# Patient Record
Sex: FEMALE | Race: WHITE | NOT HISPANIC OR LATINO | Employment: OTHER | URBAN - NONMETROPOLITAN AREA
[De-identification: names, ages, dates, MRNs, and addresses within clinical notes are randomized per-mention and may not be internally consistent; named-entity substitution may affect disease eponyms.]

---

## 2017-01-17 ENCOUNTER — OFFICE VISIT (OUTPATIENT)
Dept: OTOLARYNGOLOGY | Facility: CLINIC | Age: 82
End: 2017-01-17

## 2017-01-17 VITALS
TEMPERATURE: 97.8 F | DIASTOLIC BLOOD PRESSURE: 74 MMHG | BODY MASS INDEX: 25.39 KG/M2 | WEIGHT: 158 LBS | HEART RATE: 64 BPM | SYSTOLIC BLOOD PRESSURE: 114 MMHG | HEIGHT: 66 IN

## 2017-01-17 DIAGNOSIS — R09.89 GLOBUS SENSATION: Primary | ICD-10-CM

## 2017-01-17 DIAGNOSIS — H69.83 EUSTACHIAN TUBE DYSFUNCTION, BILATERAL: ICD-10-CM

## 2017-01-17 DIAGNOSIS — J30.1 SEASONAL ALLERGIC RHINITIS DUE TO POLLEN: ICD-10-CM

## 2017-01-17 DIAGNOSIS — K21.9 GASTROESOPHAGEAL REFLUX DISEASE, ESOPHAGITIS PRESENCE NOT SPECIFIED: ICD-10-CM

## 2017-01-17 PROBLEM — J30.9 ALLERGIC RHINITIS: Status: ACTIVE | Noted: 2017-01-17

## 2017-01-17 PROBLEM — H69.80 EUSTACHIAN TUBE DYSFUNCTION: Status: ACTIVE | Noted: 2017-01-17

## 2017-01-17 PROCEDURE — 99213 OFFICE O/P EST LOW 20 MIN: CPT | Performed by: NURSE PRACTITIONER

## 2017-01-17 RX ORDER — BUMETANIDE 0.5 MG/1
0.5 TABLET ORAL DAILY
COMMUNITY
Start: 2017-01-11 | End: 2019-04-15

## 2017-01-17 RX ORDER — LOSARTAN POTASSIUM 50 MG/1
TABLET ORAL
COMMUNITY
End: 2017-09-25 | Stop reason: ALTCHOICE

## 2017-01-17 NOTE — MR AVS SNAPSHOT
Honey Canales   1/17/2017 1:30 PM   Office Visit    Dept Phone:  [phone]   Encounter #:  [id number]    Provider:  YISEL Anderson   Department:  [facility]                Your Full Care Plan              Today's Medication Changes          These changes are accurate as of: 1/17/17  4:18 PM.  If you have any questions, ask your nurse or doctor.               Stop taking medication(s)listed here:     esomeprazole 20 MG capsule   Commonly known as:  nexIUM   Stopped by:  YISEL Anderson           losartan-hydrochlorothiazide 100-25 MG per tablet   Commonly known as:  HYZAAR   Stopped by:  YISEL Anderson           metFORMIN 500 MG tablet   Commonly known as:  GLUCOPHAGE   Stopped by:  YISEL Anderson           MYRBETRIQ 25 MG tablet sustained-release 24 hour   Generic drug:  Mirabegron ER   Stopped by:  YISEL Anderson                      Your Updated Medication List          This list is accurate as of: 1/17/17  4:18 PM.  Always use your most recent med list.                atorvastatin 80 MG tablet   Commonly known as:  LIPITOR       bumetanide 0.5 MG tablet   Commonly known as:  BUMEX       fluticasone 50 MCG/ACT nasal spray   Commonly known as:  FLONASE   USE 1 SPRAY NASALLY DAILY       glipiZIDE 5 MG tablet   Commonly known as:  GLUCOTROL       insulin detemir 100 UNIT/ML injection   Commonly known as:  LEVEMIR       levothyroxine 75 MCG tablet   Commonly known as:  SYNTHROID, LEVOTHROID       losartan 50 MG tablet   Commonly known as:  COZAAR       montelukast 10 MG tablet   Commonly known as:  SINGULAIR   TAKE 1 TABLET DAILY IN THE EVENING       PARoxetine 10 MG tablet   Commonly known as:  PAXIL       XYZAL PO               You Were Diagnosed With        Codes Comments    Globus sensation    -  Primary ICD-10-CM: F45.8  ICD-9-CM: 306.4     Gastroesophageal reflux disease, esophagitis presence not specified      ICD-10-CM: K21.9  ICD-9-CM: 530.81     Seasonal allergic rhinitis due to pollen     ICD-10-CM: J30.1  ICD-9-CM: 477.0     Eustachian tube dysfunction, bilateral     ICD-10-CM: H69.83  ICD-9-CM: 381.81       Instructions    Sleep with the head of bed on an incline. No large meals 1-2 hrs prior to sleep. The patient was instructed to use PPI's 30 minutes prior to the last meal of the day. The patient was advised to avoid fatty meals and caffine or alcohol prior to sleep. Increase water/ non caffeinated drink intake to at least 6-8 glasses of  a day. Decrease caffeine intake. Plain 600 mg Mucinex over the counter, twice a day, as needed to thin out secretions.      ALL ABOUT HEARTBURN AND THE LIFESTYLE CHANGES THAT HELP    Lifestyle Changes Can Help Relieve The Burning Pain In Your Tummy    What is Heartburn?  Heartburn occurs when the lining of the esophagus (the tube that connects the mouth to the stomach) is exposed to and irritated by stomach acid.  Heartburn often feels like a burning pain in the middle of your chest that moves up your throat.  You may also have the sensation that food has come back into your mouth, leaving a sour or bitter taste.  Almost everyone has heartburn once in a while.  But if you have heartburn 2 or more times per week, it can be a sign of a more serious problem call gastroesophogeal reflux disease, or GERD.    What causes Heartburn?  Heartburn usually occurs when the valve at the suzanne of the stomach (the lower esophageal sphincter or LES) doesn’t close the way it should.  If the LES is weak or opens at the wrong time, stomach acid can reflux (flow back) into the esophagus and cause heartburn.  Factors that can affect the LES include:    • Eating or drinking certain foods and beverages such as chocolate, peppermint, fried or fatty foods, coffee, or drinks that contain alcohol  • Having a hiatal hernia - a common physical condition where part of the stomach protrudes up through the  diaphragm  • Lying down  • Smoking cigarettes  • Taking certain medications (be sure to tell your doctor about all medications or supplements you take)    What foods can make heartburn worse?  All foods cause the stomach to produce acid, although foods can affect people in different ways.  Following is a list of foods and beverages that may aggravate your symptoms.  You may want to eat them less often.    • Fried or fatty foods  • Heavy seasoning and spicy foods  • Coffee  • Onions  • Orange juice, grapefruit juice, and tomato juice  • Alcoholic beverages  • Chocolate  • Peppermint and spearmint    What else can I do to help control my heartburn?  Try these lifestyle changes:  • Stop Smoking  • Lose weight - if you’re overweight  • Exercise to help control your weight (talk to your doctor first before starting any exercise program).  • Eat small, well-balanced meals  • Reduce abdominal pressure-don’t wear tight belts or tight clothing  • Avoid eating within 2 hours before bedtime  • Elevate your bed so the head is 6 to 8 inches higher than the foot.  This can help reduce acid reflux at night  • Let your doctor know about all the drugs and supplements you are taking.  Some of them may contribute to your heartburn.    What if these things don’t work?  Talk to your doctor, who can discuss many treatments with you.  Although there are several possible causes of heartburn associated with GERD, they all involve stomach acids.  So no matter what the cause may be, the medicines to reduce stomach acid can prevent heartburn.         Patient Instructions History      Upcoming Appointments     Visit Type Date Time Department    FOLLOW UP 1/17/2017  1:30 PM [facility] [place]    FOLLOW UP 2/21/2017  1:00 PM [facility]    FOLLOW UP 7/18/2017  1:30 PM [facility]     [facility] MyChart allows you to send messages to your doctor, view your test results, renew your prescriptions, schedule appointments,  "and more. To sign up, go to [employer] and click on the Sign Up Now link in the New User? box. Enter your [employer] Activation Code exactly as it appears below along with the last four digits of your Social Security Number and your Date of Birth () to complete the sign-up process. If you do not sign up before the expiration date, you must request a new code.    [employer] Activation Code: [email or web address]  Expires: 2017  4:18 PM    If you have questions, you can email [email or web address] or call [phone] to talk to our [employer] staff. Remember, [employer] is NOT to be used for urgent needs. For medical emergencies, dial 911.               Other Info from Your Visit           Your Appointments     2017  1:00 PM CST   Follow Up with Archie Avery MD   [facility] (--)    [address]  [facility] [id number] [phone]           Arrive 15 minutes prior to appointment.            2017  1:30 PM CDT   Follow Up with YISEL Anderson   [facility] (--)    [address]  [place] [address] [phone]           Arrive 15 minutes prior to appointment.              Allergies     Accupril [Quinapril Hcl]      Adhesive Tape      Aspirin      Celebrex [Celecoxib]  Nausea Only    Codeine Phosphate [Codeine]      Collagen      CAT GUT SUTURES    Lyrica [Pregabalin]      Sulfa Antibiotics      Tramadol      Vioxx [Rofecoxib]        Reason for Visit     Sinus Problem Drainage/Constant Throat clearing    Dizziness Imbalance      Vital Signs     Blood Pressure Pulse Temperature Height Weight Body Mass Index    114/74 64 97.8 °F (36.6 °C) 66\" (167.6 cm) 158 lb (71.7 kg) 25.5 kg/m2    Smoking Status                   Former Smoker           Problems and Diagnoses Noted     Nasal inflammation due to allergen    Eustachian tube dysfunction    Acid reflux disease    Sensation of lump in throat        "

## 2017-01-17 NOTE — PROGRESS NOTES
PRIMARY CARE PROVIDER: Devon Zuñiga MD  REFERRING PROVIDER: No ref. provider found    Chief Complaint   Patient presents with   • Sinus Problem     Drainage/Constant Throat clearing   • Dizziness     Imbalance       Subjective   History of Present Illness:  Honey Canales is a  83 y.o.  female who complains of allergy, acid reflux and a globus sensation. The symptoms are not localized to a particular location. The patient has had moderate symptoms. The symptoms have been intermittant for the last several months . The symptoms are aggravated by  no identifiable factors . The symptoms are improved by medications.    Review of Systems:  Review of Systems   Constitutional: Negative for chills and fever.   HENT:        See HPI   Eyes: Positive for redness and itching.   Respiratory: Positive for cough. Negative for shortness of breath.    Gastrointestinal: Negative for nausea and vomiting.   Endocrine: Negative.    Allergic/Immunologic: Positive for environmental allergies. Negative for food allergies.   Neurological: Positive for dizziness and headaches.   Hematological: Negative.    Psychiatric/Behavioral: Negative.        Past History:  Past Medical History   Diagnosis Date   • Allergic rhinitis    • Aneurysm    • Arthritis    • Cerebral aneurysm 2009     2ND ONE FOUND   • Chronic laryngitis    • Deviated nasal septum    • Diabetes mellitus    • Disorder of kidney and ureter    • Disturbance of smell and taste    • Dizziness    • Encounter for imaging to screen for metal prior to MRI      NO MRI, METAL CLIPS IN HEAD   • Eustachian tube dysfunction    • GERD (gastroesophageal reflux disease)    • Headache    • Hyperlipidemia    • Hypertension    • Hypothyroid    • Laryngitis sicca    • Nocturia    • Sensorineural hearing loss    • Spinal stenosis    • Urge incontinence    • Urgency of urination    • Urinary frequency    • UTI (urinary tract infection)      Past Surgical History   Procedure Laterality Date   • Brain  surgery       ANUERYSM REMOVED   • Hysterectomy     • Breast surgery       BIOPSY   • Cataract extraction, bilateral     • Cerebral aneurysm repair     • Carpal tunnel release     • Knee arthroscopy     •  section     • Cholecystectomy     • Thyroidectomy     • Tonsillectomy     • Bladder surgery  2016     ManagerComplete     Family History   Problem Relation Age of Onset   • Cancer Brother      BLADDER     Social History   Substance Use Topics   • Smoking status: Former Smoker     Types: Cigarettes     Quit date:    • Smokeless tobacco: None      Comment: SMOKED OVER 40 YEARS   • Alcohol use No       Current Outpatient Prescriptions:   •  atorvastatin (LIPITOR) 80 MG tablet, Take 80 mg by mouth daily., Disp: , Rfl:   •  bumetanide (BUMEX) 0.5 MG tablet, , Disp: , Rfl:   •  fluticasone (FLONASE) 50 MCG/ACT nasal spray, USE 1 SPRAY NASALLY DAILY, Disp: 16 g, Rfl: 4  •  glipiZIDE (GLUCOTROL) 5 MG tablet, Take 5 mg by mouth 2 (two) times a day before meals., Disp: , Rfl:   •  insulin detemir (LEVEMIR) 100 UNIT/ML injection, Inject  under the skin daily., Disp: , Rfl:   •  Levocetirizine Dihydrochloride (XYZAL PO), Take  by mouth., Disp: , Rfl:   •  levothyroxine (SYNTHROID, LEVOTHROID) 75 MCG tablet, Take 75 mcg by mouth daily., Disp: , Rfl:   •  losartan (COZAAR) 50 MG tablet, Take 100 mg by mouth daily , Disp: , Rfl:   •  montelukast (SINGULAIR) 10 MG tablet, TAKE 1 TABLET DAILY IN THE EVENING, Disp: 90 tablet, Rfl: 2  •  PARoxetine (PAXIL) 10 MG tablet, Take 10 mg by mouth every morning., Disp: , Rfl:   Allergies:  Accupril [quinapril hcl]; Adhesive tape; Aspirin; Celebrex [celecoxib]; Codeine phosphate [codeine]; Collagen; Lyrica [pregabalin]; Sulfa antibiotics; Tramadol; and Vioxx [rofecoxib]    Objective     Vital Signs:  Temp:  [97.8 °F (36.6 °C)] 97.8 °F (36.6 °C)  Heart Rate:  [64] 64  BP: (114)/(74) 114/74    Physical Exam:  CONSTITUTIONAL: well nourished, well-developed, alert, oriented,  in no acute distress   COMMUNICATION AND VOICE: able to communicate normally for age, normal voice quality  HEAD: normocephalic, no lesions, atraumatic, no tenderness, no masses   FACE: appearance normal, no lesions, no tenderness, no deformities, facial motion symmetric  EYES: ocular motility normal, eyelids normal, orbits normal, no proptosis, conjunctiva normal , pupils equal, round   EARS:  Hearing: response to conversational voice normal bilaterally   External Ears: auricles without lesions  NOSE:  External Nose: structure normal, no tenderness on palpation, no nasal discharge, no lesions, no evidence of trauma, nostrils patent   Intranasal exam: normal nasal mucosa with no erythema, nasal septum relatively midline   ORAL:  Lips: upper and lower lips without lesion   Oropharynx: dry with stringy mucous  NECK: neck appearance normal  CHEST/RESPIRATORY: respiratory effort normal, normal chest excursion  CARDIOVASCULAR: extremities without cyanosis or edema   NEUROLOGIC/PSYCHIATRIC: oriented appropriately, mood normal, affect appropriate, CN II-XII intact grossly      Results Review:   None.     Assessment   1. Globus sensation    2. Gastroesophageal reflux disease, esophagitis presence not specified    3. Seasonal allergic rhinitis due to pollen    4. Eustachian tube dysfunction, bilateral        Plan   Continue current management plan.     -------MEDICATIONS:-------  Continue previous medication as prescribed.        -----INSTRUCTIONS-----  Continue current plan.  Continue nasal sprays as previously prescribed.  Increase water/ non caffeinated drink intake to at least 6-8 glasses a day. Decrease caffeine intake. Plain Mucinex over the counter as needed to thin out secretions.  Sleep with the head of bed on an incline. No large meals 1-2 hrs prior to sleep. The patient was instructed to use PPI's 30 minutes prior to the last meal of the day. The patient was advised to avoid fatty meals and caffine or alcohol  prior to sleep. May use 600mg Mucinex bid as needed to thin secretions.      -----FOLLOW UP-----  Return in about 6 months (around 7/17/2017) for Recheck allergies/GERD.        Beena Grey, YISEL  01/17/17  2:10 PM

## 2017-01-17 NOTE — PATIENT INSTRUCTIONS
Sleep with the head of bed on an incline. No large meals 1-2 hrs prior to sleep. The patient was instructed to use PPI's 30 minutes prior to the last meal of the day. The patient was advised to avoid fatty meals and caffine or alcohol prior to sleep. Increase water/ non caffeinated drink intake to at least 6-8 glasses of  a day. Decrease caffeine intake. Plain 600 mg Mucinex over the counter, twice a day, as needed to thin out secretions.      ALL ABOUT HEARTBURN AND THE LIFESTYLE CHANGES THAT HELP    Lifestyle Changes Can Help Relieve The Burning Pain In Your Tummy    What is Heartburn?  Heartburn occurs when the lining of the esophagus (the tube that connects the mouth to the stomach) is exposed to and irritated by stomach acid.  Heartburn often feels like a burning pain in the middle of your chest that moves up your throat.  You may also have the sensation that food has come back into your mouth, leaving a sour or bitter taste.  Almost everyone has heartburn once in a while.  But if you have heartburn 2 or more times per week, it can be a sign of a more serious problem call gastroesophogeal reflux disease, or GERD.    What causes Heartburn?  Heartburn usually occurs when the valve at the suzanne of the stomach (the lower esophageal sphincter or LES) doesn’t close the way it should.  If the LES is weak or opens at the wrong time, stomach acid can reflux (flow back) into the esophagus and cause heartburn.  Factors that can affect the LES include:    • Eating or drinking certain foods and beverages such as chocolate, peppermint, fried or fatty foods, coffee, or drinks that contain alcohol  • Having a hiatal hernia - a common physical condition where part of the stomach protrudes up through the diaphragm  • Lying down  • Smoking cigarettes  • Taking certain medications (be sure to tell your doctor about all medications or supplements you take)    What foods can make heartburn worse?  All foods cause the stomach to  produce acid, although foods can affect people in different ways.  Following is a list of foods and beverages that may aggravate your symptoms.  You may want to eat them less often.    • Fried or fatty foods  • Heavy seasoning and spicy foods  • Coffee  • Onions  • Orange juice, grapefruit juice, and tomato juice  • Alcoholic beverages  • Chocolate  • Peppermint and spearmint    What else can I do to help control my heartburn?  Try these lifestyle changes:  • Stop Smoking  • Lose weight - if you’re overweight  • Exercise to help control your weight (talk to your doctor first before starting any exercise program).  • Eat small, well-balanced meals  • Reduce abdominal pressure-don’t wear tight belts or tight clothing  • Avoid eating within 2 hours before bedtime  • Elevate your bed so the head is 6 to 8 inches higher than the foot.  This can help reduce acid reflux at night  • Let your doctor know about all the drugs and supplements you are taking.  Some of them may contribute to your heartburn.    What if these things don’t work?  Talk to your doctor, who can discuss many treatments with you.  Although there are several possible causes of heartburn associated with GERD, they all involve stomach acids.  So no matter what the cause may be, the medicines to reduce stomach acid can prevent heartburn.

## 2017-02-16 ENCOUNTER — TELEPHONE (OUTPATIENT)
Dept: UROLOGY | Facility: CLINIC | Age: 82
End: 2017-02-16

## 2017-02-16 NOTE — TELEPHONE ENCOUNTER
----- Message from Megan Reilly sent at 2/15/2017 11:51 AM CST -----  Contact: [phone]  Patient is still having problems going to bathroom at night, She getting up 4 to 5 times and not making it to bathroom. She said the the [facility] Rep was suppose to call her back and she never did. She wanted to know if the Rep can come to appointment next week with her. She also wants to know if we have a number for the Rep.      Thanks

## 2017-02-23 ENCOUNTER — OFFICE VISIT (OUTPATIENT)
Dept: UROLOGY | Facility: CLINIC | Age: 82
End: 2017-02-23

## 2017-02-23 VITALS
BODY MASS INDEX: 25.07 KG/M2 | DIASTOLIC BLOOD PRESSURE: 66 MMHG | WEIGHT: 156 LBS | TEMPERATURE: 97.1 F | SYSTOLIC BLOOD PRESSURE: 130 MMHG | HEIGHT: 66 IN

## 2017-02-23 DIAGNOSIS — R35.0 FREQUENCY OF URINATION: ICD-10-CM

## 2017-02-23 DIAGNOSIS — R35.1 NOCTURIA: Primary | ICD-10-CM

## 2017-02-23 DIAGNOSIS — N39.41 URGE INCONTINENCE: ICD-10-CM

## 2017-02-23 LAB
BILIRUB BLD-MCNC: NEGATIVE MG/DL
CLARITY, POC: CLEAR
COLOR UR: YELLOW
GLUCOSE UR STRIP-MCNC: NEGATIVE MG/DL
KETONES UR QL: ABNORMAL
LEUKOCYTE EST, POC: NEGATIVE
NITRITE UR-MCNC: NEGATIVE MG/ML
PH UR: 5 [PH] (ref 5–8)
PROT UR STRIP-MCNC: ABNORMAL MG/DL
RBC # UR STRIP: NEGATIVE /UL
SP GR UR: 1.02 (ref 1–1.03)
UROBILINOGEN UR QL: NORMAL

## 2017-02-23 PROCEDURE — 81003 URINALYSIS AUTO W/O SCOPE: CPT | Performed by: UROLOGY

## 2017-02-23 PROCEDURE — 99213 OFFICE O/P EST LOW 20 MIN: CPT | Performed by: UROLOGY

## 2017-02-23 RX ORDER — LOSARTAN POTASSIUM 100 MG/1
100 TABLET ORAL NIGHTLY
COMMUNITY
Start: 2017-02-19 | End: 2019-12-02

## 2017-02-23 RX ORDER — LEVOCETIRIZINE DIHYDROCHLORIDE 5 MG/1
5 TABLET, FILM COATED ORAL EVERY EVENING
COMMUNITY
Start: 2017-02-12 | End: 2020-12-10

## 2017-02-23 NOTE — PROGRESS NOTES
Subjective    Ms. Canales is 83 y.o. female    CHIEF COMPLAINT: Nocturia and urge incontinence    The patient comes back today for follow-up of urgency frequency urgent continence and nocturia she says she does not do to bed during the day but she really has a lot of problems at night she gets up 3 maybe 4 times at night which actually looking back over her history is somewhat improved however which he does get up she cannot quite make it to the bathroom.    There is no gross hematuria there is no dysuria but just irritable-type symptoms.    She is had a InterStim and we will change at the last time about 3 months ago when she was here maybe a little better again as noted above    Again no symptoms of infections at all also I did discuss revascularization with her she is on an Leslie on a diuretic she does take in the morning but I do not know she may be getting a little more fluid at night axes she says she may make more urine at night      History of Present Illness      The following portions of the patient's history were reviewed and updated as appropriate: allergies, current medications, past family history, past medical history, past social history, past surgical history and problem list.    Review of Systems   Constitutional: Negative for fever.   Gastrointestinal: Negative for nausea and vomiting.   Genitourinary: Positive for frequency (3-5 times a night) and urgency. Negative for difficulty urinating.         Current Outpatient Prescriptions:   •  atorvastatin (LIPITOR) 80 MG tablet, Take 80 mg by mouth daily., Disp: , Rfl:   •  bumetanide (BUMEX) 0.5 MG tablet, , Disp: , Rfl:   •  fluticasone (FLONASE) 50 MCG/ACT nasal spray, USE 1 SPRAY NASALLY DAILY, Disp: 16 g, Rfl: 4  •  glipiZIDE (GLUCOTROL) 5 MG tablet, Take 5 mg by mouth 2 (two) times a day before meals., Disp: , Rfl:   •  insulin detemir (LEVEMIR) 100 UNIT/ML injection, Inject  under the skin daily., Disp: , Rfl:   •  levocetirizine (XYZAL) 5 MG  tablet, , Disp: , Rfl:   •  Levocetirizine Dihydrochloride (XYZAL PO), Take  by mouth., Disp: , Rfl:   •  levothyroxine (SYNTHROID, LEVOTHROID) 75 MCG tablet, Take 75 mcg by mouth daily., Disp: , Rfl:   •  losartan (COZAAR) 100 MG tablet, , Disp: , Rfl:   •  losartan (COZAAR) 50 MG tablet, Take 100 mg by mouth daily , Disp: , Rfl:   •  montelukast (SINGULAIR) 10 MG tablet, TAKE 1 TABLET DAILY IN THE EVENING, Disp: 90 tablet, Rfl: 2  •  PARoxetine (PAXIL) 10 MG tablet, Take 10 mg by mouth every morning., Disp: , Rfl:     Past Medical History   Diagnosis Date   • Allergic rhinitis    • Aneurysm    • Arthritis    • Cerebral aneurysm 2009 ONE FOUND   • Chronic laryngitis    • Deviated nasal septum    • Diabetes mellitus    • Disorder of kidney and ureter    • Disturbance of smell and taste    • Dizziness    • Encounter for imaging to screen for metal prior to MRI      NO MRI, METAL CLIPS IN HEAD   • Eustachian tube dysfunction    • GERD (gastroesophageal reflux disease)    • Headache    • Hyperlipidemia    • Hypertension    • Hypothyroid    • Laryngitis sicca    • Nocturia    • Sensorineural hearing loss    • Spinal stenosis    • Urge incontinence    • Urgency of urination    • Urinary frequency    • UTI (urinary tract infection)        Past Surgical History   Procedure Laterality Date   • Brain surgery       ANUERYSM REMOVED   • Hysterectomy     • Breast surgery       BIOPSY   • Cataract extraction, bilateral     • Cerebral aneurysm repair     • Carpal tunnel release     • Knee arthroscopy     •  section     • Cholecystectomy     • Thyroidectomy     • Tonsillectomy     • Bladder surgery  2016     [id number]       Social History     Social History   • Marital status:      Spouse name: N/A   • Number of children: N/A   • Years of education: N/A     Social History Main Topics   • Smoking status: Former Smoker     Types: Cigarettes     Quit date:    • Smokeless tobacco: None       "Comment: SMOKED OVER 40 YEARS   • Alcohol use No   • Drug use: None   • Sexual activity: Not Asked     Other Topics Concern   • None     Social History Narrative       Family History   Problem Relation Age of Onset   • Cancer Brother      BLADDER       Objective    Visit Vitals   • /66   • Temp 97.1 °F (36.2 °C)   • Ht 66\" (167.6 cm)   • Wt 156 lb (70.8 kg)   • BMI 25.18 kg/m2       Physical Exam      Office Visit on 11/21/2016   Component Date Value Ref Range Status   • Color 11/21/2016 Yellow  Yellow, Straw, Dark Yellow, Kirti Final   • Clarity, UA 11/21/2016 Clear  Clear Final   • Glucose, UA 11/21/2016 Negative  Negative mg/dL Final   • Bilirubin 11/21/2016 Negative  Negative Final   • Ketones, UA 11/21/2016 Negative  Negative Final   • Specific Gravity  11/21/2016 1.025  1.005 - 1.030 Final   • Blood, UA 11/21/2016 Negative  Negative Final   • pH, Urine 11/21/2016 5.5  5.0 - 8.0 Final   • Protein, POC 11/21/2016 Negative  Negative mg/dL Final   • Urobilinogen, UA 11/21/2016 Normal  Normal Final   • Leukocytes 11/21/2016 Negative  Negative Final   • Nitrite, UA 11/21/2016 Negative  Negative Final       Results for orders placed or performed in visit on 02/23/17   POC Urinalysis Dipstick, Automated   Result Value Ref Range    Color Yellow Yellow, Straw, Dark Yellow, Kirti    Clarity, UA Clear Clear    Glucose, UA Negative Negative, 1000 mg/dL (3+) mg/dL    Bilirubin Negative Negative    Ketones, UA Trace (A) Negative    Specific Gravity  1.025 1.005 - 1.030    Blood, UA Negative Negative    pH, Urine 5.0 5.0 - 8.0    Protein, POC Trace (A) Negative mg/dL    Urobilinogen, UA Normal Normal    Leukocytes Negative Negative    Nitrite, UA Negative Negative       Assessment and Plan    Diagnoses and all orders for this visit:    Nocturia  -     POC Urinalysis Dipstick, Automated    Urge incontinence    Frequency of urination   plan--I discussed the options with the patient underwent a head and, reprogrammed her " device she was on level I at the power setting of 4.5 and I increased her level I prior setting of 4.8 before she started feeling in the vaginal tissues.    If this does not work or she continues to have symptoms and we will can have her come back when Miranda is here and reprogramming she will let us know otherwise we will see her back in 3 months time    EMR Dragon/Transcription disclaimer:  Much of this encounter note is an electronic transcription/translation of spoken language to printed text. The electronic translation of spoken language may permit erroneous, or at times, nonsensical words or phrases to be inadvertently transcribed; although I have reviewed the note for such errors, some may still exist.

## 2017-03-24 ENCOUNTER — HOSPITAL ENCOUNTER (OUTPATIENT)
Dept: WOMENS IMAGING | Age: 82
Discharge: HOME | End: 2017-03-24
Payer: MEDICARE

## 2017-03-24 DIAGNOSIS — Z12.31 ENCOUNTER FOR SCREENING MAMMOGRAM FOR BREAST CANCER: ICD-10-CM

## 2017-03-24 PROCEDURE — 77063 BREAST TOMOSYNTHESIS BI: CPT

## 2017-05-23 ENCOUNTER — OFFICE VISIT (OUTPATIENT)
Dept: UROLOGY | Facility: CLINIC | Age: 82
End: 2017-05-23

## 2017-05-23 ENCOUNTER — TELEPHONE (OUTPATIENT)
Dept: UROLOGY | Facility: CLINIC | Age: 82
End: 2017-05-23

## 2017-05-23 VITALS
WEIGHT: 157 LBS | DIASTOLIC BLOOD PRESSURE: 68 MMHG | HEIGHT: 66 IN | SYSTOLIC BLOOD PRESSURE: 132 MMHG | TEMPERATURE: 96.6 F | BODY MASS INDEX: 25.23 KG/M2

## 2017-05-23 DIAGNOSIS — R35.0 FREQUENCY OF URINATION: ICD-10-CM

## 2017-05-23 DIAGNOSIS — R35.1 NOCTURIA: Primary | ICD-10-CM

## 2017-05-23 DIAGNOSIS — N39.41 URGE INCONTINENCE: ICD-10-CM

## 2017-05-23 DIAGNOSIS — R32 INCONTINENCE IN FEMALE: ICD-10-CM

## 2017-05-23 LAB
BILIRUB BLD-MCNC: NEGATIVE MG/DL
CLARITY, POC: CLEAR
COLOR UR: YELLOW
GLUCOSE UR STRIP-MCNC: ABNORMAL MG/DL
KETONES UR QL: NEGATIVE
LEUKOCYTE EST, POC: NEGATIVE
NITRITE UR-MCNC: NEGATIVE MG/ML
PH UR: 5.5 [PH] (ref 5–8)
PROT UR STRIP-MCNC: NEGATIVE MG/DL
RBC # UR STRIP: ABNORMAL /UL
SP GR UR: 10.02 (ref 1–1.03)
UROBILINOGEN UR QL: NORMAL

## 2017-05-23 PROCEDURE — 81003 URINALYSIS AUTO W/O SCOPE: CPT | Performed by: UROLOGY

## 2017-05-23 PROCEDURE — 51798 US URINE CAPACITY MEASURE: CPT | Performed by: UROLOGY

## 2017-05-23 PROCEDURE — 99213 OFFICE O/P EST LOW 20 MIN: CPT | Performed by: UROLOGY

## 2017-06-06 ENCOUNTER — TELEPHONE (OUTPATIENT)
Dept: UROLOGY | Facility: CLINIC | Age: 82
End: 2017-06-06

## 2017-06-06 NOTE — TELEPHONE ENCOUNTER
Patient called to see when Miranda the [employer] rep would back in the office. Her interstim has not improved the issue. I spoke with Marty to get in touch with Miranda. I told the patient we would call her once we knew a date.

## 2017-06-08 ENCOUNTER — HOSPITAL ENCOUNTER (OUTPATIENT)
Dept: ULTRASOUND IMAGING | Facility: HOSPITAL | Age: 82
Discharge: HOME | End: 2017-06-08
Attending: INTERNAL MEDICINE | Admitting: INTERNAL MEDICINE

## 2017-06-08 ENCOUNTER — TRANSCRIBE ORDERS (OUTPATIENT)
Dept: GENERAL RADIOLOGY | Facility: HOSPITAL | Age: 82
End: 2017-06-08

## 2017-06-08 DIAGNOSIS — R55 NEAR SYNCOPE: ICD-10-CM

## 2017-06-08 DIAGNOSIS — R55 NEAR SYNCOPE: Primary | ICD-10-CM

## 2017-06-08 PROCEDURE — 93880 EXTRACRANIAL BILAT STUDY: CPT

## 2017-07-06 ENCOUNTER — TELEPHONE (OUTPATIENT)
Dept: UROLOGY | Facility: CLINIC | Age: 82
End: 2017-07-06

## 2017-07-06 NOTE — TELEPHONE ENCOUNTER
Pt called said the program Miranda changed her interstim to last time did not work. I have sent Miranda a message via text to see what her advice is to do next with that patient. I will also be sending Dr. Avery a message to see when he would like to see patient back in the office since it doesn't specify in last office note in May,

## 2017-07-11 NOTE — TELEPHONE ENCOUNTER
Marty is go ahead and get her an appointment when Miranda is here sometime the near future when I think I am here

## 2017-08-29 ENCOUNTER — OFFICE VISIT (OUTPATIENT)
Dept: UROLOGY | Facility: CLINIC | Age: 82
End: 2017-08-29

## 2017-08-29 VITALS — BODY MASS INDEX: 25.71 KG/M2 | TEMPERATURE: 97 F | WEIGHT: 160 LBS | HEIGHT: 66 IN

## 2017-08-29 DIAGNOSIS — R35.1 NOCTURIA: ICD-10-CM

## 2017-08-29 DIAGNOSIS — N39.41 URGE INCONTINENCE: Primary | ICD-10-CM

## 2017-08-29 DIAGNOSIS — R35.0 FREQUENCY OF URINATION: ICD-10-CM

## 2017-08-29 LAB
BILIRUB BLD-MCNC: NEGATIVE MG/DL
CLARITY, POC: CLEAR
COLOR UR: YELLOW
GLUCOSE UR STRIP-MCNC: NEGATIVE MG/DL
KETONES UR QL: NEGATIVE
LEUKOCYTE EST, POC: NEGATIVE
NITRITE UR-MCNC: NEGATIVE MG/ML
PH UR: 6 [PH] (ref 5–8)
PROT UR STRIP-MCNC: NEGATIVE MG/DL
RBC # UR STRIP: NEGATIVE /UL
SP GR UR: 1.01 (ref 1–1.03)
UROBILINOGEN UR QL: NORMAL

## 2017-08-29 PROCEDURE — 99213 OFFICE O/P EST LOW 20 MIN: CPT | Performed by: UROLOGY

## 2017-08-29 PROCEDURE — 81003 URINALYSIS AUTO W/O SCOPE: CPT | Performed by: UROLOGY

## 2017-08-29 NOTE — PROGRESS NOTES
Subjective    Ms. Canales is 84 y.o. female    CHIEF COMPLAINT: Urge incontinence    The patient comes back today apparently about 2 weeks ago she was able to get hold of the Miranda and another ref for [employer] N/A changed her power settings and her settings and actually she was doing great unfortunately recently she is also had a tooth infection she is been on steroids and antibiotics and this is causing her diabetes to go way out of control so she is having to void a lot more frequently and of course with the frequency, little more urgency etc.    She is not had any gross hematuria there is no flank pain.    Her urinalysis today is clear            History of Present Illness      The following portions of the patient's history were reviewed and updated as appropriate: allergies, current medications, past family history, past medical history, past social history, past surgical history and problem list.    Review of Systems   Constitutional: Negative for chills and fever.   Gastrointestinal: Negative for abdominal pain, anal bleeding and blood in stool.   Genitourinary: Positive for frequency and urgency. Negative for difficulty urinating, dysuria, flank pain and hematuria.         Current Outpatient Prescriptions:   •  atorvastatin (LIPITOR) 80 MG tablet, Take 80 mg by mouth daily., Disp: , Rfl:   •  bumetanide (BUMEX) 0.5 MG tablet, , Disp: , Rfl:   •  fluticasone (FLONASE) 50 MCG/ACT nasal spray, USE 1 SPRAY NASALLY DAILY, Disp: 16 g, Rfl: 4  •  glipiZIDE (GLUCOTROL) 5 MG tablet, Take 5 mg by mouth 2 (two) times a day before meals., Disp: , Rfl:   •  insulin detemir (LEVEMIR) 100 UNIT/ML injection, Inject  under the skin daily., Disp: , Rfl:   •  levocetirizine (XYZAL) 5 MG tablet, , Disp: , Rfl:   •  Levocetirizine Dihydrochloride (XYZAL PO), Take  by mouth., Disp: , Rfl:   •  levothyroxine (SYNTHROID, LEVOTHROID) 75 MCG tablet, Take 75 mcg by mouth daily., Disp: , Rfl:   •  losartan (COZAAR) 100 MG tablet, ,  Disp: , Rfl:   •  losartan (COZAAR) 50 MG tablet, Take 100 mg by mouth daily , Disp: , Rfl:   •  montelukast (SINGULAIR) 10 MG tablet, TAKE 1 TABLET DAILY IN THE EVENING, Disp: 90 tablet, Rfl: 2  •  PARoxetine (PAXIL) 10 MG tablet, Take 10 mg by mouth every morning., Disp: , Rfl:     Past Medical History:   Diagnosis Date   • Allergic rhinitis    • Aneurysm    • Arthritis    • Cerebral aneurysm 2009    2ND ONE FOUND   • Chronic laryngitis    • Deviated nasal septum    • Diabetes mellitus    • Disorder of kidney and ureter    • Disturbance of smell and taste    • Dizziness    • Encounter for imaging to screen for metal prior to MRI     NO MRI, METAL CLIPS IN HEAD   • Eustachian tube dysfunction    • GERD (gastroesophageal reflux disease)    • Headache    • Hyperlipidemia    • Hypertension    • Hypothyroid    • Laryngitis sicca    • Nocturia    • Sensorineural hearing loss    • Spinal stenosis    • Urge incontinence    • Urgency of urination    • Urinary frequency    • UTI (urinary tract infection)        Past Surgical History:   Procedure Laterality Date   • BLADDER SURGERY  2016    [employer] Interstem   • BRAIN SURGERY      ANUERYSM REMOVED   • BREAST SURGERY      BIOPSY   • CARPAL TUNNEL RELEASE     • CATARACT EXTRACTION, BILATERAL     • CEREBRAL ANEURYSM REPAIR     •  SECTION     • CHOLECYSTECTOMY     • HYSTERECTOMY     • KNEE ARTHROSCOPY     • THYROIDECTOMY     • TONSILLECTOMY         Social History     Social History   • Marital status:      Spouse name: N/A   • Number of children: N/A   • Years of education: N/A     Social History Main Topics   • Smoking status: Former Smoker     Types: Cigarettes     Quit date:    • Smokeless tobacco: None      Comment: SMOKED OVER 40 YEARS   • Alcohol use No   • Drug use: None   • Sexual activity: Not Asked     Other Topics Concern   • None     Social History Narrative       Family History   Problem Relation Age of Onset   • Cancer Brother      BLADDER  "      Objective    Temp 97 °F (36.1 °C)  Ht 66\" (167.6 cm)  Wt 160 lb (72.6 kg)  BMI 25.82 kg/m2    Physical Exam      Office Visit on 05/23/2017   Component Date Value Ref Range Status   • Color 05/23/2017 Yellow  Yellow, Straw, Dark Yellow, Kirti Final   • Clarity, UA 05/23/2017 Clear  Clear Final   • Glucose, UA 05/23/2017 100 mg/dL* Negative, 1000 mg/dL (3+) mg/dL Final   • Bilirubin 05/23/2017 Negative  Negative Final   • Ketones, UA 05/23/2017 Negative  Negative Final   • Specific Gravity  05/23/2017 10.020* 1.005 - 1.030 Final   • Blood, UA 05/23/2017 Trace* Negative Final   • pH, Urine 05/23/2017 5.5  5.0 - 8.0 Final   • Protein, POC 05/23/2017 Negative  Negative mg/dL Final   • Urobilinogen, UA 05/23/2017 Normal  Normal Final   • Leukocytes 05/23/2017 Negative  Negative Final   • Nitrite, UA 05/23/2017 Negative  Negative Final       Results for orders placed or performed in visit on 08/29/17   POC Urinalysis Dipstick, Automated   Result Value Ref Range    Color Yellow Yellow, Straw, Dark Yellow, Kirti    Clarity, UA Clear Clear    Glucose, UA Negative Negative, 1000 mg/dL (3+) mg/dL    Bilirubin Negative Negative    Ketones, UA Negative Negative    Specific Gravity  1.010 1.005 - 1.030    Blood, UA Negative Negative    pH, Urine 6.0 5.0 - 8.0    Protein, POC Negative Negative mg/dL    Urobilinogen, UA Normal Normal    Leukocytes Negative Negative    Nitrite, UA Negative Negative       Assessment and Plan    Diagnoses and all orders for this visit:    Urge incontinence  -     POC Urinalysis Dipstick, Automated    Nocturia    Frequency of urination  Plan--she questions, what to do with the InterStim at this point I told her until she is off the steroids and antibiotics and is to this fixed I would expect may be that the diabetes would be a lot worse and that may affect her frequency of voiding.    I told her really that that will be helped probably by the InterStim except that she may be would be able to " hold a little more volume so I would wait until her tooth and infection etc. is improved she is off steroids and antibiotics and her etc. before we get too concerned about this.    She has had really the last couple weeks prior to this was the best she had with her bladder site told her I would not therefore change the settings at this point in time and less just leave it as it is.    On was here back again in 3 months but if she gets the infection cleared up and still having trouble she will let us know prior to that

## 2017-09-14 RX ORDER — FLUTICASONE PROPIONATE 50 MCG
SPRAY, SUSPENSION (ML) NASAL
Qty: 16 G | Refills: 4 | Status: SHIPPED | OUTPATIENT
Start: 2017-09-14 | End: 2018-01-02 | Stop reason: SINTOL

## 2017-09-25 ENCOUNTER — OFFICE VISIT (OUTPATIENT)
Dept: OTOLARYNGOLOGY | Facility: CLINIC | Age: 82
End: 2017-09-25

## 2017-09-25 VITALS
WEIGHT: 160.13 LBS | TEMPERATURE: 97.8 F | BODY MASS INDEX: 25.73 KG/M2 | HEART RATE: 69 BPM | HEIGHT: 66 IN | SYSTOLIC BLOOD PRESSURE: 107 MMHG | DIASTOLIC BLOOD PRESSURE: 66 MMHG

## 2017-09-25 DIAGNOSIS — J30.1 SEASONAL ALLERGIC RHINITIS DUE TO POLLEN: Primary | ICD-10-CM

## 2017-09-25 PROCEDURE — 99213 OFFICE O/P EST LOW 20 MIN: CPT | Performed by: NURSE PRACTITIONER

## 2017-09-25 NOTE — PROGRESS NOTES
YOB: 1933  Location: [facility]  Location Address: [address] [place] [address], [facility], [address] [place] [address]  Location Phone: [phone]    Chief Complaint   Patient presents with   • Ear Problem       History of Present Illness  Honey Canales is a 84 y.o. female.  Honey Canales is here for follow up of ENT complaints. The patient has had problems with nasal drainage  The symptoms are not localized to a particular location. The patient has had mild symptoms. The symptoms have been present for the last several months The symptoms are aggravated by  no identifiable factors. The symptoms are improved by no identifiable factors. Her PCP is giving her her allergy meds       Past Medical History:   Diagnosis Date   • Allergic rhinitis    • Aneurysm    • Arthritis    • Cerebral aneurysm 2009 ONE FOUND   • Chronic laryngitis    • Deviated nasal septum    • Diabetes mellitus    • Disorder of kidney and ureter    • Disturbance of smell and taste    • Dizziness    • Encounter for imaging to screen for metal prior to MRI     NO MRI, METAL CLIPS IN HEAD   • Eustachian tube dysfunction    • GERD (gastroesophageal reflux disease)    • Globus sensation    • Headache    • Hyperlipidemia    • Hypertension    • Hypothyroid    • Laryngitis sicca    • Nocturia    • Sensorineural hearing loss    • Spinal stenosis    • Urge incontinence    • Urgency of urination    • Urinary frequency    • UTI (urinary tract infection)        Past Surgical History:   Procedure Laterality Date   • BLADDER SURGERY  2016    [employer] Interstem   • BRAIN SURGERY      ANUERYSM REMOVED   • BREAST SURGERY      BIOPSY   • CARPAL TUNNEL RELEASE     • CATARACT EXTRACTION, BILATERAL     • CEREBRAL ANEURYSM REPAIR     •  SECTION     • CHOLECYSTECTOMY     • HYSTERECTOMY     • KNEE ARTHROSCOPY     • THYROIDECTOMY     • TONSILLECTOMY           Current Outpatient Prescriptions:   •  atorvastatin (LIPITOR) 80 MG tablet, Take 80 mg by mouth  daily., Disp: , Rfl:   •  bumetanide (BUMEX) 0.5 MG tablet, , Disp: , Rfl:   •  fluticasone (FLONASE) 50 MCG/ACT nasal spray, USE 1 SPRAY NASALLY DAILY, Disp: 16 g, Rfl: 4  •  insulin detemir (LEVEMIR) 100 UNIT/ML injection, Inject  under the skin daily., Disp: , Rfl:   •  levocetirizine (XYZAL) 5 MG tablet, , Disp: , Rfl:   •  levothyroxine (SYNTHROID, LEVOTHROID) 75 MCG tablet, Take 75 mcg by mouth daily., Disp: , Rfl:   •  losartan (COZAAR) 100 MG tablet, , Disp: , Rfl:   •  montelukast (SINGULAIR) 10 MG tablet, TAKE 1 TABLET DAILY IN THE EVENING, Disp: 90 tablet, Rfl: 2  •  PARoxetine (PAXIL) 10 MG tablet, Take 10 mg by mouth every morning., Disp: , Rfl:     Accupril [quinapril hcl]; Adhesive tape; Aspirin; Celebrex [celecoxib]; Codeine phosphate [codeine]; Collagen; Lyrica [pregabalin]; Pantoprazole sodium; Pentoxifylline; Sulfa antibiotics; Tramadol; and Vioxx [rofecoxib]    Family History   Problem Relation Age of Onset   • Cancer Brother      BLADDER       Social History     Social History   • Marital status:      Spouse name: N/A   • Number of children: N/A   • Years of education: N/A     Occupational History   • Not on file.     Social History Main Topics   • Smoking status: Former Smoker     Types: Cigarettes     Quit date: 1993   • Smokeless tobacco: Never Used      Comment: SMOKED OVER 40 YEARS   • Alcohol use No   • Drug use: Defer   • Sexual activity: Not on file     Other Topics Concern   • Not on file     Social History Narrative       Review of Systems   Constitutional: Negative.    HENT:        SEE HPI   Eyes: Negative.    Respiratory: Negative.    Cardiovascular: Negative.    Gastrointestinal: Negative.    Endocrine: Negative.    Genitourinary: Negative.    Musculoskeletal: Negative.    Skin: Negative.    Allergic/Immunologic: Negative.    Neurological: Negative.    Hematological: Negative.    Psychiatric/Behavioral: Negative.        Vitals:    09/25/17 1538   BP: 107/66   Pulse: 69    Temp: 97.8 °F (36.6 °C)       Objective     Physical Exam  CONSTITUTIONAL: well nourished, alert, oriented, in no acute distress     COMMUNICATION AND VOICE: able to communicate normally, normal voice quality    HEAD: normocephalic, no lesions, atraumatic, no tenderness, no masses     FACE: appearance normal, no lesions, no tenderness, no deformities, facial motion symmetric    SALIVARY GLANDS: parotid glands with no tenderness, no swelling, no masses, submandibular glands with normal size, nontender    EYES: ocular motility normal, eyelids normal, orbits normal, no proptosis, conjunctiva normal , pupils equal, round     EARS:  Hearing: response to conversational voice normal bilaterally   External Ears: auricles without lesions  Otoscopic: tympanic membrane appearance normal, no lesions, no perforation, normal mobility, no fluid    NOSE:  External Nose: flattened nasal bridge  Intranasal Exam: nasal mucosa normal, vestibule within normal limits, inferior turbinate normal, nasal septum midline     ORAL:  Lips: upper and lower lips without lesion   Teeth: dentition within normal limits for age   Gums: gingivae healthy   Oral Mucosa: oral mucosa normal, no mucosal lesions   Floor of Mouth: Warthin’s duct patent, mucosa normal  Tongue: lingual mucosa normal without lesions, normal tongue mobility   Palate: soft and hard palates with normal mucosa and structure  Oropharynx: oropharyngeal mucosa normal    NECK: neck appearance normal, no mass,  noted without erythema or tenderness    THYROID: no overt thyromegaly, no tenderness, nodules or mass present on palpation, position midline     LYMPH NODES: no lymphadenopathy    CHEST/RESPIRATORY: respiratory effort normal,     CARDIOVASCULAR: extremities without cyanosis or edema      NEUROLOGIC/PSYCHIATRIC: oriented to time, place and person, mood normal, affect appropriate, CN II-XII intact grossly    Assessment/Plan   Honey was seen today for ear problem.    Diagnoses and  all orders for this visit:    Seasonal allergic rhinitis due to pollen      * Surgery not found *  No orders of the defined types were placed in this encounter.    Return if symptoms worsen or fail to improve.       Patient Instructions   Call for problems or worsening symptoms

## 2017-10-16 RX ORDER — MONTELUKAST SODIUM 10 MG/1
TABLET ORAL
Qty: 90 TABLET | Refills: 2 | Status: SHIPPED | OUTPATIENT
Start: 2017-10-16 | End: 2018-07-12 | Stop reason: SDUPTHER

## 2017-11-21 ENCOUNTER — OFFICE VISIT (OUTPATIENT)
Dept: UROLOGY | Facility: CLINIC | Age: 82
End: 2017-11-21

## 2017-11-21 VITALS — BODY MASS INDEX: 27.32 KG/M2 | WEIGHT: 170 LBS | TEMPERATURE: 98.6 F | HEIGHT: 66 IN

## 2017-11-21 DIAGNOSIS — N39.41 URGE INCONTINENCE: Primary | ICD-10-CM

## 2017-11-21 DIAGNOSIS — R35.0 FREQUENCY OF URINATION: ICD-10-CM

## 2017-11-21 DIAGNOSIS — R35.1 NOCTURIA: ICD-10-CM

## 2017-11-21 PROCEDURE — 81003 URINALYSIS AUTO W/O SCOPE: CPT | Performed by: UROLOGY

## 2017-11-21 PROCEDURE — 99213 OFFICE O/P EST LOW 20 MIN: CPT | Performed by: UROLOGY

## 2017-11-21 NOTE — PROGRESS NOTES
Subjective    Ms. Canales is 84 y.o. female    CHIEF COMPLAINT: Nocturia    The patient came back today for follow-up with her InterStim we last saw her she was actually doing better but since then she is got a follow back off the wagon again and her nocturia is 2-3 sometimes 4 times.  Also during the day she has noticed little bit more urgencies some occasional urge incontinence and frequency.    I checked her InterStim for her she was on program one at a level of 5.5.    One ahead and turned up to a level of 5.7 she can just barely feel it and was not uncomfortable.        She denies any gross hematuria there is no flank pain etc.      History of Present Illness      The following portions of the patient's history were reviewed and updated as appropriate: allergies, current medications, past family history, past medical history, past social history, past surgical history and problem list.    Review of Systems   Constitutional: Negative for chills and fever.   Gastrointestinal: Negative for abdominal pain, anal bleeding and blood in stool.   Genitourinary: Positive for frequency and urgency. Negative for difficulty urinating, dysuria, flank pain and hematuria.         Current Outpatient Prescriptions:   •  atorvastatin (LIPITOR) 80 MG tablet, Take 80 mg by mouth daily., Disp: , Rfl:   •  bumetanide (BUMEX) 0.5 MG tablet, , Disp: , Rfl:   •  fluticasone (FLONASE) 50 MCG/ACT nasal spray, USE 1 SPRAY NASALLY DAILY, Disp: 16 g, Rfl: 4  •  insulin detemir (LEVEMIR) 100 UNIT/ML injection, Inject  under the skin daily., Disp: , Rfl:   •  levocetirizine (XYZAL) 5 MG tablet, , Disp: , Rfl:   •  levothyroxine (SYNTHROID, LEVOTHROID) 75 MCG tablet, Take 75 mcg by mouth daily., Disp: , Rfl:   •  losartan (COZAAR) 100 MG tablet, , Disp: , Rfl:   •  montelukast (SINGULAIR) 10 MG tablet, TAKE 1 TABLET DAILY IN THE EVENING, Disp: 90 tablet, Rfl: 2  •  PARoxetine (PAXIL) 10 MG tablet, Take 10 mg by mouth every morning., Disp: , Rfl:  "    Past Medical History:   Diagnosis Date   • Allergic rhinitis    • Aneurysm    • Arthritis    • Cerebral aneurysm 2009    2ND ONE FOUND   • Chronic laryngitis    • Deviated nasal septum    • Diabetes mellitus    • Disorder of kidney and ureter    • Disturbance of smell and taste    • Dizziness    • Encounter for imaging to screen for metal prior to MRI     NO MRI, METAL CLIPS IN HEAD   • Eustachian tube dysfunction    • GERD (gastroesophageal reflux disease)    • Globus sensation    • Headache    • Hyperlipidemia    • Hypertension    • Hypothyroid    • Laryngitis sicca    • Nocturia    • Sensorineural hearing loss    • Spinal stenosis    • Urge incontinence    • Urgency of urination    • Urinary frequency    • UTI (urinary tract infection)        Past Surgical History:   Procedure Laterality Date   • BLADDER SURGERY  2016    Medtronic Interstem   • BRAIN SURGERY      ANUERYSM REMOVED   • BREAST SURGERY      BIOPSY   • CARPAL TUNNEL RELEASE     • CATARACT EXTRACTION, BILATERAL     • CEREBRAL ANEURYSM REPAIR     •  SECTION     • CHOLECYSTECTOMY     • HYSTERECTOMY     • KNEE ARTHROSCOPY     • THYROIDECTOMY     • TONSILLECTOMY         Social History     Social History   • Marital status:      Spouse name: N/A   • Number of children: N/A   • Years of education: N/A     Social History Main Topics   • Smoking status: Former Smoker     Types: Cigarettes     Quit date:    • Smokeless tobacco: Never Used      Comment: SMOKED OVER 40 YEARS   • Alcohol use No   • Drug use: Defer   • Sexual activity: Not Asked     Other Topics Concern   • None     Social History Narrative       Family History   Problem Relation Age of Onset   • Cancer Brother      BLADDER   • No Known Problems Father    • No Known Problems Mother        Objective    Temp 98.6 °F (37 °C)  Ht 66\" (167.6 cm)  Wt 170 lb (77.1 kg)  BMI 27.44 kg/m2    Physical Exam      Office Visit on 2017   Component Date Value Ref Range Status   • " Color 08/29/2017 Yellow  Yellow, Straw, Dark Yellow, Kirti Final   • Clarity, UA 08/29/2017 Clear  Clear Final   • Glucose, UA 08/29/2017 Negative  Negative, 1000 mg/dL (3+) mg/dL Final   • Bilirubin 08/29/2017 Negative  Negative Final   • Ketones, UA 08/29/2017 Negative  Negative Final   • Specific Gravity  08/29/2017 1.010  1.005 - 1.030 Final   • Blood, UA 08/29/2017 Negative  Negative Final   • pH, Urine 08/29/2017 6.0  5.0 - 8.0 Final   • Protein, POC 08/29/2017 Negative  Negative mg/dL Final   • Urobilinogen, UA 08/29/2017 Normal  Normal Final   • Leukocytes 08/29/2017 Negative  Negative Final   • Nitrite, UA 08/29/2017 Negative  Negative Final       Results for orders placed or performed in visit on 11/21/17   POC Urinalysis Dipstick, Automated   Result Value Ref Range    Color Yellow Yellow, Straw, Dark Yellow, Kirti    Clarity, UA Clear Clear    Glucose, UA Negative Negative, 1000 mg/dL (3+) mg/dL    Bilirubin Negative Negative    Ketones, UA Negative Negative    Specific Gravity  1.015 1.005 - 1.030    Blood, UA Negative Negative    pH, Urine 6.0 5.0 - 8.0    Protein, POC Negative Negative mg/dL    Urobilinogen, UA Normal Normal    Leukocytes Negative Negative    Nitrite, UA Negative Negative       Assessment and Plan    Diagnoses and all orders for this visit:    Urge incontinence  -     POC Urinalysis Dipstick, Automated    Nocturia    Frequency of urination    Plan--she is going try this new level for couple weeks if there is no benefit the stomach already have her get set up with Miranda she is not seen Miranda in some time so she will let us know otherwise I will just see her back here in 6 months time.    If she has any change or worsening of her symptoms she will let me know

## 2018-01-02 ENCOUNTER — TELEPHONE (OUTPATIENT)
Dept: OTOLARYNGOLOGY | Facility: CLINIC | Age: 83
End: 2018-01-02

## 2018-01-02 NOTE — TELEPHONE ENCOUNTER
Patient called wanting to discontinue her fluticasone due to intermittent nose bleeding.  Explained to her that she should use humidification during the winter months especially as it is very drying and can cause nosebleeds.  She understood that and still requested to stop the Flonase as well.  Spoke to Angelito Atkins and okay to discontinue med.

## 2018-02-21 ENCOUNTER — OFFICE VISIT (OUTPATIENT)
Dept: GASTROENTEROLOGY | Age: 83
End: 2018-02-21
Payer: MEDICARE

## 2018-02-21 VITALS
DIASTOLIC BLOOD PRESSURE: 60 MMHG | HEART RATE: 60 BPM | SYSTOLIC BLOOD PRESSURE: 104 MMHG | BODY MASS INDEX: 25.15 KG/M2 | OXYGEN SATURATION: 96 % | HEIGHT: 67 IN | WEIGHT: 160.2 LBS

## 2018-02-21 DIAGNOSIS — Z86.010 HISTORY OF COLON POLYPS: Primary | ICD-10-CM

## 2018-02-21 DIAGNOSIS — K59.09 CHRONIC CONSTIPATION: ICD-10-CM

## 2018-02-21 PROCEDURE — 99214 OFFICE O/P EST MOD 30 MIN: CPT | Performed by: NURSE PRACTITIONER

## 2018-02-21 PROCEDURE — G8399 PT W/DXA RESULTS DOCUMENT: HCPCS | Performed by: NURSE PRACTITIONER

## 2018-02-21 PROCEDURE — G8482 FLU IMMUNIZE ORDER/ADMIN: HCPCS | Performed by: NURSE PRACTITIONER

## 2018-02-21 PROCEDURE — G8427 DOCREV CUR MEDS BY ELIG CLIN: HCPCS | Performed by: NURSE PRACTITIONER

## 2018-02-21 PROCEDURE — 1090F PRES/ABSN URINE INCON ASSESS: CPT | Performed by: NURSE PRACTITIONER

## 2018-02-21 PROCEDURE — 1123F ACP DISCUSS/DSCN MKR DOCD: CPT | Performed by: NURSE PRACTITIONER

## 2018-02-21 PROCEDURE — G8419 CALC BMI OUT NRM PARAM NOF/U: HCPCS | Performed by: NURSE PRACTITIONER

## 2018-02-21 PROCEDURE — 4040F PNEUMOC VAC/ADMIN/RCVD: CPT | Performed by: NURSE PRACTITIONER

## 2018-02-21 PROCEDURE — 1036F TOBACCO NON-USER: CPT | Performed by: NURSE PRACTITIONER

## 2018-02-21 RX ORDER — BUMETANIDE 0.5 MG/1
TABLET ORAL
COMMUNITY
Start: 2017-01-11 | End: 2022-03-15

## 2018-02-21 RX ORDER — LOSARTAN POTASSIUM 100 MG/1
TABLET ORAL
COMMUNITY
Start: 2017-02-19 | End: 2022-03-15

## 2018-02-21 ASSESSMENT — ENCOUNTER SYMPTOMS
ABDOMINAL PAIN: 0
CONSTIPATION: 1
VOMITING: 0
COUGH: 0
BLOOD IN STOOL: 0
NAUSEA: 0
RECTAL PAIN: 0
ABDOMINAL DISTENTION: 0
CHEST TIGHTNESS: 0
VOICE CHANGE: 0
SORE THROAT: 0
BACK PAIN: 0
DIARRHEA: 0
SHORTNESS OF BREATH: 0

## 2018-02-21 NOTE — PATIENT INSTRUCTIONS
Colorectal cancer symptoms  The most common symptoms of colorectal cancer include:  1. Stomach pains or frequent gas pains  2. Change in bowel habits (constipation or diarrhea)  3. Blood in the bowel movement  4. Feeling unusually weak or tired  5. Low iron level (iron deficiency anemia)  6.  Black or dark-colored stools

## 2018-02-21 NOTE — PROGRESS NOTES
[facility]    UPPER GASTROINTESTINAL ENDOSCOPY  10/25/2013    [facility]       Current Outpatient Prescriptions   Medication Sig Dispense Refill    bumetanide (BUMEX) 0.5 MG tablet       losartan (COZAAR) 100 MG tablet       insulin detemir (LEVEMIR) 100 UNIT/ML injection vial Inject into the skin      levothyroxine (SYNTHROID) 75 MCG tablet Take 75 mcg by mouth Daily.  Levocetirizine Dihydrochloride (XYZAL PO) Take 5 mg by mouth daily.  PARoxetine (PAXIL) 10 MG tablet Take 10 mg by mouth every morning.  Atorvastatin Calcium (LIPITOR PO) Take 80 mg by mouth daily.  GlipiZIDE (GLUCOTROL PO) Take 10 mg by mouth daily. No current facility-administered medications for this visit. Allergies   Allergen Reactions    Celebrex [Celecoxib]     Codeine     Pregabalin     Protonix [Pantoprazole Sodium] Diarrhea    Quinapril Hcl     Sulfa Antibiotics     Tramadol     Trental [Pentoxifylline Er] Diarrhea    Vioxx [Rofecoxib]         reports that she quit smoking about 25 years ago. She has never used smokeless tobacco. She reports that she does not drink alcohol or use drugs. Review of Systems   Constitutional: Negative for appetite change, fever and unexpected weight change. HENT: Negative for sore throat and voice change. Respiratory: Negative for cough, chest tightness and shortness of breath. Cardiovascular: Negative for chest pain, palpitations and leg swelling. Gastrointestinal: Positive for constipation. Negative for abdominal distention, abdominal pain, blood in stool, diarrhea, nausea, rectal pain and vomiting. Musculoskeletal: Positive for arthralgias and gait problem. Negative for back pain. Skin: Negative for pallor, rash and wound. Neurological: Negative for dizziness, weakness and light-headedness. Hematological: Negative for adenopathy. Does not bruise/bleed easily. All other systems reviewed and are negative.       Objective:   Physical Exam

## 2018-03-13 ENCOUNTER — OFFICE VISIT (OUTPATIENT)
Dept: UROLOGY | Facility: CLINIC | Age: 83
End: 2018-03-13

## 2018-03-13 VITALS — HEIGHT: 66 IN | TEMPERATURE: 96.8 F | BODY MASS INDEX: 25.55 KG/M2 | WEIGHT: 159 LBS

## 2018-03-13 DIAGNOSIS — R35.0 FREQUENCY OF URINATION: ICD-10-CM

## 2018-03-13 DIAGNOSIS — N39.41 URGE INCONTINENCE: Primary | ICD-10-CM

## 2018-03-13 PROCEDURE — 99213 OFFICE O/P EST LOW 20 MIN: CPT | Performed by: UROLOGY

## 2018-03-13 NOTE — PROGRESS NOTES
Subjective    Ms. Canales is 84 y.o. female    CHIEF COMPLAINT: Frequency and urgency    The patient comes back today for follow-up of [facility] she recently is seeing Miranda the [facility] and she is adjusted thinks she has been having some pain in her vaginal tissues and she had turned down she still having some pain and pressure at the end of her voiding but she actually does seem to be doing better she is not had any gross hematuria no fever again symptoms seem to be better today      History of Present Illness      The following portions of the patient's history were reviewed and updated as appropriate: allergies, current medications, past family history, past medical history, past social history, past surgical history and problem list.    Review of Systems   Constitutional: Negative for chills and fever.   Gastrointestinal: Negative for abdominal pain, anal bleeding and blood in stool.   Genitourinary: Positive for decreased urine volume, frequency and vaginal pain. Negative for flank pain and hematuria.         Current Outpatient Prescriptions:   •  atorvastatin (LIPITOR) 80 MG tablet, Take 80 mg by mouth daily., Disp: , Rfl:   •  bumetanide (BUMEX) 0.5 MG tablet, , Disp: , Rfl:   •  insulin detemir (LEVEMIR) 100 UNIT/ML injection, Inject  under the skin daily., Disp: , Rfl:   •  levocetirizine (XYZAL) 5 MG tablet, , Disp: , Rfl:   •  levothyroxine (SYNTHROID, LEVOTHROID) 75 MCG tablet, Take 75 mcg by mouth daily., Disp: , Rfl:   •  losartan (COZAAR) 100 MG tablet, , Disp: , Rfl:   •  montelukast (SINGULAIR) 10 MG tablet, TAKE 1 TABLET DAILY IN THE EVENING, Disp: 90 tablet, Rfl: 2  •  PARoxetine (PAXIL) 10 MG tablet, Take 10 mg by mouth every morning., Disp: , Rfl:     Past Medical History:   Diagnosis Date   • Allergic rhinitis    • Aneurysm    • Arthritis    • Cerebral aneurysm 2009    2ND ONE FOUND   • Chronic laryngitis    • Deviated nasal septum    • Diabetes mellitus    • Disorder of kidney and ureter   "  • Disturbance of smell and taste    • Dizziness    • Encounter for imaging to screen for metal prior to MRI     NO MRI, METAL CLIPS IN HEAD   • Eustachian tube dysfunction    • GERD (gastroesophageal reflux disease)    • Globus sensation    • Headache    • Hyperlipidemia    • Hypertension    • Hypothyroid    • Laryngitis sicca    • Nocturia    • Sensorineural hearing loss    • Spinal stenosis    • Urge incontinence    • Urgency of urination    • Urinary frequency    • UTI (urinary tract infection)        Past Surgical History:   Procedure Laterality Date   • BLADDER SURGERY  2016    Medtronic Interstem   • BRAIN SURGERY      ANUERYSM REMOVED   • BREAST SURGERY      BIOPSY   • CARPAL TUNNEL RELEASE     • CATARACT EXTRACTION, BILATERAL     • CEREBRAL ANEURYSM REPAIR     •  SECTION     • CHOLECYSTECTOMY     • HYSTERECTOMY     • KNEE ARTHROSCOPY     • THYROIDECTOMY     • TONSILLECTOMY         Social History     Social History   • Marital status:      Social History Main Topics   • Smoking status: Former Smoker     Types: Cigarettes     Quit date:    • Smokeless tobacco: Never Used      Comment: SMOKED OVER 40 YEARS   • Alcohol use No   • Drug use: Unknown     Other Topics Concern   • Not on file       Family History   Problem Relation Age of Onset   • Cancer Brother      BLADDER   • No Known Problems Father    • No Known Problems Mother        Objective    Temp 96.8 °F (36 °C)   Ht 167.6 cm (66\")   Wt 72.1 kg (159 lb)   BMI 25.66 kg/m²     Physical Exam      Office Visit on 2017   Component Date Value Ref Range Status   • Color 2017 Yellow  Yellow, Straw, Dark Yellow, Kirti Final   • Clarity, UA 2017 Clear  Clear Final   • Glucose, UA 2017 Negative  Negative, 1000 mg/dL (3+) mg/dL Final   • Bilirubin 2017 Negative  Negative Final   • Ketones, UA 2017 Negative  Negative Final   • Specific Gravity  2017 1.015  1.005 - 1.030 Final   • Blood, UA 2017 " Negative  Negative Final   • pH, Urine 11/21/2017 6.0  5.0 - 8.0 Final   • Protein, POC 11/21/2017 Negative  Negative mg/dL Final   • Urobilinogen, UA 11/21/2017 Normal  Normal Final   • Leukocytes 11/21/2017 Negative  Negative Final   • Nitrite, UA 11/21/2017 Negative  Negative Final       Results for orders placed or performed in visit on 11/21/17   POC Urinalysis Dipstick, Automated   Result Value Ref Range    Color Yellow Yellow, Straw, Dark Yellow, Kirti    Clarity, UA Clear Clear    Glucose, UA Negative Negative, 1000 mg/dL (3+) mg/dL    Bilirubin Negative Negative    Ketones, UA Negative Negative    Specific Gravity  1.015 1.005 - 1.030    Blood, UA Negative Negative    pH, Urine 6.0 5.0 - 8.0    Protein, POC Negative Negative mg/dL    Urobilinogen, UA Normal Normal    Leukocytes Negative Negative    Nitrite, UA Negative Negative       Assessment and Plan    Diagnoses and all orders for this visit:    Urge incontinence  -     Cancel: POC Urinalysis Dipstick, Automated    Frequency of urination  -     Cancel: POC Urinalysis Dipstick, Automated    Plan--I discussed the options with Ms. Mcleod I really do not want to change things around anymore I think Miranda was may be concerned about her battery life but actually this was put in and 16 so really her batteries should be fine.    I suggest that we give a little more time cc she cannot acclimate to the new settings and see if she will get better somewhat on her own.    On the see her back here in 4 months but if she needs to me before then she will call

## 2018-03-26 ENCOUNTER — HOSPITAL ENCOUNTER (OUTPATIENT)
Dept: WOMENS IMAGING | Age: 83
Discharge: HOME | End: 2018-03-26
Payer: MEDICARE

## 2018-03-26 DIAGNOSIS — Z12.31 ENCOUNTER FOR SCREENING MAMMOGRAM FOR MALIGNANT NEOPLASM OF BREAST: ICD-10-CM

## 2018-03-26 PROCEDURE — 77067 SCR MAMMO BI INCL CAD: CPT

## 2018-06-13 ENCOUNTER — TRANSCRIBE ORDERS (OUTPATIENT)
Dept: ADMINISTRATIVE | Facility: HOSPITAL | Age: 83
End: 2018-06-13

## 2018-06-13 DIAGNOSIS — R06.02 SHORTNESS OF BREATH: Primary | ICD-10-CM

## 2018-06-13 DIAGNOSIS — R26.89 IMBALANCE: ICD-10-CM

## 2018-06-15 ENCOUNTER — HOSPITAL ENCOUNTER (OUTPATIENT)
Dept: CARDIOLOGY | Facility: HOSPITAL | Age: 83
Discharge: HOME | End: 2018-06-15
Attending: INTERNAL MEDICINE | Admitting: INTERNAL MEDICINE

## 2018-06-15 VITALS
WEIGHT: 156 LBS | HEIGHT: 66 IN | DIASTOLIC BLOOD PRESSURE: 78 MMHG | SYSTOLIC BLOOD PRESSURE: 125 MMHG | HEART RATE: 74 BPM | BODY MASS INDEX: 25.07 KG/M2

## 2018-06-15 DIAGNOSIS — R06.02 SHORTNESS OF BREATH: ICD-10-CM

## 2018-06-15 DIAGNOSIS — R26.89 IMBALANCE: ICD-10-CM

## 2018-06-15 PROCEDURE — 25010000003 DOBUTAMINE PER 250 MG: Performed by: INTERNAL MEDICINE

## 2018-06-15 PROCEDURE — 93017 CV STRESS TEST TRACING ONLY: CPT

## 2018-06-15 PROCEDURE — 93350 STRESS TTE ONLY: CPT

## 2018-06-15 PROCEDURE — 25010000002 PERFLUTREN 6.52 MG/ML SUSPENSION: Performed by: INTERNAL MEDICINE

## 2018-06-15 PROCEDURE — 93350 STRESS TTE ONLY: CPT | Performed by: INTERNAL MEDICINE

## 2018-06-15 PROCEDURE — 93352 ADMIN ECG CONTRAST AGENT: CPT | Performed by: INTERNAL MEDICINE

## 2018-06-15 PROCEDURE — 93018 CV STRESS TEST I&R ONLY: CPT | Performed by: INTERNAL MEDICINE

## 2018-06-15 RX ORDER — DOBUTAMINE HYDROCHLORIDE 100 MG/100ML
10-50 INJECTION INTRAVENOUS CONTINUOUS
Status: DISCONTINUED | OUTPATIENT
Start: 2018-06-15 | End: 2018-06-16 | Stop reason: HOSPADM

## 2018-06-15 RX ADMIN — PERFLUTREN 8.48 MG: 6.52 INJECTION, SUSPENSION INTRAVENOUS at 14:49

## 2018-06-15 RX ADMIN — Medication 10 MCG/KG/MIN: at 14:49

## 2018-06-25 LAB
BH CV STRESS BP STAGE 1: NORMAL
BH CV STRESS BP STAGE 2: NORMAL
BH CV STRESS BP STAGE 3: NORMAL
BH CV STRESS DOSE DOBUTAMINE STAGE 1: 10
BH CV STRESS DOSE DOBUTAMINE STAGE 2: 20
BH CV STRESS DOSE DOBUTAMINE STAGE 3: 30
BH CV STRESS DURATION MIN STAGE 1: 3
BH CV STRESS DURATION MIN STAGE 2: 3
BH CV STRESS DURATION MIN STAGE 3: 1
BH CV STRESS DURATION SEC STAGE 1: 0
BH CV STRESS DURATION SEC STAGE 2: 0
BH CV STRESS DURATION SEC STAGE 3: 31
BH CV STRESS HR STAGE 1: 65
BH CV STRESS HR STAGE 2: 104
BH CV STRESS HR STAGE 3: 121
BH CV STRESS PROTOCOL 1: NORMAL
BH CV STRESS RECOVERY BP: NORMAL MMHG
BH CV STRESS RECOVERY HR: 83 BPM
BH CV STRESS STAGE 1: 1
BH CV STRESS STAGE 2: 2
BH CV STRESS STAGE 3: 3
MAXIMAL PREDICTED HEART RATE: 135 BPM
PERCENT MAX PREDICTED HR: 89.63 %
STRESS BASELINE BP: NORMAL MMHG
STRESS BASELINE HR: 63 BPM
STRESS PERCENT HR: 105 %
STRESS POST EXERCISE DUR MIN: 7 MIN
STRESS POST EXERCISE DUR SEC: 31 SEC
STRESS POST PEAK BP: NORMAL MMHG
STRESS POST PEAK HR: 121 BPM
STRESS TARGET HR: 115 BPM

## 2018-07-13 RX ORDER — MONTELUKAST SODIUM 10 MG/1
TABLET ORAL
Qty: 90 TABLET | Refills: 2 | Status: SHIPPED | OUTPATIENT
Start: 2018-07-13 | End: 2018-09-24

## 2018-07-24 ENCOUNTER — OFFICE VISIT (OUTPATIENT)
Dept: UROLOGY | Facility: CLINIC | Age: 83
End: 2018-07-24

## 2018-07-24 VITALS
HEIGHT: 66 IN | DIASTOLIC BLOOD PRESSURE: 70 MMHG | BODY MASS INDEX: 24.27 KG/M2 | WEIGHT: 151 LBS | SYSTOLIC BLOOD PRESSURE: 132 MMHG

## 2018-07-24 DIAGNOSIS — R35.1 NOCTURIA: ICD-10-CM

## 2018-07-24 DIAGNOSIS — N39.41 URGE INCONTINENCE: Primary | ICD-10-CM

## 2018-07-24 DIAGNOSIS — R35.0 FREQUENCY OF URINATION: ICD-10-CM

## 2018-07-24 DIAGNOSIS — R32 INCONTINENCE IN FEMALE: ICD-10-CM

## 2018-07-24 LAB
BILIRUB BLD-MCNC: NEGATIVE MG/DL
CLARITY, POC: CLEAR
COLOR UR: YELLOW
GLUCOSE UR STRIP-MCNC: NEGATIVE MG/DL
KETONES UR QL: NEGATIVE
LEUKOCYTE EST, POC: ABNORMAL
NITRITE UR-MCNC: NEGATIVE MG/ML
PH UR: 5.5 [PH] (ref 5–8)
PROT UR STRIP-MCNC: NEGATIVE MG/DL
RBC # UR STRIP: NEGATIVE /UL
SP GR UR: 1.01 (ref 1–1.03)
UROBILINOGEN UR QL: NORMAL

## 2018-07-24 PROCEDURE — 81001 URINALYSIS AUTO W/SCOPE: CPT | Performed by: UROLOGY

## 2018-07-24 PROCEDURE — 99212 OFFICE O/P EST SF 10 MIN: CPT | Performed by: UROLOGY

## 2018-07-24 NOTE — PROGRESS NOTES
Subjective    Ms. Canales is 85 y.o. female    CHIEF COMPLAINT: Urgency frequency    The patient comes back today for follow-up of urgency frequency she does have an InterStim placed unfortunately we were hoping to get him point with Miranda but she has not been able see her.    Since we last saw her she is still having the vaginal pain and irritation intermittently her voiding symptoms also are 90 better she has to go pretty urgently she still gets up a lot at night she is not had any gross hematuria no urinary tract infections etc. she is had no flank pain.    There is no dysuria      History of Present Illness      The following portions of the patient's history were reviewed and updated as appropriate: allergies, current medications, past family history, past medical history, past social history, past surgical history and problem list.    Review of Systems   Constitutional: Negative.  Negative for appetite change, chills, fatigue, fever and unexpected weight change.   HENT: Negative for congestion, dental problem, ear pain, hearing loss, nosebleeds, sinus pressure and trouble swallowing.    Eyes: Negative for pain, discharge, redness and itching.   Respiratory: Negative for apnea, cough, choking and shortness of breath.    Cardiovascular: Negative for chest pain and palpitations.   Gastrointestinal: Negative for abdominal distention, abdominal pain, blood in stool, constipation, diarrhea, nausea and vomiting.   Endocrine: Negative for cold intolerance and heat intolerance.   Genitourinary: Positive for frequency, urgency and vaginal pain. Negative for difficulty urinating, dysuria, flank pain and hematuria.   Musculoskeletal: Negative for arthralgias, back pain and gait problem.   Skin: Negative for pallor, rash and wound.   Allergic/Immunologic: Negative for immunocompromised state.   Neurological: Negative for dizziness, tremors, seizures, weakness, numbness and headaches.   Hematological: Negative for adenopathy.  Does not bruise/bleed easily.   Psychiatric/Behavioral: Negative.  Negative for agitation, behavioral problems, confusion, hallucinations, self-injury and suicidal ideas.   All other systems reviewed and are negative.        Current Outpatient Prescriptions:   •  atorvastatin (LIPITOR) 80 MG tablet, Take 80 mg by mouth daily., Disp: , Rfl:   •  bumetanide (BUMEX) 0.5 MG tablet, , Disp: , Rfl:   •  insulin detemir (LEVEMIR) 100 UNIT/ML injection, Inject  under the skin daily., Disp: , Rfl:   •  levocetirizine (XYZAL) 5 MG tablet, , Disp: , Rfl:   •  levothyroxine (SYNTHROID, LEVOTHROID) 75 MCG tablet, Take 75 mcg by mouth daily., Disp: , Rfl:   •  losartan (COZAAR) 100 MG tablet, , Disp: , Rfl:   •  montelukast (SINGULAIR) 10 MG tablet, TAKE 1 TABLET DAILY IN THE EVENING, Disp: 90 tablet, Rfl: 2  •  PARoxetine (PAXIL) 10 MG tablet, Take 10 mg by mouth every morning., Disp: , Rfl:     Past Medical History:   Diagnosis Date   • Allergic rhinitis    • Aneurysm (CMS/HCC)    • Arthritis    • Cerebral aneurysm 2009    2ND ONE FOUND   • Chronic laryngitis    • Deviated nasal septum    • Diabetes mellitus (CMS/HCC)    • Disorder of kidney and ureter    • Disturbance of smell and taste    • Dizziness    • Encounter for imaging to screen for metal prior to MRI     NO MRI, METAL CLIPS IN HEAD   • Eustachian tube dysfunction    • GERD (gastroesophageal reflux disease)    • Globus sensation    • Headache    • Hyperlipidemia    • Hypertension    • Hypothyroid    • Laryngitis sicca    • Nocturia    • Sensorineural hearing loss    • Spinal stenosis    • Urge incontinence    • Urgency of urination    • Urinary frequency    • UTI (urinary tract infection)        Past Surgical History:   Procedure Laterality Date   • BLADDER SURGERY  08/2016    [employer]tronic Interstem   • BRAIN SURGERY      ANUERYSM REMOVED   • BREAST SURGERY      BIOPSY   • CARPAL TUNNEL RELEASE     • CATARACT EXTRACTION, BILATERAL     • CEREBRAL ANEURYSM REPAIR     •  " SECTION     • CHOLECYSTECTOMY     • HYSTERECTOMY     • KNEE ARTHROSCOPY     • THYROIDECTOMY     • TONSILLECTOMY         Social History     Social History   • Marital status:      Social History Main Topics   • Smoking status: Former Smoker     Types: Cigarettes     Quit date:    • Smokeless tobacco: Never Used      Comment: SMOKED OVER 40 YEARS   • Alcohol use No   • Drug use: Unknown     Other Topics Concern   • Not on file       Family History   Problem Relation Age of Onset   • Cancer Brother         BLADDER   • No Known Problems Father    • No Known Problems Mother        Objective    /70   Ht 166.4 cm (65.5\")   Wt 68.5 kg (151 lb)   BMI 24.75 kg/m²     Physical Exam      Hospital Outpatient Visit on 06/15/2018   Component Date Value Ref Range Status   • BH CV STRESS PROTOCOL 1 06/15/2018 Pharmacologic   Final-Edited   • Stage 1 06/15/2018 1   Final-Edited   • HR Stage 1 06/15/2018 65   Final-Edited   • BP Stage 1 06/15/2018 128/64   Final-Edited   • Duration Min Stage 1 06/15/2018 3   Final-Edited   • Duration Sec Stage 1 06/15/2018 0   Final-Edited   • Stress Dose Dobutamine Stage 1 06/15/2018 10   Final-Edited   • Stage 2 06/15/2018 2   Final-Edited   • HR Stage 2 06/15/2018 104   Final-Edited   • BP Stage 2 06/15/2018 165/44   Final-Edited   • Duration Min Stage 2 06/15/2018 3   Final-Edited   • Duration Sec Stage 2 06/15/2018 0   Final-Edited   • Stress Dose Dobutamine Stage 2 06/15/2018 20   Final-Edited   • Stage 3 06/15/2018 3   Final-Edited   • HR Stage 3 06/15/2018 121   Final-Edited   • BP Stage 3 06/15/2018 148/43   Final-Edited   • Duration Min Stage 3 06/15/2018 1   Final-Edited   • Duration Sec Stage 3 06/15/2018 31   Final-Edited   • Stress Dose Dobutamine Stage 3 06/15/2018 30   Final-Edited   • Baseline HR 06/15/2018 63  bpm Final-Edited   • Baseline BP 06/15/2018 125/78  mmHg Corrected   • Peak HR 06/15/2018 121  bpm Final-Edited   • Peak BP 06/15/2018 148/43  mmHg " Final-Edited   • Exercise duration (min) 06/15/2018 7  min Final-Edited   • Exercise duration (sec) 06/15/2018 31  sec Final-Edited   • Percent Target HR 06/15/2018 105  % Final-Edited   • Percent Max Pred HR 06/15/2018 89.63  % Final-Edited   • Recovery HR 06/15/2018 83  bpm Final-Edited   • Recovery BP 06/15/2018 153/65  mmHg Final-Edited   • Target HR (85%) 06/15/2018 115  bpm Final-Edited   • Max. Pred. HR (100%) 06/15/2018 135  bpm Final-Edited       Results for orders placed or performed in visit on 07/24/18   POC Urinalysis Dipstick   Result Value Ref Range    Color Yellow Yellow, Straw, Dark Yellow, Kirti    Clarity, UA Clear Clear    Glucose, UA Negative Negative, 1000 mg/dL (3+) mg/dL    Bilirubin Negative Negative    Ketones, UA Negative Negative    Specific Gravity  1.010 1.005 - 1.030    Blood, UA Negative Negative    pH, Urine 5.5 5.0 - 8.0    Protein, POC Negative Negative mg/dL    Urobilinogen, UA Normal Normal    Leukocytes Small (1+) (A) Negative    Nitrite, UA Negative Negative   Microscopic Urinalysis  I inspected the urine myself based on the clinical situation including the dipstick urine. The urine is spun in a centrifuge for three minutes. The spun urine shows 0-2 rbc/hpf, 3-6 wbc/hpf, 0-2 epi/hpf, negative bacteria, negative crystals, and negative casts.       Assessment and Plan    Honey was seen today for follow-up.    Diagnoses and all orders for this visit:    Urge incontinence  -     POC Urinalysis Dipstick    Frequency of urination  -     POC Urinalysis Dipstick    Nocturia  -     POC Urinalysis Dipstick    Incontinence in female  -     POC Urinalysis Dipstick    Plan--I spoke with chronic today we do have a couple patient's schedule with Miranda so hopefully she will be her near future I informed the patient to await a phone call from Miranda and we will also give her a call as well so we did not have her reviewed this with her she had no further questions today

## 2018-07-25 ENCOUNTER — TELEPHONE (OUTPATIENT)
Dept: OTOLARYNGOLOGY | Facility: CLINIC | Age: 83
End: 2018-07-25

## 2018-07-25 NOTE — TELEPHONE ENCOUNTER
Patient has called and has stopped all medications. She will call and make an appt if she needs anything.

## 2018-07-30 ENCOUNTER — TELEPHONE (OUTPATIENT)
Dept: UROLOGY | Facility: CLINIC | Age: 83
End: 2018-07-30

## 2018-07-30 NOTE — TELEPHONE ENCOUNTER
Called pt, she will come in on 08/02/2018 at 10 to see Miranda----- Message from Archie Avery MD sent at 7/24/2018 11:05 AM CDT -----  : With Miranda when next year place thank you

## 2018-08-09 ENCOUNTER — OFFICE VISIT (OUTPATIENT)
Dept: UROLOGY | Facility: CLINIC | Age: 83
End: 2018-08-09

## 2018-08-09 VITALS
HEIGHT: 67 IN | BODY MASS INDEX: 23.7 KG/M2 | SYSTOLIC BLOOD PRESSURE: 100 MMHG | DIASTOLIC BLOOD PRESSURE: 64 MMHG | WEIGHT: 151 LBS

## 2018-08-09 DIAGNOSIS — R35.0 FREQUENCY OF URINATION: ICD-10-CM

## 2018-08-09 DIAGNOSIS — N30.00 ACUTE CYSTITIS WITHOUT HEMATURIA: ICD-10-CM

## 2018-08-09 DIAGNOSIS — N39.41 URGE INCONTINENCE: Primary | ICD-10-CM

## 2018-08-09 LAB
BILIRUB BLD-MCNC: NEGATIVE MG/DL
CLARITY, POC: CLEAR
COLOR UR: YELLOW
GLUCOSE UR STRIP-MCNC: NEGATIVE MG/DL
KETONES UR QL: NEGATIVE
LEUKOCYTE EST, POC: ABNORMAL
NITRITE UR-MCNC: POSITIVE MG/ML
PH UR: 5.5 [PH] (ref 5–8)
PROT UR STRIP-MCNC: ABNORMAL MG/DL
RBC # UR STRIP: ABNORMAL /UL
SP GR UR: 1.02 (ref 1–1.03)
UROBILINOGEN UR QL: NORMAL

## 2018-08-09 PROCEDURE — 81001 URINALYSIS AUTO W/SCOPE: CPT | Performed by: UROLOGY

## 2018-08-09 PROCEDURE — 99214 OFFICE O/P EST MOD 30 MIN: CPT | Performed by: UROLOGY

## 2018-08-09 RX ORDER — NITROFURANTOIN 25; 75 MG/1; MG/1
100 CAPSULE ORAL 2 TIMES DAILY
Qty: 14 CAPSULE | Refills: 0 | Status: SHIPPED | OUTPATIENT
Start: 2018-08-09 | End: 2018-08-10 | Stop reason: SDUPTHER

## 2018-08-09 NOTE — PROGRESS NOTES
Subjective    Ms. Canales is 85 y.o. female    CHIEF COMPLAINT: Urge incontinence    The patient comes back today for follow-up of InterStim and urge incontinence frequency etc. she has been having some pain off and on with the device is not constant however her biggest complaint still is nocturia she does fine during the day but at night she has to get up 3 times before we put and she had to get up 5-6 times with there has been improvement but not as far she would like her I would like.    .  She recently saw Miranda and she was told that the battery was about ready to run out a couple months from a little surprised that that in that we will put an anechoic couple years ago so I would not anticipate that the battery point would run down that quickly.    Also her urinalysis today is abnormal and consistent with a urinary tract infection.    We try the catheter actually my nurse Veronique Pollard got a couple drops also not sufficient amount for culture      History of Present Illness      The following portions of the patient's history were reviewed and updated as appropriate: allergies, current medications, past family history, past medical history, past social history, past surgical history and problem list.    Review of Systems   Constitutional: Negative for chills and fever.   Gastrointestinal: Negative for abdominal pain, anal bleeding and blood in stool.   Genitourinary: Positive for difficulty urinating, frequency and urgency. Negative for decreased urine volume, dyspareunia, dysuria, enuresis, flank pain, genital sores, hematuria, menstrual problem, pelvic pain, vaginal bleeding, vaginal discharge and vaginal pain.         Current Outpatient Prescriptions:   •  atorvastatin (LIPITOR) 80 MG tablet, Take 80 mg by mouth daily., Disp: , Rfl:   •  bumetanide (BUMEX) 0.5 MG tablet, , Disp: , Rfl:   •  insulin detemir (LEVEMIR) 100 UNIT/ML injection, Inject  under the skin daily., Disp: , Rfl:   •  levocetirizine (XYZAL) 5 MG  tablet, , Disp: , Rfl:   •  levothyroxine (SYNTHROID, LEVOTHROID) 75 MCG tablet, Take 75 mcg by mouth daily., Disp: , Rfl:   •  losartan (COZAAR) 100 MG tablet, , Disp: , Rfl:   •  montelukast (SINGULAIR) 10 MG tablet, TAKE 1 TABLET DAILY IN THE EVENING, Disp: 90 tablet, Rfl: 2  •  PARoxetine (PAXIL) 10 MG tablet, Take 10 mg by mouth every morning., Disp: , Rfl:   •  nitrofurantoin, macrocrystal-monohydrate, (MACROBID) 100 MG capsule, Take 1 capsule by mouth 2 (Two) Times a Day., Disp: 14 capsule, Rfl: 0    Past Medical History:   Diagnosis Date   • Allergic rhinitis    • Aneurysm (CMS/HCC)    • Arthritis    • Cerebral aneurysm 2009 ONE FOUND   • Chronic laryngitis    • Deviated nasal septum    • Diabetes mellitus (CMS/HCC)    • Disorder of kidney and ureter    • Disturbance of smell and taste    • Dizziness    • Encounter for imaging to screen for metal prior to MRI     NO MRI, METAL CLIPS IN HEAD   • Eustachian tube dysfunction    • GERD (gastroesophageal reflux disease)    • Globus sensation    • Headache    • Hyperlipidemia    • Hypertension    • Hypothyroid    • Laryngitis sicca    • Nocturia    • Sensorineural hearing loss    • Spinal stenosis    • Urge incontinence    • Urgency of urination    • Urinary frequency    • UTI (urinary tract infection)        Past Surgical History:   Procedure Laterality Date   • BLADDER SURGERY  2016    Medtronic Interstem   • BRAIN SURGERY      ANUERYSM REMOVED   • BREAST SURGERY      BIOPSY   • CARPAL TUNNEL RELEASE     • CATARACT EXTRACTION, BILATERAL     • CEREBRAL ANEURYSM REPAIR     •  SECTION     • CHOLECYSTECTOMY     • HYSTERECTOMY     • KNEE ARTHROSCOPY     • THYROIDECTOMY     • TONSILLECTOMY         Social History     Social History   • Marital status:      Social History Main Topics   • Smoking status: Former Smoker     Types: Cigarettes     Quit date:    • Smokeless tobacco: Never Used      Comment: SMOKED OVER 40 YEARS   • Alcohol use No  "  • Drug use: Unknown     Other Topics Concern   • Not on file       Family History   Problem Relation Age of Onset   • Cancer Brother         BLADDER   • No Known Problems Father    • No Known Problems Mother        Objective    /64   Ht 168.9 cm (66.5\")   Wt 68.5 kg (151 lb)   BMI 24.01 kg/m²     Physical Exam      Office Visit on 07/24/2018   Component Date Value Ref Range Status   • Color 07/24/2018 Yellow  Yellow, Straw, Dark Yellow, Kirti Final   • Clarity, UA 07/24/2018 Clear  Clear Final   • Glucose, UA 07/24/2018 Negative  Negative, 1000 mg/dL (3+) mg/dL Final   • Bilirubin 07/24/2018 Negative  Negative Final   • Ketones, UA 07/24/2018 Negative  Negative Final   • Specific Gravity  07/24/2018 1.010  1.005 - 1.030 Final   • Blood, UA 07/24/2018 Negative  Negative Final   • pH, Urine 07/24/2018 5.5  5.0 - 8.0 Final   • Protein, POC 07/24/2018 Negative  Negative mg/dL Final   • Urobilinogen, UA 07/24/2018 Normal  Normal Final   • Leukocytes 07/24/2018 Small (1+)* Negative Final   • Nitrite, UA 07/24/2018 Negative  Negative Final       Results for orders placed or performed in visit on 08/09/18   POC Urinalysis Dipstick, Multipro   Result Value Ref Range    Color Yellow Yellow, Straw, Dark Yellow, Kirti    Clarity, UA Clear Clear    Glucose, UA Negative Negative, 1000 mg/dL (3+) mg/dL    Bilirubin Negative Negative    Ketones, UA Negative Negative    Specific Gravity  1.020 1.005 - 1.030    Blood, UA Moderate (A) Negative    pH, Urine 5.5 5.0 - 8.0    Protein, POC 2+ (A) Negative mg/dL    Urobilinogen, UA Normal Normal    Nitrite, UA Positive (A) Negative    Leukocytes Large (3+) (A) Negative   Microscopic Urinalysis  I inspected the urine myself based on the clinical situation including the dipstick urine. The urine is spun in a centrifuge for three minutes. The spun urine shows 7-12 rbc/hpf, 12-20 wbc/hpf, 3-6 epi/hpf, 2+ bacteria, negative crystals, and negative casts.       Assessment and " Plan    Diagnoses and all orders for this visit:    Urge incontinence  -     POC Urinalysis Dipstick, Multipro    Frequency of urination    Acute cystitis without hematuria  -     nitrofurantoin, macrocrystal-monohydrate, (MACROBID) 100 MG capsule; Take 1 capsule by mouth 2 (Two) Times a Day.    Plan--undergo ahead and put the patient on Macrodantin 50 mg milligrams twice a day for a week she became clear the infection.    I will speak also with Miranda and see what the problem is with the battery and discussed the case with her as well and see we can figure out what best plan is for Ms. Canales

## 2018-08-10 ENCOUNTER — TELEPHONE (OUTPATIENT)
Dept: UROLOGY | Facility: CLINIC | Age: 83
End: 2018-08-10

## 2018-08-10 DIAGNOSIS — N30.00 ACUTE CYSTITIS WITHOUT HEMATURIA: Primary | ICD-10-CM

## 2018-08-10 RX ORDER — NITROFURANTOIN 25; 75 MG/1; MG/1
100 CAPSULE ORAL 2 TIMES DAILY
Qty: 14 CAPSULE | Refills: 0 | Status: SHIPPED | OUTPATIENT
Start: 2018-08-10 | End: 2018-08-17

## 2018-08-10 NOTE — TELEPHONE ENCOUNTER
THIS IS A RANSLER PATIENT...    Patient was in yesterday and prescribed Microbid. It was sent to [facility] instead of [facility] on [address]. She did ask that the one faxed yesterday to be cancelled.

## 2018-08-10 NOTE — TELEPHONE ENCOUNTER
[phone] Lyudmila  ----- Message from Archie Avery MD sent at 8/9/2018  4:22 PM CDT -----  Please give me traceys number to call

## 2018-08-27 ENCOUNTER — TELEPHONE (OUTPATIENT)
Dept: UROLOGY | Facility: CLINIC | Age: 83
End: 2018-08-27

## 2018-08-27 NOTE — TELEPHONE ENCOUNTER
Patient called stating she was waiting for news from Dr Avery regarding her interstem. She said he was going to talk to the rep and let her know. She has not heard anything back yet.

## 2018-09-18 ENCOUNTER — TELEPHONE (OUTPATIENT)
Dept: UROLOGY | Facility: CLINIC | Age: 83
End: 2018-09-18

## 2018-09-18 NOTE — TELEPHONE ENCOUNTER
Miranda from [facility], saw Honey Canales in the office and her Interstim battery needs replaced ang possibly  a lead revision.  Miranda #[phone]

## 2018-09-20 ENCOUNTER — TELEPHONE (OUTPATIENT)
Dept: PODIATRY | Facility: CLINIC | Age: 83
End: 2018-09-20

## 2018-09-20 NOTE — PROGRESS NOTES
[facility] - PODIATRY    Today's Date: 09/24/18    Patient Name: Honey Canales  MRN: [id number]  CSN: [id number]  PCP: Devon Zuñiga MD  Referring Provider: No ref. provider found    SUBJECTIVE     Chief Complaint   Patient presents with   • Diabetes     PT is here for diabetic foot and nail care. PT c/o occasional pain in feet depending on shoes. Blood sugar: 97mg/dl. PCP: Dr. Devon Zuñiga last visit 03/14/2018     HPI: Honey Canales, a 85 y.o.female, comes to clinic as a(n) new patient presenting for diabetic foot exam and complaining of painful toenails. Patient has h/o aneurysm, arthritis, DM2, GERD, Headache, HLD, HTN, Hypothyroid, Spinal Stenosis, urinary incontinence. Patient is IDDM with last stated BG level of 97mg/dl. States that her toenails are long, thick and discolored. She is unable to care for them herself. Admits to occasional numbness in feet. Denies open wounds or sores. Most pain when wearing closed toed shoes. Uses foam as padding for bunion deformities. Admits pain at 3/10 level and described as aching, numbness and tingling. Relates previous treatment(s) including foot care by podiatrist. Denies any constitutional symptoms. No other pedal complaints at this time.    Past Medical History:   Diagnosis Date   • Allergic rhinitis    • Aneurysm (CMS/HCC)    • Arthritis    • Cerebral aneurysm 2009    2ND ONE FOUND   • Chronic laryngitis    • Deviated nasal septum    • Diabetes mellitus (CMS/HCC)    • Disorder of kidney and ureter    • Disturbance of smell and taste    • Dizziness    • Encounter for imaging to screen for metal prior to MRI     NO MRI, METAL CLIPS IN HEAD   • Eustachian tube dysfunction    • GERD (gastroesophageal reflux disease)    • Globus sensation    • Headache    • Hyperlipidemia    • Hypertension    • Hypothyroid    • Laryngitis sicca    • Nocturia    • Sensorineural hearing loss    • Spinal stenosis    • Urge incontinence    • Urgency of urination    •  Urinary frequency    • UTI (urinary tract infection)      Past Surgical History:   Procedure Laterality Date   • BLADDER SURGERY  2016    Medtronic Interstem   • BRAIN SURGERY      ANUERYSM REMOVED   • BREAST SURGERY      BIOPSY   • CARPAL TUNNEL RELEASE     • CATARACT EXTRACTION, BILATERAL     • CEREBRAL ANEURYSM REPAIR     •  SECTION     • CHOLECYSTECTOMY     • HYSTERECTOMY     • KNEE ARTHROSCOPY     • THYROIDECTOMY     • TONSILLECTOMY       Family History   Problem Relation Age of Onset   • Cancer Brother         BLADDER   • No Known Problems Father    • No Known Problems Mother      Social History     Social History   • Marital status:      Spouse name: N/A   • Number of children: N/A   • Years of education: N/A     Occupational History   • Not on file.     Social History Main Topics   • Smoking status: Former Smoker     Types: Cigarettes     Quit date:    • Smokeless tobacco: Never Used      Comment: SMOKED OVER 40 YEARS   • Alcohol use No   • Drug use: Unknown   • Sexual activity: Not on file     Other Topics Concern   • Not on file     Social History Narrative   • No narrative on file     Allergies   Allergen Reactions   • Accupril [Quinapril Hcl]    • Adhesive Tape    • Aspirin    • Celebrex [Celecoxib] Nausea Only   • Codeine Phosphate [Codeine]    • Collagen      CAT GUT SUTURES   • Lyrica [Pregabalin]    • Pantoprazole Sodium Diarrhea   • Pentoxifylline Diarrhea   • Sulfa Antibiotics    • Tramadol    • Vioxx [Rofecoxib]      Current Outpatient Prescriptions   Medication Sig Dispense Refill   • acetaminophen (TYLENOL) 500 MG tablet Take 500 mg by mouth Every 6 (Six) Hours As Needed for Mild Pain .     • atorvastatin (LIPITOR) 80 MG tablet Take 80 mg by mouth daily.     • bumetanide (BUMEX) 0.5 MG tablet      • glipiZIDE (GLUCOTROL) 10 MG tablet Take 10 mg by mouth 2 (Two) Times a Day Before Meals.     • insulin detemir (LEVEMIR) 100 UNIT/ML injection Inject  under the skin daily.      • levocetirizine (XYZAL) 5 MG tablet      • levothyroxine (SYNTHROID, LEVOTHROID) 75 MCG tablet Take 75 mcg by mouth daily.     • losartan (COZAAR) 100 MG tablet      • PARoxetine (PAXIL) 10 MG tablet Take 10 mg by mouth every morning.       No current facility-administered medications for this visit.      Review of Systems   Constitutional: Negative for chills and fever.   HENT: Negative for congestion.    Respiratory: Negative for shortness of breath.    Cardiovascular: Negative for chest pain and leg swelling.   Gastrointestinal: Negative for constipation, diarrhea, nausea and vomiting.   Musculoskeletal:        Foot pain   Skin: Negative for wound.   Neurological: Positive for numbness.       OBJECTIVE     Vitals:    09/24/18 0938   BP: 118/62   Pulse: 76   SpO2: 97%       PHYSICAL EXAM  GEN:   Accompanied by none.     General Exam  Appearance: appears stated age and healthy   Orientation: alert and oriented to person, place, and time   Affect: appropriate   Gait: unimpaired   Assistance: ambulation does not require assistance   Shoe Gear: diabetic shoes    Foot/Ankle Exam  Inspection and Palpation  Ecchymosis - Right: none Left: none  Tenderness - Right: (Toenails) Left: (Toenails)  Arch - Right: normal Left: normal  Hammertoes - Right: absent Left: absent  Claw Toes - Right: absent Left: absent  Mallet Toes - Right: absent Left: absent  Hallux Valgus - Right: yes Left: yes  Hallux Limitus - Right: no Left: no  Skin - Right: skin intact  Left: skin intact   Nails -  Right: abnormally thick and onychomycosis Left: abnormally thick and onychomycosis     Vascular  Dorsalis Pedis - Right: 2+ Left: 2+  Posterior Tibial - Right: 2+ Left: 2+  Skin Temperature -  Right: warm  Left: warm    CFT - Right: 3 Left: 3  Pedal Hair Growth - Right: absent Left: absent  Varicosities -  Right: mild varicosities  Left: mild varicosities      Neurologic  Saphenous Nerve Sensation  - Right: normal Left: normal  Tibial Nerve  Sensation - Right: normal Left: normal  Superficial Peroneal Nerve Sensation - Right: normal Left: normal  Deep Peroneal Nerve Sensation - Right: normal Left: normal  Sural Nerve Sensation - Right: normal Left: normal  Protective Sensation using [place]Tima Monofilament - Right: 9 Left: 9    Muscle Strength  Dorsiflexion - Right: 5 Left: 5  Plantar Flexion - Right: 5 Left: 5  Eversion - Right: 5 Left: 5  Inversion - Right: 5 Left: 5  Great Toe Extension - Right: 5 Left: 5  Great Toe Flexion - Right: 5 Left: 5    Range of Motion  Normal right ankle ROM  Normal left ankle ROM            RADIOLOGY/NUCLEAR:  No results found.    LABORATORY/CULTURE RESULTS:      PATHOLOGY RESULTS:       ASSESSMENT/PLAN     Honey was seen today for diabetes.    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  Discussed findings and treatment plan including risks, benefits, and treatment options with patient in detail. Patient agreed with treatment plan.  After verbal consent obtained, nail(s) x10 debrided of length and thickness with nail nipper without incidence  Patient may maintain nails and calluses at home utilizing emery board or pumice stone between visits as needed  Reviewed at home diabetic foot care including daily foot checks   HAV asymptomatic at this time. Continue foam padding.   An After Visit Summary was printed and given to the patient at discharge, including (if requested) any available informative/educational handouts regarding diagnosis, treatment, or medications. All questions were answered to patient/family satisfaction. Should symptoms fail to improve or worsen they agree to call or return to clinic or to go to the Emergency Department. Discussed the importance  of following up with any needed screening tests/labs/specialist appointments and any requested follow-up recommended by me today. Importance of maintaining follow-up discussed and patient accepts that missed appointments can delay diagnosis and potentially lead to worsening of conditions.  Return in about 3 months (around 12/24/2018)., or sooner if acute issues arise.        This document has been electronically signed by Morgan Pepper DPM on September 24, 2018 10:07 AM

## 2018-09-20 NOTE — TELEPHONE ENCOUNTER
Called and reminded patient of appointment with Dr. Ameya Pepper on 09/24/2018 at 9:30 am. Patient gave verbal confirmation at this time.

## 2018-09-24 ENCOUNTER — OFFICE VISIT (OUTPATIENT)
Dept: PODIATRY | Facility: CLINIC | Age: 83
End: 2018-09-24

## 2018-09-24 VITALS
DIASTOLIC BLOOD PRESSURE: 62 MMHG | SYSTOLIC BLOOD PRESSURE: 118 MMHG | OXYGEN SATURATION: 97 % | WEIGHT: 151 LBS | HEIGHT: 67 IN | BODY MASS INDEX: 23.7 KG/M2 | HEART RATE: 76 BPM

## 2018-09-24 DIAGNOSIS — M20.11 HALLUX VALGUS, RIGHT: ICD-10-CM

## 2018-09-24 DIAGNOSIS — M79.671 FOOT PAIN, BILATERAL: ICD-10-CM

## 2018-09-24 DIAGNOSIS — E11.8 DIABETIC FOOT (HCC): ICD-10-CM

## 2018-09-24 DIAGNOSIS — E11.49 DIABETES MELLITUS TYPE 2 WITH NEUROLOGICAL MANIFESTATIONS (HCC): ICD-10-CM

## 2018-09-24 DIAGNOSIS — M79.672 FOOT PAIN, BILATERAL: ICD-10-CM

## 2018-09-24 DIAGNOSIS — M20.12 HALLUX VALGUS, LEFT: ICD-10-CM

## 2018-09-24 DIAGNOSIS — B35.1 ONYCHOMYCOSIS: Primary | ICD-10-CM

## 2018-09-24 PROCEDURE — 11721 DEBRIDE NAIL 6 OR MORE: CPT | Performed by: PODIATRIST

## 2018-09-24 PROCEDURE — 99203 OFFICE O/P NEW LOW 30 MIN: CPT | Performed by: PODIATRIST

## 2018-09-24 RX ORDER — GLIPIZIDE 10 MG/1
10 TABLET ORAL
COMMUNITY
End: 2020-06-18

## 2018-09-24 RX ORDER — ACETAMINOPHEN 500 MG
500 TABLET ORAL EVERY 6 HOURS PRN
COMMUNITY

## 2018-10-04 ENCOUNTER — OFFICE VISIT (OUTPATIENT)
Dept: UROLOGY | Facility: CLINIC | Age: 83
End: 2018-10-04

## 2018-10-04 ENCOUNTER — PREP FOR SURGERY (OUTPATIENT)
Dept: OTHER | Facility: HOSPITAL | Age: 83
End: 2018-10-04

## 2018-10-04 VITALS — HEIGHT: 66 IN | TEMPERATURE: 97.9 F | BODY MASS INDEX: 24.11 KG/M2 | WEIGHT: 150 LBS

## 2018-10-04 DIAGNOSIS — N39.41 URGE INCONTINENCE: Primary | ICD-10-CM

## 2018-10-04 DIAGNOSIS — R35.0 FREQUENCY OF URINATION: ICD-10-CM

## 2018-10-04 DIAGNOSIS — R32 INCONTINENCE IN FEMALE: ICD-10-CM

## 2018-10-04 DIAGNOSIS — R35.1 NOCTURIA: ICD-10-CM

## 2018-10-04 LAB
BILIRUB BLD-MCNC: ABNORMAL MG/DL
CLARITY, POC: CLEAR
COLOR UR: YELLOW
GLUCOSE UR STRIP-MCNC: ABNORMAL MG/DL
KETONES UR QL: ABNORMAL
LEUKOCYTE EST, POC: NEGATIVE
NITRITE UR-MCNC: NEGATIVE MG/ML
PH UR: 5.5 [PH] (ref 5–8)
PROT UR STRIP-MCNC: NEGATIVE MG/DL
RBC # UR STRIP: NEGATIVE /UL
SP GR UR: 1.02 (ref 1–1.03)
UROBILINOGEN UR QL: NORMAL

## 2018-10-04 PROCEDURE — 99213 OFFICE O/P EST LOW 20 MIN: CPT | Performed by: UROLOGY

## 2018-10-04 PROCEDURE — 81001 URINALYSIS AUTO W/SCOPE: CPT | Performed by: UROLOGY

## 2018-10-04 RX ORDER — PEN NEEDLE, DIABETIC 31 GX5/16"
NEEDLE, DISPOSABLE MISCELLANEOUS
COMMUNITY
Start: 2018-09-28 | End: 2020-12-10

## 2018-10-04 NOTE — PROGRESS NOTES
Subjective    Ms. Canales is 85 y.o. female    CHIEF COMPLAINT: Problems with the InterStim    The patient comes back today I finally was able to sit down and talk with Miranda to find out what we had seen with the evaluation initially I was told that it was a battery that and then immediately replaced however when I talked with Miranda but we found that really was that the reason the battery had run down so quickly was not there were some impedance and difficulties with the lead itself so this is what the problem is and so were the need to change both the lead and the battery.    What I wanted to be sure to do was to make sure that when we first put it and that we had gotten success with Ms. Rivera and indeed she said yes was not perfect but that she got significant improvement with the InterStim and she does want to go ahead and replace      History of Present Illness      The following portions of the patient's history were reviewed and updated as appropriate: allergies, current medications, past family history, past medical history, past social history, past surgical history and problem list.    Review of Systems   Constitutional: Negative for chills and fever.   Gastrointestinal: Negative for abdominal pain, anal bleeding and blood in stool.   Genitourinary: Positive for difficulty urinating, frequency, urgency and vaginal pain. Negative for dysuria and hematuria.         Current Outpatient Prescriptions:   •  acetaminophen (TYLENOL) 500 MG tablet, Take 500 mg by mouth Every 6 (Six) Hours As Needed for Mild Pain ., Disp: , Rfl:   •  atorvastatin (LIPITOR) 80 MG tablet, Take 80 mg by mouth daily., Disp: , Rfl:   •  B-D ULTRAFINE III SHORT PEN 31G X 8 MM misc, , Disp: , Rfl:   •  bumetanide (BUMEX) 0.5 MG tablet, , Disp: , Rfl:   •  glipiZIDE (GLUCOTROL) 10 MG tablet, Take 10 mg by mouth 2 (Two) Times a Day Before Meals., Disp: , Rfl:   •  insulin detemir (LEVEMIR) 100 UNIT/ML injection, Inject  under the skin daily.,  Disp: , Rfl:   •  levocetirizine (XYZAL) 5 MG tablet, , Disp: , Rfl:   •  levothyroxine (SYNTHROID, LEVOTHROID) 75 MCG tablet, Take 75 mcg by mouth daily., Disp: , Rfl:   •  losartan (COZAAR) 100 MG tablet, , Disp: , Rfl:   •  PARoxetine (PAXIL) 10 MG tablet, Take 10 mg by mouth every morning., Disp: , Rfl:     Past Medical History:   Diagnosis Date   • Allergic rhinitis    • Aneurysm (CMS/HCC)    • Arthritis    • Cerebral aneurysm 2009 ONE FOUND   • Chronic laryngitis    • Deviated nasal septum    • Diabetes mellitus (CMS/HCC)    • Disorder of kidney and ureter    • Disturbance of smell and taste    • Dizziness    • Encounter for imaging to screen for metal prior to MRI     NO MRI, METAL CLIPS IN HEAD   • Eustachian tube dysfunction    • GERD (gastroesophageal reflux disease)    • Globus sensation    • Headache    • Hyperlipidemia    • Hypertension    • Hypothyroid    • Laryngitis sicca    • Nocturia    • Sensorineural hearing loss    • Spinal stenosis    • Urge incontinence    • Urgency of urination    • Urinary frequency    • UTI (urinary tract infection)        Past Surgical History:   Procedure Laterality Date   • BLADDER SURGERY  2016    Medtronic Interstem   • BRAIN SURGERY      ANUERYSM REMOVED   • BREAST SURGERY      BIOPSY   • CARPAL TUNNEL RELEASE     • CATARACT EXTRACTION, BILATERAL     • CEREBRAL ANEURYSM REPAIR     •  SECTION     • CHOLECYSTECTOMY     • HYSTERECTOMY     • KNEE ARTHROSCOPY     • THYROIDECTOMY     • TONSILLECTOMY         Social History     Social History   • Marital status:      Social History Main Topics   • Smoking status: Former Smoker     Types: Cigarettes     Quit date:    • Smokeless tobacco: Never Used      Comment: SMOKED OVER 40 YEARS   • Alcohol use No   • Drug use: Unknown     Other Topics Concern   • Not on file       Family History   Problem Relation Age of Onset   • Cancer Brother         BLADDER   • No Known Problems Father    • No Known  "Problems Mother        Objective    Temp 97.9 °F (36.6 °C)   Ht 167.6 cm (66\")   Wt 68 kg (150 lb)   BMI 24.21 kg/m²     Physical Exam      Office Visit on 08/09/2018   Component Date Value Ref Range Status   • Color 08/09/2018 Yellow  Yellow, Straw, Dark Yellow, Kirti Final   • Clarity, UA 08/09/2018 Clear  Clear Final   • Glucose, UA 08/09/2018 Negative  Negative, 1000 mg/dL (3+) mg/dL Final   • Bilirubin 08/09/2018 Negative  Negative Final   • Ketones, UA 08/09/2018 Negative  Negative Final   • Specific Gravity  08/09/2018 1.020  1.005 - 1.030 Final   • Blood, UA 08/09/2018 Moderate* Negative Final   • pH, Urine 08/09/2018 5.5  5.0 - 8.0 Final   • Protein, POC 08/09/2018 2+* Negative mg/dL Final   • Urobilinogen, UA 08/09/2018 Normal  Normal Final   • Nitrite, UA 08/09/2018 Positive* Negative Final   • Leukocytes 08/09/2018 Large (3+)* Negative Final       Results for orders placed or performed in visit on 10/04/18   POC Urinalysis Dipstick, Multipro   Result Value Ref Range    Color Yellow Yellow, Straw, Dark Yellow, Kirti    Clarity, UA Clear Clear    Glucose, UA Trace (A) Negative, 1000 mg/dL (3+) mg/dL    Bilirubin Small (1+) (A) Negative    Ketones, UA 15 mg/dL (A) Negative    Specific Gravity  1.025 1.005 - 1.030    Blood, UA Negative Negative    pH, Urine 5.5 5.0 - 8.0    Protein, POC Negative Negative mg/dL    Urobilinogen, UA Normal Normal    Nitrite, UA Negative Negative    Leukocytes Negative Negative       Assessment and Plan    Diagnoses and all orders for this visit:    Urge incontinence  -     POC Urinalysis Dipstick, Multipro    Nocturia    Incontinence in female    Frequency of urination    Plan--again I discussed the replacement with the patient will we will do is go ahead and take out the InterStim and lead and then fully replace it with a new battery and a new lead I think she is comfortable with that I told her hopefully this will help her symptoms.    We will call with a time when we get " together with Miranda

## 2018-10-11 ENCOUNTER — APPOINTMENT (OUTPATIENT)
Dept: PREADMISSION TESTING | Facility: HOSPITAL | Age: 83
End: 2018-10-11

## 2018-10-11 VITALS
SYSTOLIC BLOOD PRESSURE: 101 MMHG | OXYGEN SATURATION: 98 % | DIASTOLIC BLOOD PRESSURE: 45 MMHG | WEIGHT: 154.32 LBS | BODY MASS INDEX: 24.22 KG/M2 | HEART RATE: 78 BPM | HEIGHT: 67 IN

## 2018-10-11 DIAGNOSIS — N39.41 URGE INCONTINENCE: ICD-10-CM

## 2018-10-11 LAB
ANION GAP SERPL CALCULATED.3IONS-SCNC: 11 MMOL/L (ref 4–13)
BILIRUB UR QL STRIP: ABNORMAL
BUN BLD-MCNC: 19 MG/DL (ref 5–21)
BUN/CREAT SERPL: 12.7 (ref 7–25)
CALCIUM SPEC-SCNC: 9.7 MG/DL (ref 8.4–10.4)
CHLORIDE SERPL-SCNC: 104 MMOL/L (ref 98–110)
CLARITY UR: CLEAR
CO2 SERPL-SCNC: 26 MMOL/L (ref 24–31)
COLOR UR: ABNORMAL
CREAT BLD-MCNC: 1.5 MG/DL (ref 0.5–1.4)
DEPRECATED RDW RBC AUTO: 46.1 FL (ref 40–54)
ERYTHROCYTE [DISTWIDTH] IN BLOOD BY AUTOMATED COUNT: 13.2 % (ref 12–15)
GFR SERPL CREATININE-BSD FRML MDRD: 33 ML/MIN/1.73
GLUCOSE BLD-MCNC: 184 MG/DL (ref 70–100)
GLUCOSE UR STRIP-MCNC: NEGATIVE MG/DL
HCT VFR BLD AUTO: 40.9 % (ref 37–47)
HGB BLD-MCNC: 13.7 G/DL (ref 12–16)
HGB UR QL STRIP.AUTO: NEGATIVE
KETONES UR QL STRIP: ABNORMAL
LEUKOCYTE ESTERASE UR QL STRIP.AUTO: ABNORMAL
MCH RBC QN AUTO: 31.8 PG (ref 28–32)
MCHC RBC AUTO-ENTMCNC: 33.5 G/DL (ref 33–36)
MCV RBC AUTO: 94.9 FL (ref 82–98)
NITRITE UR QL STRIP: NEGATIVE
PH UR STRIP.AUTO: <=5 [PH] (ref 5–8)
PLATELET # BLD AUTO: 211 10*3/MM3 (ref 130–400)
PMV BLD AUTO: 10.5 FL (ref 6–12)
POTASSIUM BLD-SCNC: 5 MMOL/L (ref 3.5–5.3)
PROT UR QL STRIP: ABNORMAL
RBC # BLD AUTO: 4.31 10*6/MM3 (ref 4.2–5.4)
SODIUM BLD-SCNC: 141 MMOL/L (ref 135–145)
SP GR UR STRIP: 1.02 (ref 1–1.03)
UROBILINOGEN UR QL STRIP: ABNORMAL
WBC NRBC COR # BLD: 7.8 10*3/MM3 (ref 4.8–10.8)

## 2018-10-11 PROCEDURE — 80048 BASIC METABOLIC PNL TOTAL CA: CPT | Performed by: UROLOGY

## 2018-10-11 PROCEDURE — 36415 COLL VENOUS BLD VENIPUNCTURE: CPT

## 2018-10-11 PROCEDURE — 81003 URINALYSIS AUTO W/O SCOPE: CPT | Performed by: UROLOGY

## 2018-10-11 PROCEDURE — 85027 COMPLETE CBC AUTOMATED: CPT | Performed by: UROLOGY

## 2018-10-11 NOTE — DISCHARGE INSTRUCTIONS
INTERSTIM STAGES 1 AND 2 LEAD AND GENERATOR PLACEMENT    DAY OF SURGERY INSTRUCTIONS  INTERSTIM STAGES 1 AND 2 LEAD AND GENERATOR PLACEMENT    DR RAMIREZ      DATE OF SURGERY: OCT 18 2018    ARRIVAL TIME: AS DIRECTED BY OFFICE    DAY OF SURGERY TAKE ONLY THESE MEDICATIONS UNLESS OTHERWISE INSTRUCTED BY YOUR PHYSICIAN: NONE          MANAGING PAIN AFTER SURGERY    We know you are probably wondering what your pain will be like after surgery.  Following surgery it is unrealistic to expect you will not have pain.   Pain is how our bodies let us know that something is wrong or cautions us to be careful.  That said, our goal is to make your pain tolerable.    Methods we may use to treat your pain include (oral or IV medications, PCAs, epidurals, nerve blocks, etc.)   While some procedures require IV pain medications for a short time after surgery, transitioning to pain medications by mouth allows for better management of pain.   Your nurse will encourage you to take oral pain medications whenever possible.  IV medications work almost immediately, but only last a short while.  Taking medications by mouth allows for a more constant level of medication in your blood stream for a longer period of time.      Once your pain is out of control it is harder to get back under control.  It is important you are aware when your next dose of pain medication is due.  If you are admitted, your nurse may write the time of your next dose on the white board in your room to help you remember.      We are interested in your pain and encourage you to inform us about aggravating factors during your visit.   Many times a simple repositioning every few hours can make a big difference.    If your physician says it is okay, do not let your pain prevent you from getting out of bed. Be sure to call your nurse for assistance prior to getting up so you do not fall.      Before surgery, please decide your tolerable pain goal.  These faces help describe  the pain ratings we use on a 0-10 scale.   Be prepared to tell us your goal and whether or not you take pain or anxiety medications at home.            BEFORE YOU COME TO THE HOSPITAL  (Pre-op instructions)  • Do not eat, drink, smoke or chew gum after midnight the night before surgery.  This also includes no mints.  • Morning of surgery take only the medicines you have been instructed with a sip of water unless otherwise instructed  by your physician.  • Do not shave, wear makeup or dark nail polish.  • Remove all jewelry including rings.  • Leave anything you consider valuable at home.  • Leave your suitcase in the car until after your surgery.  • Bring the following with you if applicable:  o Picture ID and insurance, Medicare or Medicaid cards  o Co-pay/deductible required by insurance (cash, check, credit card)  o Copy of advance directive, living will or power-of- documents if not brought to PAT  o CPAP or BIPAP mask and tubing  o Relaxation aids (MP3 player, book, magazine)  • On the day of surgery check in at registration located at the main entrance of the hospital.       Outpatient Surgery Guidelines, Adult  Outpatient procedures are those for which the person having the procedure is allowed to go home the same day as the procedure. Various procedures are done on an outpatient basis. You should follow some general guidelines if you will be having an outpatient procedure.  LET YOUR HEALTH CARE PROVIDER KNOW ABOUT:  · Any allergies you have.  · All medicines you are taking, including vitamins, herbs, eye drops, creams, and over-the-counter medicines.  · Previous problems you or members of your family have had with the use of anesthetics.  · Any blood disorders you have.  · Previous surgeries you have had.  · Medical conditions you have.  RISKS AND COMPLICATIONS  Your health care provider will discuss possible risks and complications with you before surgery. Common risks and complications include:     · Problems due to the use of anesthetics.  · Blood loss and replacement (does not apply to minor surgical procedures).  · Temporary increase in pain due to surgery.  · Uncorrected pain or problems that the surgery was meant to correct.  · Infection.  · New damage.  BEFORE THE PROCEDURE  · Ask your health care provider about changing or stopping your regular medicines. You may need to stop taking certain medicines in the days or weeks before the procedure.  · Stop smoking at least 2 weeks before surgery. This lowers your risk for complications during and after surgery. Ask your health care provider for help with this if needed.  · Eat your usual meals and a light supper the day before surgery. Continue fluid intake. Do not drink alcohol.  · Do not eat or drink after midnight the night before your surgery.   · Arrange for someone to take you home and to stay with you for 24 hours after the procedure. Medicine given for your procedure may affect your ability to drive or to care for yourself.  · Call your health care provider's office if you develop an illness or problem that may prevent you from safely having your procedure.  AFTER THE PROCEDURE  After surgery, you will be taken to a recovery area, where your progress will be monitored. If there are no complications, you will be allowed to go home when you are awake, stable, and taking fluids well. You may have numbness around the surgical site. Healing will take some time. You will have tenderness at the surgical site and may have some swelling and bruising. You may also have some nausea.  HOME CARE INSTRUCTIONS  · Do not drive for 24 hours, or as directed by your health care provider. Do not drive while taking prescription pain medicines.  · Do not drink alcohol for 24 hours.  · Do not make important decisions or sign legal documents for 24 hours.  · You may resume a normal diet and activities as directed.  · Do not lift anything heavier than 10 pounds (4.5 kg) or  play contact sports until your health care provider says it is okay.  · Change your bandages (dressings) as directed.  · Only take over-the-counter or prescription medicines as directed by your health care provider.  · Follow up with your health care provider as directed.  SEEK MEDICAL CARE IF:  · You have increased bleeding (more than a small spot) from the surgical site.  · You have redness, swelling, or increasing pain in the wound.  · You see pus coming from the wound.  · You have a fever.  · You notice a bad smell coming from the wound or dressing.  · You feel lightheaded or faint.  · You develop a rash.  · You have trouble breathing.  · You develop allergies.  MAKE SURE YOU:  · Understand these instructions.  · Will watch your condition.  · Will get help right away if you are not doing well or get worse.     This information is not intended to replace advice given to you by your health care provider. Make sure you discuss any questions you have with your health care provider.     Document Released: 09/12/2002 Document Revised: 05/03/2016 Document Reviewed: 05/22/2014  [employer] Interactive Patient Education ©2016 [employer]       Fall Prevention in Hospitals, Adult  As a hospital patient, your condition and the treatments you receive can increase your risk for falls. Some additional risk factors for falls in a hospital include:  · Being in an unfamiliar environment.  · Being on bed rest.  · Your surgery.  · Taking certain medicines.  · Your tubing requirements, such as intravenous (IV) therapy or catheters.  It is important that you learn how to decrease fall risks while at the hospital. Below are important tips that can help prevent falls.  SAFETY TIPS FOR PREVENTING FALLS  Talk about your risk of falling.  · Ask your health care provider why you are at risk for falling. Is it your medicine, illness, tubing placement, or something else?  · Make a plan with your health care provider to keep you safe from  falls.  · Ask your health care provider or pharmacist about side effects of your medicines. Some medicines can make you dizzy or affect your coordination.  Ask for help.  · Ask for help before getting out of bed. You may need to press your call button.  · Ask for assistance in getting safely to the toilet.  · Ask for a walker or cane to be put at your bedside. Ask that most of the side rails on your bed be placed up before your health care provider leaves the room.  · Ask family or friends to sit with you.  · Ask for things that are out of your reach, such as your glasses, hearing aids, telephone, bedside table, or call button.  Follow these tips to avoid falling:  · Stay lying or seated, rather than standing, while waiting for help.  · Wear rubber-soled slippers or shoes whenever you walk in the hospital.  · Avoid quick, sudden movements.  ¨ Change positions slowly.  ¨ Sit on the side of your bed before standing.  ¨ Stand up slowly and wait before you start to walk.  · Let your health care provider know if there is a spill on the floor.  · Pay careful attention to the medical equipment, electrical cords, and tubes around you.  · When you need help, use your call button by your bed or in the bathroom. Wait for one of your health care providers to help you.  · If you feel dizzy or unsure of your footing, return to bed and wait for assistance.  · Avoid being distracted by the TV, telephone, or another person in your room.  · Do not lean or support yourself on rolling objects, such as IV poles or bedside tables.     This information is not intended to replace advice given to you by your health care provider. Make sure you discuss any questions you have with your health care provider.     Document Released: 12/15/2001 Document Revised: 01/08/2016 Document Reviewed: 08/25/2013  [employer] Interactive Patient Education ©2016 [employer]       Surgical Site Infections FAQs  What is a Surgical Site Infection (SSI)?  A  surgical site infection is an infection that occurs after surgery in the part of the body where the surgery took place. Most patients who have surgery do not develop an infection. However, infections develop in about 1 to 3 out of every 100 patients who have surgery.  Some of the common symptoms of a surgical site infection are:  · Redness and pain around the area where you had surgery  · Drainage of cloudy fluid from your surgical wound  · Fever  Can SSIs be treated?  Yes. Most surgical site infections can be treated with antibiotics. The antibiotic given to you depends on the bacteria (germs) causing the infection. Sometimes patients with SSIs also need another surgery to treat the infection.  What are some of the things that hospitals are doing to prevent SSIs?  To prevent SSIs, doctors, nurses, and other healthcare providers:  · Clean their hands and arms up to their elbows with an antiseptic agent just before the surgery.  · Clean their hands with soap and water or an alcohol-based hand rub before and after caring for each patient.  · May remove some of your hair immediately before your surgery using electric clippers if the hair is in the same area where the procedure will occur. They should not shave you with a razor.  · Wear special hair covers, masks, gowns, and gloves during surgery to keep the surgery area clean.  · Give you antibiotics before your surgery starts. In most cases, you should get antibiotics within 60 minutes before the surgery starts and the antibiotics should be stopped within 24 hours after surgery.  · Clean the skin at the site of your surgery with a special soap that kills germs.  What can I do to help prevent SSIs?  Before your surgery:  · Tell your doctor about other medical problems you may have. Health problems such as allergies, diabetes, and obesity could affect your surgery and your treatment.  · Quit smoking. Patients who smoke get more infections. Talk to your doctor about how  you can quit before your surgery.  · Do not shave near where you will have surgery. Shaving with a razor can irritate your skin and make it easier to develop an infection.  At the time of your surgery:  · Speak up if someone tries to shave you with a razor before surgery. Ask why you need to be shaved and talk with your surgeon if you have any concerns.  · Ask if you will get antibiotics before surgery.  After your surgery:  · Make sure that your healthcare providers clean their hands before examining you, either with soap and water or an alcohol-based hand rub.  · If you do not see your providers clean their hands, please ask them to do so.  · Family and friends who visit you should not touch the surgical wound or dressings.  · Family and friends should clean their hands with soap and water or an alcohol-based hand rub before and after visiting you. If you do not see them clean their hands, ask them to clean their hands.  What do I need to do when I go home from the hospital?  · Before you go home, your doctor or nurse should explain everything you need to know about taking care of your wound. Make sure you understand how to care for your wound before you leave the hospital.  · Always clean your hands before and after caring for your wound.  · Before you go home, make sure you know who to contact if you have questions or problems after you get home.  · If you have any symptoms of an infection, such as redness and pain at the surgery site, drainage, or fever, call your doctor immediately.  If you have additional questions, please ask your doctor or nurse.  Developed and co-sponsored by The Society for Healthcare Epidemiology of [place] (SHEA); Infectious Diseases Society of [place] (IDSA); American Hospital Association; Association for Professionals in Infection Control and Epidemiology (APIC); Centers for Disease Control and Prevention (CDC); and The Joint Commission.     This information is not intended to replace  advice given to you by your health care provider. Make sure you discuss any questions you have with your health care provider.     Document Released: 12/23/2014 Document Revised: 01/08/2016 Document Reviewed: 03/02/2016  [employer] Interactive Patient Education ©2016 [employer]     PATIENT/FAMILY/RESPONSIBLE PARTY VERBALIZES UNDERSTANDING OF ABOVE EDUCATION.  COPY OF PAIN SCALE GIVEN AND REVIEWED WITH VERBALIZED UNDERSTANDING.

## 2018-10-18 ENCOUNTER — HOSPITAL ENCOUNTER (OUTPATIENT)
Facility: HOSPITAL | Age: 83
Discharge: HOME | End: 2018-10-18
Attending: UROLOGY | Admitting: UROLOGY

## 2018-10-18 ENCOUNTER — ANESTHESIA (OUTPATIENT)
Dept: PERIOP | Facility: HOSPITAL | Age: 83
End: 2018-10-18

## 2018-10-18 ENCOUNTER — APPOINTMENT (OUTPATIENT)
Dept: GENERAL RADIOLOGY | Facility: HOSPITAL | Age: 83
End: 2018-10-18

## 2018-10-18 ENCOUNTER — ANESTHESIA EVENT (OUTPATIENT)
Dept: PERIOP | Facility: HOSPITAL | Age: 83
End: 2018-10-18

## 2018-10-18 VITALS
RESPIRATION RATE: 20 BRPM | HEART RATE: 71 BPM | TEMPERATURE: 97.9 F | SYSTOLIC BLOOD PRESSURE: 126 MMHG | DIASTOLIC BLOOD PRESSURE: 69 MMHG | OXYGEN SATURATION: 96 %

## 2018-10-18 DIAGNOSIS — N39.41 URGE INCONTINENCE: ICD-10-CM

## 2018-10-18 LAB
GLUCOSE BLDC GLUCOMTR-MCNC: 100 MG/DL (ref 70–130)
GLUCOSE BLDC GLUCOMTR-MCNC: 72 MG/DL (ref 70–130)
GLUCOSE BLDC GLUCOMTR-MCNC: 97 MG/DL (ref 70–130)

## 2018-10-18 PROCEDURE — 76000 FLUOROSCOPY <1 HR PHYS/QHP: CPT

## 2018-10-18 PROCEDURE — 76000 FLUOROSCOPY <1 HR PHYS/QHP: CPT | Performed by: UROLOGY

## 2018-10-18 PROCEDURE — C1894 INTRO/SHEATH, NON-LASER: HCPCS | Performed by: UROLOGY

## 2018-10-18 PROCEDURE — 63710000001 ACETAMINOPHEN 500 MG TABLET: Performed by: ANESTHESIOLOGY

## 2018-10-18 PROCEDURE — 63710000001 OXYCODONE-ACETAMINOPHEN 10-325 MG TABLET: Performed by: ANESTHESIOLOGY

## 2018-10-18 PROCEDURE — 82962 GLUCOSE BLOOD TEST: CPT

## 2018-10-18 PROCEDURE — C1787 PATIENT PROGR, NEUROSTIM: HCPCS | Performed by: UROLOGY

## 2018-10-18 PROCEDURE — 25010000002 SUCCINYLCHOLINE PER 20 MG: Performed by: NURSE ANESTHETIST, CERTIFIED REGISTERED

## 2018-10-18 PROCEDURE — 25010000002 DEXAMETHASONE PER 1 MG: Performed by: NURSE ANESTHETIST, CERTIFIED REGISTERED

## 2018-10-18 PROCEDURE — 25010000002 ONDANSETRON PER 1 MG: Performed by: NURSE ANESTHETIST, CERTIFIED REGISTERED

## 2018-10-18 PROCEDURE — A9270 NON-COVERED ITEM OR SERVICE: HCPCS | Performed by: ANESTHESIOLOGY

## 2018-10-18 PROCEDURE — 25010000002 VANCOMYCIN PER 500 MG: Performed by: UROLOGY

## 2018-10-18 PROCEDURE — 25010000002 FENTANYL CITRATE (PF) 100 MCG/2ML SOLUTION: Performed by: NURSE ANESTHETIST, CERTIFIED REGISTERED

## 2018-10-18 PROCEDURE — 64581 OPN IMPLTJ NEA SACRAL NERVE: CPT | Performed by: UROLOGY

## 2018-10-18 PROCEDURE — C1767 GENERATOR, NEURO NON-RECHARG: HCPCS | Performed by: UROLOGY

## 2018-10-18 PROCEDURE — 25010000002 PROPOFOL 10 MG/ML EMULSION: Performed by: NURSE ANESTHETIST, CERTIFIED REGISTERED

## 2018-10-18 PROCEDURE — 72220 X-RAY EXAM SACRUM TAILBONE: CPT

## 2018-10-18 PROCEDURE — 64590 INS/RPL PRPH SAC/GSTR NPG/R: CPT | Performed by: UROLOGY

## 2018-10-18 PROCEDURE — 95972 ALYS CPLX SP/PN NPGT W/PRGRM: CPT | Performed by: UROLOGY

## 2018-10-18 PROCEDURE — C1778 LEAD, NEUROSTIMULATOR: HCPCS | Performed by: UROLOGY

## 2018-10-18 PROCEDURE — 88300 SURGICAL PATH GROSS: CPT | Performed by: UROLOGY

## 2018-10-18 DEVICE — LD INTERSTIM TINED 28CM 388928: Type: IMPLANTABLE DEVICE | Status: FUNCTIONAL

## 2018-10-18 DEVICE — NEUROSTIMULAR INTERSTIM 2: Type: IMPLANTABLE DEVICE | Status: FUNCTIONAL

## 2018-10-18 RX ORDER — SODIUM CHLORIDE 0.9 % (FLUSH) 0.9 %
3 SYRINGE (ML) INJECTION EVERY 12 HOURS SCHEDULED
Status: DISCONTINUED | OUTPATIENT
Start: 2018-10-18 | End: 2018-10-18 | Stop reason: HOSPADM

## 2018-10-18 RX ORDER — SUCCINYLCHOLINE CHLORIDE 20 MG/ML
INJECTION INTRAMUSCULAR; INTRAVENOUS AS NEEDED
Status: DISCONTINUED | OUTPATIENT
Start: 2018-10-18 | End: 2018-10-18 | Stop reason: SURG

## 2018-10-18 RX ORDER — ACETAMINOPHEN 500 MG
1000 TABLET ORAL ONCE
Status: COMPLETED | OUTPATIENT
Start: 2018-10-18 | End: 2018-10-18

## 2018-10-18 RX ORDER — SODIUM CHLORIDE 0.9 % (FLUSH) 0.9 %
1-10 SYRINGE (ML) INJECTION AS NEEDED
Status: DISCONTINUED | OUTPATIENT
Start: 2018-10-18 | End: 2018-10-18 | Stop reason: HOSPADM

## 2018-10-18 RX ORDER — NALOXONE HCL 0.4 MG/ML
0.4 VIAL (ML) INJECTION AS NEEDED
Status: DISCONTINUED | OUTPATIENT
Start: 2018-10-18 | End: 2018-10-18 | Stop reason: HOSPADM

## 2018-10-18 RX ORDER — MEPERIDINE HYDROCHLORIDE 25 MG/ML
12.5 INJECTION INTRAMUSCULAR; INTRAVENOUS; SUBCUTANEOUS
Status: DISCONTINUED | OUTPATIENT
Start: 2018-10-18 | End: 2018-10-18 | Stop reason: HOSPADM

## 2018-10-18 RX ORDER — MAGNESIUM HYDROXIDE 1200 MG/15ML
LIQUID ORAL AS NEEDED
Status: DISCONTINUED | OUTPATIENT
Start: 2018-10-18 | End: 2018-10-18 | Stop reason: HOSPADM

## 2018-10-18 RX ORDER — LABETALOL HYDROCHLORIDE 5 MG/ML
5 INJECTION, SOLUTION INTRAVENOUS
Status: DISCONTINUED | OUTPATIENT
Start: 2018-10-18 | End: 2018-10-18 | Stop reason: HOSPADM

## 2018-10-18 RX ORDER — PROPOFOL 10 MG/ML
VIAL (ML) INTRAVENOUS AS NEEDED
Status: DISCONTINUED | OUTPATIENT
Start: 2018-10-18 | End: 2018-10-18 | Stop reason: SURG

## 2018-10-18 RX ORDER — CEPHALEXIN 500 MG/1
500 CAPSULE ORAL 3 TIMES DAILY
Qty: 15 CAPSULE | Refills: 0 | Status: SHIPPED | OUTPATIENT
Start: 2018-10-18 | End: 2019-04-15

## 2018-10-18 RX ORDER — PHENYLEPHRINE HCL IN 0.9% NACL 0.8MG/10ML
SYRINGE (ML) INTRAVENOUS AS NEEDED
Status: DISCONTINUED | OUTPATIENT
Start: 2018-10-18 | End: 2018-10-18 | Stop reason: SURG

## 2018-10-18 RX ORDER — ONDANSETRON 2 MG/ML
INJECTION INTRAMUSCULAR; INTRAVENOUS AS NEEDED
Status: DISCONTINUED | OUTPATIENT
Start: 2018-10-18 | End: 2018-10-18 | Stop reason: SURG

## 2018-10-18 RX ORDER — IPRATROPIUM BROMIDE AND ALBUTEROL SULFATE 2.5; .5 MG/3ML; MG/3ML
3 SOLUTION RESPIRATORY (INHALATION) ONCE AS NEEDED
Status: DISCONTINUED | OUTPATIENT
Start: 2018-10-18 | End: 2018-10-18 | Stop reason: HOSPADM

## 2018-10-18 RX ORDER — DEXAMETHASONE SODIUM PHOSPHATE 4 MG/ML
INJECTION, SOLUTION INTRA-ARTICULAR; INTRALESIONAL; INTRAMUSCULAR; INTRAVENOUS; SOFT TISSUE AS NEEDED
Status: DISCONTINUED | OUTPATIENT
Start: 2018-10-18 | End: 2018-10-18 | Stop reason: SURG

## 2018-10-18 RX ORDER — SODIUM CHLORIDE, SODIUM LACTATE, POTASSIUM CHLORIDE, CALCIUM CHLORIDE 600; 310; 30; 20 MG/100ML; MG/100ML; MG/100ML; MG/100ML
100 INJECTION, SOLUTION INTRAVENOUS CONTINUOUS
Status: DISCONTINUED | OUTPATIENT
Start: 2018-10-18 | End: 2018-10-18 | Stop reason: HOSPADM

## 2018-10-18 RX ORDER — SODIUM CHLORIDE, SODIUM LACTATE, POTASSIUM CHLORIDE, CALCIUM CHLORIDE 600; 310; 30; 20 MG/100ML; MG/100ML; MG/100ML; MG/100ML
1000 INJECTION, SOLUTION INTRAVENOUS CONTINUOUS
Status: DISCONTINUED | OUTPATIENT
Start: 2018-10-18 | End: 2018-10-18 | Stop reason: HOSPADM

## 2018-10-18 RX ORDER — ROCURONIUM BROMIDE 10 MG/ML
INJECTION, SOLUTION INTRAVENOUS AS NEEDED
Status: DISCONTINUED | OUTPATIENT
Start: 2018-10-18 | End: 2018-10-18 | Stop reason: SURG

## 2018-10-18 RX ORDER — FENTANYL CITRATE 50 UG/ML
25 INJECTION, SOLUTION INTRAMUSCULAR; INTRAVENOUS AS NEEDED
Status: DISCONTINUED | OUTPATIENT
Start: 2018-10-18 | End: 2018-10-18 | Stop reason: HOSPADM

## 2018-10-18 RX ORDER — ONDANSETRON 2 MG/ML
4 INJECTION INTRAMUSCULAR; INTRAVENOUS ONCE AS NEEDED
Status: DISCONTINUED | OUTPATIENT
Start: 2018-10-18 | End: 2018-10-18 | Stop reason: HOSPADM

## 2018-10-18 RX ORDER — FENTANYL CITRATE 50 UG/ML
INJECTION, SOLUTION INTRAMUSCULAR; INTRAVENOUS AS NEEDED
Status: DISCONTINUED | OUTPATIENT
Start: 2018-10-18 | End: 2018-10-18 | Stop reason: SURG

## 2018-10-18 RX ORDER — SODIUM CHLORIDE 0.9 % (FLUSH) 0.9 %
3 SYRINGE (ML) INJECTION AS NEEDED
Status: DISCONTINUED | OUTPATIENT
Start: 2018-10-18 | End: 2018-10-18 | Stop reason: HOSPADM

## 2018-10-18 RX ORDER — LIDOCAINE HYDROCHLORIDE 20 MG/ML
INJECTION, SOLUTION INFILTRATION; PERINEURAL AS NEEDED
Status: DISCONTINUED | OUTPATIENT
Start: 2018-10-18 | End: 2018-10-18 | Stop reason: SURG

## 2018-10-18 RX ORDER — OXYCODONE AND ACETAMINOPHEN 10; 325 MG/1; MG/1
1 TABLET ORAL ONCE AS NEEDED
Status: COMPLETED | OUTPATIENT
Start: 2018-10-18 | End: 2018-10-18

## 2018-10-18 RX ORDER — METOCLOPRAMIDE HYDROCHLORIDE 5 MG/ML
5 INJECTION INTRAMUSCULAR; INTRAVENOUS
Status: DISCONTINUED | OUTPATIENT
Start: 2018-10-18 | End: 2018-10-18 | Stop reason: HOSPADM

## 2018-10-18 RX ADMIN — EPHEDRINE SULFATE 10 MG: 50 INJECTION INTRAMUSCULAR; INTRAVENOUS; SUBCUTANEOUS at 08:47

## 2018-10-18 RX ADMIN — PROPOFOL 150 MG: 10 INJECTION, EMULSION INTRAVENOUS at 08:25

## 2018-10-18 RX ADMIN — VANCOMYCIN HYDROCHLORIDE 1000 MG: 1 INJECTION, POWDER, LYOPHILIZED, FOR SOLUTION INTRAVENOUS at 07:46

## 2018-10-18 RX ADMIN — SODIUM CHLORIDE, POTASSIUM CHLORIDE, SODIUM LACTATE AND CALCIUM CHLORIDE 1000 ML: 600; 310; 30; 20 INJECTION, SOLUTION INTRAVENOUS at 06:48

## 2018-10-18 RX ADMIN — LIDOCAINE HYDROCHLORIDE 0.5 ML: 10 INJECTION, SOLUTION EPIDURAL; INFILTRATION; INTRACAUDAL; PERINEURAL at 06:47

## 2018-10-18 RX ADMIN — FENTANYL CITRATE 50 MCG: 50 INJECTION, SOLUTION INTRAMUSCULAR; INTRAVENOUS at 08:37

## 2018-10-18 RX ADMIN — FENTANYL CITRATE 50 MCG: 50 INJECTION, SOLUTION INTRAMUSCULAR; INTRAVENOUS at 08:23

## 2018-10-18 RX ADMIN — LIDOCAINE HYDROCHLORIDE 60 MG: 20 INJECTION, SOLUTION INFILTRATION; PERINEURAL at 08:24

## 2018-10-18 RX ADMIN — SUCCINYLCHOLINE CHLORIDE 100 MG: 20 INJECTION, SOLUTION INTRAMUSCULAR; INTRAVENOUS at 08:25

## 2018-10-18 RX ADMIN — DEXAMETHASONE SODIUM PHOSPHATE 4 MG: 4 INJECTION, SOLUTION INTRAMUSCULAR; INTRAVENOUS at 08:40

## 2018-10-18 RX ADMIN — Medication 80 MCG: at 08:47

## 2018-10-18 RX ADMIN — ROCURONIUM BROMIDE 10 MG: 10 INJECTION INTRAVENOUS at 08:24

## 2018-10-18 RX ADMIN — Medication 80 MCG: at 08:31

## 2018-10-18 RX ADMIN — Medication 80 MCG: at 08:30

## 2018-10-18 RX ADMIN — OXYCODONE HYDROCHLORIDE AND ACETAMINOPHEN 1 TABLET: 10; 325 TABLET ORAL at 10:00

## 2018-10-18 RX ADMIN — EPHEDRINE SULFATE 10 MG: 50 INJECTION INTRAMUSCULAR; INTRAVENOUS; SUBCUTANEOUS at 09:05

## 2018-10-18 RX ADMIN — ACETAMINOPHEN 1000 MG: 500 TABLET, FILM COATED ORAL at 07:32

## 2018-10-18 RX ADMIN — ONDANSETRON HYDROCHLORIDE 4 MG: 2 SOLUTION INTRAMUSCULAR; INTRAVENOUS at 09:20

## 2018-10-18 NOTE — ANESTHESIA PREPROCEDURE EVALUATION
Anesthesia Evaluation     Patient summary reviewed   history of anesthetic complications: PONV  NPO Solid Status: > 8 hours  NPO Liquid Status: > 8 hours           Airway   Mallampati: I  TM distance: >3 FB  Neck ROM: full  No difficulty expected  Dental - normal exam     Pulmonary    (+) COPD,   (-) asthma, sleep apnea, not a smoker  Cardiovascular   Exercise tolerance: (Patient explains shortness of breath, denies chest pain with exertional activity)    (+) hypertension, hyperlipidemia,   (-) past MI, CAD, CHF, cardiac stents    ROS comment: 06/2018- Dobutamine stress echo is low risk for ischemia    Neuro/Psych  (+) headaches,       ROS Comment: Cerebral aneurysm  GI/Hepatic/Renal/Endo    (+)  GERD,  hepatitis (patient reports 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

## 2018-10-18 NOTE — ANESTHESIA POSTPROCEDURE EVALUATION
Patient: Honey Canales    Procedure Summary     Date:  10/18/18 Room / Location:  [facility] OR  /  [facility] OR    Anesthesia Start:  0820 Anesthesia Stop:  0928    Procedure:  INTERSTIM STAGES 1 AND 2 LEAD AND GENERATOR REMOVAL AND REPLACEMENT (N/A ) Diagnosis:       Urge incontinence      (Urge incontinence [N39.41])    Surgeon:  Archie Avery MD Provider:  Jaswinder Bhatia CRNA    Anesthesia Type:  general ASA Status:  3          Anesthesia Type: general  Last vitals  BP   158/90 (10/18/18 1020)   Temp   97.9 °F (36.6 °C) (10/18/18 0927)   Pulse   66 (10/18/18 1020)   Resp   20 (10/18/18 1020)     SpO2   97 % (10/18/18 1020)     Post Anesthesia Care and Evaluation    Patient location during evaluation: PACU  Patient participation: complete - patient participated  Level of consciousness: awake and alert  Pain management: adequate  Airway patency: patent  Anesthetic complications: No anesthetic complications  PONV Status: none  Cardiovascular status: acceptable and hemodynamically stable  Respiratory status: acceptable  Hydration status: acceptable    Comments: Blood pressure 158/90, pulse 66, temperature 97.9 °F (36.6 °C), temperature source Temporal Artery , resp. rate 20, SpO2 97 %.    Patient discharged from PACU based upon Cayla score. Please see RN notes for further details

## 2018-10-18 NOTE — ANESTHESIA PROCEDURE NOTES
Airway  Urgency: elective    Date/Time: 10/18/2018 8:26 AM  End Time:10/18/2018 8:26 AM    General Information and Staff    Patient location during procedure: OR  CRNA: FREDA SAUCEDA    Indications and Patient Condition  Indications for airway management: airway protection    Preoxygenated: yes  Mask difficulty assessment: 1 - vent by mask    Final Airway Details  Final airway type: endotracheal airway      Successful airway: ETT    Endotracheal tube insertion site: oral  Blade: Schmitt  Blade size: 2  ETT size: 7.0 mm  Cormack-Lehane Classification: grade IIb - view of arytenoids or posterior of glottis only  Placement verified by: capnometry   Measured from: teeth  ETT to teeth (cm): 22  Number of attempts at approach: 1

## 2018-10-18 NOTE — OP NOTE
INTERSTIM STAGES 1 AND 2 LEAD AND GENERATOR PLACEMENT  Procedure Note    Honey Canales  10/18/2018    Pre-op Diagnosis:   Urge incontinence [N39.41]    Post-op Diagnosis:     Post-Op Diagnosis Codes:     * Urge incontinence [N39.41]    Procedure/CPT® Codes:      Procedure(s):  INTERSTIM STAGES 1 AND 2 LEAD AND GENERATOR REMOVAL AND REPLACEMENT    Surgeon(s):  Archie Avery MD    Anesthesia: General    Staff:   Circulator: Kanchan Saucedo RN  Scrub Person: Tim Jamison; Zay Mason    Estimated Blood Loss: minimal    Specimens:                ID Type Source Tests Collected by Time   A : INTERSTIM GENERATOR REMOVAL FOR GROSS ONLY Hardware / Foreign Body Buttock, Right TISSUE PATHOLOGY EXAM Archie Avery MD 10/18/2018 0909         Drains:      Findings: Nonfunctioning InterStim    Complications: None    Indications: The patient had a InterStim placed about 2 years ago it he was not working well and actually because we turned up so high the battery was running down to    Also testing investigation revealed that the lead was not functioning well also.    I discussed the options with the patient she did feel that we initially placed InterStim that really works great and she was very pleased with that so she did not want to go ahead and remove and replace this present InterStim revision complication were discussed including bleeding infection etc. think all questions were answered she had no further medially prior to surgery    Description of Procedure: Patient brought to the operating suite under adequate general anesthetic was carefully placed in the prone position buttocks were taped open so to expose the rectal area the lower back and buttocks were then prepped and draped in a sterile fashion.    Might be noted the strict sterile technique was used throughout the case    At this point I went ahead and fluoroscopy did not interestingly enough the lead which was in the patient's left side looks like  a pullback almost out of the foramen completely so presumably this was the reason for the malfunction in that the bleeding had migrated out of the foramen of s3.    Incision was then made over the old generator site and the right buttocks sharply carried down through to the capsule which is an open and did not use any cautery at this point the capsule was not opened the generator was popped out and then was removed from the lead I Gently tugged on the lead at this point and saw where it went into the many surface the skin on the patient's left side.    A small incision was then made over the lead at this point it was then fished out using a right angle clamp and then totally removed without breaking the lead and generator were then removed from the table and sent for pathologic analysis.    Using fluoroscopy at this point and on the patient's right side I was able to pass a spinal needle into the S3 foramen on the right we got excellent neurologic responses both rectal and toe.    Following this the guide was placed through the needle and then over the guide the introducer was placed down just to the tip of the sacral inner portion.    Following this the lead was then placed appropriately using an angled guide all portions of the lead were tested and tested positive.    The obturator was removed and the lead was then tunneled to the previous generator site and then the generator was placed at this portion also throughout the case I used copious amounts of antibiotic solution to irrigate the generator site the jig.    The generator was then placed in the site and there was tested and we got a good neurologic response testing as well on all leads everything looks to be working and nice condition.    The wound was then closed with a running 3-0 Vicryl and then a subcuticular 3-0 Vicryl the smaller incisions were then also closed with interrupted Vicryl.    Steri-Strips were applied sponge and needles are correct blood  loss was minimal the patient returned to the cover stable condition        Archie Avery MD     Date: 10/18/2018  Time: 9:22 AM

## 2018-10-18 NOTE — DISCHARGE INSTRUCTIONS

## 2018-10-19 LAB
LAB AP CASE REPORT: NORMAL
PATH REPORT.FINAL DX SPEC: NORMAL
PATH REPORT.GROSS SPEC: NORMAL

## 2018-10-22 ENCOUNTER — TELEPHONE (OUTPATIENT)
Dept: UROLOGY | Facility: CLINIC | Age: 83
End: 2018-10-22

## 2018-10-22 NOTE — TELEPHONE ENCOUNTER
Patient had surgery to replace her InterStim and she was not fur sure if it was working correctly and if she could program it herself or if someone else needed to do it for her. She said it may not be working correctly if it is already programed because her urine frequency has increased and she says she is going more now than her first InterStim. I told her someone would be in contact with her. I wasn't for sure if the Rep would want to check it out and go over how it works again with the patient to make sure she understands how it works.

## 2018-11-01 ENCOUNTER — OFFICE VISIT (OUTPATIENT)
Dept: UROLOGY | Facility: CLINIC | Age: 83
End: 2018-11-01

## 2018-11-01 VITALS — WEIGHT: 154 LBS | BODY MASS INDEX: 24.17 KG/M2 | HEIGHT: 67 IN

## 2018-11-01 DIAGNOSIS — N39.41 URGE INCONTINENCE: ICD-10-CM

## 2018-11-01 DIAGNOSIS — N30.00 ACUTE CYSTITIS WITHOUT HEMATURIA: Primary | ICD-10-CM

## 2018-11-01 DIAGNOSIS — R32 INCONTINENCE IN FEMALE: ICD-10-CM

## 2018-11-01 LAB
BILIRUB BLD-MCNC: ABNORMAL MG/DL
CLARITY, POC: CLEAR
COLOR UR: YELLOW
GLUCOSE UR STRIP-MCNC: NEGATIVE MG/DL
KETONES UR QL: ABNORMAL
LEUKOCYTE EST, POC: ABNORMAL
NITRITE UR-MCNC: NEGATIVE MG/ML
PH UR: 5 [PH] (ref 5–8)
PROT UR STRIP-MCNC: NEGATIVE MG/DL
RBC # UR STRIP: NEGATIVE /UL
SP GR UR: 1.01 (ref 1–1.03)
UROBILINOGEN UR QL: NORMAL

## 2018-11-01 PROCEDURE — 99024 POSTOP FOLLOW-UP VISIT: CPT | Performed by: UROLOGY

## 2018-11-01 PROCEDURE — 81001 URINALYSIS AUTO W/SCOPE: CPT | Performed by: UROLOGY

## 2018-11-01 RX ORDER — NYSTATIN AND TRIAMCINOLONE ACETONIDE 100000; 1 [USP'U]/G; MG/G
OINTMENT TOPICAL
Qty: 30 G | Refills: 1 | Status: SHIPPED | OUTPATIENT
Start: 2018-11-01 | End: 2020-06-18

## 2018-11-01 RX ORDER — GLIPIZIDE 10 MG/1
TABLET, FILM COATED, EXTENDED RELEASE ORAL
COMMUNITY
Start: 2018-10-31 | End: 2019-04-15

## 2018-11-01 NOTE — PROGRESS NOTES
Subjective    Ms. Canales is 85 y.o. female    CHIEF COMPLAINT: Urge incontinence    The patient comes back today for follow-up of recent InterStim placement she is doing okay I still may be not quite worse he like to be but she is markedly improved she is down from a nocturia 5-6 times now to about 3 times she still does have some urgency but that is improving as well.    She is also talked [place] people and we have slowly turned up to power from 1.72.3 again I encouraged her not to turn up more frequently than that in the last time we turned up to the point where she was having vaginal irritation in this sort.    She also feels some antibiotics that she get a yeast infections I wrote her prescription for some Mycolog.    I looked at her incisions are nicely healed (infection per deciliter back here again in 3 months      History of Present Illness      The following portions of the patient's history were reviewed and updated as appropriate: allergies, current medications, past family history, past medical history, past social history, past surgical history and problem list.    Review of Systems   Constitutional: Negative for chills and fever.   Gastrointestinal: Negative for abdominal pain, anal bleeding and blood in stool.   Genitourinary: Positive for frequency and urgency. Negative for decreased urine volume, difficulty urinating, dyspareunia, dysuria, enuresis, flank pain, genital sores, hematuria, menstrual problem, pelvic pain, vaginal bleeding, vaginal discharge and vaginal pain.         Current Outpatient Prescriptions:   •  acetaminophen (TYLENOL) 500 MG tablet, Take 500 mg by mouth Every 6 (Six) Hours As Needed for Mild Pain ., Disp: , Rfl:   •  atorvastatin (LIPITOR) 80 MG tablet, Take 80 mg by mouth daily., Disp: , Rfl:   •  B-D ULTRAFINE III SHORT PEN 31G X 8 MM misc, , Disp: , Rfl:   •  bumetanide (BUMEX) 0.5 MG tablet, Take 0.5 mg by mouth Daily., Disp: , Rfl:   •  cephalexin (KEFLEX) 500 MG  capsule, Take 1 capsule by mouth 3 (Three) Times a Day., Disp: 15 capsule, Rfl: 0  •  glipiZIDE (GLUCOTROL XL) 10 MG 24 hr tablet, , Disp: , Rfl:   •  glipiZIDE (GLUCOTROL) 10 MG tablet, Take 10 mg by mouth 2 (Two) Times a Day Before Meals., Disp: , Rfl:   •  Insulin Detemir (LEVEMIR FLEXPEN SC), Inject 25 Units under the skin into the appropriate area as directed Every Night., Disp: , Rfl:   •  insulin detemir (LEVEMIR) 100 UNIT/ML injection, Inject 30 Units under the skin into the appropriate area as directed Every Morning., Disp: , Rfl:   •  levocetirizine (XYZAL) 5 MG tablet, Take 5 mg by mouth Every Evening., Disp: , Rfl:   •  levothyroxine (SYNTHROID, LEVOTHROID) 75 MCG tablet, Take 75 mcg by mouth daily., Disp: , Rfl:   •  losartan (COZAAR) 100 MG tablet, Take 100 mg by mouth Every Night., Disp: , Rfl:   •  nystatin-triamcinolone (MYCOLOG) 239864-5.1 UNIT/GM-% ointment, Apply to affected area 2 times daily, Disp: 30 g, Rfl: 1  •  PARoxetine (PAXIL) 10 MG tablet, Take 10 mg by mouth Every Evening., Disp: , Rfl:     Past Medical History:   Diagnosis Date   • Allergic rhinitis    • Aneurysm (CMS/HCC)    • Arthritis    • Cerebral aneurysm 2009    2ND ONE FOUND   • Cerebral aneurysm     NON TREATABLE   • Chronic laryngitis    • CKD (chronic kidney disease)    • Colon polyps    • COPD (chronic obstructive pulmonary disease) (CMS/HCC)    • Deviated nasal septum    • Diabetes mellitus (CMS/HCC)    • Disorder of kidney and ureter    • Disturbance of smell and taste    • Dizziness    • Encounter for imaging to screen for metal prior to MRI     NO MRI, METAL CLIPS IN HEAD   • Eustachian tube dysfunction    • GERD (gastroesophageal reflux disease)    • Globus sensation    • Headache    • Hepatitis     B   • Hepatitis A    • History of stomach ulcers    • Hyperlipidemia    • Hypertension    • Hypothyroid    • Laryngitis sicca    • Lung nodule    • Nocturia    • PONV (postoperative nausea and vomiting)    • Sensorineural  "hearing loss    • Spinal stenosis    • Urge incontinence    • Urgency of urination    • Urinary frequency    • Urinary incontinence    • UTI (urinary tract infection)        Past Surgical History:   Procedure Laterality Date   • BLADDER SURGERY  2016    Medtronic Interstem   • BRAIN SURGERY      ANUERYSM REMOVED   • BREAST SURGERY Bilateral     BIOPSY   • CARPAL TUNNEL RELEASE     • CATARACT EXTRACTION, BILATERAL     • CEREBRAL ANEURYSM REPAIR     •  SECTION      X4   • CHOLECYSTECTOMY     • HYSTERECTOMY     • INTERSTIM PLACEMENT N/A 10/18/2018    Procedure: INTERSTIM STAGES 1 AND 2 LEAD AND GENERATOR REMOVAL AND REPLACEMENT;  Surgeon: Archie Avery MD;  Location: [facility] OR;  Service: Urology   • JOINT REPLACEMENT Right     KNEE   • KNEE ARTHROSCOPY     • THYROIDECTOMY     • TONSILLECTOMY         Social History     Social History   • Marital status:      Social History Main Topics   • Smoking status: Former Smoker     Types: Cigarettes     Quit date:    • Smokeless tobacco: Never Used      Comment: SMOKED OVER 40 YEARS   • Alcohol use No   • Drug use: Unknown   • Sexual activity: Defer     Other Topics Concern   • Not on file       Family History   Problem Relation Age of Onset   • Cancer Brother         BLADDER   • No Known Problems Father    • No Known Problems Mother        Objective    Ht 169 cm (66.54\")   Wt 69.9 kg (154 lb)   BMI 24.45 kg/m²     Physical Exam      Admission on 10/18/2018, Discharged on 10/18/2018   Component Date Value Ref Range Status   • Glucose 10/18/2018 72  70 - 130 mg/dL Final    : [id number] Sally Torriebrianne Gallardo ID: [id number]   • Glucose 10/18/2018 97  70 - 130 mg/dL Final    : [id number] Manuela HardikjingAurea ID: [id number]   • Case Report 10/18/2018    Final                    Value:Surgical Pathology Report                         Case: [id number]                                  Authorizing Provider:  Archie Avery MD     Collected:       "     10/18/2018 09:09 AM          Ordering Location:     [facility] OR  Received:            10/18/2018 10:33 AM          Pathologist:           Shilo Mckenna MD                                                        Specimen:    Back, Lower, INTERSTIM GENERATOR REMOVAL FOR GROSS ONLY                                   • Final Diagnosis 10/18/2018    Final                    Value:This result contains rich text formatting which cannot be displayed here.   • Gross Description 10/18/2018    Final                    Value:This result contains rich text formatting which cannot be displayed here.   • Glucose 10/18/2018 100  70 - 130 mg/dL Final    : [id number] Emily Radford ID: [id number]       Results for orders placed or performed in visit on 11/01/18   POC Urinalysis Dipstick, Multipro   Result Value Ref Range    Color Yellow Yellow, Straw, Dark Yellow, Kirti    Clarity, UA Clear Clear    Glucose, UA Negative Negative, 1000 mg/dL (3+) mg/dL    Bilirubin Small (1+) (A) Negative    Ketones, UA Trace (A) Negative    Specific Gravity  1.010 1.005 - 1.030    Blood, UA Negative Negative    pH, Urine 5.0 5.0 - 8.0    Protein, POC Negative Negative mg/dL    Urobilinogen, UA Normal Normal    Nitrite, UA Negative Negative    Leukocytes Small (1+) (A) Negative       Assessment and Plan    Diagnoses and all orders for this visit:    Acute cystitis without hematuria  -     POC Urinalysis Dipstick, Multipro  -     nystatin-triamcinolone (MYCOLOG) 168370-7.1 UNIT/GM-% ointment; Apply to affected area 2 times daily    Urge incontinence    Incontinence in female

## 2019-01-03 NOTE — PROGRESS NOTES
[facility] - PODIATRY    Today's Date: 01/07/19    Patient Name: Honey Canales  MRN: [id number]  CSN: [id number]  PCP: Devon Zuñiga MD  Referring Provider: No ref. provider found    SUBJECTIVE     Chief Complaint   Patient presents with   • Diabetes     Patient is here for diabetic foot and nail care. Denies pain at present time. Last blood sugar level of 119mg/dl. PCP: Dr. Devon Zuñiga last visit 09/17/2018     HPI: Honey Canales, a 85 y.o.female, comes to clinic as a(n) established patient presenting for diabetic foot exam and complaining of painful toenails. Patient has h/o aneurysm, arthritis, DM2, GERD, Headache, HLD, HTN, Hypothyroid, Spinal Stenosis, urinary incontinence. Patient is IDDM with last stated BG level of 119mg/dl. States that her toenails are long, thick and discolored. She is unable to care for them herself. Admits to occasional numbness in feet. Denies open wounds or sores. Most pain when wearing closed toed shoes. Uses foam as padding for bunion deformities. Denies pain today. Relates previous treatment(s) including foot care by podiatrist. Denies any constitutional symptoms. No other pedal complaints at this time.    Past Medical History:   Diagnosis Date   • Allergic rhinitis    • Aneurysm (CMS/HCC)    • Arthritis    • Cerebral aneurysm 2009    2ND ONE FOUND   • Cerebral aneurysm     NON TREATABLE   • Chronic laryngitis    • CKD (chronic kidney disease)    • Colon polyps    • COPD (chronic obstructive pulmonary disease) (CMS/HCC)    • Deviated nasal septum    • Diabetes mellitus (CMS/HCC)    • Disorder of kidney and ureter    • Disturbance of smell and taste    • Dizziness    • Encounter for imaging to screen for metal prior to MRI     NO MRI, METAL CLIPS IN HEAD   • Eustachian tube dysfunction    • GERD (gastroesophageal reflux disease)    • Globus sensation    • Headache    • Hepatitis     B   • Hepatitis A    • History of stomach ulcers    • Hyperlipidemia    •  Hypertension    • Hypothyroid    • Laryngitis sicca    • Lung nodule    • Nocturia    • PONV (postoperative nausea and vomiting)    • Sensorineural hearing loss    • Spinal stenosis    • Urge incontinence    • Urgency of urination    • Urinary frequency    • Urinary incontinence    • UTI (urinary tract infection)      Past Surgical History:   Procedure Laterality Date   • BLADDER SURGERY  2016    Medtronic Interstem   • BRAIN SURGERY      ANUERYSM REMOVED   • BREAST SURGERY Bilateral     BIOPSY   • CARPAL TUNNEL RELEASE     • CATARACT EXTRACTION, BILATERAL     • CEREBRAL ANEURYSM REPAIR     •  SECTION      X4   • CHOLECYSTECTOMY     • HYSTERECTOMY     • INTERSTIM PLACEMENT N/A 10/18/2018    Procedure: INTERSTIM STAGES 1 AND 2 LEAD AND GENERATOR REMOVAL AND REPLACEMENT;  Surgeon: Archie Avery MD;  Location: [facility];  Service: Urology   • JOINT REPLACEMENT Right     KNEE   • KNEE ARTHROSCOPY     • THYROIDECTOMY     • TONSILLECTOMY       Family History   Problem Relation Age of Onset   • Cancer Brother         BLADDER   • No Known Problems Father    • No Known Problems Mother      Social History     Socioeconomic History   • Marital status:      Spouse name: Not on file   • Number of children: Not on file   • Years of education: Not on file   • Highest education level: Not on file   Social Needs   • Financial resource strain: Not on file   • Food insecurity - worry: Not on file   • Food insecurity - inability: Not on file   • Transportation needs - medical: Not on file   • Transportation needs - non-medical: Not on file   Occupational History   • Not on file   Tobacco Use   • Smoking status: Former Smoker     Types: Cigarettes     Last attempt to quit:      Years since quittin.0   • Smokeless tobacco: Never Used   • Tobacco comment: SMOKED OVER 40 YEARS   Substance and Sexual Activity   • Alcohol use: No   • Drug use: Defer   • Sexual activity: Defer   Other Topics Concern   • Not on  file   Social History Narrative   • Not on file     Allergies   Allergen Reactions   • Codeine Phosphate [Codeine] Unknown (See Comments)     CHEST PAIN   • Collagen Other (See Comments)     CAT GUT SUTURES \ WOUND WOULD NOT HEAL AND GOT INFECTION   • Accupril [Quinapril Hcl] Other (See Comments)     COUGH   • Aspirin Other (See Comments)     HISTORY OF STOMACH ULCERS   • Adhesive Tape Rash     SKIN BLISTERS   • Celebrex [Celecoxib] Nausea Only   • Lyrica [Pregabalin] Other (See Comments)     GAINED 50 POUNDS   • Pantoprazole Sodium Diarrhea   • Pentoxifylline Diarrhea   • Sulfa Antibiotics Nausea And Vomiting   • Tramadol Nausea And Vomiting   • Vioxx [Rofecoxib] Nausea And Vomiting     Current Outpatient Medications   Medication Sig Dispense Refill   • acetaminophen (TYLENOL) 500 MG tablet Take 500 mg by mouth Every 6 (Six) Hours As Needed for Mild Pain .     • atorvastatin (LIPITOR) 80 MG tablet Take 80 mg by mouth daily.     • B-D ULTRAFINE III SHORT PEN 31G X 8 MM misc      • bumetanide (BUMEX) 0.5 MG tablet Take 0.5 mg by mouth Daily.     • glipiZIDE (GLUCOTROL XL) 10 MG 24 hr tablet      • glipiZIDE (GLUCOTROL) 10 MG tablet Take 10 mg by mouth 2 (Two) Times a Day Before Meals.     • Insulin Detemir (LEVEMIR FLEXPEN SC) Inject 25 Units under the skin into the appropriate area as directed Every Night.     • insulin detemir (LEVEMIR) 100 UNIT/ML injection Inject 30 Units under the skin into the appropriate area as directed Every Morning.     • levocetirizine (XYZAL) 5 MG tablet Take 5 mg by mouth Every Evening.     • levothyroxine (SYNTHROID, LEVOTHROID) 75 MCG tablet Take 75 mcg by mouth daily.     • losartan (COZAAR) 100 MG tablet Take 100 mg by mouth Every Night.     • nystatin-triamcinolone (MYCOLOG) 499970-2.1 UNIT/GM-% ointment Apply to affected area 2 times daily 30 g 1   • PARoxetine (PAXIL) 10 MG tablet Take 10 mg by mouth Every Evening.     • cephalexin (KEFLEX) 500 MG capsule Take 1 capsule by mouth 3  (Three) Times a Day. 15 capsule 0     No current facility-administered medications for this visit.      Review of Systems   Constitutional: Negative for chills and fever.   HENT: Negative for congestion.    Respiratory: Negative for shortness of breath.    Cardiovascular: Negative for chest pain and leg swelling.   Gastrointestinal: Negative for constipation, diarrhea, nausea and vomiting.   Musculoskeletal:        Foot pain   Skin: Negative for wound.   Neurological: Positive for numbness.       OBJECTIVE     Vitals:    01/07/19 1016   BP: 110/60   Pulse: 60   SpO2: 94%       PHYSICAL EXAM  GEN:   Accompanied by none.     General Exam  Appearance: appears stated age and healthy   Orientation: alert and oriented to person, place, and time   Affect: appropriate   Gait: unimpaired   Assistance: ambulation does not require assistance   Shoe Gear: diabetic shoes      Foot/Ankle Exam  Inspection and Palpation  Ecchymosis - Right: none Left: none  Tenderness - Right: (Toenails) Left: (Toenails)  Arch - Right: normal Left: normal  Hammertoes - Right: absent Left: absent  Claw Toes - Right: absent Left: absent  Mallet Toes - Right: absent Left: absent  Hallux Valgus - Right: yes Left: yes  Hallux Limitus - Right: no Left: no  Skin - Right: callus  Left: skin intact   Nails -  Right: abnormally thick and onychomycosis Left: abnormally thick and onychomycosis     Vascular  Dorsalis Pedis - Right: 2+ Left: 2+  Posterior Tibial - Right: 2+ Left: 2+  Skin Temperature -  Right: warm  Left: warm    CFT - Right: 3 Left: 3  Pedal Hair Growth - Right: absent Left: absent  Varicosities -  Right: mild varicosities  Left: mild varicosities      Neurologic  Saphenous Nerve Sensation  - Right: normal Left: normal  Tibial Nerve Sensation - Right: normal Left: normal  Superficial Peroneal Nerve Sensation - Right: normal Left: normal  Deep Peroneal Nerve Sensation - Right: normal Left: normal  Sural Nerve Sensation - Right: normal Left:  normal  Protective Sensation using [place]ima Monofilament - Right: 9 Left: 9    Muscle Strength  Dorsiflexion - Right: 5 Left: 5  Plantar Flexion - Right: 5 Left: 5  Eversion - Right: 5 Left: 5  Inversion - Right: 5 Left: 5  Great Toe Extension - Right: 5 Left: 5  Great Toe Flexion - Right: 5 Left: 5    Range of Motion  Normal right ankle ROM  Normal left ankle ROM            RADIOLOGY/NUCLEAR:  No results found.    LABORATORY/CULTURE RESULTS:      PATHOLOGY RESULTS:       ASSESSMENT/PLAN     Honey was seen today for diabetes.    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  Discussed findings and treatment plan including risks, benefits, and treatment options with patient in detail. Patient agreed with treatment plan.  After verbal consent obtained, nail(s) x10 debrided of length and thickness with nail nipper without incidence  After verbal consent obtained, calluses x1 pared utilizing dermal curette and/or scalpel without incidence  Patient may maintain nails and calluses at home utilizing emery board or pumice stone between visits as needed  Reviewed at home diabetic foot care including daily foot checks   HAV asymptomatic at this time. Continue foam padding.   An After Visit Summary was printed and given to the patient at discharge, including (if requested) any available informative/educational handouts regarding diagnosis, treatment, or medications. All questions were answered to patient/family satisfaction. Should symptoms fail to improve or worsen they agree to call or return to clinic or to go to the Emergency Department. Discussed the importance of following up with any needed screening tests/labs/specialist appointments and any  requested follow-up recommended by me today. Importance of maintaining follow-up discussed and patient accepts that missed appointments can delay diagnosis and potentially lead to worsening of conditions.  Return in about 3 months (around 4/7/2019)., or sooner if acute issues arise.        This document has been electronically signed by Morgan Pepper DPM on January 7, 2019 7:00 PM

## 2019-01-04 ENCOUNTER — TELEPHONE (OUTPATIENT)
Dept: PODIATRY | Facility: CLINIC | Age: 84
End: 2019-01-04

## 2019-01-04 NOTE — TELEPHONE ENCOUNTER
Called to remind patient of appointment with Dr. Ameya Pepper on January 7, 2019 at 10:15 am. Left callback number included with message at this time.

## 2019-01-07 ENCOUNTER — OFFICE VISIT (OUTPATIENT)
Dept: PODIATRY | Facility: CLINIC | Age: 84
End: 2019-01-07

## 2019-01-07 VITALS
HEART RATE: 60 BPM | DIASTOLIC BLOOD PRESSURE: 60 MMHG | SYSTOLIC BLOOD PRESSURE: 110 MMHG | WEIGHT: 154 LBS | OXYGEN SATURATION: 94 % | HEIGHT: 67 IN | BODY MASS INDEX: 24.17 KG/M2

## 2019-01-07 DIAGNOSIS — E11.8 DIABETIC FOOT (HCC): ICD-10-CM

## 2019-01-07 DIAGNOSIS — M79.672 FOOT PAIN, BILATERAL: ICD-10-CM

## 2019-01-07 DIAGNOSIS — M79.671 FOOT PAIN, BILATERAL: ICD-10-CM

## 2019-01-07 DIAGNOSIS — B35.1 ONYCHOMYCOSIS: Primary | ICD-10-CM

## 2019-01-07 DIAGNOSIS — M20.11 HALLUX VALGUS, RIGHT: ICD-10-CM

## 2019-01-07 DIAGNOSIS — E11.49 DIABETES MELLITUS TYPE 2 WITH NEUROLOGICAL MANIFESTATIONS (HCC): ICD-10-CM

## 2019-01-07 DIAGNOSIS — L84 FOOT CALLUS: ICD-10-CM

## 2019-01-07 DIAGNOSIS — M20.12 HALLUX VALGUS, LEFT: ICD-10-CM

## 2019-01-07 PROCEDURE — 11721 DEBRIDE NAIL 6 OR MORE: CPT | Performed by: PODIATRIST

## 2019-01-07 PROCEDURE — 11055 PARING/CUTG B9 HYPRKER LES 1: CPT | Performed by: PODIATRIST

## 2019-01-07 PROCEDURE — 99213 OFFICE O/P EST LOW 20 MIN: CPT | Performed by: PODIATRIST

## 2019-02-05 ENCOUNTER — OFFICE VISIT (OUTPATIENT)
Dept: UROLOGY | Facility: CLINIC | Age: 84
End: 2019-02-05

## 2019-02-05 VITALS — HEIGHT: 67 IN | BODY MASS INDEX: 24.17 KG/M2 | WEIGHT: 154 LBS | TEMPERATURE: 97.9 F

## 2019-02-05 DIAGNOSIS — R35.0 FREQUENCY OF URINATION: ICD-10-CM

## 2019-02-05 DIAGNOSIS — N39.41 URGE INCONTINENCE: Primary | ICD-10-CM

## 2019-02-05 DIAGNOSIS — R32 INCONTINENCE IN FEMALE: ICD-10-CM

## 2019-02-05 DIAGNOSIS — R35.1 NOCTURIA: ICD-10-CM

## 2019-02-05 PROCEDURE — 81001 URINALYSIS AUTO W/SCOPE: CPT | Performed by: UROLOGY

## 2019-02-05 PROCEDURE — 99213 OFFICE O/P EST LOW 20 MIN: CPT | Performed by: UROLOGY

## 2019-02-05 NOTE — PROGRESS NOTES
Subjective    Ms. Canales is 85 y.o. female    CHIEF COMPLAINT: Nocturia    Patient comes back today for follow-up of her bladder her complaints still is of nocturia she said she is not any better but then when I ask her how she is doing she is only getting up 2-3 times at night this is much better than what she had preoperatively when she was getting up 5-6 times at night.    However she is not having any troubles at all during the day as far as urgency frequency etc. and we think is when she takes her Bumex in the morning she does have to void more which is obviously indicated.    She has no gross hematuria she has no flank pain her urinalysis today is clear      History of Present Illness      The following portions of the patient's history were reviewed and updated as appropriate: allergies, current medications, past family history, past medical history, past social history, past surgical history and problem list.    Review of Systems   Constitutional: Negative for chills and fever.   Gastrointestinal: Negative for abdominal pain, anal bleeding and blood in stool.   Genitourinary: Positive for frequency and urgency. Negative for difficulty urinating, dysuria and hematuria.   Psychiatric/Behavioral: Negative for agitation, behavioral problems and confusion.         Current Outpatient Medications:   •  acetaminophen (TYLENOL) 500 MG tablet, Take 500 mg by mouth Every 6 (Six) Hours As Needed for Mild Pain ., Disp: , Rfl:   •  atorvastatin (LIPITOR) 80 MG tablet, Take 80 mg by mouth daily., Disp: , Rfl:   •  B-D ULTRAFINE III SHORT PEN 31G X 8 MM misc, , Disp: , Rfl:   •  bumetanide (BUMEX) 0.5 MG tablet, Take 0.5 mg by mouth Daily., Disp: , Rfl:   •  cephalexin (KEFLEX) 500 MG capsule, Take 1 capsule by mouth 3 (Three) Times a Day., Disp: 15 capsule, Rfl: 0  •  glipiZIDE (GLUCOTROL XL) 10 MG 24 hr tablet, , Disp: , Rfl:   •  glipiZIDE (GLUCOTROL) 10 MG tablet, Take 10 mg by mouth 2 (Two) Times a Day Before Meals.,  Disp: , Rfl:   •  Insulin Detemir (LEVEMIR FLEXPEN SC), Inject 25 Units under the skin into the appropriate area as directed Every Night., Disp: , Rfl:   •  insulin detemir (LEVEMIR) 100 UNIT/ML injection, Inject 30 Units under the skin into the appropriate area as directed Every Morning., Disp: , Rfl:   •  levocetirizine (XYZAL) 5 MG tablet, Take 5 mg by mouth Every Evening., Disp: , Rfl:   •  levothyroxine (SYNTHROID, LEVOTHROID) 75 MCG tablet, Take 75 mcg by mouth daily., Disp: , Rfl:   •  losartan (COZAAR) 100 MG tablet, Take 100 mg by mouth Every Night., Disp: , Rfl:   •  nystatin-triamcinolone (MYCOLOG) 448998-0.1 UNIT/GM-% ointment, Apply to affected area 2 times daily, Disp: 30 g, Rfl: 1  •  PARoxetine (PAXIL) 10 MG tablet, Take 10 mg by mouth Every Evening., Disp: , Rfl:     Past Medical History:   Diagnosis Date   • Allergic rhinitis    • Aneurysm (CMS/HCC)    • Arthritis    • Cerebral aneurysm 2009    2ND ONE FOUND   • Cerebral aneurysm     NON TREATABLE   • Chronic laryngitis    • CKD (chronic kidney disease)    • Colon polyps    • COPD (chronic obstructive pulmonary disease) (CMS/HCC)    • Deviated nasal septum    • Diabetes mellitus (CMS/HCC)    • Disorder of kidney and ureter    • Disturbance of smell and taste    • Dizziness    • Encounter for imaging to screen for metal prior to MRI     NO MRI, METAL CLIPS IN HEAD   • Eustachian tube dysfunction    • GERD (gastroesophageal reflux disease)    • Globus sensation    • Headache    • Hepatitis     B   • Hepatitis A    • History of stomach ulcers    • Hyperlipidemia    • Hypertension    • Hypothyroid    • Laryngitis sicca    • Lung nodule    • Nocturia    • PONV (postoperative nausea and vomiting)    • Sensorineural hearing loss    • Spinal stenosis    • Urge incontinence    • Urgency of urination    • Urinary frequency    • Urinary incontinence    • UTI (urinary tract infection)        Past Surgical History:   Procedure Laterality Date   • BLADDER  "SURGERY  2016    Medtronic Interstem   • BRAIN SURGERY      ANUERYSM REMOVED   • BREAST SURGERY Bilateral     BIOPSY   • CARPAL TUNNEL RELEASE     • CATARACT EXTRACTION, BILATERAL     • CEREBRAL ANEURYSM REPAIR     •  SECTION      X4   • CHOLECYSTECTOMY     • HYSTERECTOMY     • INTERSTIM PLACEMENT N/A 10/18/2018    Procedure: INTERSTIM STAGES 1 AND 2 LEAD AND GENERATOR REMOVAL AND REPLACEMENT;  Surgeon: Archie Avery MD;  Location: [facility];  Service: Urology   • JOINT REPLACEMENT Right     KNEE   • KNEE ARTHROSCOPY     • THYROIDECTOMY     • TONSILLECTOMY         Social History     Socioeconomic History   • Marital status:      Spouse name: Not on file   • Number of children: Not on file   • Years of education: Not on file   • Highest education level: Not on file   Tobacco Use   • Smoking status: Former Smoker     Types: Cigarettes     Last attempt to quit:      Years since quittin.1   • Smokeless tobacco: Never Used   • Tobacco comment: SMOKED OVER 40 YEARS   Substance and Sexual Activity   • Alcohol use: No   • Drug use: Defer   • Sexual activity: Defer       Family History   Problem Relation Age of Onset   • Cancer Brother         BLADDER   • No Known Problems Father    • No Known Problems Mother        Objective    Temp 97.9 °F (36.6 °C)   Ht 169 cm (66.54\")   Wt 69.9 kg (154 lb)   BMI 24.45 kg/m²     Physical Exam      Office Visit on 2018   Component Date Value Ref Range Status   • Color 2018 Yellow  Yellow, Straw, Dark Yellow, Kirti Final   • Clarity, UA 2018 Clear  Clear Final   • Glucose, UA 2018 Negative  Negative, 1000 mg/dL (3+) mg/dL Final   • Bilirubin 2018 Small (1+)* Negative Final   • Ketones, UA 2018 Trace* Negative Final   • Specific Gravity  2018 1.010  1.005 - 1.030 Final   • Blood, UA 2018 Negative  Negative Final   • pH, Urine 2018 5.0  5.0 - 8.0 Final   • Protein, POC 2018 Negative  Negative mg/dL " Final   • Urobilinogen, UA 11/01/2018 Normal  Normal Final   • Nitrite, UA 11/01/2018 Negative  Negative Final   • Leukocytes 11/01/2018 Small (1+)* Negative Final       Results for orders placed or performed in visit on 02/05/19   POC Urinalysis Dipstick, Multipro   Result Value Ref Range    Color Yellow Yellow, Straw, Dark Yellow, Kirti    Clarity, UA Clear Clear    Glucose, UA Negative Negative, 1000 mg/dL (3+) mg/dL    Bilirubin Negative Negative    Ketones, UA Negative Negative    Specific Gravity  1.015 1.005 - 1.030    Blood, UA Negative Negative    pH, Urine 6.0 5.0 - 8.0    Protein, POC Negative Negative mg/dL    Urobilinogen, UA Normal Normal    Nitrite, UA Negative Negative    Leukocytes Negative Negative       Assessment and Plan    Diagnoses and all orders for this visit:    Urge incontinence  -     POC Urinalysis Dipstick, Multipro    Incontinence in female    Frequency of urination    Nocturia    Plan--the patient I think her 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    Therefore I am going to suggest that she go ahead and take her Bumex later in the day I made in the afternoon to see if we cannot keep the fluid off at night and have less symptoms I told her I do not know if we can do any better than that.    I will see her back again in 6 months she has a trouble prior then she will call

## 2019-03-28 ENCOUNTER — HOSPITAL ENCOUNTER (OUTPATIENT)
Dept: WOMENS IMAGING | Age: 84
Discharge: HOME | End: 2019-03-28
Payer: MEDICARE

## 2019-03-28 DIAGNOSIS — Z12.31 ENCOUNTER FOR SCREENING MAMMOGRAM FOR BREAST CANCER: ICD-10-CM

## 2019-03-28 PROCEDURE — 77063 BREAST TOMOSYNTHESIS BI: CPT

## 2019-04-09 NOTE — PROGRESS NOTES
[facility] - PODIATRY    Today's Date: 04/15/19    Patient Name: Honey Canales  MRN: [id number]  CSN: [id number]  PCP: Devon Zuñiga MD  Referring Provider: No ref. provider found    SUBJECTIVE     Chief Complaint   Patient presents with   • Follow-up     diabetic foot and nail care - pt c/o long toenails -  pt denies pain at present time    • Diabetes     last stated blood sugar 133 mg/dl - PCP Dr. Devon Zuñiga last visit 09/17/18     HPI: Honey Canales, a 86 y.o.female, comes to clinic as a(n) established patient presenting for diabetic foot exam and complaining of painful toenails. Patient has h/o aneurysm, arthritis, DM2, GERD, Headache, HLD, HTN, Hypothyroid, Spinal Stenosis, urinary incontinence. Patient is IDDM with last stated BG level of 133mg/dl. States that her toenails are long, thick and discolored. She is unable to care for them herself. Admits to occasional numbness in feet. Denies open wounds or sores. Most pain when wearing closed toed shoes. Uses foam as padding for bunion deformities. Denies pain today. Relates previous treatment(s) including foot care by podiatrist. Denies any constitutional symptoms. No other pedal complaints at this time.    Past Medical History:   Diagnosis Date   • Allergic rhinitis    • Aneurysm (CMS/HCC)    • Arthritis    • Cerebral aneurysm 2009 2ND ONE FOUND   • Cerebral aneurysm     NON TREATABLE   • Chronic laryngitis    • CKD (chronic kidney disease)    • Colon polyps    • COPD (chronic obstructive pulmonary disease) (CMS/HCC)    • Deviated nasal septum    • Diabetes mellitus (CMS/HCC)    • Disorder of kidney and ureter    • Disturbance of smell and taste    • Dizziness    • Encounter for imaging to screen for metal prior to MRI     NO MRI, METAL CLIPS IN HEAD   • Eustachian tube dysfunction    • GERD (gastroesophageal reflux disease)    • Globus sensation    • Headache    • Hepatitis     B   • Hepatitis A    • History of stomach ulcers    •  Hyperlipidemia    • Hypertension    • Hypothyroid    • Laryngitis sicca    • Lung nodule    • Nocturia    • PONV (postoperative nausea and vomiting)    • Sensorineural hearing loss    • Spinal stenosis    • Urge incontinence    • Urgency of urination    • Urinary frequency    • Urinary incontinence    • UTI (urinary tract infection)      Past Surgical History:   Procedure Laterality Date   • BLADDER SURGERY  2016    Medtronic Interstem   • BRAIN SURGERY      ANUERYSM REMOVED   • BREAST SURGERY Bilateral     BIOPSY   • CARPAL TUNNEL RELEASE     • CATARACT EXTRACTION, BILATERAL     • CEREBRAL ANEURYSM REPAIR     •  SECTION      X4   • CHOLECYSTECTOMY     • HYSTERECTOMY     • INTERSTIM PLACEMENT N/A 10/18/2018    Procedure: INTERSTIM STAGES 1 AND 2 LEAD AND GENERATOR REMOVAL AND REPLACEMENT;  Surgeon: Archie Avery MD;  Location: [facility] OR;  Service: Urology   • JOINT REPLACEMENT Right     KNEE   • KNEE ARTHROSCOPY     • THYROIDECTOMY     • TONSILLECTOMY       Family History   Problem Relation Age of Onset   • Cancer Brother         BLADDER   • No Known Problems Father    • No Known Problems Mother      Social History     Socioeconomic History   • Marital status:      Spouse name: Not on file   • Number of children: Not on file   • Years of education: Not on file   • Highest education level: Not on file   Tobacco Use   • Smoking status: Former Smoker     Types: Cigarettes     Last attempt to quit:      Years since quittin.3   • Smokeless tobacco: Never Used   • Tobacco comment: SMOKED OVER 40 YEARS   Substance and Sexual Activity   • Alcohol use: No   • Drug use: Defer   • Sexual activity: Defer     Allergies   Allergen Reactions   • Codeine Phosphate [Codeine] Unknown (See Comments)     CHEST PAIN   • Collagen Other (See Comments)     CAT GUT SUTURES \ WOUND WOULD NOT HEAL AND GOT INFECTION   • Accupril [Quinapril Hcl] Other (See Comments)     COUGH   • Aspirin Other (See Comments)      HISTORY OF STOMACH ULCERS   • Adhesive Tape Rash     SKIN BLISTERS   • Celebrex [Celecoxib] Nausea Only   • Lyrica [Pregabalin] Other (See Comments)     GAINED 50 POUNDS   • Pantoprazole Sodium Diarrhea   • Pentoxifylline Diarrhea   • Sulfa Antibiotics Nausea And Vomiting   • Tramadol Nausea And Vomiting   • Vioxx [Rofecoxib] Nausea And Vomiting     Current Outpatient Medications   Medication Sig Dispense Refill   • acetaminophen (TYLENOL) 500 MG tablet Take 500 mg by mouth Every 6 (Six) Hours As Needed for Mild Pain .     • atorvastatin (LIPITOR) 80 MG tablet Take 80 mg by mouth daily.     • B-D ULTRAFINE III SHORT PEN 31G X 8 MM misc      • chlorthalidone (HYGROTEN) 50 MG tablet Take 50 mg by mouth Daily.  1   • glipiZIDE (GLUCOTROL) 10 MG tablet Take 10 mg by mouth 2 (Two) Times a Day Before Meals.     • Insulin Detemir (LEVEMIR FLEXPEN SC) Inject 25 Units under the skin into the appropriate area as directed Every Night.     • insulin detemir (LEVEMIR) 100 UNIT/ML injection Inject 30 Units under the skin into the appropriate area as directed Every Morning.     • levocetirizine (XYZAL) 5 MG tablet Take 5 mg by mouth Every Evening.     • levothyroxine (SYNTHROID, LEVOTHROID) 75 MCG tablet Take 75 mcg by mouth daily.     • losartan (COZAAR) 100 MG tablet Take 100 mg by mouth Every Night.     • nystatin-triamcinolone (MYCOLOG) 046575-3.1 UNIT/GM-% ointment Apply to affected area 2 times daily 30 g 1   • PARoxetine (PAXIL) 10 MG tablet Take 10 mg by mouth Every Evening.       No current facility-administered medications for this visit.      Review of Systems   Constitutional: Negative for chills and fever.   HENT: Negative for congestion.    Respiratory: Negative for shortness of breath.    Cardiovascular: Negative for chest pain and leg swelling.   Gastrointestinal: Negative for constipation, diarrhea, nausea and vomiting.   Musculoskeletal:        Foot pain   Skin: Negative for wound.   Neurological: Positive for  numbness.       OBJECTIVE     Vitals:    04/15/19 1347   BP: 102/60   Pulse: 83   SpO2: 98%       PHYSICAL EXAM  GEN:   Accompanied by none.     General Exam  Appearance: appears stated age and healthy   Orientation: alert and oriented to person, place, and time   Affect: appropriate   Gait: unimpaired   Assistance: ambulation does not require assistance   Shoe Gear: diabetic shoes    Foot/Ankle Exam  Inspection and Palpation  Ecchymosis - Right: none Left: none  Tenderness - Right: (Toenails) Left: (Toenails)  Arch - Right: normal Left: normal  Hammertoes - Right: absent Left: absent  Claw Toes - Right: absent Left: absent  Mallet Toes - Right: absent Left: absent  Hallux Valgus - Right: yes Left: yes  Hallux Limitus - Right: no Left: no  Skin - Right: skin intact  Left: skin intact   Nails -  Right: abnormally thick and onychomycosis Left: abnormally thick and onychomycosis     Vascular  Dorsalis Pedis - Right: 2+ Left: 2+  Posterior Tibial - Right: 2+ Left: 2+  Skin Temperature -  Right: warm  Left: warm    CFT - Right: 3 Left: 3  Pedal Hair Growth - Right: absent Left: absent  Varicosities -  Right: mild varicosities  Left: mild varicosities      Neurologic  Saphenous Nerve Sensation  - Right: normal Left: normal  Tibial Nerve Sensation - Right: normal Left: normal  Superficial Peroneal Nerve Sensation - Right: normal Left: normal  Deep Peroneal Nerve Sensation - Right: normal Left: normal  Sural Nerve Sensation - Right: normal Left: normal  Protective Sensation using Chama-Tima Monofilament - Right: 9 Left: 9    Muscle Strength  Dorsiflexion - Right: 5 Left: 5  Plantar Flexion - Right: 5 Left: 5  Eversion - Right: 5 Left: 5  Inversion - Right: 5 Left: 5  Great Toe Extension - Right: 5 Left: 5  Great Toe Flexion - Right: 5 Left: 5    Range of Motion  Normal right ankle ROM  Normal left ankle ROM            RADIOLOGY/NUCLEAR:  No results found.    LABORATORY/CULTURE RESULTS:      PATHOLOGY RESULTS:        ASSESSMENT/PLAN     Honey was seen today for follow-up and diabetes.    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  Discussed findings and treatment plan including risks, benefits, and treatment options with patient in detail. Patient agreed with treatment plan.  After verbal consent obtained, nail(s) x10 debrided of length and thickness with nail nipper without incidence  Patient may maintain nails and calluses at home utilizing emery board or pumice stone between visits as needed  Reviewed at home diabetic foot care including daily foot checks   HAV asymptomatic at this time. Continue foam padding.   An After Visit Summary was printed and given to the patient at discharge, including (if requested) any available informative/educational handouts regarding diagnosis, treatment, or medications. All questions were answered to patient/family satisfaction. Should symptoms fail to improve or worsen they agree to call or return to clinic or to go to the Emergency Department. Discussed the importance of following up with any needed screening tests/labs/specialist appointments and any requested follow-up recommended by me today. Importance of maintaining follow-up discussed and patient accepts that missed appointments can delay diagnosis and potentially lead to worsening of conditions.  Return in about 3 months (around 7/15/2019)., or sooner if acute issues arise.        This document has been electronically signed by Morgan Pepper DPM on April 15, 2019 2:04 PM

## 2019-04-11 ENCOUNTER — TELEPHONE (OUTPATIENT)
Dept: PODIATRY | Facility: CLINIC | Age: 84
End: 2019-04-11

## 2019-04-11 NOTE — TELEPHONE ENCOUNTER
Called and reminded patient of appointment with Dr. Ameya Pepper on April 15, 2019 at 1:30 pm. Patient gave verbal confirmation of appointment at this time.

## 2019-04-15 ENCOUNTER — OFFICE VISIT (OUTPATIENT)
Dept: PODIATRY | Facility: CLINIC | Age: 84
End: 2019-04-15

## 2019-04-15 VITALS
HEART RATE: 83 BPM | HEIGHT: 67 IN | DIASTOLIC BLOOD PRESSURE: 60 MMHG | SYSTOLIC BLOOD PRESSURE: 102 MMHG | WEIGHT: 153 LBS | OXYGEN SATURATION: 98 % | BODY MASS INDEX: 24.01 KG/M2

## 2019-04-15 DIAGNOSIS — M79.671 FOOT PAIN, BILATERAL: ICD-10-CM

## 2019-04-15 DIAGNOSIS — M20.11 HALLUX VALGUS, RIGHT: ICD-10-CM

## 2019-04-15 DIAGNOSIS — L84 FOOT CALLUS: ICD-10-CM

## 2019-04-15 DIAGNOSIS — M79.672 FOOT PAIN, BILATERAL: ICD-10-CM

## 2019-04-15 DIAGNOSIS — E11.8 DIABETIC FOOT (HCC): ICD-10-CM

## 2019-04-15 DIAGNOSIS — E11.49 DIABETES MELLITUS TYPE 2 WITH NEUROLOGICAL MANIFESTATIONS (HCC): ICD-10-CM

## 2019-04-15 DIAGNOSIS — B35.1 ONYCHOMYCOSIS: Primary | ICD-10-CM

## 2019-04-15 DIAGNOSIS — M20.12 HALLUX VALGUS, LEFT: ICD-10-CM

## 2019-04-15 PROCEDURE — 11721 DEBRIDE NAIL 6 OR MORE: CPT | Performed by: PODIATRIST

## 2019-04-15 RX ORDER — CHLORTHALIDONE 50 MG/1
50 TABLET ORAL DAILY
Refills: 1 | COMMUNITY
Start: 2019-04-02 | End: 2019-08-12

## 2019-08-08 ENCOUNTER — TELEPHONE (OUTPATIENT)
Dept: PODIATRY | Facility: CLINIC | Age: 84
End: 2019-08-08

## 2019-08-12 ENCOUNTER — OFFICE VISIT (OUTPATIENT)
Dept: PODIATRY | Facility: CLINIC | Age: 84
End: 2019-08-12

## 2019-08-12 VITALS
HEIGHT: 67 IN | OXYGEN SATURATION: 97 % | BODY MASS INDEX: 24.01 KG/M2 | HEART RATE: 75 BPM | WEIGHT: 153 LBS | DIASTOLIC BLOOD PRESSURE: 60 MMHG | SYSTOLIC BLOOD PRESSURE: 118 MMHG

## 2019-08-12 DIAGNOSIS — B35.1 ONYCHOMYCOSIS: Primary | ICD-10-CM

## 2019-08-12 DIAGNOSIS — E11.8 DIABETIC FOOT (HCC): ICD-10-CM

## 2019-08-12 DIAGNOSIS — M79.671 FOOT PAIN, BILATERAL: ICD-10-CM

## 2019-08-12 DIAGNOSIS — M20.12 HALLUX VALGUS, LEFT: ICD-10-CM

## 2019-08-12 DIAGNOSIS — E11.49 DIABETES MELLITUS TYPE 2 WITH NEUROLOGICAL MANIFESTATIONS (HCC): ICD-10-CM

## 2019-08-12 DIAGNOSIS — M79.672 FOOT PAIN, BILATERAL: ICD-10-CM

## 2019-08-12 DIAGNOSIS — M20.11 HALLUX VALGUS, RIGHT: ICD-10-CM

## 2019-08-12 PROCEDURE — 99213 OFFICE O/P EST LOW 20 MIN: CPT | Performed by: PODIATRIST

## 2019-08-12 PROCEDURE — 11721 DEBRIDE NAIL 6 OR MORE: CPT | Performed by: PODIATRIST

## 2019-08-12 NOTE — PROGRESS NOTES
[facility] - PODIATRY    Today's Date: 08/12/19    Patient Name: Honey Canales  MRN: [id number]  CSN: [id number]  PCP: Devon Zuñiga MD  Referring Provider: No ref. provider found    SUBJECTIVE     Chief Complaint   Patient presents with   • Follow-up     referral diabetic foot and nail care-  pt c/o elongated, thickened toenails , callus on left foot - pt denies pain at present time    • Diabetes     last stated blood sugar reading 72 mg/dl-  PCP Dr. Zuñiga last visit 6/24/19     HPI: Honey Canales, a 86 y.o.female, comes to clinic as a(n) established patient presenting for diabetic foot exam and complaining of painful toenails. Patient has h/o aneurysm, arthritis, DM2, GERD, Headache, HLD, HTN, Hypothyroid, Spinal Stenosis, urinary incontinence. Patient is IDDM with last stated BG level of 72mg/dl. States that her toenails are long, thick and discolored. She is unable to care for them herself. Admits to occasional numbness in feet. Denies open wounds or sores. Most pain when wearing closed toed shoes. Uses foam as padding for bunion deformities. Denies pain today. Relates previous treatment(s) including foot care by podiatrist. Denies any constitutional symptoms. No other pedal complaints at this time.    Past Medical History:   Diagnosis Date   • Allergic rhinitis    • Aneurysm (CMS/HCC)    • Arthritis    • Cerebral aneurysm 2009 2ND ONE FOUND   • Cerebral aneurysm     NON TREATABLE   • Chronic laryngitis    • CKD (chronic kidney disease)    • Colon polyps    • COPD (chronic obstructive pulmonary disease) (CMS/HCC)    • Deviated nasal septum    • Diabetes mellitus (CMS/HCC)    • Disorder of kidney and ureter    • Disturbance of smell and taste    • Dizziness    • Encounter for imaging to screen for metal prior to MRI     NO MRI, METAL CLIPS IN HEAD   • Eustachian tube dysfunction    • GERD (gastroesophageal reflux disease)    • Globus sensation    • Headache    • Hepatitis     B   •  Hepatitis A    • History of stomach ulcers    • Hyperlipidemia    • Hypertension    • Hypothyroid    • Laryngitis sicca    • Lung nodule    • Nocturia    • PONV (postoperative nausea and vomiting)    • Sensorineural hearing loss    • Spinal stenosis    • Urge incontinence    • Urgency of urination    • Urinary frequency    • Urinary incontinence    • UTI (urinary tract infection)      Past Surgical History:   Procedure Laterality Date   • BLADDER SURGERY  2016    Medtronic Interstem   • BRAIN SURGERY      ANUERYSM REMOVED   • BREAST SURGERY Bilateral     BIOPSY   • CARPAL TUNNEL RELEASE     • CATARACT EXTRACTION, BILATERAL     • CEREBRAL ANEURYSM REPAIR     •  SECTION      X4   • CHOLECYSTECTOMY     • HYSTERECTOMY     • INTERSTIM PLACEMENT N/A 10/18/2018    Procedure: INTERSTIM STAGES 1 AND 2 LEAD AND GENERATOR REMOVAL AND REPLACEMENT;  Surgeon: Archie Avery MD;  Location: [facility] OR;  Service: Urology   • JOINT REPLACEMENT Right     KNEE   • KNEE ARTHROSCOPY     • THYROIDECTOMY     • TONSILLECTOMY       Family History   Problem Relation Age of Onset   • Cancer Brother         BLADDER   • No Known Problems Father    • No Known Problems Mother      Social History     Socioeconomic History   • Marital status:      Spouse name: Not on file   • Number of children: Not on file   • Years of education: Not on file   • Highest education level: Not on file   Tobacco Use   • Smoking status: Former Smoker     Types: Cigarettes     Last attempt to quit:      Years since quittin.6   • Smokeless tobacco: Never Used   • Tobacco comment: SMOKED OVER 40 YEARS   Substance and Sexual Activity   • Alcohol use: No   • Drug use: Defer   • Sexual activity: Defer     Allergies   Allergen Reactions   • Codeine Phosphate [Codeine] Unknown (See Comments)     CHEST PAIN   • Collagen Other (See Comments)     CAT GUT SUTURES \ WOUND WOULD NOT HEAL AND GOT INFECTION   • Accupril [Quinapril Hcl] Other (See  Comments)     COUGH   • Aspirin Other (See Comments)     HISTORY OF STOMACH ULCERS   • Adhesive Tape Rash     SKIN BLISTERS   • Celebrex [Celecoxib] Nausea Only   • Lyrica [Pregabalin] Other (See Comments)     GAINED 50 POUNDS   • Pantoprazole Sodium Diarrhea   • Pentoxifylline Diarrhea   • Sulfa Antibiotics Nausea And Vomiting   • Tramadol Nausea And Vomiting   • Vioxx [Rofecoxib] Nausea And Vomiting     Current Outpatient Medications   Medication Sig Dispense Refill   • acetaminophen (TYLENOL) 500 MG tablet Take 500 mg by mouth Every 6 (Six) Hours As Needed for Mild Pain .     • atorvastatin (LIPITOR) 80 MG tablet Take 80 mg by mouth daily.     • B-D ULTRAFINE III SHORT PEN 31G X 8 MM misc      • glipiZIDE (GLUCOTROL) 10 MG tablet Take 10 mg by mouth 2 (Two) Times a Day Before Meals.     • insulin detemir (LEVEMIR) 100 UNIT/ML injection Inject 30 Units under the skin into the appropriate area as directed Every Morning.     • levocetirizine (XYZAL) 5 MG tablet Take 5 mg by mouth Every Evening.     • levothyroxine (SYNTHROID, LEVOTHROID) 75 MCG tablet Take 75 mcg by mouth daily.     • losartan (COZAAR) 100 MG tablet Take 100 mg by mouth Every Night.     • nystatin-triamcinolone (MYCOLOG) 005106-6.1 UNIT/GM-% ointment Apply to affected area 2 times daily 30 g 1   • PARoxetine (PAXIL) 10 MG tablet Take 10 mg by mouth Every Evening.       No current facility-administered medications for this visit.      Review of Systems   Constitutional: Negative for chills and fever.   HENT: Negative for congestion.    Respiratory: Negative for shortness of breath.    Cardiovascular: Negative for chest pain and leg swelling.   Gastrointestinal: Negative for constipation, diarrhea, nausea and vomiting.   Musculoskeletal:        Foot pain   Skin: Negative for wound.   Neurological: Positive for numbness.       OBJECTIVE     Vitals:    08/12/19 1022   BP: 118/60   Pulse: 75   SpO2: 97%       PHYSICAL EXAM  GEN:   Accompanied by none.      General Exam  Diabetic Foot Exam: performed  Appearance: appears stated age and healthy   Orientation: alert and oriented to person, place, and time   Affect: appropriate   Gait: unimpaired   Assistance: ambulation does not require assistance   Shoe Gear: diabetic shoes    Foot/Ankle Exam  Inspection and Palpation  Ecchymosis - Right: none Left: none  Tenderness - Right: (Toenails) Left: (Toenails)  Arch - Right: normal Left: normal  Hammertoes - Right: absent Left: absent  Claw Toes - Right: absent Left: absent  Mallet Toes - Right: absent Left: absent  Hallux Valgus - Right: yes Left: yes  Hallux Limitus - Right: no Left: no  Skin - Right: skin intact  Left: skin intact   Nails -  Right: abnormally thick and onychomycosis Left: abnormally thick and onychomycosis     Vascular  Dorsalis Pedis - Right: 2+ Left: 2+  Posterior Tibial - Right: 2+ Left: 2+  Skin Temperature -  Right: warm  Left: warm    CFT - Right: 3 Left: 3  Pedal Hair Growth - Right: absent Left: absent  Varicosities -  Right: mild varicosities  Left: mild varicosities      Neurologic  Saphenous Nerve Sensation  - Right: normal Left: normal  Tibial Nerve Sensation - Right: diminished Left: diminished  Superficial Peroneal Nerve Sensation - Right: diminished Left: diminished  Deep Peroneal Nerve Sensation - Right: diminished Left: diminished  Sural Nerve Sensation - Right: diminished Left: diminished  Protective Sensation using [place] Monofilament - Right: 9 Left: 9    Muscle Strength  Dorsiflexion - Right: 5 Left: 5  Plantar Flexion - Right: 5 Left: 5  Eversion - Right: 5 Left: 5  Inversion - Right: 5 Left: 5    Range of Motion  Normal right ankle ROM  Normal left ankle ROM            RADIOLOGY/NUCLEAR:  No results found.    LABORATORY/CULTURE RESULTS:      PATHOLOGY RESULTS:       ASSESSMENT/PLAN     Honey was seen today for follow-up and diabetes.    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  Discussed findings and treatment plan including risks, benefits, and treatment options with patient in detail. Patient agreed with treatment plan.  After verbal consent obtained, nail(s) x10 debrided of length and thickness with nail nipper without incidence  Patient may maintain nails and calluses at home utilizing emery board or pumice stone between visits as needed  Reviewed at home diabetic foot care including daily foot checks   HAV asymptomatic at this time. Continue foam padding.   An After Visit Summary was printed and given to the patient at discharge, including (if requested) any available informative/educational handouts regarding diagnosis, treatment, or medications. All questions were answered to patient/family satisfaction. Should symptoms fail to improve or worsen they agree to call or return to clinic or to go to the Emergency Department. Discussed the importance of following up with any needed screening tests/labs/specialist appointments and any requested follow-up recommended by me today. Importance of maintaining follow-up discussed and patient accepts that missed appointments can delay diagnosis and potentially lead to worsening of conditions.  Return in about 3 months (around 11/12/2019)., or sooner if acute issues arise.        This document has been electronically signed by Morgan Pepper DPM on August 12, 2019 10:45 AM

## 2019-09-12 NOTE — PROGRESS NOTES
Subjective    Ms. Canales is 86 y.o. female    Chief Complaint: Urge Incontinence    History of Present Illness  86-year-old female follow-up for her InterStim.  She had a removal and replacement in 2018.  She complains of worsening urgency and frequency and nighttime incontinence.  The representative interrogated her device today and found not to be on.  She was placed on program 2 at 2.7.  She had appropriate vaginal sensation at the setting.       The following portions of the patient's history were reviewed and updated as appropriate: allergies, current medications, past family history, past medical history, past social history, past surgical history and problem list.    Review of Systems   Genitourinary: Positive for frequency and urgency.   All other systems reviewed and are negative.        Current Outpatient Medications:   •  acetaminophen (TYLENOL) 500 MG tablet, Take 500 mg by mouth Every 6 (Six) Hours As Needed for Mild Pain ., Disp: , Rfl:   •  atorvastatin (LIPITOR) 80 MG tablet, Take 80 mg by mouth daily., Disp: , Rfl:   •  B-D ULTRAFINE III SHORT PEN 31G X 8 MM misc, , Disp: , Rfl:   •  glipiZIDE (GLUCOTROL) 10 MG tablet, Take 10 mg by mouth 2 (Two) Times a Day Before Meals., Disp: , Rfl:   •  insulin detemir (LEVEMIR) 100 UNIT/ML injection, Inject 30 Units under the skin into the appropriate area as directed Every Morning., Disp: , Rfl:   •  levocetirizine (XYZAL) 5 MG tablet, Take 5 mg by mouth Every Evening., Disp: , Rfl:   •  levothyroxine (SYNTHROID, LEVOTHROID) 75 MCG tablet, Take 75 mcg by mouth daily., Disp: , Rfl:   •  losartan (COZAAR) 100 MG tablet, Take 100 mg by mouth Every Night., Disp: , Rfl:   •  nystatin-triamcinolone (MYCOLOG) 969386-2.1 UNIT/GM-% ointment, Apply to affected area 2 times daily, Disp: 30 g, Rfl: 1  •  PARoxetine (PAXIL) 10 MG tablet, Take 10 mg by mouth Every Evening., Disp: , Rfl:     Past Medical History:   Diagnosis Date   • Allergic rhinitis    • Aneurysm (CMS/HCC)     • Arthritis    • Cerebral aneurysm 2009    2ND ONE FOUND   • Cerebral aneurysm     NON TREATABLE   • Chronic laryngitis    • CKD (chronic kidney disease)    • Colon polyps    • COPD (chronic obstructive pulmonary disease) (CMS/HCC)    • Deviated nasal septum    • Diabetes mellitus (CMS/HCC)    • Disorder of kidney and ureter    • Disturbance of smell and taste    • Dizziness    • Encounter for imaging to screen for metal prior to MRI     NO MRI, METAL CLIPS IN HEAD   • Eustachian tube dysfunction    • GERD (gastroesophageal reflux disease)    • Globus sensation    • Headache    • Hepatitis     B   • Hepatitis A    • History of stomach ulcers    • Hyperlipidemia    • Hypertension    • Hypothyroid    • Laryngitis sicca    • Lung nodule    • Nocturia    • PONV (postoperative nausea and vomiting)    • Sensorineural hearing loss    • Spinal stenosis    • Urge incontinence    • Urgency of urination    • Urinary frequency    • Urinary incontinence    • UTI (urinary tract infection)        Past Surgical History:   Procedure Laterality Date   • BLADDER SURGERY  2016    Medtronic Interstem   • BRAIN SURGERY      ANUERYSM REMOVED   • BREAST SURGERY Bilateral     BIOPSY   • CARPAL TUNNEL RELEASE     • CATARACT EXTRACTION, BILATERAL     • CEREBRAL ANEURYSM REPAIR     •  SECTION      X4   • CHOLECYSTECTOMY     • HYSTERECTOMY     • INTERSTIM PLACEMENT N/A 10/18/2018    Procedure: INTERSTIM STAGES 1 AND 2 LEAD AND GENERATOR REMOVAL AND REPLACEMENT;  Surgeon: Archie Avery MD;  Location: [facility];  Service: Urology   • JOINT REPLACEMENT Right     KNEE   • KNEE ARTHROSCOPY     • THYROIDECTOMY     • TONSILLECTOMY         Social History     Socioeconomic History   • Marital status:      Spouse name: Not on file   • Number of children: Not on file   • Years of education: Not on file   • Highest education level: Not on file   Tobacco Use   • Smoking status: Former Smoker     Types: Cigarettes     Last attempt  to quit:      Years since quittin.7   • Smokeless tobacco: Never Used   • Tobacco comment: SMOKED OVER 40 YEARS   Substance and Sexual Activity   • Alcohol use: No   • Drug use: Defer   • Sexual activity: Defer       Family History   Problem Relation Age of Onset   • Cancer Brother         BLADDER   • No Known Problems Father    • No Known Problems Mother        Objective    There were no vitals taken for this visit.    Physical Exam  Constitutional: Well nourished, Well developed; No apparent distress, her vital signs are reviewed as recorded above  Psychiatric: Appropriate affect; Alert and oriented  Eyes: Unremarkable  Musculoskeletal: Normal gait and station  GI: Abdomen is soft, non-tender  Respiratory: No distress; Unlabored movement; No accessory musculature needed with symmetric movements  Skin: No pallor or diaphoresis        Results for orders placed or performed in visit on 19   POC Urinalysis Dipstick, Multipro   Result Value Ref Range    Color Yellow Yellow, Straw, Dark Yellow, Kirti    Clarity, UA Clear Clear    Glucose, UA Negative Negative, 1000 mg/dL (3+) mg/dL    Bilirubin Negative Negative    Ketones, UA Negative Negative    Specific Gravity  1.015 1.005 - 1.030    Blood, UA Negative Negative    pH, Urine 6.0 5.0 - 8.0    Protein, POC Negative Negative mg/dL    Urobilinogen, UA Normal Normal    Nitrite, UA Negative Negative    Leukocytes Negative Negative     Patient's There is no height or weight on file to calculate BMI. BMI is above normal parameters. Recommendations include: educational material.      Bladder Scan interpretation  Estimation of residual urine via abdominal ultrasound  Residual Urine: 0 ml  Indication: Urge Incontinence  Position: Supine  Examination: Incremental scanning of the suprapubic area using 3 MHz transducer using copious amounts of acoustic gel.   Findings: An anechoic area was demonstrated which represented the bladder, with measurement of residual  urine as noted. I inspected this myself. In that the residual urine was stable or insignificant, no treatment will be necessary at this time.       Assessment and Plan     Honey was seen today for urge incontinence.    Diagnoses and all orders for this visit:    Urge incontinence  -     POC Urinalysis Dipstick, Multipro      InterStim was turned back on today.  Follow-up 6 months or sooner as needed    More than half of this 15 minute visit was spent on counseling/coordinating care for  the patient about the following: Interstim, voiding dysfunction.       The entire time allotted was spent as face to face time with the patient. .

## 2019-09-16 ENCOUNTER — OFFICE VISIT (OUTPATIENT)
Dept: UROLOGY | Facility: CLINIC | Age: 84
End: 2019-09-16

## 2019-09-16 VITALS
BODY MASS INDEX: 24.59 KG/M2 | TEMPERATURE: 98.6 F | RESPIRATION RATE: 18 BRPM | WEIGHT: 153 LBS | OXYGEN SATURATION: 99 % | HEIGHT: 66 IN

## 2019-09-16 DIAGNOSIS — N39.41 URGE INCONTINENCE: Primary | ICD-10-CM

## 2019-09-16 LAB
BILIRUB BLD-MCNC: NEGATIVE MG/DL
CLARITY, POC: CLEAR
COLOR UR: YELLOW
GLUCOSE UR STRIP-MCNC: NEGATIVE MG/DL
KETONES UR QL: NEGATIVE
LEUKOCYTE EST, POC: NEGATIVE
NITRITE UR-MCNC: NEGATIVE MG/ML
PH UR: 5.5 [PH] (ref 5–8)
PROT UR STRIP-MCNC: NEGATIVE MG/DL
RBC # UR STRIP: ABNORMAL /UL
SP GR UR: 1.02 (ref 1–1.03)
UROBILINOGEN UR QL: NORMAL

## 2019-09-16 PROCEDURE — 81003 URINALYSIS AUTO W/O SCOPE: CPT | Performed by: UROLOGY

## 2019-09-16 PROCEDURE — 51798 US URINE CAPACITY MEASURE: CPT | Performed by: UROLOGY

## 2019-09-16 PROCEDURE — 99213 OFFICE O/P EST LOW 20 MIN: CPT | Performed by: UROLOGY

## 2019-11-27 ENCOUNTER — TELEPHONE (OUTPATIENT)
Dept: VASCULAR SURGERY | Facility: CLINIC | Age: 84
End: 2019-11-27

## 2019-12-02 ENCOUNTER — OFFICE VISIT (OUTPATIENT)
Dept: PODIATRY | Facility: CLINIC | Age: 84
End: 2019-12-02

## 2019-12-02 VITALS
WEIGHT: 154 LBS | HEIGHT: 66 IN | DIASTOLIC BLOOD PRESSURE: 80 MMHG | OXYGEN SATURATION: 98 % | BODY MASS INDEX: 24.75 KG/M2 | SYSTOLIC BLOOD PRESSURE: 140 MMHG | HEART RATE: 75 BPM

## 2019-12-02 DIAGNOSIS — M20.12 HALLUX VALGUS, LEFT: ICD-10-CM

## 2019-12-02 DIAGNOSIS — B35.1 ONYCHOMYCOSIS: Primary | ICD-10-CM

## 2019-12-02 DIAGNOSIS — E11.49 DIABETES MELLITUS TYPE 2 WITH NEUROLOGICAL MANIFESTATIONS (HCC): ICD-10-CM

## 2019-12-02 DIAGNOSIS — M79.672 FOOT PAIN, BILATERAL: ICD-10-CM

## 2019-12-02 DIAGNOSIS — M20.11 HALLUX VALGUS, RIGHT: ICD-10-CM

## 2019-12-02 DIAGNOSIS — M79.671 FOOT PAIN, BILATERAL: ICD-10-CM

## 2019-12-02 PROCEDURE — 11721 DEBRIDE NAIL 6 OR MORE: CPT | Performed by: PODIATRIST

## 2019-12-02 NOTE — PROGRESS NOTES
[facility] - PODIATRY    Today's Date: 12/02/19    Patient Name: Honey Canales  MRN: [id number]  CSN: [id number]  PCP: Devon Zuñiga MD  Referring Provider: No ref. provider found    SUBJECTIVE     Chief Complaint   Patient presents with   • Follow-up     pt c/o left foot bunion pain, right great toenail pain- pain scale 5/10- long, thickened toenails, callus -    • Diabetes     last stated blood sugar 83 mg/dl - PCP Dr. Zuñiga last visit 6/24/19      HPI: Honey Canales, a 86 y.o.female, comes to clinic as a(n) established patient presenting for diabetic foot exam and complaining of painful toenails. Patient has h/o aneurysm, arthritis, DM2, GERD, Headache, HLD, HTN, Hypothyroid, Spinal Stenosis, urinary incontinence. Patient is IDDM with last stated BG level of 83mg/dl. States that her toenails are long, thick and discolored. She is unable to care for them herself. Admits to occasional numbness in feet. Denies open wounds or sores. Most pain when wearing closed toed shoes. Uses foam as padding for bunion deformities. Admits pain at 5/10 level and described as aching, dull and numbness. Relates previous treatment(s) including foot care by podiatrist. Denies any constitutional symptoms. No other pedal complaints at this time.    Past Medical History:   Diagnosis Date   • Allergic rhinitis    • Aneurysm (CMS/HCC)    • Arthritis    • Cerebral aneurysm 2009    2ND ONE FOUND   • Cerebral aneurysm     NON TREATABLE   • Chronic laryngitis    • CKD (chronic kidney disease)    • Colon polyps    • COPD (chronic obstructive pulmonary disease) (CMS/HCC)    • Deviated nasal septum    • Diabetes mellitus (CMS/HCC)    • Disorder of kidney and ureter    • Disturbance of smell and taste    • Dizziness    • Encounter for imaging to screen for metal prior to MRI     NO MRI, METAL CLIPS IN HEAD   • Eustachian tube dysfunction    • GERD (gastroesophageal reflux disease)    • Globus sensation    • Headache    •  Hepatitis     B   • Hepatitis A    • History of stomach ulcers    • Hyperlipidemia    • Hypertension    • Hypothyroid    • Laryngitis sicca    • Lung nodule    • Nocturia    • PONV (postoperative nausea and vomiting)    • Sensorineural hearing loss    • Spinal stenosis    • Urge incontinence    • Urgency of urination    • Urinary frequency    • Urinary incontinence    • UTI (urinary tract infection)      Past Surgical History:   Procedure Laterality Date   • BLADDER SURGERY  2016    Medtronic Interstem   • BRAIN SURGERY      ANUERYSM REMOVED   • BREAST SURGERY Bilateral     BIOPSY   • CARPAL TUNNEL RELEASE     • CATARACT EXTRACTION, BILATERAL     • CEREBRAL ANEURYSM REPAIR     •  SECTION      X4   • CHOLECYSTECTOMY     • HYSTERECTOMY     • INTERSTIM PLACEMENT N/A 10/18/2018    Procedure: INTERSTIM STAGES 1 AND 2 LEAD AND GENERATOR REMOVAL AND REPLACEMENT;  Surgeon: Archie Avery MD;  Location: [facility] OR;  Service: Urology   • JOINT REPLACEMENT Right     KNEE   • KNEE ARTHROSCOPY     • THYROIDECTOMY     • TONSILLECTOMY       Family History   Problem Relation Age of Onset   • Cancer Brother         BLADDER   • No Known Problems Father    • No Known Problems Mother      Social History     Socioeconomic History   • Marital status:      Spouse name: Not on file   • Number of children: Not on file   • Years of education: Not on file   • Highest education level: Not on file   Tobacco Use   • Smoking status: Former Smoker     Types: Cigarettes     Last attempt to quit:      Years since quittin.9   • Smokeless tobacco: Never Used   • Tobacco comment: SMOKED OVER 40 YEARS   Substance and Sexual Activity   • Alcohol use: No   • Drug use: No   • Sexual activity: Defer     Allergies   Allergen Reactions   • Codeine Phosphate [Codeine] Unknown (See Comments)     CHEST PAIN   • Collagen Other (See Comments)     CAT GUT SUTURES \ WOUND WOULD NOT HEAL AND GOT INFECTION   • Accupril [Quinapril Hcl]  Other (See Comments)     COUGH   • Aspirin Other (See Comments)     HISTORY OF STOMACH ULCERS   • Adhesive Tape Rash     SKIN BLISTERS   • Celebrex [Celecoxib] Nausea Only   • Lyrica [Pregabalin] Other (See Comments)     GAINED 50 POUNDS   • Pantoprazole Sodium Diarrhea   • Pentoxifylline Diarrhea   • Sulfa Antibiotics Nausea And Vomiting   • Tramadol Nausea And Vomiting   • Vioxx [Rofecoxib] Nausea And Vomiting     Current Outpatient Medications   Medication Sig Dispense Refill   • acetaminophen (TYLENOL) 500 MG tablet Take 500 mg by mouth Every 6 (Six) Hours As Needed for Mild Pain .     • atorvastatin (LIPITOR) 80 MG tablet Take 80 mg by mouth daily.     • B-D ULTRAFINE III SHORT PEN 31G X 8 MM misc      • glipiZIDE (GLUCOTROL) 10 MG tablet Take 10 mg by mouth 2 (Two) Times a Day Before Meals.     • insulin detemir (LEVEMIR) 100 UNIT/ML injection Inject  under the skin into the appropriate area as directed. 35 units in the morning and 25 units at night     • levocetirizine (XYZAL) 5 MG tablet Take 5 mg by mouth Every Evening.     • levothyroxine (SYNTHROID, LEVOTHROID) 75 MCG tablet Take 75 mcg by mouth daily.     • nystatin-triamcinolone (MYCOLOG) 244186-5.1 UNIT/GM-% ointment Apply to affected area 2 times daily 30 g 1   • PARoxetine (PAXIL) 10 MG tablet Take 10 mg by mouth Every Evening.       No current facility-administered medications for this visit.      Review of Systems   Constitutional: Negative for chills and fever.   HENT: Negative for congestion.    Respiratory: Negative for shortness of breath.    Cardiovascular: Negative for chest pain and leg swelling.   Gastrointestinal: Negative for constipation, diarrhea, nausea and vomiting.   Musculoskeletal:        Foot pain   Skin: Negative for wound.   Neurological: Positive for numbness.       OBJECTIVE     Vitals:    12/02/19 0958   BP: 140/80   Pulse: 75   SpO2: 98%       PHYSICAL EXAM  GEN:   Accompanied by none.     Foot/Ankle Exam:       General:    Diabetic Foot Exam Performed    Appearance: appears stated age and healthy    Orientation: AAOx3    Affect: appropriate    Gait: unimpaired    Assistance: independent    Shoe Gear:  Diabetic shoes     Right Foot/Ankle:      Vascular   Dorsalis pedis:  2+  Posterior tibial:  2+  Skin Temperature: warm    Edema Grading:  None  CFT:  3  Pedal Hair Growth:  Absent  Varicosities: mild varicosities       Neurologic   Light touch sensation:  Diminished  Vibratory sensation:  Diminished  Hot/Cold sensation: diminished    Protective Sensation using Woodruff-Itma Monofilament:  9     Musculoskeletal   Ecchymosis:  None  Tenderness: toenails    Arch:  Normal  Hammertoe:  Absent  Claw toe:  Absent  Mallet toe:  Absent  Hallux valgus: Yes    Hallux limitus: No       Muscle Strength   Foot dorsiflexion:  5  Foot plantar flexion:  5  Foot inversion:  5  Foot eversion:  5     Range of Motion   Foot and ankle ROM within normal limits       Dermatologic   Skin: skin intact    Nails: onychomycosis, abnormally thick, subungual debris and dystrophic nails       Left Foot/Ankle:      Vascular   Dorsalis pedis:  2+  Posterior tibial:  2+  Skin Temperature: warm    Edema Grading:  None  CFT:  3  Pedal Hair Growth:  Absent  Varicosities: mild varicosities       Neurologic   Light touch sensation:  Diminished  Vibratory sensation:  Diminished  Hot/cold sensation: diminished    Protective Sensation using Woodruff-Tima Monofilament:  9     Musculoskeletal   Ecchymosis:  None  Tenderness: great toe metatarsophalangeal joint and toenails    Arch:  Normal  Hammertoe:  Absent  Claw toe:  Absent  Mallet Toe:  Absent  Hallux valgus: Yes    Hallux limitus: No       Muscle Strength   Foot dorsiflexion:  5  Foot plantar flexion:  5  Foot inversion:  5  Foot eversion:  5     Range of Motion   Foot and ankle ROM within normal limits       Dermatologic   Skin: skin intact    Nails: onychomycosis, abnormally thick, subungual debris and dystrophic  nails        RADIOLOGY/NUCLEAR:  No results found.    LABORATORY/CULTURE RESULTS:      PATHOLOGY RESULTS:       ASSESSMENT/PLAN     Honey was seen today for follow-up and diabetes.    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  Discussed findings and treatment plan including risks, benefits, and treatment options with patient in detail. Patient agreed with treatment plan.  After verbal consent obtained, nail(s) x10 debrided of length and thickness with nail nipper without incidence  Patient may maintain nails and calluses at home utilizing emery board or pumice stone between visits as needed  Reviewed at home diabetic foot care including daily foot checks   HAV asymptomatic at this time. Dispensed 1 gel bunion shield  An After Visit Summary was printed and given to the patient at discharge, including (if requested) any available informative/educational handouts regarding diagnosis, treatment, or medications. All questions were answered to patient/family satisfaction. Should symptoms fail to improve or worsen they agree to call or return to clinic or to go to the Emergency Department. Discussed the importance of following up with any needed screening tests/labs/specialist appointments and any requested follow-up recommended by me today. Importance of maintaining follow-up discussed and patient accepts that missed appointments can delay diagnosis and potentially lead to worsening of conditions.  Return in about 3 months (around 3/2/2020)., or sooner if acute issues arise.        This document has been electronically signed by Morgan Pepper DPM on December 2, 2019 10:26 AM

## 2020-01-17 ENCOUNTER — OFFICE VISIT (OUTPATIENT)
Dept: URGENT CARE | Age: 85
End: 2020-01-17
Payer: MEDICARE

## 2020-01-17 VITALS
WEIGHT: 147 LBS | RESPIRATION RATE: 18 BRPM | HEART RATE: 70 BPM | BODY MASS INDEX: 23.37 KG/M2 | OXYGEN SATURATION: 98 % | TEMPERATURE: 97.6 F | SYSTOLIC BLOOD PRESSURE: 109 MMHG | DIASTOLIC BLOOD PRESSURE: 75 MMHG

## 2020-01-17 PROCEDURE — G8484 FLU IMMUNIZE NO ADMIN: HCPCS | Performed by: SPECIALIST

## 2020-01-17 PROCEDURE — 4040F PNEUMOC VAC/ADMIN/RCVD: CPT | Performed by: SPECIALIST

## 2020-01-17 PROCEDURE — 99213 OFFICE O/P EST LOW 20 MIN: CPT | Performed by: SPECIALIST

## 2020-01-17 PROCEDURE — 1090F PRES/ABSN URINE INCON ASSESS: CPT | Performed by: SPECIALIST

## 2020-01-17 PROCEDURE — 1036F TOBACCO NON-USER: CPT | Performed by: SPECIALIST

## 2020-01-17 PROCEDURE — G8427 DOCREV CUR MEDS BY ELIG CLIN: HCPCS | Performed by: SPECIALIST

## 2020-01-17 PROCEDURE — 1123F ACP DISCUSS/DSCN MKR DOCD: CPT | Performed by: SPECIALIST

## 2020-01-17 PROCEDURE — G8420 CALC BMI NORM PARAMETERS: HCPCS | Performed by: SPECIALIST

## 2020-01-17 RX ORDER — PENICILLIN V POTASSIUM 500 MG/1
500 TABLET ORAL 4 TIMES DAILY
Qty: 40 TABLET | Refills: 0 | Status: SHIPPED | OUTPATIENT
Start: 2020-01-17 | End: 2020-01-27

## 2020-01-17 ASSESSMENT — ENCOUNTER SYMPTOMS: RESPIRATORY NEGATIVE: 1

## 2020-01-17 NOTE — PROGRESS NOTES
[address] [facility]  [address]  [place] [address]  Dept: [phone]  Loc: [phone]    Elenita Hayes is a 80 y.o. female who presents today for her medical conditions/complaintsas noted below. Elenita Hayes is c/o of Jaw Pain (lower left side) and Dental Pain        HPI:     Patient has an appointment Tuesday with her Dentist.    Dental Pain    This is a new problem. The current episode started in the past 7 days. The problem has been gradually worsening. The pain is moderate. Associated symptoms include facial pain and thermal sensitivity. She has tried acetaminophen for the symptoms. The treatment provided mild relief. Past Medical History:   Diagnosis Date    Aneurysm ([address]     Aneurysm ([address]     Breast cyst     Chronic pulmonary disease     CKD (chronic kidney disease)     Colon polyps     DM (diabetes mellitus) (HCC)     GERD (gastroesophageal reflux disease)     Hepatitis     High cholesterol     History of stomach ulcers     Lung nodule     left/BG    Stomach ache reflux    Thyroid disorder     Thyroid nodule     Urinary incontinence      Past Surgical History:   Procedure Laterality Date    BLADDER SURGERY  ?  BRAIN ANEURYSM SURGERY      BREAST SURGERY      BIOPSY    CARPAL TUNNEL RELEASE      CATARACT REMOVAL       SECTION      TIMES 4    COLONOSCOPY  3-    DR Dl Nielson    COLONOSCOPY  13    Louise    CYST REMOVAL      FINGER    GALLBLADDER SURGERY      HYSTERECTOMY      KNEE ARTHROSCOPY      KNEE SURGERY      RTK    OTHER SURGICAL HISTORY      arteriorgram, surgeon said leave it alone no surgery.     TONSILLECTOMY AND ADENOIDECTOMY      UPPER GASTROINTESTINAL ENDOSCOPY  2010    DR Dl Nielson    UPPER GASTROINTESTINAL ENDOSCOPY  10/25/2013    Louise       Family History   Problem Relation Age of Onset    Stroke Mother     High Blood Pressure Mother     Lung Cancer Brother     Esophageal Cancer Brother     Cancer Brother     High Blood Pressure Father     High Blood Pressure Sister        Social History     Tobacco Use    Smoking status: Former Smoker     Last attempt to quit: 1993     Years since quittin.9    Smokeless tobacco: Never Used    Tobacco comment: quit    Substance Use Topics    Alcohol use: No      Current Outpatient Medications   Medication Sig Dispense Refill    penicillin v potassium (VEETID) 500 MG tablet Take 1 tablet by mouth 4 times daily for 10 days 40 tablet 0    insulin detemir (LEVEMIR) 100 UNIT/ML injection vial Inject into the skin      levothyroxine (SYNTHROID) 75 MCG tablet Take 75 mcg by mouth Daily.  Levocetirizine Dihydrochloride (XYZAL PO) Take 5 mg by mouth daily.  PARoxetine (PAXIL) 10 MG tablet Take 10 mg by mouth every morning.  Atorvastatin Calcium (LIPITOR PO) Take 80 mg by mouth daily.  GlipiZIDE (GLUCOTROL PO) Take 10 mg by mouth daily.  bumetanide (BUMEX) 0.5 MG tablet       losartan (COZAAR) 100 MG tablet        No current facility-administered medications for this visit. Allergies   Allergen Reactions    Celebrex [Celecoxib]     Codeine     Pregabalin     Protonix [Pantoprazole Sodium] Diarrhea    Quinapril Hcl     Sulfa Antibiotics     Tramadol     Trental [Pentoxifylline Er] Diarrhea    Vioxx [Rofecoxib]        Health Maintenance   Topic Date Due    Potassium monitoring  1933    Creatinine monitoring  1933    Lipid screen  1943    DTaP/Tdap/Td vaccine (1 - Tdap) 1944    Shingles Vaccine (1 of 2) 1983    Pneumococcal 65+ years Vaccine (2 of 2 - PPSV23) 2016    Annual Wellness Visit (AWV)  2019    Flu vaccine (1) 2019       Subjective:     Review of Systems   Constitutional: Negative. HENT: Positive for dental problem. Respiratory: Negative.         OBJECTIVE:     Physical Exam  Vitals signs and nursing gums and throbbing pain, especially when you chew. You may have a bad taste in your mouth and a fever, and your jaw may swell. Damage to the tooth, untreated tooth decay, or gum disease can cause an abscessed tooth. An abscessed tooth needs to be treated by a dental professional right away. If it is not treated, the infection could spread to other parts of your body. Your dentist will give you antibiotics to stop the infection. He or she may make a hole in the tooth or cut open (zain) the abscess inside your mouth so that the infection can drain, which should relieve your pain. You may need to have a root canal treatment, which tries to save your tooth by taking out the infected pulp and replacing it with a healing medicine and/or a filling. If these treatments do not work, your tooth may have to be removed. Follow-up care is a key part of your treatment and safety. Be sure to make and go to all appointments, and call your doctor if you are having problems. It's also a good idea to know your test results and keep a list of the medicines you take. How can you care for yourself at home? · Reduce pain and swelling in your face and jaw by putting ice or a cold pack on the outside of your cheek for 10 to 20 minutes at a time. Put a thin cloth between the ice and your skin. · Take pain medicines exactly as directed. ? If the doctor gave you a prescription medicine for pain, take it as prescribed. ? If you are not taking a prescription pain medicine, ask your doctor if you can take an over-the-counter medicine. · Take your antibiotics as directed. Do not stop taking them just because you feel better. You need to take the full course of antibiotics. To prevent tooth abscess  · Brush and floss every day, and have regular dental checkups. · Eat a healthy diet, and avoid sugary foods and drinks.   · Do not smoke, use e-cigarettes with nicotine, or use spit tobacco. Tobacco and nicotine slow your ability to heal. Tobacco also increases your risk for gum disease and cancer of the mouth and throat. If you need help quitting, talk to your doctor about stop-smoking programs and medicines. These can increase your chances of quitting for good. When should you call for help? Call 911 anytime you think you may need emergency care. For example, call if:    · You have trouble breathing.    Call your doctor now or seek immediate medical care if:    · You have new or worse symptoms of infection, such as:  ? Increased pain, swelling, warmth, or redness. ? Red streaks leading from the area. ? Pus draining from the area. ? A fever.    Watch closely for changes in your health, and be sure to contact your doctor if:    · You do not get better as expected. Where can you learn more? Go to https://Offerum.[employer]. org and sign in to your [employer] account. Enter E018 in the [employer] box to learn more about \"Abscessed Tooth: Care Instructions. \"     If you do not have an account, please click on the \"Sign Up Now\" link. Current as of: July 28, 2019  Content Version: 12.3  © 6173-7309 [employer]. Care instructions adapted under license by [facility] ([facility]). If you have questions about a medical condition or this instruction, always ask your healthcare professional. [place] [address] any warranty or liability for your use of this information. Patient Education        Tooth and Gum Pain: Care Instructions  Your Care Instructions    The most common causes of dental pain are tooth decay and gum disease. Pain can also be caused by an infection of the tooth (abscess) or the gums. Or you may have pain from a broken or cracked tooth. Other causes of pain include infection and damage to a tooth from nervous grinding of your teeth. A wisdom tooth can be painful when it is coming in but cannot break through the gum.  It can also be painful when the tooth is only partway in and extra gum tissue has formed around it. The tissue can get inflamed (pericoronitis), and sometimes it gets infected. Prompt dental care can help find the cause of your toothache and keep the tooth from dying or gum disease from getting worse. Self-care at home may reduce your pain and discomfort. Follow-up care is a key part of your treatment and safety. Be sure to make and go to all appointments, and call your dentist or doctor if you are having problems. It's also a good idea to know your test results and keep a list of the medicines you take. How can you care for yourself at home? · To reduce pain and facial swelling, put an ice or cold pack on the outside of your cheek for 10 to 20 minutes at a time. Put a thin cloth between the ice and your skin. Do not use heat. · If your doctor prescribed antibiotics, take them as directed. Do not stop taking them just because you feel better. You need to take the full course of antibiotics. · Ask your doctor if you can take an over-the-counter pain medicine, such as acetaminophen (Tylenol), ibuprofen (Advil, Motrin), or naproxen (Aleve). Be safe with medicines. Read and follow all instructions on the label. · Avoid very hot, cold, or sweet foods and drinks if they increase your pain. · Rinse your mouth with warm salt water every 2 hours to help relieve pain and swelling. Mix 1 teaspoon of salt in 8 ounces of water. · Talk to your dentist about using special toothpaste for sensitive teeth. To reduce pain on contact with heat or cold or when brushing, brush with this toothpaste regularly or rub a small amount of the paste on the sensitive area with a clean finger 2 or 3 times a day. Floss gently between your teeth. · Do not smoke or use spit tobacco. Tobacco use can make gum problems worse, decreases your ability to fight infection in your gums, and delays healing. If you need help quitting, talk to your doctor about stop-smoking programs and medicines.  These can increase your

## 2020-01-17 NOTE — PATIENT INSTRUCTIONS
feel better. You need to take the full course of antibiotics. To prevent tooth abscess  · Brush and floss every day, and have regular dental checkups. · Eat a healthy diet, and avoid sugary foods and drinks. · Do not smoke, use e-cigarettes with nicotine, or use spit tobacco. Tobacco and nicotine slow your ability to heal. Tobacco also increases your risk for gum disease and cancer of the mouth and throat. If you need help quitting, talk to your doctor about stop-smoking programs and medicines. These can increase your chances of quitting for good. When should you call for help? Call 911 anytime you think you may need emergency care. For example, call if:    · You have trouble breathing.    Call your doctor now or seek immediate medical care if:    · You have new or worse symptoms of infection, such as:  ? Increased pain, swelling, warmth, or redness. ? Red streaks leading from the area. ? Pus draining from the area. ? A fever.    Watch closely for changes in your health, and be sure to contact your doctor if:    · You do not get better as expected. Where can you learn more? Go to https://Sociercise.[employer]. org and sign in to your [employer] account. Enter S047 in the [facility] box to learn more about \"Abscessed Tooth: Care Instructions. \"     If you do not have an account, please click on the \"Sign Up Now\" link. Current as of: July 28, 2019  Content Version: 12.3  © 5515-4975 [employer]. Care instructions adapted under license by [facility] ([facility]). If you have questions about a medical condition or this instruction, always ask your healthcare professional. [place] [address] any warranty or liability for your use of this information. Patient Education        Tooth and Gum Pain: Care Instructions  Your Care Instructions    The most common causes of dental pain are tooth decay and gum disease.  Pain can also be caused by an infection of the tooth (abscess) or the gums. Or you may have pain from a broken or cracked tooth. Other causes of pain include infection and damage to a tooth from nervous grinding of your teeth. A wisdom tooth can be painful when it is coming in but cannot break through the gum. It can also be painful when the tooth is only partway in and extra gum tissue has formed around it. The tissue can get inflamed (pericoronitis), and sometimes it gets infected. Prompt dental care can help find the cause of your toothache and keep the tooth from dying or gum disease from getting worse. Self-care at home may reduce your pain and discomfort. Follow-up care is a key part of your treatment and safety. Be sure to make and go to all appointments, and call your dentist or doctor if you are having problems. It's also a good idea to know your test results and keep a list of the medicines you take. How can you care for yourself at home? · To reduce pain and facial swelling, put an ice or cold pack on the outside of your cheek for 10 to 20 minutes at a time. Put a thin cloth between the ice and your skin. Do not use heat. · If your doctor prescribed antibiotics, take them as directed. Do not stop taking them just because you feel better. You need to take the full course of antibiotics. · Ask your doctor if you can take an over-the-counter pain medicine, such as acetaminophen (Tylenol), ibuprofen (Advil, Motrin), or naproxen (Aleve). Be safe with medicines. Read and follow all instructions on the label. · Avoid very hot, cold, or sweet foods and drinks if they increase your pain. · Rinse your mouth with warm salt water every 2 hours to help relieve pain and swelling. Mix 1 teaspoon of salt in 8 ounces of water. · Talk to your dentist about using special toothpaste for sensitive teeth.  To reduce pain on contact with heat or cold or when brushing, brush with this toothpaste regularly or rub a small amount of the paste on the sensitive area with a

## 2020-01-23 RX ORDER — GLIPIZIDE 10 MG/1
TABLET, FILM COATED, EXTENDED RELEASE ORAL
Qty: 180 TABLET | Refills: 3 | Status: SHIPPED | OUTPATIENT
Start: 2020-01-23 | End: 2021-01-18

## 2020-02-06 RX ORDER — PAROXETINE 10 MG/1
TABLET, FILM COATED ORAL
Qty: 90 TABLET | Refills: 3 | Status: SHIPPED | OUTPATIENT
Start: 2020-02-06 | End: 2021-02-01

## 2020-03-13 NOTE — PROGRESS NOTES
Subjective    Ms. Canales is 86 y.o. female    Chief Complaint: Urge incontinence    History of Present Illness    86-year-old female follow-up for worsening urinary urgency and frequency.  Timing constant.  Onset gradual.  Severity worsening.  Context she had a removal and replacement of InterStim October 2018 by Dr. Avery.  Her device was found to be off September 2019 and was turned back on.  Patient states she tarted up once.  She states she is having worsening nocturia and urgency incontinence.  She was placed on program 2 at 2.7 last visit 9/17/2019.      The following portions of the patient's history were reviewed and updated as appropriate: allergies, current medications, past family history, past medical history, past social history, past surgical history and problem list.    Review of Systems   Constitutional: Negative for chills, fatigue and fever.   Respiratory: Negative for apnea, cough, shortness of breath and wheezing.    Cardiovascular: Negative for chest pain and palpitations.   Gastrointestinal: Negative for abdominal distention, anal bleeding, blood in stool and nausea.   Genitourinary: Positive for urgency. Negative for difficulty urinating, dysuria, flank pain, frequency and hematuria.   All other systems reviewed and are negative.        Current Outpatient Medications:   •  acetaminophen (TYLENOL) 500 MG tablet, Take 500 mg by mouth Every 6 (Six) Hours As Needed for Mild Pain ., Disp: , Rfl:   •  atorvastatin (LIPITOR) 80 MG tablet, Take 80 mg by mouth daily., Disp: , Rfl:   •  B-D ULTRAFINE III SHORT PEN 31G X 8 MM misc, , Disp: , Rfl:   •  glipizide (GLUCOTROL XL) 10 MG 24 hr tablet, TAKE 1 TABLET TWICE A DAY, Disp: 180 tablet, Rfl: 3  •  glipiZIDE (GLUCOTROL) 10 MG tablet, Take 10 mg by mouth 2 (Two) Times a Day Before Meals., Disp: , Rfl:   •  insulin detemir (LEVEMIR) 100 UNIT/ML injection, Inject  under the skin into the appropriate area as directed. 35 units in the morning and 25 units  at night, Disp: , Rfl:   •  levocetirizine (XYZAL) 5 MG tablet, Take 5 mg by mouth Every Evening., Disp: , Rfl:   •  levothyroxine (SYNTHROID, LEVOTHROID) 75 MCG tablet, Take 75 mcg by mouth daily., Disp: , Rfl:   •  nystatin-triamcinolone (MYCOLOG) 701675-6.1 UNIT/GM-% ointment, Apply to affected area 2 times daily, Disp: 30 g, Rfl: 1  •  PARoxetine (PAXIL) 10 MG tablet, TAKE 1 TABLET DAILY, Disp: 90 tablet, Rfl: 3    Past Medical History:   Diagnosis Date   • Allergic rhinitis    • Aneurysm (CMS/HCC)    • Arthritis    • Cerebral aneurysm 2009    2ND ONE FOUND   • Cerebral aneurysm     NON TREATABLE   • Chronic laryngitis    • CKD (chronic kidney disease)    • Colon polyps    • COPD (chronic obstructive pulmonary disease) (CMS/HCC)    • Deviated nasal septum    • Diabetes mellitus (CMS/HCC)    • Disorder of kidney and ureter    • Disturbance of smell and taste    • Dizziness    • Encounter for imaging to screen for metal prior to MRI     NO MRI, METAL CLIPS IN HEAD   • Eustachian tube dysfunction    • GERD (gastroesophageal reflux disease)    • Globus sensation    • Headache    • Hepatitis     B   • Hepatitis A    • History of stomach ulcers    • Hyperlipidemia    • Hypertension    • Hypothyroid    • Laryngitis sicca    • Lung nodule    • Nocturia    • PONV (postoperative nausea and vomiting)    • Sensorineural hearing loss    • Spinal stenosis    • Urge incontinence    • Urgency of urination    • Urinary frequency    • Urinary incontinence    • UTI (urinary tract infection)        Past Surgical History:   Procedure Laterality Date   • BLADDER SURGERY  2016    Medtronic Interstem   • BRAIN SURGERY      ANUERYSM REMOVED   • BREAST SURGERY Bilateral     BIOPSY   • CARPAL TUNNEL RELEASE     • CATARACT EXTRACTION, BILATERAL     • CEREBRAL ANEURYSM REPAIR     •  SECTION      X4   • CHOLECYSTECTOMY     • HYSTERECTOMY     • INTERSTIM PLACEMENT N/A 10/18/2018    Procedure: INTERSTIM STAGES 1 AND 2 LEAD AND  GENERATOR REMOVAL AND REPLACEMENT;  Surgeon: Archie Avery MD;  Location: [facility] OR;  Service: Urology   • JOINT REPLACEMENT Right     KNEE   • KNEE ARTHROSCOPY     • THYROIDECTOMY     • TONSILLECTOMY         Social History     Socioeconomic History   • Marital status:      Spouse name: Not on file   • Number of children: Not on file   • Years of education: Not on file   • Highest education level: Not on file   Tobacco Use   • Smoking status: Former Smoker     Types: Cigarettes     Last attempt to quit:      Years since quittin.2   • Smokeless tobacco: Never Used   • Tobacco comment: SMOKED OVER 40 YEARS   Substance and Sexual Activity   • Alcohol use: No   • Drug use: No   • Sexual activity: Defer       Family History   Problem Relation Age of Onset   • Cancer Brother         BLADDER   • No Known Problems Father    • No Known Problems Mother        Objective    There were no vitals taken for this visit.    Physical Exam  Constitutional: Well nourished, Well developed; No apparent distress; Vital reviewed as above  Psychiatric: Appropriate affect; Alert and oriented  Eyes: Unremarkable  Musculoskeletal: Normal gait and station  GI: Abdomen is soft, non-tender  Respiratory: No distress; Unlabored movement; No accessory musculature needed with symmetric movements  Skin: No pallor or diaphoresis  Lymphatic: No adenopathy neck or groin      Results for orders placed or performed in visit on 19   POC Urinalysis Dipstick, Multipro   Result Value Ref Range    Color Yellow Yellow, Straw, Dark Yellow, Kirti    Clarity, UA Clear Clear    Glucose, UA Negative Negative, 1000 mg/dL (3+) mg/dL    Bilirubin Negative Negative    Ketones, UA Negative Negative    Specific Gravity  1.020 1.005 - 1.030    Blood, UA Trace (A) Negative    pH, Urine 5.5 5.0 - 8.0    Protein, POC Negative Negative mg/dL    Urobilinogen, UA Normal Normal    Nitrite, UA Negative Negative    Leukocytes Negative Negative     Bladder  Scan interpretation  Estimation of residual urine via abdominal ultrasound  Residual Urine: 11 ml  Indication: Urge incontinence  Position: Supine  Examination: Incremental scanning of the suprapubic area using 3 MHz transducer using copious amounts of acoustic gel.   Findings: An anechoic area was demonstrated which represented the bladder, with measurement of residual urine as noted. I inspected this myself. In that the residual urine was stable or insignificant, no treatment will be necessary at this time.       Assessment and Plan    Honey was seen today for urgency incontinence.    Diagnoses and all orders for this visit:    Urge incontinence  -     POC Urinalysis Dipstick, Multipro  -     Mirabegron ER (MYRBETRIQ) 50 MG tablet sustained-release 24 hour 24 hr tablet; Take 50 mg by mouth Daily.    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  She will try combination therapy with the Myrbetriq along with her InterStim.  She will be scheduled to have her InterStim interrogated by the device rep.  Otherwise follow-up with me in 1 year or sooner as needed.

## 2020-03-16 ENCOUNTER — OFFICE VISIT (OUTPATIENT)
Dept: UROLOGY | Facility: CLINIC | Age: 85
End: 2020-03-16

## 2020-03-16 VITALS — BODY MASS INDEX: 23.78 KG/M2 | TEMPERATURE: 98.6 F | WEIGHT: 148 LBS | HEIGHT: 66 IN

## 2020-03-16 DIAGNOSIS — N39.41 URGE INCONTINENCE: Primary | ICD-10-CM

## 2020-03-16 DIAGNOSIS — R35.0 FREQUENCY OF URINATION: ICD-10-CM

## 2020-03-16 DIAGNOSIS — E11.49 DIABETES MELLITUS TYPE 2 WITH NEUROLOGICAL MANIFESTATIONS (HCC): ICD-10-CM

## 2020-03-16 LAB
BILIRUB BLD-MCNC: ABNORMAL MG/DL
CLARITY, POC: CLEAR
COLOR UR: YELLOW
GLUCOSE UR STRIP-MCNC: NEGATIVE MG/DL
KETONES UR QL: NEGATIVE
LEUKOCYTE EST, POC: NEGATIVE
NITRITE UR-MCNC: NEGATIVE MG/ML
PH UR: 6 [PH] (ref 5–8)
PROT UR STRIP-MCNC: ABNORMAL MG/DL
RBC # UR STRIP: NEGATIVE /UL
SP GR UR: 1.03 (ref 1–1.03)
UROBILINOGEN UR QL: NORMAL

## 2020-03-16 PROCEDURE — 99213 OFFICE O/P EST LOW 20 MIN: CPT | Performed by: UROLOGY

## 2020-03-16 PROCEDURE — 51798 US URINE CAPACITY MEASURE: CPT | Performed by: UROLOGY

## 2020-03-16 PROCEDURE — 81003 URINALYSIS AUTO W/O SCOPE: CPT | Performed by: UROLOGY

## 2020-03-17 ENCOUNTER — OFFICE VISIT (OUTPATIENT)
Dept: PODIATRY | Facility: CLINIC | Age: 85
End: 2020-03-17

## 2020-03-17 VITALS
SYSTOLIC BLOOD PRESSURE: 140 MMHG | HEIGHT: 66 IN | BODY MASS INDEX: 23.63 KG/M2 | HEART RATE: 76 BPM | DIASTOLIC BLOOD PRESSURE: 60 MMHG | OXYGEN SATURATION: 98 % | WEIGHT: 147 LBS

## 2020-03-17 DIAGNOSIS — M20.12 HALLUX VALGUS, LEFT: ICD-10-CM

## 2020-03-17 DIAGNOSIS — M20.11 HALLUX VALGUS, RIGHT: ICD-10-CM

## 2020-03-17 DIAGNOSIS — B35.1 ONYCHOMYCOSIS: Primary | ICD-10-CM

## 2020-03-17 DIAGNOSIS — E11.40 TYPE 2 DIABETES MELLITUS WITH DIABETIC NEUROPATHY, WITHOUT LONG-TERM CURRENT USE OF INSULIN (HCC): ICD-10-CM

## 2020-03-17 DIAGNOSIS — M79.672 FOOT PAIN, BILATERAL: ICD-10-CM

## 2020-03-17 DIAGNOSIS — M79.671 FOOT PAIN, BILATERAL: ICD-10-CM

## 2020-03-17 PROCEDURE — 99213 OFFICE O/P EST LOW 20 MIN: CPT | Performed by: PODIATRIST

## 2020-03-17 PROCEDURE — 11721 DEBRIDE NAIL 6 OR MORE: CPT | Performed by: PODIATRIST

## 2020-03-17 NOTE — PROGRESS NOTES
[facility] - PODIATRY    Today's Date: 03/17/20    Patient Name: Honey Canales  MRN: [id number]  CSN: [id number]  PCP: Devon Zuñiga MD  Referring Provider: No ref. provider found    SUBJECTIVE     Chief Complaint   Patient presents with   • Follow-up     Pt is here for diabetic foot/nail care - Pt states no pain - Pt present with discoloration of the toenails.   • Diabetes     Pt last blood sugar reading is 96mg/dl.     HPI: Honey Canales, a 86 y.o.female, comes to clinic as a(n) established patient presenting for diabetic foot exam and complaining of painful toenails. Patient has h/o aneurysm, arthritis, DM2, GERD, Headache, HLD, HTN, Hypothyroid, Spinal Stenosis, urinary incontinence. Patient is IDDM with last stated BG level of 96mg/dl. States that her toenails are long, thick and discolored. She is unable to care for them herself. Admits to occasional numbness in feet. Denies open wounds or sores. Most pain when wearing closed toed shoes. Uses foam as padding or bunion shields for bunion deformities. Admits pain at 3/10 level and described as aching, dull and numbness. Relates previous treatment(s) including foot care by podiatrist. Denies any constitutional symptoms. No other pedal complaints at this time.    Past Medical History:   Diagnosis Date   • Allergic rhinitis    • Aneurysm (CMS/HCC)    • Arthritis    • Cerebral aneurysm 2009    2ND ONE FOUND   • Cerebral aneurysm     NON TREATABLE   • Chronic laryngitis    • CKD (chronic kidney disease)    • Colon polyps    • COPD (chronic obstructive pulmonary disease) (CMS/HCC)    • Deviated nasal septum    • Diabetes mellitus (CMS/HCC)    • Disorder of kidney and ureter    • Disturbance of smell and taste    • Dizziness    • Encounter for imaging to screen for metal prior to MRI     NO MRI, METAL CLIPS IN HEAD   • Eustachian tube dysfunction    • GERD (gastroesophageal reflux disease)    • Globus sensation    • Headache    • Hepatitis     B   •  Hepatitis A    • History of stomach ulcers    • Hyperlipidemia    • Hypertension    • Hypothyroid    • Laryngitis sicca    • Lung nodule    • Nocturia    • PONV (postoperative nausea and vomiting)    • Sensorineural hearing loss    • Spinal stenosis    • Urge incontinence    • Urgency of urination    • Urinary frequency    • Urinary incontinence    • UTI (urinary tract infection)      Past Surgical History:   Procedure Laterality Date   • BLADDER SURGERY  2016    Medtronic Interstem   • BRAIN SURGERY      ANUERYSM REMOVED   • BREAST SURGERY Bilateral     BIOPSY   • CARPAL TUNNEL RELEASE     • CATARACT EXTRACTION, BILATERAL     • CEREBRAL ANEURYSM REPAIR     •  SECTION      X4   • CHOLECYSTECTOMY     • HYSTERECTOMY     • INTERSTIM PLACEMENT N/A 10/18/2018    Procedure: INTERSTIM STAGES 1 AND 2 LEAD AND GENERATOR REMOVAL AND REPLACEMENT;  Surgeon: Archie Avery MD;  Location: [facility] OR;  Service: Urology   • JOINT REPLACEMENT Right     KNEE   • KNEE ARTHROSCOPY     • THYROIDECTOMY     • TONSILLECTOMY       Family History   Problem Relation Age of Onset   • Cancer Brother         BLADDER   • No Known Problems Father    • No Known Problems Mother      Social History     Socioeconomic History   • Marital status:      Spouse name: Not on file   • Number of children: Not on file   • Years of education: Not on file   • Highest education level: Not on file   Tobacco Use   • Smoking status: Former Smoker     Types: Cigarettes     Last attempt to quit:      Years since quittin.2   • Smokeless tobacco: Never Used   • Tobacco comment: SMOKED OVER 40 YEARS   Substance and Sexual Activity   • Alcohol use: No   • Drug use: No   • Sexual activity: Defer     Allergies   Allergen Reactions   • Codeine Phosphate [Codeine] Unknown (See Comments)     CHEST PAIN   • Collagen Other (See Comments)     CAT GUT SUTURES \ WOUND WOULD NOT HEAL AND GOT INFECTION   • Accupril [Quinapril Hcl] Other (See Comments)      COUGH   • Aspirin Other (See Comments)     HISTORY OF STOMACH ULCERS   • Adhesive Tape Rash     SKIN BLISTERS   • Celebrex [Celecoxib] Nausea Only   • Lyrica [Pregabalin] Other (See Comments)     GAINED 50 POUNDS   • Pantoprazole Sodium Diarrhea   • Pentoxifylline Diarrhea   • Sulfa Antibiotics Nausea And Vomiting   • Tramadol Nausea And Vomiting   • Vioxx [Rofecoxib] Nausea And Vomiting     Current Outpatient Medications   Medication Sig Dispense Refill   • acetaminophen (TYLENOL) 500 MG tablet Take 500 mg by mouth Every 6 (Six) Hours As Needed for Mild Pain .     • atorvastatin (LIPITOR) 80 MG tablet Take 80 mg by mouth daily.     • B-D ULTRAFINE III SHORT PEN 31G X 8 MM misc      • glipizide (GLUCOTROL XL) 10 MG 24 hr tablet TAKE 1 TABLET TWICE A  tablet 3   • glipiZIDE (GLUCOTROL) 10 MG tablet Take 10 mg by mouth 2 (Two) Times a Day Before Meals.     • insulin detemir (LEVEMIR) 100 UNIT/ML injection Inject  under the skin into the appropriate area as directed. 35 units in the morning and 25 units at night     • levocetirizine (XYZAL) 5 MG tablet Take 5 mg by mouth Every Evening.     • levothyroxine (SYNTHROID, LEVOTHROID) 75 MCG tablet Take 75 mcg by mouth daily.     • Mirabegron ER (MYRBETRIQ) 50 MG tablet sustained-release 24 hour 24 hr tablet Take 50 mg by mouth Daily. 90 tablet 3   • nystatin-triamcinolone (MYCOLOG) 075883-3.1 UNIT/GM-% ointment Apply to affected area 2 times daily 30 g 1   • PARoxetine (PAXIL) 10 MG tablet TAKE 1 TABLET DAILY 90 tablet 3     No current facility-administered medications for this visit.      Review of Systems   Constitutional: Negative for chills and fever.   HENT: Negative for congestion.    Respiratory: Negative for shortness of breath.    Cardiovascular: Positive for leg swelling. Negative for chest pain.   Gastrointestinal: Negative for constipation, diarrhea, nausea and vomiting.   Musculoskeletal:        Foot pain   Skin: Negative for wound.   Neurological:  Positive for numbness.       OBJECTIVE     Vitals:    03/17/20 1018   BP: 140/60   Pulse: 76   SpO2: 98%       PHYSICAL EXAM  GEN:   Accompanied by none.     Foot/Ankle Exam:       General:   Diabetic Foot Exam Performed    Appearance: appears stated age and healthy    Orientation: AAOx3    Affect: appropriate    Gait: unimpaired    Assistance: independent    Shoe Gear:  Diabetic shoes    VASCULAR      Right Foot Vascularity   Dorsalis pedis:  2+  Posterior tibial:  2+  Skin Temperature: warm    Edema Grading:  Trace  CFT:  3  Pedal Hair Growth:  Absent  Varicosities: mild varicosities       Left Foot Vascularity   Dorsalis pedis:  2+  Posterior tibial:  2+  Skin Temperature: warm    Edema Grading:  Trace  CFT:  3  Pedal Hair Growth:  Absent  Varicosities: mild varicosities        NEUROLOGIC     Right Foot Neurologic   Light touch sensation:  Diminished  Vibratory sensation:  Diminished  Hot/Cold sensation: diminished    Protective Sensation using Lamoni-Tima Monofilament:  8     Left Foot Neurologic   Light touch sensation:  Diminished  Vibratory sensation:  Diminished  Hot/cold sensation: diminished    Protective Sensation using Lamoni-Tima Monofilament:  8     MUSCULOSKELETAL      Right Foot Musculoskeletal   Ecchymosis:  None  Tenderness: toenails    Arch:  Normal  Hallux valgus: Yes    Hallux limitus: No       Left Foot Musculoskeletal   Ecchymosis:  None  Tenderness: great toe metatarsophalangeal joint and toenails    Arch:  Normal  Hallux valgus: Yes    Hallux limitus: No       MUSCLE STRENGTH     Right Foot Muscle Strength   Foot dorsiflexion:  5  Foot plantar flexion:  5  Foot inversion:  5  Foot eversion:  5     Left Foot Muscle Strength   Foot dorsiflexion:  5  Foot plantar flexion:  5  Foot inversion:  5  Foot eversion:  5     RANGE OF MOTION      Right Foot Range of Motion   Foot and ankle ROM within normal limits       Left Foot Range of Motion   Foot and ankle ROM within normal limits        DERMATOLOGIC     Right Foot Dermatologic   Skin: skin intact and atrophic    Nails: onychomycosis, abnormally thick, subungual debris and dystrophic nails       Left Foot Dermatologic   Skin: skin intact and atrophic    Nails: onychomycosis, abnormally thick, subungual debris and dystrophic nails        RADIOLOGY/NUCLEAR:  No results found.    LABORATORY/CULTURE RESULTS:      PATHOLOGY RESULTS:       ASSESSMENT/PLAN     Honey was seen today for follow-up and diabetes.    Diagnoses and all orders for this visit:    Onychomycosis    Type 2 diabetes mellitus with diabetic neuropathy, without long-term current use of insulin (CMS/[facility], located within [facility])    Hallux valgus, right    Hallux valgus, left    Foot pain, bilateral      Comprehensive lower extremity examination and evaluation was performed.  Discussed findings and treatment plan including risks, benefits, and treatment options with patient in detail. Patient agreed with treatment plan.  After verbal consent obtained, nail(s) x10 debrided of length and thickness with nail nipper without incidence  Patient may maintain nails and calluses at home utilizing emery board or pumice stone between visits as needed  Reviewed at home diabetic foot care including daily foot checks   HAV asymptomatic at this time. Continue with padding.   An After Visit Summary was printed and given to the patient at discharge, including (if requested) any available informative/educational handouts regarding diagnosis, treatment, or medications. All questions were answered to patient/family satisfaction. Should symptoms fail to improve or worsen they agree to call or return to clinic or to go to the Emergency Department. Discussed the importance of following up with any needed screening tests/labs/specialist appointments and any requested follow-up recommended by me today. Importance of maintaining follow-up discussed and patient accepts that missed appointments can delay diagnosis and potentially lead to  worsening of conditions.  Return in about 3 months (around 6/17/2020)., or sooner if acute issues arise.        This document has been electronically signed by Morgan Pepper DPM on March 17, 2020 10:48

## 2020-06-11 NOTE — PROGRESS NOTES
[facility] - PODIATRY    Today's Date: 06/18/20    Patient Name: Honey Canales  MRN: [id number]  CSN: [id number]  PCP: Devon Zuñiga MD  Referring Provider: No ref. provider found    SUBJECTIVE     Chief Complaint   Patient presents with   • Follow-up     Pt is here today for a 3 month f/u on diabetic foot/nail care - Pt presents with discoloration and thickened toenails - Pt denies any pain at this moment - PCP    • Diabetes     Pt last blood sugar reading is 77mg/dl.      HPI: Honey Canales, a 87 y.o.female, comes to clinic as a(n) established patient presenting for diabetic foot exam and complaining of painful toenails. Patient has h/o aneurysm, arthritis, DM2, GERD, Headache, HLD, HTN, Hypothyroid, Spinal Stenosis, urinary incontinence. Patient is IDDM with last stated BG level of 77mg/dl. States that her toenails are long, thick and discolored. She is unable to care for them herself. Admits to occasional numbness in feet. Denies open wounds or sores. Most pain when wearing closed toed shoes. Uses foam as padding or bunion shields for bunion deformities without complications. Callus to plantar right foot has returned. Denies redness or drainage. Denies pain today. Relates previous treatment(s) including foot care by podiatrist. Denies any constitutional symptoms. No other pedal complaints at this time.    Past Medical History:   Diagnosis Date   • Allergic rhinitis    • Aneurysm (CMS/HCC)    • Arthritis    • Cerebral aneurysm 2009    2ND ONE FOUND   • Cerebral aneurysm     NON TREATABLE   • Chronic laryngitis    • CKD (chronic kidney disease)    • Colon polyps    • COPD (chronic obstructive pulmonary disease) (CMS/HCC)    • Deviated nasal septum    • Diabetes mellitus (CMS/HCC)    • Disorder of kidney and ureter    • Disturbance of smell and taste    • Dizziness    • Encounter for imaging to screen for metal prior to MRI     NO MRI, METAL CLIPS IN HEAD   • Eustachian tube dysfunction    • GERD  (gastroesophageal reflux disease)    • Globus sensation    • Headache    • Hepatitis     B   • Hepatitis A    • History of stomach ulcers    • Hyperlipidemia    • Hypertension    • Hypothyroid    • Laryngitis sicca    • Lung nodule    • Nocturia    • PONV (postoperative nausea and vomiting)    • Sensorineural hearing loss    • Spinal stenosis    • Urge incontinence    • Urgency of urination    • Urinary frequency    • Urinary incontinence    • UTI (urinary tract infection)      Past Surgical History:   Procedure Laterality Date   • BLADDER SURGERY  2016    Medtronic Interstem   • BRAIN SURGERY      ANUERYSM REMOVED   • BREAST SURGERY Bilateral     BIOPSY   • CARPAL TUNNEL RELEASE     • CATARACT EXTRACTION, BILATERAL     • CEREBRAL ANEURYSM REPAIR     •  SECTION      X4   • CHOLECYSTECTOMY     • HYSTERECTOMY     • INTERSTIM PLACEMENT N/A 10/18/2018    Procedure: INTERSTIM STAGES 1 AND 2 LEAD AND GENERATOR REMOVAL AND REPLACEMENT;  Surgeon: Archie Avery MD;  Location: [facility];  Service: Urology   • JOINT REPLACEMENT Right     KNEE   • KNEE ARTHROSCOPY     • THYROIDECTOMY     • TONSILLECTOMY       Family History   Problem Relation Age of Onset   • Cancer Brother         BLADDER   • No Known Problems Father    • No Known Problems Mother      Social History     Socioeconomic History   • Marital status:      Spouse name: Not on file   • Number of children: Not on file   • Years of education: Not on file   • Highest education level: Not on file   Tobacco Use   • Smoking status: Former Smoker     Types: Cigarettes     Last attempt to quit:      Years since quittin.4   • Smokeless tobacco: Never Used   • Tobacco comment: SMOKED OVER 40 YEARS   Substance and Sexual Activity   • Alcohol use: No   • Drug use: No   • Sexual activity: Defer     Allergies   Allergen Reactions   • Codeine Phosphate [Codeine] Unknown (See Comments)     CHEST PAIN   • Collagen Other (See Comments)     CAT GUT  SUTURES \ WOUND WOULD NOT HEAL AND GOT INFECTION   • Accupril [Quinapril Hcl] Other (See Comments)     COUGH   • Aspirin Other (See Comments)     HISTORY OF STOMACH ULCERS   • Adhesive Tape Rash     SKIN BLISTERS   • Celebrex [Celecoxib] Nausea Only   • Lyrica [Pregabalin] Other (See Comments)     GAINED 50 POUNDS   • Pantoprazole Sodium Diarrhea   • Pentoxifylline Diarrhea   • Sulfa Antibiotics Nausea And Vomiting   • Tramadol Nausea And Vomiting   • Vioxx [Rofecoxib] Nausea And Vomiting     Current Outpatient Medications   Medication Sig Dispense Refill   • acetaminophen (TYLENOL) 500 MG tablet Take 500 mg by mouth Every 6 (Six) Hours As Needed for Mild Pain .     • atorvastatin (LIPITOR) 80 MG tablet Take 80 mg by mouth daily.     • B-D ULTRAFINE III SHORT PEN 31G X 8 MM misc      • glipizide (GLUCOTROL XL) 10 MG 24 hr tablet TAKE 1 TABLET TWICE A  tablet 3   • insulin detemir (LEVEMIR) 100 UNIT/ML injection Inject  under the skin into the appropriate area as directed. 35 units in the morning and 25 units at night     • levocetirizine (XYZAL) 5 MG tablet Take 5 mg by mouth Every Evening.     • levothyroxine (SYNTHROID, LEVOTHROID) 75 MCG tablet Take 75 mcg by mouth daily.     • Mirabegron ER (MYRBETRIQ) 50 MG tablet sustained-release 24 hour 24 hr tablet Take 50 mg by mouth Daily. 90 tablet 3   • nystatin-triamcinolone (MYCOLOG) 368166-6.1 UNIT/GM-% ointment Apply to affected area 2 times daily 30 g 1   • PARoxetine (PAXIL) 10 MG tablet TAKE 1 TABLET DAILY 90 tablet 3     No current facility-administered medications for this visit.      Review of Systems   Constitutional: Negative for chills and fever.   HENT: Negative for congestion.    Respiratory: Negative for shortness of breath.    Cardiovascular: Positive for leg swelling. Negative for chest pain.   Gastrointestinal: Negative for constipation, diarrhea, nausea and vomiting.   Musculoskeletal:        Foot pain   Skin: Negative for wound.    Neurological: Positive for numbness.       OBJECTIVE     Vitals:    06/18/20 1028   BP: 123/82   Pulse: 80   SpO2: 97%       PHYSICAL EXAM  GEN:   Accompanied by none.     Foot/Ankle Exam:       General:   Diabetic Foot Exam Performed    Appearance: appears stated age and healthy    Orientation: AAOx3    Affect: appropriate    Gait: unimpaired    Assistance: independent    Shoe Gear:  Diabetic shoes    VASCULAR      Right Foot Vascularity   Dorsalis pedis:  2+  Posterior tibial:  2+  Skin Temperature: warm    Edema Grading:  Trace  CFT:  3  Pedal Hair Growth:  Absent  Varicosities: mild varicosities       Left Foot Vascularity   Dorsalis pedis:  2+  Posterior tibial:  2+  Skin Temperature: warm    Edema Grading:  Trace  CFT:  3  Pedal Hair Growth:  Absent  Varicosities: mild varicosities        NEUROLOGIC     Right Foot Neurologic   Light touch sensation:  Diminished  Vibratory sensation:  Diminished  Hot/Cold sensation: diminished    Protective Sensation using Shirland-Tima Monofilament:  7     Left Foot Neurologic   Light touch sensation:  Diminished  Vibratory sensation:  Diminished  Hot/cold sensation: diminished    Protective Sensation using Shirland-Tima Monofilament:  7     MUSCULOSKELETAL      Right Foot Musculoskeletal   Ecchymosis:  None  Tenderness: right foot callus and toenails    Arch:  Normal  Hallux valgus: Yes    Hallux limitus: No       Left Foot Musculoskeletal   Ecchymosis:  None  Tenderness: great toe metatarsophalangeal joint and toenails    Arch:  Normal  Hallux valgus: Yes    Hallux limitus: No       MUSCLE STRENGTH     Right Foot Muscle Strength   Foot dorsiflexion:  5  Foot plantar flexion:  5  Foot inversion:  5  Foot eversion:  5     Left Foot Muscle Strength   Foot dorsiflexion:  5  Foot plantar flexion:  5  Foot inversion:  5  Foot eversion:  5     RANGE OF MOTION      Right Foot Range of Motion   Foot and ankle ROM within normal limits       Left Foot Range of Motion   Foot and  ankle ROM within normal limits       DERMATOLOGIC     Right Foot Dermatologic   Skin: corn (sub 5th met head) and atrophic    Nails: onychomycosis, abnormally thick, subungual debris and dystrophic nails       Left Foot Dermatologic   Skin: skin intact and atrophic    Nails: onychomycosis, abnormally thick, subungual debris and dystrophic nails        RADIOLOGY/NUCLEAR:  No results found.    LABORATORY/CULTURE RESULTS:      PATHOLOGY RESULTS:       ASSESSMENT/PLAN     Honey was seen today for follow-up and diabetes.    Diagnoses and all orders for this visit:    Onychomycosis    Type 2 diabetes mellitus with diabetic neuropathy, without long-term current use of insulin (CMS/[facility])    Foot pain, bilateral    Hallux valgus, left    Hallux valgus, right    Foot callus      Comprehensive lower extremity examination and evaluation was performed.  Discussed findings and treatment plan including risks, benefits, and treatment options with patient in detail. Patient agreed with treatment plan.  After verbal consent obtained, nail(s) x10 debrided of length and thickness with nail nipper without incidence  After verbal consent obtained, calluses x1 pared utilizing dermal curette and/or scalpel without incidence  Patient may maintain nails and calluses at home utilizing emery board or pumice stone between visits as needed  Reviewed at home diabetic foot care including daily foot checks   HAV asymptomatic at this time. Continue with padding.   An After Visit Summary was printed and given to the patient at discharge, including (if requested) any available informative/educational handouts regarding diagnosis, treatment, or medications. All questions were answered to patient/family satisfaction. Should symptoms fail to improve or worsen they agree to call or return to clinic or to go to the Emergency Department. Discussed the importance of following up with any needed screening tests/labs/specialist appointments and any requested  follow-up recommended by me today. Importance of maintaining follow-up discussed and patient accepts that missed appointments can delay diagnosis and potentially lead to worsening of conditions.  Return in about 3 months (around 9/18/2020)., or sooner if acute issues arise.        This document has been electronically signed by Morgan Pepper DPM on June 18, 2020 10:58

## 2020-06-17 ENCOUNTER — TELEPHONE (OUTPATIENT)
Dept: PODIATRY | Facility: CLINIC | Age: 85
End: 2020-06-17

## 2020-06-18 ENCOUNTER — OFFICE VISIT (OUTPATIENT)
Dept: PODIATRY | Facility: CLINIC | Age: 85
End: 2020-06-18

## 2020-06-18 VITALS
OXYGEN SATURATION: 97 % | SYSTOLIC BLOOD PRESSURE: 123 MMHG | WEIGHT: 148 LBS | HEART RATE: 80 BPM | HEIGHT: 66 IN | BODY MASS INDEX: 23.78 KG/M2 | DIASTOLIC BLOOD PRESSURE: 82 MMHG

## 2020-06-18 DIAGNOSIS — B35.1 ONYCHOMYCOSIS: Primary | ICD-10-CM

## 2020-06-18 DIAGNOSIS — M20.12 HALLUX VALGUS, LEFT: ICD-10-CM

## 2020-06-18 DIAGNOSIS — M79.671 FOOT PAIN, BILATERAL: ICD-10-CM

## 2020-06-18 DIAGNOSIS — M79.672 FOOT PAIN, BILATERAL: ICD-10-CM

## 2020-06-18 DIAGNOSIS — M20.11 HALLUX VALGUS, RIGHT: ICD-10-CM

## 2020-06-18 DIAGNOSIS — E11.40 TYPE 2 DIABETES MELLITUS WITH DIABETIC NEUROPATHY, WITHOUT LONG-TERM CURRENT USE OF INSULIN (HCC): ICD-10-CM

## 2020-06-18 DIAGNOSIS — L84 FOOT CALLUS: ICD-10-CM

## 2020-06-18 PROCEDURE — 11721 DEBRIDE NAIL 6 OR MORE: CPT | Performed by: PODIATRIST

## 2020-06-18 PROCEDURE — 11055 PARING/CUTG B9 HYPRKER LES 1: CPT | Performed by: PODIATRIST

## 2020-07-16 RX ORDER — ATORVASTATIN CALCIUM 80 MG/1
TABLET, FILM COATED ORAL
Qty: 90 TABLET | Refills: 3 | Status: SHIPPED | OUTPATIENT
Start: 2020-07-16 | End: 2021-03-30 | Stop reason: HOSPADM

## 2020-07-27 ENCOUNTER — HOSPITAL ENCOUNTER (OUTPATIENT)
Dept: WOMENS IMAGING | Age: 85
Discharge: HOME | End: 2020-07-27
Payer: MEDICARE

## 2020-07-27 PROCEDURE — 77063 BREAST TOMOSYNTHESIS BI: CPT

## 2020-08-28 RX ORDER — LEVOTHYROXINE SODIUM 0.07 MG/1
TABLET ORAL
Qty: 90 TABLET | Refills: 3 | Status: ON HOLD | OUTPATIENT
Start: 2020-08-28 | End: 2021-08-31

## 2020-09-04 NOTE — PROGRESS NOTES
Subjective    Ms. Canales is 87 y.o. female    Chief Complaint: Urge Incontinence    History of Present Illness    87-year-old female follow-up for overactive bladder.  Severity is improved after starting Myrbetriq last visit.  Onset gradual. .  Context she had a removal and replacement of InterStim October 2018 by Dr. Avery.  Her device was found to be off September 2019 and was turned back on.    Associated symptoms she denies dysuria, hematuria, or flank pain.    The following portions of the patient's history were reviewed and updated as appropriate: allergies, current medications, past family history, past medical history, past social history, past surgical history and problem list.    Review of Systems   Constitutional: Negative for chills and fever.   Gastrointestinal: Negative for abdominal pain, anal bleeding and blood in stool.   Genitourinary: Negative for dysuria, frequency, hematuria and urgency.   All other systems reviewed and are negative.        Current Outpatient Medications:   •  acetaminophen (TYLENOL) 500 MG tablet, Take 500 mg by mouth Every 6 (Six) Hours As Needed for Mild Pain ., Disp: , Rfl:   •  atorvastatin (LIPITOR) 80 MG tablet, TAKE 1 TABLET DAILY, Disp: 90 tablet, Rfl: 3  •  B-D ULTRAFINE III SHORT PEN 31G X 8 MM misc, , Disp: , Rfl:   •  glipizide (GLUCOTROL XL) 10 MG 24 hr tablet, TAKE 1 TABLET TWICE A DAY, Disp: 180 tablet, Rfl: 3  •  insulin detemir (LEVEMIR) 100 UNIT/ML injection, Inject  under the skin into the appropriate area as directed. 35 units in the morning and 25 units at night, Disp: , Rfl:   •  levocetirizine (XYZAL) 5 MG tablet, Take 5 mg by mouth Every Evening., Disp: , Rfl:   •  levothyroxine (SYNTHROID, LEVOTHROID) 75 MCG tablet, TAKE 1 TABLET DAILY, Disp: 90 tablet, Rfl: 3  •  Mirabegron ER (MYRBETRIQ) 50 MG tablet sustained-release 24 hour 24 hr tablet, Take 50 mg by mouth Daily., Disp: 90 tablet, Rfl: 3  •  PARoxetine (PAXIL) 10 MG tablet, TAKE 1 TABLET DAILY,  Disp: 90 tablet, Rfl: 3    Past Medical History:   Diagnosis Date   • Allergic rhinitis    • Aneurysm (CMS/HCC)    • Arthritis    • Cerebral aneurysm 2009    2ND ONE FOUND   • Cerebral aneurysm     NON TREATABLE   • Chronic laryngitis    • CKD (chronic kidney disease)    • Colon polyps    • COPD (chronic obstructive pulmonary disease) (CMS/HCC)    • Deviated nasal septum    • Diabetes mellitus (CMS/HCC)    • Disorder of kidney and ureter    • Disturbance of smell and taste    • Dizziness    • Encounter for imaging to screen for metal prior to MRI     NO MRI, METAL CLIPS IN HEAD   • Eustachian tube dysfunction    • GERD (gastroesophageal reflux disease)    • Globus sensation    • Headache    • Hepatitis     B   • Hepatitis A    • History of stomach ulcers    • Hyperlipidemia    • Hypertension    • Hypothyroid    • Laryngitis sicca    • Lung nodule    • Nocturia    • PONV (postoperative nausea and vomiting)    • Sensorineural hearing loss    • Spinal stenosis    • Urge incontinence    • Urgency of urination    • Urinary frequency    • Urinary incontinence    • UTI (urinary tract infection)        Past Surgical History:   Procedure Laterality Date   • BLADDER SURGERY  2016    Medtronic Interstem   • BRAIN SURGERY      ANUERYSM REMOVED   • BREAST SURGERY Bilateral     BIOPSY   • CARPAL TUNNEL RELEASE     • CATARACT EXTRACTION, BILATERAL     • CEREBRAL ANEURYSM REPAIR     •  SECTION      X4   • CHOLECYSTECTOMY     • HYSTERECTOMY     • INTERSTIM PLACEMENT N/A 10/18/2018    Procedure: INTERSTIM STAGES 1 AND 2 LEAD AND GENERATOR REMOVAL AND REPLACEMENT;  Surgeon: Archie Avery MD;  Location: [facility];  Service: Urology   • JOINT REPLACEMENT Right     KNEE   • KNEE ARTHROSCOPY     • THYROIDECTOMY     • TONSILLECTOMY         Social History     Socioeconomic History   • Marital status:      Spouse name: Not on file   • Number of children: Not on file   • Years of education: Not on file   • Highest  "education level: Not on file   Tobacco Use   • Smoking status: Former Smoker     Types: Cigarettes     Quit date:      Years since quittin.7   • Smokeless tobacco: Never Used   • Tobacco comment: SMOKED OVER 40 YEARS   Substance and Sexual Activity   • Alcohol use: No   • Drug use: No   • Sexual activity: Defer       Family History   Problem Relation Age of Onset   • Cancer Brother         BLADDER   • No Known Problems Father    • No Known Problems Mother        Objective    Temp 97.8 °F (36.6 °C)   Ht 167.6 cm (66\")   Wt 66.7 kg (147 lb)   BMI 23.73 kg/m²     Physical Exam  Constitutional: Well nourished, Well developed; No apparent distress; Vital reviewed as above  Psychiatric: Appropriate affect; Alert and oriented  Eyes: Unremarkable  Musculoskeletal: Normal gait and station  GI: Abdomen is soft, non-tender  Respiratory: No distress; Unlabored movement; No accessory musculature needed with symmetric movements  Skin: No pallor or diaphoresis  Lymphatic: No adenopathy neck or groin      Results for orders placed or performed in visit on 20   POC Urinalysis Dipstick, Multipro    Specimen: Urine   Result Value Ref Range    Color Kirti Yellow, Straw, Dark Yellow, Kirti    Clarity, UA Clear Clear    Glucose, UA Negative Negative, 1000 mg/dL (3+) mg/dL    Bilirubin Moderate (2+) (A) Negative    Ketones, UA Trace (A) Negative    Specific Gravity  1.020 1.005 - 1.030    Blood, UA Trace (A) Negative    pH, Urine 5.5 5.0 - 8.0    Protein, POC 2+ (A) Negative mg/dL    Urobilinogen, UA Normal Normal    Nitrite, UA Negative Negative    Leukocytes Small (1+) (A) Negative       Assessment and Plan    Diagnoses and all orders for this visit:    Urge incontinence  -     POC Urinalysis Dipstick, Multipro      Patient overall pleased on Myrbetriq therapy in addition to her InterStim-continue.  Follow-up in 1 year or sooner as needed.      This document has been signed by JADIEL Tejeda MD on 2020 " 17:46 CDT

## 2020-09-16 ENCOUNTER — OFFICE VISIT (OUTPATIENT)
Dept: UROLOGY | Facility: CLINIC | Age: 85
End: 2020-09-16

## 2020-09-16 VITALS — HEIGHT: 66 IN | WEIGHT: 147 LBS | TEMPERATURE: 97.8 F | BODY MASS INDEX: 23.63 KG/M2

## 2020-09-16 DIAGNOSIS — N39.41 URGE INCONTINENCE: Primary | ICD-10-CM

## 2020-09-16 LAB
BILIRUB BLD-MCNC: ABNORMAL MG/DL
CLARITY, POC: CLEAR
COLOR UR: ABNORMAL
GLUCOSE UR STRIP-MCNC: NEGATIVE MG/DL
KETONES UR QL: ABNORMAL
LEUKOCYTE EST, POC: ABNORMAL
NITRITE UR-MCNC: NEGATIVE MG/ML
PH UR: 5.5 [PH] (ref 5–8)
PROT UR STRIP-MCNC: ABNORMAL MG/DL
RBC # UR STRIP: ABNORMAL /UL
SP GR UR: 1.02 (ref 1–1.03)
UROBILINOGEN UR QL: NORMAL

## 2020-09-16 PROCEDURE — 99213 OFFICE O/P EST LOW 20 MIN: CPT | Performed by: UROLOGY

## 2020-09-16 PROCEDURE — 81003 URINALYSIS AUTO W/O SCOPE: CPT | Performed by: UROLOGY

## 2020-09-17 ENCOUNTER — OFFICE VISIT (OUTPATIENT)
Dept: PODIATRY | Facility: CLINIC | Age: 85
End: 2020-09-17

## 2020-09-17 VITALS
SYSTOLIC BLOOD PRESSURE: 165 MMHG | HEIGHT: 66 IN | OXYGEN SATURATION: 99 % | BODY MASS INDEX: 23.63 KG/M2 | HEART RATE: 91 BPM | WEIGHT: 147 LBS | DIASTOLIC BLOOD PRESSURE: 66 MMHG

## 2020-09-17 DIAGNOSIS — M79.671 FOOT PAIN, BILATERAL: ICD-10-CM

## 2020-09-17 DIAGNOSIS — M20.12 HALLUX VALGUS, LEFT: ICD-10-CM

## 2020-09-17 DIAGNOSIS — B35.1 ONYCHOMYCOSIS: Primary | ICD-10-CM

## 2020-09-17 DIAGNOSIS — L84 FOOT CALLUS: ICD-10-CM

## 2020-09-17 DIAGNOSIS — M79.672 FOOT PAIN, BILATERAL: ICD-10-CM

## 2020-09-17 DIAGNOSIS — E11.40 TYPE 2 DIABETES MELLITUS WITH DIABETIC NEUROPATHY, WITHOUT LONG-TERM CURRENT USE OF INSULIN (HCC): ICD-10-CM

## 2020-09-17 DIAGNOSIS — M20.11 HALLUX VALGUS, RIGHT: ICD-10-CM

## 2020-09-17 PROCEDURE — 11721 DEBRIDE NAIL 6 OR MORE: CPT | Performed by: PODIATRIST

## 2020-09-17 PROCEDURE — 99213 OFFICE O/P EST LOW 20 MIN: CPT | Performed by: PODIATRIST

## 2020-09-17 PROCEDURE — 11055 PARING/CUTG B9 HYPRKER LES 1: CPT | Performed by: PODIATRIST

## 2020-09-17 NOTE — PROGRESS NOTES
[facility] - PODIATRY    Today's Date: 09/17/20    Patient Name: Honey Canales  MRN: [id number]  CSN: [id number]  PCP: Devon Zuñiga MD  Referring Provider: No ref. provider found    SUBJECTIVE     Chief Complaint   Patient presents with   • Follow-up     Pt is here today for a 3 month f/u on diabetic foot/nail care - Pt presents with discoloration and thickened toenails - Pt denies any pain at this moment - PCP 06/14/2019   • Diabetes     last blood sugar reading is 92mg/dl      HPI: Honey Canales, a 87 y.o.female, comes to clinic as a(n) established patient presenting for diabetic foot exam and complaining of painful toenails. Patient has h/o aneurysm, arthritis, DM2, GERD, Headache, HLD, HTN, Hypothyroid, Spinal Stenosis, urinary incontinence. Patient is IDDM with last stated BG level of 92mg/dl. States that her toenails are long, thick and discolored. She is unable to care for them herself. Admits to occasional numbness in feet. Denies open wounds or sores. Most pain when wearing closed toed shoes. Uses foam as padding or bunion shields for bunion deformities without complications. Callus to plantar right foot has returned. Denies redness or drainage. Denies pain today. Relates previous treatment(s) including foot care by podiatrist. Denies any constitutional symptoms. No other pedal complaints at this time.    Past Medical History:   Diagnosis Date   • Allergic rhinitis    • Aneurysm (CMS/HCC)    • Arthritis    • Cerebral aneurysm 2009    2ND ONE FOUND   • Cerebral aneurysm     NON TREATABLE   • Chronic laryngitis    • CKD (chronic kidney disease)    • Colon polyps    • COPD (chronic obstructive pulmonary disease) (CMS/HCC)    • Deviated nasal septum    • Diabetes mellitus (CMS/HCC)    • Disorder of kidney and ureter    • Disturbance of smell and taste    • Dizziness    • Encounter for imaging to screen for metal prior to MRI     NO MRI, METAL CLIPS IN HEAD   • Eustachian tube dysfunction     • GERD (gastroesophageal reflux disease)    • Globus sensation    • Headache    • Hepatitis     B   • Hepatitis A    • History of stomach ulcers    • Hyperlipidemia    • Hypertension    • Hypothyroid    • Laryngitis sicca    • Lung nodule    • Nocturia    • PONV (postoperative nausea and vomiting)    • Sensorineural hearing loss    • Spinal stenosis    • Urge incontinence    • Urgency of urination    • Urinary frequency    • Urinary incontinence    • UTI (urinary tract infection)      Past Surgical History:   Procedure Laterality Date   • BLADDER SURGERY  2016    Medtronic Interstem   • BRAIN SURGERY      ANUERYSM REMOVED   • BREAST SURGERY Bilateral     BIOPSY   • CARPAL TUNNEL RELEASE     • CATARACT EXTRACTION, BILATERAL     • CEREBRAL ANEURYSM REPAIR     •  SECTION      X4   • CHOLECYSTECTOMY     • HYSTERECTOMY     • INTERSTIM PLACEMENT N/A 10/18/2018    Procedure: INTERSTIM STAGES 1 AND 2 LEAD AND GENERATOR REMOVAL AND REPLACEMENT;  Surgeon: Archie Avery MD;  Location: [facility] OR;  Service: Urology   • JOINT REPLACEMENT Right     KNEE   • KNEE ARTHROSCOPY     • THYROIDECTOMY     • TONSILLECTOMY       Family History   Problem Relation Age of Onset   • Cancer Brother         BLADDER   • No Known Problems Father    • No Known Problems Mother      Social History     Socioeconomic History   • Marital status:      Spouse name: Not on file   • Number of children: Not on file   • Years of education: Not on file   • Highest education level: Not on file   Tobacco Use   • Smoking status: Former Smoker     Types: Cigarettes     Quit date:      Years since quittin.7   • Smokeless tobacco: Never Used   • Tobacco comment: SMOKED OVER 40 YEARS   Substance and Sexual Activity   • Alcohol use: No   • Drug use: No   • Sexual activity: Defer     Allergies   Allergen Reactions   • Codeine Phosphate [Codeine] Unknown (See Comments)     CHEST PAIN   • Collagen Other (See Comments)     CAT GUT SUTURES  \ WOUND WOULD NOT HEAL AND GOT INFECTION   • Accupril [Quinapril Hcl] Other (See Comments)     COUGH   • Aspirin Other (See Comments)     HISTORY OF STOMACH ULCERS   • Adhesive Tape Rash     SKIN BLISTERS   • Celebrex [Celecoxib] Nausea Only   • Lyrica [Pregabalin] Other (See Comments)     GAINED 50 POUNDS   • Pantoprazole Sodium Diarrhea   • Pentoxifylline Diarrhea   • Sulfa Antibiotics Nausea And Vomiting   • Tramadol Nausea And Vomiting   • Vioxx [Rofecoxib] Nausea And Vomiting     Current Outpatient Medications   Medication Sig Dispense Refill   • acetaminophen (TYLENOL) 500 MG tablet Take 500 mg by mouth Every 6 (Six) Hours As Needed for Mild Pain .     • atorvastatin (LIPITOR) 80 MG tablet TAKE 1 TABLET DAILY 90 tablet 3   • B-D ULTRAFINE III SHORT PEN 31G X 8 MM misc      • glipizide (GLUCOTROL XL) 10 MG 24 hr tablet TAKE 1 TABLET TWICE A  tablet 3   • insulin detemir (LEVEMIR) 100 UNIT/ML injection Inject  under the skin into the appropriate area as directed. 35 units in the morning and 25 units at night     • levocetirizine (XYZAL) 5 MG tablet Take 5 mg by mouth Every Evening.     • levothyroxine (SYNTHROID, LEVOTHROID) 75 MCG tablet TAKE 1 TABLET DAILY 90 tablet 3   • Mirabegron ER (MYRBETRIQ) 50 MG tablet sustained-release 24 hour 24 hr tablet Take 50 mg by mouth Daily. 90 tablet 3   • PARoxetine (PAXIL) 10 MG tablet TAKE 1 TABLET DAILY 90 tablet 3     No current facility-administered medications for this visit.      Review of Systems   Constitutional: Negative for chills and fever.   HENT: Negative for congestion.    Respiratory: Negative for shortness of breath.    Cardiovascular: Positive for leg swelling. Negative for chest pain.   Gastrointestinal: Negative for constipation, diarrhea, nausea and vomiting.   Musculoskeletal:        Foot pain   Skin: Negative for wound.   Neurological: Positive for numbness.       OBJECTIVE     Vitals:    09/17/20 1038   BP: 165/66   Pulse: 91   SpO2: 99%        PHYSICAL EXAM  GEN:   Accompanied by none.     Foot/Ankle Exam:       General:   Diabetic Foot Exam Performed    Appearance: appears stated age and healthy    Orientation: AAOx3    Affect: appropriate    Gait: unimpaired    Assistance: independent    Shoe Gear:  Sandals    VASCULAR      Right Foot Vascularity   Dorsalis pedis:  2+  Posterior tibial:  2+  Skin Temperature: warm    Edema Grading:  Trace  CFT:  3  Pedal Hair Growth:  Absent  Varicosities: mild varicosities       Left Foot Vascularity   Dorsalis pedis:  2+  Posterior tibial:  2+  Skin Temperature: warm    Edema Grading:  Trace  CFT:  3  Pedal Hair Growth:  Absent  Varicosities: mild varicosities        NEUROLOGIC     Right Foot Neurologic   Light touch sensation:  Diminished  Vibratory sensation:  Diminished  Hot/Cold sensation: diminished    Protective Sensation using Lugoff-Tima Monofilament:  7     Left Foot Neurologic   Light touch sensation:  Diminished  Vibratory sensation:  Diminished  Hot/cold sensation: diminished    Protective Sensation using Lugoff-Tima Monofilament:  7     MUSCULOSKELETAL      Right Foot Musculoskeletal   Ecchymosis:  None  Tenderness: right foot callus and toenails    Arch:  Normal  Hallux valgus: Yes    Hallux limitus: No       Left Foot Musculoskeletal   Ecchymosis:  None  Tenderness: toenails    Arch:  Normal  Hallux valgus: Yes    Hallux limitus: No       MUSCLE STRENGTH     Right Foot Muscle Strength   Foot dorsiflexion:  5  Foot plantar flexion:  5  Foot inversion:  5  Foot eversion:  5     Left Foot Muscle Strength   Foot dorsiflexion:  5  Foot plantar flexion:  5  Foot inversion:  5  Foot eversion:  5     RANGE OF MOTION      Right Foot Range of Motion   Foot and ankle ROM within normal limits       Left Foot Range of Motion   Foot and ankle ROM within normal limits       DERMATOLOGIC     Right Foot Dermatologic   Skin: corn (sub 5th met head) and atrophic    Nails: onychomycosis, abnormally thick,  subungual debris and dystrophic nails       Left Foot Dermatologic   Skin: skin intact and atrophic    Nails: onychomycosis, abnormally thick, subungual debris and dystrophic nails       Image:       RADIOLOGY/NUCLEAR:  No results found.    LABORATORY/CULTURE RESULTS:      PATHOLOGY RESULTS:       ASSESSMENT/PLAN     Honey was seen today for follow-up and diabetes.    Diagnoses and all orders for this visit:    Onychomycosis    Type 2 diabetes mellitus with diabetic neuropathy, without long-term current use of insulin (CMS/[facility])    Foot pain, bilateral    Hallux valgus, left    Hallux valgus, right    Foot callus      Comprehensive lower extremity examination and evaluation was performed.  Discussed findings and treatment plan including risks, benefits, and treatment options with patient in detail. Patient agreed with treatment plan.  After verbal consent obtained, nail(s) x10 debrided of length and thickness with nail nipper without incidence  After verbal consent obtained, calluses x1 pared utilizing dermal curette and/or scalpel without incidence  Patient may maintain nails and calluses at home utilizing emery board or pumice stone between visits as needed  Reviewed at home diabetic foot care including daily foot checks   HAV mostly asymptomatic at this time. Continue with padding.   An After Visit Summary was printed and given to the patient at discharge, including (if requested) any available informative/educational handouts regarding diagnosis, treatment, or medications. All questions were answered to patient/family satisfaction. Should symptoms fail to improve or worsen they agree to call or return to clinic or to go to the Emergency Department. Discussed the importance of following up with any needed screening tests/labs/specialist appointments and any requested follow-up recommended by me today. Importance of maintaining follow-up discussed and patient accepts that missed appointments can delay diagnosis and  potentially lead to worsening of conditions.  Return in about 3 months (around 12/17/2020)., or sooner if acute issues arise.        This document has been electronically signed by Morgan Pepper DPM on September 17, 2020 10:59 CDT

## 2020-09-22 ENCOUNTER — OFFICE VISIT (OUTPATIENT)
Dept: URGENT CARE | Age: 85
End: 2020-09-22
Payer: MEDICARE

## 2020-09-22 ENCOUNTER — HOSPITAL ENCOUNTER (OUTPATIENT)
Dept: GENERAL RADIOLOGY | Age: 85
Discharge: HOME | End: 2020-09-22
Payer: MEDICARE

## 2020-09-22 VITALS
SYSTOLIC BLOOD PRESSURE: 112 MMHG | HEART RATE: 90 BPM | OXYGEN SATURATION: 98 % | TEMPERATURE: 98 F | WEIGHT: 146 LBS | HEIGHT: 66 IN | BODY MASS INDEX: 23.46 KG/M2 | RESPIRATION RATE: 20 BRPM | DIASTOLIC BLOOD PRESSURE: 66 MMHG

## 2020-09-22 PROCEDURE — 73502 X-RAY EXAM HIP UNI 2-3 VIEWS: CPT

## 2020-09-22 PROCEDURE — 99213 OFFICE O/P EST LOW 20 MIN: CPT | Performed by: NURSE PRACTITIONER

## 2020-09-22 ASSESSMENT — ENCOUNTER SYMPTOMS: COLOR CHANGE: 1

## 2020-09-22 NOTE — PROGRESS NOTES
[facility]  [address]  [address] [address]  Dept: [phone]  Dept Fax: [phone]  Loc: [phone]    Brandi Tracey is a 80 y.o. female who presents today for her medical conditions/complaintsas noted below. Brandi Tracey is c/o of Fall (patient complains of pain on right side of buttocks)        HPI:     Fall   The accident occurred more than 1 week ago. The fall occurred while walking. She fell from a height of 3 to 5 ft. Impact surface: driveway. There was no blood loss. The point of impact was the right hip and buttocks. The pain is present in the right hip. The symptoms are aggravated by ambulation. Pertinent negatives include no numbness or tingling. Pt states that a dog in the neighborhood jumped on her and she fell on her buttocks. Past Medical History:   Diagnosis Date    Aneurysm ([address]     Aneurysm ([address]     Breast cyst     Chronic pulmonary disease     CKD (chronic kidney disease)     Colon polyps     DM (diabetes mellitus) (HCC)     GERD (gastroesophageal reflux disease)     Hepatitis     High cholesterol     History of stomach ulcers     Lung nodule     left/BG    Stomach ache reflux    Thyroid disorder     Thyroid nodule     Urinary incontinence      Past Surgical History:   Procedure Laterality Date    BLADDER SURGERY  ?  BRAIN ANEURYSM SURGERY      BREAST SURGERY      BIOPSY    CARPAL TUNNEL RELEASE      CATARACT REMOVAL       SECTION      TIMES 4    COLONOSCOPY  3-    DR Sawyer Renee    COLONOSCOPY  13    [place]    CYST REMOVAL      FINGER    GALLBLADDER SURGERY      HYSTERECTOMY      KNEE ARTHROSCOPY      KNEE SURGERY      RTK    OTHER SURGICAL HISTORY      arteriorgram, surgeon said leave it alone no surgery.     TONSILLECTOMY AND ADENOIDECTOMY      UPPER GASTROINTESTINAL ENDOSCOPY  2010    DR Sawyer eRnee    UPPER GASTROINTESTINAL ENDOSCOPY  10/25/2013 Robin       Family History   Problem Relation Age of Onset    Stroke Mother     High Blood Pressure Mother     Lung Cancer Brother     Esophageal Cancer Brother     Cancer Brother     High Blood Pressure Father     High Blood Pressure Sister        Social History     Tobacco Use    Smoking status: Former Smoker     Last attempt to quit: 1993     Years since quittin.6    Smokeless tobacco: Never Used    Tobacco comment: quit    Substance Use Topics    Alcohol use: No      Current Outpatient Medications   Medication Sig Dispense Refill    bumetanide (BUMEX) 0.5 MG tablet       losartan (COZAAR) 100 MG tablet       insulin detemir (LEVEMIR) 100 UNIT/ML injection vial Inject into the skin      levothyroxine (SYNTHROID) 75 MCG tablet Take 75 mcg by mouth Daily.  Levocetirizine Dihydrochloride (XYZAL PO) Take 5 mg by mouth daily.  PARoxetine (PAXIL) 10 MG tablet Take 10 mg by mouth every morning.  Atorvastatin Calcium (LIPITOR PO) Take 80 mg by mouth daily.  GlipiZIDE (GLUCOTROL PO) Take 10 mg by mouth daily. No current facility-administered medications for this visit.       Allergies   Allergen Reactions    Celebrex [Celecoxib]     Codeine     Pregabalin     Protonix [Pantoprazole Sodium] Diarrhea    Quinapril Hcl     Sulfa Antibiotics     Tramadol     Trental [Pentoxifylline Er] Diarrhea    Vioxx [Rofecoxib]        Health Maintenance   Topic Date Due    Potassium monitoring  1933    Creatinine monitoring  1933    Lipid screen  1943    DTaP/Tdap/Td vaccine (1 - Tdap) 1952    Shingles Vaccine (1 of 2) 1983    Pneumococcal 65+ years Vaccine (2 of 2 - PPSV23) 2016    Annual Wellness Visit (AWV)  2019    Flu vaccine (1) 2020    Hepatitis A vaccine  Aged Out    Hib vaccine  Aged Out    Meningococcal (ACWY) vaccine  Aged Out       Subjective:     Review of Systems   Musculoskeletal: Positive for Instructed to continue current medications, diet and exercise. Patient agreed with treatment plan. Follow up as directed. Patient Instructions     Patient Education        Hip Pain: Care Instructions  Your Care Instructions     Hip pain may be caused by many things, including overuse, a fall, or a twisting movement. Another cause of hip pain is arthritis. Your pain may increase when you stand up, walk, or squat. The pain may come and go or may be constant. Home treatment can help relieve hip pain, swelling, and stiffness. If your pain is ongoing, you may need more tests and treatment. Follow-up care is a key part of your treatment and safety. Be sure to make and go to all appointments, and call your doctor if you are having problems. It's also a good idea to know your test results and keep a list of the medicines you take. How can you care for yourself at home? · Take pain medicines exactly as directed. ? If the doctor gave you a prescription medicine for pain, take it as prescribed. ? If you are not taking a prescription pain medicine, ask your doctor if you can take an over-the-counter medicine. · Rest and protect your hip. Take a break from any activity, including standing or walking, that may cause pain. · Put ice or a cold pack against your hip for 10 to 20 minutes at a time. Try to do this every 1 to 2 hours for the next 3 days (when you are awake) or until the swelling goes down. Put a thin cloth between the ice and your skin. · Sleep on your healthy side with a pillow between your knees, or sleep on your back with pillows under your knees. · If there is no swelling, you can put moist heat, a heating pad, or a warm cloth on your hip. Do gentle stretching exercises to help keep your hip flexible. · Learn how to prevent falls. Have your vision and hearing checked regularly. Wear slippers or shoes with a nonskid sole. · Stay at a healthy weight. · Wear comfortable shoes.   When should you call for help? [id number] anytime you think you may need emergency care. For example, call if:  · You have sudden chest pain and shortness of breath, or you cough up blood. · You are not able to stand or walk or bear weight. · Your buttocks, legs, or feet feel numb or tingly. · Your leg or foot is cool or pale or changes color. · You have severe pain. Call your doctor now or seek immediate medical care if:  · You have signs of infection, such as:  ? Increased pain, swelling, warmth, or redness in the hip area. ? Red streaks leading from the hip area. ? Pus draining from the hip area. ? A fever. · You have signs of a blood clot, such as:  ? Pain in your calf, back of the knee, thigh, or groin. ? Redness and swelling in your leg or groin. · You are not able to bend, straighten, or move your leg normally. · You have trouble urinating or having bowel movements. Watch closely for changes in your health, and be sure to contact your doctor if:  · You do not get better as expected. Where can you learn more? Go to https://[employer]. org and sign in to your PayOrPass account. Enter Q795 in the [facility] box to learn more about \"Hip Pain: Care Instructions. \"     If you do not have an account, please click on the \"Sign Up Now\" link. Current as of: June 26, 2019               Content Version: 12.5  © 6230-1273 Healthwise, Incorporated. Care instructions adapted under license by [facility] ([facility]). If you have questions about a medical condition or this instruction, always ask your healthcare professional. [place] [address] any warranty or liability for your use of this information.                Electronically signed by PREMA Rahman CNP on 9/22/2020 at 3:21 PM

## 2020-09-22 NOTE — PATIENT INSTRUCTIONS
Patient Education        Hip Pain: Care Instructions  Your Care Instructions     Hip pain may be caused by many things, including overuse, a fall, or a twisting movement. Another cause of hip pain is arthritis. Your pain may increase when you stand up, walk, or squat. The pain may come and go or may be constant. Home treatment can help relieve hip pain, swelling, and stiffness. If your pain is ongoing, you may need more tests and treatment. Follow-up care is a key part of your treatment and safety. Be sure to make and go to all appointments, and call your doctor if you are having problems. It's also a good idea to know your test results and keep a list of the medicines you take. How can you care for yourself at home? · Take pain medicines exactly as directed. ? If the doctor gave you a prescription medicine for pain, take it as prescribed. ? If you are not taking a prescription pain medicine, ask your doctor if you can take an over-the-counter medicine. · Rest and protect your hip. Take a break from any activity, including standing or walking, that may cause pain. · Put ice or a cold pack against your hip for 10 to 20 minutes at a time. Try to do this every 1 to 2 hours for the next 3 days (when you are awake) or until the swelling goes down. Put a thin cloth between the ice and your skin. · Sleep on your healthy side with a pillow between your knees, or sleep on your back with pillows under your knees. · If there is no swelling, you can put moist heat, a heating pad, or a warm cloth on your hip. Do gentle stretching exercises to help keep your hip flexible. · Learn how to prevent falls. Have your vision and hearing checked regularly. Wear slippers or shoes with a nonskid sole. · Stay at a healthy weight. · Wear comfortable shoes. When should you call for help? [id number] anytime you think you may need emergency care.  For example, call if:  · You have sudden chest pain and shortness of breath, or you cough up blood. · You are not able to stand or walk or bear weight. · Your buttocks, legs, or feet feel numb or tingly. · Your leg or foot is cool or pale or changes color. · You have severe pain. Call your doctor now or seek immediate medical care if:  · You have signs of infection, such as:  ? Increased pain, swelling, warmth, or redness in the hip area. ? Red streaks leading from the hip area. ? Pus draining from the hip area. ? A fever. · You have signs of a blood clot, such as:  ? Pain in your calf, back of the knee, thigh, or groin. ? Redness and swelling in your leg or groin. · You are not able to bend, straighten, or move your leg normally. · You have trouble urinating or having bowel movements. Watch closely for changes in your health, and be sure to contact your doctor if:  · You do not get better as expected. Where can you learn more? Go to https://[employer]. org and sign in to your ExpertBeacon account. Enter S628 in the [employer] box to learn more about \"Hip Pain: Care Instructions. \"     If you do not have an account, please click on the \"Sign Up Now\" link. Current as of: June 26, 2019               Content Version: 12.5  © 5827-2957 Healthwise, Incorporated. Care instructions adapted under license by [facility] ([facility]). If you have questions about a medical condition or this instruction, always ask your healthcare professional. [place] [address] any warranty or liability for your use of this information.

## 2020-10-15 ENCOUNTER — OFFICE VISIT (OUTPATIENT)
Dept: INTERNAL MEDICINE | Facility: CLINIC | Age: 85
End: 2020-10-15

## 2020-10-15 VITALS
SYSTOLIC BLOOD PRESSURE: 134 MMHG | HEIGHT: 66 IN | HEART RATE: 78 BPM | DIASTOLIC BLOOD PRESSURE: 88 MMHG | TEMPERATURE: 97.3 F | OXYGEN SATURATION: 98 % | WEIGHT: 144.8 LBS | BODY MASS INDEX: 23.27 KG/M2

## 2020-10-15 DIAGNOSIS — Z23 NEED FOR INFLUENZA VACCINATION: ICD-10-CM

## 2020-10-15 DIAGNOSIS — I10 BENIGN HYPERTENSION: ICD-10-CM

## 2020-10-15 DIAGNOSIS — S32.9XXD CLOSED NONDISPLACED FRACTURE OF PELVIS WITH ROUTINE HEALING, UNSPECIFIED PART OF PELVIS, SUBSEQUENT ENCOUNTER: ICD-10-CM

## 2020-10-15 DIAGNOSIS — E78.5 HYPERLIPIDEMIA, UNSPECIFIED HYPERLIPIDEMIA TYPE: ICD-10-CM

## 2020-10-15 DIAGNOSIS — I10 BENIGN ESSENTIAL HTN: ICD-10-CM

## 2020-10-15 DIAGNOSIS — E11.49 DIABETES MELLITUS TYPE 2 WITH NEUROLOGICAL MANIFESTATIONS (HCC): Primary | ICD-10-CM

## 2020-10-15 DIAGNOSIS — E78.5 HYPERLIPIDEMIA LDL GOAL <100: ICD-10-CM

## 2020-10-15 DIAGNOSIS — Z23 NEED FOR 23-POLYVALENT PNEUMOCOCCAL POLYSACCHARIDE VACCINE: ICD-10-CM

## 2020-10-15 PROBLEM — D32.9 BENIGN MENINGIOMA (HCC): Status: ACTIVE | Noted: 2020-10-15

## 2020-10-15 PROBLEM — E04.2 NON-TOXIC MULTINODULAR GOITER: Status: ACTIVE | Noted: 2020-10-15

## 2020-10-15 PROBLEM — M20.42 ACQUIRED HAMMER TOES OF BOTH FEET: Status: ACTIVE | Noted: 2020-10-15

## 2020-10-15 PROBLEM — L84 CALLUS: Status: ACTIVE | Noted: 2020-10-15

## 2020-10-15 PROBLEM — J84.10 PULMONARY FIBROSIS (HCC): Status: ACTIVE | Noted: 2020-10-15

## 2020-10-15 PROBLEM — M20.41 ACQUIRED HAMMER TOES OF BOTH FEET: Status: ACTIVE | Noted: 2020-10-15

## 2020-10-15 LAB — HBA1C MFR BLD: 6.6 %

## 2020-10-15 PROCEDURE — G0009 ADMIN PNEUMOCOCCAL VACCINE: HCPCS | Performed by: INTERNAL MEDICINE

## 2020-10-15 PROCEDURE — G0008 ADMIN INFLUENZA VIRUS VAC: HCPCS | Performed by: INTERNAL MEDICINE

## 2020-10-15 PROCEDURE — 83036 HEMOGLOBIN GLYCOSYLATED A1C: CPT | Performed by: INTERNAL MEDICINE

## 2020-10-15 PROCEDURE — G0438 PPPS, INITIAL VISIT: HCPCS | Performed by: INTERNAL MEDICINE

## 2020-10-15 PROCEDURE — 90732 PPSV23 VACC 2 YRS+ SUBQ/IM: CPT | Performed by: INTERNAL MEDICINE

## 2020-10-15 PROCEDURE — 90694 VACC AIIV4 NO PRSRV 0.5ML IM: CPT | Performed by: INTERNAL MEDICINE

## 2020-10-15 NOTE — PROGRESS NOTES
The ABCs of the Annual Wellness Visit  Initial Medicare Wellness Visit    Chief Complaint   Patient presents with   • Medicare Wellness-Initial Visit   • Diabetes     A1C 6.6       Subjective   History of Present Illness:  Honey Canales is a 87 y.o. female who presents for an Initial Medicare Wellness Visit.    HEALTH RISK ASSESSMENT    Recent Hospitalizations:  No hospitalization(s) within the last year.    Current Medical Providers:  Patient Care Team:  Devon Zuñiga MD as PCP - General  Archie Avery MD as Consulting Physician (Urology)  Geovanny, Tim Martinez MD as Consulting Physician (Otolaryngology)  Angelito Atkins PA as Physician Assistant (Otolaryngology)    Smoking Status:  Social History     Tobacco Use   Smoking Status Former Smoker   • Types: Cigarettes   • Quit date:    • Years since quittin.8   Smokeless Tobacco Never Used   Tobacco Comment    SMOKED OVER 40 YEARS       Alcohol Consumption:  Social History     Substance and Sexual Activity   Alcohol Use No       Depression Screen:   PHQ-2/PHQ-9 Depression Screening 10/15/2020   Little interest or pleasure in doing things 0   Feeling down, depressed, or hopeless 0   Total Score 0       Fall Risk Screen:  STEDAVID Fall Risk Assessment was completed, and patient is at HIGH risk for falls. Assessment completed on:10/15/2020    Health Habits and Functional and Cognitive Screening:  No flowsheet data found.      Does the patient have evidence of cognitive impairment? No    Asprin use counseling:Does not need ASA (and currently is not on it)    Age-appropriate Screening Schedule:  Refer to the list below for future screening recommendations based on patient's age, sex and/or medical conditions. Orders for these recommended tests are listed in the plan section. The patient has been provided with a written plan.    Health Maintenance   Topic Date Due   • URINE MICROALBUMIN  1933   • TDAP/TD VACCINES (1 - Tdap) 1952   • ZOSTER  VACCINE (1 of 2) 03/27/1983   • LIPID PANEL  05/23/2017   • DIABETIC EYE EXAM  05/23/2017   • HEMOGLOBIN A1C  03/23/2020   • INFLUENZA VACCINE  Completed          The following portions of the patient's history were reviewed and updated as appropriate: allergies, current medications, past family history, past medical history, past social history, past surgical history and problem list.    Outpatient Medications Prior to Visit   Medication Sig Dispense Refill   • acetaminophen (TYLENOL) 500 MG tablet Take 500 mg by mouth Every 6 (Six) Hours As Needed for Mild Pain .     • atorvastatin (LIPITOR) 80 MG tablet TAKE 1 TABLET DAILY 90 tablet 3   • B-D ULTRAFINE III SHORT PEN 31G X 8 MM misc      • glipizide (GLUCOTROL XL) 10 MG 24 hr tablet TAKE 1 TABLET TWICE A  tablet 3   • insulin detemir (LEVEMIR) 100 UNIT/ML injection Inject  under the skin into the appropriate area as directed. 35 units in the morning and 25 units at night     • levocetirizine (XYZAL) 5 MG tablet Take 5 mg by mouth Every Evening.     • levothyroxine (SYNTHROID, LEVOTHROID) 75 MCG tablet TAKE 1 TABLET DAILY 90 tablet 3   • Mirabegron ER (MYRBETRIQ) 50 MG tablet sustained-release 24 hour 24 hr tablet Take 50 mg by mouth Daily. 90 tablet 3   • PARoxetine (PAXIL) 10 MG tablet TAKE 1 TABLET DAILY 90 tablet 3     No facility-administered medications prior to visit.        Patient Active Problem List   Diagnosis   • Disorder of kidney and ureter   • Nocturia   • Urge incontinence   • Incontinence in female   • Urinary incontinence   • Frequency of urination   • GERD (gastroesophageal reflux disease)   • Globus sensation   • Eustachian tube dysfunction   • Allergic rhinitis   • Acute cystitis without hematuria   • Onychomycosis   • Diabetes mellitus type 2 with neurological manifestations (CMS/HCC)   • Hallux valgus, right   • Hallux valgus, left   • Foot pain, bilateral       Advanced Care Planning:  ACP discussion was held with the patient during  "this visit. Patient has an advance directive in EMR which is still valid.     Review of Systems   Constitutional: Negative for activity change, appetite change and chills.   HENT: Negative for congestion, ear pain and facial swelling.    Eyes: Negative for pain, discharge and itching.   Respiratory: Negative for apnea, cough and shortness of breath.    Cardiovascular: Negative for chest pain, palpitations and leg swelling.   Gastrointestinal: Negative for abdominal distention, abdominal pain and anal bleeding.   Endocrine: Negative for cold intolerance and heat intolerance.   Genitourinary: Negative for difficulty urinating, dysuria and flank pain.   Musculoskeletal: Negative for arthralgias, back pain and joint swelling.        Right pelvic pain   Skin: Negative for color change, pallor and rash.   Allergic/Immunologic: Negative for environmental allergies and food allergies.   Neurological: Negative for dizziness and facial asymmetry.   Hematological: Negative for adenopathy. Does not bruise/bleed easily.   Psychiatric/Behavioral: Negative for agitation, behavioral problems and confusion.       Compared to one year ago, the patient feels her physical health is the same.  Compared to one year ago, the patient feels her mental health is the same.    Reviewed chart for potential of high risk medication in the elderly: yes  Reviewed chart for potential of harmful drug interactions in the elderly:yes    Objective         Vitals:    10/15/20 1324   BP: 134/88   BP Location: Left arm   Patient Position: Sitting   Cuff Size: Adult   Pulse: 78   Temp: 97.3 °F (36.3 °C)   TempSrc: Temporal   SpO2: 98%   Weight: 65.7 kg (144 lb 12.8 oz)   Height: 167.6 cm (66\")   PainSc: 0-No pain       Body mass index is 23.37 kg/m².  Discussed the patient's BMI with her. The BMI is in the acceptable range.    Physical Exam  Constitutional:       Appearance: Normal appearance. She is well-developed.   HENT:      Head: Normocephalic and " atraumatic.      Right Ear: External ear normal.      Left Ear: External ear normal.   Eyes:      Extraocular Movements: Extraocular movements intact.      Conjunctiva/sclera: Conjunctivae normal.      Pupils: Pupils are equal, round, and reactive to light.   Neck:      Musculoskeletal: Normal range of motion and neck supple. No neck rigidity.      Vascular: No carotid bruit.   Cardiovascular:      Rate and Rhythm: Normal rate and regular rhythm.      Pulses: Normal pulses.      Heart sounds: Normal heart sounds. No murmur. No gallop.    Pulmonary:      Effort: Pulmonary effort is normal.      Breath sounds: Normal breath sounds.   Abdominal:      General: Bowel sounds are normal.      Palpations: Abdomen is soft.   Musculoskeletal: Normal range of motion.         General: No swelling or tenderness.   Skin:     General: Skin is warm and dry.   Neurological:      General: No focal deficit present.      Mental Status: She is alert and oriented to person, place, and time.   Psychiatric:         Mood and Affect: Mood normal.         Behavior: Behavior normal.               Assessment/Plan   Medicare Risks and Personalized Health Plan  CMS Preventative Services Quick Reference  Advance Directive Discussion  Cardiovascular risk  Diabetic Lab Screening   Immunizations Discussed/Encouraged (specific immunizations; adacel Tdap, Influenza, Pneumococcal 23 and Shingrix )    The above risks/problems have been discussed with the patient.  Pertinent information has been shared with the patient in the After Visit Summary.  Follow up plans and orders are seen below in the Assessment/Plan Section.    Diagnoses and all orders for this visit:    1. Diabetes mellitus type 2 with neurological manifestations (CMS/[facility]) (Primary)  -     POC Glycosylated Hemoglobin (Hb A1C)    2. Benign hypertension  -     CBC & Differential  -     Comprehensive Metabolic Panel  -     Urinalysis With Microscopic - Urine, Clean Catch  -     TSH  -     Lipid  Panel  -     Uric Acid  -     MicroAlbumin, Urine, Random - Urine, Clean Catch    3. Hyperlipidemia, unspecified hyperlipidemia type  -     CBC & Differential  -     Comprehensive Metabolic Panel  -     Urinalysis With Microscopic - Urine, Clean Catch  -     TSH  -     Lipid Panel  -     Uric Acid  -     MicroAlbumin, Urine, Random - Urine, Clean Catch      Follow Up:  No follow-ups on file.  I suspect that patient likely had a right pelvic fracture when she fell she had x-rays that she was told were okay however the pain when walking and the positioning of the pain distributed over the right lower pelvis right lateral hip and right greater trochanter area it makes me very suspicious of a pelvic fracture we talked about getting another x-ray however since I would do nothing other than continue to ask her to walk and when she hurts to sit down and rest she is just going to watch this while it may take another 4 to 6 weeks to totally resolve.    An After Visit Summary and PPPS were given to the patient.

## 2020-10-16 LAB
ALBUMIN SERPL-MCNC: 4 G/DL (ref 3.6–4.6)
ALBUMIN/GLOB SERPL: 1.4 {RATIO} (ref 1.2–2.2)
ALP SERPL-CCNC: 91 IU/L (ref 39–117)
ALT SERPL-CCNC: 16 IU/L (ref 0–32)
APPEARANCE UR: CLEAR
AST SERPL-CCNC: 21 IU/L (ref 0–40)
BACTERIA #/AREA URNS HPF: ABNORMAL /[HPF]
BASOPHILS # BLD AUTO: 0 X10E3/UL (ref 0–0.2)
BASOPHILS NFR BLD AUTO: 0 %
BILIRUB SERPL-MCNC: 0.7 MG/DL (ref 0–1.2)
BILIRUB UR QL STRIP: NEGATIVE
BUN SERPL-MCNC: 23 MG/DL (ref 8–27)
BUN/CREAT SERPL: 13 (ref 12–28)
CALCIUM SERPL-MCNC: 9.6 MG/DL (ref 8.7–10.3)
CASTS URNS MICRO: ABNORMAL
CASTS URNS QL MICRO: PRESENT /LPF
CHLORIDE SERPL-SCNC: 102 MMOL/L (ref 96–106)
CHOLEST SERPL-MCNC: 118 MG/DL (ref 100–199)
CO2 SERPL-SCNC: 21 MMOL/L (ref 20–29)
COLOR UR: YELLOW
CREAT SERPL-MCNC: 1.76 MG/DL (ref 0.57–1)
CRYSTALS URNS MICRO: ABNORMAL
EOSINOPHIL # BLD AUTO: 0 X10E3/UL (ref 0–0.4)
EOSINOPHIL NFR BLD AUTO: 1 %
EPI CELLS #/AREA URNS HPF: ABNORMAL /HPF (ref 0–10)
ERYTHROCYTE [DISTWIDTH] IN BLOOD BY AUTOMATED COUNT: 12 % (ref 11.7–15.4)
GLOBULIN SER CALC-MCNC: 2.8 G/DL (ref 1.5–4.5)
GLUCOSE SERPL-MCNC: 190 MG/DL (ref 65–99)
GLUCOSE UR QL: ABNORMAL
HCT VFR BLD AUTO: 37.2 % (ref 34–46.6)
HDLC SERPL-MCNC: 35 MG/DL
HGB BLD-MCNC: 12.5 G/DL (ref 11.1–15.9)
HGB UR QL STRIP: NEGATIVE
IMM GRANULOCYTES # BLD AUTO: 0 X10E3/UL (ref 0–0.1)
IMM GRANULOCYTES NFR BLD AUTO: 0 %
KETONES UR QL STRIP: ABNORMAL
LDLC SERPL CALC-MCNC: 55 MG/DL (ref 0–99)
LEUKOCYTE ESTERASE UR QL STRIP: NEGATIVE
LYMPHOCYTES # BLD AUTO: 2 X10E3/UL (ref 0.7–3.1)
LYMPHOCYTES NFR BLD AUTO: 27 %
MCH RBC QN AUTO: 31.7 PG (ref 26.6–33)
MCHC RBC AUTO-ENTMCNC: 33.6 G/DL (ref 31.5–35.7)
MCV RBC AUTO: 94 FL (ref 79–97)
MICRO URNS: ABNORMAL
MICROALBUMIN UR-MCNC: 31.1 UG/ML
MONOCYTES # BLD AUTO: 0.7 X10E3/UL (ref 0.1–0.9)
MONOCYTES NFR BLD AUTO: 10 %
MUCOUS THREADS URNS QL MICRO: PRESENT
NEUTROPHILS # BLD AUTO: 4.6 X10E3/UL (ref 1.4–7)
NEUTROPHILS NFR BLD AUTO: 62 %
NITRITE UR QL STRIP: NEGATIVE
PH UR STRIP: 5 [PH] (ref 5–7.5)
PLATELET # BLD AUTO: 234 X10E3/UL (ref 150–450)
POTASSIUM SERPL-SCNC: 4.6 MMOL/L (ref 3.5–5.2)
PROT SERPL-MCNC: 6.8 G/DL (ref 6–8.5)
PROT UR QL STRIP: ABNORMAL
RBC # BLD AUTO: 3.94 X10E6/UL (ref 3.77–5.28)
RBC #/AREA URNS HPF: ABNORMAL /HPF (ref 0–2)
SODIUM SERPL-SCNC: 139 MMOL/L (ref 134–144)
SP GR UR: 1.02 (ref 1–1.03)
TRIGL SERPL-MCNC: 166 MG/DL (ref 0–149)
TSH SERPL DL<=0.005 MIU/L-ACNC: 1.2 UIU/ML (ref 0.45–4.5)
UNIDENT CRYS URNS QL MICRO: PRESENT
URATE SERPL-MCNC: 6.7 MG/DL (ref 2.5–7.1)
UROBILINOGEN UR STRIP-MCNC: 0.2 MG/DL (ref 0.2–1)
VLDLC SERPL CALC-MCNC: 28 MG/DL (ref 5–40)
WBC # BLD AUTO: 7.4 X10E3/UL (ref 3.4–10.8)
WBC #/AREA URNS HPF: ABNORMAL /HPF (ref 0–5)

## 2020-11-12 NOTE — TELEPHONE ENCOUNTER
Caller: Honey Canales    Relationship: Self    Best call back number: [phone] (H)    Medication needed:   Requested Prescriptions     Pending Prescriptions Disp Refills   • insulin detemir (LEVEMIR) 100 UNIT/ML injection        Sig: Inject  under the skin into the appropriate area as directed. 35 units in the morning and 25 units at night       When do you need the refill by:11/12/2020    What details did the patient provide when requesting the medication:     Does the patient have less than a 3 day supply:  [] Yes  [x] No    What is the patient's preferred pharmacy: [facility] - [address] [phone] [facility] [phone]

## 2020-12-10 RX ORDER — LEVOCETIRIZINE DIHYDROCHLORIDE 5 MG/1
TABLET, FILM COATED ORAL
Qty: 90 TABLET | Refills: 3 | Status: ON HOLD | OUTPATIENT
Start: 2020-12-10 | End: 2021-08-31

## 2020-12-10 RX ORDER — PEN NEEDLE, DIABETIC 31 GX5/16"
NEEDLE, DISPOSABLE MISCELLANEOUS
Qty: 180 EACH | Refills: 3 | Status: ON HOLD | OUTPATIENT
Start: 2020-12-10 | End: 2021-08-31

## 2020-12-10 NOTE — PROGRESS NOTES
[facility] - PODIATRY    Today's Date: 12/17/20    Patient Name: Honey Canales  MRN: [id number]  CSN: [id number]  PCP: Devon Zuñiga MD  Referring Provider: No ref. provider found    SUBJECTIVE     Chief Complaint   Patient presents with   • diabetic foot care     3 month diabetic foot care; pt states she is not having any problems today;  pain score is 0/10 today; pcp last seen on 10.15.2020;    • Diabetes     66mg/dl     HPI: Honey Canales, a 87 y.o.female, comes to clinic as a(n) established patient presenting for diabetic foot exam and complaining of painful toenails. Patient has h/o aneurysm, arthritis, DM2, GERD, Headache, HLD, HTN, Hypothyroid, Spinal Stenosis, urinary incontinence. Patient is IDDM with last stated BG level of 66mg/dl. States that her toenails are long, thick and discolored. She is unable to care for them herself. Admits to occasional numbness in feet. Denies open wounds or sores. Most pain when wearing closed toed shoes. Uses foam as padding or bunion shields for bunion deformities without complications. Callus to plantar right foot has returned. Denies redness or drainage. Denies pain today. Relates previous treatment(s) including foot care by podiatrist. Denies any constitutional symptoms. No other pedal complaints at this time.    Past Medical History:   Diagnosis Date   • Allergic rhinitis    • Aneurysm (CMS/HCC)    • Arthritis    • Cerebral aneurysm 2009    2ND ONE FOUND   • Cerebral aneurysm     NON TREATABLE   • Chronic laryngitis    • CKD (chronic kidney disease)    • Colon polyps    • COPD (chronic obstructive pulmonary disease) (CMS/HCC)    • Deviated nasal septum    • Diabetes mellitus (CMS/HCC)    • Disorder of kidney and ureter    • Disturbance of smell and taste    • Dizziness    • Encounter for imaging to screen for metal prior to MRI     NO MRI, METAL CLIPS IN HEAD   • Eustachian tube dysfunction    • GERD (gastroesophageal reflux disease)    • Globus  sensation    • Headache    • Hepatitis     B   • Hepatitis A    • History of stomach ulcers    • Hyperlipidemia    • Hypertension    • Hypothyroid    • Laryngitis sicca    • Lung nodule    • Nocturia    • PONV (postoperative nausea and vomiting)    • Sensorineural hearing loss    • Spinal stenosis    • Urge incontinence    • Urgency of urination    • Urinary frequency    • Urinary incontinence    • UTI (urinary tract infection)      Past Surgical History:   Procedure Laterality Date   • BLADDER SURGERY  2016    Medtronic Interstem   • BRAIN SURGERY      ANUERYSM REMOVED   • BREAST SURGERY Bilateral     BIOPSY   • CARPAL TUNNEL RELEASE     • CATARACT EXTRACTION, BILATERAL     • CEREBRAL ANEURYSM REPAIR     •  SECTION      X4   • CHOLECYSTECTOMY     • HYSTERECTOMY     • INTERSTIM PLACEMENT N/A 10/18/2018    Procedure: INTERSTIM STAGES 1 AND 2 LEAD AND GENERATOR REMOVAL AND REPLACEMENT;  Surgeon: Archie Avery MD;  Location: [facility];  Service: Urology   • JOINT REPLACEMENT Right     KNEE   • KNEE ARTHROSCOPY     • THYROIDECTOMY     • TONSILLECTOMY       Family History   Problem Relation Age of Onset   • Cancer Brother         BLADDER   • No Known Problems Father    • No Known Problems Mother      Social History     Socioeconomic History   • Marital status:      Spouse name: Not on file   • Number of children: Not on file   • Years of education: Not on file   • Highest education level: Not on file   Tobacco Use   • Smoking status: Former Smoker     Types: Cigarettes     Quit date:      Years since quittin.9   • Smokeless tobacco: Never Used   • Tobacco comment: SMOKED OVER 40 YEARS   Substance and Sexual Activity   • Alcohol use: No   • Drug use: No   • Sexual activity: Defer     Allergies   Allergen Reactions   • Codeine Phosphate [Codeine] Unknown (See Comments)     CHEST PAIN   • Collagen Other (See Comments)     CAT GUT SUTURES \ WOUND WOULD NOT HEAL AND GOT INFECTION   • Accupril  [Quinapril Hcl] Other (See Comments)     COUGH   • Aspirin Other (See Comments)     HISTORY OF STOMACH ULCERS   • Adhesive Tape Rash     SKIN BLISTERS   • Celebrex [Celecoxib] Nausea Only   • Lyrica [Pregabalin] Other (See Comments)     GAINED 50 POUNDS   • Pantoprazole Sodium Diarrhea   • Pentoxifylline Diarrhea   • Sulfa Antibiotics Nausea And Vomiting   • Tramadol Nausea And Vomiting   • Vioxx [Rofecoxib] Nausea And Vomiting     Current Outpatient Medications   Medication Sig Dispense Refill   • acetaminophen (TYLENOL) 500 MG tablet Take 500 mg by mouth Every 6 (Six) Hours As Needed for Mild Pain .     • atorvastatin (LIPITOR) 80 MG tablet TAKE 1 TABLET DAILY 90 tablet 3   • B-D ULTRAFINE III SHORT PEN 31G X 8 MM misc USE 1 PEN NEEDLE TWO TIMES DAILY 180 each 3   • glipizide (GLUCOTROL XL) 10 MG 24 hr tablet TAKE 1 TABLET TWICE A  tablet 3   • insulin detemir (LEVEMIR) 100 UNIT/ML injection 35 units in the morning and 25 units at night 18 mL 3   • levocetirizine (XYZAL) 5 MG tablet TAKE 1 TABLET DAILY 90 tablet 3   • levothyroxine (SYNTHROID, LEVOTHROID) 75 MCG tablet TAKE 1 TABLET DAILY 90 tablet 3   • Mirabegron ER (MYRBETRIQ) 50 MG tablet sustained-release 24 hour 24 hr tablet Take 50 mg by mouth Daily. 90 tablet 3   • PARoxetine (PAXIL) 10 MG tablet TAKE 1 TABLET DAILY 90 tablet 3     No current facility-administered medications for this visit.      Review of Systems   Constitutional: Negative for chills and fever.   HENT: Negative for congestion.    Respiratory: Negative for shortness of breath.    Cardiovascular: Positive for leg swelling. Negative for chest pain.   Gastrointestinal: Negative for constipation, diarrhea, nausea and vomiting.   Musculoskeletal:        Foot pain   Skin: Negative for wound.   Neurological: Positive for numbness.       OBJECTIVE     Vitals:    12/17/20 1009   BP: 136/84   Pulse: 68   SpO2: 97%       PHYSICAL EXAM  GEN:   Accompanied by none.     Foot/Ankle Exam:        General:   Diabetic Foot Exam Performed    Appearance: appears stated age and healthy    Orientation: AAOx3    Affect: appropriate    Gait: unimpaired    Assistance: independent    Shoe Gear:  Diabetic shoes    VASCULAR      Right Foot Vascularity   Dorsalis pedis:  2+  Posterior tibial:  2+  Skin Temperature: warm    Edema Grading:  Trace  CFT:  3  Pedal Hair Growth:  Absent  Varicosities: mild varicosities       Left Foot Vascularity   Dorsalis pedis:  2+  Posterior tibial:  2+  Skin Temperature: warm    Edema Grading:  Trace  CFT:  3  Pedal Hair Growth:  Absent  Varicosities: mild varicosities        NEUROLOGIC     Right Foot Neurologic   Light touch sensation:  Diminished  Vibratory sensation:  Diminished  Hot/Cold sensation: diminished    Protective Sensation using Hazlet-Tima Monofilament:  7     Left Foot Neurologic   Light touch sensation:  Diminished  Vibratory sensation:  Diminished  Hot/cold sensation: diminished    Protective Sensation using Hazlet-Tima Monofilament:  7     MUSCULOSKELETAL      Right Foot Musculoskeletal   Ecchymosis:  None  Tenderness: right foot callus and toenails    Arch:  Normal  Hallux valgus: Yes    Hallux limitus: No       Left Foot Musculoskeletal   Ecchymosis:  None  Tenderness: toenails    Arch:  Normal  Hallux valgus: Yes    Hallux limitus: No       MUSCLE STRENGTH     Right Foot Muscle Strength   Foot dorsiflexion:  5  Foot plantar flexion:  5  Foot inversion:  5  Foot eversion:  5     Left Foot Muscle Strength   Foot dorsiflexion:  5  Foot plantar flexion:  5  Foot inversion:  5  Foot eversion:  5     RANGE OF MOTION      Right Foot Range of Motion   Foot and ankle ROM within normal limits       Left Foot Range of Motion   Foot and ankle ROM within normal limits       DERMATOLOGIC     Right Foot Dermatologic   Skin: corn (sub 5th met head) and atrophic    Nails: onychomycosis, abnormally thick, subungual debris and dystrophic nails       Left Foot Dermatologic    Skin: skin intact and atrophic    Nails: onychomycosis, abnormally thick, subungual debris and dystrophic nails       Image:       RADIOLOGY/NUCLEAR:  No results found.    LABORATORY/CULTURE RESULTS:      PATHOLOGY RESULTS:       ASSESSMENT/PLAN     Diagnoses and all orders for this visit:    1. Onychomycosis (Primary)    2. Type 2 diabetes mellitus with diabetic neuropathy, without long-term current use of insulin (CMS/[facility])    3. Foot pain, bilateral    4. Hallux valgus, left    5. Hallux valgus, right    6. Foot callus      Comprehensive lower extremity examination and evaluation was performed.  Discussed findings and treatment plan including risks, benefits, and treatment options with patient in detail. Patient agreed with treatment plan.  After verbal consent obtained, nail(s) x10 debrided of length and thickness with nail nipper without incidence  After verbal consent obtained, calluses x1 pared utilizing dermal curette and/or scalpel without incidence  Patient may maintain nails and calluses at home utilizing emery board or pumice stone between visits as needed  Reviewed at home diabetic foot care including daily foot checks   HAV mostly asymptomatic at this time. Continue with padding.   An After Visit Summary was printed and given to the patient at discharge, including (if requested) any available informative/educational handouts regarding diagnosis, treatment, or medications. All questions were answered to patient/family satisfaction. Should symptoms fail to improve or worsen they agree to call or return to clinic or to go to the Emergency Department. Discussed the importance of following up with any needed screening tests/labs/specialist appointments and any requested follow-up recommended by me today. Importance of maintaining follow-up discussed and patient accepts that missed appointments can delay diagnosis and potentially lead to worsening of conditions.  Return in about 3 months (around  3/17/2021)., or sooner if acute issues arise.        This document has been electronically signed by Morgan Pepper DPM on December 17, 2020 10:32 CST

## 2020-12-16 ENCOUNTER — TELEPHONE (OUTPATIENT)
Dept: PODIATRY | Facility: CLINIC | Age: 85
End: 2020-12-16

## 2020-12-17 ENCOUNTER — OFFICE VISIT (OUTPATIENT)
Dept: PODIATRY | Facility: CLINIC | Age: 85
End: 2020-12-17

## 2020-12-17 VITALS
WEIGHT: 148 LBS | OXYGEN SATURATION: 97 % | DIASTOLIC BLOOD PRESSURE: 84 MMHG | HEIGHT: 66 IN | SYSTOLIC BLOOD PRESSURE: 136 MMHG | BODY MASS INDEX: 23.78 KG/M2 | HEART RATE: 68 BPM

## 2020-12-17 DIAGNOSIS — L84 FOOT CALLUS: ICD-10-CM

## 2020-12-17 DIAGNOSIS — E11.40 TYPE 2 DIABETES MELLITUS WITH DIABETIC NEUROPATHY, WITHOUT LONG-TERM CURRENT USE OF INSULIN (HCC): ICD-10-CM

## 2020-12-17 DIAGNOSIS — M20.12 HALLUX VALGUS, LEFT: ICD-10-CM

## 2020-12-17 DIAGNOSIS — M79.671 FOOT PAIN, BILATERAL: ICD-10-CM

## 2020-12-17 DIAGNOSIS — B35.1 ONYCHOMYCOSIS: Primary | ICD-10-CM

## 2020-12-17 DIAGNOSIS — M20.11 HALLUX VALGUS, RIGHT: ICD-10-CM

## 2020-12-17 DIAGNOSIS — M79.672 FOOT PAIN, BILATERAL: ICD-10-CM

## 2020-12-17 PROCEDURE — 11721 DEBRIDE NAIL 6 OR MORE: CPT | Performed by: PODIATRIST

## 2020-12-17 PROCEDURE — 11055 PARING/CUTG B9 HYPRKER LES 1: CPT | Performed by: PODIATRIST

## 2020-12-30 DIAGNOSIS — E11.49 DIABETES MELLITUS TYPE 2 WITH NEUROLOGICAL MANIFESTATIONS (HCC): Primary | ICD-10-CM

## 2020-12-30 RX ORDER — LANCETS 28 GAUGE
EACH MISCELLANEOUS
Qty: 200 EACH | Refills: 3 | Status: ON HOLD | OUTPATIENT
Start: 2020-12-30 | End: 2021-08-31

## 2020-12-30 RX ORDER — ALPRAZOLAM 1 MG/1
TABLET ORAL
Qty: 200 EACH | Refills: 3 | Status: ON HOLD | OUTPATIENT
Start: 2020-12-30 | End: 2021-08-31

## 2021-01-18 ENCOUNTER — OFFICE VISIT (OUTPATIENT)
Dept: INTERNAL MEDICINE | Facility: CLINIC | Age: 86
End: 2021-01-18

## 2021-01-18 VITALS
WEIGHT: 144 LBS | HEIGHT: 66 IN | DIASTOLIC BLOOD PRESSURE: 80 MMHG | OXYGEN SATURATION: 98 % | BODY MASS INDEX: 23.14 KG/M2 | HEART RATE: 79 BPM | TEMPERATURE: 96.9 F | SYSTOLIC BLOOD PRESSURE: 120 MMHG

## 2021-01-18 DIAGNOSIS — E78.5 HYPERLIPIDEMIA LDL GOAL <100: ICD-10-CM

## 2021-01-18 DIAGNOSIS — E11.49 DIABETES MELLITUS TYPE 2 WITH NEUROLOGICAL MANIFESTATIONS (HCC): Primary | ICD-10-CM

## 2021-01-18 DIAGNOSIS — E11.9 ENCOUNTER FOR DIABETIC FOOT EXAM (HCC): ICD-10-CM

## 2021-01-18 DIAGNOSIS — I10 BENIGN ESSENTIAL HTN: ICD-10-CM

## 2021-01-18 LAB — HBA1C MFR BLD: 6.4 %

## 2021-01-18 PROCEDURE — 83036 HEMOGLOBIN GLYCOSYLATED A1C: CPT | Performed by: INTERNAL MEDICINE

## 2021-01-18 PROCEDURE — 99214 OFFICE O/P EST MOD 30 MIN: CPT | Performed by: INTERNAL MEDICINE

## 2021-01-18 RX ORDER — GLIPIZIDE 10 MG/1
TABLET, FILM COATED, EXTENDED RELEASE ORAL
Qty: 180 TABLET | Refills: 3 | Status: ON HOLD | OUTPATIENT
Start: 2021-01-18 | End: 2021-08-31

## 2021-01-18 NOTE — PROGRESS NOTES
Subjective   Honey Canales is a 87 y.o. female.   Chief Complaint   Patient presents with   • Hypertension     3 MONTH FOLLOW UP    • Diabetes     A1C: 6.4   • Fatigue     ON GOING    • Balance Issues     ON GOING        History of Present Illness patient is a well-controlled diabetic with an A1c of 6.4.  She has some ongoing fatigue and some rather severe hearing loss.  She is also stating that her balance is not quite what it should be.  She has not fallen and there is no signs of weakness on right or left body indicating ischemic events.    The following portions of the patient's history were reviewed and updated as appropriate: allergies, current medications, past family history, past medical history, past social history, past surgical history and problem list.    Review of Systems   Constitutional: Negative for activity change, appetite change, fatigue, fever, unexpected weight gain and unexpected weight loss.   HENT: Positive for hearing loss. Negative for swollen glands, trouble swallowing and voice change.    Eyes: Negative for blurred vision and visual disturbance.   Respiratory: Negative for cough and shortness of breath.    Cardiovascular: Negative for chest pain, palpitations and leg swelling.   Gastrointestinal: Negative for abdominal pain, constipation, diarrhea, nausea, vomiting and indigestion.   Endocrine: Negative for cold intolerance, heat intolerance, polydipsia and polyphagia.   Genitourinary: Negative for dysuria and frequency.   Musculoskeletal: Positive for gait problem. Negative for arthralgias, back pain, joint swelling and neck pain.   Skin: Negative for color change, rash and skin lesions.   Neurological: Negative for dizziness, weakness, headache, memory problem and confusion.   Hematological: Does not bruise/bleed easily.   Psychiatric/Behavioral: Negative for agitation, hallucinations and suicidal ideas. The patient is not nervous/anxious.        Objective   Past Medical History:    Diagnosis Date   • Allergic rhinitis    • Aneurysm (CMS/HCC)    • Arthritis    • Cerebral aneurysm 2009    2ND ONE FOUND   • Cerebral aneurysm     NON TREATABLE   • Chronic laryngitis    • CKD (chronic kidney disease)    • Colon polyps    • COPD (chronic obstructive pulmonary disease) (CMS/HCC)    • Deviated nasal septum    • Diabetes mellitus (CMS/HCC)    • Disorder of kidney and ureter    • Disturbance of smell and taste    • Dizziness    • Encounter for imaging to screen for metal prior to MRI     NO MRI, METAL CLIPS IN HEAD   • Eustachian tube dysfunction    • GERD (gastroesophageal reflux disease)    • Globus sensation    • Headache    • Hepatitis     B   • Hepatitis A    • History of stomach ulcers    • Hyperlipidemia    • Hypothyroid    • Laryngitis sicca    • Lung nodule    • Nocturia    • PONV (postoperative nausea and vomiting)    • Sensorineural hearing loss    • Spinal stenosis    • Urge incontinence    • Urgency of urination    • Urinary frequency    • Urinary incontinence    • UTI (urinary tract infection)       Past Surgical History:   Procedure Laterality Date   • BLADDER SURGERY  2016    Medtronic Interstem   • BRAIN SURGERY      ANUERYSM REMOVED   • BREAST SURGERY Bilateral     BIOPSY   • CARPAL TUNNEL RELEASE     • CATARACT EXTRACTION, BILATERAL     • CEREBRAL ANEURYSM REPAIR     •  SECTION      X4   • CHOLECYSTECTOMY     • HYSTERECTOMY     • INTERSTIM PLACEMENT N/A 10/18/2018    Procedure: INTERSTIM STAGES 1 AND 2 LEAD AND GENERATOR REMOVAL AND REPLACEMENT;  Surgeon: Archie Avery MD;  Location: [facility];  Service: Urology   • JOINT REPLACEMENT Right     KNEE   • KNEE ARTHROSCOPY     • THYROIDECTOMY     • TONSILLECTOMY          Current Outpatient Medications:   •  acetaminophen (TYLENOL) 500 MG tablet, Take 500 mg by mouth Every 6 (Six) Hours As Needed for Mild Pain ., Disp: , Rfl:   •  atorvastatin (LIPITOR) 80 MG tablet, TAKE 1 TABLET DAILY, Disp: 90 tablet, Rfl: 3  •  B-D  ULTRAFINE III SHORT PEN 31G X 8 MM misc, USE 1 PEN NEEDLE TWO TIMES DAILY, Disp: 180 each, Rfl: 3  •  Easy Touch Lancets 28G/Twist misc, TEST TWICE A DAY, Disp: 200 each, Rfl: 3  •  Easy Touch Test test strip, TEST TWICE A DAY, Disp: 200 each, Rfl: 3  •  insulin detemir (LEVEMIR) 100 UNIT/ML injection, 35 units in the morning and 25 units at night, Disp: 18 mL, Rfl: 3  •  levocetirizine (XYZAL) 5 MG tablet, TAKE 1 TABLET DAILY (Patient taking differently: PT WANTS TO STOP TAKING DUE TO COST), Disp: 90 tablet, Rfl: 3  •  levothyroxine (SYNTHROID, LEVOTHROID) 75 MCG tablet, TAKE 1 TABLET DAILY, Disp: 90 tablet, Rfl: 3  •  Mirabegron ER (MYRBETRIQ) 50 MG tablet sustained-release 24 hour 24 hr tablet, Take 50 mg by mouth Daily., Disp: 90 tablet, Rfl: 3  •  PARoxetine (PAXIL) 10 MG tablet, TAKE 1 TABLET DAILY, Disp: 90 tablet, Rfl: 3  •  glipizide (GLUCOTROL XL) 10 MG 24 hr tablet, TAKE 1 TABLET TWICE A DAY, Disp: 180 tablet, Rfl: 3     Vitals:    01/18/21 1439   BP: 120/80   Pulse: 79   Temp: 96.9 °F (36.1 °C)   SpO2: 98%         01/18/21  1439   Weight: 65.3 kg (144 lb)     Patient's Body mass index is 23.24 kg/m². BMI is .      Physical Exam  Constitutional:       Appearance: Normal appearance. She is well-developed.   HENT:      Head: Normocephalic and atraumatic.      Right Ear: External ear normal.      Left Ear: External ear normal.   Eyes:      Extraocular Movements: Extraocular movements intact.      Conjunctiva/sclera: Conjunctivae normal.      Pupils: Pupils are equal, round, and reactive to light.   Neck:      Musculoskeletal: Normal range of motion and neck supple. No neck rigidity.      Vascular: No carotid bruit.   Cardiovascular:      Rate and Rhythm: Normal rate and regular rhythm.      Pulses: Normal pulses.      Heart sounds: Normal heart sounds. No murmur. No gallop.    Pulmonary:      Effort: Pulmonary effort is normal.      Breath sounds: Normal breath sounds.   Musculoskeletal: Normal range of  motion.         General: No swelling or tenderness.   Skin:     General: Skin is warm and dry.   Neurological:      General: No focal deficit present.      Mental Status: She is alert and oriented to person, place, and time.   Psychiatric:         Mood and Affect: Mood normal.         Behavior: Behavior normal.               Assessment/Plan   Diagnoses and all orders for this visit:    1. Diabetes mellitus type 2 with neurological manifestations (CMS/[facility]) (Primary)  -     POC Glycosylated Hemoglobin (Hb A1C)    2. Encounter for diabetic foot exam (CMS/[facility])  -     POC Glycosylated Hemoglobin (Hb A1C)    3. Benign essential HTN    4. Hyperlipidemia LDL goal <100      At this point patient's diabetes is under good control I am satisfied with her A1c.  In regard to her blood pressure her reading today is 120/80 indicating good control with current medication.  In regard to her hyperlipidemia she is not yet due for repeat lipid and she is taking her medication as prescribed.  I will see patient back in months for her annual evaluation.  At that point she will need to have her lipids rechecked.

## 2021-02-01 RX ORDER — PAROXETINE 10 MG/1
TABLET, FILM COATED ORAL
Qty: 90 TABLET | Refills: 3 | Status: ON HOLD | OUTPATIENT
Start: 2021-02-01 | End: 2021-08-31

## 2021-02-04 DIAGNOSIS — N39.41 URGE INCONTINENCE: ICD-10-CM

## 2021-02-04 NOTE — TELEPHONE ENCOUNTER
Requested Prescriptions     Pending Prescriptions Disp Refills   • Myrbetriq 50 MG tablet sustained-release 24 hour 24 hr tablet [Pharmacy Med Name: MYRBETRIQ TABS 50MG] 90 tablet 3     Sig: TAKE 1 TABLET DAILY

## 2021-02-05 RX ORDER — MIRABEGRON 50 MG/1
TABLET, FILM COATED, EXTENDED RELEASE ORAL
Qty: 90 TABLET | Refills: 3 | Status: ON HOLD | OUTPATIENT
Start: 2021-02-05 | End: 2021-08-31

## 2021-02-09 ENCOUNTER — IMMUNIZATION (OUTPATIENT)
Age: 86
End: 2021-02-09
Payer: MEDICARE

## 2021-02-09 PROCEDURE — 0001A COVID-19, PFIZER VACCINE 30MCG/0.3ML DOSE: CPT | Performed by: FAMILY MEDICINE

## 2021-02-09 PROCEDURE — 91300 COVID-19, PFIZER VACCINE 30MCG/0.3ML DOSE: CPT | Performed by: FAMILY MEDICINE

## 2021-02-10 DIAGNOSIS — E11.49 DIABETES MELLITUS TYPE 2 WITH NEUROLOGICAL MANIFESTATIONS (HCC): Primary | ICD-10-CM

## 2021-02-10 NOTE — TELEPHONE ENCOUNTER
Caller: Honey Canales    Relationship: Self    Best call back number: [phone]    Medication needed:   Requested Prescriptions     Pending Prescriptions Disp Refills   • insulin detemir (LEVEMIR) 100 UNIT/ML injection 18 mL 3     Si units in the morning and 25 units at night       When do you need the refill by:   ASAP    What details did the patient provide when requesting the medication:   Patient wanted to request to have written and sent to pharmacy.    Does the patient have less than a 3 day supply:  [] Yes  [] No    What is the patient's preferred pharmacy: [facility] - [address] [phone] [facility] [phone]

## 2021-02-25 ENCOUNTER — TELEPHONE (OUTPATIENT)
Dept: INTERNAL MEDICINE | Facility: CLINIC | Age: 86
End: 2021-02-25

## 2021-02-25 DIAGNOSIS — E11.49 DIABETES MELLITUS TYPE 2 WITH NEUROLOGICAL MANIFESTATIONS (HCC): ICD-10-CM

## 2021-02-25 NOTE — TELEPHONE ENCOUNTER
PATIENT CALLING IN REQUESTING THAT A NEW SCRIPT BE FAXED TO [facility] FOR HER Levemir FlexTouch.    PATIENT CALLBACK # [phone]     [facility] - [address] [phone] [facility] [phone]   [phone]

## 2021-03-02 ENCOUNTER — IMMUNIZATION (OUTPATIENT)
Age: 86
End: 2021-03-02
Payer: MEDICARE

## 2021-03-02 PROCEDURE — 91300 COVID-19, PFIZER VACCINE 30MCG/0.3ML DOSE: CPT | Performed by: FAMILY MEDICINE

## 2021-03-02 PROCEDURE — 0002A COVID-19, PFIZER VACCINE 30MCG/0.3ML DOSE: CPT | Performed by: FAMILY MEDICINE

## 2021-03-17 ENCOUNTER — TELEPHONE (OUTPATIENT)
Dept: PODIATRY | Facility: CLINIC | Age: 86
End: 2021-03-17

## 2021-03-17 NOTE — PROGRESS NOTES
[facility] - PODIATRY    Today's Date: 03/18/21    Patient Name: Honey Canales  MRN: [id number]  CSN: [id number]  PCP: Devon Zuñiga MD  Referring Provider: No ref. provider found    SUBJECTIVE     Chief Complaint   Patient presents with   • Follow-up     3 Month Follow Up For Diabetic Foot and Nail Care. Patient denies any problems today. 0/10 on a pain scale.  PCP Dr Devon Zuñiga 01/08/2021   • Diabetes     Last known blood sugar 71   • Former Smoker     Patient is a Former Smoker - Quit 1993   • Med Management     Verified medicaitons from list patient brought in      HPI: Honey Canales, a 87 y.o.female, comes to clinic as a(n) established patient presenting for diabetic foot exam and complaining of painful toenails. Patient has h/o aneurysm, arthritis, DM2, GERD, Headache, HLD, HTN, Hypothyroid, Spinal Stenosis, urinary incontinence. Patient is IDDM with last stated BG level of 71mg/dl. States that her toenails are long, thick and discolored. She is unable to care for them herself. Admits to occasional numbness in feet. Denies open wounds or sores. Most pain when wearing closed toed shoes. Uses foam as padding or bunion shields for bunion deformities without complications. Callus to plantar right foot has returned. Denies redness or drainage. Denies pain today. Relates previous treatment(s) including foot care by podiatrist. Denies any constitutional symptoms. No other pedal complaints at this time.    Past Medical History:   Diagnosis Date   • Allergic rhinitis    • Aneurysm (CMS/HCC)    • Arthritis    • Cerebral aneurysm 2009 2ND ONE FOUND   • Cerebral aneurysm     NON TREATABLE   • Chronic laryngitis    • CKD (chronic kidney disease)    • Colon polyps    • COPD (chronic obstructive pulmonary disease) (CMS/HCC)    • Deviated nasal septum    • Diabetes mellitus (CMS/HCC)    • Disorder of kidney and ureter    • Disturbance of smell and taste    • Dizziness    • Encounter for imaging to  screen for metal prior to MRI     NO MRI, METAL CLIPS IN HEAD   • Eustachian tube dysfunction    • GERD (gastroesophageal reflux disease)    • Globus sensation    • Headache    • Hepatitis     B   • Hepatitis A    • History of stomach ulcers    • Hyperlipidemia    • Hypothyroid    • Laryngitis sicca    • Lung nodule    • Nocturia    • PONV (postoperative nausea and vomiting)    • Sensorineural hearing loss    • Spinal stenosis    • Urge incontinence    • Urgency of urination    • Urinary frequency    • Urinary incontinence    • UTI (urinary tract infection)      Past Surgical History:   Procedure Laterality Date   • BLADDER SURGERY  2016    Medtronic Interstem   • BRAIN SURGERY      ANUERYSM REMOVED   • BREAST SURGERY Bilateral     BIOPSY   • CARPAL TUNNEL RELEASE     • CATARACT EXTRACTION, BILATERAL     • CEREBRAL ANEURYSM REPAIR     •  SECTION      X4   • CHOLECYSTECTOMY     • HYSTERECTOMY     • INTERSTIM PLACEMENT N/A 10/18/2018    Procedure: INTERSTIM STAGES 1 AND 2 LEAD AND GENERATOR REMOVAL AND REPLACEMENT;  Surgeon: Archie Avery MD;  Location: [facility];  Service: Urology   • JOINT REPLACEMENT Right     KNEE   • KNEE ARTHROSCOPY     • THYROIDECTOMY     • TONSILLECTOMY       Family History   Problem Relation Age of Onset   • Cancer Brother         BLADDER   • No Known Problems Father    • No Known Problems Mother      Social History     Socioeconomic History   • Marital status:      Spouse name: Not on file   • Number of children: Not on file   • Years of education: Not on file   • Highest education level: Not on file   Tobacco Use   • Smoking status: Former Smoker     Types: Cigarettes     Quit date:      Years since quittin.2   • Smokeless tobacco: Never Used   • Tobacco comment: SMOKED OVER 40 YEARS   Substance and Sexual Activity   • Alcohol use: No   • Drug use: No   • Sexual activity: Defer     Allergies   Allergen Reactions   • Codeine Phosphate [Codeine] Unknown (See  Comments)     CHEST PAIN   • Collagen Other (See Comments)     CAT GUT SUTURES \ WOUND WOULD NOT HEAL AND GOT INFECTION   • Accupril [Quinapril Hcl] Other (See Comments)     COUGH   • Aspirin Other (See Comments)     HISTORY OF STOMACH ULCERS   • Adhesive Tape Rash     SKIN BLISTERS   • Celebrex [Celecoxib] Nausea Only   • Lyrica [Pregabalin] Other (See Comments)     GAINED 50 POUNDS   • Pantoprazole Sodium Diarrhea   • Pentoxifylline Diarrhea   • Sulfa Antibiotics Nausea And Vomiting   • Tramadol Nausea And Vomiting   • Vioxx [Rofecoxib] Nausea And Vomiting     Current Outpatient Medications   Medication Sig Dispense Refill   • acetaminophen (TYLENOL) 500 MG tablet Take 500 mg by mouth Every 6 (Six) Hours As Needed for Mild Pain .     • atorvastatin (LIPITOR) 80 MG tablet TAKE 1 TABLET DAILY 90 tablet 3   • B-D ULTRAFINE III SHORT PEN 31G X 8 MM misc USE 1 PEN NEEDLE TWO TIMES DAILY 180 each 3   • Easy Touch Lancets 28G/Twist misc TEST TWICE A  each 3   • Easy Touch Test test strip TEST TWICE A  each 3   • glipizide (GLUCOTROL XL) 10 MG 24 hr tablet TAKE 1 TABLET TWICE A  tablet 3   • insulin detemir (LEVEMIR) 100 UNIT/ML injection 35 units in the morning and 25 units at night 18 mL 3   • levocetirizine (XYZAL) 5 MG tablet TAKE 1 TABLET DAILY (Patient taking differently: PT WANTS TO STOP TAKING DUE TO COST) 90 tablet 3   • levothyroxine (SYNTHROID, LEVOTHROID) 75 MCG tablet TAKE 1 TABLET DAILY 90 tablet 3   • Myrbetriq 50 MG tablet sustained-release 24 hour 24 hr tablet TAKE 1 TABLET DAILY 90 tablet 3   • PARoxetine (PAXIL) 10 MG tablet TAKE 1 TABLET DAILY 90 tablet 3     No current facility-administered medications for this visit.     Review of Systems   Constitutional: Negative for chills and fever.   HENT: Negative for congestion.    Respiratory: Negative for shortness of breath.    Cardiovascular: Positive for leg swelling. Negative for chest pain.   Gastrointestinal: Negative for  constipation, diarrhea, nausea and vomiting.   Musculoskeletal:        Foot pain   Skin: Negative for wound.   Neurological: Positive for numbness.       OBJECTIVE     Vitals:    03/18/21 1022   BP: 118/72       PHYSICAL EXAM  GEN:   Accompanied by none.     Foot/Ankle Exam:       General:   Diabetic Foot Exam Performed    Appearance: appears stated age and healthy    Orientation: AAOx3    Affect: appropriate    Gait: unimpaired    Assistance: independent    Shoe Gear:  Diabetic shoes    VASCULAR      Right Foot Vascularity   Dorsalis pedis:  2+  Posterior tibial:  2+  Skin Temperature: warm    Edema Grading:  Trace  CFT:  3  Pedal Hair Growth:  Absent  Varicosities: mild varicosities       Left Foot Vascularity   Dorsalis pedis:  2+  Posterior tibial:  2+  Skin Temperature: warm    Edema Grading:  Trace  CFT:  3  Pedal Hair Growth:  Absent  Varicosities: mild varicosities        NEUROLOGIC     Right Foot Neurologic   Light touch sensation:  Diminished  Vibratory sensation:  Diminished  Hot/Cold sensation: diminished    Protective Sensation using [place] Monofilament:  7     Left Foot Neurologic   Light touch sensation:  Diminished  Vibratory sensation:  Diminished  Hot/cold sensation: diminished    Protective Sensation using [place] Monofilament:  7     MUSCULOSKELETAL      Right Foot Musculoskeletal   Ecchymosis:  None  Tenderness: right foot callus and toenails    Arch:  Normal  Hallux valgus: Yes    Hallux limitus: No       Left Foot Musculoskeletal   Ecchymosis:  None  Tenderness: toenails    Arch:  Normal  Hallux valgus: Yes    Hallux limitus: No       MUSCLE STRENGTH     Right Foot Muscle Strength   Foot dorsiflexion:  5  Foot plantar flexion:  5  Foot inversion:  4+  Foot eversion:  4+     Left Foot Muscle Strength   Foot dorsiflexion:  5  Foot plantar flexion:  5  Foot inversion:  4+  Foot eversion:  4+     RANGE OF MOTION      Right Foot Range of Motion   Foot and ankle ROM within normal  limits       Left Foot Range of Motion   Foot and ankle ROM within normal limits       DERMATOLOGIC     Right Foot Dermatologic   Skin: corn (sub 5th met head) and atrophic    Nails: onychomycosis, abnormally thick, subungual debris and dystrophic nails       Left Foot Dermatologic   Skin: skin intact and atrophic    Nails: onychomycosis, abnormally thick, subungual debris and dystrophic nails       Image:       RADIOLOGY/NUCLEAR:  No results found.    LABORATORY/CULTURE RESULTS:      PATHOLOGY RESULTS:       ASSESSMENT/PLAN     Diagnoses and all orders for this visit:    1. Onychomycosis (Primary)    2. Type 2 diabetes mellitus with diabetic neuropathy, without long-term current use of insulin (CMS/[facility])    3. Foot pain, bilateral    4. Hallux valgus, left    5. Hallux valgus, right    6. Foot callus      Comprehensive lower extremity examination and evaluation was performed.  Discussed findings and treatment plan including risks, benefits, and treatment options with patient in detail. Patient agreed with treatment plan.  After verbal consent obtained, nail(s) x10 debrided of length and thickness with nail nipper without incidence  After verbal consent obtained, calluses x1 pared utilizing dermal curette and/or scalpel without incidence  Patient may maintain nails and calluses at home utilizing emery board or pumice stone between visits as needed  Reviewed at home diabetic foot care including daily foot checks   HAV mostly asymptomatic at this time. Continue with padding.   An After Visit Summary was printed and given to the patient at discharge, including (if requested) any available informative/educational handouts regarding diagnosis, treatment, or medications. All questions were answered to patient/family satisfaction. Should symptoms fail to improve or worsen they agree to call or return to clinic or to go to the Emergency Department. Discussed the importance of following up with any needed screening  tests/labs/specialist appointments and any requested follow-up recommended by me today. Importance of maintaining follow-up discussed and patient accepts that missed appointments can delay diagnosis and potentially lead to worsening of conditions.  Return in about 3 months (around 6/18/2021)., or sooner if acute issues arise.        This document has been electronically signed by Morgan Pepper DPM on March 18, 2021 10:34 CDT

## 2021-03-18 ENCOUNTER — OFFICE VISIT (OUTPATIENT)
Dept: PODIATRY | Facility: CLINIC | Age: 86
End: 2021-03-18

## 2021-03-18 VITALS
WEIGHT: 140 LBS | BODY MASS INDEX: 22.5 KG/M2 | HEIGHT: 66 IN | DIASTOLIC BLOOD PRESSURE: 72 MMHG | SYSTOLIC BLOOD PRESSURE: 118 MMHG

## 2021-03-18 DIAGNOSIS — M79.671 FOOT PAIN, BILATERAL: ICD-10-CM

## 2021-03-18 DIAGNOSIS — L84 FOOT CALLUS: ICD-10-CM

## 2021-03-18 DIAGNOSIS — M20.12 HALLUX VALGUS, LEFT: ICD-10-CM

## 2021-03-18 DIAGNOSIS — B35.1 ONYCHOMYCOSIS: Primary | ICD-10-CM

## 2021-03-18 DIAGNOSIS — M20.11 HALLUX VALGUS, RIGHT: ICD-10-CM

## 2021-03-18 DIAGNOSIS — M79.672 FOOT PAIN, BILATERAL: ICD-10-CM

## 2021-03-18 DIAGNOSIS — E11.40 TYPE 2 DIABETES MELLITUS WITH DIABETIC NEUROPATHY, WITHOUT LONG-TERM CURRENT USE OF INSULIN (HCC): ICD-10-CM

## 2021-03-18 PROCEDURE — 11721 DEBRIDE NAIL 6 OR MORE: CPT | Performed by: PODIATRIST

## 2021-03-18 PROCEDURE — 11055 PARING/CUTG B9 HYPRKER LES 1: CPT | Performed by: PODIATRIST

## 2021-03-18 PROCEDURE — 99213 OFFICE O/P EST LOW 20 MIN: CPT | Performed by: PODIATRIST

## 2021-03-25 ENCOUNTER — HOSPITAL ENCOUNTER (OUTPATIENT)
Facility: HOSPITAL | Age: 86
Setting detail: OBSERVATION
Discharge: NURSING, REHAB OR LONG-TERM CARE | End: 2021-03-30
Attending: EMERGENCY MEDICINE | Admitting: FAMILY MEDICINE

## 2021-03-25 ENCOUNTER — APPOINTMENT (OUTPATIENT)
Dept: CT IMAGING | Facility: HOSPITAL | Age: 86
End: 2021-03-25

## 2021-03-25 ENCOUNTER — APPOINTMENT (OUTPATIENT)
Dept: GENERAL RADIOLOGY | Facility: HOSPITAL | Age: 86
End: 2021-03-25

## 2021-03-25 DIAGNOSIS — S42.291A CLOSED FRACTURE OF ANATOMICAL NECK OF RIGHT HUMERUS, INITIAL ENCOUNTER: Primary | ICD-10-CM

## 2021-03-25 DIAGNOSIS — Z74.09 IMPAIRED MOBILITY AND ADLS: ICD-10-CM

## 2021-03-25 DIAGNOSIS — Z78.9 IMPAIRED MOBILITY AND ADLS: ICD-10-CM

## 2021-03-25 DIAGNOSIS — Z74.09 IMPAIRED MOBILITY: ICD-10-CM

## 2021-03-25 PROBLEM — G89.11 ACUTE PAIN DUE TO INJURY: Status: ACTIVE | Noted: 2021-03-25

## 2021-03-25 PROBLEM — R29.6 FREQUENT FALLS: Status: ACTIVE | Noted: 2021-03-25

## 2021-03-25 PROBLEM — R74.01 TRANSAMINITIS: Status: ACTIVE | Noted: 2021-03-25

## 2021-03-25 LAB
ALBUMIN SERPL-MCNC: 3.5 G/DL (ref 3.5–5.2)
ALBUMIN/GLOB SERPL: 1.3 G/DL
ALP SERPL-CCNC: 96 U/L (ref 39–117)
ALT SERPL W P-5'-P-CCNC: 110 U/L (ref 1–33)
ANION GAP SERPL CALCULATED.3IONS-SCNC: 11 MMOL/L (ref 5–15)
AST SERPL-CCNC: 252 U/L (ref 1–32)
BASOPHILS # BLD AUTO: 0.02 10*3/MM3 (ref 0–0.2)
BASOPHILS NFR BLD AUTO: 0.2 % (ref 0–1.5)
BILIRUB SERPL-MCNC: 1.1 MG/DL (ref 0–1.2)
BUN SERPL-MCNC: 21 MG/DL (ref 8–23)
BUN/CREAT SERPL: 15 (ref 7–25)
CALCIUM SPEC-SCNC: 9.3 MG/DL (ref 8.6–10.5)
CHLORIDE SERPL-SCNC: 106 MMOL/L (ref 98–107)
CO2 SERPL-SCNC: 23 MMOL/L (ref 22–29)
CREAT SERPL-MCNC: 1.4 MG/DL (ref 0.57–1)
DEPRECATED RDW RBC AUTO: 44.6 FL (ref 37–54)
EOSINOPHIL # BLD AUTO: 0.01 10*3/MM3 (ref 0–0.4)
EOSINOPHIL NFR BLD AUTO: 0.1 % (ref 0.3–6.2)
ERYTHROCYTE [DISTWIDTH] IN BLOOD BY AUTOMATED COUNT: 13.2 % (ref 12.3–15.4)
GFR SERPL CREATININE-BSD FRML MDRD: 36 ML/MIN/1.73
GLOBULIN UR ELPH-MCNC: 2.8 GM/DL
GLUCOSE BLDC GLUCOMTR-MCNC: 114 MG/DL (ref 70–130)
GLUCOSE SERPL-MCNC: 126 MG/DL (ref 65–99)
HCT VFR BLD AUTO: 33.7 % (ref 34–46.6)
HGB BLD-MCNC: 11.4 G/DL (ref 12–15.9)
IMM GRANULOCYTES # BLD AUTO: 0.11 10*3/MM3 (ref 0–0.05)
IMM GRANULOCYTES NFR BLD AUTO: 1.1 % (ref 0–0.5)
LYMPHOCYTES # BLD AUTO: 1.18 10*3/MM3 (ref 0.7–3.1)
LYMPHOCYTES NFR BLD AUTO: 11.7 % (ref 19.6–45.3)
MCH RBC QN AUTO: 30.8 PG (ref 26.6–33)
MCHC RBC AUTO-ENTMCNC: 33.8 G/DL (ref 31.5–35.7)
MCV RBC AUTO: 91.1 FL (ref 79–97)
MONOCYTES # BLD AUTO: 0.81 10*3/MM3 (ref 0.1–0.9)
MONOCYTES NFR BLD AUTO: 8 % (ref 5–12)
NEUTROPHILS NFR BLD AUTO: 7.94 10*3/MM3 (ref 1.7–7)
NEUTROPHILS NFR BLD AUTO: 78.9 % (ref 42.7–76)
NRBC BLD AUTO-RTO: 0 /100 WBC (ref 0–0.2)
PLATELET # BLD AUTO: 183 10*3/MM3 (ref 140–450)
PMV BLD AUTO: 10.5 FL (ref 6–12)
POTASSIUM SERPL-SCNC: 4.1 MMOL/L (ref 3.5–5.2)
PROT SERPL-MCNC: 6.3 G/DL (ref 6–8.5)
RBC # BLD AUTO: 3.7 10*6/MM3 (ref 3.77–5.28)
SARS-COV-2 RNA PNL SPEC NAA+PROBE: NOT DETECTED
SODIUM SERPL-SCNC: 140 MMOL/L (ref 136–145)
WBC # BLD AUTO: 10.07 10*3/MM3 (ref 3.4–10.8)

## 2021-03-25 PROCEDURE — 25010000002 HYDROMORPHONE PER 4 MG: Performed by: EMERGENCY MEDICINE

## 2021-03-25 PROCEDURE — 82962 GLUCOSE BLOOD TEST: CPT

## 2021-03-25 PROCEDURE — G0378 HOSPITAL OBSERVATION PER HR: HCPCS

## 2021-03-25 PROCEDURE — 96376 TX/PRO/DX INJ SAME DRUG ADON: CPT

## 2021-03-25 PROCEDURE — 73030 X-RAY EXAM OF SHOULDER: CPT

## 2021-03-25 PROCEDURE — 96375 TX/PRO/DX INJ NEW DRUG ADDON: CPT

## 2021-03-25 PROCEDURE — 87635 SARS-COV-2 COVID-19 AMP PRB: CPT | Performed by: EMERGENCY MEDICINE

## 2021-03-25 PROCEDURE — 85025 COMPLETE CBC W/AUTO DIFF WBC: CPT | Performed by: EMERGENCY MEDICINE

## 2021-03-25 PROCEDURE — 96374 THER/PROPH/DIAG INJ IV PUSH: CPT

## 2021-03-25 PROCEDURE — C9803 HOPD COVID-19 SPEC COLLECT: HCPCS

## 2021-03-25 PROCEDURE — 99284 EMERGENCY DEPT VISIT MOD MDM: CPT

## 2021-03-25 PROCEDURE — 25010000002 ONDANSETRON PER 1 MG: Performed by: EMERGENCY MEDICINE

## 2021-03-25 PROCEDURE — 80053 COMPREHEN METABOLIC PANEL: CPT | Performed by: EMERGENCY MEDICINE

## 2021-03-25 PROCEDURE — 70450 CT HEAD/BRAIN W/O DYE: CPT

## 2021-03-25 RX ORDER — SODIUM CHLORIDE, SODIUM LACTATE, POTASSIUM CHLORIDE, CALCIUM CHLORIDE 600; 310; 30; 20 MG/100ML; MG/100ML; MG/100ML; MG/100ML
50 INJECTION, SOLUTION INTRAVENOUS CONTINUOUS
Status: DISCONTINUED | OUTPATIENT
Start: 2021-03-26 | End: 2021-03-27

## 2021-03-25 RX ORDER — PAROXETINE 10 MG/1
10 TABLET, FILM COATED ORAL DAILY
Status: DISCONTINUED | OUTPATIENT
Start: 2021-03-26 | End: 2021-03-26

## 2021-03-25 RX ORDER — ONDANSETRON 2 MG/ML
4 INJECTION INTRAMUSCULAR; INTRAVENOUS EVERY 6 HOURS PRN
Status: DISCONTINUED | OUTPATIENT
Start: 2021-03-25 | End: 2021-03-30 | Stop reason: HOSPADM

## 2021-03-25 RX ORDER — SODIUM CHLORIDE 0.9 % (FLUSH) 0.9 %
10 SYRINGE (ML) INJECTION AS NEEDED
Status: DISCONTINUED | OUTPATIENT
Start: 2021-03-25 | End: 2021-03-30 | Stop reason: HOSPADM

## 2021-03-25 RX ORDER — HYDROCODONE BITARTRATE AND ACETAMINOPHEN 5; 325 MG/1; MG/1
1 TABLET ORAL EVERY 4 HOURS PRN
Status: DISCONTINUED | OUTPATIENT
Start: 2021-03-25 | End: 2021-03-27

## 2021-03-25 RX ORDER — ACETAMINOPHEN 500 MG
500 TABLET ORAL EVERY 6 HOURS PRN
Status: DISCONTINUED | OUTPATIENT
Start: 2021-03-25 | End: 2021-03-30 | Stop reason: HOSPADM

## 2021-03-25 RX ORDER — ONDANSETRON 4 MG/1
4 TABLET, FILM COATED ORAL EVERY 6 HOURS PRN
Status: DISCONTINUED | OUTPATIENT
Start: 2021-03-25 | End: 2021-03-30 | Stop reason: HOSPADM

## 2021-03-25 RX ORDER — AMOXICILLIN 250 MG
1 CAPSULE ORAL 2 TIMES DAILY PRN
Status: DISCONTINUED | OUTPATIENT
Start: 2021-03-25 | End: 2021-03-30 | Stop reason: HOSPADM

## 2021-03-25 RX ORDER — LEVOTHYROXINE SODIUM 0.07 MG/1
75 TABLET ORAL
Status: DISCONTINUED | OUTPATIENT
Start: 2021-03-26 | End: 2021-03-30 | Stop reason: HOSPADM

## 2021-03-25 RX ORDER — OXYBUTYNIN CHLORIDE 5 MG/1
10 TABLET, EXTENDED RELEASE ORAL DAILY
Status: DISCONTINUED | OUTPATIENT
Start: 2021-03-26 | End: 2021-03-30 | Stop reason: HOSPADM

## 2021-03-25 RX ORDER — ONDANSETRON 2 MG/ML
4 INJECTION INTRAMUSCULAR; INTRAVENOUS ONCE
Status: COMPLETED | OUTPATIENT
Start: 2021-03-25 | End: 2021-03-25

## 2021-03-25 RX ORDER — DEXTROSE MONOHYDRATE 25 G/50ML
25 INJECTION, SOLUTION INTRAVENOUS
Status: DISCONTINUED | OUTPATIENT
Start: 2021-03-25 | End: 2021-03-30 | Stop reason: HOSPADM

## 2021-03-25 RX ORDER — HYDROMORPHONE HYDROCHLORIDE 1 MG/ML
0.5 INJECTION, SOLUTION INTRAMUSCULAR; INTRAVENOUS; SUBCUTANEOUS ONCE
Status: COMPLETED | OUTPATIENT
Start: 2021-03-25 | End: 2021-03-25

## 2021-03-25 RX ORDER — NICOTINE POLACRILEX 4 MG
15 LOZENGE BUCCAL
Status: DISCONTINUED | OUTPATIENT
Start: 2021-03-25 | End: 2021-03-30 | Stop reason: HOSPADM

## 2021-03-25 RX ORDER — SODIUM CHLORIDE 0.9 % (FLUSH) 0.9 %
10 SYRINGE (ML) INJECTION EVERY 12 HOURS SCHEDULED
Status: DISCONTINUED | OUTPATIENT
Start: 2021-03-26 | End: 2021-03-30 | Stop reason: HOSPADM

## 2021-03-25 RX ADMIN — ONDANSETRON HYDROCHLORIDE 4 MG: 2 SOLUTION INTRAMUSCULAR; INTRAVENOUS at 17:49

## 2021-03-25 RX ADMIN — HYDROCODONE BITARTRATE AND ACETAMINOPHEN 1 TABLET: 5; 325 TABLET ORAL at 23:53

## 2021-03-25 RX ADMIN — HYDROMORPHONE HYDROCHLORIDE 0.5 MG: 1 INJECTION, SOLUTION INTRAMUSCULAR; INTRAVENOUS; SUBCUTANEOUS at 18:50

## 2021-03-25 RX ADMIN — HYDROMORPHONE HYDROCHLORIDE 0.5 MG: 1 INJECTION, SOLUTION INTRAMUSCULAR; INTRAVENOUS; SUBCUTANEOUS at 17:52

## 2021-03-26 PROBLEM — N18.32 STAGE 3B CHRONIC KIDNEY DISEASE (HCC): Status: ACTIVE | Noted: 2021-03-26

## 2021-03-26 LAB
GLUCOSE BLDC GLUCOMTR-MCNC: 128 MG/DL (ref 70–130)
GLUCOSE BLDC GLUCOMTR-MCNC: 182 MG/DL (ref 70–130)
GLUCOSE BLDC GLUCOMTR-MCNC: 224 MG/DL (ref 70–130)
GLUCOSE BLDC GLUCOMTR-MCNC: 283 MG/DL (ref 70–130)

## 2021-03-26 PROCEDURE — 63710000001 INSULIN DETEMIR PER 5 UNITS: Performed by: NURSE PRACTITIONER

## 2021-03-26 PROCEDURE — G0378 HOSPITAL OBSERVATION PER HR: HCPCS

## 2021-03-26 PROCEDURE — 82962 GLUCOSE BLOOD TEST: CPT

## 2021-03-26 PROCEDURE — 63710000001 INSULIN LISPRO (HUMAN) PER 5 UNITS: Performed by: NURSE PRACTITIONER

## 2021-03-26 RX ORDER — CLONIDINE HYDROCHLORIDE 0.1 MG/1
0.1 TABLET ORAL ONCE
Status: COMPLETED | OUTPATIENT
Start: 2021-03-26 | End: 2021-03-26

## 2021-03-26 RX ORDER — GLIPIZIDE 5 MG/1
5 TABLET ORAL
Refills: 3 | Status: DISCONTINUED | OUTPATIENT
Start: 2021-03-26 | End: 2021-03-30 | Stop reason: HOSPADM

## 2021-03-26 RX ORDER — ATORVASTATIN CALCIUM 40 MG/1
80 TABLET, FILM COATED ORAL DAILY
Status: DISCONTINUED | OUTPATIENT
Start: 2021-03-26 | End: 2021-03-26

## 2021-03-26 RX ORDER — PAROXETINE 10 MG/1
10 TABLET, FILM COATED ORAL NIGHTLY
Status: DISCONTINUED | OUTPATIENT
Start: 2021-03-26 | End: 2021-03-30 | Stop reason: HOSPADM

## 2021-03-26 RX ADMIN — CLONIDINE HYDROCHLORIDE 0.1 MG: 0.1 TABLET ORAL at 01:07

## 2021-03-26 RX ADMIN — SODIUM CHLORIDE, POTASSIUM CHLORIDE, SODIUM LACTATE AND CALCIUM CHLORIDE 50 ML/HR: 600; 310; 30; 20 INJECTION, SOLUTION INTRAVENOUS at 03:25

## 2021-03-26 RX ADMIN — INSULIN DETEMIR 20 UNITS: 100 INJECTION, SOLUTION SUBCUTANEOUS at 08:41

## 2021-03-26 RX ADMIN — HYDROCODONE BITARTRATE AND ACETAMINOPHEN 1 TABLET: 5; 325 TABLET ORAL at 08:42

## 2021-03-26 RX ADMIN — OXYBUTYNIN CHLORIDE 10 MG: 5 TABLET, EXTENDED RELEASE ORAL at 08:42

## 2021-03-26 RX ADMIN — PAROXETINE 10 MG: 10 TABLET, FILM COATED ORAL at 19:42

## 2021-03-26 RX ADMIN — HYDROCODONE BITARTRATE AND ACETAMINOPHEN 1 TABLET: 5; 325 TABLET ORAL at 12:20

## 2021-03-26 RX ADMIN — LEVOTHYROXINE SODIUM 75 MCG: 75 TABLET ORAL at 06:38

## 2021-03-26 RX ADMIN — INSULIN DETEMIR 15 UNITS: 100 INJECTION, SOLUTION SUBCUTANEOUS at 19:46

## 2021-03-26 RX ADMIN — GLIPIZIDE 5 MG: 5 TABLET ORAL at 17:12

## 2021-03-26 RX ADMIN — INSULIN LISPRO 2 UNITS: 100 INJECTION, SOLUTION INTRAVENOUS; SUBCUTANEOUS at 12:19

## 2021-03-26 RX ADMIN — SODIUM CHLORIDE, PRESERVATIVE FREE 10 ML: 5 INJECTION INTRAVENOUS at 08:43

## 2021-03-26 RX ADMIN — INSULIN LISPRO 4 UNITS: 100 INJECTION, SOLUTION INTRAVENOUS; SUBCUTANEOUS at 17:12

## 2021-03-26 RX ADMIN — HYDROCODONE BITARTRATE AND ACETAMINOPHEN 1 TABLET: 5; 325 TABLET ORAL at 17:12

## 2021-03-26 RX ADMIN — INSULIN LISPRO 6 UNITS: 100 INJECTION, SOLUTION INTRAVENOUS; SUBCUTANEOUS at 19:46

## 2021-03-26 RX ADMIN — SODIUM CHLORIDE, PRESERVATIVE FREE 10 ML: 5 INJECTION INTRAVENOUS at 03:26

## 2021-03-27 LAB
ANION GAP SERPL CALCULATED.3IONS-SCNC: 10 MMOL/L (ref 5–15)
BUN SERPL-MCNC: 23 MG/DL (ref 8–23)
BUN/CREAT SERPL: 17.4 (ref 7–25)
CALCIUM SPEC-SCNC: 9 MG/DL (ref 8.6–10.5)
CHLORIDE SERPL-SCNC: 101 MMOL/L (ref 98–107)
CO2 SERPL-SCNC: 24 MMOL/L (ref 22–29)
CREAT SERPL-MCNC: 1.32 MG/DL (ref 0.57–1)
GFR SERPL CREATININE-BSD FRML MDRD: 38 ML/MIN/1.73
GLUCOSE BLDC GLUCOMTR-MCNC: 119 MG/DL (ref 70–130)
GLUCOSE BLDC GLUCOMTR-MCNC: 174 MG/DL (ref 70–130)
GLUCOSE BLDC GLUCOMTR-MCNC: 179 MG/DL (ref 70–130)
GLUCOSE BLDC GLUCOMTR-MCNC: 256 MG/DL (ref 70–130)
GLUCOSE SERPL-MCNC: 189 MG/DL (ref 65–99)
POTASSIUM SERPL-SCNC: 4.4 MMOL/L (ref 3.5–5.2)
SODIUM SERPL-SCNC: 135 MMOL/L (ref 136–145)

## 2021-03-27 PROCEDURE — 63710000001 INSULIN DETEMIR PER 5 UNITS: Performed by: NURSE PRACTITIONER

## 2021-03-27 PROCEDURE — 80048 BASIC METABOLIC PNL TOTAL CA: CPT | Performed by: FAMILY MEDICINE

## 2021-03-27 PROCEDURE — G0378 HOSPITAL OBSERVATION PER HR: HCPCS

## 2021-03-27 PROCEDURE — 63710000001 INSULIN LISPRO (HUMAN) PER 5 UNITS: Performed by: NURSE PRACTITIONER

## 2021-03-27 PROCEDURE — 97166 OT EVAL MOD COMPLEX 45 MIN: CPT

## 2021-03-27 PROCEDURE — 97162 PT EVAL MOD COMPLEX 30 MIN: CPT

## 2021-03-27 PROCEDURE — 82962 GLUCOSE BLOOD TEST: CPT

## 2021-03-27 RX ORDER — HYDROCODONE BITARTRATE AND ACETAMINOPHEN 7.5; 325 MG/1; MG/1
1 TABLET ORAL EVERY 6 HOURS PRN
Status: DISCONTINUED | OUTPATIENT
Start: 2021-03-27 | End: 2021-03-30 | Stop reason: HOSPADM

## 2021-03-27 RX ADMIN — GLIPIZIDE 5 MG: 5 TABLET ORAL at 17:01

## 2021-03-27 RX ADMIN — OXYBUTYNIN CHLORIDE 10 MG: 5 TABLET, EXTENDED RELEASE ORAL at 08:07

## 2021-03-27 RX ADMIN — INSULIN DETEMIR 15 UNITS: 100 INJECTION, SOLUTION SUBCUTANEOUS at 08:07

## 2021-03-27 RX ADMIN — HYDROCODONE BITARTRATE AND ACETAMINOPHEN 1 TABLET: 5; 325 TABLET ORAL at 10:07

## 2021-03-27 RX ADMIN — INSULIN LISPRO 2 UNITS: 100 INJECTION, SOLUTION INTRAVENOUS; SUBCUTANEOUS at 12:11

## 2021-03-27 RX ADMIN — INSULIN DETEMIR 15 UNITS: 100 INJECTION, SOLUTION SUBCUTANEOUS at 21:08

## 2021-03-27 RX ADMIN — INSULIN LISPRO 2 UNITS: 100 INJECTION, SOLUTION INTRAVENOUS; SUBCUTANEOUS at 17:01

## 2021-03-27 RX ADMIN — PAROXETINE 10 MG: 10 TABLET, FILM COATED ORAL at 21:08

## 2021-03-27 RX ADMIN — LEVOTHYROXINE SODIUM 75 MCG: 75 TABLET ORAL at 06:21

## 2021-03-27 RX ADMIN — INSULIN LISPRO 6 UNITS: 100 INJECTION, SOLUTION INTRAVENOUS; SUBCUTANEOUS at 21:14

## 2021-03-27 RX ADMIN — HYDROCODONE BITARTRATE AND ACETAMINOPHEN 1 TABLET: 5; 325 TABLET ORAL at 00:09

## 2021-03-27 RX ADMIN — HYDROCODONE BITARTRATE AND ACETAMINOPHEN 1 TABLET: 7.5; 325 TABLET ORAL at 17:01

## 2021-03-27 RX ADMIN — GLIPIZIDE 5 MG: 5 TABLET ORAL at 08:07

## 2021-03-28 LAB
GLUCOSE BLDC GLUCOMTR-MCNC: 159 MG/DL (ref 70–130)
GLUCOSE BLDC GLUCOMTR-MCNC: 169 MG/DL (ref 70–130)
GLUCOSE BLDC GLUCOMTR-MCNC: 192 MG/DL (ref 70–130)
GLUCOSE BLDC GLUCOMTR-MCNC: 82 MG/DL (ref 70–130)

## 2021-03-28 PROCEDURE — G0378 HOSPITAL OBSERVATION PER HR: HCPCS

## 2021-03-28 PROCEDURE — 97530 THERAPEUTIC ACTIVITIES: CPT

## 2021-03-28 PROCEDURE — 63710000001 INSULIN DETEMIR PER 5 UNITS: Performed by: NURSE PRACTITIONER

## 2021-03-28 PROCEDURE — 63710000001 INSULIN LISPRO (HUMAN) PER 5 UNITS: Performed by: NURSE PRACTITIONER

## 2021-03-28 PROCEDURE — 82962 GLUCOSE BLOOD TEST: CPT

## 2021-03-28 RX ORDER — DOCUSATE SODIUM 100 MG/1
100 CAPSULE, LIQUID FILLED ORAL 2 TIMES DAILY
Status: DISCONTINUED | OUTPATIENT
Start: 2021-03-28 | End: 2021-03-29

## 2021-03-28 RX ORDER — POLYETHYLENE GLYCOL 3350 17 G/17G
17 POWDER, FOR SOLUTION ORAL DAILY
Status: DISCONTINUED | OUTPATIENT
Start: 2021-03-28 | End: 2021-03-30 | Stop reason: HOSPADM

## 2021-03-28 RX ADMIN — GLIPIZIDE 5 MG: 5 TABLET ORAL at 08:45

## 2021-03-28 RX ADMIN — ACETAMINOPHEN 500 MG: 500 TABLET, FILM COATED ORAL at 00:21

## 2021-03-28 RX ADMIN — DOCUSATE SODIUM 100 MG: 100 CAPSULE ORAL at 12:24

## 2021-03-28 RX ADMIN — GLIPIZIDE 5 MG: 5 TABLET ORAL at 17:37

## 2021-03-28 RX ADMIN — INSULIN LISPRO 2 UNITS: 100 INJECTION, SOLUTION INTRAVENOUS; SUBCUTANEOUS at 12:24

## 2021-03-28 RX ADMIN — INSULIN LISPRO 2 UNITS: 100 INJECTION, SOLUTION INTRAVENOUS; SUBCUTANEOUS at 20:34

## 2021-03-28 RX ADMIN — DOCUSATE SODIUM 100 MG: 100 CAPSULE ORAL at 20:34

## 2021-03-28 RX ADMIN — LEVOTHYROXINE SODIUM 75 MCG: 75 TABLET ORAL at 06:49

## 2021-03-28 RX ADMIN — SODIUM CHLORIDE, PRESERVATIVE FREE 10 ML: 5 INJECTION INTRAVENOUS at 00:21

## 2021-03-28 RX ADMIN — OXYBUTYNIN CHLORIDE 10 MG: 5 TABLET, EXTENDED RELEASE ORAL at 08:45

## 2021-03-28 RX ADMIN — INSULIN DETEMIR 15 UNITS: 100 INJECTION, SOLUTION SUBCUTANEOUS at 20:26

## 2021-03-28 RX ADMIN — POLYETHYLENE GLYCOL (3350) 17 G: 17 POWDER, FOR SOLUTION ORAL at 12:24

## 2021-03-28 RX ADMIN — INSULIN LISPRO 2 UNITS: 100 INJECTION, SOLUTION INTRAVENOUS; SUBCUTANEOUS at 17:27

## 2021-03-28 RX ADMIN — SODIUM CHLORIDE, PRESERVATIVE FREE 10 ML: 5 INJECTION INTRAVENOUS at 20:34

## 2021-03-28 RX ADMIN — INSULIN DETEMIR 15 UNITS: 100 INJECTION, SOLUTION SUBCUTANEOUS at 08:45

## 2021-03-28 RX ADMIN — PAROXETINE 10 MG: 10 TABLET, FILM COATED ORAL at 20:33

## 2021-03-29 PROBLEM — B19.20 HEPATITIS C: Status: ACTIVE | Noted: 2021-03-29

## 2021-03-29 LAB
ALBUMIN SERPL-MCNC: 3.2 G/DL (ref 3.5–5.2)
ALBUMIN/GLOB SERPL: 1.1 G/DL
ALP SERPL-CCNC: 71 U/L (ref 39–117)
ALT SERPL W P-5'-P-CCNC: 47 U/L (ref 1–33)
ANION GAP SERPL CALCULATED.3IONS-SCNC: 12 MMOL/L (ref 5–15)
AST SERPL-CCNC: 24 U/L (ref 1–32)
BILIRUB SERPL-MCNC: 0.7 MG/DL (ref 0–1.2)
BUN SERPL-MCNC: 24 MG/DL (ref 8–23)
BUN/CREAT SERPL: 17.6 (ref 7–25)
CALCIUM SPEC-SCNC: 9 MG/DL (ref 8.6–10.5)
CHLORIDE SERPL-SCNC: 100 MMOL/L (ref 98–107)
CHOLEST SERPL-MCNC: 107 MG/DL (ref 0–200)
CO2 SERPL-SCNC: 23 MMOL/L (ref 22–29)
CREAT SERPL-MCNC: 1.36 MG/DL (ref 0.57–1)
GFR SERPL CREATININE-BSD FRML MDRD: 37 ML/MIN/1.73
GLOBULIN UR ELPH-MCNC: 3 GM/DL
GLUCOSE BLDC GLUCOMTR-MCNC: 102 MG/DL (ref 70–130)
GLUCOSE BLDC GLUCOMTR-MCNC: 152 MG/DL (ref 70–130)
GLUCOSE BLDC GLUCOMTR-MCNC: 171 MG/DL (ref 70–130)
GLUCOSE BLDC GLUCOMTR-MCNC: 91 MG/DL (ref 70–130)
GLUCOSE SERPL-MCNC: 104 MG/DL (ref 65–99)
HAV IGM SERPL QL IA: ABNORMAL
HBV CORE IGM SERPL QL IA: ABNORMAL
HBV SURFACE AG SERPL QL IA: ABNORMAL
HCV AB SER DONR QL: REACTIVE
HDLC SERPL-MCNC: 34 MG/DL (ref 40–60)
LDLC SERPL CALC-MCNC: 47 MG/DL (ref 0–100)
LDLC/HDLC SERPL: 1.24 {RATIO}
POTASSIUM SERPL-SCNC: 4.1 MMOL/L (ref 3.5–5.2)
PROT SERPL-MCNC: 6.2 G/DL (ref 6–8.5)
SODIUM SERPL-SCNC: 135 MMOL/L (ref 136–145)
TRIGL SERPL-MCNC: 154 MG/DL (ref 0–150)
VLDLC SERPL-MCNC: 26 MG/DL (ref 5–40)

## 2021-03-29 PROCEDURE — 97110 THERAPEUTIC EXERCISES: CPT

## 2021-03-29 PROCEDURE — 80074 ACUTE HEPATITIS PANEL: CPT | Performed by: NURSE PRACTITIONER

## 2021-03-29 PROCEDURE — 80061 LIPID PANEL: CPT | Performed by: NURSE PRACTITIONER

## 2021-03-29 PROCEDURE — G0378 HOSPITAL OBSERVATION PER HR: HCPCS

## 2021-03-29 PROCEDURE — 97535 SELF CARE MNGMENT TRAINING: CPT

## 2021-03-29 PROCEDURE — 82962 GLUCOSE BLOOD TEST: CPT

## 2021-03-29 PROCEDURE — 80053 COMPREHEN METABOLIC PANEL: CPT | Performed by: NURSE PRACTITIONER

## 2021-03-29 PROCEDURE — 63710000001 INSULIN DETEMIR PER 5 UNITS: Performed by: NURSE PRACTITIONER

## 2021-03-29 PROCEDURE — 63710000001 INSULIN LISPRO (HUMAN) PER 5 UNITS: Performed by: NURSE PRACTITIONER

## 2021-03-29 PROCEDURE — 97530 THERAPEUTIC ACTIVITIES: CPT

## 2021-03-29 RX ORDER — AMOXICILLIN 250 MG
1 CAPSULE ORAL 2 TIMES DAILY
Status: DISCONTINUED | OUTPATIENT
Start: 2021-03-29 | End: 2021-03-30 | Stop reason: HOSPADM

## 2021-03-29 RX ORDER — MAGNESIUM CARB/ALUMINUM HYDROX 105-160MG
296 TABLET,CHEWABLE ORAL ONCE
Status: COMPLETED | OUTPATIENT
Start: 2021-03-29 | End: 2021-03-29

## 2021-03-29 RX ADMIN — GLIPIZIDE 5 MG: 5 TABLET ORAL at 08:29

## 2021-03-29 RX ADMIN — DOCUSATE SODIUM 50 MG AND SENNOSIDES 8.6 MG 1 TABLET: 8.6; 5 TABLET, FILM COATED ORAL at 20:32

## 2021-03-29 RX ADMIN — GLIPIZIDE 5 MG: 5 TABLET ORAL at 18:05

## 2021-03-29 RX ADMIN — HYDROCODONE BITARTRATE AND ACETAMINOPHEN 1 TABLET: 7.5; 325 TABLET ORAL at 10:13

## 2021-03-29 RX ADMIN — INSULIN LISPRO 2 UNITS: 100 INJECTION, SOLUTION INTRAVENOUS; SUBCUTANEOUS at 20:33

## 2021-03-29 RX ADMIN — LEVOTHYROXINE SODIUM 75 MCG: 75 TABLET ORAL at 05:16

## 2021-03-29 RX ADMIN — POLYETHYLENE GLYCOL (3350) 17 G: 17 POWDER, FOR SOLUTION ORAL at 08:30

## 2021-03-29 RX ADMIN — INSULIN LISPRO 2 UNITS: 100 INJECTION, SOLUTION INTRAVENOUS; SUBCUTANEOUS at 18:05

## 2021-03-29 RX ADMIN — DOCUSATE SODIUM 50 MG AND SENNOSIDES 8.6 MG 1 TABLET: 8.6; 5 TABLET, FILM COATED ORAL at 11:37

## 2021-03-29 RX ADMIN — INSULIN DETEMIR 15 UNITS: 100 INJECTION, SOLUTION SUBCUTANEOUS at 22:00

## 2021-03-29 RX ADMIN — OXYBUTYNIN CHLORIDE 10 MG: 5 TABLET, EXTENDED RELEASE ORAL at 08:30

## 2021-03-29 RX ADMIN — DOCUSATE SODIUM 100 MG: 100 CAPSULE ORAL at 08:30

## 2021-03-29 RX ADMIN — PAROXETINE 10 MG: 10 TABLET, FILM COATED ORAL at 20:33

## 2021-03-29 RX ADMIN — INSULIN DETEMIR 15 UNITS: 100 INJECTION, SOLUTION SUBCUTANEOUS at 08:32

## 2021-03-29 RX ADMIN — Medication 296 ML: at 18:59

## 2021-03-30 VITALS
RESPIRATION RATE: 16 BRPM | HEART RATE: 69 BPM | OXYGEN SATURATION: 95 % | DIASTOLIC BLOOD PRESSURE: 60 MMHG | HEIGHT: 66 IN | BODY MASS INDEX: 22.98 KG/M2 | SYSTOLIC BLOOD PRESSURE: 153 MMHG | TEMPERATURE: 97.6 F | WEIGHT: 143 LBS

## 2021-03-30 LAB
GLUCOSE BLDC GLUCOMTR-MCNC: 106 MG/DL (ref 70–130)
GLUCOSE BLDC GLUCOMTR-MCNC: 92 MG/DL (ref 70–130)
SARS-COV-2 RNA PNL SPEC NAA+PROBE: NOT DETECTED

## 2021-03-30 PROCEDURE — 82962 GLUCOSE BLOOD TEST: CPT

## 2021-03-30 PROCEDURE — 87635 SARS-COV-2 COVID-19 AMP PRB: CPT | Performed by: FAMILY MEDICINE

## 2021-03-30 PROCEDURE — G0378 HOSPITAL OBSERVATION PER HR: HCPCS

## 2021-03-30 PROCEDURE — 97110 THERAPEUTIC EXERCISES: CPT

## 2021-03-30 PROCEDURE — 97530 THERAPEUTIC ACTIVITIES: CPT

## 2021-03-30 PROCEDURE — 63710000001 INSULIN DETEMIR PER 5 UNITS: Performed by: NURSE PRACTITIONER

## 2021-03-30 RX ORDER — AMOXICILLIN 250 MG
1 CAPSULE ORAL 2 TIMES DAILY
Start: 2021-03-30 | End: 2021-12-06

## 2021-03-30 RX ORDER — ATORVASTATIN CALCIUM 40 MG/1
40 TABLET, FILM COATED ORAL DAILY
Status: ON HOLD
Start: 2021-03-30 | End: 2021-10-09

## 2021-03-30 RX ORDER — HYDROCODONE BITARTRATE AND ACETAMINOPHEN 7.5; 325 MG/1; MG/1
1 TABLET ORAL EVERY 6 HOURS PRN
Qty: 12 TABLET | Refills: 0 | Status: SHIPPED | OUTPATIENT
Start: 2021-03-30 | End: 2021-04-02

## 2021-03-30 RX ORDER — POLYETHYLENE GLYCOL 3350 17 G/17G
17 POWDER, FOR SOLUTION ORAL DAILY
Start: 2021-03-31

## 2021-03-30 RX ADMIN — OXYBUTYNIN CHLORIDE 10 MG: 5 TABLET, EXTENDED RELEASE ORAL at 09:20

## 2021-03-30 RX ADMIN — GLIPIZIDE 5 MG: 5 TABLET ORAL at 09:20

## 2021-03-30 RX ADMIN — INSULIN DETEMIR 15 UNITS: 100 INJECTION, SOLUTION SUBCUTANEOUS at 09:20

## 2021-03-30 RX ADMIN — LEVOTHYROXINE SODIUM 75 MCG: 75 TABLET ORAL at 06:08

## 2021-06-11 NOTE — PROGRESS NOTES
[facility] - PODIATRY    Today's Date: 06/17/21    Patient Name: Honey Canales  MRN: [id number]  CSN: [id number]  PCP: Devon Zuñiga MD  Referring Provider: No ref. provider found    SUBJECTIVE     Chief Complaint   Patient presents with   • Follow-up     pcp01/18/2021 3 MO FU diabetic nail care-pt states swelling in legs and feet for about 2 months. Pt wears comrpression and uses elevation when sleeping. - pt states some pain in heels. 5/10- pt presents in wheelchair with compression sstockings. Some bruisng from stockings.    • Diabetes     74mg/dl last BG      HPI: Honey Canales, a 88 y.o.female, comes to clinic as a(n) established patient presenting for diabetic foot exam and complaining of painful toenails. Patient has h/o aneurysm, arthritis, DM2, GERD, Headache, HLD, HTN, Hypothyroid, Spinal Stenosis, urinary incontinence. Patient is IDDM with last stated BG level of 74mg/dl. States that her toenails are long, thick and discolored. She is unable to care for them herself. Admits to occasional numbness in feet. Denies open wounds or sores. Most pain when wearing closed toed shoes. Callus to plantar right foot has returned. Denies redness or drainage. States that roughly 1 week after last appt she had a fall and broke her shoulder/arm. She also hit her head but denies significant head injury. She is now at [facility]. She has difficulty with balance and notes increased edema in legs. Admits pain at 5/10 level and described as aching and dull. Relates previous treatment(s) including foot care by podiatrist. Denies any constitutional symptoms. No other pedal complaints at this time.    Past Medical History:   Diagnosis Date   • Allergic rhinitis    • Aneurysm (CMS/HCC)    • Arthritis    • Broken shoulder     Right shoulder    • Cerebral aneurysm 2009 2ND ONE FOUND   • Cerebral aneurysm     NON TREATABLE   • Chronic laryngitis    • CKD (chronic kidney disease)    • Colon polyps    • COPD  (chronic obstructive pulmonary disease) (CMS/HCC)    • Deviated nasal septum    • Diabetes mellitus (CMS/HCC)    • Disorder of kidney and ureter    • Disturbance of smell and taste    • Dizziness    • Encounter for imaging to screen for metal prior to MRI     NO MRI, METAL CLIPS IN HEAD   • Eustachian tube dysfunction    • GERD (gastroesophageal reflux disease)    • Globus sensation    • Headache    • Hepatitis     B   • Hepatitis A    • History of stomach ulcers    • Hyperlipidemia    • Hypothyroid    • Laryngitis sicca    • Lung nodule    • Nocturia    • PONV (postoperative nausea and vomiting)    • Sensorineural hearing loss    • Spinal stenosis    • Urge incontinence    • Urgency of urination    • Urinary frequency    • Urinary incontinence    • UTI (urinary tract infection)      Past Surgical History:   Procedure Laterality Date   • BLADDER SURGERY  2016    Medtronic Interstem   • BRAIN SURGERY      ANUERYSM REMOVED   • BREAST SURGERY Bilateral     BIOPSY   • CARPAL TUNNEL RELEASE     • CATARACT EXTRACTION, BILATERAL     • CEREBRAL ANEURYSM REPAIR     •  SECTION      X4   • CHOLECYSTECTOMY     • HYSTERECTOMY     • INTERSTIM PLACEMENT N/A 10/18/2018    Procedure: INTERSTIM STAGES 1 AND 2 LEAD AND GENERATOR REMOVAL AND REPLACEMENT;  Surgeon: Archie Avery MD;  Location: [facility] OR;  Service: Urology   • JOINT REPLACEMENT Right     KNEE   • KNEE ARTHROSCOPY     • THYROIDECTOMY     • TONSILLECTOMY       Family History   Problem Relation Age of Onset   • Cancer Brother         BLADDER   • No Known Problems Father    • No Known Problems Mother      Social History     Socioeconomic History   • Marital status:      Spouse name: Not on file   • Number of children: Not on file   • Years of education: Not on file   • Highest education level: Not on file   Tobacco Use   • Smoking status: Former Smoker     Types: Cigarettes     Quit date:      Years since quittin.4   • Smokeless tobacco:  Never Used   • Tobacco comment: SMOKED OVER 40 YEARS   Vaping Use   • Vaping Use: Never used   Substance and Sexual Activity   • Alcohol use: No   • Drug use: No   • Sexual activity: Defer     Allergies   Allergen Reactions   • Codeine Phosphate [Codeine] Unknown (See Comments)     CHEST PAIN   • Collagen Other (See Comments)     CAT GUT SUTURES \ WOUND WOULD NOT HEAL AND GOT INFECTION   • Accupril [Quinapril Hcl] Other (See Comments)     COUGH   • Aspirin Other (See Comments)     HISTORY OF STOMACH ULCERS   • Adhesive Tape Rash     SKIN BLISTERS   • Celebrex [Celecoxib] Nausea Only   • Lyrica [Pregabalin] Other (See Comments)     GAINED 50 POUNDS   • Pantoprazole Sodium Diarrhea   • Pentoxifylline Diarrhea   • Sulfa Antibiotics Nausea And Vomiting   • Tramadol Nausea And Vomiting   • Vioxx [Rofecoxib] Nausea And Vomiting     Current Outpatient Medications   Medication Sig Dispense Refill   • acetaminophen (TYLENOL) 500 MG tablet Take 500 mg by mouth Every 6 (Six) Hours As Needed for Mild Pain .     • atorvastatin (Lipitor) 40 MG tablet Take 1 tablet by mouth Daily.     • B-D ULTRAFINE III SHORT PEN 31G X 8 MM misc USE 1 PEN NEEDLE TWO TIMES DAILY 180 each 3   • Dextran 70-Hypromellose (GENTEAL TEARS MODERATE PF OP) Apply  to eye(s) as directed by provider.     • Easy Touch Lancets 28G/Twist misc TEST TWICE A  each 3   • Easy Touch Test test strip TEST TWICE A  each 3   • glipizide (GLUCOTROL XL) 10 MG 24 hr tablet TAKE 1 TABLET TWICE A  tablet 3   • hydroCHLOROthiazide (HYDRODIURIL) 25 MG tablet Take 25 mg by mouth Daily.     • insulin detemir (LEVEMIR) 100 UNIT/ML injection 35 units in the morning and 25 units at night 18 mL 3   • levocetirizine (XYZAL) 5 MG tablet TAKE 1 TABLET DAILY (Patient taking differently: Take 5 mg by mouth Every Evening. PT WANTS TO STOP TAKING DUE TO COST) 90 tablet 3   • levothyroxine (SYNTHROID, LEVOTHROID) 75 MCG tablet TAKE 1 TABLET DAILY 90 tablet 3   • LOSARTAN  POTASSIUM PO Take  by mouth.     • Miconazole Nitrate (REMEDY PHYTOPLEX ANTIFUNGAL EX) Apply  topically.     • multivitamin with minerals (MULTIVITAMIN ADULT PO) Take 1 tablet by mouth Daily.     • Myrbetriq 50 MG tablet sustained-release 24 hour 24 hr tablet TAKE 1 TABLET DAILY (Patient taking differently: Take 50 mg by mouth Every Night.) 90 tablet 3   • PARoxetine (PAXIL) 10 MG tablet TAKE 1 TABLET DAILY (Patient taking differently: Take 10 mg by mouth Every Night.) 90 tablet 3   • polyethylene glycol (polyethylene glycol) 17 g packet Take 17 g by mouth Daily.     • sennosides-docusate (PERICOLACE) 8.6-50 MG per tablet Take 1 tablet by mouth 2 (Two) Times a Day.     • vitamin C (ASCORBIC ACID) 250 MG tablet Take 250 mg by mouth Daily.     • Wheat Dextrin (BENEFIBER DRINK MIX PO) Take  by mouth.     • Zinc Sulfate (ZINC 15 PO) Take  by mouth.       No current facility-administered medications for this visit.     Review of Systems   Constitutional: Negative for chills and fever.   HENT: Negative for congestion.    Respiratory: Negative for shortness of breath.    Cardiovascular: Positive for leg swelling. Negative for chest pain.   Gastrointestinal: Negative for constipation, diarrhea, nausea and vomiting.   Musculoskeletal: Positive for arthralgias and gait problem.        Foot pain   Skin: Negative for wound.   Neurological: Positive for numbness.       OBJECTIVE     Vitals:    21 1024   BP: 136/58   Pulse: 77   SpO2: 99%       PHYSICAL EXAM  GEN:   Accompanied by son.     Foot/Ankle Exam:       General:   Diabetic Foot Exam Performed    Appearance: appears stated age and healthy and elderly    Orientation: AAOx3    Affect: appropriate    Assistance: wheelchair    Shoe Gear:  Casual shoes (compression stockings. )    VASCULAR      Right Foot Vascularity   Dorsalis pedis:  2+  Posterior tibial:  2+  Skin Temperature: warm    Edema Gradin+ and pitting  CFT:  3  Pedal Hair Growth:  Absent  Varicosities:  mild varicosities       Left Foot Vascularity   Dorsalis pedis:  2+  Posterior tibial:  2+  Skin Temperature: warm    Edema Gradin+ and pitting  CFT:  3  Pedal Hair Growth:  Absent  Varicosities: mild varicosities        NEUROLOGIC     Right Foot Neurologic   Light touch sensation:  Diminished  Vibratory sensation:  Diminished  Hot/Cold sensation: diminished    Protective Sensation using Garrett-Tima Monofilament:  7     Left Foot Neurologic   Light touch sensation:  Diminished  Vibratory sensation:  Diminished  Hot/cold sensation: diminished    Protective Sensation using Garrett-Tima Monofilament:  7     MUSCULOSKELETAL      Right Foot Musculoskeletal   Ecchymosis:  None  Tenderness: right foot callus and toenails    Arch:  Normal  Hallux valgus: Yes    Hallux limitus: No       Left Foot Musculoskeletal   Ecchymosis:  None  Tenderness: toenails    Arch:  Normal  Hallux valgus: Yes    Hallux limitus: No       MUSCLE STRENGTH     Right Foot Muscle Strength   Foot dorsiflexion:  5  Foot plantar flexion:  5  Foot inversion:  4+  Foot eversion:  4+     Left Foot Muscle Strength   Foot dorsiflexion:  5  Foot plantar flexion:  5  Foot inversion:  4+  Foot eversion:  4+     RANGE OF MOTION      Right Foot Range of Motion   Foot and ankle ROM within normal limits       Left Foot Range of Motion   Foot and ankle ROM within normal limits       DERMATOLOGIC     Right Foot Dermatologic   Skin: corn (sub 5th met head) and atrophic    Nails: onychomycosis, abnormally thick, subungual debris and dystrophic nails       Left Foot Dermatologic   Skin: skin intact and atrophic    Nails: onychomycosis, abnormally thick, subungual debris and dystrophic nails       Image:       RADIOLOGY/NUCLEAR:  No results found.    LABORATORY/CULTURE RESULTS:      PATHOLOGY RESULTS:       ASSESSMENT/PLAN     Diagnoses and all orders for this visit:    1. Onychomycosis (Primary)    2. Type 2 diabetes mellitus with diabetic neuropathy,  without long-term current use of insulin (CMS/[facility])    3. Foot pain, bilateral    4. Hallux valgus, left    5. Hallux valgus, right    6. Foot callus    7. Peripheral edema      Comprehensive lower extremity examination and evaluation was performed.  Discussed findings and treatment plan including risks, benefits, and treatment options with patient in detail. Patient agreed with treatment plan.  After verbal consent obtained, nail(s) x10 debrided of length and thickness with nail nipper without incidence  After verbal consent obtained, calluses x1 pared utilizing dermal curette and/or scalpel without incidence  Patient may maintain nails and calluses at home utilizing emery board or pumice stone between visits as needed  Reviewed at home diabetic foot care including daily foot checks   Continue use of compression stockings daily.  An After Visit Summary was printed and given to the patient at discharge, including (if requested) any available informative/educational handouts regarding diagnosis, treatment, or medications. All questions were answered to patient/family satisfaction. Should symptoms fail to improve or worsen they agree to call or return to clinic or to go to the Emergency Department. Discussed the importance of following up with any needed screening tests/labs/specialist appointments and any requested follow-up recommended by me today. Importance of maintaining follow-up discussed and patient accepts that missed appointments can delay diagnosis and potentially lead to worsening of conditions.  Return in about 3 months (around 9/17/2021)., or sooner if acute issues arise.        This document has been electronically signed by Morgan Pepper DPM on June 17, 2021 11:00 CDT

## 2021-06-16 ENCOUNTER — TELEPHONE (OUTPATIENT)
Dept: PODIATRY | Facility: CLINIC | Age: 86
End: 2021-06-16

## 2021-06-17 ENCOUNTER — OFFICE VISIT (OUTPATIENT)
Dept: PODIATRY | Facility: CLINIC | Age: 86
End: 2021-06-17

## 2021-06-17 VITALS
HEIGHT: 66 IN | WEIGHT: 152 LBS | DIASTOLIC BLOOD PRESSURE: 58 MMHG | SYSTOLIC BLOOD PRESSURE: 136 MMHG | HEART RATE: 77 BPM | OXYGEN SATURATION: 99 % | BODY MASS INDEX: 24.43 KG/M2

## 2021-06-17 DIAGNOSIS — E11.40 TYPE 2 DIABETES MELLITUS WITH DIABETIC NEUROPATHY, WITHOUT LONG-TERM CURRENT USE OF INSULIN (HCC): ICD-10-CM

## 2021-06-17 DIAGNOSIS — B35.1 ONYCHOMYCOSIS: Primary | ICD-10-CM

## 2021-06-17 DIAGNOSIS — M20.12 HALLUX VALGUS, LEFT: ICD-10-CM

## 2021-06-17 DIAGNOSIS — M20.11 HALLUX VALGUS, RIGHT: ICD-10-CM

## 2021-06-17 DIAGNOSIS — M79.672 FOOT PAIN, BILATERAL: ICD-10-CM

## 2021-06-17 DIAGNOSIS — L84 FOOT CALLUS: ICD-10-CM

## 2021-06-17 DIAGNOSIS — R60.9 PERIPHERAL EDEMA: ICD-10-CM

## 2021-06-17 DIAGNOSIS — M79.671 FOOT PAIN, BILATERAL: ICD-10-CM

## 2021-06-17 PROCEDURE — 11721 DEBRIDE NAIL 6 OR MORE: CPT | Performed by: PODIATRIST

## 2021-06-17 PROCEDURE — 11055 PARING/CUTG B9 HYPRKER LES 1: CPT | Performed by: PODIATRIST

## 2021-06-17 RX ORDER — HYDROCHLOROTHIAZIDE 25 MG/1
25 TABLET ORAL DAILY
COMMUNITY
End: 2021-09-03 | Stop reason: HOSPADM

## 2021-06-17 RX ORDER — MULTIVIT-MIN/IRON/FOLIC ACID/K 18-600-40
500 CAPSULE ORAL DAILY
COMMUNITY
End: 2021-11-05 | Stop reason: HOSPADM

## 2021-06-17 RX ORDER — LOSARTAN POTASSIUM 100 MG/1
100 TABLET ORAL
COMMUNITY
End: 2021-09-03 | Stop reason: HOSPADM

## 2021-06-17 RX ORDER — MULTIPLE VITAMINS W/ MINERALS TAB 9MG-400MCG
1 TAB ORAL DAILY
COMMUNITY
End: 2021-12-06

## 2021-08-30 ENCOUNTER — APPOINTMENT (OUTPATIENT)
Dept: GENERAL RADIOLOGY | Facility: HOSPITAL | Age: 86
End: 2021-08-30

## 2021-08-30 ENCOUNTER — HOSPITAL ENCOUNTER (INPATIENT)
Facility: HOSPITAL | Age: 86
LOS: 4 days | Discharge: NURSING, REHAB OR LONG-TERM CARE | End: 2021-09-03
Attending: EMERGENCY MEDICINE | Admitting: INTERNAL MEDICINE

## 2021-08-30 ENCOUNTER — APPOINTMENT (OUTPATIENT)
Dept: CT IMAGING | Facility: HOSPITAL | Age: 86
End: 2021-08-30

## 2021-08-30 DIAGNOSIS — E16.2 HYPOGLYCEMIA: Primary | ICD-10-CM

## 2021-08-30 DIAGNOSIS — N13.30 HYDRONEPHROSIS OF RIGHT KIDNEY: ICD-10-CM

## 2021-08-30 DIAGNOSIS — D64.9 ANEMIA, UNSPECIFIED TYPE: ICD-10-CM

## 2021-08-30 DIAGNOSIS — S32.050A COMPRESSION FRACTURE OF L5 VERTEBRA, INITIAL ENCOUNTER (HCC): ICD-10-CM

## 2021-08-30 DIAGNOSIS — N39.0 ACUTE UTI: ICD-10-CM

## 2021-08-30 DIAGNOSIS — N17.9 AKI (ACUTE KIDNEY INJURY) (HCC): ICD-10-CM

## 2021-08-30 DIAGNOSIS — E87.1 ACUTE HYPONATREMIA: ICD-10-CM

## 2021-08-30 DIAGNOSIS — R09.02 HYPOXIA: ICD-10-CM

## 2021-08-30 PROBLEM — Z91.81 PERSONAL HISTORY OF FALL: Status: ACTIVE | Noted: 2021-08-30

## 2021-08-30 PROBLEM — R18.8 ABDOMINAL FLUID COLLECTION: Status: ACTIVE | Noted: 2021-08-30

## 2021-08-30 LAB
ALBUMIN SERPL-MCNC: 3.1 G/DL (ref 3.5–5.2)
ALBUMIN/GLOB SERPL: 0.8 G/DL
ALP SERPL-CCNC: 58 U/L (ref 39–117)
ALT SERPL W P-5'-P-CCNC: 15 U/L (ref 1–33)
ANION GAP SERPL CALCULATED.3IONS-SCNC: 12 MMOL/L (ref 5–15)
AST SERPL-CCNC: 21 U/L (ref 1–32)
BACTERIA UR QL AUTO: ABNORMAL /HPF
BASOPHILS # BLD AUTO: 0.01 10*3/MM3 (ref 0–0.2)
BASOPHILS NFR BLD AUTO: 0.1 % (ref 0–1.5)
BILIRUB SERPL-MCNC: 0.5 MG/DL (ref 0–1.2)
BILIRUB UR QL STRIP: NEGATIVE
BUN SERPL-MCNC: 55 MG/DL (ref 8–23)
BUN/CREAT SERPL: 20.4 (ref 7–25)
CALCIUM SPEC-SCNC: 9 MG/DL (ref 8.6–10.5)
CHLORIDE SERPL-SCNC: 95 MMOL/L (ref 98–107)
CLARITY UR: ABNORMAL
CO2 SERPL-SCNC: 23 MMOL/L (ref 22–29)
COLOR UR: YELLOW
CREAT SERPL-MCNC: 2.7 MG/DL (ref 0.57–1)
D DIMER PPP FEU-MCNC: 2.46 MG/L (FEU) (ref 0–0.5)
D-LACTATE SERPL-SCNC: 1.1 MMOL/L (ref 0.5–2)
DEPRECATED RDW RBC AUTO: 42.9 FL (ref 37–54)
EOSINOPHIL # BLD AUTO: 0.01 10*3/MM3 (ref 0–0.4)
EOSINOPHIL NFR BLD AUTO: 0.1 % (ref 0.3–6.2)
ERYTHROCYTE [DISTWIDTH] IN BLOOD BY AUTOMATED COUNT: 13.1 % (ref 12.3–15.4)
GFR SERPL CREATININE-BSD FRML MDRD: 17 ML/MIN/1.73
GLOBULIN UR ELPH-MCNC: 3.8 GM/DL
GLUCOSE BLDC GLUCOMTR-MCNC: 106 MG/DL (ref 70–130)
GLUCOSE BLDC GLUCOMTR-MCNC: 149 MG/DL (ref 70–130)
GLUCOSE BLDC GLUCOMTR-MCNC: 167 MG/DL (ref 70–130)
GLUCOSE BLDC GLUCOMTR-MCNC: 210 MG/DL (ref 70–130)
GLUCOSE BLDC GLUCOMTR-MCNC: 32 MG/DL (ref 70–130)
GLUCOSE BLDC GLUCOMTR-MCNC: 33 MG/DL (ref 70–130)
GLUCOSE SERPL-MCNC: 57 MG/DL (ref 65–99)
GLUCOSE UR STRIP-MCNC: NEGATIVE MG/DL
HCT VFR BLD AUTO: 27.2 % (ref 34–46.6)
HGB BLD-MCNC: 9.3 G/DL (ref 12–15.9)
HGB UR QL STRIP.AUTO: NEGATIVE
HYALINE CASTS UR QL AUTO: ABNORMAL /LPF
IMM GRANULOCYTES # BLD AUTO: 0.06 10*3/MM3 (ref 0–0.05)
IMM GRANULOCYTES NFR BLD AUTO: 0.6 % (ref 0–0.5)
KETONES UR QL STRIP: NEGATIVE
LEUKOCYTE ESTERASE UR QL STRIP.AUTO: ABNORMAL
LIPASE SERPL-CCNC: 296 U/L (ref 13–60)
LYMPHOCYTES # BLD AUTO: 2.57 10*3/MM3 (ref 0.7–3.1)
LYMPHOCYTES NFR BLD AUTO: 25.3 % (ref 19.6–45.3)
MCH RBC QN AUTO: 31 PG (ref 26.6–33)
MCHC RBC AUTO-ENTMCNC: 34.2 G/DL (ref 31.5–35.7)
MCV RBC AUTO: 90.7 FL (ref 79–97)
MONOCYTES # BLD AUTO: 1.42 10*3/MM3 (ref 0.1–0.9)
MONOCYTES NFR BLD AUTO: 14 % (ref 5–12)
NEUTROPHILS NFR BLD AUTO: 59.9 % (ref 42.7–76)
NEUTROPHILS NFR BLD AUTO: 6.1 10*3/MM3 (ref 1.7–7)
NITRITE UR QL STRIP: NEGATIVE
NRBC BLD AUTO-RTO: 0 /100 WBC (ref 0–0.2)
NT-PROBNP SERPL-MCNC: 1686 PG/ML (ref 0–1800)
PH UR STRIP.AUTO: 6 [PH] (ref 5–8)
PLATELET # BLD AUTO: 256 10*3/MM3 (ref 140–450)
PMV BLD AUTO: 11.1 FL (ref 6–12)
POTASSIUM SERPL-SCNC: 4 MMOL/L (ref 3.5–5.2)
PROT SERPL-MCNC: 6.9 G/DL (ref 6–8.5)
PROT UR QL STRIP: ABNORMAL
RBC # BLD AUTO: 3 10*6/MM3 (ref 3.77–5.28)
RBC # UR: ABNORMAL /HPF
REF LAB TEST METHOD: ABNORMAL
SARS-COV-2 RNA PNL SPEC NAA+PROBE: NOT DETECTED
SODIUM SERPL-SCNC: 130 MMOL/L (ref 136–145)
SP GR UR STRIP: 1.02 (ref 1–1.03)
SQUAMOUS #/AREA URNS HPF: ABNORMAL /HPF
TSH SERPL DL<=0.05 MIU/L-ACNC: 1.71 UIU/ML (ref 0.27–4.2)
UROBILINOGEN UR QL STRIP: ABNORMAL
WBC # BLD AUTO: 10.17 10*3/MM3 (ref 3.4–10.8)
WBC UR QL AUTO: ABNORMAL /HPF

## 2021-08-30 PROCEDURE — 99222 1ST HOSP IP/OBS MODERATE 55: CPT | Performed by: UROLOGY

## 2021-08-30 PROCEDURE — 85025 COMPLETE CBC W/AUTO DIFF WBC: CPT | Performed by: EMERGENCY MEDICINE

## 2021-08-30 PROCEDURE — 84443 ASSAY THYROID STIM HORMONE: CPT | Performed by: INTERNAL MEDICINE

## 2021-08-30 PROCEDURE — 82962 GLUCOSE BLOOD TEST: CPT

## 2021-08-30 PROCEDURE — 81001 URINALYSIS AUTO W/SCOPE: CPT | Performed by: EMERGENCY MEDICINE

## 2021-08-30 PROCEDURE — 99285 EMERGENCY DEPT VISIT HI MDM: CPT

## 2021-08-30 PROCEDURE — 80053 COMPREHEN METABOLIC PANEL: CPT | Performed by: EMERGENCY MEDICINE

## 2021-08-30 PROCEDURE — 87040 BLOOD CULTURE FOR BACTERIA: CPT | Performed by: EMERGENCY MEDICINE

## 2021-08-30 PROCEDURE — 71045 X-RAY EXAM CHEST 1 VIEW: CPT

## 2021-08-30 PROCEDURE — P9612 CATHETERIZE FOR URINE SPEC: HCPCS

## 2021-08-30 PROCEDURE — 74176 CT ABD & PELVIS W/O CONTRAST: CPT

## 2021-08-30 PROCEDURE — 36415 COLL VENOUS BLD VENIPUNCTURE: CPT | Performed by: EMERGENCY MEDICINE

## 2021-08-30 PROCEDURE — 83605 ASSAY OF LACTIC ACID: CPT | Performed by: EMERGENCY MEDICINE

## 2021-08-30 PROCEDURE — 36415 COLL VENOUS BLD VENIPUNCTURE: CPT

## 2021-08-30 PROCEDURE — 25010000002 CEFTRIAXONE PER 250 MG: Performed by: EMERGENCY MEDICINE

## 2021-08-30 PROCEDURE — 83690 ASSAY OF LIPASE: CPT | Performed by: EMERGENCY MEDICINE

## 2021-08-30 PROCEDURE — 87635 SARS-COV-2 COVID-19 AMP PRB: CPT | Performed by: EMERGENCY MEDICINE

## 2021-08-30 PROCEDURE — 83880 ASSAY OF NATRIURETIC PEPTIDE: CPT | Performed by: EMERGENCY MEDICINE

## 2021-08-30 PROCEDURE — 87086 URINE CULTURE/COLONY COUNT: CPT | Performed by: EMERGENCY MEDICINE

## 2021-08-30 PROCEDURE — 85379 FIBRIN DEGRADATION QUANT: CPT | Performed by: EMERGENCY MEDICINE

## 2021-08-30 RX ORDER — ATORVASTATIN CALCIUM 40 MG/1
40 TABLET, FILM COATED ORAL NIGHTLY
Status: DISCONTINUED | OUTPATIENT
Start: 2021-08-30 | End: 2021-09-03 | Stop reason: HOSPADM

## 2021-08-30 RX ORDER — ONDANSETRON 4 MG/1
4 TABLET, ORALLY DISINTEGRATING ORAL EVERY 6 HOURS PRN
Status: ON HOLD | COMMUNITY
End: 2021-08-31

## 2021-08-30 RX ORDER — DEXTROSE AND SODIUM CHLORIDE 5; .9 G/100ML; G/100ML
100 INJECTION, SOLUTION INTRAVENOUS CONTINUOUS
Status: DISCONTINUED | OUTPATIENT
Start: 2021-08-30 | End: 2021-08-31

## 2021-08-30 RX ORDER — SODIUM CHLORIDE 0.9 % (FLUSH) 0.9 %
10 SYRINGE (ML) INJECTION AS NEEDED
Status: DISCONTINUED | OUTPATIENT
Start: 2021-08-30 | End: 2021-09-03 | Stop reason: HOSPADM

## 2021-08-30 RX ORDER — LEVOTHYROXINE SODIUM 0.07 MG/1
75 TABLET ORAL
Status: DISCONTINUED | OUTPATIENT
Start: 2021-08-31 | End: 2021-09-03 | Stop reason: HOSPADM

## 2021-08-30 RX ORDER — SODIUM CHLORIDE 0.9 % (FLUSH) 0.9 %
10 SYRINGE (ML) INJECTION EVERY 12 HOURS SCHEDULED
Status: DISCONTINUED | OUTPATIENT
Start: 2021-08-30 | End: 2021-09-03 | Stop reason: HOSPADM

## 2021-08-30 RX ORDER — ACETAMINOPHEN 325 MG/1
650 TABLET ORAL EVERY 4 HOURS PRN
Status: DISCONTINUED | OUTPATIENT
Start: 2021-08-30 | End: 2021-09-03 | Stop reason: HOSPADM

## 2021-08-30 RX ORDER — SODIUM CHLORIDE 9 MG/ML
75 INJECTION, SOLUTION INTRAVENOUS CONTINUOUS
Status: DISCONTINUED | OUTPATIENT
Start: 2021-08-30 | End: 2021-09-03 | Stop reason: HOSPADM

## 2021-08-30 RX ORDER — NICOTINE POLACRILEX 4 MG
15 LOZENGE BUCCAL
Status: DISCONTINUED | OUTPATIENT
Start: 2021-08-30 | End: 2021-09-03 | Stop reason: HOSPADM

## 2021-08-30 RX ORDER — ONDANSETRON 2 MG/ML
4 INJECTION INTRAMUSCULAR; INTRAVENOUS EVERY 6 HOURS PRN
Status: DISCONTINUED | OUTPATIENT
Start: 2021-08-30 | End: 2021-09-03 | Stop reason: HOSPADM

## 2021-08-30 RX ORDER — LACTULOSE 20 G/30ML
20 SOLUTION ORAL DAILY
Status: DISCONTINUED | OUTPATIENT
Start: 2021-08-30 | End: 2021-09-02

## 2021-08-30 RX ORDER — DEXTROSE MONOHYDRATE 25 G/50ML
25 INJECTION, SOLUTION INTRAVENOUS
Status: DISCONTINUED | OUTPATIENT
Start: 2021-08-30 | End: 2021-09-03 | Stop reason: HOSPADM

## 2021-08-30 RX ORDER — MULTIPLE VITAMINS W/ MINERALS TAB 9MG-400MCG
1 TAB ORAL DAILY
Status: DISCONTINUED | OUTPATIENT
Start: 2021-08-30 | End: 2021-09-03 | Stop reason: HOSPADM

## 2021-08-30 RX ORDER — HYDROCODONE BITARTRATE AND ACETAMINOPHEN 7.5; 325 MG/1; MG/1
1 TABLET ORAL EVERY 6 HOURS PRN
Status: ON HOLD | COMMUNITY
End: 2021-09-03 | Stop reason: SDUPTHER

## 2021-08-30 RX ORDER — BISACODYL 10 MG
10 SUPPOSITORY, RECTAL RECTAL DAILY PRN
Status: DISCONTINUED | OUTPATIENT
Start: 2021-08-30 | End: 2021-09-03 | Stop reason: HOSPADM

## 2021-08-30 RX ORDER — MAGNESIUM CARB/ALUMINUM HYDROX 105-160MG
296 TABLET,CHEWABLE ORAL ONCE
Status: COMPLETED | OUTPATIENT
Start: 2021-08-30 | End: 2021-08-30

## 2021-08-30 RX ORDER — PAROXETINE 10 MG/1
10 TABLET, FILM COATED ORAL DAILY
Status: DISCONTINUED | OUTPATIENT
Start: 2021-08-30 | End: 2021-09-03 | Stop reason: HOSPADM

## 2021-08-30 RX ORDER — SODIUM CHLORIDE 0.9 % (FLUSH) 0.9 %
10 SYRINGE (ML) INJECTION AS NEEDED
Status: DISCONTINUED | OUTPATIENT
Start: 2021-08-30 | End: 2021-08-31 | Stop reason: SDUPTHER

## 2021-08-30 RX ORDER — DEXTROSE MONOHYDRATE 25 G/50ML
25 INJECTION, SOLUTION INTRAVENOUS ONCE
Status: COMPLETED | OUTPATIENT
Start: 2021-08-30 | End: 2021-08-30

## 2021-08-30 RX ADMIN — Medication 296 ML: at 22:10

## 2021-08-30 RX ADMIN — DEXTROSE AND SODIUM CHLORIDE 100 ML/HR: 5; 900 INJECTION, SOLUTION INTRAVENOUS at 18:05

## 2021-08-30 RX ADMIN — ATORVASTATIN CALCIUM 40 MG: 40 TABLET, FILM COATED ORAL at 22:10

## 2021-08-30 RX ADMIN — DEXTROSE MONOHYDRATE 25 G: 500 INJECTION PARENTERAL at 17:30

## 2021-08-30 RX ADMIN — LACTULOSE 20 G: 20 SOLUTION ORAL at 22:10

## 2021-08-30 RX ADMIN — CEFTRIAXONE 1 G: 1 INJECTION, POWDER, FOR SOLUTION INTRAMUSCULAR; INTRAVENOUS at 12:37

## 2021-08-30 RX ADMIN — ACETAMINOPHEN 650 MG: 325 TABLET, FILM COATED ORAL at 22:10

## 2021-08-30 RX ADMIN — DEXTROSE MONOHYDRATE 25 G: 500 INJECTION PARENTERAL at 12:52

## 2021-08-31 LAB
ANION GAP SERPL CALCULATED.3IONS-SCNC: 20 MMOL/L (ref 5–15)
BUN SERPL-MCNC: 54 MG/DL (ref 8–23)
BUN/CREAT SERPL: 22.5 (ref 7–25)
CALCIUM SPEC-SCNC: 8.5 MG/DL (ref 8.6–10.5)
CHLORIDE SERPL-SCNC: 95 MMOL/L (ref 98–107)
CO2 SERPL-SCNC: 21 MMOL/L (ref 22–29)
CREAT SERPL-MCNC: 2.4 MG/DL (ref 0.57–1)
DEPRECATED RDW RBC AUTO: 43.7 FL (ref 37–54)
ERYTHROCYTE [DISTWIDTH] IN BLOOD BY AUTOMATED COUNT: 13 % (ref 12.3–15.4)
GFR SERPL CREATININE-BSD FRML MDRD: 19 ML/MIN/1.73
GLUCOSE BLDC GLUCOMTR-MCNC: 161 MG/DL (ref 70–130)
GLUCOSE BLDC GLUCOMTR-MCNC: 161 MG/DL (ref 70–130)
GLUCOSE BLDC GLUCOMTR-MCNC: 221 MG/DL (ref 70–130)
GLUCOSE BLDC GLUCOMTR-MCNC: 235 MG/DL (ref 70–130)
GLUCOSE BLDC GLUCOMTR-MCNC: 238 MG/DL (ref 70–130)
GLUCOSE BLDC GLUCOMTR-MCNC: 305 MG/DL (ref 70–130)
GLUCOSE SERPL-MCNC: 156 MG/DL (ref 65–99)
HBA1C MFR BLD: 6.3 % (ref 4.8–5.6)
HCT VFR BLD AUTO: 27.2 % (ref 34–46.6)
HGB BLD-MCNC: 9 G/DL (ref 12–15.9)
LIPASE SERPL-CCNC: 584 U/L (ref 13–60)
MCH RBC QN AUTO: 30.4 PG (ref 26.6–33)
MCHC RBC AUTO-ENTMCNC: 33.1 G/DL (ref 31.5–35.7)
MCV RBC AUTO: 91.9 FL (ref 79–97)
PLATELET # BLD AUTO: 247 10*3/MM3 (ref 140–450)
PMV BLD AUTO: 11.2 FL (ref 6–12)
POTASSIUM SERPL-SCNC: 3.7 MMOL/L (ref 3.5–5.2)
RBC # BLD AUTO: 2.96 10*6/MM3 (ref 3.77–5.28)
SODIUM SERPL-SCNC: 136 MMOL/L (ref 136–145)
WBC # BLD AUTO: 7.29 10*3/MM3 (ref 3.4–10.8)

## 2021-08-31 PROCEDURE — 99232 SBSQ HOSP IP/OBS MODERATE 35: CPT | Performed by: UROLOGY

## 2021-08-31 PROCEDURE — 63710000001 INSULIN LISPRO (HUMAN) PER 5 UNITS: Performed by: INTERNAL MEDICINE

## 2021-08-31 PROCEDURE — 63710000001 INSULIN DETEMIR PER 5 UNITS: Performed by: INTERNAL MEDICINE

## 2021-08-31 PROCEDURE — 83690 ASSAY OF LIPASE: CPT | Performed by: INTERNAL MEDICINE

## 2021-08-31 PROCEDURE — 85027 COMPLETE CBC AUTOMATED: CPT | Performed by: INTERNAL MEDICINE

## 2021-08-31 PROCEDURE — 83036 HEMOGLOBIN GLYCOSYLATED A1C: CPT | Performed by: INTERNAL MEDICINE

## 2021-08-31 PROCEDURE — 25010000002 CEFTRIAXONE PER 250 MG: Performed by: INTERNAL MEDICINE

## 2021-08-31 PROCEDURE — 80048 BASIC METABOLIC PNL TOTAL CA: CPT | Performed by: INTERNAL MEDICINE

## 2021-08-31 PROCEDURE — 82962 GLUCOSE BLOOD TEST: CPT

## 2021-08-31 RX ORDER — ONDANSETRON 4 MG/1
4 TABLET, ORALLY DISINTEGRATING ORAL EVERY 6 HOURS PRN
Status: DISCONTINUED | OUTPATIENT
Start: 2021-08-31 | End: 2021-09-03 | Stop reason: HOSPADM

## 2021-08-31 RX ORDER — LEVOCETIRIZINE DIHYDROCHLORIDE 5 MG/1
5 TABLET, FILM COATED ORAL DAILY
COMMUNITY
End: 2021-10-15 | Stop reason: HOSPADM

## 2021-08-31 RX ORDER — GLIPIZIDE 10 MG/1
10 TABLET, FILM COATED, EXTENDED RELEASE ORAL 2 TIMES DAILY
COMMUNITY
End: 2021-09-03 | Stop reason: HOSPADM

## 2021-08-31 RX ORDER — PAROXETINE 10 MG/1
10 TABLET, FILM COATED ORAL EVERY MORNING
Status: ON HOLD | COMMUNITY
End: 2021-10-09

## 2021-08-31 RX ORDER — PROCHLORPERAZINE MALEATE 10 MG
10 TABLET ORAL EVERY 6 HOURS PRN
COMMUNITY
End: 2022-01-10

## 2021-08-31 RX ORDER — ONDANSETRON 4 MG/1
4 TABLET, FILM COATED ORAL EVERY 6 HOURS PRN
COMMUNITY
End: 2021-12-06

## 2021-08-31 RX ORDER — SODIUM CHLORIDE 9 MG/ML
100 INJECTION, SOLUTION INTRAVENOUS CONTINUOUS
Status: DISCONTINUED | OUTPATIENT
Start: 2021-08-31 | End: 2021-08-31

## 2021-08-31 RX ORDER — LEVOTHYROXINE SODIUM 0.07 MG/1
75 TABLET ORAL DAILY
COMMUNITY

## 2021-08-31 RX ADMIN — SODIUM CHLORIDE 100 ML/HR: 9 INJECTION, SOLUTION INTRAVENOUS at 23:19

## 2021-08-31 RX ADMIN — SODIUM CHLORIDE 100 ML/HR: 9 INJECTION, SOLUTION INTRAVENOUS at 12:21

## 2021-08-31 RX ADMIN — INSULIN DETEMIR 10 UNITS: 100 INJECTION, SOLUTION SUBCUTANEOUS at 22:38

## 2021-08-31 RX ADMIN — ATORVASTATIN CALCIUM 40 MG: 40 TABLET, FILM COATED ORAL at 21:06

## 2021-08-31 RX ADMIN — SODIUM CHLORIDE 100 ML/HR: 9 INJECTION, SOLUTION INTRAVENOUS at 01:06

## 2021-08-31 RX ADMIN — LEVOTHYROXINE SODIUM 75 MCG: 75 TABLET ORAL at 06:47

## 2021-08-31 RX ADMIN — INSULIN LISPRO 4 UNITS: 100 INJECTION, SOLUTION INTRAVENOUS; SUBCUTANEOUS at 17:25

## 2021-08-31 RX ADMIN — LACTULOSE 20 G: 20 SOLUTION ORAL at 09:03

## 2021-08-31 RX ADMIN — PAROXETINE 10 MG: 10 TABLET, FILM COATED ORAL at 09:03

## 2021-08-31 RX ADMIN — INSULIN LISPRO 4 UNITS: 100 INJECTION, SOLUTION INTRAVENOUS; SUBCUTANEOUS at 12:21

## 2021-08-31 RX ADMIN — SODIUM CHLORIDE 1 G: 9 INJECTION, SOLUTION INTRAVENOUS at 12:21

## 2021-08-31 RX ADMIN — INSULIN LISPRO 2 UNITS: 100 INJECTION, SOLUTION INTRAVENOUS; SUBCUTANEOUS at 09:03

## 2021-08-31 RX ADMIN — Medication 1 TABLET: at 09:03

## 2021-09-01 LAB
ANION GAP SERPL CALCULATED.3IONS-SCNC: 11 MMOL/L (ref 5–15)
BUN SERPL-MCNC: 43 MG/DL (ref 8–23)
BUN/CREAT SERPL: 19.7 (ref 7–25)
CALCIUM SPEC-SCNC: 8.2 MG/DL (ref 8.6–10.5)
CHLORIDE SERPL-SCNC: 103 MMOL/L (ref 98–107)
CO2 SERPL-SCNC: 20 MMOL/L (ref 22–29)
CREAT SERPL-MCNC: 2.18 MG/DL (ref 0.57–1)
GFR SERPL CREATININE-BSD FRML MDRD: 21 ML/MIN/1.73
GLUCOSE BLDC GLUCOMTR-MCNC: 134 MG/DL (ref 70–130)
GLUCOSE BLDC GLUCOMTR-MCNC: 185 MG/DL (ref 70–130)
GLUCOSE BLDC GLUCOMTR-MCNC: 194 MG/DL (ref 70–130)
GLUCOSE BLDC GLUCOMTR-MCNC: 208 MG/DL (ref 70–130)
GLUCOSE SERPL-MCNC: 154 MG/DL (ref 65–99)
LIPASE SERPL-CCNC: 506 U/L (ref 13–60)
POTASSIUM SERPL-SCNC: 3.9 MMOL/L (ref 3.5–5.2)
SODIUM SERPL-SCNC: 134 MMOL/L (ref 136–145)

## 2021-09-01 PROCEDURE — 82962 GLUCOSE BLOOD TEST: CPT

## 2021-09-01 PROCEDURE — 25010000002 CEFTRIAXONE PER 250 MG: Performed by: INTERNAL MEDICINE

## 2021-09-01 PROCEDURE — 63710000001 INSULIN LISPRO (HUMAN) PER 5 UNITS: Performed by: INTERNAL MEDICINE

## 2021-09-01 PROCEDURE — 63710000001 INSULIN DETEMIR PER 5 UNITS: Performed by: INTERNAL MEDICINE

## 2021-09-01 PROCEDURE — 99232 SBSQ HOSP IP/OBS MODERATE 35: CPT | Performed by: UROLOGY

## 2021-09-01 PROCEDURE — 80048 BASIC METABOLIC PNL TOTAL CA: CPT | Performed by: NURSE PRACTITIONER

## 2021-09-01 PROCEDURE — 25010000002 VANCOMYCIN 10 G RECONSTITUTED SOLUTION: Performed by: INTERNAL MEDICINE

## 2021-09-01 PROCEDURE — 83690 ASSAY OF LIPASE: CPT | Performed by: INTERNAL MEDICINE

## 2021-09-01 RX ORDER — AMLODIPINE BESYLATE 5 MG/1
5 TABLET ORAL
Status: DISCONTINUED | OUTPATIENT
Start: 2021-09-01 | End: 2021-09-03

## 2021-09-01 RX ORDER — LABETALOL HYDROCHLORIDE 5 MG/ML
10 INJECTION, SOLUTION INTRAVENOUS EVERY 6 HOURS PRN
Status: DISCONTINUED | OUTPATIENT
Start: 2021-09-01 | End: 2021-09-03 | Stop reason: HOSPADM

## 2021-09-01 RX ADMIN — INSULIN DETEMIR 10 UNITS: 100 INJECTION, SOLUTION SUBCUTANEOUS at 21:03

## 2021-09-01 RX ADMIN — AMLODIPINE BESYLATE 5 MG: 5 TABLET ORAL at 08:35

## 2021-09-01 RX ADMIN — PAROXETINE 10 MG: 10 TABLET, FILM COATED ORAL at 08:35

## 2021-09-01 RX ADMIN — LABETALOL HYDROCHLORIDE 10 MG: 5 INJECTION, SOLUTION INTRAVENOUS at 22:38

## 2021-09-01 RX ADMIN — INSULIN LISPRO 4 UNITS: 100 INJECTION, SOLUTION INTRAVENOUS; SUBCUTANEOUS at 11:25

## 2021-09-01 RX ADMIN — LEVOTHYROXINE SODIUM 75 MCG: 75 TABLET ORAL at 05:44

## 2021-09-01 RX ADMIN — Medication 1 TABLET: at 08:35

## 2021-09-01 RX ADMIN — LABETALOL HYDROCHLORIDE 10 MG: 5 INJECTION, SOLUTION INTRAVENOUS at 05:44

## 2021-09-01 RX ADMIN — SODIUM CHLORIDE 1 G: 9 INJECTION, SOLUTION INTRAVENOUS at 12:14

## 2021-09-01 RX ADMIN — ATORVASTATIN CALCIUM 40 MG: 40 TABLET, FILM COATED ORAL at 21:02

## 2021-09-01 RX ADMIN — VANCOMYCIN HYDROCHLORIDE 500 MG: 10 INJECTION, POWDER, LYOPHILIZED, FOR SOLUTION INTRAVENOUS at 08:35

## 2021-09-01 RX ADMIN — INSULIN LISPRO 2 UNITS: 100 INJECTION, SOLUTION INTRAVENOUS; SUBCUTANEOUS at 17:42

## 2021-09-01 NOTE — PROGRESS NOTES
"Pharmacy Dosing Service  Pharmacokinetics  Vancomycin Initial Evaluation  Assessment/Action/Plan:    Initiated: Vancomycin 500 mg IVPB every 24 hours  Current end date:9/7/21  Additional antimicrobial agent(s): Rocephin    Vancomycin dosage initiated based on population pharmacokinetic parameters. Pharmacy will continue to follow daily and adjust dose accordingly.     Subjective:  Honey Canales is a 88 y.o. female with a Vancomycin \"Pharmacy to Dose\" consult for the treatment of UTI , day 1 of 7 of treatment.    AUC Model Data:  Regimen: 500 mg IV every 24 hours.  Exposure target: AUC24 (range)400-600 mg/L.hr   AUC24,ss: 420 mg/L.hr  PAUC*: 56 %  Ctrough,ss: 14.9 mg/L  Pconc*: 18 %  Tox.: 10 %      Objective:  Ht: 168.9 cm (66.5\"); Wt: 71.8 kg (158 lb 3.2 oz)  Estimated Creatinine Clearance: 20.2 mL/min (A) (by C-G formula based on SCr of 2.18 mg/dL (H)).   Creatinine   Date Value Ref Range Status   09/01/2021 2.18 (H) 0.57 - 1.00 mg/dL Final   08/31/2021 2.40 (H) 0.57 - 1.00 mg/dL Final   08/30/2021 2.70 (H) 0.57 - 1.00 mg/dL Final      Lab Results   Component Value Date    WBC 7.29 08/31/2021    WBC 10.17 08/30/2021      Baseline culture results:  Microbiology Results (last 10 days)       Procedure Component Value - Date/Time    Urine Culture - Urine, Urine, Catheter In/Out [[id number]]  (Abnormal) Collected: 08/30/21 1120    Lab Status: Preliminary result Specimen: Urine, Catheter In/Out Updated: 08/31/21 2219     Urine Culture >100,000 CFU/mL Gram Positive Cocci    Blood Culture - Blood, Arm, Right [[id number]] Collected: 08/30/21 1113    Lab Status: Preliminary result Specimen: Blood from Arm, Right Updated: 08/31/21 1145     Blood Culture No growth at 24 hours    Blood Culture - Blood, Hand, Right [[id number]] Collected: 08/30/21 1110    Lab Status: Preliminary result Specimen: Blood from Hand, Right Updated: 08/31/21 1145     Blood Culture No growth at 24 hours    COVID-19,[facility] IN-HOUSE,Nasal Swab No " Transport Media 3-4 HR TAT - Swab, Nasal Cavity [[id number]]  (Normal) Collected: 08/30/21 1059    Lab Status: Final result Specimen: Swab from Nasal Cavity Updated: 08/30/21 1223     COVID19 Not Detected    Narrative:      Fact sheet for providers: https://www.fda.gov/media/333976/download     Fact sheet for patients: https://www.fda.gov/media/961897/download    Test performed by PCR.    Consider negative results in combination with clinical observations, patient history, and epidemiological information.            RUY Bernard PharmD  09/01/21 07:37 CDT

## 2021-09-01 NOTE — PLAN OF CARE
Goal Outcome Evaluation:  Plan of Care Reviewed With: patient           Outcome Summary: BP elevated, PRN Labatolol started and given, no c/o pain, BG was up to 305 at HS, scheduled Levemir given, bladder scans were 227 and 167 this shift, sinus 75-90

## 2021-09-01 NOTE — PLAN OF CARE
Problem: Adult Inpatient Plan of Care  Goal: Plan of Care Review  Outcome: Ongoing, Progressing  Flowsheets (Taken 9/1/2021 1552)  Progress: no change  Plan of Care Reviewed With: patient  Outcome Summary: PATIENT ALERT AND ORIENTED. PURE WIC IN PLACE. BM THIS SHIFT. NO C/O PAIN. PATIENT REFUSED TO BE TURNED AND SAID SHE COULD TURN HERSELF, HAVE BEEN REMINDING HER THOUGH TO PREVENT BREAKDOWN. ROCEPHIN AND VANC IV GIVEN. CREAT 2.18, NS INFUSING AT 75. SATS WNL ON RA. CONT TO MONITOR.

## 2021-09-01 NOTE — PROGRESS NOTES
LOS: 2 days   Patient Care Team:  Devon Zuñiga MD as PCP - General  Archie Avery MD as Consulting Physician (Urology)  Tim Small MD as Consulting Physician (Otolaryngology)  Angelito Atkins PA as Physician Assistant (Otolaryngology)    Chief Complaint:  JAKOB, right hydronephrosis vs cysts    Subjective     Interval History:     Patient Reports: Feeling well  Patient Denies:  Fever, chills, flank pain, nausea  History taken from: patient chart    Patient is comfortable in her bed eating breakfast.     Review of Systems  Pertinent items are noted in HPI, all other systems reviewed and negative     Objective     Vital Signs  Temp:  [97.6 °F (36.4 °C)-99.8 °F (37.7 °C)] 97.8 °F (36.6 °C)  Heart Rate:  [70-88] 70  Resp:  [14-16] 14  BP: (138-175)/(52-69) 154/52    Physical Exam:  Nontoxic. Comfortable. Mild CVA tenderness.     Data Review:       I have reviewed the following data:    Basic Metabolic Panel (09/01/2021 05:22)   Plan of Care by Renita Dahl RN (09/01/2021 07:24)       Medication Review:     Current Facility-Administered Medications:   •  acetaminophen (TYLENOL) tablet 650 mg, 650 mg, Oral, Q4H PRN, David Garland MD, 650 mg at 08/30/21 2210  •  amLODIPine (NORVASC) tablet 5 mg, 5 mg, Oral, Q24H, Beverly Cabral DO, 5 mg at 09/01/21 0835  •  atorvastatin (LIPITOR) tablet 40 mg, 40 mg, Oral, Nightly, David Garland MD, 40 mg at 08/31/21 2106  •  bisacodyl (DULCOLAX) suppository 10 mg, 10 mg, Rectal, Daily PRN, David Garland MD  •  cefTRIAXone (ROCEPHIN) 1 g in sodium chloride 0.9 % 100 mL IVPB-VTB, 1 g, Intravenous, Q24H, David Garland MD, Last Rate: 200 mL/hr at 08/31/21 1221, 1 g at 08/31/21 1221  •  dextrose (D50W) 25 g/ 50mL Intravenous Solution 25 g, 25 g, Intravenous, Q15 Min PRN, David Garland MD, 25 g at 08/30/21 1730  •  dextrose (GLUTOSE) oral gel 15 g, 15 g, Oral, Q15 Min PRN, David Garland MD  •  glucagon (human  recombinant) (GLUCAGEN DIAGNOSTIC) injection 1 mg, 1 mg, Subcutaneous, Q15 Min PRN, David Garland MD  •  insulin detemir (LEVEMIR) injection 10 Units, 10 Units, Subcutaneous, Nightly, David Garland MD, 10 Units at 08/31/21 2238  •  insulin lispro (humaLOG) injection 0-9 Units, 0-9 Units, Subcutaneous, TID AC, David Garland MD, 4 Units at 08/31/21 1725  •  labetalol (NORMODYNE,TRANDATE) injection 10 mg, 10 mg, Intravenous, Q6H PRN, Beverly Cabral DO, 10 mg at 09/01/21 0544  •  lactulose solution 20 g, 20 g, Oral, Daily, David Garland MD, 20 g at 08/31/21 0903  •  levothyroxine (SYNTHROID, LEVOTHROID) tablet 75 mcg, 75 mcg, Oral, Q AM, David Garland MD, 75 mcg at 09/01/21 0544  •  multivitamin with minerals 1 tablet, 1 tablet, Oral, Daily, David Garland MD, 1 tablet at 09/01/21 0835  •  ondansetron (ZOFRAN) injection 4 mg, 4 mg, Intravenous, Q6H PRN, David Garland MD  •  ondansetron ODT (ZOFRAN-ODT) disintegrating tablet 4 mg, 4 mg, Oral, Q6H PRN, Nohemi Varner, APRN  •  PARoxetine (PAXIL) tablet 10 mg, 10 mg, Oral, Daily, David Garland MD, 10 mg at 09/01/21 0835  •  [COMPLETED] Insert peripheral IV, , , Once **AND** sodium chloride 0.9 % flush 10 mL, 10 mL, Intravenous, PRN, David Garland MD  •  sodium chloride 0.9 % flush 10 mL, 10 mL, Intravenous, Q12H, David Garland MD  •  sodium chloride 0.9 % infusion, 100 mL/hr, Intravenous, Continuous, David Garland MD, Last Rate: 100 mL/hr at 08/31/21 2319, 100 mL/hr at 08/31/21 2319  •  vancomycin 500 mg/100 mL 0.9% NS IVPB ([facility]), 500 mg, Intravenous, Q24H, Tim Perez MD, 500 mg at 09/01/21 0835    Assessment and Plan:    JAKOB: Continue to monitor. Slowly trending down.    Right hydronephrosis versus cysts: Relatively asymptomatic at this time. Continue to monitor. Reassess tomorrow.    URO DISPO: I will continue to follow this patient.    I discussed the patients  findings and my recommendations with patient and primary care team    (Please note that portions of this note were completed with a voice recognition program.)    Amor Brown MD  09/01/21  08:35 CDT    Time: Time spent: 25 minutes spent performing evaluation and management, chart review, and discussion with patient, > 50% of time spent in face-to-face encounter

## 2021-09-01 NOTE — PROGRESS NOTES
[facility] Medicine Services  INPATIENT PROGRESS NOTE    Length of Stay: 2  Date of Admission: 8/30/2021  Primary Care Physician: Devon Zuñiga MD    Subjective   Chief Complaint: Follow-up  HPI   Sitting up in bed eating.  No oxygen in use.  Reports she had bowel movement the past 2 days.  Abdomen less distended.  Denies nausea, vomiting or abdominal pain.  Glucoses 134, 154, 305.  Will resume low-dose insulin at night.  Blood Cultures no growth.  Urine culture gram-positive cocci.  Discussed with urology today.  Plans to monitor renal function before electing for stent which would be long-term.  Vancomycin added to antibiotics until culture results available.  Denies chest pain, palpitations or shortness of breath.  Appetite improved.  Denies nausea or vomiting.    Review of Systems   Constitutional: Positive for appetite change. Negative for chills and fever.   HENT: Negative for congestion and trouble swallowing.    Eyes: Negative for photophobia and visual disturbance.   Respiratory: Negative for cough, shortness of breath and wheezing.    Cardiovascular: Negative for chest pain and palpitations.   Gastrointestinal: Positive for constipation. Negative for nausea and vomiting.   Endocrine: Negative for cold intolerance, heat intolerance and polyuria.   Genitourinary: Negative for dysuria, frequency and urgency.   Musculoskeletal: Positive for gait problem.   Skin: Negative for color change, pallor, rash and wound.   Allergic/Immunologic: Negative for immunocompromised state.   Neurological: Positive for weakness.   Hematological: Negative for adenopathy. Does not bruise/bleed easily.   Psychiatric/Behavioral: Negative for agitation, behavioral problems and confusion.      All pertinent negatives and positives are as above. All other systems have been reviewed and are negative unless otherwise stated.     Objective    Temp:  [97.8 °F (36.6 °C)-99.8 °F (37.7 °C)] 97.8 °F  (36.6 °C)  Heart Rate:  [70-88] 70  Resp:  [14-16] 14  BP: (138-175)/(52-69) 154/52  Physical Exam  Vitals reviewed. Nurses notes reviewed  Constitutional:       Comments: Sitting up in bed.  Oxygen has been weaned.  No family in room.   HENT:      Head: Normocephalic and atraumatic.      Nose: No congestion.      Mouth/Throat:      Pharynx: Oropharynx is clear. No oropharyngeal exudate.   Eyes:      Extraocular Movements: Extraocular movements intact.      Pupils: Pupils are equal, round, and reactive to light.   Cardiovascular:      Rate and Rhythm: Normal rate and regular rhythm.      Heart sounds: No murmur heard.      Comments: Normal sinus rhythm 75-90 on telemetry.  Pulmonary:      Breath sounds: No wheezing, rhonchi or rales.      Comments: No oxygen in use.  Abdominal:      General: There is distension (mild distention, improved, soft, bowel sounds present.).      Palpations: Abdomen is soft.   Genitourinary:     Comments: Voiding.  Musculoskeletal:         General: No swelling or tenderness.      Cervical back: Normal range of motion and neck supple.   Skin:     General: Skin is warm and dry.   Neurological:      General: No focal deficit present.      Mental Status: She is alert and oriented to person, place, and time.   Psychiatric:         Mood and Affect: Mood normal.         Behavior: Behavior normal.         Thought Content: Thought content normal.         Judgment: Judgment normal.      Results Review:  I have reviewed the labs, radiology results, and diagnostic studies.    Laboratory Data:      Results from last 7 days   Lab Units 08/31/21  0550 08/30/21  1304   WBC 10*3/mm3 7.29 10.17   HEMOGLOBIN g/dL 9.0* 9.3*   HEMATOCRIT % 27.2* 27.2*   PLATELETS 10*3/mm3 247 256        Results from last 7 days   Lab Units 09/01/21  0522 08/31/21  0550 08/31/21  0550 08/30/21  1113 08/30/21  1113   SODIUM mmol/L 134*  --  136  --  130*   POTASSIUM mmol/L 3.9  --  3.7  --  4.0   CHLORIDE mmol/L 103  --  95*  --   95*   CO2 mmol/L 20.0*  --  21.0*  --  23.0   BUN mg/dL 43*  --  54*  --  55*   CREATININE mg/dL 2.18*  --  2.40*  --  2.70*   GLUCOSE mg/dL 154*   < > 156*   < > 57*   CALCIUM mg/dL 8.2*  --  8.5*  --  9.0   ALT (SGPT) U/L  --   --   --   --  15    < > = values in this interval not displayed.     Culture Data:      Blood Culture   Date Value Ref Range Status   08/30/2021 No growth at 24 hours  Preliminary   08/30/2021 No growth at 24 hours  Preliminary     Urine Culture   Date Value Ref Range Status   08/30/2021 >100,000 CFU/mL Gram Positive Cocci (A)  Preliminary     Radiology Data:   Imaging Results (Last 7 Days)     Procedure Component Value Units Date/Time    CT Abdomen Pelvis Without Contrast [[id number]] Collected: 08/30/21 1336     Updated: 08/30/21 1356    Narrative:      EXAMINATION:  CT ABDOMEN PELVIS WO CONTRAST-  8/30/2021 1:25 PM CDT     HISTORY: Abdominal distention.     TECHNIQUE: Spiral CT was performed of the abdomen and pelvis without  contrast. Multiplanar images were reconstructed.     DLP: 485 mGy-cm. Automated dosage control was utilized.     COMPARISON: 10/17/2013.     LUNG BASES: There is dense coronary artery calcification. There are mild  patchy infiltrates in both lung bases likely due to atelectasis. There  are emphysematous changes in the lung bases.     LIVER AND SPLEEN: There has been prior cholecystectomy. The unenhanced  liver and spleen are unremarkable.     PANCREAS: Normal unenhanced appearance of the pancreas.     KIDNEYS AND ADRENALS: The adrenal glands are unremarkable. There is a  1-2 mm nonobstructive stone in the lower pole left kidney. There is a 1  mm or less nonobstructive stone in the left mid kidney. The left renal  collecting system and ureter are nondilated. There is severe  hydronephrosis of the right kidney. The right renal collecting system is  dilated. There is stranding of the fat around the dilated collecting  system and around the right kidney. The right  ureter is not appear to be  significantly dilated. There is a collection of fluid medial to the  central right renal collecting system measuring about 4.7 x 3.6 cm.  There is minimal wall thickening of the urinary bladder.     BOWEL: No oral contrast was given. There is underdistention of the  stomach. Small bowel loops are nondilated. There is liquid stool in the  colon. There are a few scattered colon diverticula.     OTHER: There has been prior hysterectomy. There is atheromatous disease  of the aortoiliac vessels. There is a stimulator device in the pelvis  extending through the sacral foramina which is probably a bladder  stimulator. There are degenerative changes of the spine. There is  enthesopathy of the pelvis. There are degenerative changes of both hips.  There is an age-indeterminate compression fracture of L5.       Impression:      1. 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  Patchy infiltrates in both lung bases likely due to atelectasis.  Emphysema in the lung bases.  2. Prior cholecystectomy.  3. Severe hydronephrosis of the right renal collecting system with  stranding of the fat around the kidney and right renal collecting system  without a visible obstructing stone. There is a fluid collection medial  to the collecting system measuring 4.7 x 3.6 cm. This may represent a  urinoma versus a perinephric abscess. It does not contain any air  bubbles. The right ureter is nondilated. The hydronephrosis could be  secondary to infection. Neoplasm is not ruled out. Urology consultation  is recommended for these findings.  4. There are small nonobstructive renal stones on the left side. Minimal  bladder wall thickening may be an artifact of underdistention.  Infection/inflammation is possible.  5. Prior hysterectomy and cholecystectomy.  6. Age indeterminate L5 compression deformity. It may be acute or  subacute in age. There are degenerative changes of the spine and  both  hips.  7. There are a few scattered colon diverticula. There is no small bowel  distention.     The full report of this exam was immediately signed and available to the  emergency room. The patient is currently in the emergency room.  This report was finalized on 08/30/2021 13:53 by Dr. Johnathan Brennan MD.    XR Chest 1 View [[id number]] Collected: 08/30/21 1058     Updated: 08/30/21 1101    Narrative:      Exam:   XR CHEST 1 VW-       Date:  8/30/2021      History:  Female, age  88 years;hypoxic     COMPARISON:  None.     Findings :     The heart and mediastinum are normal in size. Bilateral diffuse  coarsening of interstitium. Low lung volumes. No measurable  pneumothorax. No pleural effusion. No obvious focal consolidation.  The  bones show no acute pathology.         Impression:      Impression:     Bilateral coarsening of interstitium, without focal consolidation. This  is nonspecific. Differential does include viral infection.     This report was finalized on 08/30/2021 10:58 by Dr. Polly Pablo MD.        Results for orders placed during the hospital encounter of 06/15/18    Adult Stress Echo W/ Cont or Stress Agent if Necessary Per Protocol    Interpretation Summary  Dobutamine stress echocardiogram is low risk for ischemia.    Intake/Output    Intake/Output Summary (Last 24 hours) at 9/1/2021 1117  Last data filed at 9/1/2021 0928  Gross per 24 hour   Intake 1720 ml   Output 2271 ml   Net -551 ml     Scheduled Meds  amLODIPine, 5 mg, Oral, Q24H  atorvastatin, 40 mg, Oral, Nightly  cefTRIAXone, 1 g, Intravenous, Q24H  insulin detemir, 10 Units, Subcutaneous, Nightly  insulin lispro, 0-9 Units, Subcutaneous, TID AC  lactulose, 20 g, Oral, Daily  levothyroxine, 75 mcg, Oral, Q AM  multivitamin with minerals, 1 tablet, Oral, Daily  PARoxetine, 10 mg, Oral, Daily  sodium chloride, 10 mL, Intravenous, Q12H  vancomycin, 500 mg, Intravenous, Q24H      I have reviewed the patient current medications.      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  To ER from [facility] with low blood sugar.  Reports constipation and decreased oral intake and nausea.  Insulin continued at scheduled dose at SNF.  Glucose 38. Dextrose given and symptoms improved.  2.  Hemoglobin A1c 6.3. Accu-Cheks with sliding scale insulin coverage.  Glucoses improved 134, 154, 305.  Tolerating diet. Detemir 10 units nightly, restarted at lower dose.  Hold glipizide.  3.  Urinalysis 21-30 WBC, 1+ bacteria.  Rocephin started on admission.  Urine culture today reporting gram-positive cocci.  Vancomycin added today.  Continue Rocephin until culture sensitivity available.  4.  Blood cultures no growth at 24 hours.  5.  CT abdomen and pelvis noted severe hydronephrosis on the right renal collecting system with stranding of the fat around the kidney and right renal collecting system without obstructive stone.  Fluid collection measuring 4.7 x 3.6 cm.  May represent urinoma versus perinephric abscess.  Does not contain any air bubbles.  Right ureter nondilated.  Hydronephrosis could be secondary to infection.  Neoplasm not ruled out.  Small nonobstructive renal stones left side.  6.  Urology consulted.  If renal function improves with hydration consider contrasted CT.  If no significant improvement consider cystoscopy with retrograde pyelogram and stent placement.  Discussed with Dr. Brown this morning.  He would like to avoid stent placement if possible.  Continue hydration.  7.  Presented with  acute kidney injury with creatinine 2.7.  Baseline creatinine 1.08-1.2.  Creatinine 2.18 today.  Continue IV fluids but decrease rate to 75 mL/h and repeat basic metabolic panel in a.m.  8.  Normocytic anemia.  Hemoglobin 9.3 on admission.  Stable at 9.  CBC in a.m.  9.  Bowel regimen with lactulose. Magnesium citrate as needed.  10.  Deep vein thrombosis prophylaxis with SCDs.  11.  Sodium 130 on admission.  Hyponatremia improved 134.    Code Status/Advanced Care Plan: Full Code  The patient's surrogate decision maker is her son, Chaka.    The above documentation resulted from a face-to-face encounter by me Nohemi MORILLO, [facility].    Discharge Planning: I expect patient to be discharged to skilled nursing facility in 2-3 days.    Electronically signed by YISEL Salamanca, 9/1/2021, 11:17 CDT.

## 2021-09-02 LAB
ANION GAP SERPL CALCULATED.3IONS-SCNC: 10 MMOL/L (ref 5–15)
BACTERIA SPEC AEROBE CULT: ABNORMAL
BASOPHILS # BLD AUTO: 0.01 10*3/MM3 (ref 0–0.2)
BASOPHILS NFR BLD AUTO: 0.2 % (ref 0–1.5)
BUN SERPL-MCNC: 38 MG/DL (ref 8–23)
BUN/CREAT SERPL: 20.3 (ref 7–25)
CALCIUM SPEC-SCNC: 8.6 MG/DL (ref 8.6–10.5)
CHLORIDE SERPL-SCNC: 104 MMOL/L (ref 98–107)
CO2 SERPL-SCNC: 21 MMOL/L (ref 22–29)
CREAT SERPL-MCNC: 1.87 MG/DL (ref 0.57–1)
DEPRECATED RDW RBC AUTO: 42.4 FL (ref 37–54)
EOSINOPHIL # BLD AUTO: 0.2 10*3/MM3 (ref 0–0.4)
EOSINOPHIL NFR BLD AUTO: 3 % (ref 0.3–6.2)
ERYTHROCYTE [DISTWIDTH] IN BLOOD BY AUTOMATED COUNT: 12.6 % (ref 12.3–15.4)
GFR SERPL CREATININE-BSD FRML MDRD: 25 ML/MIN/1.73
GLUCOSE BLDC GLUCOMTR-MCNC: 141 MG/DL (ref 70–130)
GLUCOSE BLDC GLUCOMTR-MCNC: 144 MG/DL (ref 70–130)
GLUCOSE BLDC GLUCOMTR-MCNC: 163 MG/DL (ref 70–130)
GLUCOSE BLDC GLUCOMTR-MCNC: 207 MG/DL (ref 70–130)
GLUCOSE SERPL-MCNC: 143 MG/DL (ref 65–99)
HCT VFR BLD AUTO: 29.7 % (ref 34–46.6)
HGB BLD-MCNC: 9.9 G/DL (ref 12–15.9)
IMM GRANULOCYTES # BLD AUTO: 0.04 10*3/MM3 (ref 0–0.05)
IMM GRANULOCYTES NFR BLD AUTO: 0.6 % (ref 0–0.5)
LYMPHOCYTES # BLD AUTO: 1.76 10*3/MM3 (ref 0.7–3.1)
LYMPHOCYTES NFR BLD AUTO: 26.5 % (ref 19.6–45.3)
MCH RBC QN AUTO: 30.7 PG (ref 26.6–33)
MCHC RBC AUTO-ENTMCNC: 33.3 G/DL (ref 31.5–35.7)
MCV RBC AUTO: 92 FL (ref 79–97)
MONOCYTES # BLD AUTO: 0.82 10*3/MM3 (ref 0.1–0.9)
MONOCYTES NFR BLD AUTO: 12.3 % (ref 5–12)
NEUTROPHILS NFR BLD AUTO: 3.81 10*3/MM3 (ref 1.7–7)
NEUTROPHILS NFR BLD AUTO: 57.4 % (ref 42.7–76)
NRBC BLD AUTO-RTO: 0 /100 WBC (ref 0–0.2)
PLATELET # BLD AUTO: 291 10*3/MM3 (ref 140–450)
PMV BLD AUTO: 10.7 FL (ref 6–12)
POTASSIUM SERPL-SCNC: 4.2 MMOL/L (ref 3.5–5.2)
RBC # BLD AUTO: 3.23 10*6/MM3 (ref 3.77–5.28)
SODIUM SERPL-SCNC: 135 MMOL/L (ref 136–145)
WBC # BLD AUTO: 6.64 10*3/MM3 (ref 3.4–10.8)

## 2021-09-02 PROCEDURE — 25010000002 VANCOMYCIN 10 G RECONSTITUTED SOLUTION: Performed by: INTERNAL MEDICINE

## 2021-09-02 PROCEDURE — 82962 GLUCOSE BLOOD TEST: CPT

## 2021-09-02 PROCEDURE — 99232 SBSQ HOSP IP/OBS MODERATE 35: CPT | Performed by: UROLOGY

## 2021-09-02 PROCEDURE — 80048 BASIC METABOLIC PNL TOTAL CA: CPT | Performed by: NURSE PRACTITIONER

## 2021-09-02 PROCEDURE — 25010000002 CEFTRIAXONE PER 250 MG: Performed by: INTERNAL MEDICINE

## 2021-09-02 PROCEDURE — 63710000001 INSULIN LISPRO (HUMAN) PER 5 UNITS: Performed by: INTERNAL MEDICINE

## 2021-09-02 PROCEDURE — 63710000001 INSULIN DETEMIR PER 5 UNITS: Performed by: INTERNAL MEDICINE

## 2021-09-02 PROCEDURE — 85025 COMPLETE CBC W/AUTO DIFF WBC: CPT | Performed by: NURSE PRACTITIONER

## 2021-09-02 RX ORDER — LACTULOSE 20 G/30ML
20 SOLUTION ORAL DAILY PRN
Status: DISCONTINUED | OUTPATIENT
Start: 2021-09-02 | End: 2021-09-03 | Stop reason: HOSPADM

## 2021-09-02 RX ADMIN — VANCOMYCIN HYDROCHLORIDE 500 MG: 10 INJECTION, POWDER, LYOPHILIZED, FOR SOLUTION INTRAVENOUS at 08:12

## 2021-09-02 RX ADMIN — AMLODIPINE BESYLATE 5 MG: 5 TABLET ORAL at 08:10

## 2021-09-02 RX ADMIN — LEVOTHYROXINE SODIUM 75 MCG: 75 TABLET ORAL at 05:03

## 2021-09-02 RX ADMIN — INSULIN DETEMIR 10 UNITS: 100 INJECTION, SOLUTION SUBCUTANEOUS at 20:45

## 2021-09-02 RX ADMIN — INSULIN LISPRO 2 UNITS: 100 INJECTION, SOLUTION INTRAVENOUS; SUBCUTANEOUS at 12:26

## 2021-09-02 RX ADMIN — SODIUM CHLORIDE 75 ML/HR: 9 INJECTION, SOLUTION INTRAVENOUS at 20:47

## 2021-09-02 RX ADMIN — SODIUM CHLORIDE 75 ML/HR: 9 INJECTION, SOLUTION INTRAVENOUS at 03:09

## 2021-09-02 RX ADMIN — LABETALOL HYDROCHLORIDE 10 MG: 5 INJECTION, SOLUTION INTRAVENOUS at 06:34

## 2021-09-02 RX ADMIN — ATORVASTATIN CALCIUM 40 MG: 40 TABLET, FILM COATED ORAL at 20:39

## 2021-09-02 RX ADMIN — SODIUM CHLORIDE, PRESERVATIVE FREE 10 ML: 5 INJECTION INTRAVENOUS at 22:15

## 2021-09-02 RX ADMIN — SODIUM CHLORIDE 1 G: 9 INJECTION, SOLUTION INTRAVENOUS at 12:39

## 2021-09-02 RX ADMIN — PAROXETINE 10 MG: 10 TABLET, FILM COATED ORAL at 08:10

## 2021-09-02 RX ADMIN — LABETALOL HYDROCHLORIDE 10 MG: 5 INJECTION, SOLUTION INTRAVENOUS at 22:15

## 2021-09-02 RX ADMIN — Medication 1 TABLET: at 08:11

## 2021-09-02 NOTE — CASE MANAGEMENT/SOCIAL WORK
Continued Stay Note   Oral     Patient Name: Honey Canales  MRN: [id number]  Today's Date: 9/2/2021    Admit Date: 8/30/2021    Discharge Plan     Row Name 09/02/21 1120       Plan    Plan  [facility]    Patient/Family in Agreement with Plan  yes    Final Discharge Disposition Code  03 - skilled nursing facility (SNF)    Final Note  Pt will return to [facility] upon dc.    Phone: [phone] Fax: [phone].        Discharge Codes    No documentation.             CAROLIN Cole

## 2021-09-02 NOTE — PROGRESS NOTES
[facility] Medicine Services  INPATIENT PROGRESS NOTE    Patient Name: Honey Canales  Date of Admission: 8/30/2021  Today's Date: 09/02/21  Length of Stay: 3  Primary Care Physician: Devon Zuñiga MD    Subjective   Chief Complaint: Follow-up acute kidney injury  HPI   She was sitting up in bed eating lunch. Reports appetite is improving. Denies shortness of breath, chest pain or pressure. Denies nausea, vomiting or abdominal pain. Afebrile.  She has had several loose bowel movements and refused lactulose this morning. Renal function continues to improve.  She she is voiding via pure wick and has had good urine output per her nurse So at bedside. Urology recommends to continue broad-spectrum antibiotics.    Review of Systems   All pertinent negatives and positives are as above. All other systems have been reviewed and are negative unless otherwise stated.     Objective    Temp:  [97.5 °F (36.4 °C)-98.4 °F (36.9 °C)] 97.5 °F (36.4 °C)  Heart Rate:  [66-76] 71  Resp:  [16-18] 18  BP: (150-184)/(54-88) 150/55  Physical Exam  Constitutional:       Appearance: She is well-developed.      Comments: Sitting up in bed. No acute distress. Tolerating room air. Discussed with her nurse So at bedside.   HENT:      Head: Normocephalic and atraumatic.   Eyes:      General: No scleral icterus.     Conjunctiva/sclera: Conjunctivae normal.      Pupils: Pupils are equal, round, and reactive to light.   Neck:      Vascular: No JVD.   Cardiovascular:      Rate and Rhythm: Normal rate and regular rhythm.      Heart sounds: Normal heart sounds. No murmur heard.     Pulmonary:      Effort: Pulmonary effort is normal.      Breath sounds: Normal breath sounds. No wheezing or rales.   Abdominal:      General: Bowel sounds are normal. There is no distension.      Palpations: Abdomen is soft.      Tenderness: There is no abdominal tenderness.   Genitourinary:     Comments: Voiding via pure  gage  Musculoskeletal:         General: Normal range of motion.      Cervical back: Neck supple.   Lymphadenopathy:      Cervical: No cervical adenopathy.   Skin:     General: Skin is warm and dry.   Neurological:      Mental Status: She is alert and oriented to person, place, and time.      Cranial Nerves: No cranial nerve deficit.   Psychiatric:         Behavior: Behavior normal.       Results Review:  I have reviewed the labs, radiology results, and diagnostic studies.    Laboratory Data:   Results from last 7 days   Lab Units 09/02/21  0514 08/31/21  0550 08/30/21  1304   WBC 10*3/mm3 6.64 7.29 10.17   HEMOGLOBIN g/dL 9.9* 9.0* 9.3*   HEMATOCRIT % 29.7* 27.2* 27.2*   PLATELETS 10*3/mm3 291 247 256        Results from last 7 days   Lab Units 09/02/21  0514 09/01/21  0522 08/31/21  0550 08/30/21  1113 08/30/21  1113   SODIUM mmol/L 135* 134* 136   < > 130*   POTASSIUM mmol/L 4.2 3.9 3.7   < > 4.0   CHLORIDE mmol/L 104 103 95*   < > 95*   CO2 mmol/L 21.0* 20.0* 21.0*   < > 23.0   BUN mg/dL 38* 43* 54*   < > 55*   CREATININE mg/dL 1.87* 2.18* 2.40*   < > 2.70*   CALCIUM mg/dL 8.6 8.2* 8.5*   < > 9.0   BILIRUBIN mg/dL  --   --   --   --  0.5   ALK PHOS U/L  --   --   --   --  58   ALT (SGPT) U/L  --   --   --   --  15   AST (SGOT) U/L  --   --   --   --  21   GLUCOSE mg/dL 143* 154* 156*   < > 57*    < > = values in this interval not displayed.     I have reviewed the patient's current medications.     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 Patient  presented on 8/30 with hypoglycemia . She also had reported decreased oral intake, nausea and constipation  Despite decreased oral intake she continued to receive scheduled Levemir 38 units in the morning and 25 units nightly in addition to Glucotrol XL.  She is a resident at [facility].  EMS was contacted and they arrived her blood sugar was checked and found to be 38.  She was administered dextrose and many of her symptoms began to improve. CT abdomen and pelvis noted severe hydronephrosis on the right renal collecting system with stranding of the fat around the kidney and right renal collecting system without obstructive stone.  Fluid collection measuring 4.7 x 3.6 cm.  May represent urinoma versus perinephric abscess.  Does not contain any air bubbles.  Right ureter nondilated.  Hydronephrosis could be secondary to infection.  Neoplasm not ruled out.  Small nonobstructive renal stones left side.  2.  Urology evaluating patient.  Continue broad-spectrum antibiotics with vancomycin and ceftriaxone.  Continue to monitor.  Urology is considering reimaging tomorrow.  3.  Urine culture shows greater than 100 K Aerococcus urinae.  Narrative notes that aerococcus urinae are generally susceptible to penicillin G, vancomycin, tetracycline.  Resistant to trimethoprim, sulfamethoxazole, and gentamicin.  Blood cultures with no growth to date.  4.  Creatinine on admission 2.7.  Creatinine is trending down to 1.87.  Baseline creatinine appears in the order of 1.08-1.2.  BMP in AM.  5.  Blood glucose trend reviewed 143, 141, 163.  Continue moderate dose sliding scale insulin and Levemir 10 units nightly.  6.  Sequential compression devices for DVT prophylaxis.    Discharge Planning: I expect the patient to be discharged to [facility] in 1 to 2 days pending patient's progress.    Electronically signed by YISEL Velasquez, 09/02/21, 14:52 CDT.

## 2021-09-02 NOTE — PLAN OF CARE
Goal Outcome Evaluation:  Plan of Care Reviewed With: patient        Progress: improving  Outcome Summary: No c/o pain today. Cre/Bun improving with IV hydration, possible CT tomorrow. S 63-77. Vanc trough timed for tomorrow. Purewick in place, good urine output. Continue to monitor.

## 2021-09-02 NOTE — PLAN OF CARE
Goal Outcome Evaluation:           Progress: no change   SBP elevated throughout the shift, MD notified, no new orders, patient asymptomatic, otherwise VSS. AAOX4. No C/O pain. Sinus 65-80 on tele. Continues on room air with saturation in the 90's. SCD's placed on patient. Bladder scanned performed. Weight shift encouraged. Medication education provided. Safety maintained.

## 2021-09-02 NOTE — PROGRESS NOTES
"Pharmacy Dosing Service  Pharmacokinetics  Vancomycin Follow-up Evaluation    Assessment/Action/Plan:  Vancomycin initiated yesterday for UTI.     AUC Model Data for current regimen:  Regimen: 500 mg IV every 24 hours.  Exposure target: AUC24 (range)400-600 mg/L.hr   AUC24,ss: 378 mg/L.hr  PAUC*: 43 %  Ctrough,ss: 13.2 mg/L  Pconc*: 11 %  Tox.: 8 %    SCr=1.87 (decreasing; see below). Ordered a Vancomycin trough prior to the AM dose tomorrow (3rd). Pharmacy will continue to follow daily and adjust dose accordingly.     Subjective:  Honey Canales is a 88 y.o. female currently on Vancomycin 500 mg IV every 24 hours for the treatment of UTI, day 2 of therapy. Current end date: 9-7-21    Objective:  Ht: 168.9 cm (66.5\"); Wt: 72.7 kg (160 lb 3.2 oz)  Estimated Creatinine Clearance: 21.4 mL/min (A) (by C-G formula based on SCr of 1.87 mg/dL (H)).     Creatinine   Date Value Ref Range Status   09/02/2021 1.87 (H) 0.57 - 1.00 mg/dL Final   09/01/2021 2.18 (H) 0.57 - 1.00 mg/dL Final   08/31/2021 2.40 (H) 0.57 - 1.00 mg/dL Final      Lab Results   Component Value Date    WBC 6.64 09/02/2021    WBC 7.29 08/31/2021    WBC 10.17 08/30/2021        Culture Results:  Microbiology Results (last 10 days)       Procedure Component Value - Date/Time    Urine Culture - Urine, Urine, Catheter In/Out [[id number]]  (Abnormal) Collected: 08/30/21 1120    Lab Status: Final result Specimen: Urine, Catheter In/Out Updated: 09/02/21 1044     Urine Culture >100,000 CFU/mL Aerococcus urinae    Narrative:      Aerococcus urinae are usually susceptible to Penicillin G, Vancomycin, and Tetracycline and Resistant to Trimethoprim sulfamethoxazole and Gentamicin.      Blood Culture - Blood, Arm, Right [[id number]] Collected: 08/30/21 1113    Lab Status: Preliminary result Specimen: Blood from Arm, Right Updated: 09/02/21 1145     Blood Culture No growth at 3 days    Blood Culture - Blood, Hand, Right [[id number]] Collected: 08/30/21 1110    Lab Status: " Preliminary result Specimen: Blood from Hand, Right Updated: 09/02/21 1145     Blood Culture No growth at 3 days    COVID-19,[facility] IN-HOUSE,Nasal Swab No Transport Media 3-4 HR TAT - Swab, Nasal Cavity [[id number]]  (Normal) Collected: 08/30/21 1059    Lab Status: Final result Specimen: Swab from Nasal Cavity Updated: 08/30/21 1223     COVID19 Not Detected    Narrative:      Fact sheet for providers: https://www.fda.gov/media/780297/download     Fact sheet for patients: https://www.fda.gov/media/908210/download    Test performed by PCR.    Consider negative results in combination with clinical observations, patient history, and epidemiological information.            Jamal Varner, PharmD   09/02/21 15:59 CDT

## 2021-09-02 NOTE — PROGRESS NOTES
LOS: 3 days   Patient Care Team:  Devon Zuñiga MD as PCP - General  Archie Avery MD as Consulting Physician (Urology)  Tim Small MD as Consulting Physician (Otolaryngology)  Angelito Atkins PA as Physician Assistant (Otolaryngology)    Chief Complaint:  JAKOB, right hydro vs cysts    Subjective     Interval History:     Patient Reports: Feeling well  Patient Denies:  Pain and fever  History taken from: patient chart family    Tolerating PO. Denies flank and back pain.    Review of Systems  Pertinent items are noted in HPI, all other systems reviewed and negative     Objective     Vital Signs  Temp:  [97.6 °F (36.4 °C)-98.5 °F (36.9 °C)] 98.1 °F (36.7 °C)  Heart Rate:  [58-76] 66  Resp:  [16-18] 18  BP: (154-184)/(54-88) 170/54    Physical Exam:  Comfortable. Nontoxic. No CVA tenderness today.     Data Review:       I have reviewed the following data:    Bladder scans acceptable    Creatinine trending down.    Urine culture reviewed    Medication Review:     Current Facility-Administered Medications:   •  acetaminophen (TYLENOL) tablet 650 mg, 650 mg, Oral, Q4H PRN, David Garland MD, 650 mg at 08/30/21 2210  •  amLODIPine (NORVASC) tablet 5 mg, 5 mg, Oral, Q24H, Beverly Cabral DO, 5 mg at 09/02/21 0810  •  atorvastatin (LIPITOR) tablet 40 mg, 40 mg, Oral, Nightly, David Garland MD, 40 mg at 09/01/21 2102  •  bisacodyl (DULCOLAX) suppository 10 mg, 10 mg, Rectal, Daily PRN, David Garland MD  •  cefTRIAXone (ROCEPHIN) 1 g in sodium chloride 0.9 % 100 mL IVPB-VTB, 1 g, Intravenous, Q24H, David Garland MD, Last Rate: 200 mL/hr at 09/01/21 1214, 1 g at 09/01/21 1214  •  dextrose (D50W) 25 g/ 50mL Intravenous Solution 25 g, 25 g, Intravenous, Q15 Min PRN, David Garland MD, 25 g at 08/30/21 1730  •  dextrose (GLUTOSE) oral gel 15 g, 15 g, Oral, Q15 Min PRN, David Garland MD  •  glucagon (human recombinant) (GLUCAGEN DIAGNOSTIC) injection 1  mg, 1 mg, Subcutaneous, Q15 Min PRN, David Garland MD  •  insulin detemir (LEVEMIR) injection 10 Units, 10 Units, Subcutaneous, Nightly, David Garland MD, 10 Units at 09/01/21 2103  •  insulin lispro (humaLOG) injection 0-9 Units, 0-9 Units, Subcutaneous, TID AC, David Garland MD, 2 Units at 09/01/21 1742  •  labetalol (NORMODYNE,TRANDATE) injection 10 mg, 10 mg, Intravenous, Q6H PRN, Beverly Cabral DO, 10 mg at 09/02/21 0634  •  lactulose solution 20 g, 20 g, Oral, Daily, David Garland MD, 20 g at 08/31/21 0903  •  levothyroxine (SYNTHROID, LEVOTHROID) tablet 75 mcg, 75 mcg, Oral, Q AM, David Garland MD, 75 mcg at 09/02/21 0503  •  multivitamin with minerals 1 tablet, 1 tablet, Oral, Daily, David Garland MD, 1 tablet at 09/02/21 0811  •  ondansetron (ZOFRAN) injection 4 mg, 4 mg, Intravenous, Q6H PRN, David Garland MD  •  ondansetron ODT (ZOFRAN-ODT) disintegrating tablet 4 mg, 4 mg, Oral, Q6H PRN, Nohemi Varner, APRN  •  PARoxetine (PAXIL) tablet 10 mg, 10 mg, Oral, Daily, David Garland MD, 10 mg at 09/02/21 0810  •  [COMPLETED] Insert peripheral IV, , , Once **AND** sodium chloride 0.9 % flush 10 mL, 10 mL, Intravenous, PRN, David Garland MD  •  sodium chloride 0.9 % flush 10 mL, 10 mL, Intravenous, Q12H, David Garland MD  •  sodium chloride 0.9 % infusion, 75 mL/hr, Intravenous, Continuous, Nohemi Varner, APRN, Last Rate: 75 mL/hr at 09/02/21 0309, 75 mL/hr at 09/02/21 0309  •  vancomycin 500 mg/100 mL 0.9% NS IVPB ([facility]), 500 mg, Intravenous, Q24H, Tim Perez MD, 500 mg at 09/02/21 0812    Assessment and Plan:    Continue broad spectrum antibiotics for UTI. Clincally, she's improving. Continue to monitor. Watch creatinine. Consider re-imaging tomorrow.    URO DISPO: I will continue to follow this patient.    I discussed the patients findings and my recommendations with patient and nursing staff    (Please note  that portions of this note were completed with a voice recognition program.)    Amor Brown MD  09/02/21  09:38 CDT    Time: Time spent: 25 minutes spent performing evaluation and management, chart review, and discussion with patient, > 50% of time spent in face-to-face encounter

## 2021-09-03 ENCOUNTER — APPOINTMENT (OUTPATIENT)
Dept: ULTRASOUND IMAGING | Facility: HOSPITAL | Age: 86
End: 2021-09-03

## 2021-09-03 VITALS
DIASTOLIC BLOOD PRESSURE: 63 MMHG | RESPIRATION RATE: 24 BRPM | SYSTOLIC BLOOD PRESSURE: 161 MMHG | HEART RATE: 74 BPM | OXYGEN SATURATION: 98 % | TEMPERATURE: 98.3 F | BODY MASS INDEX: 25.62 KG/M2 | WEIGHT: 159.4 LBS | HEIGHT: 66 IN

## 2021-09-03 LAB
ANION GAP SERPL CALCULATED.3IONS-SCNC: 9 MMOL/L (ref 5–15)
BUN SERPL-MCNC: 39 MG/DL (ref 8–23)
BUN/CREAT SERPL: 20.6 (ref 7–25)
CALCIUM SPEC-SCNC: 8.2 MG/DL (ref 8.6–10.5)
CHLORIDE SERPL-SCNC: 106 MMOL/L (ref 98–107)
CO2 SERPL-SCNC: 19 MMOL/L (ref 22–29)
CREAT SERPL-MCNC: 1.89 MG/DL (ref 0.57–1)
GFR SERPL CREATININE-BSD FRML MDRD: 25 ML/MIN/1.73
GLUCOSE BLDC GLUCOMTR-MCNC: 124 MG/DL (ref 70–130)
GLUCOSE BLDC GLUCOMTR-MCNC: 137 MG/DL (ref 70–130)
GLUCOSE BLDC GLUCOMTR-MCNC: 144 MG/DL (ref 70–130)
GLUCOSE SERPL-MCNC: 141 MG/DL (ref 65–99)
POTASSIUM SERPL-SCNC: 4.3 MMOL/L (ref 3.5–5.2)
SODIUM SERPL-SCNC: 134 MMOL/L (ref 136–145)
VANCOMYCIN TROUGH SERPL-MCNC: 6.9 MCG/ML (ref 5–20)

## 2021-09-03 PROCEDURE — 76775 US EXAM ABDO BACK WALL LIM: CPT

## 2021-09-03 PROCEDURE — 99232 SBSQ HOSP IP/OBS MODERATE 35: CPT | Performed by: UROLOGY

## 2021-09-03 PROCEDURE — 80202 ASSAY OF VANCOMYCIN: CPT | Performed by: INTERNAL MEDICINE

## 2021-09-03 PROCEDURE — 25010000002 VANCOMYCIN 10 G RECONSTITUTED SOLUTION: Performed by: INTERNAL MEDICINE

## 2021-09-03 PROCEDURE — 25010000002 CEFTRIAXONE PER 250 MG: Performed by: INTERNAL MEDICINE

## 2021-09-03 PROCEDURE — 80048 BASIC METABOLIC PNL TOTAL CA: CPT | Performed by: NURSE PRACTITIONER

## 2021-09-03 PROCEDURE — 82962 GLUCOSE BLOOD TEST: CPT

## 2021-09-03 RX ORDER — CEFDINIR 300 MG/1
300 CAPSULE ORAL DAILY
Qty: 5 CAPSULE | Refills: 0
Start: 2021-09-04 | End: 2021-09-09

## 2021-09-03 RX ORDER — AMLODIPINE BESYLATE 10 MG/1
10 TABLET ORAL
Status: ON HOLD
Start: 2021-09-04 | End: 2021-10-09

## 2021-09-03 RX ORDER — HYDROCODONE BITARTRATE AND ACETAMINOPHEN 7.5; 325 MG/1; MG/1
1 TABLET ORAL EVERY 6 HOURS PRN
Qty: 12 TABLET | Refills: 0 | Status: SHIPPED | OUTPATIENT
Start: 2021-09-03 | End: 2021-10-15 | Stop reason: HOSPADM

## 2021-09-03 RX ORDER — AMLODIPINE BESYLATE 10 MG/1
10 TABLET ORAL
Status: DISCONTINUED | OUTPATIENT
Start: 2021-09-04 | End: 2021-09-03 | Stop reason: HOSPADM

## 2021-09-03 RX ORDER — AMLODIPINE BESYLATE 5 MG/1
5 TABLET ORAL
Status: COMPLETED | OUTPATIENT
Start: 2021-09-03 | End: 2021-09-03

## 2021-09-03 RX ADMIN — PAROXETINE 10 MG: 10 TABLET, FILM COATED ORAL at 09:36

## 2021-09-03 RX ADMIN — LEVOTHYROXINE SODIUM 75 MCG: 75 TABLET ORAL at 05:22

## 2021-09-03 RX ADMIN — Medication 1 TABLET: at 09:36

## 2021-09-03 RX ADMIN — SODIUM CHLORIDE 75 ML/HR: 9 INJECTION, SOLUTION INTRAVENOUS at 10:20

## 2021-09-03 RX ADMIN — AMLODIPINE BESYLATE 5 MG: 5 TABLET ORAL at 09:36

## 2021-09-03 RX ADMIN — VANCOMYCIN HYDROCHLORIDE 750 MG: 10 INJECTION, POWDER, LYOPHILIZED, FOR SOLUTION INTRAVENOUS at 10:51

## 2021-09-03 RX ADMIN — AMLODIPINE BESYLATE 5 MG: 5 TABLET ORAL at 12:33

## 2021-09-03 RX ADMIN — LABETALOL HYDROCHLORIDE 10 MG: 5 INJECTION, SOLUTION INTRAVENOUS at 07:51

## 2021-09-03 RX ADMIN — SODIUM CHLORIDE 1 G: 9 INJECTION, SOLUTION INTRAVENOUS at 13:19

## 2021-09-03 NOTE — PROGRESS NOTES
Had a long discussion with the patient, her son, and Brisa Feliciano.    The patient does not want a stent. She understands that this may ultimately compromise the right kidney function. There is a possible increased risk of infection. Given her age, if she does well (asymptomatic and no infections) and creatinine stays stable, management with observation is an appropriate option.    We discussed the risks and benefits of observation versus stent placement at length. All questions were answered.    We will have her follow up with her previously scheduled appointment on 9/27/21 with Joao Sanchez in our office. She should have blood work prior to recheck her creatinine. If her creatinine is stable and she is asymptomatic, no imaging would be required given her desire to observe.

## 2021-09-03 NOTE — DISCHARGE PLACEMENT REQUEST
"Ava Canales (88 y.o. Female)     Date of Birth Social Security Number Address Home Phone MRN    03/27/1933  [id number] [place] [facility] [id number] [phone] [id number]    Caodaism Marital Status          Voodoo        Admission Date Admission Type Admitting Provider Attending Provider Department, Room/Bed    8/30/21 Emergency Tim Perez MD Fleming, John Eric, MD [facility] 4B, 435/1    Discharge Date Discharge Disposition Discharge Destination                       Attending Provider: Tim Perez MD    Allergies: Codeine Phosphate [Codeine], Collagen, Accupril [Quinapril Hcl], Aspirin, Adhesive Tape, Celebrex [Celecoxib], Lyrica [Pregabalin], Pantoprazole Sodium, Pentoxifylline, Sulfa Antibiotics, Tramadol, Vioxx [Rofecoxib]    Isolation: None   Infection: None   Code Status: CPR    Ht: 168.9 cm (66.5\")   Wt: 72.3 kg (159 lb 6.4 oz)    Admission Cmt: None   Principal Problem: Right hydronephrosis [N13.30]                 Active Insurance as of 8/30/2021     Primary Coverage     Payor Plan Insurance Group Employer/Plan Group    MEDICARE MEDICARE A & B      Payor Plan Address Payor Plan Phone Number Payor Plan Fax Number Effective Dates    [address] [phone]  3/1/1998 - None Entered    [facility] [id number]       Subscriber Name Subscriber Birth Date Member AVA BRAVO 3/27/1933 [id number]           Secondary Coverage     Payor Plan Insurance Group Employer/Plan Group    [facility] [id number]     Payor Plan Address Payor Plan Phone Number Payor Plan Fax Number Effective Dates    [address]   1/1/2016 - None Entered    [facility] [id number]       Subscriber Name Subscriber Birth Date Member MANJULA BRAVO 2/26/1928 [id number]                 Emergency Contacts      (Rel.) Home Phone Work Phone Mobile Phone    Chaka Canales (Son) -- -- [phone]               History & Physical      David Garland MD at 08/30/21 " "1524              [facility] Medicine Services  HISTORY AND PHYSICAL    Date of Admission: 8/30/2021  Primary Care Physician: Devon Zuñiga MD    Subjective     Chief Complaint: \"My blood sugar was low\"    History of Present Illness  Patient is an 88-year-old  female with past medical history significant for chronic kidney disease, history of recurrent falls, hypertension, and insulin-dependent diabetes that presented to our hospital from [facility] due to low blood sugar.  Patient states that her main symptom over the course of the past 1 week has been constipation.  She states that she has been on multiple medications to assist her with constipation, but none have been very effective.  This is resulted in her having symptoms of nausea and decreased oral intake.  She reports that she has been on the same dose of insulin, and earlier today was found to be more listless and confused.  She also reportedly had some malodorous urine.  EMS was contacted when they arrived her blood sugar was checked and was found to be 38.  She was administered dextrose, and many of her symptoms began to improve.  She is followed by urology on an outpatient basis, and does have history of bladder stimulator placement.  Abdominal imaging obtained in the emergency department did reveal evidence of severe right-sided hydronephrosis with a fluid collection of unclear etiology.  There was also an incidental finding of a compression deformity at L5.  Patient states that she has a longstanding history of gait imbalance and history of recurrent falls.  She denies having any acute back pain.  She reports sustaining a broken shoulder related to a fall a number of months ago, and has required placement in a skilled nursing facility since that time.    Review of Systems     Otherwise complete ROS reviewed and negative except as mentioned in the HPI.    Past Medical History:   Past " Medical History:   Diagnosis Date   • Allergic rhinitis    • Aneurysm (CMS/HCC)    • Arthritis    • Broken shoulder     Right shoulder    • Cerebral aneurysm 2009    2ND ONE FOUND   • Cerebral aneurysm     NON TREATABLE   • Chronic laryngitis    • CKD (chronic kidney disease)    • Colon polyps    • COPD (chronic obstructive pulmonary disease) (CMS/HCC)    • Deviated nasal septum    • Diabetes mellitus (CMS/HCC)    • Disorder of kidney and ureter    • Disturbance of smell and taste    • Dizziness    • Encounter for imaging to screen for metal prior to MRI     NO MRI, METAL CLIPS IN HEAD   • Eustachian tube dysfunction    • GERD (gastroesophageal reflux disease)    • Globus sensation    • Headache    • Hepatitis     B   • Hepatitis A    • History of stomach ulcers    • Hyperlipidemia    • Hypothyroid    • Laryngitis sicca    • Lung nodule    • Nocturia    • PONV (postoperative nausea and vomiting)    • Sensorineural hearing loss    • Spinal stenosis    • Urge incontinence    • Urgency of urination    • Urinary frequency    • Urinary incontinence    • UTI (urinary tract infection)      Past Surgical History:  Past Surgical History:   Procedure Laterality Date   • BLADDER SURGERY  2016    Medtronic Interstem   • BRAIN SURGERY      ANUERYSM REMOVED   • BREAST SURGERY Bilateral     BIOPSY   • CARPAL TUNNEL RELEASE     • CATARACT EXTRACTION, BILATERAL     • CEREBRAL ANEURYSM REPAIR     •  SECTION      X4   • CHOLECYSTECTOMY     • HYSTERECTOMY     • INTERSTIM PLACEMENT N/A 10/18/2018    Procedure: INTERSTIM STAGES 1 AND 2 LEAD AND GENERATOR REMOVAL AND REPLACEMENT;  Surgeon: Archie Avery MD;  Location: [facility];  Service: Urology   • JOINT REPLACEMENT Right     KNEE   • KNEE ARTHROSCOPY     • THYROIDECTOMY     • TONSILLECTOMY       Social History:  reports that she quit smoking about 28 years ago. Her smoking use included cigarettes. She has never used smokeless tobacco. She reports that she does not  drink alcohol and does not use drugs.    Family History: family history includes Cancer in her brother; No Known Problems in her father and mother.       Allergies:  Allergies   Allergen Reactions   • Codeine Phosphate [Codeine] Unknown (See Comments)     CHEST PAIN   • Collagen Other (See Comments)     CAT GUT SUTURES \ WOUND WOULD NOT HEAL AND GOT INFECTION   • Accupril [Quinapril Hcl] Other (See Comments)     COUGH   • Aspirin Other (See Comments)     HISTORY OF STOMACH ULCERS   • Adhesive Tape Rash     SKIN BLISTERS   • Celebrex [Celecoxib] Nausea Only   • Lyrica [Pregabalin] Other (See Comments)     GAINED 50 POUNDS   • Pantoprazole Sodium Diarrhea   • Pentoxifylline Diarrhea   • Sulfa Antibiotics Nausea And Vomiting   • Tramadol Nausea And Vomiting   • Vioxx [Rofecoxib] Nausea And Vomiting       Medications:  Prior to Admission medications    Medication Sig Start Date End Date Taking? Authorizing Provider   acetaminophen (TYLENOL) 500 MG tablet Take 500 mg by mouth Every 6 (Six) Hours As Needed for Mild Pain .    ProviderMaureen MD   atorvastatin (Lipitor) 40 MG tablet Take 1 tablet by mouth Daily. 3/30/21   Brisa Feliciano APRN B-WENDY ULTRAFINE III SHORT PEN 31G X 8 MM misc USE 1 PEN NEEDLE TWO TIMES DAILY 12/10/20   Devon Zuñiga MD   Dextran 70-Hypromellose (GENTEAL TEARS MODERATE PF OP) Apply  to eye(s) as directed by provider.    Maureen Hyatt MD   Easy Touch Lancets 28G/Twist misc TEST TWICE A DAY 12/30/20   Devon Zuñiga MD   Easy Touch Test test strip TEST TWICE A DAY 12/30/20   Devon Zuñiga MD   glipizide (GLUCOTROL XL) 10 MG 24 hr tablet TAKE 1 TABLET TWICE A DAY 1/18/21   Devon Zuñiga MD   hydroCHLOROthiazide (HYDRODIURIL) 25 MG tablet Take 25 mg by mouth Daily.    Maureen Hyatt MD   insulin detemir (LEVEMIR) 100 UNIT/ML injection 35 units in the morning and 25 units at night 2/25/21   Devon Zuñiga MD   levocetirizine (XYZAL) 5 MG tablet TAKE 1 TABLET  "DAILY  Patient taking differently: Take 5 mg by mouth Every Evening. PT WANTS TO STOP TAKING DUE TO COST 12/10/20   Devon Zuñiga MD   levothyroxine (SYNTHROID, LEVOTHROID) 75 MCG tablet TAKE 1 TABLET DAILY 8/28/20   Devon Zuñiga MD   LOSARTAN POTASSIUM PO Take  by mouth.    Maureen Hyatt MD   Miconazole Nitrate (REMEDY PHYTOPLEX ANTIFUNGAL EX) Apply  topically.    Maureen Hyatt MD   multivitamin with minerals (MULTIVITAMIN ADULT PO) Take 1 tablet by mouth Daily.    Maureen Hyatt MD   Myrbetriq 50 MG tablet sustained-release 24 hour 24 hr tablet TAKE 1 TABLET DAILY  Patient taking differently: Take 50 mg by mouth Every Night. 2/5/21   Joao Sanchez PA   PARoxetine (PAXIL) 10 MG tablet TAKE 1 TABLET DAILY  Patient taking differently: Take 10 mg by mouth Every Night. 2/1/21   Devon Zuñiga MD   polyethylene glycol (polyethylene glycol) 17 g packet Take 17 g by mouth Daily. 3/31/21   Brisa Feliciano APRN   sennosides-docusate (PERICOLACE) 8.6-50 MG per tablet Take 1 tablet by mouth 2 (Two) Times a Day. 3/30/21   Brisa Feliciano APRN   vitamin C (ASCORBIC ACID) 250 MG tablet Take 250 mg by mouth Daily.    Maureen Hyatt MD   Wheat Dextrin (BENEFIBER DRINK MIX PO) Take  by mouth.    Maureen Hyatt MD   Zinc Sulfate (ZINC 15 PO) Take  by mouth.    Maureen Hyatt MD     I have utilized all available immediate resources to obtain, update, and review the patient's current medications.    Objective     Vital Signs: /44 (BP Location: Right arm, Patient Position: Lying)   Pulse 75   Temp 98.2 °F (36.8 °C) (Oral)   Resp 18   Ht 168.9 cm (66.5\")   Wt 71.2 kg (157 lb)   SpO2 97%   BMI 24.96 kg/m²   Physical Exam  Vitals reviewed.   HENT:      Ears:      Comments: Hard of hearing     Mouth/Throat:      Pharynx: No oropharyngeal exudate (wearing facemask).   Eyes:      Pupils: Pupils are equal, round, and reactive to light.   Cardiovascular:      Rate and Rhythm: " Normal rate.   Pulmonary:      Effort: Pulmonary effort is normal. No respiratory distress.      Breath sounds: No wheezing or rales.   Abdominal:      General: There is no distension.      Palpations: Abdomen is soft.      Tenderness: There is abdominal tenderness (mild generalized). There is no right CVA tenderness.   Musculoskeletal:         General: No deformity.      Cervical back: Neck supple.   Skin:     General: Skin is warm.      Capillary Refill: Capillary refill takes less than 2 seconds.   Neurological:      General: No focal deficit present.      Mental Status: She is alert.      Motor: Weakness present.   Psychiatric:         Mood and Affect: Mood normal.        Results Reviewed:  Lab Results (last 24 hours)     Procedure Component Value Units Date/Time    CBC & Differential [[id number]]  (Abnormal) Collected: 08/30/21 1304    Specimen: Blood Updated: 08/30/21 1421    Narrative:      The following orders were created for panel order CBC & Differential.  Procedure                               Abnormality         Status                     ---------                               -----------         ------                     CBC Auto Differential[[id number]]        Abnormal            Final result                 Please view results for these tests on the individual orders.    CBC Auto Differential [[id number]]  (Abnormal) Collected: 08/30/21 1304    Specimen: Blood Updated: 08/30/21 1421     WBC 10.17 10*3/mm3      RBC 3.00 10*6/mm3      Hemoglobin 9.3 g/dL      Hematocrit 27.2 %      MCV 90.7 fL      MCH 31.0 pg      MCHC 34.2 g/dL      RDW 13.1 %      RDW-SD 42.9 fl      MPV 11.1 fL      Platelets 256 10*3/mm3      Neutrophil % 59.9 %      Lymphocyte % 25.3 %      Monocyte % 14.0 %      Eosinophil % 0.1 %      Basophil % 0.1 %      Immature Grans % 0.6 %      Neutrophils, Absolute 6.10 10*3/mm3      Lymphocytes, Absolute 2.57 10*3/mm3      Monocytes, Absolute 1.42 10*3/mm3      Eosinophils, Absolute  0.01 10*3/mm3      Basophils, Absolute 0.01 10*3/mm3      Immature Grans, Absolute 0.06 10*3/mm3      nRBC 0.0 /100 WBC     POC Glucose Once [[id number]]  (Abnormal) Collected: 08/30/21 1337    Specimen: Blood Updated: 08/30/21 1349     Glucose 149 mg/dL      Comment: : [id number] Negar NortonMeter ID: [id number]       Urinalysis, Microscopic Only - Urine, Catheter In/Out [[id number]]  (Abnormal) Collected: 08/30/21 1120    Specimen: Urine, Catheter In/Out Updated: 08/30/21 1230     RBC, UA 3-5 /HPF      WBC, UA 21-30 /HPF      Bacteria, UA 1+ /HPF      Squamous Epithelial Cells, UA 13-20 /HPF      Hyaline Casts, UA None Seen /LPF      Methodology Manual Light Microscopy    Urine Culture - Urine, Urine, Catheter In/Out [[id number]] Collected: 08/30/21 1120    Specimen: Urine, Catheter In/Out Updated: 08/30/21 1230    D-dimer, Quantitative [[id number]]  (Abnormal) Collected: 08/30/21 1140    Specimen: Blood Updated: 08/30/21 1227     D-Dimer, Quantitative 2.46 mg/L (FEU)     Narrative:      Reference Range is 0-0.50 mg/L FEU. However, results <0.50 mg/L FEU tends to rule out DVT or PE. Results >0.50 mg/L FEU are not useful in predicting absence or presence of DVT or PE.      COVID-19,[facility] IN-HOUSE,Nasal Swab No Transport Media 3-4 HR TAT - Swab, Nasal Cavity [[id number]]  (Normal) Collected: 08/30/21 1059    Specimen: Swab from Nasal Cavity Updated: 08/30/21 1223     COVID19 Not Detected    Narrative:      Fact sheet for providers: https://www.fda.gov/media/737136/download     Fact sheet for patients: https://www.fda.gov/media/607732/download    Test performed by PCR.    Consider negative results in combination with clinical observations, patient history, and epidemiological information.    Urinalysis With Culture If Indicated - Urine, Catheter In/Out [[id number]]  (Abnormal) Collected: 08/30/21 1120    Specimen: Urine, Catheter In/Out Updated: 08/30/21 1203     Color, UA Yellow     Appearance, UA Cloudy     pH,  UA 6.0     Specific Gravity, UA 1.016     Glucose, UA Negative     Ketones, UA Negative     Bilirubin, UA Negative     Blood, UA Negative     Protein, UA Trace     Leuk Esterase, UA Moderate (2+)     Nitrite, UA Negative     Urobilinogen, UA 0.2 E.U./dL    Comprehensive Metabolic Panel [[id number]]  (Abnormal) Collected: 08/30/21 1113    Specimen: Blood Updated: 08/30/21 1202     Glucose 57 mg/dL      BUN 55 mg/dL      Creatinine 2.70 mg/dL      Sodium 130 mmol/L      Potassium 4.0 mmol/L      Chloride 95 mmol/L      CO2 23.0 mmol/L      Calcium 9.0 mg/dL      Total Protein 6.9 g/dL      Albumin 3.10 g/dL      ALT (SGPT) 15 U/L      AST (SGOT) 21 U/L      Alkaline Phosphatase 58 U/L      Total Bilirubin 0.5 mg/dL      eGFR Non African Amer 17 mL/min/1.73      Globulin 3.8 gm/dL      A/G Ratio 0.8 g/dL      BUN/Creatinine Ratio 20.4     Anion Gap 12.0 mmol/L     Narrative:      GFR Normal >60  Chronic Kidney Disease <60  Kidney Failure <15      Lactic Acid, Plasma [[id number]]  (Normal) Collected: 08/30/21 1113    Specimen: Blood Updated: 08/30/21 1159     Lactate 1.1 mmol/L     Lipase [[id number]]  (Abnormal) Collected: 08/30/21 1113    Specimen: Blood Updated: 08/30/21 1158     Lipase 296 U/L     BNP [[id number]]  (Normal) Collected: 08/30/21 1113    Specimen: Blood Updated: 08/30/21 1155     proBNP 1,686.0 pg/mL     Narrative:      Among patients with dyspnea, NT-proBNP is highly sensitive for the detection of acute congestive heart failure. In addition NT-proBNP of <300 pg/ml effectively rules out acute congestive heart failure with 99% negative predictive value.    Results may be falsely decreased if patient taking Biotin.      Blood Culture - Blood, Arm, Right [[id number]] Collected: 08/30/21 1113    Specimen: Blood from Arm, Right Updated: 08/30/21 1142    Blood Culture - Blood, Hand, Right [[id number]] Collected: 08/30/21 1110    Specimen: Blood from Hand, Right Updated: 08/30/21 1142    POC Glucose Once  [[id number]]  (Normal) Collected: 08/30/21 1024    Specimen: Blood Updated: 08/30/21 1039     Glucose 106 mg/dL      Comment: : Mary Conti CaseyMeter ID: [id number]           Imaging Results (Last 24 Hours)     Procedure Component Value Units Date/Time    CT Abdomen Pelvis Without Contrast [[id number]] Collected: 08/30/21 1336     Updated: 08/30/21 1356    Narrative:      EXAMINATION:  CT ABDOMEN PELVIS WO CONTRAST-  8/30/2021 1:25 PM CDT     HISTORY: Abdominal distention.     TECHNIQUE: Spiral CT was performed of the abdomen and pelvis without  contrast. Multiplanar images were reconstructed.     DLP: 485 mGy-cm. Automated dosage control was utilized.     COMPARISON: 10/17/2013.     LUNG BASES: There is dense coronary artery calcification. There are mild  patchy infiltrates in both lung bases likely due to atelectasis. There  are emphysematous changes in the lung bases.     LIVER AND SPLEEN: There has been prior cholecystectomy. The unenhanced  liver and spleen are unremarkable.     PANCREAS: Normal unenhanced appearance of the pancreas.     KIDNEYS AND ADRENALS: The adrenal glands are unremarkable. There is a  1-2 mm nonobstructive stone in the lower pole left kidney. There is a 1  mm or less nonobstructive stone in the left mid kidney. The left renal  collecting system and ureter are nondilated. There is severe  hydronephrosis of the right kidney. The right renal collecting system is  dilated. There is stranding of the fat around the dilated collecting  system and around the right kidney. The right ureter is not appear to be  significantly dilated. There is a collection of fluid medial to the  central right renal collecting system measuring about 4.7 x 3.6 cm.  There is minimal wall thickening of the urinary bladder.     BOWEL: No oral contrast was given. There is underdistention of the  stomach. Small bowel loops are nondilated. There is liquid stool in the  colon. There are a few scattered colon  diverticula.     OTHER: There has been prior hysterectomy. There is atheromatous disease  of the aortoiliac vessels. There is a stimulator device in the pelvis  extending through the sacral foramina which is probably a bladder  stimulator. There are degenerative changes of the spine. There is  enthesopathy of the pelvis. There are degenerative changes of both hips.  There is an age-indeterminate compression fracture of L5.       Impression:      1. 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  Patchy infiltrates in both lung bases likely due to atelectasis.  Emphysema in the lung bases.  2. Prior cholecystectomy.  3. Severe hydronephrosis of the right renal collecting system with  stranding of the fat around the kidney and right renal collecting system  without a visible obstructing stone. There is a fluid collection medial  to the collecting system measuring 4.7 x 3.6 cm. This may represent a  urinoma versus a perinephric abscess. It does not contain any air  bubbles. The right ureter is nondilated. The hydronephrosis could be  secondary to infection. Neoplasm is not ruled out. Urology consultation  is recommended for these findings.  4. There are small nonobstructive renal stones on the left side. Minimal  bladder wall thickening may be an artifact of underdistention.  Infection/inflammation is possible.  5. Prior hysterectomy and cholecystectomy.  6. Age indeterminate L5 compression deformity. It may be acute or  subacute in age. There are degenerative changes of the spine and both  hips.  7. There are a few scattered colon diverticula. There is no small bowel  distention.     The full report of this exam was immediately signed and available to the  emergency room. The patient is currently in the emergency room.  This report was finalized on 08/30/2021 13:53 by Dr. Johnathan Brennan MD.    XR Chest 1 View [[id number]] Collected: 08/30/21 1058     Updated: 08/30/21 1101    Narrative:       Exam:   XR CHEST 1 VW-       Date:  8/30/2021      History:  Female, age  88 years;hypoxic     COMPARISON:  None.     Findings :     The heart and mediastinum are normal in size. Bilateral diffuse  coarsening of interstitium. Low lung volumes. No measurable  pneumothorax. No pleural effusion. No obvious focal consolidation.  The  bones show no acute pathology.         Impression:      Impression:     Bilateral coarsening of interstitium, without focal consolidation. This  is nonspecific. Differential does include viral infection.     This report was finalized on 08/30/2021 10:58 by Dr. Polly Pablo MD.        I have personally reviewed and interpreted the radiology studies and ECG obtained at time of admission.     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  Urology has evaluated patient in ED; appreciate their assistance  2.  IVFs  3.  Urine culture  4.  IV Rocephin for now  5.  Bowel regimen - lactulose and magnesium citrate  6.  Hold long-acting insulin given recent hypoglycemia, poor appetite, nausea  7.  SSI coverage  8.  CBC, BMP, and repeat lipase in AM  9.  Bladder scan  10.  Strict I/Os  11.  SCDs  12.  Workup ongoing    Code Status/Advanced Care Plan: Full Code    The patient's surrogate decision maker is her son, Chaka.    Electronically signed by David Garland MD, 08/30/21, 15:38 CDT.              Electronically signed by David Garland MD at 08/30/21 1538       Prior to Admission Medications     Prescriptions Last Dose  Informant Patient Reported? Taking?    Ascorbic Acid (Vitamin C) 500 MG capsule 8/30/2021 Nursing Home Yes Yes    Take 500 mg by mouth Daily.    atorvastatin (Lipitor) 40 MG tablet 8/30/2021 Nursing Home No Yes    Take 1 tablet by mouth Daily.    Dextran 70-Hypromellose (GENTEAL TEARS MODERATE PF OP) 8/30/2021 Nursing Home Yes Yes    Administer 2 drops to both eyes Daily.    glipizide (GLUCOTROL XL) 10 MG 24 hr tablet 8/30/2021 Nursing Home Yes Yes    Take 10 mg by mouth 2 (two) times a day.    hydroCHLOROthiazide (HYDRODIURIL) 25 MG tablet 8/30/2021 Nursing Home Yes Yes    Take 25 mg by mouth Daily.    insulin detemir (LEVEMIR) 100 UNIT/ML injection 8/30/2021 Nursing Home Yes Yes    38 units QAM & 25 units QHS    levocetirizine (XYZAL) 5 MG tablet 8/30/2021 Nursing Home Yes Yes    Take 5 mg by mouth Daily.    levothyroxine (SYNTHROID, LEVOTHROID) 75 MCG tablet 8/30/2021 Nursing Home Yes Yes    Take 75 mcg by mouth Daily.    losartan (COZAAR) 100 MG tablet 8/29/2021 Nursing Home Yes Yes    Take 100 mg by mouth every night at bedtime.    Mirabegron ER (MYRBETRIQ) 50 MG tablet sustained-release 24 hour 24 hr tablet 8/30/2021 Nursing Home Yes Yes    Take 50 mg by mouth Daily.    multivitamin with minerals (MULTIVITAMIN ADULT PO) 8/30/2021 Nursing Home Yes Yes    Take 1 tablet by mouth Daily.    PARoxetine (PAXIL) 10 MG tablet 8/30/2021 Nursing Home Yes Yes    Take 10 mg by mouth Every Morning.    polyethylene glycol (polyethylene glycol) 17 g packet 8/30/2021 Nursing Home No Yes    Take 17 g by mouth Daily.    sennosides-docusate (PERICOLACE) 8.6-50 MG per tablet 8/30/2021 Nursing Home No Yes    Take 1 tablet by mouth 2 (Two) Times a Day.    Wheat Dextrin (BENEFIBER DRINK MIX PO) 8/30/2021 Nursing Home Yes Yes    Take 1 packet by mouth Daily With Breakfast & Lunch.    Zinc Sulfate (ZINC 15 PO) 8/30/2021 Nursing Home Yes Yes    Take 220 mg by mouth Daily.    acetaminophen (TYLENOL) 500 MG tablet Unknown Nursing Home Yes  No    Take 500 mg by mouth Every 6 (Six) Hours As Needed for Mild Pain .    HYDROcodone-acetaminophen (NORCO) 7.5-325 MG per tablet Unknown Nursing Home Yes No    Take 1 tablet by mouth Every 6 (Six) Hours As Needed for Moderate Pain .    Miconazole Nitrate (REMEDY PHYTOPLEX ANTIFUNGAL EX) Unknown Nursing Home Yes No    Apply to coccyx after each incontinent episode    ondansetron (ZOFRAN) 4 MG tablet Unknown Nursing Home Yes No    Take 4 mg by mouth Every 6 (Six) Hours As Needed for Nausea or Vomiting.    prochlorperazine (COMPAZINE) 10 MG tablet Unknown Nursing Home Yes No    Take 10 mg by mouth Every 6 (Six) Hours As Needed for Nausea or Vomiting.          Current Facility-Administered Medications   Medication Dose Route Frequency Provider Last Rate Last Admin   • acetaminophen (TYLENOL) tablet 650 mg  650 mg Oral Q4H PRN David Garland MD   650 mg at 08/30/21 2210   • [START ON 9/4/2021] amLODIPine (NORVASC) tablet 10 mg  10 mg Oral Q24H Brisa Feliciano APRN       • atorvastatin (LIPITOR) tablet 40 mg  40 mg Oral Nightly David Garland MD   40 mg at 09/02/21 2039   • bisacodyl (DULCOLAX) suppository 10 mg  10 mg Rectal Daily PRN David Garland MD       • cefTRIAXone (ROCEPHIN) 1 g in sodium chloride 0.9 % 100 mL IVPB-VTB  1 g Intravenous Q24H David Garland  mL/hr at 09/03/21 1319 1 g at 09/03/21 1319   • dextrose (D50W) 25 g/ 50mL Intravenous Solution 25 g  25 g Intravenous Q15 Min PRN David Garland MD   25 g at 08/30/21 1730   • dextrose (GLUTOSE) oral gel 15 g  15 g Oral Q15 Min PRN David Garland MD       • glucagon (human recombinant) (GLUCAGEN DIAGNOSTIC) injection 1 mg  1 mg Subcutaneous Q15 Min PRN David Garland MD       • insulin detemir (LEVEMIR) injection 10 Units  10 Units Subcutaneous Nightly David Garland MD   10 Units at 09/02/21 2045   • insulin lispro (humaLOG) injection 0-9 Units  0-9 Units Subcutaneous TID AC David Garland  MD   2 Units at 09/02/21 1226   • labetalol (NORMODYNE,TRANDATE) injection 10 mg  10 mg Intravenous Q6H PRN Beverly Cabral,    10 mg at 09/03/21 0751   • lactulose solution 20 g  20 g Oral Daily PRN Brisa Feliciano APRN       • levothyroxine (SYNTHROID, LEVOTHROID) tablet 75 mcg  75 mcg Oral Q AM David Garland MD   75 mcg at 09/03/21 0522   • multivitamin with minerals 1 tablet  1 tablet Oral Daily David Garland MD   1 tablet at 09/03/21 0936   • ondansetron (ZOFRAN) injection 4 mg  4 mg Intravenous Q6H PRN David Garladn MD       • ondansetron ODT (ZOFRAN-ODT) disintegrating tablet 4 mg  4 mg Oral Q6H PRN Nohemi Varner APRN       • PARoxetine (PAXIL) tablet 10 mg  10 mg Oral Daily David Garland MD   10 mg at 09/03/21 0936   • sodium chloride 0.9 % flush 10 mL  10 mL Intravenous PRN David Garland MD       • sodium chloride 0.9 % flush 10 mL  10 mL Intravenous Q12H David Garland MD   10 mL at 09/02/21 2215   • sodium chloride 0.9 % infusion  75 mL/hr Intravenous Continuous Nohemi Varner APRN 75 mL/hr at 09/03/21 1020 75 mL/hr at 09/03/21 1020   • [START ON 9/4/2021] vancomycin 500 mg/100 mL 0.9% NS IVPB ([facility])  500 mg Intravenous Q24H Tim Perez MD         Operative/Procedure Notes (most recent note)    No notes of this type exist for this encounter.            Physician Progress Notes (most recent note)      Amor Brown MD at 09/03/21 1505        Had a long discussion with the patient, her son, and Brisa Feliciano.    The patient does not want a stent. She understands that this may ultimately compromise the right kidney function. There is a possible increased risk of infection. Given her age, if she does well (asymptomatic and no infections) and creatinine stays stable, management with observation is an appropriate option.    We discussed the risks and benefits of observation versus stent placement at length. All questions were answered.    We  "will have her follow up with her previously scheduled appointment on 9/27/21 with Joao Sanchez in our office. She should have blood work prior to recheck her creatinine. If her creatinine is stable and she is asymptomatic, no imaging would be required given her desire to observe.    Electronically signed by Amor Brown MD at 09/03/21 1507          Consult Notes (most recent note)      Amor Brown MD at 08/30/21 1447          Referring Provider: Timmy (ER)  Reason for Consultation: right hydronephrosis    Chief complaint: \"My blood sugar was low\"    Subjective     History of present illness:    Ms. Canales is a pleasant 88 year old female who presented to our ER with hypoglycemia. Labs revealed an elevated creatinine. She reports some central abdominal pain and nausea. She has not been taking in my PO recently secondary to this. Her CT revealed possible right hydronephrosis. I have reviewed the CT images. It is a non-contrasted study. It may be hydronephrosis versus peripelvic cysts. The patient denies a history of ever being told her right kidney appears abnormal.    Review of Systems  Pertinent items are noted in HPI, all other systems reviewed and negative     History  Past Medical History:   Diagnosis Date   • Allergic rhinitis    • Aneurysm (CMS/HCC)    • Arthritis    • Broken shoulder     Right shoulder    • Cerebral aneurysm 2009    2ND ONE FOUND   • Cerebral aneurysm     NON TREATABLE   • Chronic laryngitis    • CKD (chronic kidney disease)    • Colon polyps    • COPD (chronic obstructive pulmonary disease) (CMS/HCC)    • Deviated nasal septum    • Diabetes mellitus (CMS/HCC)    • Disorder of kidney and ureter    • Disturbance of smell and taste    • Dizziness    • Encounter for imaging to screen for metal prior to MRI     NO MRI, METAL CLIPS IN HEAD   • Eustachian tube dysfunction    • GERD (gastroesophageal reflux disease)    • Globus sensation    • Headache    • Hepatitis     B   • " Hepatitis A    • History of stomach ulcers    • Hyperlipidemia    • Hypothyroid    • Laryngitis sicca    • Lung nodule    • Nocturia    • PONV (postoperative nausea and vomiting)    • Sensorineural hearing loss    • Spinal stenosis    • Urge incontinence    • Urgency of urination    • Urinary frequency    • Urinary incontinence    • UTI (urinary tract infection)        Past Surgical History:   Procedure Laterality Date   • BLADDER SURGERY  2016    [employer]onic Interstem   • BRAIN SURGERY      ANUERYSM REMOVED   • BREAST SURGERY Bilateral     BIOPSY   • CARPAL TUNNEL RELEASE     • CATARACT EXTRACTION, BILATERAL     • CEREBRAL ANEURYSM REPAIR     •  SECTION      X4   • CHOLECYSTECTOMY     • HYSTERECTOMY     • INTERSTIM PLACEMENT N/A 10/18/2018    Procedure: INTERSTIM STAGES 1 AND 2 LEAD AND GENERATOR REMOVAL AND REPLACEMENT;  Surgeon: Archie Avery MD;  Location: [facility] OR;  Service: Urology   • JOINT REPLACEMENT Right     KNEE   • KNEE ARTHROSCOPY     • THYROIDECTOMY     • TONSILLECTOMY         Family History   Problem Relation Age of Onset   • Cancer Brother         BLADDER   • No Known Problems Father    • No Known Problems Mother        Social History     Socioeconomic History   • Marital status:      Spouse name: Not on file   • Number of children: Not on file   • Years of education: Not on file   • Highest education level: Not on file   Tobacco Use   • Smoking status: Former Smoker     Types: Cigarettes     Quit date:      Years since quittin.6   • Smokeless tobacco: Never Used   • Tobacco comment: SMOKED OVER 40 YEARS   Vaping Use   • Vaping Use: Never used   Substance and Sexual Activity   • Alcohol use: No   • Drug use: No   • Sexual activity: Defer       Current Facility-Administered Medications   Medication Dose Route Frequency Provider Last Rate Last Admin   • sodium chloride 0.9 % flush 10 mL  10 mL Intravenous PRN Sravanthi Alston MD         Current Outpatient Medications    Medication Sig Dispense Refill   • acetaminophen (TYLENOL) 500 MG tablet Take 500 mg by mouth Every 6 (Six) Hours As Needed for Mild Pain .     • atorvastatin (Lipitor) 40 MG tablet Take 1 tablet by mouth Daily.     • B-D ULTRAFINE III SHORT PEN 31G X 8 MM misc USE 1 PEN NEEDLE TWO TIMES DAILY 180 each 3   • Dextran 70-Hypromellose (GENTEAL TEARS MODERATE PF OP) Apply  to eye(s) as directed by provider.     • Easy Touch Lancets 28G/Twist misc TEST TWICE A  each 3   • Easy Touch Test test strip TEST TWICE A  each 3   • glipizide (GLUCOTROL XL) 10 MG 24 hr tablet TAKE 1 TABLET TWICE A  tablet 3   • hydroCHLOROthiazide (HYDRODIURIL) 25 MG tablet Take 25 mg by mouth Daily.     • insulin detemir (LEVEMIR) 100 UNIT/ML injection 35 units in the morning and 25 units at night 18 mL 3   • levocetirizine (XYZAL) 5 MG tablet TAKE 1 TABLET DAILY (Patient taking differently: Take 5 mg by mouth Every Evening. PT WANTS TO STOP TAKING DUE TO COST) 90 tablet 3   • levothyroxine (SYNTHROID, LEVOTHROID) 75 MCG tablet TAKE 1 TABLET DAILY 90 tablet 3   • LOSARTAN POTASSIUM PO Take  by mouth.     • Miconazole Nitrate (REMEDY PHYTOPLEX ANTIFUNGAL EX) Apply  topically.     • multivitamin with minerals (MULTIVITAMIN ADULT PO) Take 1 tablet by mouth Daily.     • Myrbetriq 50 MG tablet sustained-release 24 hour 24 hr tablet TAKE 1 TABLET DAILY (Patient taking differently: Take 50 mg by mouth Every Night.) 90 tablet 3   • PARoxetine (PAXIL) 10 MG tablet TAKE 1 TABLET DAILY (Patient taking differently: Take 10 mg by mouth Every Night.) 90 tablet 3   • polyethylene glycol (polyethylene glycol) 17 g packet Take 17 g by mouth Daily.     • sennosides-docusate (PERICOLACE) 8.6-50 MG per tablet Take 1 tablet by mouth 2 (Two) Times a Day.     • vitamin C (ASCORBIC ACID) 250 MG tablet Take 250 mg by mouth Daily.     • Wheat Dextrin (BENEFIBER DRINK MIX PO) Take  by mouth.     • Zinc Sulfate (ZINC 15 PO) Take  by mouth.           Allergies   Allergen Reactions   • Codeine Phosphate [Codeine] Unknown (See Comments)     CHEST PAIN   • Collagen Other (See Comments)     CAT GUT SUTURES \ WOUND WOULD NOT HEAL AND GOT INFECTION   • Accupril [Quinapril Hcl] Other (See Comments)     COUGH   • Aspirin Other (See Comments)     HISTORY OF STOMACH ULCERS   • Adhesive Tape Rash     SKIN BLISTERS   • Celebrex [Celecoxib] Nausea Only   • Lyrica [Pregabalin] Other (See Comments)     GAINED 50 POUNDS   • Pantoprazole Sodium Diarrhea   • Pentoxifylline Diarrhea   • Sulfa Antibiotics Nausea And Vomiting   • Tramadol Nausea And Vomiting   • Vioxx [Rofecoxib] Nausea And Vomiting       Objective     Vital Signs   Temp:  [98.2 °F (36.8 °C)] 98.2 °F (36.8 °C)  Heart Rate:  [75] 75  Resp:  [18] 18  BP: (120)/(44) 120/44    Intake/Output Summary (Last 24 hours) at 8/30/2021 1447  Last data filed at 8/30/2021 1252  Gross per 24 hour   Intake 100 ml   Output --   Net 100 ml       Physical Exam:    General: Alert, cooperative, nontoxic, comfortable  Head:  Normocephalic, without obvious abnormality, atraumatic  Eyes:   Lids and lashes normal, no icterus, no pallor  Ears:  Ears appear intact with no abnormalities noted  Neck:  Supple  Back:  No costovertebral angle tenderness  Lungs: Unlabored respirations  Heart:  Regular rhythm and normal rate  Abdomen: Soft, nontender, mildly distended, no peritoneal signs.  :  Bladder is not palpable.  Extremities: Moves all extremities well  Skin:  Warm and dry  Neurologic: Cranial nerves 2 - 12 grossly intact    Data Review:    CT Abdomen Pelvis Without Contrast (08/30/2021 13:30)   CBC & Differential (08/30/2021 13:04)   Urinalysis, Microscopic Only - Urine, Catheter In/Out (08/30/2021 11:20)   Urinalysis With Culture If Indicated - Urine, Catheter In/Out (08/30/2021 11:20)   Lactic Acid, Plasma (08/30/2021 11:13)   Comprehensive Metabolic Panel (08/30/2021 11:13)   COVID-19,[facility] IN-HOUSE,Nasal Swab No Transport Media  3-4 HR TAT - Swab, Nasal Cavity (08/30/2021 10:59)   ED Provider Notes by Sravanthi Alston MD (08/30/2021 10:39)   ED Triage Notes by Cierra Bills RN (08/30/2021 10:24)       Assessment and Plan:    Abnormal CT of the right kidney: I would like to monitor this for now. If kidney function improves with IV hydration, will consider a contrasted CT with delays to further delineate the process. No exam findings of acute right kidney pathology.    Elevated creatinine: Will monitor post void bladder scans for now. Will monitor if creatine improves with hydration as patient has had poor PO intake.    UROLOGICAL DISPOSITION: I will continue to follow this patient.    I discussed the patient's findings and my recommendations with patient and family    (Please note that portions of this note were completed with a voice recognition program.)    Amor Brown MD  08/30/21  14:47 CDT    Time: Time spent: 40 minutes spent performing evaluation and management, chart review, and discussion with patient, > 50% of time spent in face-to-face encounter      Electronically signed by Amor Brown MD at 08/30/21 1454       Nutrition Notes (most recent note)    No notes exist for this encounter.         Physical Therapy Notes (most recent note)    No notes exist for this encounter.         Occupational Therapy Notes (most recent note)    No notes exist for this encounter.         Speech Language Pathology Notes (most recent note)    No notes exist for this encounter.         Respiratory Therapy Notes (most recent note)    No notes exist for this encounter.           Discharge Summary    No notes of this type exist for this encounter.         Discharge Order (From admission, onward)    None

## 2021-09-03 NOTE — DISCHARGE SUMMARY
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 Brown with urology.    Procedures Performed: None.    Pertinent Test Results:   Results for orders placed during the hospital encounter of 06/15/18    Adult Stress Echo W/ Cont or Stress Agent if Necessary Per Protocol    Interpretation Summary  Dobutamine stress echocardiogram is low risk for ischemia.    Imaging Results (Last 72 Hours)     Procedure Component Value Units Date/Time    US Renal Bilateral [[id number]] Collected: 09/03/21 1521     Updated: 09/03/21 1529    Narrative:      RENAL ULTRASOUND COMPLETE 9/3/2021 1:05 PM CDT     REASON FOR EXAM: hydronephrosis; E16.2-Hypoglycemia, unspecified;  N17.9-Acute kidney failure, unspecified; E87.2-Sbyn-bzwjwkgxme and  hyponatremia; N39.0-Urinary tract infection, site not specified;  N13.30-Unspecified hydronephrosis; R09.02-Hypoxemia; D64.9-Anemia,  unspecified       COMPARISON: CT scan dated 8/30/2021       TECHNIQUE: Multiple longitudinal and transverse realtime sonographic  images of the kidneys and urinary bladder are obtained.      FINDINGS:      RIGHT KIDNEY: 11.8 x 5.3 x 6.6 cm. Increased renal cortical  echogenicity. No solid or cystic masses. Severe right  hydronephrosis  with right renal pelvis distended up to 4.4 cm. No nephrolithiasis or  abnormal perinephric fluid collections . No hydroureter.      LEFT KIDNEY: 9.4 x 5.0 x 4.7 cm. Increased renal cortical echogenicity.  No solid or cystic masses. The central echo complex is compact with no  evidence for hydronephrosis. No nephrolithiasis or abnormal perinephric  fluid collections . No hydroureter.      PELVIS: The bladder is mildly distended with anechoic urine and  demonstrates no significant wall thickening or internal echogenicities.  There is no surrounding ascites.        Impression:      1. Bilateral renal cortices are thinned and echogenic, suggesting  underlying chronic medical renal disease.  2. There is a severe right hydronephrosis with renal pelvis is dilated  up to 4.4 cm in diameter. This does not appear significantly changed  from the recent CT scan. I do not see any right-sided hydroureter.  3. Urinary bladder is unremarkable.     This report was finalized on 09/03/2021 15:26 by Dr Alexx Morillo, .        Lab Results (last 72 hours)     Procedure Component Value Units Date/Time    POC Glucose Once [[id number]]  (Abnormal) Collected: 09/03/21 1420    Specimen: Blood Updated: 09/03/21 1432     Glucose 144 mg/dL      Comment: : [id number] Tobias Gunter ID: [id number]       Blood Culture - Blood, Hand, Right [[id number]] Collected: 08/30/21 1110    Specimen: Blood from Hand, Right Updated: 09/03/21 1146     Blood Culture No growth at 4 days    Blood Culture - Blood, Arm, Right [[id number]] Collected: 08/30/21 1113    Specimen: Blood from Arm, Right Updated: 09/03/21 1146     Blood Culture No growth at 4 days    POC Glucose Once [[id number]]  (Normal) Collected: 09/03/21 1115    Specimen: Blood Updated: 09/03/21 1126     Glucose 124 mg/dL      Comment: : [id number] Javed Norman) ErnestinaieMeter ID: [id number]       Vancomycin, Trough Please call results to Pharmacy prior to administering next  dose [[id number]]  (Normal) Collected: 09/03/21 0858    Specimen: Blood Updated: 09/03/21 0957     Vancomycin Trough 6.90 mcg/mL     POC Glucose Once [[id number]]  (Abnormal) Collected: 09/03/21 0738    Specimen: Blood Updated: 09/03/21 0749     Glucose 137 mg/dL      Comment: : [id number] Mika FaythMeter ID: [id number]       Basic Metabolic Panel [[id number]]  (Abnormal) Collected: 09/03/21 0512    Specimen: Blood Updated: 09/03/21 0631     Glucose 141 mg/dL      BUN 39 mg/dL      Creatinine 1.89 mg/dL      Sodium 134 mmol/L      Potassium 4.3 mmol/L      Chloride 106 mmol/L      CO2 19.0 mmol/L      Calcium 8.2 mg/dL      eGFR Non African Amer 25 mL/min/1.73      BUN/Creatinine Ratio 20.6     Anion Gap 9.0 mmol/L     Narrative:      GFR Normal >60  Chronic Kidney Disease <60  Kidney Failure <15      POC Glucose Once [[id number]]  (Abnormal) Collected: 09/02/21 1920    Specimen: Blood Updated: 09/02/21 1931     Glucose 207 mg/dL      Comment: : [id number] Marin CassandraMeter ID: [id number]       POC Glucose Once [[id number]]  (Abnormal) Collected: 09/02/21 1655    Specimen: Blood Updated: 09/02/21 1707     Glucose 144 mg/dL      Comment: : [id number] Aldo (Estephania) AutumnMeter ID: [id number]       POC Glucose Once [[id number]]  (Abnormal) Collected: 09/02/21 1211    Specimen: Blood Updated: 09/02/21 1226     Glucose 163 mg/dL      Comment: : [id number] Aldo ([employer] Technologyrainer) AutumnMeter ID: [id number]       Urine Culture - Urine, Urine, Catheter In/Out [[id number]]  (Abnormal) Collected: 08/30/21 1120    Specimen: Urine, Catheter In/Out Updated: 09/02/21 1044     Urine Culture >100,000 CFU/mL Aerococcus urinae    Narrative:      Aerococcus urinae are usually susceptible to Penicillin G, Vancomycin, and Tetracycline and Resistant to Trimethoprim sulfamethoxazole and Gentamicin.      POC Glucose Once [[id number]]  (Abnormal) Collected: 09/02/21 0715    Specimen: Blood Updated: 09/02/21 0726     Glucose 141  mg/dL      Comment: : [id number] Claudette Hirsch (Johnson) ID: [id number]       Basic Metabolic Panel [[id number]]  (Abnormal) Collected: 09/02/21 0514    Specimen: Blood Updated: 09/02/21 0620     Glucose 143 mg/dL      BUN 38 mg/dL      Creatinine 1.87 mg/dL      Sodium 135 mmol/L      Potassium 4.2 mmol/L      Chloride 104 mmol/L      CO2 21.0 mmol/L      Calcium 8.6 mg/dL      eGFR Non African Amer 25 mL/min/1.73      BUN/Creatinine Ratio 20.3     Anion Gap 10.0 mmol/L     Narrative:      GFR Normal >60  Chronic Kidney Disease <60  Kidney Failure <15      CBC & Differential [[id number]]  (Abnormal) Collected: 09/02/21 0514    Specimen: Blood Updated: 09/02/21 0548    Narrative:      The following orders were created for panel order CBC & Differential.  Procedure                               Abnormality         Status                     ---------                               -----------         ------                     CBC Auto Differential[[id number]]        Abnormal            Final result                 Please view results for these tests on the individual orders.    CBC Auto Differential [[id number]]  (Abnormal) Collected: 09/02/21 0514    Specimen: Blood Updated: 09/02/21 0548     WBC 6.64 10*3/mm3      RBC 3.23 10*6/mm3      Hemoglobin 9.9 g/dL      Hematocrit 29.7 %      MCV 92.0 fL      MCH 30.7 pg      MCHC 33.3 g/dL      RDW 12.6 %      RDW-SD 42.4 fl      MPV 10.7 fL      Platelets 291 10*3/mm3      Neutrophil % 57.4 %      Lymphocyte % 26.5 %      Monocyte % 12.3 %      Eosinophil % 3.0 %      Basophil % 0.2 %      Immature Grans % 0.6 %      Neutrophils, Absolute 3.81 10*3/mm3      Lymphocytes, Absolute 1.76 10*3/mm3      Monocytes, Absolute 0.82 10*3/mm3      Eosinophils, Absolute 0.20 10*3/mm3      Basophils, Absolute 0.01 10*3/mm3      Immature Grans, Absolute 0.04 10*3/mm3      nRBC 0.0 /100 WBC     POC Glucose Once [[id number]]  (Abnormal) Collected: 09/01/21 1930    Specimen: Blood  Updated: 09/01/21 1941     Glucose 194 mg/dL      Comment: : [id number] Marin HorneraMeter ID: [id number]       POC Glucose Once [[id number]]  (Abnormal) Collected: 09/01/21 1627    Specimen: Blood Updated: 09/01/21 1656     Glucose 185 mg/dL      Comment: : [id number] Sutcliffe LisaMeter ID: [id number]       POC Glucose Once [[id number]]  (Abnormal) Collected: 09/01/21 1047    Specimen: Blood Updated: 09/01/21 1137     Glucose 208 mg/dL      Comment: : [id number] Sutcliffe LisaMeter ID: [id number]       POC Glucose Once [[id number]]  (Abnormal) Collected: 09/01/21 0732    Specimen: Blood Updated: 09/01/21 0808     Glucose 134 mg/dL      Comment: : [id number] Sutcliffe LisaMeter ID: [id number]       Lipase [[id number]]  (Abnormal) Collected: 09/01/21 0522    Specimen: Blood Updated: 09/01/21 0628     Lipase 506 U/L     Basic Metabolic Panel [[id number]]  (Abnormal) Collected: 09/01/21 0522    Specimen: Blood Updated: 09/01/21 0607     Glucose 154 mg/dL      BUN 43 mg/dL      Creatinine 2.18 mg/dL      Sodium 134 mmol/L      Potassium 3.9 mmol/L      Chloride 103 mmol/L      CO2 20.0 mmol/L      Calcium 8.2 mg/dL      eGFR Non African Amer 21 mL/min/1.73      BUN/Creatinine Ratio 19.7     Anion Gap 11.0 mmol/L     Narrative:      GFR Normal >60  Chronic Kidney Disease <60  Kidney Failure <15          Chief Complaint on Day of Discharge: Overall, patient reports feeling well.  She denies shortness of breath, chest pain or pressure.  She is tolerating a diet.  She has been urinating well.  She feels much better as compared to admission.    Hospital Course:  The patient is a 88 y.o. female who presented to [facility] on 8/30 with hypoglycemia . She also had reported decreased oral intake, nausea and constipation  Despite decreased oral intake she continued to receive scheduled Levemir 38 units in the morning and 25 units nightly in addition to Glucotrol XL.  She is a resident at [facility].   EMS was contacted and they arrived her blood sugar was checked and found to be 38.  She was administered dextrose and many of her symptoms began to improve.  Upon evaluation in the emergency department, CT abdomen and pelvis noted severe hydronephrosis on the right renal collecting system with stranding of the fat around the kidney and right renal collecting system without obstructive stone.  Fluid collection measuring 4.7 x 3.6 cm.  May represent urinoma versus perinephric abscess.  Does not contain any air bubbles.  Right ureter nondilated.  Hydronephrosis could be secondary to infection.  Neoplasm not ruled out.  Small nonobstructive renal stones left side.  She was started on broad-spectrum antibiotics with vancomycin and ceftriaxone.  She was admitted to the hospitalist service for further evaluation and management.     She was seen in consultation by urology.  Renal function has continued to improve and she has been urinating well without difficulty.  Renal ultrasound today showed severe right hydronephrosis with renal pelvis dilated up to 4.4 cm in diameter. This does not appear significantly changed from the recent CT scan.  No right-sided hydroureter seen.  Underlying chronic medical renal disease.  Discussion with patient, her son Chaka, Dr. Brown and I.  The patient does not want a stent.  She understands that this may ultimately compromise the right kidney function. There is a possible increased risk of infection.  Per urology, given her age, if she does well (asymptomatic and no infections) and creatinine stays stable, management with observation is an appropriate option.  She is okay for discharge from urology standpoint with close follow-up on previously scheduled appointment on 9/27/2021.  She will need repeat BMP prior to appointment. Urine culture shows greater than 100 K Aerococcus urinae.  Narrative notes that aerococcus urinae are generally susceptible to penicillin G, vancomycin,  "tetracycline.  Resistant to trimethoprim, sulfamethoxazole, and gentamicin.  Blood cultures with no growth to date.  She has had 5 days of IV ceftriaxone, will go ahead and transition to Omnicef to complete a total of 10 days. She has remained afebrile and absence of leukocytosis. Creatinine on admission 2.7.  Creatinine is trending down stable and 1.89.  Baseline creatinine appears in the order of 1.08-1.2.    She will need repeat BMP in 1 week.  Hemoglobin A1c 6.3.  She did come in with hypoglycemia and was taking Glucotrol XL 10 mg twice daily in addition to Levemir.  Plan to discontinue Glucotrol at this time given renal dysfunction.  She is to continue on Levemir 10 units twice daily in addition to low-dose sliding scale insulin before meals.  Glucotrol to be resumed when kidney function improves.  Blood pressure trend noted.  She has been started on amlodipine 10 mg.  Hydrochlorothiazide and lisinopril discontinued given renal function.  Sequential compression devices for DVT prophylaxis.  Overall, patient reports feeling well.  She denies shortness of breath, chest pain or pressure.  She is tolerating a diet.  She has been urinating well.  She feels much better as compared to admission.  Long discussion with patient and her son Chaka at bedside.  She was previously a resident at [facility] but plans to to [place] at time of discharge.  She is medically stable and appropriate for discharge today.  She is to follow-up with physician at skilled nursing facility for 24-48 hours for post hospitalization assessment.    Condition on Discharge:  Medically stable.    Physical Exam on Discharge:  /66 (BP Location: Left arm, Patient Position: Lying)   Pulse 70   Temp 98.1 °F (36.7 °C) (Oral)   Resp 20   Ht 168.9 cm (66.5\")   Wt 72.3 kg (159 lb 6.4 oz)   SpO2 97%   BMI 25.35 kg/m²   Physical Exam  Constitutional:       Appearance: She is well-developed.      Comments: Sitting up in bed. No acute " distress. Tolerating room air.  Son at bedside.  Discussed with her nurse Leticia   HENT:      Head: Normocephalic and atraumatic.   Eyes:      General: No scleral icterus.     Conjunctiva/sclera: Conjunctivae normal.      Pupils: Pupils are equal, round, and reactive to light.   Neck:      Vascular: No JVD.   Cardiovascular:      Rate and Rhythm: Normal rate and regular rhythm.      Heart sounds: Normal heart sounds. No murmur heard.   Pulmonary:      Effort: Pulmonary effort is normal.      Breath sounds: Normal breath sounds. No wheezing or rales.   Abdominal:      General: Bowel sounds are normal. There is no distension.      Palpations: Abdomen is soft.      Tenderness: There is no abdominal tenderness.   Genitourinary:     Comments: Voiding via pure wick  Musculoskeletal:         General: Normal range of motion.      Cervical back: Neck supple.   Lymphadenopathy:      Cervical: No cervical adenopathy.   Skin:     General: Skin is warm and dry.   Neurological:      Mental Status: She is alert and oriented to person, place, and time.      Cranial Nerves: No cranial nerve deficit.   Psychiatric:         Behavior: Behavior normal.     Discharge Disposition:  Skilled Nursing Facility (DC - External)    Discharge Medications:     Discharge Medications      New Medications      Instructions Start Date   amLODIPine 10 MG tablet  Commonly known as: NORVASC   10 mg, Oral, Every 24 Hours Scheduled   Start Date: September 4, 2021     cefdinir 300 MG capsule  Commonly known as: OMNICEF   300 mg, Oral, Daily   Start Date: September 4, 2021     insulin lispro 100 UNIT/ML injection  Commonly known as: humaLOG   2-7 Units, Subcutaneous, 3 Times Daily Before Meals         Changes to Medications      Instructions Start Date   insulin detemir 100 UNIT/ML injection  Commonly known as: LEVEMIR  What changed:   · how much to take  · how to take this  · when to take this  · additional instructions   10 Units, Subcutaneous, 2 Times  Daily         Continue These Medications      Instructions Start Date   acetaminophen 500 MG tablet  Commonly known as: TYLENOL   500 mg, Oral, Every 6 Hours PRN      atorvastatin 40 MG tablet  Commonly known as: Lipitor   40 mg, Oral, Daily      BENEFIBER DRINK MIX PO   1 packet, Oral, Daily With Breakfast & Lunch      GENTEAL TEARS MODERATE PF OP   2 drops, Both Eyes, Daily      HYDROcodone-acetaminophen 7.5-325 MG per tablet  Commonly known as: NORCO   1 tablet, Oral, Every 6 Hours PRN      levocetirizine 5 MG tablet  Commonly known as: XYZAL   5 mg, Oral, Daily      levothyroxine 75 MCG tablet  Commonly known as: SYNTHROID, LEVOTHROID   75 mcg, Oral, Daily      Mirabegron ER 50 MG tablet sustained-release 24 hour 24 hr tablet  Commonly known as: MYRBETRIQ   50 mg, Oral, Daily      multivitamin with minerals tablet tablet   1 tablet, Oral, Daily      ondansetron 4 MG tablet  Commonly known as: ZOFRAN   4 mg, Oral, Every 6 Hours PRN      PARoxetine 10 MG tablet  Commonly known as: PAXIL   10 mg, Oral, Every Morning      polyethylene glycol 17 g packet  Commonly known as: MIRALAX   17 g, Oral, Daily      prochlorperazine 10 MG tablet  Commonly known as: COMPAZINE   10 mg, Oral, Every 6 Hours PRN      REMEDY PHYTOPLEX ANTIFUNGAL EX   Apply to coccyx after each incontinent episode      sennosides-docusate 8.6-50 MG per tablet  Commonly known as: PERICOLACE   1 tablet, Oral, 2 Times Daily      Vitamin C 500 MG capsule   500 mg, Oral, Daily      ZINC 15 PO   220 mg, Oral, Daily         Stop These Medications    glipizide 10 MG 24 hr tablet  Commonly known as: GLUCOTROL XL     hydroCHLOROthiazide 25 MG tablet  Commonly known as: HYDRODIURIL     losartan 100 MG tablet  Commonly known as: COZAAR            Discharge Diet:   Diet Instructions     Diet: Regular, Renal, Consistent Carbohydrate      Discharge Diet:  Regular  Renal  Consistent Carbohydrate             Activity at Discharge:   Activity Instructions     Activity  as Tolerated            Discharge Care Plan/Instructions:   1.  Follow-up with physician at [place] within 24-48 hours for post hospitalization assessment.  Repeat BMP in 1 week.  2.  Follow-up with urology on upcoming appointment on 9/27/2021.  She will need repeat BMP prior to appointment.  3.  Continue Omnicef until completion.  4.  Seek evaluation for worsening symptoms.    Follow-up Appointments:   Future Appointments   Date Time Provider Department Center   9/16/2021 10:30 AM Morgan Pepper DPM [facility] POD PAD PAD   9/27/2021  1:30 PM Joao Sanchez PA [facility] PAD PAD       Test Results Pending at Discharge: Blood cultures with no growth to date, will follow to completion.    Electronically signed by YISEL Velasquez, 09/03/21, 15:52 CDT.    Time: 35 minutes.

## 2021-09-03 NOTE — CASE MANAGEMENT/SOCIAL WORK
Continued Stay Note  [facility]     Patient Name: Honey Canales  MRN: [id number]  Today's Date: 9/3/2021    Admit Date: 8/30/2021    Discharge Plan     Row Name 09/03/21 1529       Plan    Plan  [facility]    Patient/Family in Agreement with Plan  yes    Final Discharge Disposition Code  03 - skilled nursing facility (SNF)    Final Note  Stephanie from [facility] found a doctor and has accepted.  Pt can dc today.  They will pick pt up at 430PM.  MD aware.  Phone: [phone] Fax: [phone].    Row Name 09/03/21 1523       Plan    Plan  [place]    Patient/Family in Agreement with Plan  yes    Plan Comments  Stephanie from [facility] states she has to get in touch with MD before she can offer a bed on pt.  CARLOS has faxed referral and will follow for bed offer/denial.    Row Name 09/03/21 1501       Plan    Plan  [facility]    Patient/Family in Agreement with Plan  yes    Plan Comments  Pt's son states he has been in touch with [facility] and they have a bed for pt.  CARLOS has left a message for Stephanie in admissions to verify.        Discharge Codes    No documentation.             CAROLIN Cole

## 2021-09-03 NOTE — CASE MANAGEMENT/SOCIAL WORK
Continued Stay Note   [facility]     Patient Name: Honey Canales  MRN: [id number]  Today's Date: 9/3/2021    Admit Date: 8/30/2021    Discharge Plan     Row Name 09/03/21 1521       Plan    Plan  [facility]    Patient/Family in Agreement with Plan  yes    Plan Comments  Stephanie from [facility] states she has to get in touch with MD before she can offer a bed on pt.  CARLOS has faxed referral and will follow for bed offer/denial.    Row Name 09/03/21 1507       Plan    Plan  [facility]    Patient/Family in Agreement with Plan  yes    Plan Comments  Pt's son states he has been in touch with [facility] and they have a bed for pt.  CARLOS has left a message for Stephanie in admissions to verify.        Discharge Codes    No documentation.             CAROLIN Cole

## 2021-09-03 NOTE — PROGRESS NOTES
LOS: 4 days   Patient Care Team:  Devon Zuñiga MD as PCP - General  Archie Avery MD as Consulting Physician (Urology)  Tim Small MD as Consulting Physician (Otolaryngology)  Angelito Atkins PA as Physician Assistant (Otolaryngology)    Chief Complaint:  JAKOB, right hydronephrosis    Subjective     Interval History:     Patient Reports: Feeling good  Patient Denies:  FCR  History taken from: patient chart    Review of Systems  Pertinent items are noted in HPI, all other systems reviewed and negative     Objective     Vital Signs  Temp:  [97.5 °F (36.4 °C)-99 °F (37.2 °C)] 99 °F (37.2 °C)  Heart Rate:  [71-77] 75  Resp:  [18-22] 22  BP: (140-173)/(54-70) 173/62    Physical Exam:  Comfortable. Nontoxic. Mild right CVA tenderness.     Data Review:       I have reviewed the following data:    Basic Metabolic Panel (09/03/2021 05:12)   Progress Notes by Brisa Feliciano APRN (09/02/2021 14:51)   Plan of Care by So Carlson RN (09/02/2021 17:35)   Plan of Care by Asiya Burns LPN (09/03/2021 03:25)       Medication Review:     Current Facility-Administered Medications:   •  acetaminophen (TYLENOL) tablet 650 mg, 650 mg, Oral, Q4H PRN, David Garland MD, 650 mg at 08/30/21 2210  •  amLODIPine (NORVASC) tablet 5 mg, 5 mg, Oral, Q24H, Beverly Cabral DO, 5 mg at 09/02/21 0810  •  atorvastatin (LIPITOR) tablet 40 mg, 40 mg, Oral, Nightly, David Garland MD, 40 mg at 09/02/21 2039  •  bisacodyl (DULCOLAX) suppository 10 mg, 10 mg, Rectal, Daily PRN, David Garland MD  •  cefTRIAXone (ROCEPHIN) 1 g in sodium chloride 0.9 % 100 mL IVPB-VTB, 1 g, Intravenous, Q24H, David Garland MD, Last Rate: 200 mL/hr at 09/02/21 1239, 1 g at 09/02/21 1239  •  dextrose (D50W) 25 g/ 50mL Intravenous Solution 25 g, 25 g, Intravenous, Q15 Min PRN, David Garland MD, 25 g at 08/30/21 1730  •  dextrose (GLUTOSE) oral gel 15 g, 15 g, Oral, Q15 Min PRN, Dada,  "David CHEUNG MD  •  glucagon (human recombinant) (GLUCAGEN DIAGNOSTIC) injection 1 mg, 1 mg, Subcutaneous, Q15 Min PRN, David Garland MD  •  insulin detemir (LEVEMIR) injection 10 Units, 10 Units, Subcutaneous, Nightly, David Garland MD, 10 Units at 09/02/21 2045  •  insulin lispro (humaLOG) injection 0-9 Units, 0-9 Units, Subcutaneous, TID AC, David Garland MD, 2 Units at 09/02/21 1226  •  labetalol (NORMODYNE,TRANDATE) injection 10 mg, 10 mg, Intravenous, Q6H PRN, Beverly Cabral DO, 10 mg at 09/03/21 0751  •  lactulose solution 20 g, 20 g, Oral, Daily PRN, Brisa Feliciano APRN  •  levothyroxine (SYNTHROID, LEVOTHROID) tablet 75 mcg, 75 mcg, Oral, Q AM, David Garland MD, 75 mcg at 09/03/21 0522  •  multivitamin with minerals 1 tablet, 1 tablet, Oral, Daily, David Garland MD, 1 tablet at 09/02/21 0811  •  ondansetron (ZOFRAN) injection 4 mg, 4 mg, Intravenous, Q6H PRN, David Garland MD  •  ondansetron ODT (ZOFRAN-ODT) disintegrating tablet 4 mg, 4 mg, Oral, Q6H PRN, Nohemi Varner APRN  •  PARoxetine (PAXIL) tablet 10 mg, 10 mg, Oral, Daily, David Garland MD, 10 mg at 09/02/21 0810  •  [COMPLETED] Insert peripheral IV, , , Once **AND** sodium chloride 0.9 % flush 10 mL, 10 mL, Intravenous, PRN, David Garland MD  •  sodium chloride 0.9 % flush 10 mL, 10 mL, Intravenous, Q12H, David Garland MD, 10 mL at 09/02/21 2215  •  sodium chloride 0.9 % infusion, 75 mL/hr, Intravenous, Continuous, Nohemi Varner APRBETTY, Last Rate: 75 mL/hr at 09/02/21 2047, 75 mL/hr at 09/02/21 2047  •  vancomycin 500 mg/100 mL 0.9% NS IVPB ([facility]), 500 mg, Intravenous, Q24H, Tim Perez MD, 500 mg at 09/02/21 0812    Assessment and Plan:    JAKOB and right hydronephrosis: Creatinine improvement has stalled today. Will obtain renal ultrasound. Patient seems hesitant to proceed with stent placement (\"I'm 88 years old\"). I will discuss ultrasound findings with " patient when available. Will also discuss with son if he is present. Nursing is informing him of the clinical data this morning. The patient does not look toxic; she is comfortable at this time.    URO DISPO: I will continue to follow this patient.    I discussed the patients findings and my recommendations with patient and nursing staff    (Please note that portions of this note were completed with a voice recognition program.)    Amor Brown MD  09/03/21  08:50 CDT    Time: Time spent: 25 minutes spent performing evaluation and management, chart review, and discussion with patient, > 50% of time spent in face-to-face encounter

## 2021-09-03 NOTE — PLAN OF CARE
Goal Outcome Evaluation:           Progress: no change   SBP elevated, PRN BP medication given, otherwise VSS. No C/O pain. AAOX4. Continues on room air with O2 saturation in the 90's. Sinus 65-80 on tele. Continues on IVF. SCD's in place. Weight shift encouraged and implemented to prevent skin breakdown. Oral care provided. Medication education provided. Safety maintained.

## 2021-09-03 NOTE — PROGRESS NOTES
"Pharmacy Dosing Service  Pharmacokinetics  Vancomycin Follow-up Evaluation    Assessment/Action/Plan:  Vancomycin trough = 6.90 at 08:58 this AM prior to 3rd dose (previous dose administered at 08:12 yesterday). SCr=1.89 (see below). Anticipate additional drug accumulation as patient reaches steady state. Will give Vancomycin 750mg IV once now, then resume 500mg Q24h dosing tomorrow.     AUC Model Data:  Regimen: 500 mg IV every 24 hours.  Exposure target: AUC24 (range)400-600 mg/L.hr   AUC24,ss: 393 mg/L.hr  PAUC*: 47 %  Ctrough,ss: 13.7 mg/L  Pconc*: 6 %  Tox.: 9 %    Pharmacy will continue to follow in anticipation of de-escalation based on culture susceptibility results..     Subjective:  Honey Canales is a 88 y.o. female currently on Vancomycin 500 mg IV every 24 hours for the treatment of UTI, day 3 of therapy. Current end date: 9-7-21     Objective:  Ht: 168.9 cm (66.5\"); Wt: 72.3 kg (159 lb 6.4 oz)  Estimated Creatinine Clearance: 23.5 mL/min (A) (by C-G formula based on SCr of 1.89 mg/dL (H)).     Creatinine   Date Value Ref Range Status   09/03/2021 1.89 (H) 0.57 - 1.00 mg/dL Final   09/02/2021 1.87 (H) 0.57 - 1.00 mg/dL Final   09/01/2021 2.18 (H) 0.57 - 1.00 mg/dL Final      Lab Results   Component Value Date    WBC 6.64 09/02/2021    WBC 7.29 08/31/2021    WBC 10.17 08/30/2021         Lab Results   Component Value Date    VANCOTROUGH 6.90 09/03/2021     Culture Results:  Microbiology Results (last 10 days)       Procedure Component Value - Date/Time    Urine Culture - Urine, Urine, Catheter In/Out [[id number]]  (Abnormal) Collected: 08/30/21 1120    Lab Status: Final result Specimen: Urine, Catheter In/Out Updated: 09/02/21 1044     Urine Culture >100,000 CFU/mL Aerococcus urinae    Narrative:      Aerococcus urinae are usually susceptible to Penicillin G, Vancomycin, and Tetracycline and Resistant to Trimethoprim sulfamethoxazole and Gentamicin.      Blood Culture - Blood, Arm, Right [[id number]] " Collected: 08/30/21 1113    Lab Status: Preliminary result Specimen: Blood from Arm, Right Updated: 09/02/21 1145     Blood Culture No growth at 3 days    Blood Culture - Blood, Hand, Right [[id number]] Collected: 08/30/21 1110    Lab Status: Preliminary result Specimen: Blood from Hand, Right Updated: 09/02/21 1145     Blood Culture No growth at 3 days    COVID-19,[facility],Nasal Swab No Transport Media 3-4 HR TAT - Swab, Nasal Cavity [[id number]]  (Normal) Collected: 08/30/21 1059    Lab Status: Final result Specimen: Swab from Nasal Cavity Updated: 08/30/21 1223     COVID19 Not Detected    Narrative:      Fact sheet for providers: https://www.fda.gov/media/291314/download     Fact sheet for patients: https://www.fda.gov/media/040235/download    Test performed by PCR.    Consider negative results in combination with clinical observations, patient history, and epidemiological information.            Jamal Varner, PharmD   09/03/21 10:19 CDT

## 2021-09-03 NOTE — CASE MANAGEMENT/SOCIAL WORK
Continued Stay Note  LENA Mixon     Patient Name: Honey Canales  MRN: [id number]  Today's Date: 9/3/2021    Admit Date: 8/30/2021    Discharge Plan     Row Name 09/03/21 1507       Plan    Plan  [facility]    Patient/Family in Agreement with Plan  yes    Plan Comments  Pt's son states he has been in touch with [facility] and they have a bed for pt.   has left a message for Stephanie in admissions to verify.        Discharge Codes    No documentation.             CAROLIN Cole

## 2021-09-03 NOTE — DISCHARGE PLACEMENT REQUEST
"From:  Doretha Garcia, BSW  [phone]    Ava Canales (88 y.o. Female)     Date of Birth Social Security Number Address Home Phone MRN    03/27/1933  [address] [place] [address] [phone] [id number]    Gnosticism Marital Status          Yazdanism        Admission Date Admission Type Admitting Provider Attending Provider Department, Room/Bed    8/30/21 Emergency Tim Perez MD Fleming, John Eric, MD [facility] 4B, 435/1    Discharge Date Discharge Disposition Discharge Destination                       Attending Provider: Tim Perez MD    Allergies: Codeine Phosphate [Codeine], Collagen, Accupril [Quinapril Hcl], Aspirin, Adhesive Tape, Celebrex [Celecoxib], Lyrica [Pregabalin], Pantoprazole Sodium, Pentoxifylline, Sulfa Antibiotics, Tramadol, Vioxx [Rofecoxib]    Isolation: None   Infection: None   Code Status: CPR    Ht: 168.9 cm (66.5\")   Wt: 72.3 kg (159 lb 6.4 oz)    Admission Cmt: None   Principal Problem: Right hydronephrosis [N13.30]                 Active Insurance as of 8/30/2021     Primary Coverage     Payor Plan Insurance Group Employer/Plan Group    MEDICARE MEDICARE A & B      Payor Plan Address Payor Plan Phone Number Payor Plan Fax Number Effective Dates    [address] [phone]  3/1/1998 - None Entered    [facility] [id number]       Subscriber Name Subscriber Birth Date Member AVA BRAVO 3/27/1933 [id number]           Secondary Coverage     Payor Plan Insurance Group Employer/Plan Group    [facility] [id number]     Payor Plan Address Payor Plan Phone Number Payor Plan Fax Number Effective Dates    [address]   1/1/2016 - None Entered    [facility] [address]       Subscriber Name Subscriber Birth Date Member MANJULA BRAVO 2/26/1928 [id number]                 Emergency Contacts      (Rel.) Home Phone Work Phone Mobile Phone    Chaka Canales (Son) -- -- [phone]               History & " "Physical      GarlandDavid MD at 08/30/21 1524              [facility] Medicine Services  HISTORY AND PHYSICAL    Date of Admission: 8/30/2021  Primary Care Physician: Devon Zuñiga MD    Subjective     Chief Complaint: \"My blood sugar was low\"    History of Present Illness  Patient is an 88-year-old  female with past medical history significant for chronic kidney disease, history of recurrent falls, hypertension, and insulin-dependent diabetes that presented to our hospital from [facility] due to low blood sugar.  Patient states that her main symptom over the course of the past 1 week has been constipation.  She states that she has been on multiple medications to assist her with constipation, but none have been very effective.  This is resulted in her having symptoms of nausea and decreased oral intake.  She reports that she has been on the same dose of insulin, and earlier today was found to be more listless and confused.  She also reportedly had some malodorous urine.  EMS was contacted when they arrived her blood sugar was checked and was found to be 38.  She was administered dextrose, and many of her symptoms began to improve.  She is followed by urology on an outpatient basis, and does have history of bladder stimulator placement.  Abdominal imaging obtained in the emergency department did reveal evidence of severe right-sided hydronephrosis with a fluid collection of unclear etiology.  There was also an incidental finding of a compression deformity at L5.  Patient states that she has a longstanding history of gait imbalance and history of recurrent falls.  She denies having any acute back pain.  She reports sustaining a broken shoulder related to a fall a number of months ago, and has required placement in a skilled nursing facility since that time.    Review of Systems     Otherwise complete ROS reviewed and negative except as " mentioned in the HPI.    Past Medical History:   Past Medical History:   Diagnosis Date   • Allergic rhinitis    • Aneurysm (CMS/HCC)    • Arthritis    • Broken shoulder     Right shoulder    • Cerebral aneurysm 2009    2ND ONE FOUND   • Cerebral aneurysm     NON TREATABLE   • Chronic laryngitis    • CKD (chronic kidney disease)    • Colon polyps    • COPD (chronic obstructive pulmonary disease) (CMS/HCC)    • Deviated nasal septum    • Diabetes mellitus (CMS/HCC)    • Disorder of kidney and ureter    • Disturbance of smell and taste    • Dizziness    • Encounter for imaging to screen for metal prior to MRI     NO MRI, METAL CLIPS IN HEAD   • Eustachian tube dysfunction    • GERD (gastroesophageal reflux disease)    • Globus sensation    • Headache    • Hepatitis     B   • Hepatitis A    • History of stomach ulcers    • Hyperlipidemia    • Hypothyroid    • Laryngitis sicca    • Lung nodule    • Nocturia    • PONV (postoperative nausea and vomiting)    • Sensorineural hearing loss    • Spinal stenosis    • Urge incontinence    • Urgency of urination    • Urinary frequency    • Urinary incontinence    • UTI (urinary tract infection)      Past Surgical History:  Past Surgical History:   Procedure Laterality Date   • BLADDER SURGERY  2016    Medtronic Interstem   • BRAIN SURGERY      ANUERYSM REMOVED   • BREAST SURGERY Bilateral     BIOPSY   • CARPAL TUNNEL RELEASE     • CATARACT EXTRACTION, BILATERAL     • CEREBRAL ANEURYSM REPAIR     •  SECTION      X4   • CHOLECYSTECTOMY     • HYSTERECTOMY     • INTERSTIM PLACEMENT N/A 10/18/2018    Procedure: INTERSTIM STAGES 1 AND 2 LEAD AND GENERATOR REMOVAL AND REPLACEMENT;  Surgeon: Archie Avery MD;  Location: [facility];  Service: Urology   • JOINT REPLACEMENT Right     KNEE   • KNEE ARTHROSCOPY     • THYROIDECTOMY     • TONSILLECTOMY       Social History:  reports that she quit smoking about 28 years ago. Her smoking use included cigarettes. She has never used  smokeless tobacco. She reports that she does not drink alcohol and does not use drugs.    Family History: family history includes Cancer in her brother; No Known Problems in her father and mother.       Allergies:  Allergies   Allergen Reactions   • Codeine Phosphate [Codeine] Unknown (See Comments)     CHEST PAIN   • Collagen Other (See Comments)     CAT GUT SUTURES \ WOUND WOULD NOT HEAL AND GOT INFECTION   • Accupril [Quinapril Hcl] Other (See Comments)     COUGH   • Aspirin Other (See Comments)     HISTORY OF STOMACH ULCERS   • Adhesive Tape Rash     SKIN BLISTERS   • Celebrex [Celecoxib] Nausea Only   • Lyrica [Pregabalin] Other (See Comments)     GAINED 50 POUNDS   • Pantoprazole Sodium Diarrhea   • Pentoxifylline Diarrhea   • Sulfa Antibiotics Nausea And Vomiting   • Tramadol Nausea And Vomiting   • Vioxx [Rofecoxib] Nausea And Vomiting       Medications:  Prior to Admission medications    Medication Sig Start Date End Date Taking? Authorizing Provider   acetaminophen (TYLENOL) 500 MG tablet Take 500 mg by mouth Every 6 (Six) Hours As Needed for Mild Pain .    ProviderMaureen MD   atorvastatin (Lipitor) 40 MG tablet Take 1 tablet by mouth Daily. 3/30/21   Brisa Feliciano APRN B-WENDY ULTRAFINE III SHORT PEN 31G X 8 MM misc USE 1 PEN NEEDLE TWO TIMES DAILY 12/10/20   Devon Zuñiga MD   Dextran 70-Hypromellose (GENTEAL TEARS MODERATE PF OP) Apply  to eye(s) as directed by provider.    Maureen Hyatt MD   Easy Touch Lancets 28G/Twist misc TEST TWICE A DAY 12/30/20   Devon Zuñiga MD   Easy Touch Test test strip TEST TWICE A DAY 12/30/20   Devon Zuñiga MD   glipizide (GLUCOTROL XL) 10 MG 24 hr tablet TAKE 1 TABLET TWICE A DAY 1/18/21   Devon Zuñiga MD   hydroCHLOROthiazide (HYDRODIURIL) 25 MG tablet Take 25 mg by mouth Daily.    Maureen Hyatt MD   insulin detemir (LEVEMIR) 100 UNIT/ML injection 35 units in the morning and 25 units at night 2/25/21   Devon Zuñiga MD  "  levocetirizine (XYZAL) 5 MG tablet TAKE 1 TABLET DAILY  Patient taking differently: Take 5 mg by mouth Every Evening. PT WANTS TO STOP TAKING DUE TO COST 12/10/20   Devon Zuñiga MD   levothyroxine (SYNTHROID, LEVOTHROID) 75 MCG tablet TAKE 1 TABLET DAILY 8/28/20   Devon Zuñiga MD   LOSARTAN POTASSIUM PO Take  by mouth.    Maureen Hyatt MD   Miconazole Nitrate (REMEDY PHYTOPLEX ANTIFUNGAL EX) Apply  topically.    Maureen Hyatt MD   multivitamin with minerals (MULTIVITAMIN ADULT PO) Take 1 tablet by mouth Daily.    Maureen Hyatt MD   Myrbetriq 50 MG tablet sustained-release 24 hour 24 hr tablet TAKE 1 TABLET DAILY  Patient taking differently: Take 50 mg by mouth Every Night. 2/5/21   Joao Sanchez PA   PARoxetine (PAXIL) 10 MG tablet TAKE 1 TABLET DAILY  Patient taking differently: Take 10 mg by mouth Every Night. 2/1/21   Devon Zuñiga MD   polyethylene glycol (polyethylene glycol) 17 g packet Take 17 g by mouth Daily. 3/31/21   Brisa Feliciano APRN   sennosides-docusate (PERICOLACE) 8.6-50 MG per tablet Take 1 tablet by mouth 2 (Two) Times a Day. 3/30/21   Brisa Feliciano APRN   vitamin C (ASCORBIC ACID) 250 MG tablet Take 250 mg by mouth Daily.    Maureen Hyatt MD   Wheat Dextrin (BENEFIBER DRINK MIX PO) Take  by mouth.    Maureen Hyatt MD   Zinc Sulfate (ZINC 15 PO) Take  by mouth.    Maureen Hyatt MD     I have utilized all available immediate resources to obtain, update, and review the patient's current medications.    Objective     Vital Signs: /44 (BP Location: Right arm, Patient Position: Lying)   Pulse 75   Temp 98.2 °F (36.8 °C) (Oral)   Resp 18   Ht 168.9 cm (66.5\")   Wt 71.2 kg (157 lb)   SpO2 97%   BMI 24.96 kg/m²   Physical Exam  Vitals reviewed.   HENT:      Ears:      Comments: Hard of hearing     Mouth/Throat:      Pharynx: No oropharyngeal exudate (wearing facemask).   Eyes:      Pupils: Pupils are equal, round, and reactive " to light.   Cardiovascular:      Rate and Rhythm: Normal rate.   Pulmonary:      Effort: Pulmonary effort is normal. No respiratory distress.      Breath sounds: No wheezing or rales.   Abdominal:      General: There is no distension.      Palpations: Abdomen is soft.      Tenderness: There is abdominal tenderness (mild generalized). There is no right CVA tenderness.   Musculoskeletal:         General: No deformity.      Cervical back: Neck supple.   Skin:     General: Skin is warm.      Capillary Refill: Capillary refill takes less than 2 seconds.   Neurological:      General: No focal deficit present.      Mental Status: She is alert.      Motor: Weakness present.   Psychiatric:         Mood and Affect: Mood normal.        Results Reviewed:  Lab Results (last 24 hours)     Procedure Component Value Units Date/Time    CBC & Differential [[id number]]  (Abnormal) Collected: 08/30/21 1304    Specimen: Blood Updated: 08/30/21 1421    Narrative:      The following orders were created for panel order CBC & Differential.  Procedure                               Abnormality         Status                     ---------                               -----------         ------                     CBC Auto Differential[[id number]]        Abnormal            Final result                 Please view results for these tests on the individual orders.    CBC Auto Differential [[id number]]  (Abnormal) Collected: 08/30/21 1304    Specimen: Blood Updated: 08/30/21 1421     WBC 10.17 10*3/mm3      RBC 3.00 10*6/mm3      Hemoglobin 9.3 g/dL      Hematocrit 27.2 %      MCV 90.7 fL      MCH 31.0 pg      MCHC 34.2 g/dL      RDW 13.1 %      RDW-SD 42.9 fl      MPV 11.1 fL      Platelets 256 10*3/mm3      Neutrophil % 59.9 %      Lymphocyte % 25.3 %      Monocyte % 14.0 %      Eosinophil % 0.1 %      Basophil % 0.1 %      Immature Grans % 0.6 %      Neutrophils, Absolute 6.10 10*3/mm3      Lymphocytes, Absolute 2.57 10*3/mm3      Monocytes,  Absolute 1.42 10*3/mm3      Eosinophils, Absolute 0.01 10*3/mm3      Basophils, Absolute 0.01 10*3/mm3      Immature Grans, Absolute 0.06 10*3/mm3      nRBC 0.0 /100 WBC     POC Glucose Once [[id number]]  (Abnormal) Collected: 08/30/21 1337    Specimen: Blood Updated: 08/30/21 1349     Glucose 149 mg/dL      Comment: : [id number] Negar NortonMeter ID: [id number]       Urinalysis, Microscopic Only - Urine, Catheter In/Out [[id number]]  (Abnormal) Collected: 08/30/21 1120    Specimen: Urine, Catheter In/Out Updated: 08/30/21 1230     RBC, UA 3-5 /HPF      WBC, UA 21-30 /HPF      Bacteria, UA 1+ /HPF      Squamous Epithelial Cells, UA 13-20 /HPF      Hyaline Casts, UA None Seen /LPF      Methodology Manual Light Microscopy    Urine Culture - Urine, Urine, Catheter In/Out [[id number]] Collected: 08/30/21 1120    Specimen: Urine, Catheter In/Out Updated: 08/30/21 1230    D-dimer, Quantitative [[id number]]  (Abnormal) Collected: 08/30/21 1140    Specimen: Blood Updated: 08/30/21 1227     D-Dimer, Quantitative 2.46 mg/L (FEU)     Narrative:      Reference Range is 0-0.50 mg/L FEU. However, results <0.50 mg/L FEU tends to rule out DVT or PE. Results >0.50 mg/L FEU are not useful in predicting absence or presence of DVT or PE.      COVID-19,[facility] IN-HOUSE,Nasal Swab No Transport Media 3-4 HR TAT - Swab, Nasal Cavity [[id number]]  (Normal) Collected: 08/30/21 1059    Specimen: Swab from Nasal Cavity Updated: 08/30/21 1223     COVID19 Not Detected    Narrative:      Fact sheet for providers: https://www.fda.gov/media/778550/download     Fact sheet for patients: https://www.fda.gov/media/961786/download    Test performed by PCR.    Consider negative results in combination with clinical observations, patient history, and epidemiological information.    Urinalysis With Culture If Indicated - Urine, Catheter In/Out [[id number]]  (Abnormal) Collected: 08/30/21 1120    Specimen: Urine, Catheter In/Out Updated: 08/30/21 1203      Color, UA Yellow     Appearance, UA Cloudy     pH, UA 6.0     Specific Gravity, UA 1.016     Glucose, UA Negative     Ketones, UA Negative     Bilirubin, UA Negative     Blood, UA Negative     Protein, UA Trace     Leuk Esterase, UA Moderate (2+)     Nitrite, UA Negative     Urobilinogen, UA 0.2 E.U./dL    Comprehensive Metabolic Panel [[id number]]  (Abnormal) Collected: 08/30/21 1113    Specimen: Blood Updated: 08/30/21 1202     Glucose 57 mg/dL      BUN 55 mg/dL      Creatinine 2.70 mg/dL      Sodium 130 mmol/L      Potassium 4.0 mmol/L      Chloride 95 mmol/L      CO2 23.0 mmol/L      Calcium 9.0 mg/dL      Total Protein 6.9 g/dL      Albumin 3.10 g/dL      ALT (SGPT) 15 U/L      AST (SGOT) 21 U/L      Alkaline Phosphatase 58 U/L      Total Bilirubin 0.5 mg/dL      eGFR Non African Amer 17 mL/min/1.73      Globulin 3.8 gm/dL      A/G Ratio 0.8 g/dL      BUN/Creatinine Ratio 20.4     Anion Gap 12.0 mmol/L     Narrative:      GFR Normal >60  Chronic Kidney Disease <60  Kidney Failure <15      Lactic Acid, Plasma [[id number]]  (Normal) Collected: 08/30/21 1113    Specimen: Blood Updated: 08/30/21 1159     Lactate 1.1 mmol/L     Lipase [[id number]]  (Abnormal) Collected: 08/30/21 1113    Specimen: Blood Updated: 08/30/21 1158     Lipase 296 U/L     BNP [[id number]]  (Normal) Collected: 08/30/21 1113    Specimen: Blood Updated: 08/30/21 1155     proBNP 1,686.0 pg/mL     Narrative:      Among patients with dyspnea, NT-proBNP is highly sensitive for the detection of acute congestive heart failure. In addition NT-proBNP of <300 pg/ml effectively rules out acute congestive heart failure with 99% negative predictive value.    Results may be falsely decreased if patient taking Biotin.      Blood Culture - Blood, Arm, Right [[id number]] Collected: 08/30/21 1113    Specimen: Blood from Arm, Right Updated: 08/30/21 1142    Blood Culture - Blood, Hand, Right [[id number]] Collected: 08/30/21 1110    Specimen: Blood from Hand, Right  Updated: 08/30/21 1142    POC Glucose Once [[id number]]  (Normal) Collected: 08/30/21 1024    Specimen: Blood Updated: 08/30/21 1039     Glucose 106 mg/dL      Comment: : [id number] Conti CaseyMeter ID: [id number]           Imaging Results (Last 24 Hours)     Procedure Component Value Units Date/Time    CT Abdomen Pelvis Without Contrast [[id number]] Collected: 08/30/21 1336     Updated: 08/30/21 1356    Narrative:      EXAMINATION:  CT ABDOMEN PELVIS WO CONTRAST-  8/30/2021 1:25 PM CDT     HISTORY: Abdominal distention.     TECHNIQUE: Spiral CT was performed of the abdomen and pelvis without  contrast. Multiplanar images were reconstructed.     DLP: 485 mGy-cm. Automated dosage control was utilized.     COMPARISON: 10/17/2013.     LUNG BASES: There is dense coronary artery calcification. There are mild  patchy infiltrates in both lung bases likely due to atelectasis. There  are emphysematous changes in the lung bases.     LIVER AND SPLEEN: There has been prior cholecystectomy. The unenhanced  liver and spleen are unremarkable.     PANCREAS: Normal unenhanced appearance of the pancreas.     KIDNEYS AND ADRENALS: The adrenal glands are unremarkable. There is a  1-2 mm nonobstructive stone in the lower pole left kidney. There is a 1  mm or less nonobstructive stone in the left mid kidney. The left renal  collecting system and ureter are nondilated. There is severe  hydronephrosis of the right kidney. The right renal collecting system is  dilated. There is stranding of the fat around the dilated collecting  system and around the right kidney. The right ureter is not appear to be  significantly dilated. There is a collection of fluid medial to the  central right renal collecting system measuring about 4.7 x 3.6 cm.  There is minimal wall thickening of the urinary bladder.     BOWEL: No oral contrast was given. There is underdistention of the  stomach. Small bowel loops are nondilated. There is liquid stool in  the  colon. There are a few scattered colon diverticula.     OTHER: There has been prior hysterectomy. There is atheromatous disease  of the aortoiliac vessels. There is a stimulator device in the pelvis  extending through the sacral foramina which is probably a bladder  stimulator. There are degenerative changes of the spine. There is  enthesopathy of the pelvis. There are degenerative changes of both hips.  There is an age-indeterminate compression fracture of L5.       Impression:      1. 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  Patchy infiltrates in both lung bases likely due to atelectasis.  Emphysema in the lung bases.  2. Prior cholecystectomy.  3. Severe hydronephrosis of the right renal collecting system with  stranding of the fat around the kidney and right renal collecting system  without a visible obstructing stone. There is a fluid collection medial  to the collecting system measuring 4.7 x 3.6 cm. This may represent a  urinoma versus a perinephric abscess. It does not contain any air  bubbles. The right ureter is nondilated. The hydronephrosis could be  secondary to infection. Neoplasm is not ruled out. Urology consultation  is recommended for these findings.  4. There are small nonobstructive renal stones on the left side. Minimal  bladder wall thickening may be an artifact of underdistention.  Infection/inflammation is possible.  5. Prior hysterectomy and cholecystectomy.  6. Age indeterminate L5 compression deformity. It may be acute or  subacute in age. There are degenerative changes of the spine and both  hips.  7. There are a few scattered colon diverticula. There is no small bowel  distention.     The full report of this exam was immediately signed and available to the  emergency room. The patient is currently in the emergency room.  This report was finalized on 08/30/2021 13:53 by Dr. Johnathan Brennan MD.    XR Chest 1 View [[id number]] Collected: 08/30/21 9234      Updated: 08/30/21 1101    Narrative:      Exam:   XR CHEST 1 VW-       Date:  8/30/2021      History:  Female, age  88 years;hypoxic     COMPARISON:  None.     Findings :     The heart and mediastinum are normal in size. Bilateral diffuse  coarsening of interstitium. Low lung volumes. No measurable  pneumothorax. No pleural effusion. No obvious focal consolidation.  The  bones show no acute pathology.         Impression:      Impression:     Bilateral coarsening of interstitium, without focal consolidation. This  is nonspecific. Differential does include viral infection.     This report was finalized on 08/30/2021 10:58 by Dr. Polly Pablo MD.        I have personally reviewed and interpreted the radiology studies and ECG obtained at time of admission.     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  Urology has evaluated patient in ED; appreciate their assistance  2.  IVFs  3.  Urine culture  4.  IV Rocephin for now  5.  Bowel regimen - lactulose and magnesium citrate  6.  Hold long-acting insulin given recent hypoglycemia, poor appetite, nausea  7.  SSI coverage  8.  CBC, BMP, and repeat lipase in AM  9.  Bladder scan  10.  Strict I/Os  11.  SCDs  12.  Workup ongoing    Code Status/Advanced Care Plan: Full Code    The patient's surrogate decision maker is her son, Chaka.    Electronically signed by David Garland MD, 08/30/21, 15:38 CDT.              Electronically signed by David Garland MD at 08/30/21 4785    "    Operative/Procedure Notes (most recent note)    No notes of this type exist for this encounter.            Physician Progress Notes (most recent note)      Amor Brown MD at 09/03/21 1503        Had a long discussion with the patient, her son, and Brisa Feliciano.    The patient does not want a stent. She understands that this may ultimately compromise the right kidney function. There is a possible increased risk of infection. Given her age, if she does well (asymptomatic and no infections) and creatinine stays stable, management with observation is an appropriate option.    We discussed the risks and benefits of observation versus stent placement at length. All questions were answered.    We will have her follow up with her previously scheduled appointment on 9/27/21 with Joao Sanchez in our office. She should have blood work prior to recheck her creatinine. If her creatinine is stable and she is asymptomatic, no imaging would be required given her desire to observe.    Electronically signed by Amor Brown MD at 09/03/21 1507          Consult Notes (most recent note)      Amor Brown MD at 08/30/21 1447          Referring Provider: Timmy (ER)  Reason for Consultation: right hydronephrosis    Chief complaint: \"My blood sugar was low\"    Subjective     History of present illness:    Ms. Canales is a pleasant 88 year old female who presented to our ER with hypoglycemia. Labs revealed an elevated creatinine. She reports some central abdominal pain and nausea. She has not been taking in my PO recently secondary to this. Her CT revealed possible right hydronephrosis. I have reviewed the CT images. It is a non-contrasted study. It may be hydronephrosis versus peripelvic cysts. The patient denies a history of ever being told her right kidney appears abnormal.    Review of Systems  Pertinent items are noted in HPI, all other systems reviewed and negative     History  Past Medical History:   Diagnosis " Date   • Allergic rhinitis    • Aneurysm (CMS/HCC)    • Arthritis    • Broken shoulder     Right shoulder    • Cerebral aneurysm 2009    2ND ONE FOUND   • Cerebral aneurysm     NON TREATABLE   • Chronic laryngitis    • CKD (chronic kidney disease)    • Colon polyps    • COPD (chronic obstructive pulmonary disease) (CMS/HCC)    • Deviated nasal septum    • Diabetes mellitus (CMS/HCC)    • Disorder of kidney and ureter    • Disturbance of smell and taste    • Dizziness    • Encounter for imaging to screen for metal prior to MRI     NO MRI, METAL CLIPS IN HEAD   • Eustachian tube dysfunction    • GERD (gastroesophageal reflux disease)    • Globus sensation    • Headache    • Hepatitis     B   • Hepatitis A    • History of stomach ulcers    • Hyperlipidemia    • Hypothyroid    • Laryngitis sicca    • Lung nodule    • Nocturia    • PONV (postoperative nausea and vomiting)    • Sensorineural hearing loss    • Spinal stenosis    • Urge incontinence    • Urgency of urination    • Urinary frequency    • Urinary incontinence    • UTI (urinary tract infection)        Past Surgical History:   Procedure Laterality Date   • BLADDER SURGERY  2016    Medtronic Interstem   • BRAIN SURGERY      ANUERYSM REMOVED   • BREAST SURGERY Bilateral     BIOPSY   • CARPAL TUNNEL RELEASE     • CATARACT EXTRACTION, BILATERAL     • CEREBRAL ANEURYSM REPAIR     •  SECTION      X4   • CHOLECYSTECTOMY     • HYSTERECTOMY     • INTERSTIM PLACEMENT N/A 10/18/2018    Procedure: INTERSTIM STAGES 1 AND 2 LEAD AND GENERATOR REMOVAL AND REPLACEMENT;  Surgeon: Archie Avery MD;  Location: [facility];  Service: Urology   • JOINT REPLACEMENT Right     KNEE   • KNEE ARTHROSCOPY     • THYROIDECTOMY     • TONSILLECTOMY         Family History   Problem Relation Age of Onset   • Cancer Brother         BLADDER   • No Known Problems Father    • No Known Problems Mother        Social History     Socioeconomic History   • Marital status:       Spouse name: Not on file   • Number of children: Not on file   • Years of education: Not on file   • Highest education level: Not on file   Tobacco Use   • Smoking status: Former Smoker     Types: Cigarettes     Quit date:      Years since quittin.6   • Smokeless tobacco: Never Used   • Tobacco comment: SMOKED OVER 40 YEARS   Vaping Use   • Vaping Use: Never used   Substance and Sexual Activity   • Alcohol use: No   • Drug use: No   • Sexual activity: Defer       Current Facility-Administered Medications   Medication Dose Route Frequency Provider Last Rate Last Admin   • sodium chloride 0.9 % flush 10 mL  10 mL Intravenous PRN Sravanthi Alston MD         Current Outpatient Medications   Medication Sig Dispense Refill   • acetaminophen (TYLENOL) 500 MG tablet Take 500 mg by mouth Every 6 (Six) Hours As Needed for Mild Pain .     • atorvastatin (Lipitor) 40 MG tablet Take 1 tablet by mouth Daily.     • B-D ULTRAFINE III SHORT PEN 31G X 8 MM misc USE 1 PEN NEEDLE TWO TIMES DAILY 180 each 3   • Dextran 70-Hypromellose (GENTEAL TEARS MODERATE PF OP) Apply  to eye(s) as directed by provider.     • Easy Touch Lancets 28G/Twist misc TEST TWICE A  each 3   • Easy Touch Test test strip TEST TWICE A  each 3   • glipizide (GLUCOTROL XL) 10 MG 24 hr tablet TAKE 1 TABLET TWICE A  tablet 3   • hydroCHLOROthiazide (HYDRODIURIL) 25 MG tablet Take 25 mg by mouth Daily.     • insulin detemir (LEVEMIR) 100 UNIT/ML injection 35 units in the morning and 25 units at night 18 mL 3   • levocetirizine (XYZAL) 5 MG tablet TAKE 1 TABLET DAILY (Patient taking differently: Take 5 mg by mouth Every Evening. PT WANTS TO STOP TAKING DUE TO COST) 90 tablet 3   • levothyroxine (SYNTHROID, LEVOTHROID) 75 MCG tablet TAKE 1 TABLET DAILY 90 tablet 3   • LOSARTAN POTASSIUM PO Take  by mouth.     • Miconazole Nitrate (REMEDY PHYTOPLEX ANTIFUNGAL EX) Apply  topically.     • multivitamin with minerals (MULTIVITAMIN ADULT PO)  Take 1 tablet by mouth Daily.     • Myrbetriq 50 MG tablet sustained-release 24 hour 24 hr tablet TAKE 1 TABLET DAILY (Patient taking differently: Take 50 mg by mouth Every Night.) 90 tablet 3   • PARoxetine (PAXIL) 10 MG tablet TAKE 1 TABLET DAILY (Patient taking differently: Take 10 mg by mouth Every Night.) 90 tablet 3   • polyethylene glycol (polyethylene glycol) 17 g packet Take 17 g by mouth Daily.     • sennosides-docusate (PERICOLACE) 8.6-50 MG per tablet Take 1 tablet by mouth 2 (Two) Times a Day.     • vitamin C (ASCORBIC ACID) 250 MG tablet Take 250 mg by mouth Daily.     • Wheat Dextrin (BENEFIBER DRINK MIX PO) Take  by mouth.     • Zinc Sulfate (ZINC 15 PO) Take  by mouth.          Allergies   Allergen Reactions   • Codeine Phosphate [Codeine] Unknown (See Comments)     CHEST PAIN   • Collagen Other (See Comments)     CAT GUT SUTURES \ WOUND WOULD NOT HEAL AND GOT INFECTION   • Accupril [Quinapril Hcl] Other (See Comments)     COUGH   • Aspirin Other (See Comments)     HISTORY OF STOMACH ULCERS   • Adhesive Tape Rash     SKIN BLISTERS   • Celebrex [Celecoxib] Nausea Only   • Lyrica [Pregabalin] Other (See Comments)     GAINED 50 POUNDS   • Pantoprazole Sodium Diarrhea   • Pentoxifylline Diarrhea   • Sulfa Antibiotics Nausea And Vomiting   • Tramadol Nausea And Vomiting   • Vioxx [Rofecoxib] Nausea And Vomiting       Objective     Vital Signs   Temp:  [98.2 °F (36.8 °C)] 98.2 °F (36.8 °C)  Heart Rate:  [75] 75  Resp:  [18] 18  BP: (120)/(44) 120/44    Intake/Output Summary (Last 24 hours) at 8/30/2021 1447  Last data filed at 8/30/2021 1252  Gross per 24 hour   Intake 100 ml   Output --   Net 100 ml       Physical Exam:    General: Alert, cooperative, nontoxic, comfortable  Head:  Normocephalic, without obvious abnormality, atraumatic  Eyes:   Lids and lashes normal, no icterus, no pallor  Ears:  Ears appear intact with no abnormalities noted  Neck:  Supple  Back:  No costovertebral angle  tenderness  Lungs: Unlabored respirations  Heart:  Regular rhythm and normal rate  Abdomen: Soft, nontender, mildly distended, no peritoneal signs.  :  Bladder is not palpable.  Extremities: Moves all extremities well  Skin:  Warm and dry  Neurologic: Cranial nerves 2 - 12 grossly intact    Data Review:    CT Abdomen Pelvis Without Contrast (08/30/2021 13:30)   CBC & Differential (08/30/2021 13:04)   Urinalysis, Microscopic Only - Urine, Catheter In/Out (08/30/2021 11:20)   Urinalysis With Culture If Indicated - Urine, Catheter In/Out (08/30/2021 11:20)   Lactic Acid, Plasma (08/30/2021 11:13)   Comprehensive Metabolic Panel (08/30/2021 11:13)   COVID-19,[facility],Nasal Swab No Transport Media 3-4 HR TAT - Swab, Nasal Cavity (08/30/2021 10:59)   ED Provider Notes by Sravanthi Alston MD (08/30/2021 10:39)   ED Triage Notes by Cierra Bills, RN (08/30/2021 10:24)       Assessment and Plan:    Abnormal CT of the right kidney: I would like to monitor this for now. If kidney function improves with IV hydration, will consider a contrasted CT with delays to further delineate the process. No exam findings of acute right kidney pathology.    Elevated creatinine: Will monitor post void bladder scans for now. Will monitor if creatine improves with hydration as patient has had poor PO intake.    UROLOGICAL DISPOSITION: I will continue to follow this patient.    I discussed the patient's findings and my recommendations with patient and family    (Please note that portions of this note were completed with a voice recognition program.)    Amor Brown MD  08/30/21  14:47 CDT    Time: Time spent: 40 minutes spent performing evaluation and management, chart review, and discussion with patient, > 50% of time spent in face-to-face encounter      Electronically signed by Amor Brown MD at 08/30/21 1454       Nutrition Notes (most recent note)    No notes exist for this encounter.         Physical Therapy  Notes (most recent note)    No notes exist for this encounter.         Occupational Therapy Notes (most recent note)    No notes exist for this encounter.         Speech Language Pathology Notes (most recent note)    No notes exist for this encounter.         Respiratory Therapy Notes (most recent note)    No notes exist for this encounter.

## 2021-09-04 LAB
BACTERIA SPEC AEROBE CULT: NORMAL
BACTERIA SPEC AEROBE CULT: NORMAL

## 2021-10-01 ENCOUNTER — OFFICE VISIT (OUTPATIENT)
Dept: UROLOGY | Facility: CLINIC | Age: 86
End: 2021-10-01

## 2021-10-01 VITALS — WEIGHT: 159 LBS | TEMPERATURE: 97.6 F | BODY MASS INDEX: 24.96 KG/M2 | HEIGHT: 67 IN

## 2021-10-01 DIAGNOSIS — N13.30 HYDRONEPHROSIS OF RIGHT KIDNEY: ICD-10-CM

## 2021-10-01 DIAGNOSIS — N39.41 URGE INCONTINENCE: Primary | ICD-10-CM

## 2021-10-01 PROCEDURE — 99213 OFFICE O/P EST LOW 20 MIN: CPT | Performed by: PHYSICIAN ASSISTANT

## 2021-10-01 PROCEDURE — 51798 US URINE CAPACITY MEASURE: CPT | Performed by: PHYSICIAN ASSISTANT

## 2021-10-08 ENCOUNTER — APPOINTMENT (OUTPATIENT)
Dept: GENERAL RADIOLOGY | Facility: HOSPITAL | Age: 86
End: 2021-10-08

## 2021-10-08 ENCOUNTER — HOSPITAL ENCOUNTER (INPATIENT)
Facility: HOSPITAL | Age: 86
LOS: 7 days | Discharge: NURSING, REHAB OR LONG-TERM CARE | End: 2021-10-15
Attending: STUDENT IN AN ORGANIZED HEALTH CARE EDUCATION/TRAINING PROGRAM | Admitting: FAMILY MEDICINE

## 2021-10-08 ENCOUNTER — APPOINTMENT (OUTPATIENT)
Dept: CT IMAGING | Facility: HOSPITAL | Age: 86
End: 2021-10-08

## 2021-10-08 DIAGNOSIS — N39.0 URINARY TRACT INFECTION WITHOUT HEMATURIA, SITE UNSPECIFIED: ICD-10-CM

## 2021-10-08 DIAGNOSIS — S32.050A COMPRESSION FRACTURE OF L5 VERTEBRA, INITIAL ENCOUNTER (HCC): ICD-10-CM

## 2021-10-08 DIAGNOSIS — R13.12 OROPHARYNGEAL DYSPHAGIA: Primary | ICD-10-CM

## 2021-10-08 DIAGNOSIS — R68.89 DECREASED FUNCTIONAL ACTIVITY TOLERANCE: ICD-10-CM

## 2021-10-08 DIAGNOSIS — J18.9 PNEUMONIA DUE TO INFECTIOUS ORGANISM, UNSPECIFIED LATERALITY, UNSPECIFIED PART OF LUNG: ICD-10-CM

## 2021-10-08 PROBLEM — J96.01 ACUTE HYPOXEMIC RESPIRATORY FAILURE: Status: ACTIVE | Noted: 2021-10-08

## 2021-10-08 LAB
ALBUMIN SERPL-MCNC: 3 G/DL (ref 3.5–5.2)
ALBUMIN/GLOB SERPL: 0.9 G/DL
ALP SERPL-CCNC: 62 U/L (ref 39–117)
ALT SERPL W P-5'-P-CCNC: 13 U/L (ref 1–33)
ANION GAP SERPL CALCULATED.3IONS-SCNC: 11 MMOL/L (ref 5–15)
ARTERIAL PATENCY WRIST A: POSITIVE
AST SERPL-CCNC: 18 U/L (ref 1–32)
ATMOSPHERIC PRESS: 750 MMHG
B PARAPERT DNA SPEC QL NAA+PROBE: NOT DETECTED
B PERT DNA SPEC QL NAA+PROBE: NOT DETECTED
BACTERIA UR QL AUTO: ABNORMAL /HPF
BASE EXCESS BLDA CALC-SCNC: -5 MMOL/L (ref 0–2)
BASOPHILS # BLD AUTO: 0.01 10*3/MM3 (ref 0–0.2)
BASOPHILS NFR BLD AUTO: 0.1 % (ref 0–1.5)
BDY SITE: ABNORMAL
BILIRUB SERPL-MCNC: 0.3 MG/DL (ref 0–1.2)
BILIRUB UR QL STRIP: NEGATIVE
BODY TEMPERATURE: 37 C
BUN SERPL-MCNC: 40 MG/DL (ref 8–23)
BUN/CREAT SERPL: 15.9 (ref 7–25)
C PNEUM DNA NPH QL NAA+NON-PROBE: NOT DETECTED
CALCIUM SPEC-SCNC: 8.6 MG/DL (ref 8.6–10.5)
CHLORIDE SERPL-SCNC: 103 MMOL/L (ref 98–107)
CLARITY UR: ABNORMAL
CO2 SERPL-SCNC: 21 MMOL/L (ref 22–29)
COLOR UR: YELLOW
CREAT SERPL-MCNC: 2.52 MG/DL (ref 0.57–1)
D-LACTATE SERPL-SCNC: 1.7 MMOL/L (ref 0.5–2)
DEPRECATED RDW RBC AUTO: 48.6 FL (ref 37–54)
EOSINOPHIL # BLD AUTO: 0.01 10*3/MM3 (ref 0–0.4)
EOSINOPHIL NFR BLD AUTO: 0.1 % (ref 0.3–6.2)
ERYTHROCYTE [DISTWIDTH] IN BLOOD BY AUTOMATED COUNT: 14.1 % (ref 12.3–15.4)
FLUAV SUBTYP SPEC NAA+PROBE: NOT DETECTED
FLUBV RNA ISLT QL NAA+PROBE: NOT DETECTED
GAS FLOW AIRWAY: 2 LPM
GFR SERPL CREATININE-BSD FRML MDRD: 18 ML/MIN/1.73
GLOBULIN UR ELPH-MCNC: 3.2 GM/DL
GLUCOSE SERPL-MCNC: 159 MG/DL (ref 65–99)
GLUCOSE UR STRIP-MCNC: NEGATIVE MG/DL
HADV DNA SPEC NAA+PROBE: NOT DETECTED
HBA1C MFR BLD: 5.8 % (ref 4.8–5.6)
HCO3 BLDA-SCNC: 18.5 MMOL/L (ref 20–26)
HCOV 229E RNA SPEC QL NAA+PROBE: NOT DETECTED
HCOV HKU1 RNA SPEC QL NAA+PROBE: NOT DETECTED
HCOV NL63 RNA SPEC QL NAA+PROBE: NOT DETECTED
HCOV OC43 RNA SPEC QL NAA+PROBE: NOT DETECTED
HCT VFR BLD AUTO: 28.2 % (ref 34–46.6)
HGB BLD-MCNC: 8.9 G/DL (ref 12–15.9)
HGB UR QL STRIP.AUTO: ABNORMAL
HMPV RNA NPH QL NAA+NON-PROBE: NOT DETECTED
HPIV1 RNA SPEC QL NAA+PROBE: NOT DETECTED
HPIV2 RNA SPEC QL NAA+PROBE: NOT DETECTED
HPIV3 RNA NPH QL NAA+PROBE: NOT DETECTED
HPIV4 P GENE NPH QL NAA+PROBE: NOT DETECTED
HYALINE CASTS UR QL AUTO: ABNORMAL /LPF
IMM GRANULOCYTES # BLD AUTO: 0.09 10*3/MM3 (ref 0–0.05)
IMM GRANULOCYTES NFR BLD AUTO: 1 % (ref 0–0.5)
KETONES UR QL STRIP: NEGATIVE
LEUKOCYTE ESTERASE UR QL STRIP.AUTO: ABNORMAL
LYMPHOCYTES # BLD AUTO: 1.62 10*3/MM3 (ref 0.7–3.1)
LYMPHOCYTES NFR BLD AUTO: 18.6 % (ref 19.6–45.3)
Lab: ABNORMAL
M PNEUMO IGG SER IA-ACNC: NOT DETECTED
MCH RBC QN AUTO: 30.1 PG (ref 26.6–33)
MCHC RBC AUTO-ENTMCNC: 31.6 G/DL (ref 31.5–35.7)
MCV RBC AUTO: 95.3 FL (ref 79–97)
MODALITY: ABNORMAL
MONOCYTES # BLD AUTO: 1.07 10*3/MM3 (ref 0.1–0.9)
MONOCYTES NFR BLD AUTO: 12.3 % (ref 5–12)
NEUTROPHILS NFR BLD AUTO: 5.92 10*3/MM3 (ref 1.7–7)
NEUTROPHILS NFR BLD AUTO: 67.9 % (ref 42.7–76)
NITRITE UR QL STRIP: POSITIVE
NRBC BLD AUTO-RTO: 0 /100 WBC (ref 0–0.2)
NT-PROBNP SERPL-MCNC: 7241 PG/ML (ref 0–1800)
PCO2 BLDA: 28.1 MM HG (ref 35–45)
PCO2 TEMP ADJ BLD: 28.1 MM HG (ref 35–45)
PH BLDA: 7.43 PH UNITS (ref 7.35–7.45)
PH UR STRIP.AUTO: 5.5 [PH] (ref 5–8)
PH, TEMP CORRECTED: 7.43 PH UNITS (ref 7.35–7.45)
PLATELET # BLD AUTO: 222 10*3/MM3 (ref 140–450)
PMV BLD AUTO: 10.9 FL (ref 6–12)
PO2 BLDA: 80.8 MM HG (ref 83–108)
PO2 TEMP ADJ BLD: 80.8 MM HG (ref 83–108)
POTASSIUM SERPL-SCNC: 4 MMOL/L (ref 3.5–5.2)
PROT SERPL-MCNC: 6.2 G/DL (ref 6–8.5)
PROT UR QL STRIP: ABNORMAL
RBC # BLD AUTO: 2.96 10*6/MM3 (ref 3.77–5.28)
RBC # UR: ABNORMAL /HPF
REF LAB TEST METHOD: ABNORMAL
RHINOVIRUS RNA SPEC NAA+PROBE: NOT DETECTED
RSV RNA NPH QL NAA+NON-PROBE: NOT DETECTED
S PNEUM AG SPEC QL LA: NEGATIVE
SAO2 % BLDCOA: 97.4 % (ref 94–99)
SARS-COV-2 RNA PNL SPEC NAA+PROBE: NOT DETECTED
SODIUM SERPL-SCNC: 135 MMOL/L (ref 136–145)
SP GR UR STRIP: 1.01 (ref 1–1.03)
SQUAMOUS #/AREA URNS HPF: ABNORMAL /HPF
TROPONIN T SERPL-MCNC: 0.04 NG/ML (ref 0–0.03)
TROPONIN T SERPL-MCNC: 0.04 NG/ML (ref 0–0.03)
UROBILINOGEN UR QL STRIP: ABNORMAL
VENTILATOR MODE: ABNORMAL
WBC # BLD AUTO: 8.72 10*3/MM3 (ref 3.4–10.8)
WBC UR QL AUTO: ABNORMAL /HPF

## 2021-10-08 PROCEDURE — 87899 AGENT NOS ASSAY W/OPTIC: CPT | Performed by: FAMILY MEDICINE

## 2021-10-08 PROCEDURE — 93010 ELECTROCARDIOGRAM REPORT: CPT | Performed by: INTERNAL MEDICINE

## 2021-10-08 PROCEDURE — 87088 URINE BACTERIA CULTURE: CPT | Performed by: NURSE PRACTITIONER

## 2021-10-08 PROCEDURE — 83880 ASSAY OF NATRIURETIC PEPTIDE: CPT | Performed by: STUDENT IN AN ORGANIZED HEALTH CARE EDUCATION/TRAINING PROGRAM

## 2021-10-08 PROCEDURE — 87086 URINE CULTURE/COLONY COUNT: CPT | Performed by: NURSE PRACTITIONER

## 2021-10-08 PROCEDURE — 81001 URINALYSIS AUTO W/SCOPE: CPT | Performed by: STUDENT IN AN ORGANIZED HEALTH CARE EDUCATION/TRAINING PROGRAM

## 2021-10-08 PROCEDURE — 83605 ASSAY OF LACTIC ACID: CPT | Performed by: STUDENT IN AN ORGANIZED HEALTH CARE EDUCATION/TRAINING PROGRAM

## 2021-10-08 PROCEDURE — 94799 UNLISTED PULMONARY SVC/PX: CPT

## 2021-10-08 PROCEDURE — 80053 COMPREHEN METABOLIC PANEL: CPT | Performed by: STUDENT IN AN ORGANIZED HEALTH CARE EDUCATION/TRAINING PROGRAM

## 2021-10-08 PROCEDURE — 87635 SARS-COV-2 COVID-19 AMP PRB: CPT | Performed by: STUDENT IN AN ORGANIZED HEALTH CARE EDUCATION/TRAINING PROGRAM

## 2021-10-08 PROCEDURE — 83036 HEMOGLOBIN GLYCOSYLATED A1C: CPT | Performed by: FAMILY MEDICINE

## 2021-10-08 PROCEDURE — 87633 RESP VIRUS 12-25 TARGETS: CPT | Performed by: FAMILY MEDICINE

## 2021-10-08 PROCEDURE — 84484 ASSAY OF TROPONIN QUANT: CPT | Performed by: STUDENT IN AN ORGANIZED HEALTH CARE EDUCATION/TRAINING PROGRAM

## 2021-10-08 PROCEDURE — 82803 BLOOD GASES ANY COMBINATION: CPT

## 2021-10-08 PROCEDURE — 71250 CT THORAX DX C-: CPT

## 2021-10-08 PROCEDURE — 36600 WITHDRAWAL OF ARTERIAL BLOOD: CPT

## 2021-10-08 PROCEDURE — 71045 X-RAY EXAM CHEST 1 VIEW: CPT

## 2021-10-08 PROCEDURE — 99285 EMERGENCY DEPT VISIT HI MDM: CPT

## 2021-10-08 PROCEDURE — 85025 COMPLETE CBC W/AUTO DIFF WBC: CPT | Performed by: STUDENT IN AN ORGANIZED HEALTH CARE EDUCATION/TRAINING PROGRAM

## 2021-10-08 PROCEDURE — 25010000002 METHYLPREDNISOLONE PER 125 MG: Performed by: STUDENT IN AN ORGANIZED HEALTH CARE EDUCATION/TRAINING PROGRAM

## 2021-10-08 PROCEDURE — 94640 AIRWAY INHALATION TREATMENT: CPT

## 2021-10-08 PROCEDURE — 93005 ELECTROCARDIOGRAM TRACING: CPT | Performed by: STUDENT IN AN ORGANIZED HEALTH CARE EDUCATION/TRAINING PROGRAM

## 2021-10-08 PROCEDURE — 87040 BLOOD CULTURE FOR BACTERIA: CPT | Performed by: STUDENT IN AN ORGANIZED HEALTH CARE EDUCATION/TRAINING PROGRAM

## 2021-10-08 PROCEDURE — 87186 SC STD MICRODIL/AGAR DIL: CPT | Performed by: NURSE PRACTITIONER

## 2021-10-08 PROCEDURE — 25010000002 CEFTRIAXONE PER 250 MG: Performed by: STUDENT IN AN ORGANIZED HEALTH CARE EDUCATION/TRAINING PROGRAM

## 2021-10-08 RX ORDER — NICOTINE POLACRILEX 4 MG
15 LOZENGE BUCCAL
Status: DISCONTINUED | OUTPATIENT
Start: 2021-10-08 | End: 2021-10-15 | Stop reason: HOSPADM

## 2021-10-08 RX ORDER — ALBUTEROL SULFATE 2.5 MG/3ML
2.5 SOLUTION RESPIRATORY (INHALATION) EVERY 4 HOURS PRN
Status: DISCONTINUED | OUTPATIENT
Start: 2021-10-08 | End: 2021-10-09

## 2021-10-08 RX ORDER — SODIUM CHLORIDE 0.9 % (FLUSH) 0.9 %
10 SYRINGE (ML) INJECTION EVERY 12 HOURS SCHEDULED
Status: DISCONTINUED | OUTPATIENT
Start: 2021-10-08 | End: 2021-10-15 | Stop reason: HOSPADM

## 2021-10-08 RX ORDER — IPRATROPIUM BROMIDE AND ALBUTEROL SULFATE 2.5; .5 MG/3ML; MG/3ML
3 SOLUTION RESPIRATORY (INHALATION) ONCE
Status: COMPLETED | OUTPATIENT
Start: 2021-10-08 | End: 2021-10-08

## 2021-10-08 RX ORDER — DEXTROSE MONOHYDRATE 25 G/50ML
25 INJECTION, SOLUTION INTRAVENOUS
Status: DISCONTINUED | OUTPATIENT
Start: 2021-10-08 | End: 2021-10-15 | Stop reason: HOSPADM

## 2021-10-08 RX ORDER — METHYLPREDNISOLONE SODIUM SUCCINATE 125 MG/2ML
125 INJECTION, POWDER, LYOPHILIZED, FOR SOLUTION INTRAMUSCULAR; INTRAVENOUS ONCE
Status: COMPLETED | OUTPATIENT
Start: 2021-10-08 | End: 2021-10-08

## 2021-10-08 RX ORDER — SODIUM CHLORIDE 0.9 % (FLUSH) 0.9 %
10 SYRINGE (ML) INJECTION AS NEEDED
Status: DISCONTINUED | OUTPATIENT
Start: 2021-10-08 | End: 2021-10-15 | Stop reason: HOSPADM

## 2021-10-08 RX ORDER — LEVOTHYROXINE SODIUM 0.07 MG/1
75 TABLET ORAL
Status: DISCONTINUED | OUTPATIENT
Start: 2021-10-09 | End: 2021-10-15 | Stop reason: HOSPADM

## 2021-10-08 RX ADMIN — DOXYCYCLINE 100 MG: 100 INJECTION, POWDER, LYOPHILIZED, FOR SOLUTION INTRAVENOUS at 21:42

## 2021-10-08 RX ADMIN — CEFTRIAXONE SODIUM 2 G: 2 INJECTION, POWDER, FOR SOLUTION INTRAMUSCULAR; INTRAVENOUS at 20:54

## 2021-10-08 RX ADMIN — IPRATROPIUM BROMIDE AND ALBUTEROL SULFATE 3 ML: 2.5; .5 SOLUTION RESPIRATORY (INHALATION) at 19:36

## 2021-10-08 RX ADMIN — METHYLPREDNISOLONE SODIUM SUCCINATE 125 MG: 125 INJECTION, POWDER, FOR SOLUTION INTRAMUSCULAR; INTRAVENOUS at 19:11

## 2021-10-08 RX ADMIN — SODIUM CHLORIDE, POTASSIUM CHLORIDE, SODIUM LACTATE AND CALCIUM CHLORIDE 500 ML: 600; 310; 30; 20 INJECTION, SOLUTION INTRAVENOUS at 19:11

## 2021-10-09 PROBLEM — N30.01 ACUTE CYSTITIS WITH HEMATURIA: Status: ACTIVE | Noted: 2021-10-09

## 2021-10-09 PROBLEM — N18.4 CHRONIC KIDNEY DISEASE (CKD), STAGE IV (SEVERE) (HCC): Status: ACTIVE | Noted: 2021-10-09

## 2021-10-09 PROBLEM — J18.9 MULTIFOCAL PNEUMONIA: Status: ACTIVE | Noted: 2021-10-09

## 2021-10-09 LAB
ANION GAP SERPL CALCULATED.3IONS-SCNC: 14 MMOL/L (ref 5–15)
BUN SERPL-MCNC: 46 MG/DL (ref 8–23)
BUN/CREAT SERPL: 18.8 (ref 7–25)
CALCIUM SPEC-SCNC: 8.5 MG/DL (ref 8.6–10.5)
CHLORIDE SERPL-SCNC: 104 MMOL/L (ref 98–107)
CO2 SERPL-SCNC: 18 MMOL/L (ref 22–29)
CREAT SERPL-MCNC: 2.45 MG/DL (ref 0.57–1)
GFR SERPL CREATININE-BSD FRML MDRD: 19 ML/MIN/1.73
GLUCOSE BLDC GLUCOMTR-MCNC: 254 MG/DL (ref 70–130)
GLUCOSE BLDC GLUCOMTR-MCNC: 302 MG/DL (ref 70–130)
GLUCOSE BLDC GLUCOMTR-MCNC: 302 MG/DL (ref 70–130)
GLUCOSE BLDC GLUCOMTR-MCNC: 328 MG/DL (ref 70–130)
GLUCOSE BLDC GLUCOMTR-MCNC: 376 MG/DL (ref 70–130)
GLUCOSE SERPL-MCNC: 313 MG/DL (ref 65–99)
HOLD SPECIMEN: NORMAL
L PNEUMO1 AG UR QL IA: NEGATIVE
MRSA DNA SPEC QL NAA+PROBE: NORMAL
POTASSIUM SERPL-SCNC: 4.7 MMOL/L (ref 3.5–5.2)
SODIUM SERPL-SCNC: 136 MMOL/L (ref 136–145)
WHOLE BLOOD HOLD SPECIMEN: NORMAL

## 2021-10-09 PROCEDURE — 25010000002 CEFEPIME PER 500 MG: Performed by: FAMILY MEDICINE

## 2021-10-09 PROCEDURE — 87899 AGENT NOS ASSAY W/OPTIC: CPT | Performed by: INTERNAL MEDICINE

## 2021-10-09 PROCEDURE — 63710000001 INSULIN LISPRO (HUMAN) PER 5 UNITS: Performed by: FAMILY MEDICINE

## 2021-10-09 PROCEDURE — 94799 UNLISTED PULMONARY SVC/PX: CPT

## 2021-10-09 PROCEDURE — 94640 AIRWAY INHALATION TREATMENT: CPT

## 2021-10-09 PROCEDURE — 63710000001 INSULIN DETEMIR PER 5 UNITS: Performed by: FAMILY MEDICINE

## 2021-10-09 PROCEDURE — 25010000002 AZITHROMYCIN PER 500 MG: Performed by: NURSE PRACTITIONER

## 2021-10-09 PROCEDURE — 82962 GLUCOSE BLOOD TEST: CPT

## 2021-10-09 PROCEDURE — 25010000002 METHYLPREDNISOLONE PER 40 MG: Performed by: NURSE PRACTITIONER

## 2021-10-09 PROCEDURE — 25010000002 ENOXAPARIN PER 10 MG: Performed by: FAMILY MEDICINE

## 2021-10-09 PROCEDURE — 92610 EVALUATE SWALLOWING FUNCTION: CPT | Performed by: SPEECH-LANGUAGE PATHOLOGIST

## 2021-10-09 PROCEDURE — 25010000002 VANCOMYCIN 10 G RECONSTITUTED SOLUTION: Performed by: FAMILY MEDICINE

## 2021-10-09 PROCEDURE — 87641 MR-STAPH DNA AMP PROBE: CPT | Performed by: FAMILY MEDICINE

## 2021-10-09 PROCEDURE — 25010000002 PIPERACILLIN SOD-TAZOBACTAM PER 1 G: Performed by: NURSE PRACTITIONER

## 2021-10-09 PROCEDURE — 80048 BASIC METABOLIC PNL TOTAL CA: CPT | Performed by: NURSE PRACTITIONER

## 2021-10-09 RX ORDER — AMLODIPINE BESYLATE 5 MG/1
5 TABLET ORAL DAILY
COMMUNITY
End: 2021-11-05 | Stop reason: HOSPADM

## 2021-10-09 RX ORDER — ALBUTEROL SULFATE 2.5 MG/3ML
2.5 SOLUTION RESPIRATORY (INHALATION) 4 TIMES DAILY
Status: DISCONTINUED | OUTPATIENT
Start: 2021-10-09 | End: 2021-10-09

## 2021-10-09 RX ORDER — LOSARTAN POTASSIUM 25 MG/1
25 TABLET ORAL 2 TIMES DAILY
COMMUNITY
End: 2021-11-27 | Stop reason: HOSPADM

## 2021-10-09 RX ORDER — IPRATROPIUM BROMIDE AND ALBUTEROL SULFATE 2.5; .5 MG/3ML; MG/3ML
1.5 SOLUTION RESPIRATORY (INHALATION)
Status: DISCONTINUED | OUTPATIENT
Start: 2021-10-09 | End: 2021-10-15 | Stop reason: HOSPADM

## 2021-10-09 RX ORDER — OXYBUTYNIN CHLORIDE 5 MG/1
10 TABLET, EXTENDED RELEASE ORAL DAILY
Status: DISCONTINUED | OUTPATIENT
Start: 2021-10-09 | End: 2021-10-15 | Stop reason: HOSPADM

## 2021-10-09 RX ORDER — AMOXICILLIN 250 MG
1 CAPSULE ORAL 2 TIMES DAILY
Status: DISCONTINUED | OUTPATIENT
Start: 2021-10-09 | End: 2021-10-15 | Stop reason: HOSPADM

## 2021-10-09 RX ORDER — GUAIFENESIN 600 MG/1
1200 TABLET, EXTENDED RELEASE ORAL EVERY 12 HOURS SCHEDULED
Status: DISCONTINUED | OUTPATIENT
Start: 2021-10-09 | End: 2021-10-15 | Stop reason: HOSPADM

## 2021-10-09 RX ORDER — HYDROCODONE BITARTRATE AND ACETAMINOPHEN 7.5; 325 MG/1; MG/1
1 TABLET ORAL EVERY 6 HOURS PRN
Status: DISCONTINUED | OUTPATIENT
Start: 2021-10-09 | End: 2021-10-15 | Stop reason: HOSPADM

## 2021-10-09 RX ORDER — PAROXETINE 10 MG/1
10 TABLET, FILM COATED ORAL DAILY
COMMUNITY
End: 2021-10-15 | Stop reason: HOSPADM

## 2021-10-09 RX ORDER — ATORVASTATIN CALCIUM 40 MG/1
40 TABLET, FILM COATED ORAL DAILY
Status: DISCONTINUED | OUTPATIENT
Start: 2021-10-09 | End: 2021-10-15 | Stop reason: HOSPADM

## 2021-10-09 RX ORDER — ALBUTEROL SULFATE 2.5 MG/3ML
2.5 SOLUTION RESPIRATORY (INHALATION) EVERY 6 HOURS PRN
Status: DISCONTINUED | OUTPATIENT
Start: 2021-10-09 | End: 2021-10-15 | Stop reason: HOSPADM

## 2021-10-09 RX ORDER — ATORVASTATIN CALCIUM 40 MG/1
40 TABLET, FILM COATED ORAL NIGHTLY
COMMUNITY

## 2021-10-09 RX ORDER — SODIUM CHLORIDE 9 MG/ML
50 INJECTION, SOLUTION INTRAVENOUS CONTINUOUS
Status: DISCONTINUED | OUTPATIENT
Start: 2021-10-09 | End: 2021-10-10

## 2021-10-09 RX ORDER — METHYLPREDNISOLONE SODIUM SUCCINATE 40 MG/ML
40 INJECTION, POWDER, LYOPHILIZED, FOR SOLUTION INTRAMUSCULAR; INTRAVENOUS EVERY 12 HOURS
Status: DISCONTINUED | OUTPATIENT
Start: 2021-10-09 | End: 2021-10-11

## 2021-10-09 RX ORDER — AMLODIPINE BESYLATE 5 MG/1
5 TABLET ORAL
Status: DISCONTINUED | OUTPATIENT
Start: 2021-10-09 | End: 2021-10-15 | Stop reason: HOSPADM

## 2021-10-09 RX ORDER — BUDESONIDE AND FORMOTEROL FUMARATE DIHYDRATE 160; 4.5 UG/1; UG/1
2 AEROSOL RESPIRATORY (INHALATION)
Status: DISCONTINUED | OUTPATIENT
Start: 2021-10-09 | End: 2021-10-15 | Stop reason: HOSPADM

## 2021-10-09 RX ADMIN — AZITHROMYCIN MONOHYDRATE 500 MG: 500 INJECTION, POWDER, LYOPHILIZED, FOR SOLUTION INTRAVENOUS at 13:16

## 2021-10-09 RX ADMIN — PIPERACILLIN SODIUM AND TAZOBACTAM SODIUM 4.5 G: 4; .5 INJECTION, POWDER, LYOPHILIZED, FOR SOLUTION INTRAVENOUS at 20:25

## 2021-10-09 RX ADMIN — LEVOTHYROXINE SODIUM 75 MCG: 75 TABLET ORAL at 06:09

## 2021-10-09 RX ADMIN — OXYBUTYNIN CHLORIDE 10 MG: 5 TABLET, EXTENDED RELEASE ORAL at 09:19

## 2021-10-09 RX ADMIN — PIPERACILLIN AND TAZOBACTAM 4.5 G: 4; .5 INJECTION, POWDER, LYOPHILIZED, FOR SOLUTION INTRAVENOUS; PARENTERAL at 12:17

## 2021-10-09 RX ADMIN — BUDESONIDE AND FORMOTEROL FUMARATE DIHYDRATE 2 PUFF: 160; 4.5 AEROSOL RESPIRATORY (INHALATION) at 20:46

## 2021-10-09 RX ADMIN — ATORVASTATIN CALCIUM 40 MG: 40 TABLET, FILM COATED ORAL at 09:19

## 2021-10-09 RX ADMIN — INSULIN LISPRO 7 UNITS: 100 INJECTION, SOLUTION INTRAVENOUS; SUBCUTANEOUS at 12:18

## 2021-10-09 RX ADMIN — SODIUM CHLORIDE, PRESERVATIVE FREE 10 ML: 5 INJECTION INTRAVENOUS at 00:25

## 2021-10-09 RX ADMIN — ALBUTEROL SULFATE 2.5 MG: 2.5 SOLUTION RESPIRATORY (INHALATION) at 10:48

## 2021-10-09 RX ADMIN — AMLODIPINE BESYLATE 5 MG: 5 TABLET ORAL at 09:19

## 2021-10-09 RX ADMIN — METHYLPREDNISOLONE SODIUM SUCCINATE 40 MG: 40 INJECTION, POWDER, FOR SOLUTION INTRAMUSCULAR; INTRAVENOUS at 10:04

## 2021-10-09 RX ADMIN — GUAIFENESIN 1200 MG: 600 TABLET, EXTENDED RELEASE ORAL at 10:04

## 2021-10-09 RX ADMIN — GUAIFENESIN 1200 MG: 600 TABLET, EXTENDED RELEASE ORAL at 20:25

## 2021-10-09 RX ADMIN — DOCUSATE SODIUM 50 MG AND SENNOSIDES 8.6 MG 1 TABLET: 8.6; 5 TABLET, FILM COATED ORAL at 20:25

## 2021-10-09 RX ADMIN — INSULIN DETEMIR 10 UNITS: 100 INJECTION, SOLUTION SUBCUTANEOUS at 21:06

## 2021-10-09 RX ADMIN — ENOXAPARIN SODIUM 30 MG: 30 INJECTION SUBCUTANEOUS at 09:19

## 2021-10-09 RX ADMIN — CEFEPIME HYDROCHLORIDE 2 G: 2 INJECTION, POWDER, FOR SOLUTION INTRAVENOUS at 00:25

## 2021-10-09 RX ADMIN — SODIUM CHLORIDE 50 ML/HR: 9 INJECTION, SOLUTION INTRAVENOUS at 10:06

## 2021-10-09 RX ADMIN — INSULIN LISPRO 7 UNITS: 100 INJECTION, SOLUTION INTRAVENOUS; SUBCUTANEOUS at 18:00

## 2021-10-09 RX ADMIN — IPRATROPIUM BROMIDE AND ALBUTEROL SULFATE 1.5 ML: 2.5; .5 SOLUTION RESPIRATORY (INHALATION) at 14:05

## 2021-10-09 RX ADMIN — SODIUM CHLORIDE, PRESERVATIVE FREE 10 ML: 5 INJECTION INTRAVENOUS at 20:25

## 2021-10-09 RX ADMIN — METHYLPREDNISOLONE SODIUM SUCCINATE 40 MG: 40 INJECTION, POWDER, FOR SOLUTION INTRAMUSCULAR; INTRAVENOUS at 21:06

## 2021-10-09 RX ADMIN — VANCOMYCIN HYDROCHLORIDE 1750 MG: 10 INJECTION, POWDER, LYOPHILIZED, FOR SOLUTION INTRAVENOUS at 03:04

## 2021-10-09 RX ADMIN — INSULIN DETEMIR 10 UNITS: 100 INJECTION, SOLUTION SUBCUTANEOUS at 00:25

## 2021-10-09 RX ADMIN — INSULIN DETEMIR 10 UNITS: 100 INJECTION, SOLUTION SUBCUTANEOUS at 09:14

## 2021-10-09 RX ADMIN — INSULIN LISPRO 12 UNITS: 100 INJECTION, SOLUTION INTRAVENOUS; SUBCUTANEOUS at 21:06

## 2021-10-09 RX ADMIN — SODIUM CHLORIDE, PRESERVATIVE FREE 10 ML: 5 INJECTION INTRAVENOUS at 09:19

## 2021-10-09 RX ADMIN — BUDESONIDE AND FORMOTEROL FUMARATE DIHYDRATE 2 PUFF: 160; 4.5 AEROSOL RESPIRATORY (INHALATION) at 14:05

## 2021-10-09 RX ADMIN — INSULIN LISPRO 6 UNITS: 100 INJECTION, SOLUTION INTRAVENOUS; SUBCUTANEOUS at 09:17

## 2021-10-09 RX ADMIN — IPRATROPIUM BROMIDE AND ALBUTEROL SULFATE 1.5 ML: 2.5; .5 SOLUTION RESPIRATORY (INHALATION) at 20:46

## 2021-10-09 NOTE — PLAN OF CARE
Goal Outcome Evaluation:       alert and oriented, prn pain medication given as needed, remains on 2L/NC with exiratory wheezes. Will monitor.

## 2021-10-09 NOTE — CONSULTS
"Pharmacy Dosing Service  Pharmacokinetics  Vancomycin Initial Evaluation  Assessment/Action/Plan:  Loading dose: 1,750 mg  Current Order: Vancomycin 500 mg IVPB every 24 hours  Current end date/final dose: 10/14/21 at 0000  Levels: None ordered at this time               Nasal PCR for MRSA ordered  Additional antimicrobial agent(s): Cefepime    Vancomycin dosage initiated based on population pharmacokinetic parameters. Pharmacy will continue to follow daily and adjust dose accordingly.     Subjective:  Honey Canales is a 88 y.o. female with a Vancomycin \"Pharmacy to Dose\" consult for the treatment of Pneumonia , day 1 of treatment.    AUC Model Data:  Loading dose: 1750 mg at 00:00 10/09/2021.  Regimen: 500 mg IV every 24 hours.  Start time: 00:00 on 10/10/2021  Exposure target: AUC24 (range)400-600 mg/L.hr   AUC24,ss: 466 mg/L.hr  PAUC*: 69 %  Ctrough,ss: 16.9 mg/L  Pconc*: 31 %  Tox.: 13 %    Objective:  Ht: 170.7 cm (67.2\"); Wt: 80.5 kg (177 lb 8 oz)  Estimated Creatinine Clearance: 16.9 mL/min (A) (by C-G formula based on SCr of 2.52 mg/dL (H)).   Creatinine   Date Value Ref Range Status   10/08/2021 2.52 (H) 0.57 - 1.00 mg/dL Final      Lab Results   Component Value Date    WBC 8.72 10/08/2021      Baseline culture results:  Microbiology Results (last 10 days)       Procedure Component Value - Date/Time    S. Pneumo Ag Urine or CSF - Urine, Urine, Clean Catch [[id number]]  (Normal) Collected: 10/08/21 2034    Lab Status: Final result Specimen: Urine, Clean Catch Updated: 10/08/21 2306     Strep Pneumo Ag Negative    COVID PRE-OP / PRE-PROCEDURE SCREENING ORDER (NO ISOLATION) - Swab, Nasal Cavity [[id number]]  (Normal) Collected: 10/08/21 1914    Lab Status: Final result Specimen: Swab from Nasal Cavity Updated: 10/08/21 2003    Narrative:      The following orders were created for panel order COVID PRE-OP / PRE-PROCEDURE SCREENING ORDER (NO ISOLATION) - Swab, Nasal Cavity.  Procedure                              "  Abnormality         Status                     ---------                               -----------         ------                     COVID-19,[facility]...[[id number]]  Normal              Final result                 Please view results for these tests on the individual orders.    COVID-19,[facility]HOU[facility],Nasal Swab No Transport Media 3-4 HR TAT - Swab, Nasal Cavity [[id number]]  (Normal) Collected: 10/08/21 1914    Lab Status: Final result Specimen: Swab from Nasal Cavity Updated: 10/08/21 2003     COVID19 Not Detected    Narrative:      Fact sheet for providers: https://www.fda.gov/media/697785/download     Fact sheet for patients: https://www.fda.gov/media/698744/download    Test performed by PCR.    Consider negative results in combination with clinical observations, patient history, and epidemiological information.  Fact sheet for providers: https://www.fda.gov/media/296799/download     Fact sheet for patients: https://www.fda.gov/media/231426/download    Test performed by PCR.    Consider negative results in combination with clinical observations, patient history, and epidemiological information.            Alexei Kaur, PharmD  10/08/21 23:18 CDT

## 2021-10-09 NOTE — PLAN OF CARE
Goal Outcome Evaluation:  Plan of Care Reviewed With: patient     Progress: no change  Outcome Summary: Ntn assessment completed. Pt reports decreased appetite for aboout six months. Encouraged oral intake. Nursing reports pt ate most of her breakfast this am. CCHO diet. Boost Glucose Control added BID. Cont to follow for plan of care.

## 2021-10-09 NOTE — H&P
[facility] Medicine Services  HISTORY AND PHYSICAL    Date of Admission: 10/8/2021  Primary Care Physician: Devon Zuñiga MD    Subjective     Chief Complaint: Shortness of breath    History of Present Illness  88-year-old  female with past medical history significant for chronic kidney disease, history of recurrent falls, hypertension, and insulin-dependent diabetes that presented to our hospital from [facility] due to shortness of breath, and cough.  CXR showed bilateral air space disease and CTA darling emphysema plus multifocal pneumonia. COVID tested and repeated with negative result.    Review of Systems   Otherwise complete ROS reviewed and negative except as mentioned in the HPI.    Past Medical History:   Past Medical History:   Diagnosis Date   • Allergic rhinitis    • Aneurysm (HCC)    • Arthritis    • Broken shoulder     Right shoulder    • Cerebral aneurysm 2009 2ND ONE FOUND   • Cerebral aneurysm     NON TREATABLE   • Chronic laryngitis    • CKD (chronic kidney disease)    • Colon polyps    • COPD (chronic obstructive pulmonary disease) (HCC)    • Deviated nasal septum    • Diabetes mellitus (HCC)    • Disorder of kidney and ureter    • Disturbance of smell and taste    • Dizziness    • Encounter for imaging to screen for metal prior to MRI     NO MRI, METAL CLIPS IN HEAD   • Eustachian tube dysfunction    • GERD (gastroesophageal reflux disease)    • Globus sensation    • Headache    • Hepatitis     B   • Hepatitis A    • History of stomach ulcers    • Hyperlipidemia    • Hypothyroid    • Laryngitis sicca    • Lung nodule    • Nocturia    • PONV (postoperative nausea and vomiting)    • Sensorineural hearing loss    • Spinal stenosis    • Urge incontinence    • Urgency of urination    • Urinary frequency    • Urinary incontinence    • UTI (urinary tract infection)      Past Surgical History:  Past Surgical History:   Procedure  Laterality Date   • BLADDER SURGERY  2016    [employer]onic Interstem   • BRAIN SURGERY      ANUERYSM REMOVED   • BREAST SURGERY Bilateral     BIOPSY   • CARPAL TUNNEL RELEASE     • CATARACT EXTRACTION, BILATERAL     • CEREBRAL ANEURYSM REPAIR     •  SECTION      X4   • CHOLECYSTECTOMY     • HYSTERECTOMY     • INTERSTIM PLACEMENT N/A 10/18/2018    Procedure: INTERSTIM STAGES 1 AND 2 LEAD AND GENERATOR REMOVAL AND REPLACEMENT;  Surgeon: Archie Avery MD;  Location: [facility];  Service: Urology   • JOINT REPLACEMENT Right     KNEE   • KNEE ARTHROSCOPY     • THYROIDECTOMY     • TONSILLECTOMY       Social History:  reports that she quit smoking about 28 years ago. Her smoking use included cigarettes. She has never used smokeless tobacco. She reports that she does not drink alcohol and does not use drugs.    Family History: family history includes Cancer in her brother; No Known Problems in her father and mother.       Allergies:  Allergies   Allergen Reactions   • Codeine Phosphate [Codeine] Unknown (See Comments)     CHEST PAIN   • Collagen Other (See Comments)     CAT GUT SUTURES \ WOUND WOULD NOT HEAL AND GOT INFECTION   • Accupril [Quinapril Hcl] Other (See Comments)     COUGH   • Aspirin Other (See Comments)     HISTORY OF STOMACH ULCERS   • Adhesive Tape Rash     SKIN BLISTERS   • Celebrex [Celecoxib] Nausea Only   • Lyrica [Pregabalin] Other (See Comments)     GAINED 50 POUNDS   • Pantoprazole Sodium Diarrhea   • Pentoxifylline Diarrhea   • Sulfa Antibiotics Nausea And Vomiting   • Tramadol Nausea And Vomiting   • Vioxx [Rofecoxib] Nausea And Vomiting       Medications:  Prior to Admission medications    Medication Sig Start Date End Date Taking? Authorizing Provider   acetaminophen (TYLENOL) 500 MG tablet Take 500 mg by mouth Every 6 (Six) Hours As Needed for Mild Pain .    Provider, MD Maureen   amLODIPine (NORVASC) 10 MG tablet Take 1 tablet by mouth Daily. 21   Brisa Feliciano APRN   Ascorbic  Acid (Vitamin C) 500 MG capsule Take 500 mg by mouth Daily.    Maureen Hyatt MD   atorvastatin (Lipitor) 40 MG tablet Take 1 tablet by mouth Daily. 3/30/21   Brisa Feliciano APRN   Dextran 70-Hypromellose (GENTEAL TEARS MODERATE PF OP) Administer 2 drops to both eyes Daily.    Maureen Hyatt MD   HYDROcodone-acetaminophen (NORCO) 7.5-325 MG per tablet Take 1 tablet by mouth Every 6 (Six) Hours As Needed for Moderate Pain . 9/3/21   Brisa Feliciano APRN   insulin detemir (LEVEMIR) 100 UNIT/ML injection Inject 10 Units under the skin into the appropriate area as directed 2 (Two) Times a Day. 9/3/21   Brisa Feliciano APRN   insulin lispro (humaLOG) 100 UNIT/ML injection Inject 2-7 Units under the skin into the appropriate area as directed 3 (Three) Times a Day Before Meals. 9/3/21   Brisa Feliciano APRN   levocetirizine (XYZAL) 5 MG tablet Take 5 mg by mouth Daily.    Maureen Hyatt MD   levothyroxine (SYNTHROID, LEVOTHROID) 75 MCG tablet Take 75 mcg by mouth Daily.    Maureen Hyatt MD   Miconazole Nitrate (REMEDY PHYTOPLEX ANTIFUNGAL EX) Apply to coccyx after each incontinent episode    Maureen Hyatt MD   Mirabegron ER (MYRBETRIQ) 50 MG tablet sustained-release 24 hour 24 hr tablet Take 50 mg by mouth Daily.    Maureen Hyatt MD   multivitamin with minerals (MULTIVITAMIN ADULT PO) Take 1 tablet by mouth Daily.    Maureen Hyatt MD   ondansetron (ZOFRAN) 4 MG tablet Take 4 mg by mouth Every 6 (Six) Hours As Needed for Nausea or Vomiting.    Maureen Hyatt MD   PARoxetine (PAXIL) 10 MG tablet Take 10 mg by mouth Every Morning.    Maureen Hyatt MD   polyethylene glycol (polyethylene glycol) 17 g packet Take 17 g by mouth Daily. 3/31/21   Brisa Feliciano APRN   prochlorperazine (COMPAZINE) 10 MG tablet Take 10 mg by mouth Every 6 (Six) Hours As Needed for Nausea or Vomiting.    Maureen Hyatt MD   sennosides-docusate (PERICOLACE) 8.6-50 MG per tablet Take 1 tablet  "by mouth 2 (Two) Times a Day. 3/30/21   Brisa Feliciano, YISEL   Wheat Dextrin (BENEFIBER DRINK MIX PO) Take 1 packet by mouth Daily With Breakfast & Lunch.    Provider, MD Maureen   Zinc Sulfate (ZINC 15 PO) Take 220 mg by mouth Daily.    Provider, MD Maureen     I have utilized all available immediate resources to obtain, update, and review the patient's current medications.    Objective     Vital Signs: /75   Pulse 87   Temp 99.7 °F (37.6 °C)   Resp 22   Ht 167.6 cm (66\")   Wt 72.6 kg (160 lb)   SpO2 95%   BMI 25.82 kg/m²   Physical Exam  Vitals reviewed.   Constitutional:       General: She is not in acute distress.     Appearance: She is well-developed. She is diaphoretic. She is not toxic-appearing.   HENT:      Head: Normocephalic and atraumatic.      Right Ear: External ear normal.      Left Ear: External ear normal.      Mouth/Throat:      Mouth: Mucous membranes are dry.      Pharynx: Oropharynx is clear.   Eyes:      General:         Right eye: No discharge.         Left eye: No discharge.      Extraocular Movements: Extraocular movements intact.      Conjunctiva/sclera: Conjunctivae normal.      Pupils: Pupils are equal, round, and reactive to light.   Neck:      Vascular: No JVD.   Cardiovascular:      Rate and Rhythm: Normal rate and regular rhythm.      Pulses: Normal pulses.      Heart sounds: Normal heart sounds. No murmur heard.  No friction rub. No gallop.    Pulmonary:      Effort: No respiratory distress.      Breath sounds: No stridor. Wheezing, rhonchi and rales present.   Chest:      Chest wall: No tenderness.   Abdominal:      General: Bowel sounds are normal. There is no distension.      Palpations: Abdomen is soft.      Tenderness: There is no abdominal tenderness. There is no guarding or rebound.      Hernia: No hernia is present.   Musculoskeletal:         General: No swelling, tenderness or deformity. Normal range of motion.      Cervical back: Normal range of motion and " neck supple. No rigidity or tenderness. No muscular tenderness.      Right lower leg: No edema.      Left lower leg: No edema.   Skin:     General: Skin is warm.      Findings: No erythema or rash.   Neurological:      General: No focal deficit present.      Mental Status: She is alert.      Cranial Nerves: No cranial nerve deficit.      Sensory: No sensory deficit.      Motor: No weakness or abnormal muscle tone.      Deep Tendon Reflexes: Reflexes normal.   Psychiatric:         Mood and Affect: Mood normal.         Behavior: Behavior normal.     Results Reviewed:  Lab Results (last 24 hours)     Procedure Component Value Units Date/Time    Troponin [[id number]]  (Abnormal) Collected: 10/08/21 2044    Specimen: Blood Updated: 10/08/21 2121     Troponin T 0.043 ng/mL     Narrative:      Troponin T Reference Range:  <= 0.03 ng/mL-   Negative for AMI  >0.03 ng/mL-     Abnormal for myocardial necrosis.  Clinicians would have to utilize clinical acumen, EKG, Troponin and serial changes to determine if it is an Acute Myocardial Infarction or myocardial injury due to an underlying chronic condition.       Results may be falsely decreased if patient taking Biotin.      Lactic Acid, Plasma [[id number]]  (Normal) Collected: 10/08/21 2044    Specimen: Blood Updated: 10/08/21 2108     Lactate 1.7 mmol/L     Blood Culture - Blood, Arm, Right [[id number]] Collected: 10/08/21 2044    Specimen: Blood from Arm, Right Updated: 10/08/21 2102    Blood Culture - Blood, Arm, Right [[id number]] Collected: 10/08/21 2044    Specimen: Blood from Arm, Right Updated: 10/08/21 2102    Urinalysis With Microscopic If Indicated (No Culture) - Urine, Clean Catch [[id number]]  (Abnormal) Collected: 10/08/21 2034    Specimen: Urine, Clean Catch Updated: 10/08/21 2048     Color, UA Yellow     Appearance, UA Turbid     pH, UA 5.5     Specific Gravity, UA 1.015     Glucose, UA Negative     Ketones, UA Negative     Bilirubin, UA Negative     Blood, UA  Moderate (2+)     Protein,  mg/dL (2+)     Leuk Esterase, UA Large (3+)     Nitrite, UA Positive     Urobilinogen, UA 0.2 E.U./dL    Urinalysis, Microscopic Only - Urine, Clean Catch [[id number]]  (Abnormal) Collected: 10/08/21 2034    Specimen: Urine, Clean Catch Updated: 10/08/21 2048     RBC, UA 21-30 /HPF      WBC, UA Too Numerous to Count /HPF      Bacteria, UA 4+ /HPF      Squamous Epithelial Cells, UA 0-2 /HPF      Hyaline Casts, UA 3-6 /LPF      Methodology Automated Microscopy    COVID PRE-OP / PRE-PROCEDURE SCREENING ORDER (NO ISOLATION) - Swab, Nasal Cavity [[id number]]  (Normal) Collected: 10/08/21 1914    Specimen: Swab from Nasal Cavity Updated: 10/08/21 2003    Narrative:      The following orders were created for panel order COVID PRE-OP / PRE-PROCEDURE SCREENING ORDER (NO ISOLATION) - Swab, Nasal Cavity.  Procedure                               Abnormality         Status                     ---------                               -----------         ------                     COVID-19,[facility] IN-SAUL...[[id number]]  Normal              Final result                 Please view results for these tests on the individual orders.    COVID-19,[facility] IN-HOUSE,Nasal Swab No Transport Media 3-4 HR TAT - Swab, Nasal Cavity [[id number]]  (Normal) Collected: 10/08/21 1914    Specimen: Swab from Nasal Cavity Updated: 10/08/21 2003     COVID19 Not Detected    Narrative:      Fact sheet for providers: https://www.fda.gov/media/338602/download     Fact sheet for patients: https://www.fda.gov/media/575152/download    Test performed by PCR.    Consider negative results in combination with clinical observations, patient history, and epidemiological information.  Fact sheet for providers: https://www.fda.gov/media/877283/download     Fact sheet for patients: https://www.fda.gov/media/885825/download    Test performed by PCR.    Consider negative results in combination with clinical observations, patient history,  and epidemiological information.    Troponin [[id number]]  (Abnormal) Collected: 10/08/21 1910    Specimen: Blood Updated: 10/08/21 1947     Troponin T 0.039 ng/mL     Narrative:      Troponin T Reference Range:  <= 0.03 ng/mL-   Negative for AMI  >0.03 ng/mL-     Abnormal for myocardial necrosis.  Clinicians would have to utilize clinical acumen, EKG, Troponin and serial changes to determine if it is an Acute Myocardial Infarction or myocardial injury due to an underlying chronic condition.       Results may be falsely decreased if patient taking Biotin.      Comprehensive Metabolic Panel [[id number]]  (Abnormal) Collected: 10/08/21 1910    Specimen: Blood Updated: 10/08/21 1946     Glucose 159 mg/dL      BUN 40 mg/dL      Creatinine 2.52 mg/dL      Sodium 135 mmol/L      Potassium 4.0 mmol/L      Chloride 103 mmol/L      CO2 21.0 mmol/L      Calcium 8.6 mg/dL      Total Protein 6.2 g/dL      Albumin 3.00 g/dL      ALT (SGPT) 13 U/L      AST (SGOT) 18 U/L      Alkaline Phosphatase 62 U/L      Total Bilirubin 0.3 mg/dL      eGFR Non African Amer 18 mL/min/1.73      Globulin 3.2 gm/dL      A/G Ratio 0.9 g/dL      BUN/Creatinine Ratio 15.9     Anion Gap 11.0 mmol/L     Narrative:      GFR Normal >60  Chronic Kidney Disease <60  Kidney Failure <15      BNP [[id number]]  (Abnormal) Collected: 10/08/21 1910    Specimen: Blood Updated: 10/08/21 1942     proBNP 7,241.0 pg/mL     Narrative:      Among patients with dyspnea, NT-proBNP is highly sensitive for the detection of acute congestive heart failure. In addition NT-proBNP of <300 pg/ml effectively rules out acute congestive heart failure with 99% negative predictive value.    Results may be falsely decreased if patient taking Biotin.      CBC & Differential [[id number]]  (Abnormal) Collected: 10/08/21 1910    Specimen: Blood Updated: 10/08/21 1921    Narrative:      The following orders were created for panel order CBC & Differential.  Procedure                                Abnormality         Status                     ---------                               -----------         ------                     CBC Auto Differential[[id number]]        Abnormal            Final result                 Please view results for these tests on the individual orders.    CBC Auto Differential [[id number]]  (Abnormal) Collected: 10/08/21 1910    Specimen: Blood Updated: 10/08/21 1921     WBC 8.72 10*3/mm3      RBC 2.96 10*6/mm3      Hemoglobin 8.9 g/dL      Hematocrit 28.2 %      MCV 95.3 fL      MCH 30.1 pg      MCHC 31.6 g/dL      RDW 14.1 %      RDW-SD 48.6 fl      MPV 10.9 fL      Platelets 222 10*3/mm3      Neutrophil % 67.9 %      Lymphocyte % 18.6 %      Monocyte % 12.3 %      Eosinophil % 0.1 %      Basophil % 0.1 %      Immature Grans % 1.0 %      Neutrophils, Absolute 5.92 10*3/mm3      Lymphocytes, Absolute 1.62 10*3/mm3      Monocytes, Absolute 1.07 10*3/mm3      Eosinophils, Absolute 0.01 10*3/mm3      Basophils, Absolute 0.01 10*3/mm3      Immature Grans, Absolute 0.09 10*3/mm3      nRBC 0.0 /100 WBC         Imaging Results (Last 24 Hours)     Procedure Component Value Units Date/Time    CT Chest Without Contrast Diagnostic [[id number]] Collected: 10/08/21 2010     Updated: 10/08/21 2019    Narrative:      EXAMINATION: CT CHEST WO CONTRAST DIAGNOSTIC- 10/8/2021 8:10 PM CDT     HISTORY: Shortness of breath, wheezing     COMPARISON: Chest x-ray 10/8/2021     DOSE: 263 mGy-cm     TECHNIQUE: Sequential imaging was performed from the thoracic inlet  through the upper abdomen without the use of IV contrast.  Sagittal and  coronal reformations were made from the original source data and  reviewed. Automated exposure control was also utilized to decrease  patient radiation dose.     FINDINGS:   Visualized thyroid gland is grossly normal in appearance. Trachea and  main bronchi are patent. The esophagus is grossly normal in appearance.     No pathologically enlarged axillary lymph nodes are  identified. Some  mildly prominent mediastinal lymph nodes measure 10 mm in short axis.     The heart is normal in size. There is no pericardial effusion. Coronary  artery calcifications are present. Atherosclerotic calcifications are  seen within the aorta and its branch vessels. The ascending thoracic  aorta is normal in caliber. Main pulmonary artery is dilated, suggesting  pulmonary arterial hypertension. The distal aortic arch measures 3.4 cm  in caliber.     There are small bilateral pleural effusions. Severe emphysematous  changes are present. There is diffuse bronchial wall thickening. Some  interlobular septal thickening is noted. Groundglass airspace opacities  are seen throughout the lungs. Some areas of patchy nodularity are seen  in the left upper lobe measuring 12 mm and 10 mm in size.     Review of the visualized portion of the upper abdomen demonstrates no  acute findings.     Review of the visualized osseous structures demonstrates no acute or  aggressive lesions.        Impression:         1. Bilateral airspace disease superimposed upon emphysematous changes,  concerning for multifocal pneumonia.     2. Small bilateral pleural effusions.     3. Atherosclerosis of the aorta and coronary arteries.  4. Pulmonary arterial hypertension.  5. Mildly prominent mediastinal lymph nodes may be reactive. Attention  on follow-up recommended.  6. Noncalcified pulmonary nodules for which follow-up CT is recommended  in 3-6 months per Fleischner Society guidelines.        This report was finalized on 10/08/2021 20:16 by Dr. Erik Schmitt MD.    XR Chest 1 View [[id number]] Collected: 10/08/21 1958     Updated: 10/08/21 2002    Narrative:      EXAMINATION: XR CHEST 1 VW- 10/8/2021 7:58 PM CDT     HISTORY: Shortness of breath, wheezing     COMPARISON: 8/30/2021     FINDINGS:  The heart and mediastinal contours are stable. Atherosclerotic  calcifications are seen in the aorta. Chronic interstitial changes  are  present. However, there are worsening airspace opacities throughout both  lungs, particularly in the lung bases. There are small pleural effusions  bilaterally. Retrocardiac consolidation is noted. An old right humeral  fracture is suspected.       Impression:      Bilateral airspace disease concerning for pneumonia  superimposed upon chronic changes. Small bilateral pleural effusions.  This report was finalized on 10/08/2021 19:59 by Dr. Erik Schmitt MD.        I have personally reviewed and interpreted the radiology studies and ECG obtained at time of admission.     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     I discussed my findings and recommendations with the patient.    Estimated length of stay is over 2 midnights.     The patient was seen and examined by me on 10/08/2021 at 2159.    Electronically signed by Hu Patton MD, 10/09/21, 06:17 CDT.

## 2021-10-09 NOTE — PLAN OF CARE
Goal Outcome Evaluation:  Plan of Care Reviewed With: patient        Progress: no change  Outcome Summary: Received pt from ED this shift. A&Ox4. Pt on 2L O2 NC, other VSS. Audible expiratory wheezing noted. Pressure injury noted to coccyx, turned q 2 hours. Purewick in place to wall suction. Blood glucose elevated upon arrival to floor, >300. Safety maintained, will continue to monitor.

## 2021-10-09 NOTE — PLAN OF CARE
Goal Outcome Evaluation:  Plan of Care Reviewed With: patient, caregiver        Progress: improving   SLP evaluation completed. Will  r/o aspiration. Please see note for further details and recommendations.

## 2021-10-09 NOTE — PROGRESS NOTES
[facility] Medicine Services  INPATIENT PROGRESS NOTE    Length of Stay: 1  Date of Admission: 10/8/2021  Primary Care Physician: Devon Zuñiga MD    Subjective   Chief Complaint: Follow-up shortness of breath  HPI   Resident at [facility].  To ER 10/8 with complaints of shortness of breath and cough.  Chest x-ray noted bilateral airspace disease concerning for pneumonia.  CT angiogram the chest concerning for multifocal pneumonia.  Urinalysis 4+ bacteria, too numerous to count WBC.  Rocephin and doxycycline given in ER.    Lying in bed.  Oxygen at 1 L.  She does not normally wear oxygen at the nursing home.  She reports shortness of breath with cough and light brown sputum production over the last 2 days.  She denies nausea, vomiting or abdominal pain.  She denies chest pain or palpitations. WIC in place with luis urine return.  Chronic lower extremity edema but legs not tight.  She reports she gets up with walker at the nursing home.  Cefepime and vancomycin started on admission for multifocal pneumonia and will also cover UTI.  Have discussed with lab to run culture on existing urinalysis.    Review of Systems   Constitutional: Positive for fatigue. Negative for activity change, appetite change, chills and fever.   HENT: Negative for congestion and trouble swallowing.    Eyes: Negative for photophobia and visual disturbance.   Respiratory: Positive for cough and shortness of breath. Negative for wheezing.    Cardiovascular: Positive for leg swelling (Chronic). Negative for chest pain.   Gastrointestinal: Negative for constipation, diarrhea, nausea and vomiting.   Endocrine: Negative for cold intolerance, heat intolerance and polyuria.   Genitourinary: Negative for dysuria, frequency and urgency.   Musculoskeletal: Positive for gait problem.   Skin: Negative for color change, pallor, rash and wound.   Allergic/Immunologic: Negative for immunocompromised state.    Neurological: Positive for weakness. Negative for light-headedness.   Hematological: Negative for adenopathy. Does not bruise/bleed easily.   Psychiatric/Behavioral: Negative for agitation, behavioral problems and confusion.      All pertinent negatives and positives are as above. All other systems have been reviewed and are negative unless otherwise stated.     Objective    Temp:  [97.1 °F (36.2 °C)-99.7 °F (37.6 °C)] 97.3 °F (36.3 °C)  Heart Rate:  [64-98] 78  Resp:  [16-27] 16  BP: (121-152)/(55-75) 148/63  Physical Exam  Vitals and nursing note reviewed.   Constitutional:       Comments: Lying in bed.  Oxygen at 1 L.  No family in room.  Chronically ill-appearing.   HENT:      Head: Normocephalic and atraumatic.      Nose: No congestion.      Mouth/Throat:      Pharynx: Oropharynx is clear. No oropharyngeal exudate or posterior oropharyngeal erythema.   Eyes:      Extraocular Movements: Extraocular movements intact.      Pupils: Pupils are equal, round, and reactive to light.   Cardiovascular:      Rate and Rhythm: Normal rate and regular rhythm.      Heart sounds: No murmur heard.      Pulmonary:      Breath sounds: Wheezing present. No rhonchi or rales.      Comments: Oxygen 1 L.  Abdominal:      Palpations: Abdomen is soft.      Tenderness: There is no abdominal tenderness.   Genitourinary:     Comments: WIC in place with luis urine return.  Musculoskeletal:         General: Swelling (Bilateral lower extremity edema.  Legs not tight.) present. No tenderness.      Cervical back: Normal range of motion and neck supple.   Skin:     General: Skin is warm and dry.   Neurological:      General: No focal deficit present.      Mental Status: She is alert and oriented to person, place, and time.   Psychiatric:         Mood and Affect: Mood normal.         Behavior: Behavior normal.         Thought Content: Thought content normal.         Judgment: Judgment normal.       Results Review:  I have reviewed the labs,  radiology results, and diagnostic studies.    Laboratory Data:      Results from last 7 days   Lab Units 10/08/21  1910   WBC 10*3/mm3 8.72   HEMOGLOBIN g/dL 8.9*   HEMATOCRIT % 28.2*   PLATELETS 10*3/mm3 222     Results from last 7 days   Lab Units 10/08/21  1910   SODIUM mmol/L 135*   POTASSIUM mmol/L 4.0   CHLORIDE mmol/L 103   CO2 mmol/L 21.0*   BUN mg/dL 40*   CREATININE mg/dL 2.52*   GLUCOSE mg/dL 159*   CALCIUM mg/dL 8.6   ALT (SGPT) U/L 13     Culture Data:    No results found for: BLOODCX, URINECX, WOUNDCX, MRSACX, RESPCX, STOOLCX    Radiology Data:   Imaging Results (Last 7 Days)     Procedure Component Value Units Date/Time    CT Chest Without Contrast Diagnostic [[id number]] Collected: 10/08/21 2010     Updated: 10/08/21 2019    Narrative:      EXAMINATION: CT CHEST WO CONTRAST DIAGNOSTIC- 10/8/2021 8:10 PM CDT     HISTORY: Shortness of breath, wheezing     COMPARISON: Chest x-ray 10/8/2021     DOSE: 263 mGy-cm     TECHNIQUE: Sequential imaging was performed from the thoracic inlet  through the upper abdomen without the use of IV contrast.  Sagittal and  coronal reformations were made from the original source data and  reviewed. Automated exposure control was also utilized to decrease  patient radiation dose.     FINDINGS:   Visualized thyroid gland is grossly normal in appearance. Trachea and  main bronchi are patent. The esophagus is grossly normal in appearance.     No pathologically enlarged axillary lymph nodes are identified. Some  mildly prominent mediastinal lymph nodes measure 10 mm in short axis.     The heart is normal in size. There is no pericardial effusion. Coronary  artery calcifications are present. Atherosclerotic calcifications are  seen within the aorta and its branch vessels. The ascending thoracic  aorta is normal in caliber. Main pulmonary artery is dilated, suggesting  pulmonary arterial hypertension. The distal aortic arch measures 3.4 cm  in caliber.     There are small bilateral  pleural effusions. Severe emphysematous  changes are present. There is diffuse bronchial wall thickening. Some  interlobular septal thickening is noted. Groundglass airspace opacities  are seen throughout the lungs. Some areas of patchy nodularity are seen  in the left upper lobe measuring 12 mm and 10 mm in size.     Review of the visualized portion of the upper abdomen demonstrates no  acute findings.     Review of the visualized osseous structures demonstrates no acute or  aggressive lesions.        Impression:         1. Bilateral airspace disease superimposed upon emphysematous changes,  concerning for multifocal pneumonia.     2. Small bilateral pleural effusions.     3. Atherosclerosis of the aorta and coronary arteries.  4. Pulmonary arterial hypertension.  5. Mildly prominent mediastinal lymph nodes may be reactive. Attention  on follow-up recommended.  6. Noncalcified pulmonary nodules for which follow-up CT is recommended  in 3-6 months per Fleischner Society guidelines.        This report was finalized on 10/08/2021 20:16 by Dr. Erik Schmitt MD.    XR Chest 1 View [[id number]] Collected: 10/08/21 1958     Updated: 10/08/21 2002    Narrative:      EXAMINATION: XR CHEST 1 VW- 10/8/2021 7:58 PM CDT     HISTORY: Shortness of breath, wheezing     COMPARISON: 8/30/2021     FINDINGS:  The heart and mediastinal contours are stable. Atherosclerotic  calcifications are seen in the aorta. Chronic interstitial changes are  present. However, there are worsening airspace opacities throughout both  lungs, particularly in the lung bases. There are small pleural effusions  bilaterally. Retrocardiac consolidation is noted. An old right humeral  fracture is suspected.       Impression:      Bilateral airspace disease concerning for pneumonia  superimposed upon chronic changes. Small bilateral pleural effusions.  This report was finalized on 10/08/2021 19:59 by Dr. rEik Schmitt MD.      Results for orders placed during  the hospital encounter of 06/15/18    Adult Stress Echo W/ Cont or Stress Agent if Necessary Per Protocol    Interpretation Summary  Dobutamine stress echocardiogram is low risk for ischemia.    Intake/Output    Intake/Output Summary (Last 24 hours) at 10/9/2021 1144  Last data filed at 10/8/2021 2142  Gross per 24 hour   Intake 600 ml   Output --   Net 600 ml     Scheduled Meds  amLODIPine, 5 mg, Oral, Q24H  atorvastatin, 40 mg, Oral, Daily  azithromycin, 500 mg, Intravenous, Q24H  budesonide-formoterol, 2 puff, Inhalation, BID - RT  enoxaparin, 30 mg, Subcutaneous, Q24H  guaiFENesin, 1,200 mg, Oral, Q12H  insulin detemir, 10 Units, Subcutaneous, BID  insulin lispro, 0-9 Units, Subcutaneous, 4x Daily With Meals & Nightly  ipratropium-albuterol, 1.5 mL, Nebulization, 4x Daily - RT  levothyroxine, 75 mcg, Oral, Q AM  methylPREDNISolone sodium succinate, 40 mg, Intravenous, Q12H  oxybutynin XL, 10 mg, Oral, Daily  piperacillin-tazobactam, 3.375 g, Intravenous, Once  piperacillin-tazobactam, 3.375 g, Intravenous, Q8H  sennosides-docusate, 1 tablet, Oral, BID  sodium chloride, 10 mL, Intravenous, Q12H      I have reviewed the patient current medications.     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  To ER 10/8/2021 with complaints of shortness of breath and cough for 2 days.  Resident at [facility].  She does not normally wear oxygen.  PO2 80 on 2 L.  Chest x-ray bilateral airspace disease concerning for pneumonia.  CT angiogram the  chest bilateral airspace disease superimposed on emphysematous changes concerning for multifocal pneumonia.  Pulmonary arterial hypertension.  Prominent mediastinal lymph node may be reactive.  Noncalcified pulmonary nodule follow-up CT 3 to 6 months.  Solu-Medrol IV, doxycycline, Rocephin given in ER.    2.  Multifocal pneumonia.  Cefepime IV, vancomycin IV started on admission.  Strep pneumonia negative, respiratory PCR panel negative.  MRSA PCR negative.  Continue oxygen 1 to 2 L and wean as tolerated.  Discontinue vancomycin as MRSA PCR negative.  Discontinue cefepime.  Start Zosyn every 8 hours and azithromycin 500 mg x 3 days for atypical features on imaging studies. Follow-up blood cultures.    3.  Add incentive spirometry.  We will schedule duonebs  4 times daily.  Faint expiratory wheeze.  Add low-dose Solu-Medrol.  Add Mucinex twice daily.  Add Symbicort twice daily.    4.  Urinalysis too numerous to count WBC, 4+ bacteria.  Positive nitrates.  Zosyn should cover UTI.  Have asked lab to run culture on previous urinalysis.  Await sensitivity. Recent urine culture 8/30/21 aerococcus urinae.    5.  Chronic kidney disease stage IV with baseline creatinine 1.9-2.1.  Creatinine 2.52 on admission.  Will order IV fluids at 50 mL/h.  BMP today pending.    6.  Diabetes mellitus type 2.  A1c 5.8 on 10/8/2021.  Sliding scale insulin coverage.  Glucoses 254, 302, 159.  Levemir 10 units twice daily.    7.  Hypothyroid.  Continue Synthroid.    8.  Lovenox for deep vein thrombosis prophylaxis.    9.  Physical therapy and Occupational Therapy consults.  Speech therapy consult    10.  Reviewed home medications.  Resumed if appropriate.    CODE STATUS/advance care planning: Full code  Patient surrogate decision-maker is her son, Chaka.      The above documentation resulted from a face-to-face encounter by margot MORILLO, [facility].    Discharge Planning: I expect patient to be discharged to [facility] in 2-3  days.    Electronically signed by YISEL Salamanca, 10/9/2021, 11:44 CDT.

## 2021-10-09 NOTE — ED PROVIDER NOTES
Subjective   Patient states that she has been having shortness of breath for the past 2 days.  States that she is not on oxygen at home.  States that she has a history of COPD and that she used to smoke.  States that she has also been having a cough.  States that she feels like she is wheezing.  Denies any chest pain, abdominal pain, dysuria, hematuria, fevers, chills, dizziness, headache, numbness, tingling.  States that she has not had any new leg swelling or calf tenderness.  Denies any recent falls.          Review of Systems   All other systems reviewed and are negative.      Past Medical History:   Diagnosis Date   • Allergic rhinitis    • Aneurysm (HCC)    • Arthritis    • Broken shoulder     Right shoulder    • Cerebral aneurysm 2009    2ND ONE FOUND   • Cerebral aneurysm     NON TREATABLE   • Chronic laryngitis    • CKD (chronic kidney disease)    • Colon polyps    • COPD (chronic obstructive pulmonary disease) (HCC)    • Deviated nasal septum    • Diabetes mellitus (HCC)    • Disorder of kidney and ureter    • Disturbance of smell and taste    • Dizziness    • Encounter for imaging to screen for metal prior to MRI     NO MRI, METAL CLIPS IN HEAD   • Eustachian tube dysfunction    • GERD (gastroesophageal reflux disease)    • Globus sensation    • Headache    • Hepatitis     B   • Hepatitis A    • History of stomach ulcers    • Hyperlipidemia    • Hypothyroid    • Laryngitis sicca    • Lung nodule    • Nocturia    • PONV (postoperative nausea and vomiting)    • Sensorineural hearing loss    • Spinal stenosis    • Urge incontinence    • Urgency of urination    • Urinary frequency    • Urinary incontinence    • UTI (urinary tract infection)        Allergies   Allergen Reactions   • Codeine Phosphate [Codeine] Unknown (See Comments)     CHEST PAIN   • Collagen Other (See Comments)     CAT GUT SUTURES \ WOUND WOULD NOT HEAL AND GOT INFECTION   • Accupril [Quinapril Hcl] Other (See Comments)     COUGH   •  Aspirin Other (See Comments)     HISTORY OF STOMACH ULCERS   • Adhesive Tape Rash     SKIN BLISTERS   • Celebrex [Celecoxib] Nausea Only   • Lyrica [Pregabalin] Other (See Comments)     GAINED 50 POUNDS   • Pantoprazole Sodium Diarrhea   • Pentoxifylline Diarrhea   • Sulfa Antibiotics Nausea And Vomiting   • Tramadol Nausea And Vomiting   • Vioxx [Rofecoxib] Nausea And Vomiting       Past Surgical History:   Procedure Laterality Date   • BLADDER SURGERY  2016    Medtronic Interstem   • BRAIN SURGERY      ANUERYSM REMOVED   • BREAST SURGERY Bilateral     BIOPSY   • CARPAL TUNNEL RELEASE     • CATARACT EXTRACTION, BILATERAL     • CEREBRAL ANEURYSM REPAIR     •  SECTION      X4   • CHOLECYSTECTOMY     • HYSTERECTOMY     • INTERSTIM PLACEMENT N/A 10/18/2018    Procedure: INTERSTIM STAGES 1 AND 2 LEAD AND GENERATOR REMOVAL AND REPLACEMENT;  Surgeon: Archie Avery MD;  Location: [facility] OR;  Service: Urology   • JOINT REPLACEMENT Right     KNEE   • KNEE ARTHROSCOPY     • THYROIDECTOMY     • TONSILLECTOMY         Family History   Problem Relation Age of Onset   • Cancer Brother         BLADDER   • No Known Problems Father    • No Known Problems Mother        Social History     Socioeconomic History   • Marital status:    Tobacco Use   • Smoking status: Former Smoker     Types: Cigarettes     Quit date:      Years since quittin.7   • Smokeless tobacco: Never Used   • Tobacco comment: SMOKED OVER 40 YEARS   Vaping Use   • Vaping Use: Never used   Substance and Sexual Activity   • Alcohol use: No   • Drug use: No   • Sexual activity: Defer           Objective   Physical Exam  Vitals and nursing note reviewed.   Constitutional:       General: She is not in acute distress.     Appearance: She is not ill-appearing, toxic-appearing or diaphoretic.      Interventions: She is not intubated.  HENT:      Head: Normocephalic and atraumatic.      Nose: Nose normal.   Eyes:      General:         Right  eye: No discharge.         Left eye: No discharge.      Extraocular Movements: Extraocular movements intact.      Conjunctiva/sclera: Conjunctivae normal.   Cardiovascular:      Rate and Rhythm: Normal rate and regular rhythm.   Pulmonary:      Effort: Pulmonary effort is normal. Tachypnea present. No bradypnea, accessory muscle usage or respiratory distress. She is not intubated.      Breath sounds: No stridor. Examination of the right-upper field reveals wheezing. Examination of the left-upper field reveals wheezing. Examination of the right-middle field reveals wheezing. Examination of the left-middle field reveals wheezing. Wheezing present. No rhonchi.   Chest:      Chest wall: No mass or tenderness.   Abdominal:      General: Abdomen is flat.      Palpations: Abdomen is soft. There is no hepatomegaly or mass.      Tenderness: There is no abdominal tenderness.   Musculoskeletal:         General: Normal range of motion.      Cervical back: No rigidity.   Skin:     General: Skin is warm.      Capillary Refill: Capillary refill takes less than 2 seconds.   Neurological:      General: No focal deficit present.      Mental Status: She is alert and oriented to person, place, and time.      Cranial Nerves: No cranial nerve deficit.      Sensory: No sensory deficit.      Motor: No weakness.   Psychiatric:         Mood and Affect: Mood normal.         Behavior: Behavior normal.         Thought Content: Thought content normal.         Judgment: Judgment normal.         Procedures           ED Course                                           MDM  Number of Diagnoses or Management Options  Pneumonia due to infectious organism, unspecified laterality, unspecified part of lung  Urinary tract infection without hematuria, site unspecified  Diagnosis management comments: This is a 88yoF presenting with shortness of breath. Patient arrived hemodynamically stable and was afebrile. She was requiring oxygen at the SNF and so is  currently on 2L NC. Patient placed on the monitor and IV access was established. Glucose within normal limits. No evidence of shock on exam. EKG was reviewed and had no overt evidence of STEMI or malignant dysrhythmia.     Differentials include, but are not limited to, COPD exacerbation, CHF exacerbation, acute PE, infectious etiology, ACS. However, I have low concern for acute cardiac etiologies including ACS (EKG showed no evidence of STEMI, new ischemic changes, or malignant dysrhythmias and unremarkable trop) or pericardial effusion / tamponade (no JVD, muffled heart sounds, or electrical alternans on EKG). I also have low concern for other non-cardiopulmonary causes including, but no limited to, toxidromes or neurologic causes (i.e. demyelinating diseases).    Plan to obtain cbc, cmp, ekg, troponin x 2, CXR, CT chest, control pain, and reassess.    The imaging and other workup was reviewed and found to have evidence of possible multifocal pneumonia.  She also has bilateral pleural effusions which are contributing to her shortness of breath.  She also has a urinary tract infection.    I reassessed the patient and discussed the findings of the work up so far. I explained my impression of the workup to her and addressed all of her questions regarding the emergency department evaluation, diagnosis, and treatment plan in plain and simple language that she was able to understand.  He is feeling better after the breathing treatments.  She is also surprised that she has urinary tract infection.  She is agreeable for admission for further work-up and treatment.    I told her that the next step in treatment would be admission to the hospital for further workup and care. She voiced agreement with the plan of care so far and had no further questions. I told her that there is always some diagnostic uncertainty in the ER and that her work up, current physical exam, and even her current presentation may not always reveal  other underlying conditions. I also went over the fact that her condition may change or worsen while being in the hospital. I told her that they may even find more things that require treatment or follow-up. She expressed understanding and agreed that there are reasonable limitations with the practice of emergency medicine.    The hospitalist service was consulted for evaluation and admission. The hospitalist service assumed primary care of the patient and admitted the patient in stable condition.           Amount and/or Complexity of Data Reviewed  Clinical lab tests: ordered and reviewed  Tests in the radiology section of CPT®: ordered and reviewed    Risk of Complications, Morbidity, and/or Mortality  Presenting problems: moderate  Diagnostic procedures: moderate  Management options: moderate        Final diagnoses:   Urinary tract infection without hematuria, site unspecified   Pneumonia due to infectious organism, unspecified laterality, unspecified part of lung       ED Disposition  ED Disposition     ED Disposition Condition Comment    Decision to Admit  Level of Care: Telemetry [5]   Diagnosis: HCAP (healthcare-associated pneumonia) [[id number]]   Certification: I Certify That Inpatient Hospital Services Are Medically Necessary For Greater Than 2 Midnights            No follow-up provider specified.       Medication List      ASK your doctor about these medications    amLODIPine 5 MG tablet  Commonly known as: NORVASC  Ask about: Which instructions should I use?     atorvastatin 40 MG tablet  Commonly known as: LIPITOR  Ask about: Which instructions should I use?     insulin lispro 100 UNIT/ML injection  Commonly known as: humaLOG  Ask about: Which instructions should I use?     PARoxetine 10 MG tablet  Commonly known as: PAXIL  Ask about: Which instructions should I use?             Derrick Davis MD  10/10/21 [id number]

## 2021-10-09 NOTE — CASE MANAGEMENT/SOCIAL WORK
Discharge Planning Assessment  [facility]     Patient Name: Honey Canales  MRN: [id number]  Today's Date: 10/9/2021    Admit Date: 10/8/2021     Discharge Needs Assessment     Row Name 10/09/21 1126       Living Environment    Lives With facility resident    Current Living Arrangements residential facility    Primary Care Provided by --  facility provides all that is needed    Provides Primary Care For no one    Family Caregiver if Needed child(wolf), adult    Quality of Family Relationships helpful; involved; supportive    Able to Return to Prior Arrangements yes       Resource/Environmental Concerns    Resource/Environmental Concerns none    Transportation Concerns car, none       Transition Planning    Patient/Family Anticipates Transition to long-term care facility    Patient/Family Anticipated Services at Transition skilled nursing    Transportation Anticipated health plan transportation       Discharge Needs Assessment    Readmission Within the Last 30 Days no previous admission in last 30 days    Equipment Currently Used at Home --  facility provides all that is needed    Concerns to be Addressed no discharge needs identified; denies needs/concerns at this time    Anticipated Changes Related to Illness none    Equipment Needed After Discharge none    Discharge Coordination/Progress Pt has RX coverage and a PCP. Pt is a resident of [facility] and plans on returning at discharge. Pt is able to return when medically stable. No needs identified at this time. SW will follow   Phone: [phone]         Fax: [phone]                       Discharge Plan    No documentation.               Continued Care and Services - Admitted Since 10/8/2021     Destination Coordination complete.    Service Provider Request Status Selected Services Address Phone Fax Patient Preferred    [facility]   Selected Skilled Nursing [address], [place] [address] [phone] --            Selected  Continued Care - Prior Encounters Includes selections from prior encounters from 7/10/2021 to 10/9/2021    Discharged on 9/3/2021 Admission date: 8/30/2021 - Discharge disposition: Skilled Nursing Facility (DC - External)    Destination     Service Provider Selected Services Address Phone Fax Patient Preferred    [facility]  Skilled Nursing [address] [address] [phone] --                       Demographic Summary    No documentation.                Functional Status    No documentation.                Psychosocial    No documentation.                Abuse/Neglect    No documentation.                Legal    No documentation.                Substance Abuse    No documentation.                Patient Forms    No documentation.                   Erin Maria

## 2021-10-09 NOTE — THERAPY EVALUATION
Acute Care - Speech Language Pathology   Swallow Initial Evaluation  [place]     Patient Name: Honey Canales  : 3/27/1933  MRN: [id number]  Today's Date: 10/9/2021               Admit Date: 10/8/2021      SPEECH-LANGUAGE PATHOLOGY EVALUATION - SWALLOW  Subjective: The patient was seen on this date for a Clinical Swallow evaluation.  Patient was alert and cooperative.  Significant history: bilateral pneumonia, r/o aspiration  Objective: Textures given included thin liquid, nectar thick liquid, honey thick liquid, puree consistency and regular consistency.  Assessment: Difficulties were noted with delayed cough x2 during testing..  Observations:  Patient with overall weakness.  No overt coughing directly following swallow but did have delayed cough x1 during eval and once after eval.  Cough non productive.  Voice remained clear during PO trials.  Did not see any obvious signs of aspiration at bedside.  Did have some slow mastication of solids and residue but cleared independently with liquid wash.  SLP Findings:  Patient presents with r/o aspiration, without esophageal component.   Recommendations: Diet Textures: thin liquid, regular consistency food.  Medications should be taken whole with puree. RN aware of results of evaluation.  She does not report any difficulties at bedside.  Recommended Strategies: Upright for PO and small bites and sips. Oral care before breakfast, after all meals and PRN.  Other Recommended Evaluations: VFSS if concerns for aspiration continue.  Dysphagia therapy is recommended for follow up to ensure safety with PO diet.  Erinn Morse MS CCC-SLP 10/9/2021 15:37 CDT    Visit Dx:     ICD-10-CM ICD-9-CM   1. 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)   • Hallux valgus, right   • Hallux valgus, left   • Foot pain, bilateral   • Acquired hammer toes of both feet   • Benign essential HTN   • Benign meningioma (HCC)   • Callus   • Chronic kidney disease   • History of colon polyps   • Hyperlipidemia LDL goal <100   • Loss of appetite   • Non-toxic multinodular goiter   • Pulmonary fibrosis (HCC)   • Uncontrolled type 2 diabetes mellitus without complication, with long-term current use of insulin   • Closed nondisplaced fracture of pelvis with routine healing   • Closed fracture of anatomical neck of right humerus   • Frequent falls   • Acute pain due to injury   • Transaminitis   • Stage 3b chronic kidney disease (HCC)   • Hepatitis C   • Hypoglycemia   • Right hydronephrosis   • Abdominal fluid collection   • Compression fracture of fifth lumbar vertebra (HCC)   • Hyponatremia   • Personal history of fall   • Acute kidney injury (HCC)   • HCAP (healthcare-associated pneumonia)   • Acute hypoxemic respiratory failure (HCC)   • Acute cystitis with hematuria   • Chronic kidney disease (CKD), stage IV (severe) (HCC)   • Multifocal pneumonia     Past Medical History:   Diagnosis Date   • Allergic rhinitis    • Aneurysm (HCC)    • Arthritis    • Broken shoulder     Right shoulder    • Cerebral aneurysm 2009    2ND ONE FOUND   • Cerebral aneurysm     NON TREATABLE   • Chronic laryngitis    • CKD (chronic kidney disease)    • Colon polyps    • COPD (chronic obstructive pulmonary disease) (HCC)    • Deviated nasal septum    • Diabetes mellitus (HCC)    • Disorder of kidney and ureter    • Disturbance of smell and taste    • Dizziness    • Encounter for imaging to screen for metal prior to MRI     NO MRI, METAL CLIPS IN HEAD   • Eustachian tube dysfunction    • GERD (gastroesophageal  reflux disease)    • Globus sensation    • Headache    • Hepatitis     B   • Hepatitis A    • History of stomach ulcers    • Hyperlipidemia    • Hypothyroid    • Laryngitis sicca    • Lung nodule    • Nocturia    • PONV (postoperative nausea and vomiting)    • Sensorineural hearing loss    • Spinal stenosis    • Urge incontinence    • Urgency of urination    • Urinary frequency    • Urinary incontinence    • UTI (urinary tract infection)      Past Surgical History:   Procedure Laterality Date   • BLADDER SURGERY  2016    [employer]tronic Interstem   • BRAIN SURGERY      ANUERYSM REMOVED   • BREAST SURGERY Bilateral     BIOPSY   • CARPAL TUNNEL RELEASE     • CATARACT EXTRACTION, BILATERAL     • CEREBRAL ANEURYSM REPAIR     •  SECTION      X4   • CHOLECYSTECTOMY     • HYSTERECTOMY     • INTERSTIM PLACEMENT N/A 10/18/2018    Procedure: INTERSTIM STAGES 1 AND 2 LEAD AND GENERATOR REMOVAL AND REPLACEMENT;  Surgeon: Archie Avery MD;  Location: [facility] OR;  Service: Urology   • JOINT REPLACEMENT Right     KNEE   • KNEE ARTHROSCOPY     • THYROIDECTOMY     • TONSILLECTOMY         SLP Recommendation and Plan  SLP Swallowing Diagnosis: R/O pharyngeal dysphagia (10/09/21 1315)  SLP Diet Recommendation: regular textures, thin liquids (10/09/21 1315)  Recommended Precautions and Strategies: upright posture during/after eating, small bites of food and sips of liquid (10/09/21 1315)  SLP Rec. for Method of Medication Administration: meds whole, with thin liquids (10/09/21 1315)     Monitor for Signs of Aspiration: notify SLP if any concerns (10/09/21 1315)     Swallow Criteria for Skilled Therapeutic Interventions Met: demonstrates skilled criteria (10/09/21 1315)  Anticipated Discharge Disposition (SLP): unknown (10/09/21 1315)  Rehab Potential/Prognosis, Swallowing: good, to achieve stated therapy goals (10/09/21 1315)  Therapy Frequency (Swallow): PRN (10/09/21 1315)  Predicted Duration Therapy Intervention (Days):  until discharge (10/09/21 0265)                         Plan of Care Reviewed With: patient, caregiver  Progress: improving      SWALLOW EVALUATION (last 72 hours)     SLP Adult Swallow Evaluation     Row Name 10/09/21 1315                   Rehab Evaluation    Document Type evaluation  -KW        Subjective Information no complaints  -KW        Patient Observations alert; cooperative  -KW        Patient/Family/Caregiver Comments/Observations no family present  -KW        Care Plan Review care plan/treatment goals reviewed  -KW        Patient Effort good  -KW                  General Information    Patient Profile Reviewed yes  -KW        Pertinent History Of Current Problem pneumonia, r/o aspiration  -KW        Current Method of Nutrition regular textures; thin liquids  -KW        Precautions/Limitations, Vision WFL  -KW        Precautions/Limitations, Hearing hearing impairment, bilaterally  -KW        Prior Level of Function-Communication WFL  -KW        Prior Level of Function-Swallowing no diet consistency restrictions  -KW        Plans/Goals Discussed with patient  -KW        Barriers to Rehab hearing deficit  -KW        Patient's Goals for Discharge return to all previous roles/activities  -KW                  Pain    Additional Documentation Pain Scale: FACES Pre/Post-Treatment (Group)  -KW                  Pain Scale: FACES Pre/Post-Treatment    Pain: FACES Scale, Pretreatment 0-->no hurt  -KW        Posttreatment Pain Rating 0-->no hurt  -KW                  Oral Motor Structure and Function    Dentition Assessment other (see comments)  implants  -KW        Secretion Management WNL/WFL  -KW        Mucosal Quality moist, healthy  -KW        Volitional Swallow WFL  -KW        Volitional Cough WFL  -KW                  Oral Musculature and Cranial Nerve Assessment    Oral Motor General Assessment generalized oral motor weakness  -KW                  General Eating/Swallowing Observations    Respiratory  Support Currently in Use nasal cannula  -KW        Eating/Swallowing Skills self-fed  -KW        Positioning During Eating upright in bed  -KW        Utensils Used spoon; straw; cup  -KW        Consistencies Trialed regular textures; thin liquids; nectar/syrup-thick liquids; honey-thick liquids; pudding thick  -KW                  Clinical Swallow Eval    Oral Prep Phase impaired  -KW        Oral Transit WFL  -KW        Oral Residue impaired  -KW        Pharyngeal Phase no overt signs/symptoms of pharyngeal impairment  -KW        Esophageal Phase unremarkable  -KW        Clinical Swallow Evaluation Summary see top of report  -KW                  Oral Prep Concerns    Oral Prep Concerns prolonged mastication  -KW        Prolonged Mastication regular consistencies  -KW                  Oral Residue Concerns    Oral Residue Concerns diffuse residue throughout oral cavity  -KW        Diffuse Residue Throughout Oral Cavity regular consistencies  -KW        Oral Residue Concerns, Comment clears with time  -KW                  Clinical Impression    Barriers to Overall Progress (SLP) hearing  -        SLP Swallowing Diagnosis R/O pharyngeal dysphagia  -KW        Functional Impact risk of aspiration/pneumonia  -KW        Rehab Potential/Prognosis, Swallowing good, to achieve stated therapy goals  -        Swallow Criteria for Skilled Therapeutic Interventions Met demonstrates skilled criteria  -KW                  Recommendations    Therapy Frequency (Swallow) PRN  -KW        Predicted Duration Therapy Intervention (Days) until discharge  -KW        SLP Diet Recommendation regular textures; thin liquids  -KW        Recommended Precautions and Strategies upright posture during/after eating; small bites of food and sips of liquid  -KW        Oral Care Recommendations Oral Care BID/PRN  -KW        SLP Rec. for Method of Medication Administration meds whole; with thin liquids  -KW        Monitor for Signs of Aspiration  notify SLP if any concerns  -KW        Anticipated Discharge Disposition (SLP) unknown  -KW                  Swallow Goals (SLP)    Oral Nutrition/Hydration Goal Selection (SLP) oral nutrition/hydration, SLP goal 1  -KW                  Oral Nutrition/Hydration Goal 1 (SLP)    Oral Nutrition/Hydration Goal 1, SLP LTG: Patient will tolerate LRD with no overt s/s of aspiration.  -KW        Time Frame (Oral Nutrition/Hydration Goal 1, SLP) short term goal (STG); by discharge  -KW        Barriers (Oral Nutrition/Hydration Goal 1, SLP) hearing  -KW        Progress/Outcomes (Oral Nutrition/Hydration Goal 1, SLP) goal ongoing  -KW              User Key  (r) = Recorded By, (t) = Taken By, (c) = Cosigned By    Initials Name Effective Dates    Erinn Montanez MS CCC-SLP 06/16/21 -                 EDUCATION  The patient has been educated in the following areas:   Dysphagia (Swallowing Impairment).        SLP GOALS     Row Name 10/09/21 1315             Oral Nutrition/Hydration Goal 1 (SLP)    Oral Nutrition/Hydration Goal 1, SLP LTG: Patient will tolerate LRD with no overt s/s of aspiration.  -KW      Time Frame (Oral Nutrition/Hydration Goal 1, SLP) short term goal (STG); by discharge  -KW      Barriers (Oral Nutrition/Hydration Goal 1, SLP) hearing  -KW      Progress/Outcomes (Oral Nutrition/Hydration Goal 1, SLP) goal ongoing  -KW            User Key  (r) = Recorded By, (t) = Taken By, (c) = Cosigned By    Initials Name Provider Type    Erinn Montanez MS CCC-SLP Speech and Language Pathologist                   Time Calculation:    Time Calculation- SLP     Row Name 10/09/21 1532             Time Calculation- SLP    SLP Start Time 1315  -KW      SLP Stop Time 1400  -KW      SLP Time Calculation (min) 45 min  -KW      SLP Received On 10/09/21  -KW      SLP Goal Re-Cert Due Date 10/19/21  -KW              Untimed Charges    [id number] Eval Oral Pharyng Swallow Minutes 45  -KW              Total Minutes    Untimed  Charges Total Minutes 45  -KW       Total Minutes 45  -KW            User Key  (r) = Recorded By, (t) = Taken By, (c) = Cosigned By    Initials Name Provider Type    Erinn Montanez MS CCC-SLP Speech and Language Pathologist                Therapy Charges for Today     Code Description Service Date Service Provider Modifiers Qty    [id number] HC ST EVAL ORAL PHARYNG SWALLOW 3 10/9/2021 Erinn Morse MS CCC-SLP GN 1               MS VALENTIN Draper  10/9/2021

## 2021-10-10 LAB
ANION GAP SERPL CALCULATED.3IONS-SCNC: 15 MMOL/L (ref 5–15)
BUN SERPL-MCNC: 53 MG/DL (ref 8–23)
BUN/CREAT SERPL: 21.7 (ref 7–25)
CALCIUM SPEC-SCNC: 8.4 MG/DL (ref 8.6–10.5)
CHLORIDE SERPL-SCNC: 104 MMOL/L (ref 98–107)
CO2 SERPL-SCNC: 16 MMOL/L (ref 22–29)
CREAT SERPL-MCNC: 2.44 MG/DL (ref 0.57–1)
GFR SERPL CREATININE-BSD FRML MDRD: 19 ML/MIN/1.73
GLUCOSE BLDC GLUCOMTR-MCNC: 313 MG/DL (ref 70–130)
GLUCOSE BLDC GLUCOMTR-MCNC: 315 MG/DL (ref 70–130)
GLUCOSE BLDC GLUCOMTR-MCNC: 363 MG/DL (ref 70–130)
GLUCOSE BLDC GLUCOMTR-MCNC: 364 MG/DL (ref 70–130)
GLUCOSE BLDC GLUCOMTR-MCNC: 405 MG/DL (ref 70–130)
GLUCOSE BLDC GLUCOMTR-MCNC: 405 MG/DL (ref 70–130)
GLUCOSE BLDC GLUCOMTR-MCNC: 407 MG/DL (ref 70–130)
GLUCOSE SERPL-MCNC: 334 MG/DL (ref 65–99)
POTASSIUM SERPL-SCNC: 4.8 MMOL/L (ref 3.5–5.2)
QT INTERVAL: 344 MS
QTC INTERVAL: 413 MS
SODIUM SERPL-SCNC: 135 MMOL/L (ref 136–145)

## 2021-10-10 PROCEDURE — 82962 GLUCOSE BLOOD TEST: CPT

## 2021-10-10 PROCEDURE — 94799 UNLISTED PULMONARY SVC/PX: CPT

## 2021-10-10 PROCEDURE — 63710000001 INSULIN DETEMIR PER 5 UNITS: Performed by: FAMILY MEDICINE

## 2021-10-10 PROCEDURE — 25010000002 PIPERACILLIN SOD-TAZOBACTAM PER 1 G: Performed by: NURSE PRACTITIONER

## 2021-10-10 PROCEDURE — 25010000002 ENOXAPARIN PER 10 MG: Performed by: FAMILY MEDICINE

## 2021-10-10 PROCEDURE — 25010000002 METHYLPREDNISOLONE PER 40 MG: Performed by: NURSE PRACTITIONER

## 2021-10-10 PROCEDURE — 63710000001 INSULIN LISPRO (HUMAN) PER 5 UNITS: Performed by: NURSE PRACTITIONER

## 2021-10-10 PROCEDURE — 63710000001 INSULIN DETEMIR PER 5 UNITS: Performed by: NURSE PRACTITIONER

## 2021-10-10 PROCEDURE — 92526 ORAL FUNCTION THERAPY: CPT | Performed by: SPEECH-LANGUAGE PATHOLOGIST

## 2021-10-10 PROCEDURE — 25010000002 AZITHROMYCIN PER 500 MG: Performed by: NURSE PRACTITIONER

## 2021-10-10 PROCEDURE — 80048 BASIC METABOLIC PNL TOTAL CA: CPT | Performed by: NURSE PRACTITIONER

## 2021-10-10 PROCEDURE — 63710000001 INSULIN LISPRO (HUMAN) PER 5 UNITS: Performed by: FAMILY MEDICINE

## 2021-10-10 RX ORDER — LOSARTAN POTASSIUM 50 MG/1
25 TABLET ORAL
Status: DISCONTINUED | OUTPATIENT
Start: 2021-10-10 | End: 2021-10-15 | Stop reason: HOSPADM

## 2021-10-10 RX ADMIN — OXYBUTYNIN CHLORIDE 10 MG: 5 TABLET, EXTENDED RELEASE ORAL at 08:59

## 2021-10-10 RX ADMIN — INSULIN LISPRO 14 UNITS: 100 INJECTION, SOLUTION INTRAVENOUS; SUBCUTANEOUS at 12:09

## 2021-10-10 RX ADMIN — BUDESONIDE AND FORMOTEROL FUMARATE DIHYDRATE 2 PUFF: 160; 4.5 AEROSOL RESPIRATORY (INHALATION) at 06:34

## 2021-10-10 RX ADMIN — IPRATROPIUM BROMIDE AND ALBUTEROL SULFATE 1.5 ML: 2.5; .5 SOLUTION RESPIRATORY (INHALATION) at 06:34

## 2021-10-10 RX ADMIN — SODIUM CHLORIDE, PRESERVATIVE FREE 10 ML: 5 INJECTION INTRAVENOUS at 09:00

## 2021-10-10 RX ADMIN — DOCUSATE SODIUM 50 MG AND SENNOSIDES 8.6 MG 1 TABLET: 8.6; 5 TABLET, FILM COATED ORAL at 21:23

## 2021-10-10 RX ADMIN — INSULIN LISPRO 5 UNITS: 100 INJECTION, SOLUTION INTRAVENOUS; SUBCUTANEOUS at 14:05

## 2021-10-10 RX ADMIN — ENOXAPARIN SODIUM 30 MG: 30 INJECTION SUBCUTANEOUS at 09:00

## 2021-10-10 RX ADMIN — SODIUM CHLORIDE 50 ML/HR: 9 INJECTION, SOLUTION INTRAVENOUS at 12:00

## 2021-10-10 RX ADMIN — METHYLPREDNISOLONE SODIUM SUCCINATE 40 MG: 40 INJECTION, POWDER, FOR SOLUTION INTRAMUSCULAR; INTRAVENOUS at 21:23

## 2021-10-10 RX ADMIN — INSULIN LISPRO 10 UNITS: 100 INJECTION, SOLUTION INTRAVENOUS; SUBCUTANEOUS at 21:23

## 2021-10-10 RX ADMIN — GUAIFENESIN 1200 MG: 600 TABLET, EXTENDED RELEASE ORAL at 08:59

## 2021-10-10 RX ADMIN — DOCUSATE SODIUM 50 MG AND SENNOSIDES 8.6 MG 1 TABLET: 8.6; 5 TABLET, FILM COATED ORAL at 08:59

## 2021-10-10 RX ADMIN — INSULIN DETEMIR 10 UNITS: 100 INJECTION, SOLUTION SUBCUTANEOUS at 09:07

## 2021-10-10 RX ADMIN — IPRATROPIUM BROMIDE AND ALBUTEROL SULFATE 1.5 ML: 2.5; .5 SOLUTION RESPIRATORY (INHALATION) at 10:21

## 2021-10-10 RX ADMIN — INSULIN DETEMIR 12 UNITS: 100 INJECTION, SOLUTION SUBCUTANEOUS at 21:24

## 2021-10-10 RX ADMIN — AMLODIPINE BESYLATE 5 MG: 5 TABLET ORAL at 08:59

## 2021-10-10 RX ADMIN — LOSARTAN POTASSIUM 25 MG: 50 TABLET, FILM COATED ORAL at 15:31

## 2021-10-10 RX ADMIN — IPRATROPIUM BROMIDE AND ALBUTEROL SULFATE 1.5 ML: 2.5; .5 SOLUTION RESPIRATORY (INHALATION) at 19:17

## 2021-10-10 RX ADMIN — ATORVASTATIN CALCIUM 40 MG: 40 TABLET, FILM COATED ORAL at 09:02

## 2021-10-10 RX ADMIN — INSULIN LISPRO 10 UNITS: 100 INJECTION, SOLUTION INTRAVENOUS; SUBCUTANEOUS at 09:15

## 2021-10-10 RX ADMIN — INSULIN LISPRO 10 UNITS: 100 INJECTION, SOLUTION INTRAVENOUS; SUBCUTANEOUS at 17:42

## 2021-10-10 RX ADMIN — PIPERACILLIN SODIUM AND TAZOBACTAM SODIUM 4.5 G: 4; .5 INJECTION, POWDER, LYOPHILIZED, FOR SOLUTION INTRAVENOUS at 09:00

## 2021-10-10 RX ADMIN — LEVOTHYROXINE SODIUM 75 MCG: 75 TABLET ORAL at 06:16

## 2021-10-10 RX ADMIN — BUDESONIDE AND FORMOTEROL FUMARATE DIHYDRATE 2 PUFF: 160; 4.5 AEROSOL RESPIRATORY (INHALATION) at 19:37

## 2021-10-10 RX ADMIN — AZITHROMYCIN MONOHYDRATE 500 MG: 500 INJECTION, POWDER, LYOPHILIZED, FOR SOLUTION INTRAVENOUS at 12:10

## 2021-10-10 RX ADMIN — PIPERACILLIN SODIUM AND TAZOBACTAM SODIUM 4.5 G: 4; .5 INJECTION, POWDER, LYOPHILIZED, FOR SOLUTION INTRAVENOUS at 21:23

## 2021-10-10 RX ADMIN — GUAIFENESIN 1200 MG: 600 TABLET, EXTENDED RELEASE ORAL at 21:23

## 2021-10-10 RX ADMIN — IPRATROPIUM BROMIDE AND ALBUTEROL SULFATE 1.5 ML: 2.5; .5 SOLUTION RESPIRATORY (INHALATION) at 14:14

## 2021-10-10 RX ADMIN — SODIUM CHLORIDE, PRESERVATIVE FREE 10 ML: 5 INJECTION INTRAVENOUS at 21:23

## 2021-10-10 RX ADMIN — METHYLPREDNISOLONE SODIUM SUCCINATE 40 MG: 40 INJECTION, POWDER, FOR SOLUTION INTRAMUSCULAR; INTRAVENOUS at 09:03

## 2021-10-10 NOTE — PLAN OF CARE
Goal Outcome Evaluation:  Plan of Care Reviewed With: patient        Progress: no change     SLP treatment completed. Will continue to address dysphagia. Please see note for further details and recommendations.

## 2021-10-10 NOTE — THERAPY TREATMENT NOTE
Acute Care - Speech Language Pathology   Swallow Treatment Note  Oral     Patient Name: Honey Canales  : 3/27/1933  MRN: [id number]  Today's Date: 10/10/2021               Admit Date: 10/8/2021    Patient eating breakfast this AM.  No overt s/s of aspiration observed by SLP.  Patient seems less congested today.  Complains of anxiousness.  O2 levels good.  States she fatigues with eating but not getting choked.  SLP will follow up.  Continue current diet.  Erinn Morse MS CCC-SLP 10/10/2021 08:59 CDT    Visit Dx:     ICD-10-CM ICD-9-CM   1. Oropharyngeal dysphagia  R13.12 787.22   2. Urinary tract infection without hematuria, site unspecified  N39.0 599.0   3. 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)   • Hallux valgus, right   • Hallux valgus, left   • Foot pain, bilateral   • Acquired hammer toes of both feet   • Benign essential HTN   • Benign meningioma (HCC)   • Callus   • Chronic kidney disease   • History of colon polyps   • Hyperlipidemia LDL goal <100   • Loss of appetite   • Non-toxic multinodular goiter   • Pulmonary fibrosis (HCC)   • Uncontrolled type 2 diabetes mellitus without complication, with long-term current use of insulin   • Closed nondisplaced fracture of pelvis with routine healing   • Closed fracture of anatomical neck of right humerus   • Frequent falls   • Acute pain due to injury   • Transaminitis   • Stage 3b chronic kidney disease (HCC)   • Hepatitis C   • Hypoglycemia   • Right hydronephrosis   •  Abdominal fluid collection   • Compression fracture of fifth lumbar vertebra (HCC)   • Hyponatremia   • Personal history of fall   • Acute kidney injury (HCC)   • HCAP (healthcare-associated pneumonia)   • Acute hypoxemic respiratory failure (HCC)   • Acute cystitis with hematuria   • Chronic kidney disease (CKD), stage IV (severe) (HCC)   • Multifocal pneumonia     Past Medical History:   Diagnosis Date   • Allergic rhinitis    • Aneurysm (HCC)    • Arthritis    • Broken shoulder     Right shoulder    • Cerebral aneurysm 2009    2ND ONE FOUND   • Cerebral aneurysm     NON TREATABLE   • Chronic laryngitis    • CKD (chronic kidney disease)    • Colon polyps    • COPD (chronic obstructive pulmonary disease) (HCC)    • Deviated nasal septum    • Diabetes mellitus (HCC)    • Disorder of kidney and ureter    • Disturbance of smell and taste    • Dizziness    • Encounter for imaging to screen for metal prior to MRI     NO MRI, METAL CLIPS IN HEAD   • Eustachian tube dysfunction    • GERD (gastroesophageal reflux disease)    • Globus sensation    • Headache    • Hepatitis     B   • Hepatitis A    • History of stomach ulcers    • Hyperlipidemia    • Hypothyroid    • Laryngitis sicca    • Lung nodule    • Nocturia    • PONV (postoperative nausea and vomiting)    • Sensorineural hearing loss    • Spinal stenosis    • Urge incontinence    • Urgency of urination    • Urinary frequency    • Urinary incontinence    • UTI (urinary tract infection)      Past Surgical History:   Procedure Laterality Date   • BLADDER SURGERY  2016    [employer] Interstem   • BRAIN SURGERY      ANUERYSM REMOVED   • BREAST SURGERY Bilateral     BIOPSY   • CARPAL TUNNEL RELEASE     • CATARACT EXTRACTION, BILATERAL     • CEREBRAL ANEURYSM REPAIR     •  SECTION      X4   • CHOLECYSTECTOMY     • HYSTERECTOMY     • INTERSTIM PLACEMENT N/A 10/18/2018    Procedure: INTERSTIM STAGES 1 AND 2 LEAD AND GENERATOR REMOVAL AND REPLACEMENT;  Surgeon:  Archie vAery MD;  Location: [facility] OR;  Service: Urology   • JOINT REPLACEMENT Right     KNEE   • KNEE ARTHROSCOPY     • THYROIDECTOMY     • TONSILLECTOMY         SLP Recommendation and Plan  SLP Swallowing Diagnosis: R/O pharyngeal dysphagia (10/09/21 1315)  SLP Diet Recommendation: regular textures, thin liquids (10/09/21 1315)  Recommended Precautions and Strategies: upright posture during/after eating, small bites of food and sips of liquid (10/09/21 1315)  SLP Rec. for Method of Medication Administration: meds whole, with thin liquids (10/09/21 1315)     Monitor for Signs of Aspiration: notify SLP if any concerns (10/09/21 1315)     Swallow Criteria for Skilled Therapeutic Interventions Met: demonstrates skilled criteria (10/09/21 1315)  Anticipated Discharge Disposition (SLP): unknown (10/09/21 1315)  Rehab Potential/Prognosis, Swallowing: good, to achieve stated therapy goals (10/09/21 1315)  Therapy Frequency (Swallow): PRN (10/09/21 1315)  Predicted Duration Therapy Intervention (Days): until discharge (10/09/21 1315)     Daily Summary of Progress (SLP): progress toward functional goals is good (10/10/21 0846)    Plan for Continued Treatment (SLP): continue to follow (10/10/21 0846)              Plan of Care Reviewed With: patient  Progress: no change      SWALLOW EVALUATION (last 72 hours)     SLP Adult Swallow Evaluation     Row Name 10/10/21 0846 10/09/21 1315                Rehab Evaluation    Document Type therapy note (daily note)  -KW evaluation  -KW       Subjective Information complains of; weakness  -KW no complaints  -KW       Patient Observations alert  -KW alert; cooperative  -KW       Patient/Family/Caregiver Comments/Observations -- no family present  -KW       Care Plan Review care plan/treatment goals reviewed  -KW care plan/treatment goals reviewed  -KW       Patient Effort good  -KW good  -KW                General Information    Patient Profile Reviewed -- yes  -KW       Pertinent  History Of Current Problem -- pneumonia, r/o aspiration  -KW       Current Method of Nutrition -- regular textures; thin liquids  -KW       Precautions/Limitations, Vision -- [facility]  -KW       Precautions/Limitations, Hearing -- hearing impairment, bilaterally  -KW       Prior Level of Function-Communication -- WFL  -KW       Prior Level of Function-Swallowing -- no diet consistency restrictions  -KW       Plans/Goals Discussed with -- patient  -KW       Barriers to Rehab -- hearing deficit  -KW       Patient's Goals for Discharge -- return to all previous roles/activities  -KW                Pain    Additional Documentation -- Pain Scale: FACES Pre/Post-Treatment (Group)  -KW                Pain Scale: FACES Pre/Post-Treatment    Pain: FACES Scale, Pretreatment 0-->no hurt  -KW 0-->no hurt  -KW       Posttreatment Pain Rating 0-->no hurt  -KW 0-->no hurt  -KW                Oral Motor Structure and Function    Dentition Assessment -- other (see comments)  implants  -KW       Secretion Management -- WNL/WFL  -KW       Mucosal Quality -- moist, healthy  -KW       Volitional Swallow -- WFL  -KW       Volitional Cough -- WFL  -KW                Oral Musculature and Cranial Nerve Assessment    Oral Motor General Assessment -- generalized oral motor weakness  -KW                General Eating/Swallowing Observations    Respiratory Support Currently in Use -- nasal cannula  -KW       Eating/Swallowing Skills -- self-fed  -KW       Positioning During Eating -- upright in bed  -KW       Utensils Used -- spoon; straw; cup  -KW       Consistencies Trialed -- regular textures; thin liquids; nectar/syrup-thick liquids; honey-thick liquids; pudding thick  -KW                Clinical Swallow Eval    Oral Prep Phase -- impaired  -KW       Oral Transit -- WFL  -KW       Oral Residue -- impaired  -KW       Pharyngeal Phase -- no overt signs/symptoms of pharyngeal impairment  -KW       Esophageal Phase -- unremarkable  -KW        Clinical Swallow Evaluation Summary -- see top of report  -                Oral Prep Concerns    Oral Prep Concerns -- prolonged mastication  -KW       Prolonged Mastication -- regular consistencies  -                Oral Residue Concerns    Oral Residue Concerns -- diffuse residue throughout oral cavity  -KW       Diffuse Residue Throughout Oral Cavity -- regular consistencies  -KW       Oral Residue Concerns, Comment -- clears with time  -                Clinical Impression    Daily Summary of Progress (SLP) progress toward functional goals is good  - --       Barriers to Overall Progress (SLP) anxious  -KW hearing  -       SLP Swallowing Diagnosis -- R/O pharyngeal dysphagia  -       Functional Impact -- risk of aspiration/pneumonia  -       Rehab Potential/Prognosis, Swallowing -- good, to achieve stated therapy goals  -       Swallow Criteria for Skilled Therapeutic Interventions Met -- demonstrates skilled criteria  -       Plan for Continued Treatment (SLP) continue to follow  - --                Recommendations    Therapy Frequency (Swallow) -- PRN  -KW       Predicted Duration Therapy Intervention (Days) -- until discharge  -       SLP Diet Recommendation -- regular textures; thin liquids  -       Recommended Precautions and Strategies -- upright posture during/after eating; small bites of food and sips of liquid  -       Oral Care Recommendations -- Oral Care BID/PRN  -       SLP Rec. for Method of Medication Administration -- meds whole; with thin liquids  -       Monitor for Signs of Aspiration -- notify SLP if any concerns  -KW       Anticipated Discharge Disposition (SLP) -- unknown  -                Swallow Goals (SLP)    Oral Nutrition/Hydration Goal Selection (SLP) -- oral nutrition/hydration, SLP goal 1  -                Oral Nutrition/Hydration Goal 1 (SLP)    Oral Nutrition/Hydration Goal 1, SLP LTG: Patient will tolerate LRD with no overt s/s of aspiration.  -KW  LTG: Patient will tolerate LRD with no overt s/s of aspiration.  -KW       Time Frame (Oral Nutrition/Hydration Goal 1, SLP) short term goal (STG); by discharge  -KW short term goal (STG); by discharge  -KW       Barriers (Oral Nutrition/Hydration Goal 1, SLP) hearing  -KW hearing  -KW       Progress/Outcomes (Oral Nutrition/Hydration Goal 1, SLP) continuing progress toward goal  -KW goal ongoing  -KW             User Key  (r) = Recorded By, (t) = Taken By, (c) = Cosigned By    Initials Name Effective Dates    Erinn Montanez MS CCC-SLP 06/16/21 -                 EDUCATION  The patient has been educated in the following areas:   Dysphagia (Swallowing Impairment).        SLP GOALS     Row Name 10/10/21 0846 10/09/21 1315          Oral Nutrition/Hydration Goal 1 (SLP)    Oral Nutrition/Hydration Goal 1, SLP LTG: Patient will tolerate LRD with no overt s/s of aspiration.  -KW LTG: Patient will tolerate LRD with no overt s/s of aspiration.  -KW     Time Frame (Oral Nutrition/Hydration Goal 1, SLP) short term goal (STG); by discharge  -KW short term goal (STG); by discharge  -KW     Barriers (Oral Nutrition/Hydration Goal 1, SLP) hearing  -KW hearing  -KW     Progress/Outcomes (Oral Nutrition/Hydration Goal 1, SLP) continuing progress toward goal  -KW goal ongoing  -KW           User Key  (r) = Recorded By, (t) = Taken By, (c) = Cosigned By    Initials Name Provider Type    Erinn Montanez MS CCC-SLP Speech and Language Pathologist                   Time Calculation:    Time Calculation- SLP     Row Name 10/10/21 0857             Time Calculation- SLP    SLP Start Time 0846  -KW      SLP Stop Time 0900  -KW      SLP Time Calculation (min) 14 min  -KW      SLP Received On 10/10/21  -KW              Untimed Charges    55676-MR Treatment Swallow Minutes 14  -KW              Total Minutes    Untimed Charges Total Minutes 14  -KW       Total Minutes 14  -KW            User Key  (r) = Recorded By, (t) = Taken By, (c) =  Cosigned By    Initials Name Provider Type    Erinn Montanez, MS CCC-SLP Speech and Language Pathologist                Therapy Charges for Today     Code Description Service Date Service Provider Modifiers Qty    39028356634 HC ST EVAL ORAL PHARYNG SWALLOW 3 10/9/2021 Erinn Morse MS CCC-SLP GN 1    [id number] HC ST TREATMENT SWALLOW 1 10/10/2021 Erinn Morse MS CCC-SLP GN 1               MS VALENTIN Draper  10/10/2021

## 2021-10-10 NOTE — PROGRESS NOTES
[facility] Medicine Services  INPATIENT PROGRESS NOTE    Length of Stay: 2  Date of Admission: 10/8/2021  Primary Care Physician: Devon Zuñiga MD    Subjective   Chief Complaint: Follow-up shortness of breath  HPI  Resident at [facility].  To ER 10/8 with complaints of shortness of breath and cough.  Chest x-ray noted bilateral airspace disease concerning for pneumonia.  CT angiogram the chest concerning for multifocal pneumonia.  Urinalysis 4+ bacteria, too numerous to count WBC.  Rocephin and doxycycline given in ER.    Lying in bed.  Oxygen at 2 L. She tells me she does not wear oxygen at the nursing home. We will wean as tolerated. No sputum production. Patient denies nausea, vomiting or abdominal pain. Faint expiratory wheeze. Wick in place with clear luis return. Chronic lower extremity edema. Legs are not tight. She uses a walker at the nursing home. Antibiotics changed to Zosyn and azithromycin yesterday. Glucose is elevated suspect secondary to steroids. She is on home dose of insulin. Continue sliding scale insulin coverage. Physical therapy to see. All cultures negative so far.    Review of Systems   Constitutional: Positive for fatigue. Negative for activity change, appetite change, chills and fever.   HENT: Negative for congestion and trouble swallowing.    Eyes: Negative for photophobia and visual disturbance.   Respiratory: Positive for cough and shortness of breath. Negative for wheezing.    Cardiovascular: Positive for leg swelling (Chronic). Negative for chest pain.   Gastrointestinal: Negative for constipation, diarrhea, nausea and vomiting.   Endocrine: Negative for cold intolerance, heat intolerance and polyuria.   Genitourinary: Negative for dysuria, frequency and urgency.   Musculoskeletal: Positive for gait problem.   Skin: Negative for color change, pallor, rash and wound.   Allergic/Immunologic: Negative for immunocompromised state.    Neurological: Positive for weakness (). Negative for light-headedness.   Hematological: Negative for adenopathy. Does not bruise/bleed easily.   Psychiatric/Behavioral: Negative for agitation, behavioral problems and confusion.      All pertinent negatives and positives are as above. All other systems have been reviewed and are negative unless otherwise stated.     Objective    Temp:  [97.5 °F (36.4 °C)-97.8 °F (36.6 °C)] 97.5 °F (36.4 °C)  Heart Rate:  [63-86] 85  Resp:  [16-20] 18  BP: (135-162)/(56-75) 162/75  Physical Exam  Vitals and nursing note reviewed.   Constitutional:       Comments: Lying in bed.  Oxygen at 1 L.  No family in room.  Chronically ill-appearing.   HENT:      Head: Normocephalic and atraumatic.      Nose: No congestion.      Mouth/Throat:      Pharynx: Oropharynx is clear. No oropharyngeal exudate or posterior oropharyngeal erythema.   Eyes:      Extraocular Movements: Extraocular movements intact.      Pupils: Pupils are equal, round, and reactive to light.   Cardiovascular:      Rate and Rhythm: Normal rate and regular rhythm.      Heart sounds: No murmur heard.      Pulmonary:      Breath sounds: Wheezing present. No rhonchi or rales.      Comments: Oxygen 2 L. Decreased to 1 L during assessment.  Abdominal:      Palpations: Abdomen is soft.      Tenderness: There is no abdominal tenderness.   Genitourinary:     Comments: WIC in place with luis urine return.  Musculoskeletal:         General: Swelling (Bilateral lower extremity edema.  Legs not tight.) present. No tenderness.      Cervical back: Normal range of motion and neck supple.   Skin:     General: Skin is warm and dry.   Neurological:      General: No focal deficit present.      Mental Status: She is alert and oriented to person, place, and time.   Psychiatric:         Mood and Affect: Mood normal.         Behavior: Behavior normal.         Thought Content: Thought content normal.         Judgment: Judgment normal.        Results Review:  I have reviewed the labs, radiology results, and diagnostic studies.    Laboratory Data:      Results from last 7 days   Lab Units 10/08/21  1910   WBC 10*3/mm3 8.72   HEMOGLOBIN g/dL 8.9*   HEMATOCRIT % 28.2*   PLATELETS 10*3/mm3 222     Results from last 7 days   Lab Units 10/10/21  0538 10/09/21  1207 10/09/21  1207 10/08/21  1910 10/08/21  1910   SODIUM mmol/L 135*  --  136  --  135*   POTASSIUM mmol/L 4.8  --  4.7  --  4.0   CHLORIDE mmol/L 104  --  104  --  103   CO2 mmol/L 16.0*  --  18.0*  --  21.0*   BUN mg/dL 53*  --  46*  --  40*   CREATININE mg/dL 2.44*  --  2.45*  --  2.52*   GLUCOSE mg/dL 334*   < > 313*   < > 159*   CALCIUM mg/dL 8.4*  --  8.5*  --  8.6   ALT (SGPT) U/L  --   --   --   --  13    < > = values in this interval not displayed.     Culture Data:    No results found for: BLOODCX, URINECX, WOUNDCX, MRSACX, RESPCX, STOOLCX    Radiology Data:   Imaging Results (Last 7 Days)     Procedure Component Value Units Date/Time    CT Chest Without Contrast Diagnostic [[id number]] Collected: 10/08/21 2010     Updated: 10/08/21 2019    Narrative:      EXAMINATION: CT CHEST WO CONTRAST DIAGNOSTIC- 10/8/2021 8:10 PM CDT     HISTORY: Shortness of breath, wheezing     COMPARISON: Chest x-ray 10/8/2021     DOSE: 263 mGy-cm     TECHNIQUE: Sequential imaging was performed from the thoracic inlet  through the upper abdomen without the use of IV contrast.  Sagittal and  coronal reformations were made from the original source data and  reviewed. Automated exposure control was also utilized to decrease  patient radiation dose.     FINDINGS:   Visualized thyroid gland is grossly normal in appearance. Trachea and  main bronchi are patent. The esophagus is grossly normal in appearance.     No pathologically enlarged axillary lymph nodes are identified. Some  mildly prominent mediastinal lymph nodes measure 10 mm in short axis.     The heart is normal in size. There is no pericardial effusion.  Coronary  artery calcifications are present. Atherosclerotic calcifications are  seen within the aorta and its branch vessels. The ascending thoracic  aorta is normal in caliber. Main pulmonary artery is dilated, suggesting  pulmonary arterial hypertension. The distal aortic arch measures 3.4 cm  in caliber.     There are small bilateral pleural effusions. Severe emphysematous  changes are present. There is diffuse bronchial wall thickening. Some  interlobular septal thickening is noted. Groundglass airspace opacities  are seen throughout the lungs. Some areas of patchy nodularity are seen  in the left upper lobe measuring 12 mm and 10 mm in size.     Review of the visualized portion of the upper abdomen demonstrates no  acute findings.     Review of the visualized osseous structures demonstrates no acute or  aggressive lesions.        Impression:         1. Bilateral airspace disease superimposed upon emphysematous changes,  concerning for multifocal pneumonia.     2. Small bilateral pleural effusions.     3. Atherosclerosis of the aorta and coronary arteries.  4. Pulmonary arterial hypertension.  5. Mildly prominent mediastinal lymph nodes may be reactive. Attention  on follow-up recommended.  6. Noncalcified pulmonary nodules for which follow-up CT is recommended  in 3-6 months per Fleischner Society guidelines.        This report was finalized on 10/08/2021 20:16 by Dr. Erik Schmitt MD.    XR Chest 1 View [[id number]] Collected: 10/08/21 1958     Updated: 10/08/21 2002    Narrative:      EXAMINATION: XR CHEST 1 VW- 10/8/2021 7:58 PM CDT     HISTORY: Shortness of breath, wheezing     COMPARISON: 8/30/2021     FINDINGS:  The heart and mediastinal contours are stable. Atherosclerotic  calcifications are seen in the aorta. Chronic interstitial changes are  present. However, there are worsening airspace opacities throughout both  lungs, particularly in the lung bases. There are small pleural effusions  bilaterally.  Retrocardiac consolidation is noted. An old right humeral  fracture is suspected.       Impression:      Bilateral airspace disease concerning for pneumonia  superimposed upon chronic changes. Small bilateral pleural effusions.  This report was finalized on 10/08/2021 19:59 by Dr. Erik Schmitt MD.      Results for orders placed during the hospital encounter of 06/15/18    Adult Stress Echo W/ Cont or Stress Agent if Necessary Per Protocol    Interpretation Summary  Dobutamine stress echocardiogram is low risk for ischemia.    Intake/Output    Intake/Output Summary (Last 24 hours) at 10/10/2021 1358  Last data filed at 10/10/2021 1210  Gross per 24 hour   Intake 1150 ml   Output 900 ml   Net 250 ml     Scheduled Meds  amLODIPine, 5 mg, Oral, Q24H  atorvastatin, 40 mg, Oral, Daily  azithromycin, 500 mg, Intravenous, Q24H  budesonide-formoterol, 2 puff, Inhalation, BID - RT  enoxaparin, 30 mg, Subcutaneous, Q24H  guaiFENesin, 1,200 mg, Oral, Q12H  insulin detemir, 10 Units, Subcutaneous, BID  insulin lispro, 0-14 Units, Subcutaneous, 4x Daily With Meals & Nightly  insulin lispro, 5 Units, Subcutaneous, Once  ipratropium-albuterol, 1.5 mL, Nebulization, 4x Daily - RT  levothyroxine, 75 mcg, Oral, Q AM  methylPREDNISolone sodium succinate, 40 mg, Intravenous, Q12H  oxybutynin XL, 10 mg, Oral, Daily  piperacillin-tazobactam, 4.5 g, Intravenous, Q12H  sennosides-docusate, 1 tablet, Oral, BID  sodium chloride, 10 mL, Intravenous, Q12H      I have reviewed the patient current medications.     Assessment/Plan     Active Hospital Problems    Diagnosis    • **Multifocal pneumonia    • HCAP (healthcare-associated pneumonia)    • Acute kidney injury (HCC)    • Acute cystitis with hematuria    • Chronic kidney disease (CKD), stage IV (severe) (HCC)    • Acute hypoxemic respiratory failure (HCC)    • Hepatitis C    • Benign essential HTN    • Pulmonary fibrosis (HCC)    • Diabetes mellitus type 2 with neurological manifestations  ([facility])    • GERD (gastroesophageal reflux disease)    • Urinary incontinence      Plan:  1.  To ER 10/8/2021 with complaints of shortness of breath and cough for 2 days.  Resident at [facility].  Does not normally wear oxygen.  PO2 80 on 2 L.  Chest x-ray bilateral airspace disease concerning for pneumonia.  CT angiogram of the chest bilateral airspace disease superimposed on emphysematous changes concerning for multifocal pneumonia.  Pulmonary arterial hypertension.  Prominent mediastinal lymph node may be reactive.  Noncalcified pulmonary nodule follow-up CT 3 to 6 months.  Solu-Medrol IV, doxycycline, Rocephin given in ER.    2.  Multifocal pneumonia.  Cefepime IV, vancomycin IV started on admission and changed to Zosyn and azithromycin on 10/9 for atypical coverage. Strep pneumonia negative, Legionella negative, respiratory PCR panel negative.  MRSA PCR negative. Wean oxygen as tolerated. Blood cultures no growth at 24 hours.     3. DuoNebs 4 times daily, incentive spirometry, flutter valve, Mucinex. Low-dose Solu-Medrol added yesterday for faint expiratory wheezes. Symbicort started 10/9.     4.  Urinalysis too numerous to count WBC, 4+ bacteria.  Positive nitrates. Urine culture in progress. Await results. Zosyn should cover UTI. Recent urine culture 8/30/21 aerococcus urinae.    5.  Chronic kidney disease stage IV with baseline creatinine 1.9-2.1.  Creatinine 2.52 on admission and 2.4 today. Discontinue IV fluids. BMP in a.m.    6.  Diabetes mellitus type 2.  A1c 5.8 on 10/8/2021.  Sliding scale insulin coverage with moderate to high dose. Glucoses 405, 334, 376. Continue Levemir twice daily and will increase to 12 units twice daily. Humalog extra 5 units given for glucose greater than 400. Suspect steroids contributing to elevated glucoses.    7.  Hypothyroid.  Continue Synthroid.    8. History of hypertension. Norvasc continued. Restart losartan.    9.  Lovenox for deep vein thrombosis  prophylaxis.    10.  Physical therapy and Occupational Therapy consults.  Speech therapy seen and allowed diet. She tells me she is ambulatory with walker at skilled nursing facility.    11.  Reviewed home medications.  Resumed if appropriate.    CODE STATUS/advance care planning: Full code  Patient surrogate decision-maker is her son, Chaka.    The above documentation resulted from a face-to-face encounter by me Nohemi MORILLO, [facility].    Discharge Planning: I expect patient to be discharged to [facility] in 2-3 days.    Electronically signed by YISEL Salamanca, 10/10/2021, 13:58 CDT.

## 2021-10-10 NOTE — PLAN OF CARE
Goal Outcome Evaluation:  Plan of Care Reviewed With: patient        Progress: no change  Outcome Summary: Pt A&Ox4, VSS. No reports of pain. Remains on 2L O2 NC. Turns q2 hours, heels elevated. Purewick in place, pt voiding. Pt's glucose elevated at 376 at bedtime check, MD contacted and SSI coverage increased. Safety maintained, will continue to monitor.

## 2021-10-10 NOTE — PLAN OF CARE
Goal Outcome Evaluation:           Progress: improving  Outcome Summary: Pt alert and oriented x4, denies pain. Becomes short of breathwith activity. Bedalarm in place. encourage PO intake. Blood glucose increased this am 405, extra insulin given and maintance increased. Vss.

## 2021-10-11 ENCOUNTER — APPOINTMENT (OUTPATIENT)
Dept: GENERAL RADIOLOGY | Facility: HOSPITAL | Age: 86
End: 2021-10-11

## 2021-10-11 LAB
ANION GAP SERPL CALCULATED.3IONS-SCNC: 13 MMOL/L (ref 5–15)
BACTERIA SPEC AEROBE CULT: ABNORMAL
BASOPHILS # BLD AUTO: 0.03 10*3/MM3 (ref 0–0.2)
BASOPHILS NFR BLD AUTO: 0.2 % (ref 0–1.5)
BUN SERPL-MCNC: 60 MG/DL (ref 8–23)
BUN/CREAT SERPL: 24.6 (ref 7–25)
CALCIUM SPEC-SCNC: 8.7 MG/DL (ref 8.6–10.5)
CHLORIDE SERPL-SCNC: 108 MMOL/L (ref 98–107)
CO2 SERPL-SCNC: 19 MMOL/L (ref 22–29)
CREAT SERPL-MCNC: 2.44 MG/DL (ref 0.57–1)
DEPRECATED RDW RBC AUTO: 49.1 FL (ref 37–54)
EOSINOPHIL # BLD AUTO: 0 10*3/MM3 (ref 0–0.4)
EOSINOPHIL NFR BLD AUTO: 0 % (ref 0.3–6.2)
ERYTHROCYTE [DISTWIDTH] IN BLOOD BY AUTOMATED COUNT: 14.2 % (ref 12.3–15.4)
GFR SERPL CREATININE-BSD FRML MDRD: 19 ML/MIN/1.73
GLUCOSE BLDC GLUCOMTR-MCNC: 217 MG/DL (ref 70–130)
GLUCOSE BLDC GLUCOMTR-MCNC: 236 MG/DL (ref 70–130)
GLUCOSE BLDC GLUCOMTR-MCNC: 270 MG/DL (ref 70–130)
GLUCOSE BLDC GLUCOMTR-MCNC: 278 MG/DL (ref 70–130)
GLUCOSE SERPL-MCNC: 203 MG/DL (ref 65–99)
HCT VFR BLD AUTO: 27.7 % (ref 34–46.6)
HGB BLD-MCNC: 8.7 G/DL (ref 12–15.9)
IMM GRANULOCYTES # BLD AUTO: 0.36 10*3/MM3 (ref 0–0.05)
IMM GRANULOCYTES NFR BLD AUTO: 2.6 % (ref 0–0.5)
LYMPHOCYTES # BLD AUTO: 0.67 10*3/MM3 (ref 0.7–3.1)
LYMPHOCYTES NFR BLD AUTO: 4.9 % (ref 19.6–45.3)
MCH RBC QN AUTO: 29.6 PG (ref 26.6–33)
MCHC RBC AUTO-ENTMCNC: 31.4 G/DL (ref 31.5–35.7)
MCV RBC AUTO: 94.2 FL (ref 79–97)
MONOCYTES # BLD AUTO: 0.29 10*3/MM3 (ref 0.1–0.9)
MONOCYTES NFR BLD AUTO: 2.1 % (ref 5–12)
NEUTROPHILS NFR BLD AUTO: 12.28 10*3/MM3 (ref 1.7–7)
NEUTROPHILS NFR BLD AUTO: 90.2 % (ref 42.7–76)
NRBC BLD AUTO-RTO: 0 /100 WBC (ref 0–0.2)
PLATELET # BLD AUTO: 245 10*3/MM3 (ref 140–450)
PMV BLD AUTO: 11.3 FL (ref 6–12)
POTASSIUM SERPL-SCNC: 4.5 MMOL/L (ref 3.5–5.2)
RBC # BLD AUTO: 2.94 10*6/MM3 (ref 3.77–5.28)
SODIUM SERPL-SCNC: 140 MMOL/L (ref 136–145)
WBC # BLD AUTO: 13.63 10*3/MM3 (ref 3.4–10.8)

## 2021-10-11 PROCEDURE — 25010000002 PIPERACILLIN SOD-TAZOBACTAM PER 1 G: Performed by: NURSE PRACTITIONER

## 2021-10-11 PROCEDURE — 85025 COMPLETE CBC W/AUTO DIFF WBC: CPT | Performed by: NURSE PRACTITIONER

## 2021-10-11 PROCEDURE — 63710000001 INSULIN DETEMIR PER 5 UNITS: Performed by: NURSE PRACTITIONER

## 2021-10-11 PROCEDURE — 97161 PT EVAL LOW COMPLEX 20 MIN: CPT | Performed by: PHYSICAL THERAPIST

## 2021-10-11 PROCEDURE — 25010000002 ENOXAPARIN PER 10 MG: Performed by: FAMILY MEDICINE

## 2021-10-11 PROCEDURE — 97530 THERAPEUTIC ACTIVITIES: CPT | Performed by: PHYSICAL THERAPIST

## 2021-10-11 PROCEDURE — 92611 MOTION FLUOROSCOPY/SWALLOW: CPT

## 2021-10-11 PROCEDURE — 94799 UNLISTED PULMONARY SVC/PX: CPT

## 2021-10-11 PROCEDURE — 82962 GLUCOSE BLOOD TEST: CPT

## 2021-10-11 PROCEDURE — 63710000001 INSULIN LISPRO (HUMAN) PER 5 UNITS: Performed by: FAMILY MEDICINE

## 2021-10-11 PROCEDURE — 25010000002 AZITHROMYCIN PER 500 MG: Performed by: NURSE PRACTITIONER

## 2021-10-11 PROCEDURE — 74230 X-RAY XM SWLNG FUNCJ C+: CPT

## 2021-10-11 PROCEDURE — 80048 BASIC METABOLIC PNL TOTAL CA: CPT | Performed by: NURSE PRACTITIONER

## 2021-10-11 PROCEDURE — 92526 ORAL FUNCTION THERAPY: CPT | Performed by: SPEECH-LANGUAGE PATHOLOGIST

## 2021-10-11 PROCEDURE — 25010000002 METHYLPREDNISOLONE PER 40 MG: Performed by: NURSE PRACTITIONER

## 2021-10-11 PROCEDURE — 99221 1ST HOSP IP/OBS SF/LOW 40: CPT | Performed by: NURSE PRACTITIONER

## 2021-10-11 RX ORDER — PREDNISONE 20 MG/1
60 TABLET ORAL
Status: DISCONTINUED | OUTPATIENT
Start: 2021-10-12 | End: 2021-10-12

## 2021-10-11 RX ADMIN — BARIUM SULFATE 20 ML: 400 SUSPENSION ORAL at 12:35

## 2021-10-11 RX ADMIN — OXYBUTYNIN CHLORIDE 10 MG: 5 TABLET, EXTENDED RELEASE ORAL at 08:01

## 2021-10-11 RX ADMIN — LOSARTAN POTASSIUM 25 MG: 50 TABLET, FILM COATED ORAL at 08:06

## 2021-10-11 RX ADMIN — INSULIN DETEMIR 15 UNITS: 100 INJECTION, SOLUTION SUBCUTANEOUS at 20:40

## 2021-10-11 RX ADMIN — LEVOTHYROXINE SODIUM 75 MCG: 75 TABLET ORAL at 06:04

## 2021-10-11 RX ADMIN — IPRATROPIUM BROMIDE AND ALBUTEROL SULFATE 1.5 ML: 2.5; .5 SOLUTION RESPIRATORY (INHALATION) at 20:48

## 2021-10-11 RX ADMIN — ENOXAPARIN SODIUM 30 MG: 30 INJECTION SUBCUTANEOUS at 08:01

## 2021-10-11 RX ADMIN — INSULIN LISPRO 8 UNITS: 100 INJECTION, SOLUTION INTRAVENOUS; SUBCUTANEOUS at 18:00

## 2021-10-11 RX ADMIN — INSULIN LISPRO 5 UNITS: 100 INJECTION, SOLUTION INTRAVENOUS; SUBCUTANEOUS at 08:16

## 2021-10-11 RX ADMIN — ATORVASTATIN CALCIUM 40 MG: 40 TABLET, FILM COATED ORAL at 08:01

## 2021-10-11 RX ADMIN — SODIUM CHLORIDE, PRESERVATIVE FREE 10 ML: 5 INJECTION INTRAVENOUS at 20:42

## 2021-10-11 RX ADMIN — INSULIN LISPRO 5 UNITS: 100 INJECTION, SOLUTION INTRAVENOUS; SUBCUTANEOUS at 11:41

## 2021-10-11 RX ADMIN — PIPERACILLIN SODIUM AND TAZOBACTAM SODIUM 4.5 G: 4; .5 INJECTION, POWDER, LYOPHILIZED, FOR SOLUTION INTRAVENOUS at 20:43

## 2021-10-11 RX ADMIN — BARIUM SULFATE 20 ML: 400 PASTE ORAL at 12:35

## 2021-10-11 RX ADMIN — BUDESONIDE AND FORMOTEROL FUMARATE DIHYDRATE 2 PUFF: 160; 4.5 AEROSOL RESPIRATORY (INHALATION) at 20:48

## 2021-10-11 RX ADMIN — DOCUSATE SODIUM 50 MG AND SENNOSIDES 8.6 MG 1 TABLET: 8.6; 5 TABLET, FILM COATED ORAL at 08:01

## 2021-10-11 RX ADMIN — INSULIN LISPRO 8 UNITS: 100 INJECTION, SOLUTION INTRAVENOUS; SUBCUTANEOUS at 20:41

## 2021-10-11 RX ADMIN — PIPERACILLIN SODIUM AND TAZOBACTAM SODIUM 4.5 G: 4; .5 INJECTION, POWDER, LYOPHILIZED, FOR SOLUTION INTRAVENOUS at 08:15

## 2021-10-11 RX ADMIN — IPRATROPIUM BROMIDE AND ALBUTEROL SULFATE 1.5 ML: 2.5; .5 SOLUTION RESPIRATORY (INHALATION) at 14:54

## 2021-10-11 RX ADMIN — METHYLPREDNISOLONE SODIUM SUCCINATE 40 MG: 40 INJECTION, POWDER, FOR SOLUTION INTRAMUSCULAR; INTRAVENOUS at 11:34

## 2021-10-11 RX ADMIN — GUAIFENESIN 1200 MG: 600 TABLET, EXTENDED RELEASE ORAL at 08:01

## 2021-10-11 RX ADMIN — INSULIN DETEMIR 15 UNITS: 100 INJECTION, SOLUTION SUBCUTANEOUS at 09:43

## 2021-10-11 RX ADMIN — DOCUSATE SODIUM 50 MG AND SENNOSIDES 8.6 MG 1 TABLET: 8.6; 5 TABLET, FILM COATED ORAL at 20:42

## 2021-10-11 RX ADMIN — IPRATROPIUM BROMIDE AND ALBUTEROL SULFATE 1.5 ML: 2.5; .5 SOLUTION RESPIRATORY (INHALATION) at 06:41

## 2021-10-11 RX ADMIN — GUAIFENESIN 1200 MG: 600 TABLET, EXTENDED RELEASE ORAL at 20:42

## 2021-10-11 RX ADMIN — AMLODIPINE BESYLATE 5 MG: 5 TABLET ORAL at 08:01

## 2021-10-11 RX ADMIN — BARIUM SULFATE 20 ML: 0.81 POWDER, FOR SUSPENSION ORAL at 12:35

## 2021-10-11 RX ADMIN — AZITHROMYCIN MONOHYDRATE 500 MG: 500 INJECTION, POWDER, LYOPHILIZED, FOR SOLUTION INTRAVENOUS at 16:17

## 2021-10-11 RX ADMIN — SODIUM CHLORIDE, PRESERVATIVE FREE 10 ML: 5 INJECTION INTRAVENOUS at 08:01

## 2021-10-11 NOTE — THERAPY TREATMENT NOTE
"Acute Care - Speech Language Pathology   Swallow Treatment Note  [place]     Patient Name: Honey Canales  : 3/27/1933  MRN: [id number]  Today's Date: 10/11/2021               Admit Date: 10/8/2021  Swallow treatment completed. The patient was alert and cooperative. She reports not being able to eat much breakfast because it was \"filling up.\" When SLP asked if she was speaking about her stomach or her throat feeling like something was \"stuck\" she stated both. Delayed coughing is observed during session. Nothing directly following PO intake, vocal quality remained clear throughout. Due to ongoing complaint SLP recommends VFSS In radiology to assess pharyngeal function objectively. Continue current diet (regular diet with thin liquids). Further recommendations following VFSS.   Philly Hinojosa MS CCC-SLP 10/11/2021 09:47 CDT    Visit Dx:     ICD-10-CM ICD-9-CM   1. Oropharyngeal dysphagia  R13.12 787.22   2. Urinary tract infection without hematuria, site unspecified  N39.0 599.0   3. 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)   • Hallux valgus, right   • Hallux valgus, left   • Foot pain, bilateral   • Acquired hammer toes of both feet   • Benign essential HTN   • Benign meningioma (HCC)   • Callus   • Chronic kidney disease   • History of colon polyps   • Hyperlipidemia LDL goal <100   • Loss of appetite   • Non-toxic multinodular goiter   • Pulmonary fibrosis (HCC)   • Uncontrolled type 2 diabetes mellitus without complication, " with long-term current use of insulin   • Closed nondisplaced fracture of pelvis with routine healing   • Closed fracture of anatomical neck of right humerus   • Frequent falls   • Acute pain due to injury   • Transaminitis   • Stage 3b chronic kidney disease (HCC)   • Hepatitis C   • Hypoglycemia   • Right hydronephrosis   • Abdominal fluid collection   • Compression fracture of fifth lumbar vertebra (HCC)   • Hyponatremia   • Personal history of fall   • Acute kidney injury (HCC)   • HCAP (healthcare-associated pneumonia)   • Acute hypoxemic respiratory failure (HCC)   • Acute cystitis with hematuria   • Chronic kidney disease (CKD), stage IV (severe) (HCC)   • Multifocal pneumonia     Past Medical History:   Diagnosis Date   • Allergic rhinitis    • Aneurysm (HCC)    • Arthritis    • Broken shoulder     Right shoulder    • Cerebral aneurysm 2009    2ND ONE FOUND   • Cerebral aneurysm     NON TREATABLE   • Chronic laryngitis    • CKD (chronic kidney disease)    • Colon polyps    • COPD (chronic obstructive pulmonary disease) (HCC)    • Deviated nasal septum    • Diabetes mellitus (HCC)    • Disorder of kidney and ureter    • Disturbance of smell and taste    • Dizziness    • Encounter for imaging to screen for metal prior to MRI     NO MRI, METAL CLIPS IN HEAD   • Eustachian tube dysfunction    • GERD (gastroesophageal reflux disease)    • Globus sensation    • Headache    • Hepatitis     B   • Hepatitis A    • History of stomach ulcers    • Hyperlipidemia    • Hypothyroid    • Laryngitis sicca    • Lung nodule    • Nocturia    • PONV (postoperative nausea and vomiting)    • Sensorineural hearing loss    • Spinal stenosis    • Urge incontinence    • Urgency of urination    • Urinary frequency    • Urinary incontinence    • UTI (urinary tract infection)      Past Surgical History:   Procedure Laterality Date   • BLADDER SURGERY  08/2016    [employer] Interstem   • BRAIN SURGERY      ANUERYSM REMOVED   • BREAST  SURGERY Bilateral     BIOPSY   • CARPAL TUNNEL RELEASE     • CATARACT EXTRACTION, BILATERAL     • CEREBRAL ANEURYSM REPAIR     •  SECTION      X4   • CHOLECYSTECTOMY     • HYSTERECTOMY     • INTERSTIM PLACEMENT N/A 10/18/2018    Procedure: INTERSTIM STAGES 1 AND 2 LEAD AND GENERATOR REMOVAL AND REPLACEMENT;  Surgeon: Archie Avery MD;  Location: [facility];  Service: Urology   • JOINT REPLACEMENT Right     KNEE   • KNEE ARTHROSCOPY     • THYROIDECTOMY     • TONSILLECTOMY         SLP Recommendation and Plan                                         Daily Summary of Progress (SLP): progress toward functional goals is good (10/11/21 0920)    Plan for Continued Treatment (SLP): Continue to follow (10/11/21 0920)              Plan of Care Reviewed With: patient, other (see comments) (STEPHAN Monge)  Progress: no change      SWALLOW EVALUATION (last 72 hours)     SLP Adult Swallow Evaluation     Row Name 10/11/21 0920 10/10/21 0846 10/09/21 1315             Rehab Evaluation    Document Type therapy note (daily note)  -MM therapy note (daily note)  -KW evaluation  -KW      Subjective Information complains of; fatigue; dyspnea  -MM complains of; weakness  -KW no complaints  -KW      Patient Observations alert; cooperative; agree to therapy  -MM alert  -KW alert; cooperative  -KW      Patient/Family/Caregiver Comments/Observations No family present.  -MM -- no family present  -KW      Care Plan Review care plan/treatment goals reviewed  -MM care plan/treatment goals reviewed  -KW care plan/treatment goals reviewed  -KW      Care Plan Review, Other Participant(s) other (see comments)  STEPHAN Monge  -MM -- --      Patient Effort good  -MM good  -KW good  -KW              General Information    Patient Profile Reviewed -- -- yes  -KW      Pertinent History Of Current Problem -- -- pneumonia, r/o aspiration  -KW      Current Method of Nutrition -- -- regular textures; thin liquids  -KW      Precautions/Limitations, Vision --  -- WFL  -KW      Precautions/Limitations, Hearing -- -- hearing impairment, bilaterally  -KW      Prior Level of Function-Communication -- -- WFL  -KW      Prior Level of Function-Swallowing -- -- no diet consistency restrictions  -KW      Plans/Goals Discussed with -- -- patient  -KW      Barriers to Rehab -- -- hearing deficit  -KW      Patient's Goals for Discharge -- -- return to all previous roles/activities  -KW              Pain    Additional Documentation Pain Scale: FACES Pre/Post-Treatment (Group)  -MM -- Pain Scale: FACES Pre/Post-Treatment (Group)  -KW              Pain Scale: FACES Pre/Post-Treatment    Pain: FACES Scale, Pretreatment 0-->no hurt  -MM 0-->no hurt  -KW 0-->no hurt  -KW      Posttreatment Pain Rating 0-->no hurt  -MM 0-->no hurt  -KW 0-->no hurt  -KW              Oral Motor Structure and Function    Dentition Assessment -- -- other (see comments)  implants  -KW      Secretion Management -- -- WNL/WFL  -KW      Mucosal Quality -- -- moist, healthy  -KW      Volitional Swallow -- -- WFL  -KW      Volitional Cough -- -- WFL  -KW              Oral Musculature and Cranial Nerve Assessment    Oral Motor General Assessment -- -- generalized oral motor weakness  -KW              General Eating/Swallowing Observations    Respiratory Support Currently in Use -- -- nasal cannula  -KW      Eating/Swallowing Skills -- -- self-fed  -KW      Positioning During Eating -- -- upright in bed  -KW      Utensils Used -- -- spoon; straw; cup  -KW      Consistencies Trialed -- -- regular textures; thin liquids; nectar/syrup-thick liquids; honey-thick liquids; pudding thick  -KW              Clinical Swallow Eval    Oral Prep Phase -- -- impaired  -KW      Oral Transit -- -- WFL  -KW      Oral Residue -- -- impaired  -KW      Pharyngeal Phase -- -- no overt signs/symptoms of pharyngeal impairment  -KW      Esophageal Phase -- -- unremarkable  -KW      Clinical Swallow Evaluation Summary -- -- see top of report   -              Oral Prep Concerns    Oral Prep Concerns -- -- prolonged mastication  -KW      Prolonged Mastication -- -- regular consistencies  -              Oral Residue Concerns    Oral Residue Concerns -- -- diffuse residue throughout oral cavity  -      Diffuse Residue Throughout Oral Cavity -- -- regular consistencies  -      Oral Residue Concerns, Comment -- -- clears with time  -              Clinical Impression    Daily Summary of Progress (SLP) progress toward functional goals is good  - progress toward functional goals is good  - --      Barriers to Overall Progress (SLP) Anxious  -MM anxious  -KW hearing  -      SLP Swallowing Diagnosis -- -- R/O pharyngeal dysphagia  -      Functional Impact -- -- risk of aspiration/pneumonia  -      Rehab Potential/Prognosis, Swallowing -- -- good, to achieve stated therapy goals  -      Swallow Criteria for Skilled Therapeutic Interventions Met -- -- demonstrates skilled criteria  -      Plan for Continued Treatment (SLP) Continue to follow  -MM continue to follow  - --              Recommendations    Therapy Frequency (Swallow) -- -- PRN  -      Predicted Duration Therapy Intervention (Days) -- -- until discharge  -      SLP Diet Recommendation -- -- regular textures; thin liquids  -      Recommended Precautions and Strategies -- -- upright posture during/after eating; small bites of food and sips of liquid  -      Oral Care Recommendations -- -- Oral Care BID/PRN  -      SLP Rec. for Method of Medication Administration -- -- meds whole; with thin liquids  -      Monitor for Signs of Aspiration -- -- notify SLP if any concerns  -      Anticipated Discharge Disposition (SLP) -- -- unknown  -              Swallow Goals (SLP)    Oral Nutrition/Hydration Goal Selection (SLP) oral nutrition/hydration, SLP goal 1  -MM -- oral nutrition/hydration, SLP goal 1  -KW              Oral Nutrition/Hydration Goal 1 (SLP)    Oral  Nutrition/Hydration Goal 1, SLP LTG: Patient will tolerate LRD with no overt s/s of aspiration.  -MM LTG: Patient will tolerate LRD with no overt s/s of aspiration.  -KW LTG: Patient will tolerate LRD with no overt s/s of aspiration.  -KW      Time Frame (Oral Nutrition/Hydration Goal 1, SLP) short term goal (STG); by discharge  -MM short term goal (STG); by discharge  -KW short term goal (STG); by discharge  -KW      Barriers (Oral Nutrition/Hydration Goal 1, SLP) hearing  -MM hearing  -KW hearing  -KW      Progress/Outcomes (Oral Nutrition/Hydration Goal 1, SLP) continuing progress toward goal  -MM continuing progress toward goal  -KW goal ongoing  -KW            User Key  (r) = Recorded By, (t) = Taken By, (c) = Cosigned By    Initials Name Effective Dates    Erinn Montanez MS CCC-SLP 06/16/21 -     MM Philly Hinojosa MS CCC-SLP 06/16/21 -                 EDUCATION  The patient has been educated in the following areas:   Dysphagia (Swallowing Impairment) Oral Care/Hydration.        SLP GOALS     Row Name 10/11/21 0920 10/10/21 0846 10/09/21 1315       Oral Nutrition/Hydration Goal 1 (SLP)    Oral Nutrition/Hydration Goal 1, SLP LTG: Patient will tolerate LRD with no overt s/s of aspiration.  -MM LTG: Patient will tolerate LRD with no overt s/s of aspiration.  -KW LTG: Patient will tolerate LRD with no overt s/s of aspiration.  -KW    Time Frame (Oral Nutrition/Hydration Goal 1, SLP) short term goal (STG); by discharge  -MM short term goal (STG); by discharge  -KW short term goal (STG); by discharge  -KW    Barriers (Oral Nutrition/Hydration Goal 1, SLP) hearing  -MM hearing  -KW hearing  -KW    Progress/Outcomes (Oral Nutrition/Hydration Goal 1, SLP) continuing progress toward goal  -MM continuing progress toward goal  -KW goal ongoing  -KW          User Key  (r) = Recorded By, (t) = Taken By, (c) = Cosigned By    Initials Name Provider Type    Erinn Montanez MS CCC-SLP Speech and Language  Pathologist    Philly Roque MS CCC-SLP Speech and Language Pathologist                   Time Calculation:    Time Calculation- SLP     Row Name 10/11/21 0946             Time Calculation- SLP    SLP Start Time 0920  -MM      SLP Stop Time 0946  -MM      SLP Time Calculation (min) 26 min  -MM      SLP Received On 10/11/21  -MM              Untimed Charges    62833-QG Treatment Swallow Minutes 26  -MM              Total Minutes    Untimed Charges Total Minutes 26  -MM       Total Minutes 26  -MM            User Key  (r) = Recorded By, (t) = Taken By, (c) = Cosigned By    Initials Name Provider Type    Philly Roque MS CCC-SLP Speech and Language Pathologist                Therapy Charges for Today     Code Description Service Date Service Provider Modifiers Qty    [id number] HC ST TREATMENT SWALLOW 2 10/11/2021 Philly Hinojosa MS CCC-SLP GN 1               Philly Hinojosa MS CCC-BAL  10/11/2021

## 2021-10-11 NOTE — PLAN OF CARE
Goal Outcome Evaluation:  Plan of Care Reviewed With: patient        Progress: no change  Outcome Summary: Pt A&Ox4. , weaned to room air. [place]. Q2 turn with wedges/pillows, TCDB. Heels elevated off bed, barrier cream applied to bottom. Deep tissue injury bottom, heels r/b. Severe BLE edema. Kat CDI - cath care completed. Denies pain, denies n/t. lovenox VTE. Pt instructed on incentive spirometer use hourly. Blood glucose monitored. Call light within reach. Safety maintained. Bed alarm set.

## 2021-10-11 NOTE — THERAPY EVALUATION
Patient Name: Honey Canales  : 3/27/1933    MRN: [id number]                              Today's Date: 10/11/2021       Admit Date: 10/8/2021    Visit Dx:     ICD-10-CM ICD-9-CM   1. Oropharyngeal dysphagia  R13.12 787.22   2. Urinary tract infection without hematuria, site unspecified  N39.0 599.0   3. Pneumonia due to infectious organism, unspecified laterality, unspecified part of lung  J18.9 486   4. 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)   • Hallux valgus, right   • Hallux valgus, left   • Foot pain, bilateral   • Acquired hammer toes of both feet   • Benign essential HTN   • Benign meningioma (HCC)   • Callus   • Chronic kidney disease   • History of colon polyps   • Hyperlipidemia LDL goal <100   • Loss of appetite   • Non-toxic multinodular goiter   • Pulmonary fibrosis (HCC)   • Uncontrolled type 2 diabetes mellitus without complication, with long-term current use of insulin   • Closed nondisplaced fracture of pelvis with routine healing   • Closed fracture of anatomical neck of right humerus   • Frequent falls   • Acute pain due to injury   • Transaminitis   • Stage 3b chronic kidney disease (HCC)   • Hepatitis C   • Hypoglycemia   • Right hydronephrosis   • Abdominal fluid collection   • Compression fracture of fifth lumbar vertebra (HCC)   • Hyponatremia   • Personal history of fall   • Acute kidney injury (HCC)   • HCAP (healthcare-associated pneumonia)   • Acute hypoxemic respiratory failure (HCC)   • Acute cystitis with hematuria   • Chronic kidney disease  (CKD), stage IV (severe) (HCC)   • Multifocal pneumonia     Past Medical History:   Diagnosis Date   • Allergic rhinitis    • Aneurysm (HCC)    • Arthritis    • Broken shoulder     Right shoulder    • Cerebral aneurysm 2009    2ND ONE FOUND   • Cerebral aneurysm     NON TREATABLE   • Chronic laryngitis    • CKD (chronic kidney disease)    • Colon polyps    • COPD (chronic obstructive pulmonary disease) (HCC)    • Deviated nasal septum    • Diabetes mellitus (HCC)    • Disorder of kidney and ureter    • Disturbance of smell and taste    • Dizziness    • Encounter for imaging to screen for metal prior to MRI     NO MRI, METAL CLIPS IN HEAD   • Eustachian tube dysfunction    • GERD (gastroesophageal reflux disease)    • Globus sensation    • Headache    • Hepatitis     B   • Hepatitis A    • History of stomach ulcers    • Hyperlipidemia    • Hypothyroid    • Laryngitis sicca    • Lung nodule    • Nocturia    • PONV (postoperative nausea and vomiting)    • Sensorineural hearing loss    • Spinal stenosis    • Urge incontinence    • Urgency of urination    • Urinary frequency    • Urinary incontinence    • UTI (urinary tract infection)      Past Surgical History:   Procedure Laterality Date   • BLADDER SURGERY  2016    Medtronic Interstem   • BRAIN SURGERY      ANUERYSM REMOVED   • BREAST SURGERY Bilateral     BIOPSY   • CARPAL TUNNEL RELEASE     • CATARACT EXTRACTION, BILATERAL     • CEREBRAL ANEURYSM REPAIR     •  SECTION      X4   • CHOLECYSTECTOMY     • HYSTERECTOMY     • INTERSTIM PLACEMENT N/A 10/18/2018    Procedure: INTERSTIM STAGES 1 AND 2 LEAD AND GENERATOR REMOVAL AND REPLACEMENT;  Surgeon: Archie Avery MD;  Location: [facility];  Service: Urology   • JOINT REPLACEMENT Right     KNEE   • KNEE ARTHROSCOPY     • THYROIDECTOMY     • TONSILLECTOMY        General Information     Row Name 10/11/21 1038          Physical Therapy Time and Intention    Document Type evaluation  -MS (r) NN (t) MS (c)      Mode of Treatment physical therapy; individual therapy  SOB & Cough. Bilateral Air Space Disease, Emphysema, and Multifocal Pneumonia. Injury to coccyx. BLE Swelling. Increased Glucose  -MS (r) NN (t) MS (c)     Row Name 10/11/21 1038          General Information    Patient Profile Reviewed yes  -MS (r) NN (t) MS (c)     Prior Level of Function mod assist:; all household mobility; community mobility; gait; transfer; bed mobility; dependent:; ADL's  -MS (r) NN (t) MS (c)     Existing Precautions/Restrictions fall; oxygen therapy device and L/min  1L O2 NC  -MS (r) NN (t) MS (c)     Barriers to Rehab medically complex  -MS (r) NN (t) MS (c)     Row Name 10/11/21 1038          Living Environment    Lives With facility resident  -MS (r) NN (t) MS (c)     Row Name 10/11/21 1038          Home Main Entrance    Number of Stairs, Main Entrance none  -MS (r) NN (t) MS (c)     Row Name 10/11/21 1038          Stairs Within Home, Primary    Number of Stairs, Within Home, Primary none  -MS (r) NN (t) MS (c)     Row Name 10/11/21 1038          Cognition    Orientation Status (Cognition) oriented x 4  -MS (r) NN (t) MS (c)     Row Name 10/11/21 1038          Safety Issues, Functional Mobility    Safety Issues Affecting Function (Mobility) friction/shear risk  -MS (r) NN (t) MS (c)     Impairments Affecting Function (Mobility) balance  -MS (r) NN (t) MS (c)           User Key  (r) = Recorded By, (t) = Taken By, (c) = Cosigned By    Initials Name Provider Type    Philly Villa R, PT, DPT, NCS Physical Therapist    NN Regina Russell, PT Student PT Student               Mobility     Row Name 10/11/21 1038          Bed Mobility    Bed Mobility supine-sit; sit-supine; scooting/bridging  -MS (r) NN (t) MS (c)     Scooting/Bridging [place] (Bed Mobility) modified independence  -MS (r) NN (t) MS (c)     Supine-Sit [place] (Bed Mobility) modified independence  -MS (r) NN (t) MS (c)     Sit-Supine [place] (Bed Mobility)  minimum assist (75% patient effort)  pt was unable to lift BLE and needed assistance reaching head of bed  -MS (r) NN (t) MS (c)     Assistive Device (Bed Mobility) bed rails; draw sheet; head of bed elevated  -MS (r) NN (t) MS (c)     Row Name 10/11/21 1038          Sit-Stand Transfer    Sit-Stand [place] (Transfers) contact guard  -MS (r) NN (t) MS (c)     Assistive Device (Sit-Stand Transfers) walker, front-wheeled  -MS (r) NN (t) MS (c)     Row Name 10/11/21 1038          Gait/Stairs (Locomotion)    [place] Level (Gait) contact guard  -MS (r) NN (t) MS (c)     Assistive Device (Gait) walker, front-wheeled  -MS (r) NN (t) MS (c)     Distance in Feet (Gait) 10ft: pt was limited in distance she due to SOA  -MS (r) NN (t) MS (c)           User Key  (r) = Recorded By, (t) = Taken By, (c) = Cosigned By    Initials Name Provider Type    Philly Villa R, PT, DPT, NCS Physical Therapist    NN Regina Russell, PT Student PT Student               Obj/Interventions     Row Name 10/11/21 1038          Range of Motion Comprehensive    General Range of Motion bilateral lower extremity ROM WFL  -MS (r) NN (t) MS (c)     Comment, General Range of Motion RUE: limited 50% AROM in shoulder flexion and ABD due to previous injury from this year  -MS (r) NN (t) MS (c)     Row Name 10/11/21 1038          Strength Comprehensive (MMT)    General Manual Muscle Testing (MMT) Assessment no strength deficits identified  -MS (r) NN (t) MS (c)     Row Name 10/11/21 1038          Balance    Balance Assessment sitting static balance; sitting dynamic balance; standing static balance; standing dynamic balance  -MS (r) NN (t) MS (c)     Static Sitting Balance WFL  -MS (r) NN (t) MS (c)     Dynamic Sitting Balance WFL  -MS (r) NN (t) MS (c)     Static Standing Balance WFL  -MS (r) NN (t) MS (c)     Dynamic Standing Balance WFL  -MS (r) NN (t) MS (c)     Balance Interventions sit to stand  -MS (r) NN (t) MS (c)     Comment, Balance --   -MS (r) NN (t) MS (c)     Row Name 10/11/21 1038          Sensory Assessment (Somatosensory)    Sensory Assessment (Somatosensory) sensation intact  -MS (r) NN (t) MS (c)           User Key  (r) = Recorded By, (t) = Taken By, (c) = Cosigned By    Initials Name Provider Type    Philly Villa R, PT, DPT, NCS Physical Therapist    NN Regina Russell, PT Student PT Student               Goals/Plan     Row Name 10/11/21 1038          Transfer Goal 1 (PT)    Activity/Assistive Device (Transfer Goal 1, PT) sit-to-stand/stand-to-sit; bed-to-chair/chair-to-bed; walker, rolling  -MS (r) NN (t) MS (c)     [place] Level/Cues Needed (Transfer Goal 1, PT) standby assist  -MS (r) NN (t) MS (c)     Time Frame (Transfer Goal 1, PT) long term goal (LTG)  -MS (r) NN (t) MS (c)     Progress/Outcome (Transfer Goal 1, PT) goal ongoing  -MS (r) NN (t) MS (c)     Row Name 10/11/21 1038          Gait Training Goal 1 (PT)    Activity/Assistive Device (Gait Training Goal 1, PT) gait (walking locomotion); decrease fall risk; diminish gait deviation; improve balance and speed; increase endurance/gait distance; increase energy conservation; walker, rolling  -MS (r) NN (t) MS (c)     [place] Level (Gait Training Goal 1, PT) standby assist  -MS (r) NN (t) MS (c)     Distance (Gait Training Goal 1, PT) 50ft with one or less rest break.  -MS (r) NN (t) MS (c)     Time Frame (Gait Training Goal 1, PT) long term goal (LTG)  -MS (r) NN (t) MS (c)     Progress/Outcome (Gait Training Goal 1, PT) goal ongoing  -MS (r) NN (t) MS (c)           User Key  (r) = Recorded By, (t) = Taken By, (c) = Cosigned By    Initials Name Provider Type    Philly Villa R, PT, DPT, NCS Physical Therapist    NN Regina Russell, PT Student PT Student               Clinical Impression     Row Name 10/11/21 1038          Pain    Additional Documentation Pain Scale: Numbers Pre/Post-Treatment (Group)  -MS (r) NN (t) MS (c)     Row Name 10/11/21 1038          Pain  Scale: Numbers Pre/Post-Treatment    Pretreatment Pain Rating 0/10 - no pain  -MS (r) NN (t) MS (c)     Posttreatment Pain Rating 0/10 - no pain  -MS (r) NN (t) MS (c)     Pain Intervention(s) Medication (See MAR); Repositioned; Ambulation/increased activity  -MS (r) NN (t) MS (c)     Row Name 10/11/21 1038          Plan of Care Review    Plan of Care Reviewed With patient; son  -MS (r) NN (t) MS (c)     Progress no change  -MS (r) NN (t) MS (c)     Outcome Summary PT Eval Complete: pt AOx4. pt is limited in RUE shoulder ROM due to previous shoulder injury this year. pt presents with BLE swelling with pitting edema 2+ from knee down. pt is able to reach EOB with modified independence. pt needed multiple breaks throughout due to SOA. pt is able to perform sit to stand and amb CGA. pt was limited in distance walked due to SOA and only able to walk 10ft. pt was educated on breathing technique and the importance of the incenitive spirometer. pt will continue with skilled PT to build up endurance for safe functional mobility. D/C recommendation SNF  -MS (r) NN (t) MS (c)     Row Name 10/11/21 1038          Therapy Assessment/Plan (PT)    Patient/Family Therapy Goals Statement (PT) go back to nursing facility  -MS (r) NN (t) MS (c)     Rehab Potential (PT) good, to achieve stated therapy goals  -MS (r) NN (t) MS (c)     Criteria for Skilled Interventions Met (PT) yes; meets criteria; skilled treatment is necessary  -MS (r) NN (t) MS (c)     Predicted Duration of Therapy Intervention (PT) until D/C  -MS (r) NN (t) MS (c)     Row Name 10/11/21 1038          Vital Signs    Pretreatment Heart Rate (beats/min) 89  -MS (r) NN (t) MS (c)     Intratreatment Heart Rate (beats/min) 88  -MS (r) NN (t) MS (c)     Posttreatment Heart Rate (beats/min) 81  -MS (r) NN (t) MS (c)     Pre SpO2 (%) 99  -MS (r) NN (t) MS (c)     O2 Delivery Pre Treatment nasal cannula  1L NC  -MS (r) NN (t) MS (c)     Intra SpO2 (%) 94  -MS (r) NN (t) MS (c)      O2 Delivery Intra Treatment nasal cannula  1 L O2 NC  -MS (r) NN (t) MS (c)     Post SpO2 (%) 95  -MS (r) NN (t) MS (c)     O2 Delivery Post Treatment nasal cannula  1L O2 NC  -MS (r) NN (t) MS (c)     Pre Patient Position Supine  -MS (r) NN (t) MS (c)     Intra Patient Position Sitting  -MS (r) NN (t) MS (c)     Post Patient Position Sitting  -MS (r) NN (t) MS (c)     Row Name 10/11/21 1038          Positioning and Restraints    Pre-Treatment Position in bed  -MS (r) NN (t) MS (c)     Post Treatment Position bed  -MS (r) NN (t) MS (c)     In Bed fowlers; call light within reach; encouraged to call for assist; with family/caregiver; with nsg  -MS (r) NN (t) MS (c)           User Key  (r) = Recorded By, (t) = Taken By, (c) = Cosigned By    Initials Name Provider Type    Philly Villa, PT, DPT, NCS Physical Therapist    NN Regina Russell, PT Student PT Student               Outcome Measures     Row Name 10/11/21 1038          How much help from another person do you currently need...    Turning from your back to your side while in flat bed without using bedrails? 4  -MS (r) NN (t) MS (c)     Moving from lying on back to sitting on the side of a flat bed without bedrails? 4  -MS (r) NN (t) MS (c)     Moving to and from a bed to a chair (including a wheelchair)? 3  -MS (r) NN (t) MS (c)     Standing up from a chair using your arms (e.g., wheelchair, bedside chair)? 3  -MS (r) NN (t) MS (c)     Climbing 3-5 steps with a railing? 2  -MS (r) NN (t) MS (c)     To walk in hospital room? 3  -MS (r) NN (t) MS (c)     AM-PAC 6 Clicks Score (PT) 19  -MS (r) NN (t)     Row Name 10/11/21 1038          Functional Assessment    Outcome Measure Options AM-PAC 6 Clicks Basic Mobility (PT)  -MS (r) NN (t) MS (c)           User Key  (r) = Recorded By, (t) = Taken By, (c) = Cosigned By    Initials Name Provider Type    Philly Villa R, PT, DPT, NCS Physical Therapist    NN Regina Russell, PT Student PT Student                              Physical Therapy Education                 Title: PT OT SLP Therapies (Done)     Topic: Physical Therapy (Done)     Point: Mobility training (Done)     Learning Progress Summary           Patient Acceptance, E, VU by NN at 10/11/2021 1038    Comment: PT role in care   Family Acceptance, E, VU by NN at 10/11/2021 1038    Comment: PT role in care                   Point: Home exercise program (Done)     Learning Progress Summary           Patient Acceptance, E, VU by NN at 10/11/2021 1038    Comment: PT role in care   Family Acceptance, E, VU by NN at 10/11/2021 1038    Comment: PT role in care                   Point: Body mechanics (Done)     Learning Progress Summary           Patient Acceptance, E, VU by NN at 10/11/2021 1038    Comment: PT role in care   Family Acceptance, E, VU by NN at 10/11/2021 1038    Comment: PT role in care                   Point: Precautions (Done)     Learning Progress Summary           Patient Acceptance, E, VU by NN at 10/11/2021 1038    Comment: PT role in care   Family Acceptance, E, VU by NN at 10/11/2021 1038    Comment: PT role in care                               User Key     Initials Effective Dates Name Provider Type Discipline    NN 08/18/21 -  Regina Russell, PT Student PT Student PT              PT Recommendation and Plan  Planned Therapy Interventions (PT): balance training, bed mobility training, gait training, patient/family education, ROM (range of motion), strengthening, transfer training  Plan of Care Reviewed With: patient, son  Progress: no change  Outcome Summary: PT Eval Complete: pt AOx4. pt is limited in RUE shoulder ROM due to previous shoulder injury this year. pt presents with BLE swelling with pitting edema 2+ from knee down. pt is able to reach EOB with modified independence. pt needed multiple breaks throughout due to SOA. pt is able to perform sit to stand and amb CGA. pt was limited in distance walked due to SOA and only able to walk 10ft.  pt was educated on breathing technique and the importance of the incenitive spirometer. pt will continue with skilled PT to build up endurance for safe functional mobility. D/C recommendation SNF     Time Calculation:    PT Charges     Row Name 10/11/21 1038             Time Calculation    Start Time 1038  10 Min Chart Review: Eval Low 4  -MS (r) NN (t) MS (c)      Stop Time 1142  -MS (r) NN (t) MS (c)      Time Calculation (min) 64 min  -MS (r) NN (t)      PT Received On 10/11/21  -MS (r) NN (t) MS (c)      PT Goal Re-Cert Due Date 10/21/21  -MS (r) NN (t) MS (c)              Time Calculation- PT    Total Timed Code Minutes- PT 10 minute(s)  -MS (r) NN (t) MS (c)              Timed Charges    16693 - PT Therapeutic Activity Minutes 10  -MS (r) NN (t) MS (c)              Untimed Charges    PT Eval/Re-eval Minutes 64  -MS (r) NN (t) MS (c)              Total Minutes    Timed Charges Total Minutes 10  -MS (r) NN (t)      Untimed Charges Total Minutes 64  -MS (r) NN (t)       Total Minutes 74  -MS (r) NN (t)            User Key  (r) = Recorded By, (t) = Taken By, (c) = Cosigned By    Initials Name Provider Type    Philly Villa R, PT, DPT, NCS Physical Therapist    Regina Rodriguez, PT Student PT Student                  PT G-Codes  Outcome Measure Options: AM-PAC 6 Clicks Basic Mobility (PT)  AM-PAC 6 Clicks Score (PT): 19    Regina Russell, PT Student  10/11/2021

## 2021-10-11 NOTE — PLAN OF CARE
Goal Outcome Evaluation:  Plan of Care Reviewed With: patient        Progress: no change  Outcome Summary: Pt A&Ox4, VSS. No reports of pain. Turned q 2 hours, heels elevated. Remains on 2L O2 NC. Pt BG still elevated at bedtime at 315, insulin given as ordered. Safety maintained, will continue to monitor

## 2021-10-11 NOTE — PROGRESS NOTES
[facility] Medicine Services  INPATIENT PROGRESS NOTE    Length of Stay: 3  Date of Admission: 10/8/2021  Primary Care Physician: Devon Zuñiga MD    Subjective   Chief Complaint: Follow-up  HPI   Sitting up in bed.  Son in room.  Patient is hard of hearing.  She does not wear oxygen at the nursing home.  Have decreased oxygen to 1 L.  When I ask her if she has sputum production she said what ever she eats is the color it is.  Seen at the same time as wound care.  She has scarring with blanchable erythema sacral region from healed pressure injuries but no open areas.  She does have peripheral edema that son reports has worsened recently.  Have discussed with physical therapy to place Unna boots.  She denies chest pain or palpitations.  Denies nausea or vomiting but does report food does not go down well.  Speech therapy evaluating.  She tells me she walks at the nursing home and hopefully physical therapy will get her out of the bed today.    Review of Systems   Constitutional: Positive for fatigue. Negative for activity change, appetite change, chills and fever.   HENT: Positive for hearing loss.  Negative for congestion and trouble swallowing.    Eyes: Negative for photophobia and visual disturbance.   Respiratory: Positive for cough and shortness of breath. Negative for wheezing.    Cardiovascular: Positive for leg swelling (Chronic). Negative for chest pain.   Gastrointestinal: Negative for constipation, diarrhea, nausea and vomiting.   Endocrine: Negative for cold intolerance, heat intolerance and polyuria.   Genitourinary: Negative for dysuria, frequency and urgency.   Musculoskeletal: Positive for gait problem.   Skin: Negative for color change, pallor, rash and wound.   Allergic/Immunologic: Negative for immunocompromised state.   Neurological: Positive for weakness. Negative for light-headedness.   Hematological: Negative for adenopathy. Does not bruise/bleed easily.    Psychiatric/Behavioral: Negative for agitation, behavioral problems and confusion.      All pertinent negatives and positives are as above. All other systems have been reviewed and are negative unless otherwise stated.     Objective    Temp:  [97.7 °F (36.5 °C)-98.3 °F (36.8 °C)] 98.3 °F (36.8 °C)  Heart Rate:  [77-93] 77  Resp:  [18-20] 18  BP: (129-144)/(57-70) 137/57  Physical Exam  Vitals and nursing note reviewed.   Constitutional:       Comments: Sitting up in bed.  Son in room.  Oxygen decreased to 1 L during assessment.  HENT:      Head: Normocephalic and atraumatic.      Nose: No congestion.      Mouth/Throat:      Pharynx: Oropharynx is clear. No oropharyngeal exudate or posterior oropharyngeal erythema.      Comments: Hard of hearing  Eyes:      Extraocular Movements: Extraocular movements intact.      Pupils: Pupils are equal, round, and reactive to light.   Cardiovascular:      Rate and Rhythm: Normal rate and regular rhythm.      Heart sounds: No murmur heard.  Pulmonary:      Breath sounds: No rhonchi or rales.      Comments: Oxygen decreased to 1 L during assessment.  No wheezing heard today.  Abdominal:      Palpations: Abdomen is soft.      Tenderness: There is no abdominal tenderness.   Genitourinary:     Comments: WIC in place with luis urine return.  Musculoskeletal:         General: Swelling (Bilateral lower extremity edema.  Legs not tight.) present. No tenderness.      Cervical back: Normal range of motion and neck supple.   Skin:     General: Skin is warm and dry.   Neurological:      General: No focal deficit present.      Mental Status: She is alert and oriented to person, place, and time.   Psychiatric:         Mood and Affect: Mood normal.         Behavior: Behavior normal.         Thought Content: Thought content normal.         Judgment: Judgment normal.     Results Review:  I have reviewed the labs, radiology results, and diagnostic studies.    Laboratory Data:    Results from last  7 days   Lab Units 10/11/21  0746 10/08/21  1910   WBC 10*3/mm3 13.63* 8.72   HEMOGLOBIN g/dL 8.7* 8.9*   HEMATOCRIT % 27.7* 28.2*   PLATELETS 10*3/mm3 245 222        Results from last 7 days   Lab Units 10/11/21  0615 10/10/21  0538 10/10/21  0538 10/09/21  1207 10/09/21  1207 10/08/21  1910 10/08/21  1910   SODIUM mmol/L 140  --  135*  --  136  --  135*   POTASSIUM mmol/L 4.5  --  4.8  --  4.7  --  4.0   CHLORIDE mmol/L 108*  --  104  --  104  --  103   CO2 mmol/L 19.0*  --  16.0*  --  18.0*  --  21.0*   BUN mg/dL 60*  --  53*  --  46*  --  40*   CREATININE mg/dL 2.44*  --  2.44*  --  2.45*  --  2.52*   GLUCOSE mg/dL 203*   < > 334*   < > 313*   < > 159*   CALCIUM mg/dL 8.7  --  8.4*  --  8.5*  --  8.6   ALT (SGPT) U/L  --   --   --   --   --   --  13    < > = values in this interval not displayed.     Culture Data:      Blood Culture   Date Value Ref Range Status   10/08/2021 No growth at 2 days  Preliminary   10/08/2021 No growth at 2 days  Preliminary     Urine Culture   Date Value Ref Range Status   10/08/2021 >100,000 CFU/mL Escherichia coli (A)  Final     Escherichia coli       ISMAEL     Ampicillin <=2  Susceptible     Ampicillin + Sulbactam <=2  Susceptible     Cefazolin <=4  Susceptible     Cefepime <=1  Susceptible     Ceftazidime <=1  Susceptible     Ceftriaxone <=1  Susceptible     Gentamicin <=1  Susceptible     Levofloxacin <=0.12  Susceptible     Nitrofurantoin <=16  Susceptible     Piperacillin + Tazobactam <=4  Susceptible     Tetracycline <=1  Susceptible     Trimethoprim + Sulfamethoxazole <=20  Susceptible      Radiology Data:   Imaging Results (Last 7 Days)     Procedure Component Value Units Date/Time    FL Video Swallow With Speech Single Contrast [[id number]] Collected: 10/11/21 1245     Updated: 10/11/21 1249    Narrative:      EXAMINATION:  FL VIDEO SWALLOW W SPEECH SINGLE-CONTRAST-  10/11/2021  12:26 PM CDT     HISTORY: Ongoing dysphagia with no s/s at bedside; pneumonia; concern  for silent  aspiration; R13.12-Dysphagia, oropharyngeal phase;  N39.0-Urinary tract infection, site not specified; J18.9-Pneumonia,  unspecified organism.     COMPARISON: No comparison study.     TECHNIQUE: The patient was given honey, nectar, water, pudding, fruit  and cracker consistencies mixed with barium for swallowing.     FLUOROSCOPY TIME: 1 minute 27 seconds.     NUMBER OF IMAGES: 4.4 mGy.     FINDINGS: There was laryngeal penetration with water consistency barium  on 2 separate swallowing sequences. There were no other episodes of  penetration or aspiration. The epiglottis appears to invert normally.  There was some spillover of the liquid substances.       Impression:      1. There were 2 episodes of laryngeal penetration with water consistency  barium. No aspiration was observed.  2. Please see the speech pathology report separately.  This report was finalized on 10/11/2021 12:46 by Dr. Johnathan Brennan MD.    CT Chest Without Contrast Diagnostic [[id number]] Collected: 10/08/21 2010     Updated: 10/08/21 2019    Narrative:      EXAMINATION: CT CHEST WO CONTRAST DIAGNOSTIC- 10/8/2021 8:10 PM CDT     HISTORY: Shortness of breath, wheezing     COMPARISON: Chest x-ray 10/8/2021     DOSE: 263 mGy-cm     TECHNIQUE: Sequential imaging was performed from the thoracic inlet  through the upper abdomen without the use of IV contrast.  Sagittal and  coronal reformations were made from the original source data and  reviewed. Automated exposure control was also utilized to decrease  patient radiation dose.     FINDINGS:   Visualized thyroid gland is grossly normal in appearance. Trachea and  main bronchi are patent. The esophagus is grossly normal in appearance.     No pathologically enlarged axillary lymph nodes are identified. Some  mildly prominent mediastinal lymph nodes measure 10 mm in short axis.     The heart is normal in size. There is no pericardial effusion. Coronary  artery calcifications are present. Atherosclerotic  calcifications are  seen within the aorta and its branch vessels. The ascending thoracic  aorta is normal in caliber. Main pulmonary artery is dilated, suggesting  pulmonary arterial hypertension. The distal aortic arch measures 3.4 cm  in caliber.     There are small bilateral pleural effusions. Severe emphysematous  changes are present. There is diffuse bronchial wall thickening. Some  interlobular septal thickening is noted. Groundglass airspace opacities  are seen throughout the lungs. Some areas of patchy nodularity are seen  in the left upper lobe measuring 12 mm and 10 mm in size.     Review of the visualized portion of the upper abdomen demonstrates no  acute findings.     Review of the visualized osseous structures demonstrates no acute or  aggressive lesions.        Impression:         1. Bilateral airspace disease superimposed upon emphysematous changes,  concerning for multifocal pneumonia.     2. Small bilateral pleural effusions.     3. Atherosclerosis of the aorta and coronary arteries.  4. Pulmonary arterial hypertension.  5. Mildly prominent mediastinal lymph nodes may be reactive. Attention  on follow-up recommended.  6. Noncalcified pulmonary nodules for which follow-up CT is recommended  in 3-6 months per Fleischner Society guidelines.        This report was finalized on 10/08/2021 20:16 by Dr. Erik Schmitt MD.    XR Chest 1 View [[id number]] Collected: 10/08/21 1958     Updated: 10/08/21 2002    Narrative:      EXAMINATION: XR CHEST 1 VW- 10/8/2021 7:58 PM CDT     HISTORY: Shortness of breath, wheezing     COMPARISON: 8/30/2021     FINDINGS:  The heart and mediastinal contours are stable. Atherosclerotic  calcifications are seen in the aorta. Chronic interstitial changes are  present. However, there are worsening airspace opacities throughout both  lungs, particularly in the lung bases. There are small pleural effusions  bilaterally. Retrocardiac consolidation is noted. An old right  humeral  fracture is suspected.       Impression:      Bilateral airspace disease concerning for pneumonia  superimposed upon chronic changes. Small bilateral pleural effusions.  This report was finalized on 10/08/2021 19:59 by Dr. Erik Schmitt MD.        Results for orders placed during the hospital encounter of 06/15/18    Adult Stress Echo W/ Cont or Stress Agent if Necessary Per Protocol    Interpretation Summary  Dobutamine stress echocardiogram is low risk for ischemia.    Intake/Output    Intake/Output Summary (Last 24 hours) at 10/11/2021 1342  Last data filed at 10/11/2021 0300  Gross per 24 hour   Intake --   Output 200 ml   Net -200 ml       Scheduled Meds  amLODIPine, 5 mg, Oral, Q24H  atorvastatin, 40 mg, Oral, Daily  azithromycin, 500 mg, Intravenous, Q24H  budesonide-formoterol, 2 puff, Inhalation, BID - RT  enoxaparin, 30 mg, Subcutaneous, Q24H  guaiFENesin, 1,200 mg, Oral, Q12H  insulin detemir, 15 Units, Subcutaneous, BID  insulin lispro, 0-14 Units, Subcutaneous, 4x Daily With Meals & Nightly  ipratropium-albuterol, 1.5 mL, Nebulization, 4x Daily - RT  levothyroxine, 75 mcg, Oral, Q AM  losartan, 25 mg, Oral, Q24H  methylPREDNISolone sodium succinate, 40 mg, Intravenous, Q12H  oxybutynin XL, 10 mg, Oral, Daily  piperacillin-tazobactam, 4.5 g, Intravenous, Q12H  sennosides-docusate, 1 tablet, Oral, BID  sodium chloride, 10 mL, Intravenous, Q12H      I have reviewed the patient current medications.     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  To ER 10/8/2021 with complaints of shortness of breath and cough for 2 days.  Resident at [facility]. PO2 80 on 2 L.  Chest x-ray bilateral airspace disease concerning for pneumonia.  CT angiogram of the chest bilateral airspace disease superimposed on emphysematous changes concerning for multifocal pneumonia.  Pulmonary arterial hypertension.  Prominent mediastinal lymph node may be reactive.  Noncalcified pulmonary nodule follow-up CT 3 to 6 months.  Solu-Medrol IV, doxycycline, Rocephin given in ER.     2.  Multifocal pneumonia.  Cefepime IV, vancomycin IV started on admit and changed to Zosyn and azithromycin on 10/9 for atypical coverage. Strep pneumonia negative, Legionella negative, respiratory PCR panel negative.  MRSA PCR negative.   Blood cultures no growth at 2 days.  Wean oxygen as tolerated.  Decreased to 1 L during assessment.  CBC in a.m. as WBC up to 13.63 today.     3. DuoNebs 4 times daily, incentive spirometry, flutter valve, Mucinex.  Solu-Medrol and Symbicort added on 10/9.      4.  Urine culture today positive for E. Coli.  On Zosyn.    5.  Chronic kidney disease stage IV with baseline creatinine 1.9-2.1.  Creatinine 2.52 on admit, 2.4 today.  Remains stable. BMP in a.m.    6.  Physical therapy, Occupational Therapy, speech therapy consult.  Speech therapy recommends continue current diet and swallowing study.  Discussed with physical therapy today regarding Unna boots for bilateral lower extremity edema.  No open wounds or weeping.  PT to get out of bed and ambulate.  Uses walker at skilled nursing facility.    7.  Diabetes mellitus type 2.  Hemoglobin A1c 8.5 on 10/8/2021.  Sliding scale insulin coverage moderate to high dose.  Glucoses 236, 204, 315.  Levemir increased to 15 units twice daily.  Steroids likely contributing to elevated glucoses.    8.  History of hypertension.  Blood pressure 137/57 remained stable on Norvasc and losartan.    9.  Lovenox for  deep vein thrombosis prophylaxis.    10.  Synthroid continued for hypothyroidism.    11.  Wound care evaluated today noting scarring and blanchable erythema to sacral region.  Healed pressure injuries.    CODE STATUS/advance care planning: Full code  Patient surrogate decision-maker is her son, Chaka.    The above documentation resulted from a face-to-face encounter by me Nohemi MORILLO, [facility].    Discharge Planning: I expect patient to be discharged to [facility] in 2 to 3 days.    Electronically signed by YISEL Salamanca, 10/11/2021, 13:42 CDT.

## 2021-10-11 NOTE — MBS/VFSS/FEES
Acute Care - Speech Language Pathology   Swallow Re-Evaluation [facility]     Patient Name: Honey Canales  : 3/27/1933  MRN: [id number]  Today's Date: 10/11/2021               Admit Date: 10/8/2021  SPEECH-LANGUAGE PATHOLOGY EVALUTION - VFSS  Subjective: The patient was seen on this date for a VFSS(Videofluoroscopic Swallowing Study).  Patient was alert and cooperative.    Significant history: Pneumonia, pt complaint of food getting stuck  Objective: Risks/benefits were reviewed with the patient, and consent was obtained. The study was completed with SLP and Radiologist present. The patient was seen in lateral view(s). Textures given included thin liquid, nectar thick liquid, honey thick liquid, puree consistency, mechanical soft consistency and regular consistency.  Assessment: Consistencies were presented in the following order with the following results:  Honey thick via spoon: No anterior excursion of hyoid, decreased epiglottic inversion and pharyngeal constriction. Only minimal amount of barium passed to esophagus with first swallow, but pt reduced residue to mild amount with spontaneous multiple swallow. No definite laryngeal penetration or aspiration was observed.     Nectar thick via straw: No anterior excursion of the hyoid with decreased pharyngeal constriction. No definite laryngeal penetration or aspiration observed. Mild oropharyngeal residue remained.     Thin via straw: Premature loss to the vallecula, laryngeal vestibule, and pyriform sinuses secondary to decreased back of tongue control resulting in silent laryngeal penetration before the swallow. No anterior excursion of the hyoid and decreased pharyngeal constriction. No aspiration observed. Mild oropharyngeal residue remained.     Pudding thick via spoon: Lingual rocking with oral transit of bolus. No anterior excursion of the hyoid and decreased pharyngeal constriction. No new laryngeal penetration or aspiration observed, although thin  residue remained in the laryngeal vestibule. Mild oropharyngeal residue remained.    Mechanical soft solid coated in pudding thick barium: No anterior excursion of the hyoid and decreased pharyngeal constriction. No laryngeal penetration or aspiration observed. Mild to moderate oropharyngeal residue remained.     Regular solid coated in pudding thick barium: Prolonged mastication, no anterior excursion of the hyoid and decreased pharyngeal constriction. No laryngeal penetration or aspiration observed. Moderate oropharyngeal residue remained.    Thin via straw: No anterior excursion of the hyoid and decreased epiglottic inversion with deep silent laryngeal penetration during the swallow. No aspiration observed. Moderate oropharyngeal residue remained.     Nectar thick via straw: No anterior excursion of the hyoid and decreased pharyngeal constriction. No new laryngeal penetration or aspiration observed, although thin residue remained in the laryngeal vestibule. Moderate oropharyngeal residue remained.     SLP Findings: Patient presents with mild to moderate oropharyngeal dysphagia.   Recommendations: Diet Textures: nectar thick liquid, mechanical soft consistency food. Medications should be taken whole with puree. May have water and Ice between meals after oral care, under staff or family supervision and with the recommended strategies for safe swallowing.  Recommended Strategies: Upright for PO, small bites and sips, double swallow with all consistencies and alternate liquids and solids. Oral care before breakfast, after all meals and PRN.   Dysphagia therapy is recommended.   Vesta Ramirez, CCC-SLP 10/11/2021 14:04 CDT    Visit Dx:     ICD-10-CM ICD-9-CM   1. Oropharyngeal dysphagia  R13.12 787.22   2. Urinary tract infection without hematuria, site unspecified  N39.0 599.0   3. 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)   • Hallux valgus, right   • Hallux valgus, left   • Foot pain, bilateral   • Acquired hammer toes of both feet   • Benign essential HTN   • Benign meningioma (HCC)   • Callus   • Chronic kidney disease   • History of colon polyps   • Hyperlipidemia LDL goal <100   • Loss of appetite   • Non-toxic multinodular goiter   • Pulmonary fibrosis (HCC)   • Uncontrolled type 2 diabetes mellitus without complication, with long-term current use of insulin   • Closed nondisplaced fracture of pelvis with routine healing   • Closed fracture of anatomical neck of right humerus   • Frequent falls   • Acute pain due to injury   • Transaminitis   • Stage 3b chronic kidney disease (HCC)   • Hepatitis C   • Hypoglycemia   • Right hydronephrosis   • Abdominal fluid collection   • Compression fracture of fifth lumbar vertebra (HCC)   • Hyponatremia   • Personal history of fall   • Acute kidney injury (HCC)   • HCAP (healthcare-associated pneumonia)   • Acute hypoxemic respiratory failure (HCC)   • Acute cystitis with hematuria   • Chronic kidney disease (CKD), stage IV (severe) (HCC)   • Multifocal pneumonia     Past Medical History:   Diagnosis Date   • Allergic rhinitis    • Aneurysm (HCC)    • Arthritis    • Broken shoulder     Right shoulder    • Cerebral aneurysm 2009    2ND ONE FOUND   • Cerebral aneurysm     NON TREATABLE   • Chronic laryngitis    • CKD (chronic kidney disease)    • Colon polyps    • COPD (chronic obstructive pulmonary disease) (HCC)    • Deviated nasal septum    • Diabetes mellitus (HCC)    • Disorder of kidney and  ureter    • Disturbance of smell and taste    • Dizziness    • Encounter for imaging to screen for metal prior to MRI     NO MRI, METAL CLIPS IN HEAD   • Eustachian tube dysfunction    • GERD (gastroesophageal reflux disease)    • Globus sensation    • Headache    • Hepatitis     B   • Hepatitis A    • History of stomach ulcers    • Hyperlipidemia    • Hypothyroid    • Laryngitis sicca    • Lung nodule    • Nocturia    • PONV (postoperative nausea and vomiting)    • Sensorineural hearing loss    • Spinal stenosis    • Urge incontinence    • Urgency of urination    • Urinary frequency    • Urinary incontinence    • UTI (urinary tract infection)      Past Surgical History:   Procedure Laterality Date   • BLADDER SURGERY  2016    [employer]tronic Interstem   • BRAIN SURGERY      ANUERYSM REMOVED   • BREAST SURGERY Bilateral     BIOPSY   • CARPAL TUNNEL RELEASE     • CATARACT EXTRACTION, BILATERAL     • CEREBRAL ANEURYSM REPAIR     •  SECTION      X4   • CHOLECYSTECTOMY     • HYSTERECTOMY     • INTERSTIM PLACEMENT N/A 10/18/2018    Procedure: INTERSTIM STAGES 1 AND 2 LEAD AND GENERATOR REMOVAL AND REPLACEMENT;  Surgeon: Archie Avery MD;  Location: [facility] OR;  Service: Urology   • JOINT REPLACEMENT Right     KNEE   • KNEE ARTHROSCOPY     • THYROIDECTOMY     • TONSILLECTOMY         SLP Recommendation and Plan  SLP Swallowing Diagnosis: mild, oral dysphagia, mild-moderate, pharyngeal dysphagia (10/11/21 1225)  SLP Diet Recommendation: soft textures, ground, nectar thick liquids (10/11/21 1225)  Recommended Precautions and Strategies: upright posture during/after eating, small bites of food and sips of liquid, alternate between small bites of food and sips of liquid, general aspiration precautions (10/11/21 1225)  SLP Rec. for Method of Medication Administration: meds whole, with pudding or applesauce (10/11/21 1225)     Monitor for Signs of Aspiration: yes, cough, gurgly voice, throat clearing, notify SLP if any  concerns (10/11/21 1225)     Swallow Criteria for Skilled Therapeutic Interventions Met: demonstrates skilled criteria (10/11/21 1225)  Anticipated Discharge Disposition (SLP): unknown (10/11/21 1225)  Rehab Potential/Prognosis, Swallowing: adequate, monitor progress closely (10/11/21 1225)  Therapy Frequency (Swallow): 2 days per week (10/11/21 1225)  Predicted Duration Therapy Intervention (Days): until discharge (10/11/21 1225)                                SWALLOW EVALUATION (last 72 hours)     SLP Adult Swallow Evaluation     Row Name 10/11/21 1225 10/11/21 0920 10/10/21 0846 10/09/21 1315          Rehab Evaluation    Document Type re-evaluation  -MB therapy note (daily note)  -MM therapy note (daily note)  - evaluation  -     Subjective Information no complaints  -MB complains of; fatigue; dyspnea  -MM complains of; weakness  - no complaints  -KW     Patient Observations alert; cooperative  -MB alert; cooperative; agree to therapy  - alert  - alert; cooperative  -     Patient/Family/Caregiver Comments/Observations No family present  -MB No family present.  -MM -- no family present  -     Care Plan Review -- care plan/treatment goals reviewed  - care plan/treatment goals reviewed  - care plan/treatment goals reviewed  -     Care Plan Review, Other Participant(s) -- other (see comments)  STEPHAN Monge  - -- --     Patient Effort -- good  -MM good  -KW good  -KW            General Information    Patient Profile Reviewed yes  -MB -- -- yes  -     Pertinent History Of Current Problem Pneumonia  -MB -- -- pneumonia, r/o aspiration  -     Current Method of Nutrition regular textures; thin liquids  -MB -- -- regular textures; thin liquids  -     Precautions/Limitations, Vision [facility]  -MB -- -- WFL  -KW     Precautions/Limitations, Hearing hearing impairment, bilaterally  -MB -- -- hearing impairment, bilaterally  -     Prior Level of Function-Communication [facility]  -MB -- -- L  -     Prior  Level of Function-Swallowing no diet consistency restrictions  -MB -- -- no diet consistency restrictions  -     Plans/Goals Discussed with patient  -MB -- -- patient  -     Barriers to Rehab hearing deficit  -MB -- -- hearing deficit  -     Patient's Goals for Discharge patient did not state  -MB -- -- return to all previous roles/activities  -            Pain    Additional Documentation Pain Scale: FACES Pre/Post-Treatment (Group)  -MB Pain Scale: FACES Pre/Post-Treatment (Group)  -MM -- Pain Scale: FACES Pre/Post-Treatment (Group)  -KW            Pain Scale: FACES Pre/Post-Treatment    Pain: FACES Scale, Pretreatment 0-->no hurt  -MB 0-->no hurt  -MM 0-->no hurt  -KW 0-->no hurt  -KW     Posttreatment Pain Rating -- 0-->no hurt  -MM 0-->no hurt  -KW 0-->no hurt  -KW            Oral Motor Structure and Function    Dentition Assessment natural, present and adequate  -MB -- -- other (see comments)  implants  -     Secretion Management WNL/WFL  -MB -- -- WNL/WFL  -KW     Mucosal Quality moist, healthy  -MB -- -- moist, healthy  -     Volitional Swallow -- -- -- [facility]  -     Volitional Cough -- -- -- WFL  -KW            Oral Musculature and Cranial Nerve Assessment    Oral Motor General Assessment [facility] -- -- generalized oral motor weakness  -            General Eating/Swallowing Observations    Respiratory Support Currently in Use nasal cannula  -MB -- -- nasal cannula  -     Eating/Swallowing Skills -- -- -- self-fed  -     Positioning During Eating -- -- -- upright in bed  -     Utensils Used -- -- -- spoon; straw; cup  -     Consistencies Trialed -- -- -- regular textures; thin liquids; nectar/syrup-thick liquids; honey-thick liquids; pudding thick  -            Clinical Swallow Eval    Oral Prep Phase -- -- -- impaired  -KW     Oral Transit -- -- -- WFL  -     Oral Residue -- -- -- impaired  -KW     Pharyngeal Phase -- -- -- no overt signs/symptoms of pharyngeal impairment  -      Esophageal Phase -- -- -- unremarkable  -     Clinical Swallow Evaluation Summary -- -- -- see top of report  -            Oral Prep Concerns    Oral Prep Concerns -- -- -- prolonged mastication  -     Prolonged Mastication -- -- -- regular consistencies  -            Oral Residue Concerns    Oral Residue Concerns -- -- -- diffuse residue throughout oral cavity  -     Diffuse Residue Throughout Oral Cavity -- -- -- regular consistencies  -     Oral Residue Concerns, Comment -- -- -- clears with time  -            MBS/VFSS    Utensils Used spoon; straw  -MB -- -- --     Consistencies Trialed regular textures; soft textures; thin liquids; nectar/syrup-thick liquids; honey-thick liquids; pudding thick  -MB -- -- --            MBS/VFSS Interpretation    Oral Prep Phase impaired oral phase of swallowing  -MB -- -- --     Oral Transit Phase impaired  -MB -- -- --     Oral Residue impaired  -MB -- -- --     VFSS Summary See [facility] note  -MB -- -- --            Oral Preparatory Phase    Oral Preparatory Phase prolonged manipulation  -MB -- -- --     Prolonged Manipulation regular textures  -MB -- -- --            Oral Transit Phase    Impaired Oral Transit Phase premature spillage of liquids into pharynx  -MB -- -- --     Premature Spillage of Liquids into Pharynx thin liquids  -MB -- -- --            Oral Residue    Impaired Oral Residue diffuse residue throughout oral cavity  -MB -- -- --     Diffuse Residue throughout Oral Cavity all consistencies tested  -MB -- -- --            Initiation of Pharyngeal Swallow    Initiation of Pharyngeal Swallow bolus in valleculae; bolus in pyriform sinuses  -MB -- -- --     Pharyngeal Phase impaired pharyngeal phase of swallowing  -MB -- -- --     Penetration Before the Swallow thin liquids  -MB -- -- --     Penetration During the Swallow thin liquids  -MB -- -- --     Response to Penetration no response  -MB -- -- --     Pharyngeal Residue all consistencies tested  -MB --  -- --            Clinical Impression    Daily Summary of Progress (SLP) -- progress toward functional goals is good  - progress toward functional goals is good  -KW --     Barriers to Overall Progress (SLP) -- Anxious  -MM anxious  -KW hearing  -     SLP Swallowing Diagnosis mild; oral dysphagia; mild-moderate; pharyngeal dysphagia  -MB -- -- R/O pharyngeal dysphagia  -     Functional Impact risk of aspiration/pneumonia; risk of malnutrition; risk of dehydration  -MB -- -- risk of aspiration/pneumonia  -     Rehab Potential/Prognosis, Swallowing adequate, monitor progress closely  -MB -- -- good, to achieve stated therapy goals  -     Swallow Criteria for Skilled Therapeutic Interventions Met demonstrates skilled criteria  -MB -- -- demonstrates skilled criteria  -     Plan for Continued Treatment (SLP) -- Continue to follow  -MM continue to follow  - --            Recommendations    Therapy Frequency (Swallow) 2 days per week  -MB -- -- PRN  -     Predicted Duration Therapy Intervention (Days) until discharge  -MB -- -- until discharge  -     SLP Diet Recommendation soft textures; ground; nectar thick liquids  -MB -- -- regular textures; thin liquids  -     Recommended Precautions and Strategies upright posture during/after eating; small bites of food and sips of liquid; alternate between small bites of food and sips of liquid; general aspiration precautions  -MB -- -- upright posture during/after eating; small bites of food and sips of liquid  -     Oral Care Recommendations Oral Care BID/PRN  -MB -- -- Oral Care BID/PRN  -     SLP Rec. for Method of Medication Administration meds whole; with pudding or applesauce  -MB -- -- meds whole; with thin liquids  -     Monitor for Signs of Aspiration yes; cough; gurgly voice; throat clearing; notify SLP if any concerns  -MB -- -- notify SLP if any concerns  -     Anticipated Discharge Disposition (SLP) unknown  -MB -- -- unknown  -             Swallow Goals (SLP)    Oral Nutrition/Hydration Goal Selection (SLP) oral nutrition/hydration, SLP goal 1  -MB oral nutrition/hydration, SLP goal 1  -MM -- oral nutrition/hydration, SLP goal 1  -KW     Lingual Strengthening Goal Selection (SLP) lingual strengthening, SLP goal 1  -MB -- -- --     Pharyngeal Strengthening Exercise Goal Selection (SLP) pharyngeal strengthening exercise, SLP goal 1  -MB -- -- --     Additional Documentation lingual strengthening goal selection (SLP); pharyngeal strengthening exercise goal selection (SLP)  -MB -- -- --            Oral Nutrition/Hydration Goal 1 (SLP)    Oral Nutrition/Hydration Goal 1, SLP LTG: Patient will tolerate LRD with no overt s/s of aspiration.  -MB LTG: Patient will tolerate LRD with no overt s/s of aspiration.  -MM LTG: Patient will tolerate LRD with no overt s/s of aspiration.  -KW LTG: Patient will tolerate LRD with no overt s/s of aspiration.  -KW     Time Frame (Oral Nutrition/Hydration Goal 1, SLP) short term goal (STG); by discharge  -MB short term goal (STG); by discharge  -MM short term goal (STG); by discharge  -KW short term goal (STG); by discharge  -KW     Barriers (Oral Nutrition/Hydration Goal 1, SLP) hearing  -MB hearing  -MM hearing  -KW hearing  -KW     Progress/Outcomes (Oral Nutrition/Hydration Goal 1, SLP) -- continuing progress toward goal  -MM continuing progress toward goal  -KW goal ongoing  -KW            Lingual Strengthening Goal 1 (SLP)    Activity (Lingual Strengthening Goal 1, SLP) increase tongue back strength  -MB -- -- --     Increase Tongue Back Strength lingual movement exercises  -MB -- -- --     [place]/Accuracy (Lingual Strengthening Goal 1, SLP) independently (over 90% accuracy)  -MB -- -- --     Time Frame (Lingual Strengthening Goal 1, SLP) short term goal (STG); by discharge  -MB -- -- --     Barriers (Lingual Strengthening Goal 1, SLP) n/a  -MB -- -- --     Progress/Outcomes (Lingual Strengthening Goal 1, SLP)  goal ongoing  -MB -- -- --            Pharyngeal Strengthening Exercise Goal 1 (SLP)    Activity (Pharyngeal Strengthening Goal 1, SLP) increase anterior movement of the hyolaryngeal complex; increase squeeze/positive pressure generation  -MB -- -- --     Increase Anterior Movement of the Hyolaryngeal Complex shaker  -MB -- -- --     Increase Squeeze/Positive Pressure Generation hard effortful swallow  -MB -- -- --     [place]/Accuracy (Pharyngeal Strengthening Goal 1, SLP) independently (over 90% accuracy)  -MB -- -- --     Time Frame (Pharyngeal Strengthening Goal 1, SLP) short term goal (STG); by discharge  -MB -- -- --     Barriers (Pharyngeal Strengthening Goal 1, SLP) n/a  -MB -- -- --     Progress/Outcomes (Pharyngeal Strengthening Goal 1, SLP) goal ongoing  -MB -- -- --           User Key  (r) = Recorded By, (t) = Taken By, (c) = Cosigned By    Initials Name Effective Dates    MB Vesta Ramirez, CCC-SLP 06/16/21 -     KW Erinn Morse, MS CCC-SLP 06/16/21 -     MM Philly Hinojosa, MS CCC-SLP 06/16/21 -                 EDUCATION  The patient has been educated in the following areas:   Dysphagia (Swallowing Impairment).        SLP GOALS     Row Name 10/11/21 1225 10/11/21 0920 10/10/21 0846       Oral Nutrition/Hydration Goal 1 (SLP)    Oral Nutrition/Hydration Goal 1, SLP LTG: Patient will tolerate LRD with no overt s/s of aspiration.  -MB LTG: Patient will tolerate LRD with no overt s/s of aspiration.  -MM LTG: Patient will tolerate LRD with no overt s/s of aspiration.  -KW    Time Frame (Oral Nutrition/Hydration Goal 1, SLP) short term goal (STG); by discharge  -MB short term goal (STG); by discharge  -MM short term goal (STG); by discharge  -KW    Barriers (Oral Nutrition/Hydration Goal 1, SLP) hearing  -MB hearing  -MM hearing  -KW    Progress/Outcomes (Oral Nutrition/Hydration Goal 1, SLP) -- continuing progress toward goal  -MM continuing progress toward goal  -KW       Lingual  Strengthening Goal 1 (SLP)    Activity (Lingual Strengthening Goal 1, SLP) increase tongue back strength  -MB -- --    Increase Tongue Back Strength lingual movement exercises  -MB -- --    Iron/Accuracy (Lingual Strengthening Goal 1, SLP) independently (over 90% accuracy)  -MB -- --    Time Frame (Lingual Strengthening Goal 1, SLP) short term goal (STG); by discharge  -MB -- --    Barriers (Lingual Strengthening Goal 1, SLP) n/a  -MB -- --    Progress/Outcomes (Lingual Strengthening Goal 1, SLP) goal ongoing  -MB -- --       Pharyngeal Strengthening Exercise Goal 1 (SLP)    Activity (Pharyngeal Strengthening Goal 1, SLP) increase anterior movement of the hyolaryngeal complex; increase squeeze/positive pressure generation  -MB -- --    Increase Anterior Movement of the Hyolaryngeal Complex shaker  -MB -- --    Increase Squeeze/Positive Pressure Generation hard effortful swallow  -MB -- --    Iron/Accuracy (Pharyngeal Strengthening Goal 1, SLP) independently (over 90% accuracy)  -MB -- --    Time Frame (Pharyngeal Strengthening Goal 1, SLP) short term goal (STG); by discharge  -MB -- --    Barriers (Pharyngeal Strengthening Goal 1, SLP) n/a  -MB -- --    Progress/Outcomes (Pharyngeal Strengthening Goal 1, SLP) goal ongoing  -MB -- --    Row Name 10/09/21 1315             Oral Nutrition/Hydration Goal 1 (SLP)    Oral Nutrition/Hydration Goal 1, SLP LTG: Patient will tolerate LRD with no overt s/s of aspiration.  -KW      Time Frame (Oral Nutrition/Hydration Goal 1, SLP) short term goal (STG); by discharge  -KW      Barriers (Oral Nutrition/Hydration Goal 1, SLP) hearing  -KW      Progress/Outcomes (Oral Nutrition/Hydration Goal 1, SLP) goal ongoing  -KW            User Key  (r) = Recorded By, (t) = Taken By, (c) = Cosigned By    Initials Name Provider Type    Vesta Prabhakar, CCC-SLP Speech and Language Pathologist    Erinn Montanez, MS CCC-SLP Speech and Language Pathologist    ROBERTO Hinojosa,  [place] ROSE, MS CCC-SLP Speech and Language Pathologist                   Time Calculation:    Time Calculation- SLP     Row Name 10/11/21 1403 10/11/21 0946          Time Calculation- SLP    SLP Start Time 1225  -MB 0920  -MM     SLP Stop Time 1335  -MB 0946  -MM     SLP Time Calculation (min) 70 min  -MB 26 min  -MM     SLP Received On 10/11/21  -MB 10/11/21  -MM     SLP Goal Re-Cert Due Date 10/21/21  -MB --            Untimed Charges    27374-UX Motion Fluoro Eval Swallow Minutes 70  -MB --     56205-RU Treatment Swallow Minutes -- 26  -MM            Total Minutes    Untimed Charges Total Minutes 70  -MB 26  -MM      Total Minutes 70  -MB 26  -MM           User Key  (r) = Recorded By, (t) = Taken By, (c) = Cosigned By    Initials Name Provider Type    Vesta Prabhakar, CCC-SLP Speech and Language Pathologist     [place] Hinojosa, MS CCC-SLP Speech and Language Pathologist                Therapy Charges for Today     Code Description Service Date Service Provider Modifiers Qty    [id number]  ST MOTION FLUORO EVAL SWALLOW 5 10/11/2021 Vesta Ramirez CCC-SLP GN 1               Vesta Ramirez CCC-BAL  10/11/2021

## 2021-10-11 NOTE — PLAN OF CARE
"Goal Outcome Evaluation:  Plan of Care Reviewed With: patient, other (see comments) (STEPHAN Monge)        Progress: no change     Swallow treatment completed. The patient was alert and cooperative. She reports not being able to eat much breakfast because it was \"filling up.\" When SLP asked if she was speaking about her stomach or her throat feeling like something was \"stuck\" she stated both. Delayed coughing is observed during session. Nothing directly following PO intake, vocal quality remained clear throughout. Due to ongoing complaint SLP recommends VFSS In radiology to assess pharyngeal function objectively. Continue current diet (regular diet with thin liquids). Further recommendations following VFSS.     [place] Hinojosa, MS CCC-SLP 10/11/2021 09:46 CDT    "

## 2021-10-11 NOTE — CONSULTS
[facility]  INPATIENT WOUND CONSULTATION    Today's Date: 10/11/21    Patient Name: Honey Canales  MRN: [id number]  CSN: [id number]  PCP: Devon Zuñiga MD  Referring Provider: Hu Patton MD  Attending Provider: Lloyd Martins DO  Length of Stay: 3    SUBJECTIVE   Chief Complaint: Wound of buttocks    HPI: Honey Canales, a 88 y.o.female, presents with past medical history of diabetes mellitus, chronic kidney disease, and coronary obstructive pulmonary disease.  A full past medical history as listed below.  Patient resides at [facility] and presented to the emergency department on 10/8 due to shortness of breath and cough.  Patient was found to have emphysema plus multifocal pneumonia.    Inpatient wound care is consulted due to wound of buttocks.  Patient has scarring and blanchable erythema of the sacral region.  She has healed pressure injuries and appears to have slight dermatitis related to moisture.  Patient is incontinent of bowel and bladder.      Visit Dx:    ICD-10-CM ICD-9-CM   1. Oropharyngeal dysphagia  R13.12 787.22   2. Urinary tract infection without hematuria, site unspecified  N39.0 599.0   3. 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)   • Hallux valgus, right   • Hallux valgus, left   • Foot pain, bilateral   • Acquired hammer toes of both feet   • Benign essential HTN   • Benign meningioma (HCC)   • Callus   • Chronic kidney disease   • History of colon  polyps   • Hyperlipidemia LDL goal <100   • Loss of appetite   • Non-toxic multinodular goiter   • Pulmonary fibrosis (HCC)   • Uncontrolled type 2 diabetes mellitus without complication, with long-term current use of insulin   • Closed nondisplaced fracture of pelvis with routine healing   • Closed fracture of anatomical neck of right humerus   • Frequent falls   • Acute pain due to injury   • Transaminitis   • Stage 3b chronic kidney disease (HCC)   • Hepatitis C   • Hypoglycemia   • Right hydronephrosis   • Abdominal fluid collection   • Compression fracture of fifth lumbar vertebra (HCC)   • Hyponatremia   • Personal history of fall   • Acute kidney injury (HCC)   • HCAP (healthcare-associated pneumonia)   • Acute hypoxemic respiratory failure (HCC)   • Acute cystitis with hematuria   • Chronic kidney disease (CKD), stage IV (severe) (HCC)   • Multifocal pneumonia       History:   Past Medical History:   Diagnosis Date   • Allergic rhinitis    • Aneurysm (HCC)    • Arthritis    • Broken shoulder     Right shoulder    • Cerebral aneurysm 2009    2ND ONE FOUND   • Cerebral aneurysm     NON TREATABLE   • Chronic laryngitis    • CKD (chronic kidney disease)    • Colon polyps    • COPD (chronic obstructive pulmonary disease) (HCC)    • Deviated nasal septum    • Diabetes mellitus (HCC)    • Disorder of kidney and ureter    • Disturbance of smell and taste    • Dizziness    • Encounter for imaging to screen for metal prior to MRI     NO MRI, METAL CLIPS IN HEAD   • Eustachian tube dysfunction    • GERD (gastroesophageal reflux disease)    • Globus sensation    • Headache    • Hepatitis     B   • Hepatitis A    • History of stomach ulcers    • Hyperlipidemia    • Hypothyroid    • Laryngitis sicca    • Lung nodule    • Nocturia    • PONV (postoperative nausea and vomiting)    • Sensorineural hearing loss    • Spinal stenosis    • Urge incontinence    • Urgency of urination    • Urinary frequency    • Urinary  incontinence    • UTI (urinary tract infection)      Past Surgical History:   Procedure Laterality Date   • BLADDER SURGERY  2016    Medtronic Interstem   • BRAIN SURGERY      ANUERYSM REMOVED   • BREAST SURGERY Bilateral     BIOPSY   • CARPAL TUNNEL RELEASE     • CATARACT EXTRACTION, BILATERAL     • CEREBRAL ANEURYSM REPAIR     •  SECTION      X4   • CHOLECYSTECTOMY     • HYSTERECTOMY     • INTERSTIM PLACEMENT N/A 10/18/2018    Procedure: INTERSTIM STAGES 1 AND 2 LEAD AND GENERATOR REMOVAL AND REPLACEMENT;  Surgeon: Archie Avery MD;  Location: [facility];  Service: Urology   • JOINT REPLACEMENT Right     KNEE   • KNEE ARTHROSCOPY     • THYROIDECTOMY     • TONSILLECTOMY       Social History     Socioeconomic History   • Marital status:    Tobacco Use   • Smoking status: Former Smoker     Types: Cigarettes     Quit date:      Years since quittin.7   • Smokeless tobacco: Never Used   • Tobacco comment: SMOKED OVER 40 YEARS   Vaping Use   • Vaping Use: Never used   Substance and Sexual Activity   • Alcohol use: No   • Drug use: No   • Sexual activity: Defer     Family History   Problem Relation Age of Onset   • Cancer Brother         BLADDER   • No Known Problems Father    • No Known Problems Mother        Allergies:  Allergies   Allergen Reactions   • Codeine Phosphate [Codeine] Unknown (See Comments)     CHEST PAIN   • Collagen Other (See Comments)     CAT GUT SUTURES \ WOUND WOULD NOT HEAL AND GOT INFECTION   • Accupril [Quinapril Hcl] Other (See Comments)     COUGH   • Aspirin Other (See Comments)     HISTORY OF STOMACH ULCERS   • Adhesive Tape Rash     SKIN BLISTERS   • Celebrex [Celecoxib] Nausea Only   • Lyrica [Pregabalin] Other (See Comments)     GAINED 50 POUNDS   • Pantoprazole Sodium Diarrhea   • Pentoxifylline Diarrhea   • Sulfa Antibiotics Nausea And Vomiting   • Tramadol Nausea And Vomiting   • Vioxx [Rofecoxib] Nausea And Vomiting       Medications:    Current  Facility-Administered Medications:   •  albuterol (PROVENTIL) nebulizer solution 0.083% 2.5 mg/3mL, 2.5 mg, Nebulization, Q6H PRN, Nohemi Varner APRN  •  amLODIPine (NORVASC) tablet 5 mg, 5 mg, Oral, Q24H, Hu Patton MD, 5 mg at 10/11/21 0801  •  atorvastatin (LIPITOR) tablet 40 mg, 40 mg, Oral, Daily, Hu Patton MD, 40 mg at 10/11/21 0801  •  azithromycin 500 mg IVPB in 250 mL NS, 500 mg, Intravenous, Q24H, Nohemi Varner APRN, 500 mg at 10/10/21 1210  •  budesonide-formoterol (SYMBICORT) 160-4.5 MCG/ACT inhaler 2 puff, 2 puff, Inhalation, BID - RT, Nohemi Varner APRN, 2 puff at 10/10/21 1937  •  dextrose (D50W) 25 g/ 50mL Intravenous Solution 25 g, 25 g, Intravenous, Q15 Min PRN, Hu Patton MD  •  dextrose (GLUTOSE) oral gel 15 g, 15 g, Oral, Q15 Min PRN, Hu Patton MD  •  enoxaparin (LOVENOX) syringe 30 mg, 30 mg, Subcutaneous, Q24H, Hu Patton MD, 30 mg at 10/11/21 0801  •  glucagon (human recombinant) (GLUCAGEN DIAGNOSTIC) injection 1 mg, 1 mg, Subcutaneous, Q15 Min PRN, Hu Patton MD  •  guaiFENesin (MUCINEX) 12 hr tablet 1,200 mg, 1,200 mg, Oral, Q12H, Nohemi Varner APRN, 1,200 mg at 10/11/21 0801  •  HYDROcodone-acetaminophen (NORCO) 7.5-325 MG per tablet 1 tablet, 1 tablet, Oral, Q6H PRN, Hu Patton MD  •  influenza vac split quad (FLUZONE,FLUARIX,AFLURIA,FLULAVAL) injection 0.5 mL, 0.5 mL, Intramuscular, During Hospitalization, Hu Patton MD  •  insulin detemir (LEVEMIR) injection 15 Units, 15 Units, Subcutaneous, BID, Nohemi Varner APRN, 15 Units at 10/11/21 0943  •  insulin lispro (humaLOG) injection 0-14 Units, 0-14 Units, Subcutaneous, 4x Daily With Meals & Nightly, Hu Patton MD, 5 Units at 10/11/21 1141  •  ipratropium-albuterol (DUO-NEB) nebulizer solution 1.5 mL, 1.5 mL, Nebulization, 4x Daily - RT, Nohemi Varner APRN, 1.5 mL at 10/11/21 0641  •   levothyroxine (SYNTHROID, LEVOTHROID) tablet 75 mcg, 75 mcg, Oral, Q AM, Hu Patton MD, 75 mcg at 10/11/21 0604  •  losartan (COZAAR) tablet 25 mg, 25 mg, Oral, Q24H, Nohemi Varner APRN, 25 mg at 10/11/21 0806  •  methylPREDNISolone sodium succinate (SOLU-Medrol) injection 40 mg, 40 mg, Intravenous, Q12H, Nohemi Varner APRN, 40 mg at 10/11/21 1134  •  oxybutynin XL (DITROPAN-XL) 24 hr tablet 10 mg, 10 mg, Oral, Daily, Hu Patton MD, 10 mg at 10/11/21 0801  •  piperacillin-tazobactam (ZOSYN) IVPB 4.5 g in 100 mL NS VTB, 4.5 g, Intravenous, Q12H, Nohemi Varner APRN, 4.5 g at 10/11/21 0815  •  sennosides-docusate (PERICOLACE) 8.6-50 MG per tablet 1 tablet, 1 tablet, Oral, BID, Hu Patton MD, 1 tablet at 10/11/21 0801  •  sodium chloride 0.9 % flush 10 mL, 10 mL, Intravenous, Q12H, Hu Patton MD, 10 mL at 10/11/21 0801  •  sodium chloride 0.9 % flush 10 mL, 10 mL, Intravenous, PRN, Hu Patton MD    Review of Systems:  Review of Systems   Constitutional: Negative for chills and fever.   HENT: Negative for sinus pressure and sore throat.    Respiratory: Positive for cough, shortness of breath and wheezing.    Cardiovascular: Positive for leg swelling. Negative for chest pain and palpitations.   Gastrointestinal: Negative for diarrhea, nausea and vomiting.   Genitourinary: Positive for frequency. Negative for urgency.   Musculoskeletal: Positive for gait problem and myalgias. Negative for arthralgias.   Skin: Positive for color change and wound.   Neurological: Positive for weakness. Negative for dizziness and headaches.   Psychiatric/Behavioral: Negative for agitation and behavioral problems.         OBJECTIVE     Vitals:    10/11/21 1100   BP: 137/57   Pulse: 77   Resp: 18   Temp: 98.3 °F (36.8 °C)   SpO2: 96%       PHYSICAL EXAM  Physical Exam  Vitals and nursing note reviewed.   Constitutional:       General: She is awake.       Appearance: She is ill-appearing.      Interventions: Nasal cannula in place.   HENT:      Head: Normocephalic and atraumatic.   Eyes:      General: Lids are normal. Gaze aligned appropriately.   Cardiovascular:      Rate and Rhythm: Normal rate and regular rhythm.   Pulmonary:      Effort: Tachypnea and accessory muscle usage present. No respiratory distress.   Abdominal:      General: Abdomen is protuberant. There is no distension.   Genitourinary:     Comments: External urinary catheter (primafit)  Musculoskeletal:      Cervical back: Normal range of motion and neck supple.   Feet:      Right foot:      Skin integrity: Erythema present. No ulcer.      Left foot:      Skin integrity: Erythema present. No ulcer.      Comments: Blanchable erythema of bilateral feet. 3+ pitting edema.  Skin:     General: Skin is warm and dry.      Findings: Erythema present. No wound.      Comments: Healed wounds of sacral region.  Blanchable erythema of gluteal cleft and sacrum.  Incontinence associated dermatitis.  No current open wounds.   Neurological:      Mental Status: She is alert and oriented to person, place, and time.   Psychiatric:         Attention and Perception: Attention normal.         Mood and Affect: Mood normal.         Speech: Speech normal.         Behavior: Behavior is cooperative.                 Results Review:  Lab Results (last 48 hours)     Procedure Component Value Units Date/Time    POC Glucose Once [[id number]]  (Abnormal) Collected: 10/11/21 1137    Specimen: Blood Updated: 10/11/21 1148     Glucose 236 mg/dL      Comment: : [id number] Helder AshleyMeter ID: [id number]       Urine Culture - Urine, Urine, Clean Catch [[id number]]  (Abnormal)  (Susceptibility) Collected: 10/08/21 2034    Specimen: Urine, Clean Catch Updated: 10/11/21 1018     Urine Culture >100,000 CFU/mL Escherichia coli    Susceptibility      Escherichia coli     ISMAEL     Ampicillin Susceptible     Ampicillin + Sulbactam Susceptible      Cefazolin Susceptible     Cefepime Susceptible     Ceftazidime Susceptible     Ceftriaxone Susceptible     Gentamicin Susceptible     Levofloxacin Susceptible     Nitrofurantoin Susceptible     Piperacillin + Tazobactam Susceptible     Tetracycline Susceptible     Trimethoprim + Sulfamethoxazole Susceptible                  Linear View                   CBC & Differential [[id number]]  (Abnormal) Collected: 10/11/21 0746    Specimen: Blood Updated: 10/11/21 1015    Narrative:      The following orders were created for panel order CBC & Differential.  Procedure                               Abnormality         Status                     ---------                               -----------         ------                     CBC Auto Differential[[id number]]        Abnormal            Final result                 Please view results for these tests on the individual orders.    CBC Auto Differential [[id number]]  (Abnormal) Collected: 10/11/21 0746    Specimen: Blood Updated: 10/11/21 1015     WBC 13.63 10*3/mm3      RBC 2.94 10*6/mm3      Hemoglobin 8.7 g/dL      Hematocrit 27.7 %      MCV 94.2 fL      MCH 29.6 pg      MCHC 31.4 g/dL      RDW 14.2 %      RDW-SD 49.1 fl      MPV 11.3 fL      Platelets 245 10*3/mm3      Neutrophil % 90.2 %      Lymphocyte % 4.9 %      Monocyte % 2.1 %      Eosinophil % 0.0 %      Basophil % 0.2 %      Immature Grans % 2.6 %      Neutrophils, Absolute 12.28 10*3/mm3      Lymphocytes, Absolute 0.67 10*3/mm3      Monocytes, Absolute 0.29 10*3/mm3      Eosinophils, Absolute 0.00 10*3/mm3      Basophils, Absolute 0.03 10*3/mm3      Immature Grans, Absolute 0.36 10*3/mm3      nRBC 0.0 /100 WBC     POC Glucose Once [[id number]]  (Abnormal) Collected: 10/11/21 0809    Specimen: Blood Updated: 10/11/21 0820     Glucose 217 mg/dL      Comment: : [id number] Helder DenverMeter ID: [id number]       Basic Metabolic Panel [[id number]]  (Abnormal) Collected: 10/11/21 0615    Specimen: Blood Updated:  10/11/21 0658     Glucose 203 mg/dL      BUN 60 mg/dL      Creatinine 2.44 mg/dL      Sodium 140 mmol/L      Potassium 4.5 mmol/L      Chloride 108 mmol/L      CO2 19.0 mmol/L      Calcium 8.7 mg/dL      eGFR Non African Amer 19 mL/min/1.73      BUN/Creatinine Ratio 24.6     Anion Gap 13.0 mmol/L     Narrative:      GFR Normal >60  Chronic Kidney Disease <60  Kidney Failure <15      POC Glucose Once [[id number]]  (Abnormal) Collected: 10/10/21 2115    Specimen: Blood Updated: 10/10/21 2127     Glucose 315 mg/dL      Comment: : [id number] Philip HawkinsgailMeter ID: [id number]       Blood Culture - Blood, Arm, Right [[id number]]  (Normal) Collected: 10/08/21 2044    Specimen: Blood from Arm, Right Updated: 10/10/21 2115     Blood Culture No growth at 2 days    Blood Culture - Blood, Arm, Right [[id number]]  (Normal) Collected: 10/08/21 2044    Specimen: Blood from Arm, Right Updated: 10/10/21 2115     Blood Culture No growth at 2 days    POC Glucose Once [[id number]]  (Abnormal) Collected: 10/10/21 1723    Specimen: Blood Updated: 10/10/21 1735     Glucose 313 mg/dL      Comment: : [id number] Lissy KrauseMeter ID: [id number]       POC Glucose Once [[id number]]  (Abnormal) Collected: 10/10/21 1533    Specimen: Blood Updated: 10/10/21 1544     Glucose 364 mg/dL      Comment: : [id number] Lissy KrauseMeter ID: [id number]       POC Glucose Once [[id number]]  (Abnormal) Collected: 10/10/21 1246    Specimen: Blood Updated: 10/10/21 1258     Glucose 405 mg/dL      Comment: BILLIEED SHERI SALASNOperator: [id number] Lissy MoralesanMeter ID: [id number]       POC Glucose Once [[id number]]  (Abnormal) Collected: 10/10/21 1205    Specimen: Blood Updated: 10/10/21 1226     Glucose 405 mg/dL      Comment: : [id number] Lissy MoralesanMeter ID: [id number]       POC Glucose Once [[id number]]  (Abnormal) Collected: 10/10/21 1204    Specimen: Blood Updated: 10/10/21 1226     Glucose 407 mg/dL      Comment: : [id number] Lissy  AndrewanMeter ID: [id number]       POC Glucose Once [[id number]]  (Abnormal) Collected: 10/10/21 0859    Specimen: Blood Updated: 10/10/21 0910     Glucose 363 mg/dL      Comment: : [id number] Lissy KrauseMeter ID: [id number]       Basic Metabolic Panel [[id number]]  (Abnormal) Collected: 10/10/21 0538    Specimen: Blood Updated: 10/10/21 0640     Glucose 334 mg/dL      BUN 53 mg/dL      Creatinine 2.44 mg/dL      Sodium 135 mmol/L      Potassium 4.8 mmol/L      Comment: Slight hemolysis detected by analyzer. Results may be affected.        Chloride 104 mmol/L      CO2 16.0 mmol/L      Calcium 8.4 mg/dL      eGFR Non African Amer 19 mL/min/1.73      BUN/Creatinine Ratio 21.7     Anion Gap 15.0 mmol/L     Narrative:      GFR Normal >60  Chronic Kidney Disease <60  Kidney Failure <15      POC Glucose Once [[id number]]  (Abnormal) Collected: 10/09/21 2019    Specimen: Blood Updated: 10/09/21 2030     Glucose 376 mg/dL      Comment: : [id number] Philip ShruthigailMeter ID: [id number]       POC Glucose Once [[id number]]  (Abnormal) Collected: 10/09/21 1658    Specimen: Blood Updated: 10/09/21 1709     Glucose 328 mg/dL      Comment: : [id number] Adam HullaMechantal ID: [id number]       Legionella Antigen, Urine - Urine, Urine, Clean Catch [[id number]]  (Normal) Collected: 10/09/21 1242    Specimen: Urine, Clean Catch Updated: 10/09/21 1410     LEGIONELLA ANTIGEN, URINE Negative    Extra Tubes [[id number]] Collected: 10/09/21 1207    Specimen: Blood, Venous Line Updated: 10/09/21 1315    Narrative:      The following orders were created for panel order Extra Tubes.  Procedure                               Abnormality         Status                     ---------                               -----------         ------                     Lavender Top[[id number]]                                     Final result               Gold Top - SST[[id number]]                                   Final result                 Please view  results for these tests on the individual orders.    Lavender Top [[id number]] Collected: 10/09/21 1207    Specimen: Blood Updated: 10/09/21 1315     Extra Tube hold for add-on     Comment: Auto resulted       Gold Top - SST [[id number]] Collected: 10/09/21 1207    Specimen: Blood Updated: 10/09/21 1315     Extra Tube Hold for add-ons.     Comment: Auto resulted.       Basic Metabolic Panel [[id number]]  (Abnormal) Collected: 10/09/21 1207    Specimen: Blood Updated: 10/09/21 1240     Glucose 313 mg/dL      BUN 46 mg/dL      Creatinine 2.45 mg/dL      Sodium 136 mmol/L      Potassium 4.7 mmol/L      Chloride 104 mmol/L      CO2 18.0 mmol/L      Calcium 8.5 mg/dL      eGFR Non African Amer 19 mL/min/1.73      BUN/Creatinine Ratio 18.8     Anion Gap 14.0 mmol/L     Narrative:      GFR Normal >60  Chronic Kidney Disease <60  Kidney Failure <15          Imaging Results (Last 72 Hours)     Procedure Component Value Units Date/Time    CT Chest Without Contrast Diagnostic [[id number]] Collected: 10/08/21 2010     Updated: 10/08/21 2019    Narrative:      EXAMINATION: CT CHEST WO CONTRAST DIAGNOSTIC- 10/8/2021 8:10 PM CDT     HISTORY: Shortness of breath, wheezing     COMPARISON: Chest x-ray 10/8/2021     DOSE: 263 mGy-cm     TECHNIQUE: Sequential imaging was performed from the thoracic inlet  through the upper abdomen without the use of IV contrast.  Sagittal and  coronal reformations were made from the original source data and  reviewed. Automated exposure control was also utilized to decrease  patient radiation dose.     FINDINGS:   Visualized thyroid gland is grossly normal in appearance. Trachea and  main bronchi are patent. The esophagus is grossly normal in appearance.     No pathologically enlarged axillary lymph nodes are identified. Some  mildly prominent mediastinal lymph nodes measure 10 mm in short axis.     The heart is normal in size. There is no pericardial effusion. Coronary  artery calcifications are present.  Atherosclerotic calcifications are  seen within the aorta and its branch vessels. The ascending thoracic  aorta is normal in caliber. Main pulmonary artery is dilated, suggesting  pulmonary arterial hypertension. The distal aortic arch measures 3.4 cm  in caliber.     There are small bilateral pleural effusions. Severe emphysematous  changes are present. There is diffuse bronchial wall thickening. Some  interlobular septal thickening is noted. Groundglass airspace opacities  are seen throughout the lungs. Some areas of patchy nodularity are seen  in the left upper lobe measuring 12 mm and 10 mm in size.     Review of the visualized portion of the upper abdomen demonstrates no  acute findings.     Review of the visualized osseous structures demonstrates no acute or  aggressive lesions.        Impression:         1. Bilateral airspace disease superimposed upon emphysematous changes,  concerning for multifocal pneumonia.     2. Small bilateral pleural effusions.     3. Atherosclerosis of the aorta and coronary arteries.  4. Pulmonary arterial hypertension.  5. Mildly prominent mediastinal lymph nodes may be reactive. Attention  on follow-up recommended.  6. Noncalcified pulmonary nodules for which follow-up CT is recommended  in 3-6 months per Fleischner Society guidelines.        This report was finalized on 10/08/2021 20:16 by Dr. Erik Schmitt MD.    XR Chest 1 View [[id number]] Collected: 10/08/21 1958     Updated: 10/08/21 2002    Narrative:      EXAMINATION: XR CHEST 1 VW- 10/8/2021 7:58 PM CDT     HISTORY: Shortness of breath, wheezing     COMPARISON: 8/30/2021     FINDINGS:  The heart and mediastinal contours are stable. Atherosclerotic  calcifications are seen in the aorta. Chronic interstitial changes are  present. However, there are worsening airspace opacities throughout both  lungs, particularly in the lung bases. There are small pleural effusions  bilaterally. Retrocardiac consolidation is noted. An old  right humeral  fracture is suspected.       Impression:      Bilateral airspace disease concerning for pneumonia  superimposed upon chronic changes. Small bilateral pleural effusions.  This report was finalized on 10/08/2021 19:59 by Dr. Erik Schmitt MD.             ASSESSMENT/PLAN       Examination and evaluation of wound(s) was performed.    DIAGNOSIS:   Incontinence associated dermatitis  Bowel and bladder incontinence  At high risk for skin breakdown  History of pressure injury  Diabetes mellitus type 2 with neurological manifestations  Chronic kidney disease, stage IV  Acute hypoxemic respiratory failure      PLAN:     Start     Ordered    10/11/21 1230  Skin Care  Daily      Question Answer Comment   Wound Locations Bilateral heels    Wound Care Instructions Apply silicone border dressing to bilateral heels for protection.        10/11/21 1229    10/11/21 1229  Elevate Heels Off of Bed  Until Discontinued         10/11/21 1229    10/11/21 1229  Turn Patient  Now Then Every 2 Hours         10/11/21 1229    10/11/21 1229  Use Repositioning Wedge to Position Patient  Continuous        Comments: Use repositioning sheet and wedges to position patient    10/11/21 1229    10/11/21 1229  Use Seat Cushion When Up In Chair  Continuous         10/11/21 1229    Unscheduled  Apply Moisture Barrier After Any Incontinence  As Needed      Comments: Zguard or Calazime   Question:  Wound Care Instructions  Answer:  Apply Moisture Barrier After Any Incontinence    10/11/21 1229                  Discussed findings and treatment plan including risks, benefits, and treatment options with patient in detail. Patient agreed with treatment plan.      This document has been electronically signed by YISEL Maldonado on 10/11/2021 12:15 CDT

## 2021-10-11 NOTE — PLAN OF CARE
Goal Outcome Evaluation:  Plan of Care Reviewed With: patient, son        Progress: no change  Outcome Summary: PT Eval Complete: pt AOx4. pt is limited in RUE shoulder ROM due to previous shoulder injury this year. pt presents with BLE swelling with pitting edema 2+ from knee down. pt is able to reach EOB with modified independence. pt needed multiple breaks throughout due to SOA. pt is able to perform sit to stand and amb CGA. pt was limited in distance walked due to SOA and only able to walk 10ft. pt was educated on breathing technique and the importance of the incenitive spirometer. pt will continue with skilled PT to build up endurance for safe functional mobility. D/C recommendation SNF

## 2021-10-11 NOTE — CASE MANAGEMENT/SOCIAL WORK
Continued Stay Note  LENA Mixon     Patient Name: Honey Canales  MRN: [id number]  Today's Date: 10/11/2021    Admit Date: 10/8/2021     Discharge Plan     Row Name 10/11/21 1049       Plan    Plan Comments Events noted. Plan is for return to [facility] when pt is medically stable. Pt will need a new Covid test prior to dc.               Discharge Codes    No documentation.                     RENETTA Galarza

## 2021-10-12 ENCOUNTER — APPOINTMENT (OUTPATIENT)
Dept: GENERAL RADIOLOGY | Facility: HOSPITAL | Age: 86
End: 2021-10-12

## 2021-10-12 LAB
ANION GAP SERPL CALCULATED.3IONS-SCNC: 13 MMOL/L (ref 5–15)
BASOPHILS # BLD AUTO: 0.03 10*3/MM3 (ref 0–0.2)
BASOPHILS NFR BLD AUTO: 0.2 % (ref 0–1.5)
BUN SERPL-MCNC: 65 MG/DL (ref 8–23)
BUN/CREAT SERPL: 29.1 (ref 7–25)
CALCIUM SPEC-SCNC: 8.9 MG/DL (ref 8.6–10.5)
CHLORIDE SERPL-SCNC: 108 MMOL/L (ref 98–107)
CO2 SERPL-SCNC: 19 MMOL/L (ref 22–29)
CREAT SERPL-MCNC: 2.23 MG/DL (ref 0.57–1)
DEPRECATED RDW RBC AUTO: 47.8 FL (ref 37–54)
EOSINOPHIL # BLD AUTO: 0 10*3/MM3 (ref 0–0.4)
EOSINOPHIL NFR BLD AUTO: 0 % (ref 0.3–6.2)
ERYTHROCYTE [DISTWIDTH] IN BLOOD BY AUTOMATED COUNT: 14.1 % (ref 12.3–15.4)
GFR SERPL CREATININE-BSD FRML MDRD: 21 ML/MIN/1.73
GLUCOSE BLDC GLUCOMTR-MCNC: 152 MG/DL (ref 70–130)
GLUCOSE BLDC GLUCOMTR-MCNC: 210 MG/DL (ref 70–130)
GLUCOSE BLDC GLUCOMTR-MCNC: 223 MG/DL (ref 70–130)
GLUCOSE BLDC GLUCOMTR-MCNC: 246 MG/DL (ref 70–130)
GLUCOSE SERPL-MCNC: 166 MG/DL (ref 65–99)
HCT VFR BLD AUTO: 28.2 % (ref 34–46.6)
HGB BLD-MCNC: 9.1 G/DL (ref 12–15.9)
IMM GRANULOCYTES # BLD AUTO: 0.63 10*3/MM3 (ref 0–0.05)
IMM GRANULOCYTES NFR BLD AUTO: 4.2 % (ref 0–0.5)
LYMPHOCYTES # BLD AUTO: 1.42 10*3/MM3 (ref 0.7–3.1)
LYMPHOCYTES NFR BLD AUTO: 9.4 % (ref 19.6–45.3)
MCH RBC QN AUTO: 30.1 PG (ref 26.6–33)
MCHC RBC AUTO-ENTMCNC: 32.3 G/DL (ref 31.5–35.7)
MCV RBC AUTO: 93.4 FL (ref 79–97)
MONOCYTES # BLD AUTO: 1.01 10*3/MM3 (ref 0.1–0.9)
MONOCYTES NFR BLD AUTO: 6.7 % (ref 5–12)
NEUTROPHILS NFR BLD AUTO: 12.02 10*3/MM3 (ref 1.7–7)
NEUTROPHILS NFR BLD AUTO: 79.5 % (ref 42.7–76)
NRBC BLD AUTO-RTO: 0 /100 WBC (ref 0–0.2)
PLATELET # BLD AUTO: 255 10*3/MM3 (ref 140–450)
PMV BLD AUTO: 11.4 FL (ref 6–12)
POTASSIUM SERPL-SCNC: 3.9 MMOL/L (ref 3.5–5.2)
RBC # BLD AUTO: 3.02 10*6/MM3 (ref 3.77–5.28)
SODIUM SERPL-SCNC: 140 MMOL/L (ref 136–145)
WBC # BLD AUTO: 15.11 10*3/MM3 (ref 3.4–10.8)

## 2021-10-12 PROCEDURE — 85025 COMPLETE CBC W/AUTO DIFF WBC: CPT | Performed by: NURSE PRACTITIONER

## 2021-10-12 PROCEDURE — 80048 BASIC METABOLIC PNL TOTAL CA: CPT | Performed by: NURSE PRACTITIONER

## 2021-10-12 PROCEDURE — 25010000002 METHYLPREDNISOLONE PER 125 MG: Performed by: NURSE PRACTITIONER

## 2021-10-12 PROCEDURE — 94799 UNLISTED PULMONARY SVC/PX: CPT

## 2021-10-12 PROCEDURE — 92526 ORAL FUNCTION THERAPY: CPT

## 2021-10-12 PROCEDURE — 63710000001 INSULIN DETEMIR PER 5 UNITS: Performed by: NURSE PRACTITIONER

## 2021-10-12 PROCEDURE — 82962 GLUCOSE BLOOD TEST: CPT

## 2021-10-12 PROCEDURE — 97164 PT RE-EVAL EST PLAN CARE: CPT | Performed by: PHYSICAL THERAPIST

## 2021-10-12 PROCEDURE — 97530 THERAPEUTIC ACTIVITIES: CPT | Performed by: PHYSICAL THERAPIST

## 2021-10-12 PROCEDURE — 25010000002 AZITHROMYCIN PER 500 MG: Performed by: NURSE PRACTITIONER

## 2021-10-12 PROCEDURE — 63710000001 INSULIN LISPRO (HUMAN) PER 5 UNITS: Performed by: FAMILY MEDICINE

## 2021-10-12 PROCEDURE — 25010000002 PIPERACILLIN SOD-TAZOBACTAM PER 1 G: Performed by: NURSE PRACTITIONER

## 2021-10-12 PROCEDURE — 71045 X-RAY EXAM CHEST 1 VIEW: CPT

## 2021-10-12 PROCEDURE — 29580 STRAPPING UNNA BOOT: CPT | Performed by: PHYSICAL THERAPIST

## 2021-10-12 PROCEDURE — 25010000002 FUROSEMIDE PER 20 MG: Performed by: NURSE PRACTITIONER

## 2021-10-12 PROCEDURE — 25010000002 ENOXAPARIN PER 10 MG: Performed by: FAMILY MEDICINE

## 2021-10-12 RX ORDER — METHYLPREDNISOLONE SODIUM SUCCINATE 125 MG/2ML
60 INJECTION, POWDER, LYOPHILIZED, FOR SOLUTION INTRAMUSCULAR; INTRAVENOUS ONCE
Status: COMPLETED | OUTPATIENT
Start: 2021-10-12 | End: 2021-10-12

## 2021-10-12 RX ORDER — FUROSEMIDE 10 MG/ML
40 INJECTION INTRAMUSCULAR; INTRAVENOUS ONCE
Status: COMPLETED | OUTPATIENT
Start: 2021-10-12 | End: 2021-10-12

## 2021-10-12 RX ADMIN — METHYLPREDNISOLONE SODIUM SUCCINATE 60 MG: 125 INJECTION, POWDER, FOR SOLUTION INTRAMUSCULAR; INTRAVENOUS at 10:22

## 2021-10-12 RX ADMIN — LEVOTHYROXINE SODIUM 75 MCG: 75 TABLET ORAL at 05:31

## 2021-10-12 RX ADMIN — OXYBUTYNIN CHLORIDE 10 MG: 5 TABLET, EXTENDED RELEASE ORAL at 09:17

## 2021-10-12 RX ADMIN — PIPERACILLIN SODIUM AND TAZOBACTAM SODIUM 4.5 G: 4; .5 INJECTION, POWDER, LYOPHILIZED, FOR SOLUTION INTRAVENOUS at 21:31

## 2021-10-12 RX ADMIN — INSULIN LISPRO 5 UNITS: 100 INJECTION, SOLUTION INTRAVENOUS; SUBCUTANEOUS at 17:15

## 2021-10-12 RX ADMIN — FUROSEMIDE 40 MG: 10 INJECTION INTRAMUSCULAR; INTRAVENOUS at 10:22

## 2021-10-12 RX ADMIN — DOCUSATE SODIUM 50 MG AND SENNOSIDES 8.6 MG 1 TABLET: 8.6; 5 TABLET, FILM COATED ORAL at 09:17

## 2021-10-12 RX ADMIN — GUAIFENESIN 1200 MG: 600 TABLET, EXTENDED RELEASE ORAL at 09:17

## 2021-10-12 RX ADMIN — IPRATROPIUM BROMIDE AND ALBUTEROL SULFATE 1.5 ML: 2.5; .5 SOLUTION RESPIRATORY (INHALATION) at 10:07

## 2021-10-12 RX ADMIN — INSULIN DETEMIR 15 UNITS: 100 INJECTION, SOLUTION SUBCUTANEOUS at 21:32

## 2021-10-12 RX ADMIN — PIPERACILLIN SODIUM AND TAZOBACTAM SODIUM 4.5 G: 4; .5 INJECTION, POWDER, LYOPHILIZED, FOR SOLUTION INTRAVENOUS at 09:16

## 2021-10-12 RX ADMIN — ENOXAPARIN SODIUM 30 MG: 30 INJECTION SUBCUTANEOUS at 09:17

## 2021-10-12 RX ADMIN — LOSARTAN POTASSIUM 25 MG: 50 TABLET, FILM COATED ORAL at 09:17

## 2021-10-12 RX ADMIN — BUDESONIDE AND FORMOTEROL FUMARATE DIHYDRATE 2 PUFF: 160; 4.5 AEROSOL RESPIRATORY (INHALATION) at 06:45

## 2021-10-12 RX ADMIN — IPRATROPIUM BROMIDE AND ALBUTEROL SULFATE 1.5 ML: 2.5; .5 SOLUTION RESPIRATORY (INHALATION) at 19:52

## 2021-10-12 RX ADMIN — BUDESONIDE AND FORMOTEROL FUMARATE DIHYDRATE 2 PUFF: 160; 4.5 AEROSOL RESPIRATORY (INHALATION) at 19:51

## 2021-10-12 RX ADMIN — INSULIN LISPRO 5 UNITS: 100 INJECTION, SOLUTION INTRAVENOUS; SUBCUTANEOUS at 21:31

## 2021-10-12 RX ADMIN — INSULIN LISPRO 3 UNITS: 100 INJECTION, SOLUTION INTRAVENOUS; SUBCUTANEOUS at 09:17

## 2021-10-12 RX ADMIN — AMLODIPINE BESYLATE 5 MG: 5 TABLET ORAL at 09:17

## 2021-10-12 RX ADMIN — IPRATROPIUM BROMIDE AND ALBUTEROL SULFATE 1.5 ML: 2.5; .5 SOLUTION RESPIRATORY (INHALATION) at 14:06

## 2021-10-12 RX ADMIN — IPRATROPIUM BROMIDE AND ALBUTEROL SULFATE 1.5 ML: 2.5; .5 SOLUTION RESPIRATORY (INHALATION) at 06:45

## 2021-10-12 RX ADMIN — DOCUSATE SODIUM 50 MG AND SENNOSIDES 8.6 MG 1 TABLET: 8.6; 5 TABLET, FILM COATED ORAL at 21:32

## 2021-10-12 RX ADMIN — ATORVASTATIN CALCIUM 40 MG: 40 TABLET, FILM COATED ORAL at 09:20

## 2021-10-12 RX ADMIN — INSULIN LISPRO 5 UNITS: 100 INJECTION, SOLUTION INTRAVENOUS; SUBCUTANEOUS at 11:27

## 2021-10-12 RX ADMIN — INSULIN DETEMIR 15 UNITS: 100 INJECTION, SOLUTION SUBCUTANEOUS at 09:18

## 2021-10-12 RX ADMIN — SODIUM CHLORIDE, PRESERVATIVE FREE 10 ML: 5 INJECTION INTRAVENOUS at 21:32

## 2021-10-12 RX ADMIN — GUAIFENESIN 1200 MG: 600 TABLET, EXTENDED RELEASE ORAL at 21:32

## 2021-10-12 RX ADMIN — AZITHROMYCIN MONOHYDRATE 500 MG: 500 INJECTION, POWDER, LYOPHILIZED, FOR SOLUTION INTRAVENOUS at 13:50

## 2021-10-12 NOTE — PROGRESS NOTES
[facility] Medicine Services  INPATIENT PROGRESS NOTE    Length of Stay: 4  Date of Admission: 10/8/2021  Primary Care Physician: Devon Zuñiga MD    Subjective   Chief Complaint: Follow-up shortness of breath  HPI   Called by nurse as patient complained of shortness of breath today.  She was sitting up in bed.  Saturation 95% on room air but complained of worsening shortness of breath.  She was noted to have expiratory wheezes that were essentially clear yesterday.  She has no sputum production.  Have ordered Lasix 40 mg IV x1 dose and Solu-Medrol 60 mg IV x1 dose.  Chest x-ray ordered and notes worsening bilateral patchy airspace opacity.  Differential includes multifocal pneumonia and pulmonary edema.  Again Lasix 40 mg IV x1 dose.  Peripheral edema noted bilaterally and patient reports chronic worsening over the last several weeks.  Physical therapy to assess and place Unna boots.  May use oxygen as needed; however, patient has saturation 95% on room air.  I went back and assessed patient an hour after initial assessment and she is breathing better and remains on room air.  Less wheezing noted.    Review of Systems   Constitutional: Positive for fatigue. Negative for activity change, appetite change, chills and fever.   HENT: Positive for hearing loss.  Negative for congestion and trouble swallowing.    Eyes: Negative for photophobia and visual disturbance.   Respiratory: Positive for cough and shortness of breath. Negative for wheezing.    Cardiovascular: Positive for leg swelling (Chronic). Negative for chest pain.   Gastrointestinal: Negative for constipation, diarrhea, nausea and vomiting.   Endocrine: Negative for cold intolerance, heat intolerance and polyuria.   Genitourinary: Negative for dysuria, frequency and urgency.   Musculoskeletal: Positive for gait problem.   Skin: Negative for color change, pallor, rash and wound.   Allergic/Immunologic: Negative  for immunocompromised state.   Neurological: Positive for weakness. Negative for light-headedness.   Hematological: Negative for adenopathy. Does not bruise/bleed easily.   Psychiatric/Behavioral: Negative for agitation, behavioral problems and confusion    All pertinent negatives and positives are as above. All other systems have been reviewed and are negative unless otherwise stated.     Objective    Temp:  [97.6 °F (36.4 °C)-98.1 °F (36.7 °C)] 98 °F (36.7 °C)  Heart Rate:  [75-92] 92  Resp:  [17-20] 18  BP: (126-160)/(52-78) 160/74  Physical Exam  Vitals and nursing note reviewed.   Constitutional:       Comments: Sitting up in bed.  No oxygen use.  Saturation 95% on room air.  Complains of shortness of breath today.  HENT:      Head: Normocephalic and atraumatic.      Nose: No congestion.      Mouth/Throat:      Pharynx: Oropharynx is clear. No oropharyngeal exudate or posterior oropharyngeal erythema.      Comments: Hard of hearing  Eyes:      Extraocular Movements: Extraocular movements intact.      Pupils: Pupils are equal, round, and reactive to light.   Cardiovascular:      Rate and Rhythm: Normal rate and regular rhythm.      Heart sounds: No murmur heard.  Pulmonary:      Breath sounds: Wheeze (expiratory wheezes upper airway anterior and posterior)No rhonchi or rales.      Comments: No oxygen in use.  Saturation 95%.  Abdominal:      Palpations: Abdomen is soft.      Tenderness: There is no abdominal tenderness.   Genitourinary:     Comments: WIC in place with luis urine return.  Musculoskeletal:         General: Swelling (Bilateral lower extremity edema.  Legs not tight.) present. No tenderness.      Cervical back: Normal range of motion and neck supple.   Skin:     General: Skin is warm and dry.   Neurological:      General: No focal deficit present.      Mental Status: She is alert and oriented to person, place, and time.   Psychiatric:         Mood and Affect: Mood normal.         Behavior:  Behavior normal.         Thought Content: Thought content normal.         Judgment: Judgment normal.     Results Review:  I have reviewed the labs, radiology results, and diagnostic studies.    Laboratory Data:    Results from last 7 days   Lab Units 10/12/21  0628 10/11/21  0746 10/08/21  1910   WBC 10*3/mm3 15.11* 13.63* 8.72   HEMOGLOBIN g/dL 9.1* 8.7* 8.9*   HEMATOCRIT % 28.2* 27.7* 28.2*   PLATELETS 10*3/mm3 255 245 222        Results from last 7 days   Lab Units 10/12/21  0628 10/11/21  0615 10/11/21  0615 10/10/21  0538 10/10/21  0538 10/09/21  1207 10/09/21  1207 10/08/21  1910 10/08/21  1910   SODIUM mmol/L 140  --  140  --  135*  --  136  --  135*   POTASSIUM mmol/L 3.9  --  4.5  --  4.8  --  4.7  --  4.0   CHLORIDE mmol/L 108*  --  108*  --  104  --  104  --  103   CO2 mmol/L 19.0*  --  19.0*  --  16.0*  --  18.0*  --  21.0*   BUN mg/dL 65*  --  60*  --  53*  --  46*  --  40*   CREATININE mg/dL 2.23*  --  2.44*  --  2.44*  --  2.45*  --  2.52*   GLUCOSE mg/dL 166*   < > 203*   < > 334*   < > 313*   < > 159*   CALCIUM mg/dL 8.9  --  8.7  --  8.4*  --  8.5*  --  8.6   ALT (SGPT) U/L  --   --   --   --   --   --   --   --  13    < > = values in this interval not displayed.     Culture Data:      Blood Culture   Date Value Ref Range Status   10/08/2021 No growth at 3 days  Preliminary   10/08/2021 No growth at 3 days  Preliminary     Urine Culture   Date Value Ref Range Status   10/08/2021 >100,000 CFU/mL Escherichia coli (A)  Final     Escherichia coli       ISMAEL     Ampicillin <=2  Susceptible     Ampicillin + Sulbactam <=2  Susceptible     Cefazolin <=4  Susceptible     Cefepime <=1  Susceptible     Ceftazidime <=1  Susceptible     Ceftriaxone <=1  Susceptible     Gentamicin <=1  Susceptible     Levofloxacin <=0.12  Susceptible     Nitrofurantoin <=16  Susceptible     Piperacillin + Tazobactam <=4  Susceptible     Tetracycline <=1  Susceptible     Trimethoprim + Sulfamethoxazole <=20  Susceptible         Radiology Data:   Imaging Results (Last 7 Days)     Procedure Component Value Units Date/Time    XR Chest 1 View [[id number]] Collected: 10/12/21 1005     Updated: 10/12/21 1011    Narrative:      EXAM: XR CHEST 1 VW-     INDICATION: Short of breath; R13.12-Dysphagia, oropharyngeal phase;  N39.0-Urinary tract infection, site not specified; J18.9-Pneumonia,  unspecified organism; R68.89-Other general symptoms and signs     COMPARISON: 10/8/2021     FINDINGS:     Cardiac silhouette is normal in size and stable. Small bilateral pleural  effusions appear unchanged. Interval increase in bilateral patchy  airspace opacity. Emphysema and chronic interstitial changes are again  noted. No visible pneumothorax. Old RIGHT humeral neck fracture. No  acute osseous finding.       Impression:         1.  Worsening of bilateral patchy airspace opacity. Differential  includes multifocal pneumonia and pulmonary edema.   2.  No change in small bilateral pleural effusions.  This report was finalized on 10/12/2021 10:08 by Dr. Hakan Robles MD.    FL Video Swallow With Speech Single Contrast [[id number]] Collected: 10/11/21 1245     Updated: 10/11/21 1249    Narrative:      EXAMINATION:  FL VIDEO SWALLOW W SPEECH SINGLE-CONTRAST-  10/11/2021  12:26 PM CDT     HISTORY: Ongoing dysphagia with no s/s at bedside; pneumonia; concern  for silent aspiration; R13.12-Dysphagia, oropharyngeal phase;  N39.0-Urinary tract infection, site not specified; J18.9-Pneumonia,  unspecified organism.     COMPARISON: No comparison study.     TECHNIQUE: The patient was given honey, nectar, water, pudding, fruit  and cracker consistencies mixed with barium for swallowing.     FLUOROSCOPY TIME: 1 minute 27 seconds.     NUMBER OF IMAGES: 4.4 mGy.     FINDINGS: There was laryngeal penetration with water consistency barium  on 2 separate swallowing sequences. There were no other episodes of  penetration or aspiration. The epiglottis appears to invert  normally.  There was some spillover of the liquid substances.       Impression:      1. There were 2 episodes of laryngeal penetration with water consistency  barium. No aspiration was observed.  2. Please see the speech pathology report separately.  This report was finalized on 10/11/2021 12:46 by Dr. Johnathan Brennan MD.    CT Chest Without Contrast Diagnostic [[id number]] Collected: 10/08/21 2010     Updated: 10/08/21 2019    Narrative:      EXAMINATION: CT CHEST WO CONTRAST DIAGNOSTIC- 10/8/2021 8:10 PM CDT     HISTORY: Shortness of breath, wheezing     COMPARISON: Chest x-ray 10/8/2021     DOSE: 263 mGy-cm     TECHNIQUE: Sequential imaging was performed from the thoracic inlet  through the upper abdomen without the use of IV contrast.  Sagittal and  coronal reformations were made from the original source data and  reviewed. Automated exposure control was also utilized to decrease  patient radiation dose.     FINDINGS:   Visualized thyroid gland is grossly normal in appearance. Trachea and  main bronchi are patent. The esophagus is grossly normal in appearance.     No pathologically enlarged axillary lymph nodes are identified. Some  mildly prominent mediastinal lymph nodes measure 10 mm in short axis.     The heart is normal in size. There is no pericardial effusion. Coronary  artery calcifications are present. Atherosclerotic calcifications are  seen within the aorta and its branch vessels. The ascending thoracic  aorta is normal in caliber. Main pulmonary artery is dilated, suggesting  pulmonary arterial hypertension. The distal aortic arch measures 3.4 cm  in caliber.     There are small bilateral pleural effusions. Severe emphysematous  changes are present. There is diffuse bronchial wall thickening. Some  interlobular septal thickening is noted. Groundglass airspace opacities  are seen throughout the lungs. Some areas of patchy nodularity are seen  in the left upper lobe measuring 12 mm and 10 mm in size.      Review of the visualized portion of the upper abdomen demonstrates no  acute findings.     Review of the visualized osseous structures demonstrates no acute or  aggressive lesions.        Impression:         1. Bilateral airspace disease superimposed upon emphysematous changes,  concerning for multifocal pneumonia.     2. Small bilateral pleural effusions.     3. Atherosclerosis of the aorta and coronary arteries.  4. Pulmonary arterial hypertension.  5. Mildly prominent mediastinal lymph nodes may be reactive. Attention  on follow-up recommended.  6. Noncalcified pulmonary nodules for which follow-up CT is recommended  in 3-6 months per Fleischner Society guidelines.        This report was finalized on 10/08/2021 20:16 by Dr. Erik Schmitt MD.    XR Chest 1 View [[id number]] Collected: 10/08/21 1958     Updated: 10/08/21 2002    Narrative:      EXAMINATION: XR CHEST 1 VW- 10/8/2021 7:58 PM CDT     HISTORY: Shortness of breath, wheezing     COMPARISON: 8/30/2021     FINDINGS:  The heart and mediastinal contours are stable. Atherosclerotic  calcifications are seen in the aorta. Chronic interstitial changes are  present. However, there are worsening airspace opacities throughout both  lungs, particularly in the lung bases. There are small pleural effusions  bilaterally. Retrocardiac consolidation is noted. An old right humeral  fracture is suspected.       Impression:      Bilateral airspace disease concerning for pneumonia  superimposed upon chronic changes. Small bilateral pleural effusions.  This report was finalized on 10/08/2021 19:59 by Dr. Erik Schmitt MD.        Intake/Output    Intake/Output Summary (Last 24 hours) at 10/12/2021 1128  Last data filed at 10/12/2021 0900  Gross per 24 hour   Intake 360 ml   Output 950 ml   Net -590 ml     Scheduled Meds  amLODIPine, 5 mg, Oral, Q24H  atorvastatin, 40 mg, Oral, Daily  azithromycin, 500 mg, Intravenous, Q24H  budesonide-formoterol, 2 puff, Inhalation, BID -  RT  enoxaparin, 30 mg, Subcutaneous, Q24H  guaiFENesin, 1,200 mg, Oral, Q12H  insulin detemir, 15 Units, Subcutaneous, BID  insulin lispro, 0-14 Units, Subcutaneous, 4x Daily With Meals & Nightly  ipratropium-albuterol, 1.5 mL, Nebulization, 4x Daily - RT  levothyroxine, 75 mcg, Oral, Q AM  losartan, 25 mg, Oral, Q24H  oxybutynin XL, 10 mg, Oral, Daily  piperacillin-tazobactam, 4.5 g, Intravenous, Q12H  sennosides-docusate, 1 tablet, Oral, BID  sodium chloride, 10 mL, Intravenous, Q12H      I have reviewed the patient current medications.     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  To ER 10/8/2021 with complaints of shortness of breath and cough for 2 days.  Resident at [facility]. PO2 80 on 2 L.  Chest x-ray bilateral airspace disease concerning for pneumonia.  CT angiogram of the chest bilateral airspace disease superimposed on emphysematous changes concerning for multifocal pneumonia.  Pulmonary arterial hypertension.  Prominent mediastinal lymph node may be reactive.  Noncalcified pulmonary nodule follow-up CT 3 to 6 months.  Solu-Medrol IV, doxycycline, Rocephin given in ER.     2.  Multifocal pneumonia.  Cefepime IV, vancomycin IV started on admit and changed to Zosyn and azithromycin on 10/9 for atypical coverage. Strep pneumonia negative, Legionella negative, respiratory PCR panel negative.  MRSA PCR negative.   Blood cultures no growth at 3 days.  Oxygen off.  WBC 15.1 today, worsening but has been receiving steroids.   Remains afebrile.    3.  Patient complained of worsening shortness of breath.  Saturation 95% on room air.  Noted to have expiratory wheezes and respiratory rate up.  Solu-Medrol 60 mg IV x1 dose, Lasix 40 mg IV x1 dose.  Chest x-ray ordered with results showing worsening bilateral patchy airspace opacity.  Differential includes multifocal pneumonia and pulmonary edema.    4.  Continue duo nebs, incentive spirometry, flutter valve Mucinex, Symbicort.  Solu-Medrol IV x1 dose today.  P.o. prednisone tomorrow.    5.  Urine culture E. coli sensitive to Zosyn.    6.  Physical therapy consulted PT and Unna boots requested due to bilateral lower extremity edema.  No open wounds.  Speech therapy cleared for mechanical soft nectar diet.    7.  Chronic kidney disease stage V with baseline creatinine 1.9-2.1.  Creatinine 2.52 on admit 2.23 today    8.  Diabetes mellitus type 2.  A1c 8.5.  Sliding scale insulin coverage.  Glucoses 210, 166, 278.  Levemir 15 units twice daily.    9.  Hypertension.  Blood pressure 160/74, 148/78.  Continue Norvasc and losartan.    10.  Lovenox for deep vein thrombosis prophylaxis.  Synthroid for hypothyroidism.    11.  Wound care evaluated noting scarring and blanchable erythema.  Healed pressure injuries.       CODE STATUS/advance care planning: Full code  Patient surrogate decision-maker is her son, Chaka.     The above documentation resulted from a face-to-face encounter by me Nohemi MORILLO, [facility].    Discharge Planning: I expect patient to be discharged to [place] [facility] to be determined.     Electronically signed by YISEL Salamanca, 10/12/2021, 11:28 CDT.

## 2021-10-12 NOTE — THERAPY WOUND CARE TREATMENT
Acute Care - Wound/Debridement Initial Evaluation   [place]     Patient Name: Honey Canales  : 3/27/1933  MRN: [id number]  Today's Date: 10/12/2021                Admit Date: 10/8/2021    Visit Dx:    ICD-10-CM ICD-9-CM   1. Oropharyngeal dysphagia  R13.12 787.22   2. Urinary tract infection without hematuria, site unspecified  N39.0 599.0   3. Pneumonia due to infectious organism, unspecified laterality, unspecified part of lung  J18.9 486   4. 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)   • Hallux valgus, right   • Hallux valgus, left   • Foot pain, bilateral   • Acquired hammer toes of both feet   • Benign essential HTN   • Benign meningioma (HCC)   • Callus   • Chronic kidney disease   • History of colon polyps   • Hyperlipidemia LDL goal <100   • Loss of appetite   • Non-toxic multinodular goiter   • Pulmonary fibrosis (HCC)   • Uncontrolled type 2 diabetes mellitus without complication, with long-term current use of insulin   • Closed nondisplaced fracture of pelvis with routine healing   • Closed fracture of anatomical neck of right humerus   • Frequent falls   • Acute pain due to injury   • Transaminitis   • Stage 3b chronic kidney disease (HCC)   • Hepatitis C   • Hypoglycemia   • Right hydronephrosis   • Abdominal fluid collection   • Compression fracture of fifth lumbar vertebra (HCC)   • Hyponatremia   • Personal history of fall   • Acute kidney injury (HCC)   • HCAP (healthcare-associated pneumonia)   • Acute hypoxemic respiratory failure (HCC)   • Acute  cystitis with hematuria   • Chronic kidney disease (CKD), stage IV (severe) (HCC)   • Multifocal pneumonia        Past Medical History:   Diagnosis Date   • Allergic rhinitis    • Aneurysm (HCC)    • Arthritis    • Broken shoulder     Right shoulder    • Cerebral aneurysm 2009    2ND ONE FOUND   • Cerebral aneurysm     NON TREATABLE   • Chronic laryngitis    • CKD (chronic kidney disease)    • Colon polyps    • COPD (chronic obstructive pulmonary disease) (HCC)    • Deviated nasal septum    • Diabetes mellitus (HCC)    • Disorder of kidney and ureter    • Disturbance of smell and taste    • Dizziness    • Encounter for imaging to screen for metal prior to MRI     NO MRI, METAL CLIPS IN HEAD   • Eustachian tube dysfunction    • GERD (gastroesophageal reflux disease)    • Globus sensation    • Headache    • Hepatitis     B   • Hepatitis A    • History of stomach ulcers    • Hyperlipidemia    • Hypothyroid    • Laryngitis sicca    • Lung nodule    • Nocturia    • PONV (postoperative nausea and vomiting)    • Sensorineural hearing loss    • Spinal stenosis    • Urge incontinence    • Urgency of urination    • Urinary frequency    • Urinary incontinence    • UTI (urinary tract infection)         Past Surgical History:   Procedure Laterality Date   • BLADDER SURGERY  2016    [employer]onic Interstem   • BRAIN SURGERY      ANUERYSM REMOVED   • BREAST SURGERY Bilateral     BIOPSY   • CARPAL TUNNEL RELEASE     • CATARACT EXTRACTION, BILATERAL     • CEREBRAL ANEURYSM REPAIR     •  SECTION      X4   • CHOLECYSTECTOMY     • HYSTERECTOMY     • INTERSTIM PLACEMENT N/A 10/18/2018    Procedure: INTERSTIM STAGES 1 AND 2 LEAD AND GENERATOR REMOVAL AND REPLACEMENT;  Surgeon: Archie Avery MD;  Location: [facility];  Service: Urology   • JOINT REPLACEMENT Right     KNEE   • KNEE ARTHROSCOPY     • THYROIDECTOMY     • TONSILLECTOMY             Wound 10/08/21 2247 gluteal (Active)   Dressing Appearance open to air 10/11/21 2040    Base red; purple; maroon/purple; non-blanchable 10/11/21 2040   Drainage Amount none 10/11/21 2040   Care, Wound barrier applied 10/12/21 0602         WOUND DEBRIDEMENT                     PT Assessment (last 12 hours)     PT Evaluation and Treatment     Row Name 10/12/21 1500          Physical Therapy Time and Intention    Subjective Information complains of; dyspnea  -MS     Document Type wound care; therapy note (daily note)  -MS     Mode of Treatment physical therapy  -MS     Row Name 10/12/21 1500          General Information    Patient Profile Reviewed yes  -MS     Pertinent History of Current Functional Problem SOB & Cough. Bilateral Air Space Disease, Emphysema, and Multifocal Pneumonia. Injury to coccyx. BLE Swelling. Increased Glucose  -MS     Existing Precautions/Restrictions fall; other (see comments)  B unna boots  -MS     Barriers to Rehab medically complex; physical barrier  -MS     Row Name 10/12/21 1500          Pain Scale: Numbers Pre/Post-Treatment    Pretreatment Pain Rating 0/10 - no pain  -MS     Posttreatment Pain Rating 0/10 - no pain  -MS     Row Name 10/12/21 1500          Bed Mobility    Supine-Sit [place] (Bed Mobility) minimum assist (75% patient effort); verbal cues  -MS     Assistive Device (Bed Mobility) bed rails; head of bed elevated  -MS     Row Name 10/12/21 1500          Transfers    Sit-Stand [place] (Transfers) contact guard; verbal cues  -MS     Row Name 10/12/21 1500          Gait/Stairs (Locomotion)    [place] Level (Gait) contact guard  -MS     Assistive Device (Gait) walker, front-wheeled  -MS     Distance in Feet (Gait) 10ft with forward flexed posture and poor foot clearance B  -MS     Row Name 10/12/21 1500          Edema Assessment & Management    Location (Edema) lower extremity, left; lower extremity, right  -MS     Additional Documentation Edema Symptoms/Interventions (Group); Location (Edema) (Row)  -MS     Row Name 10/12/21 1500          Lower  Extremity Edema Measures, Left    Lower Extremity, Left (Edema) other (see comments)  lower leg  -MS     Row Name 10/12/21 1500          Lower Extremity Edema Measures, Right    Lower Extremity, Right (Edema) other (see comments)  lower leg  -MS     Row Name 10/12/21 1500          Edema Symptoms/Interventions    Description (Edema) diffuse; pitting  -MS     Pitting Scale (Edema) 3-->moderate  -MS     Associated Symptoms (Edema) hair growth changes; skin creases diminished  -MS     Treatment Interventions (Edema) compression wrapping/taping  zinc unna boots placed covered in coban  -MS     Row Name             Wound 10/08/21 2247 gluteal    Wound - Properties Group Placement Date: 10/08/21  -ST Placement Time: 2247 -ST Present on Hospital Admission: Y  -ST Location: gluteal  -ST     Retired Wound - Properties Group Date first assessed: 10/08/21  -ST Time first assessed: 2247 -ST Present on Hospital Admission: Y  -ST Location: gluteal  -ST     Row Name 10/12/21 1500          Plan of Care Review    Plan of Care Reviewed With patient  -MS     Progress no change  -MS     Outcome Summary PT evaluation completed for placement of B unna boots on lower legs. Unna boots placed from met heads to tib tub B with capillary refill < 3 secs. The patient also performed mobility with bed to chair. Erik requires a little more assist today and reports difficulty breathing. Her O2 sat was within therapeutic levels on room air. PT will continue to monitor the unna boots and plan to re-assess in 7 days. PT will also continue with mobility to improve respiration and activity tolerance. Recommend discharge to SNF.  -MS     Row Name 10/12/21 1500          Vital Signs    Pre SpO2 (%) 96  -MS     O2 Delivery Pre Treatment room air  -MS     Post SpO2 (%) 96  -MS     O2 Delivery Post Treatment room air  -MS     Row Name 10/12/21 1500          Physical Therapy Goals    Problem Specific Goal Selection (PT) problem specific goal 1, PT; problem  specific goal 2, PT  -MS     Row Name 10/12/21 1500          Problem Specific Goal 1 (PT)    Problem Specific Goal 1 (PT) L unna boot will stay in place to provide optimal compression for edema control.  -MS     Time Frame (Problem Specific Goal 1, PT) long-term goal (LTG); by discharge  -MS     Progress/Outcome (Problem Specific Goal 1, PT) goal ongoing  -MS     Row Name 10/12/21 1500          Problem Specific Goal 2 (PT)    Problem Specific Goal 2 (PT) R unna boot will stay in place to provide optimal compression for edema control  -MS     Time Frame (Problem Specific Goal 2, PT) long-term goal (LTG); by discharge  -MS     Progress/Outcome (Problem Specific Goal 2, PT) goal ongoing  -MS     Row Name 10/12/21 1500          Positioning and Restraints    Post Treatment Position chair  -MS     In Chair reclined; call light within reach; encouraged to call for assist  -MS     Row Name 10/12/21 1500          Therapy Assessment/Plan (PT)    Patient/Family Therapy Goals Statement (PT) go back to nursing facility  -MS     Rehab Potential (PT) good, to achieve stated therapy goals  -MS     Criteria for Skilled Interventions Met (PT) yes; meets criteria  -MS     Predicted Duration of Therapy Intervention (PT) until discharge  -MS           User Key  (r) = Recorded By, (t) = Taken By, (c) = Cosigned By    Initials Name Provider Type    MS Philly Mcclendon R, PT, DPT, NCS Physical Therapist    Jeannie Marin, RN Registered Nurse              Physical Therapy Education                 Title: PT OT SLP Therapies (Done)     Topic: Physical Therapy (Done)     Point: Mobility training (Done)     Learning Progress Summary           Patient Acceptance, E, VU by NN at 10/11/2021 1038    Comment: PT role in care   Family Acceptance, E, VU by NN at 10/11/2021 1038    Comment: PT role in care                   Point: Home exercise program (Done)     Learning Progress Summary           Patient Acceptance, E, VU by NN at 10/11/2021 1038     Comment: PT role in care   Family Acceptance, E, VU by NN at 10/11/2021 1038    Comment: PT role in care                   Point: Body mechanics (Done)     Learning Progress Summary           Patient Acceptance, E, VU by NN at 10/11/2021 1038    Comment: PT role in care   Family Acceptance, E, VU by NN at 10/11/2021 1038    Comment: PT role in care                   Point: Precautions (Done)     Learning Progress Summary           Patient Acceptance, E, VU by MS at 10/12/2021 1558    Comment: role of unna boots in her care    Acceptance, E, VU by NN at 10/11/2021 1038    Comment: PT role in care   Family Acceptance, E, VU by NN at 10/11/2021 1038    Comment: PT role in care                               User Key     Initials Effective Dates Name Provider Type Discipline    MS 06/19/18 -  Philly Mcclendon, PT, DPT, NCS Physical Therapist PT     08/18/21 -  Regina Russell, PT Student PT Student PT                Recommendation and Plan  Anticipated Discharge Disposition (PT): skilled nursing facility  Planned Therapy Interventions (PT): wound care, patient/family education  Therapy Frequency (PT): daily (check daily, change in 7 days)  Plan of Care Reviewed With: patient   Progress: no change       Progress: no change  Outcome Summary: PT evaluation completed for placement of B unna boots on lower legs. Unna boots placed from met heads to tib tub B with capillary refill < 3 secs. The patient also performed mobility with bed to chair. Erik requires a little more assist today and reports difficulty breathing. Her O2 sat was within therapeutic levels on room air. PT will continue to monitor the unna boots and plan to re-assess in 7 days. PT will also continue with mobility to improve respiration and activity tolerance. Recommend discharge to SNF.  Plan of Care Reviewed With: patient            Time Calculation        Therapy Charges for Today     Code Description Service Date Service Provider Modifiers Qty     27128556507 HC PT THERAPEUTIC ACT EA 15 MIN 10/11/2021 Philly Mcclendon PT, DPT, NCS GP 1    [id number] HC PT EVAL LOW COMPLEXITY 4 10/11/2021 Philly Mcclendon PT, DPT, NCS GP 1            PT G-Codes  Outcome Measure Options: AM-PAC 6 Clicks Basic Mobility (PT)  AM-PAC 6 Clicks Score (PT): 19       Phlily Mcclendon PT, DPT, NCS  10/12/2021

## 2021-10-12 NOTE — PLAN OF CARE
Goal Outcome Evaluation:           Progress: no change  Outcome Summary: Pt c/o shortness of breath and weakness. She was able to sit in the chair from early afternoon until supper. Able to get to the BSC with walker and 2 assist.  VSS. Positioned of Coccyx and legs elevated. Barrier cream applied several times to coccyx.

## 2021-10-12 NOTE — PLAN OF CARE
Goal Outcome Evaluation:  Plan of Care Reviewed With: other (see comments) (melanie)        Progress: no change  Outcome Summary: Ntn follow up. Melanie reports pt has not had good appetite. Oral intake 50% of one meal. Pt may benefit from appetite stimulant if clinically appropriate. Soft texture,ground meats,NTL/CCHO. Boost Glucose Control BID. Cont to follow for plan of care.

## 2021-10-12 NOTE — PLAN OF CARE
Goal Outcome Evaluation:  Plan of Care Reviewed With: patient        Progress: no change  Outcome Summary: A&Ox4. VSS. No c/o pain. Con't IV abx. Turn and reposition every 2hrs. Barrier cream applied to bottom. Con't to have BLE edema. Purewick in place. Resting well.

## 2021-10-12 NOTE — THERAPY TREATMENT NOTE
"Acute Care - Speech Language Pathology   Swallow Treatment Note [facility]     Patient Name: Honey Canales  : 3/27/1933  MRN: [id number]  Today's Date: 10/12/2021               Admit Date: 10/8/2021  Pt was eating breakfast upon SLP arrival. Observed pt with trials of mechanical soft solids, pureed, nectar, and thin. Pt had delayed coughing throughout trials and complained of shortness of breath and feeling that food and drink \"stuck\" pointing to upper esophageal region. Oxygen saturation remained at 96% or above throughout session. Educated pt to try using a chin tuck strategy and to alternate between solid food, pureed, and nectar thick liquids. She still complained of difficulty, but stated incorporating strategies may be somewhat improved. Recommend continuing a mechanical soft solid, nectar thick liquids, and chin tuck plus alternation strategies.   Vesta Ramirez, CCC-SLP 10/12/2021 09:14 CDT    Visit Dx:     ICD-10-CM ICD-9-CM   1. Oropharyngeal dysphagia  R13.12 787.22   2. Urinary tract infection without hematuria, site unspecified  N39.0 599.0   3. Pneumonia due to infectious organism, unspecified laterality, unspecified part of lung  J18.9 486   4. 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)   • Hallux valgus, right   • Hallux valgus, left   • Foot pain, bilateral   • Acquired hammer toes of both feet   • Benign essential HTN   • Benign meningioma (HCC)   • Callus   • Chronic kidney disease   • History of colon polyps   • Hyperlipidemia LDL goal <100   • Loss of " appetite   • Non-toxic multinodular goiter   • Pulmonary fibrosis (HCC)   • Uncontrolled type 2 diabetes mellitus without complication, with long-term current use of insulin   • Closed nondisplaced fracture of pelvis with routine healing   • Closed fracture of anatomical neck of right humerus   • Frequent falls   • Acute pain due to injury   • Transaminitis   • Stage 3b chronic kidney disease (HCC)   • Hepatitis C   • Hypoglycemia   • Right hydronephrosis   • Abdominal fluid collection   • Compression fracture of fifth lumbar vertebra (HCC)   • Hyponatremia   • Personal history of fall   • Acute kidney injury (HCC)   • HCAP (healthcare-associated pneumonia)   • Acute hypoxemic respiratory failure (HCC)   • Acute cystitis with hematuria   • Chronic kidney disease (CKD), stage IV (severe) (HCC)   • Multifocal pneumonia     Past Medical History:   Diagnosis Date   • Allergic rhinitis    • Aneurysm (HCC)    • Arthritis    • Broken shoulder     Right shoulder    • Cerebral aneurysm 2009    2ND ONE FOUND   • Cerebral aneurysm     NON TREATABLE   • Chronic laryngitis    • CKD (chronic kidney disease)    • Colon polyps    • COPD (chronic obstructive pulmonary disease) (HCC)    • Deviated nasal septum    • Diabetes mellitus (HCC)    • Disorder of kidney and ureter    • Disturbance of smell and taste    • Dizziness    • Encounter for imaging to screen for metal prior to MRI     NO MRI, METAL CLIPS IN HEAD   • Eustachian tube dysfunction    • GERD (gastroesophageal reflux disease)    • Globus sensation    • Headache    • Hepatitis     B   • Hepatitis A    • History of stomach ulcers    • Hyperlipidemia    • Hypothyroid    • Laryngitis sicca    • Lung nodule    • Nocturia    • PONV (postoperative nausea and vomiting)    • Sensorineural hearing loss    • Spinal stenosis    • Urge incontinence    • Urgency of urination    • Urinary frequency    • Urinary incontinence    • UTI (urinary tract infection)      Past Surgical History:    Procedure Laterality Date   • BLADDER SURGERY  2016    [employer] Interstem   • BRAIN SURGERY      ANUERYSM REMOVED   • BREAST SURGERY Bilateral     BIOPSY   • CARPAL TUNNEL RELEASE     • CATARACT EXTRACTION, BILATERAL     • CEREBRAL ANEURYSM REPAIR     •  SECTION      X4   • CHOLECYSTECTOMY     • HYSTERECTOMY     • INTERSTIM PLACEMENT N/A 10/18/2018    Procedure: INTERSTIM STAGES 1 AND 2 LEAD AND GENERATOR REMOVAL AND REPLACEMENT;  Surgeon: Archie Avery MD;  Location: [facility] OR;  Service: Urology   • JOINT REPLACEMENT Right     KNEE   • KNEE ARTHROSCOPY     • THYROIDECTOMY     • TONSILLECTOMY         SLP Recommendation and Plan  SLP Swallowing Diagnosis: mild, oral dysphagia, mild-moderate, pharyngeal dysphagia (10/11/21 1225)  SLP Diet Recommendation: soft textures, ground, nectar thick liquids (10/11/21 1225)  Recommended Precautions and Strategies: upright posture during/after eating, small bites of food and sips of liquid, alternate between small bites of food and sips of liquid, general aspiration precautions (10/11/21 1225)  SLP Rec. for Method of Medication Administration: meds whole, with pudding or applesauce (10/11/21 1225)     Monitor for Signs of Aspiration: yes, cough, gurgly voice, throat clearing, notify SLP if any concerns (10/11/21 1225)     Swallow Criteria for Skilled Therapeutic Interventions Met: demonstrates skilled criteria (10/11/21 1225)  Anticipated Discharge Disposition (SLP): unknown (10/11/21 1225)  Rehab Potential/Prognosis, Swallowing: adequate, monitor progress closely (10/11/21 1225)  Therapy Frequency (Swallow): 2 days per week (10/11/21 1225)  Predicted Duration Therapy Intervention (Days): until discharge (10/11/21 1225)     Daily Summary of Progress (SLP): progress towards functional goals is fair (10/12/21 0835)    Plan for Continued Treatment (SLP): Continue to follow (10/12/21 0835)                     SWALLOW EVALUATION (last 72 hours)     SLP Adult  Swallow Evaluation     Row Name 10/12/21 0835 10/11/21 1225 10/11/21 0920 10/10/21 0846 10/09/21 131       Rehab Evaluation    Document Type therapy note (daily note)  -MB re-evaluation  -MB therapy note (daily note)  -MM therapy note (daily note)  -KW evaluation  -KW    Subjective Information complains of; dyspnea  -MB no complaints  -MB complains of; fatigue; dyspnea  -MM complains of; weakness  -KW no complaints  -KW    Patient Observations alert; cooperative  -MB alert; cooperative  -MB alert; cooperative; agree to therapy  -MM alert  -KW alert; cooperative  -    Patient/Family/Caregiver Comments/Observations No family present  -MB No family present  -MB No family present.  -MM -- no family present  -    Care Plan Review -- -- care plan/treatment goals reviewed  -MM care plan/treatment goals reviewed  -KW care plan/treatment goals reviewed  -    Care Plan Review, Other Participant(s) -- -- other (see comments)  STEPHAN Monge  - -- --    Patient Effort good  -MB -- good  -MM good  -KW good  -KW       General Information    Patient Profile Reviewed -- yes  -MB -- -- yes  -KW    Pertinent History Of Current Problem -- Pneumonia  -MB -- -- pneumonia, r/o aspiration  -    Current Method of Nutrition -- regular textures; thin liquids  -MB -- -- regular textures; thin liquids  -    Precautions/Limitations, Vision -- L  -MB -- -- [facility]  -    Precautions/Limitations, Hearing -- hearing impairment, bilaterally  -MB -- -- hearing impairment, bilaterally  -    Prior Level of Function-Communication -- L  -MB -- -- WFL  -    Prior Level of Function-Swallowing -- no diet consistency restrictions  -MB -- -- no diet consistency restrictions  -    Plans/Goals Discussed with -- patient  -MB -- -- patient  -KW    Barriers to Rehab -- hearing deficit  -MB -- -- hearing deficit  -    Patient's Goals for Discharge -- patient did not state  -MB -- -- return to all previous roles/activities  -KW       Pain     Additional Documentation Pain Scale: FACES Pre/Post-Treatment (Group)  -MB Pain Scale: FACES Pre/Post-Treatment (Group)  -MB Pain Scale: FACES Pre/Post-Treatment (Group)  -MM -- Pain Scale: FACES Pre/Post-Treatment (Group)  -KW       Pain Scale: FACES Pre/Post-Treatment    Pain: FACES Scale, Pretreatment 4-->hurts little more  -MB 0-->no hurt  -MB 0-->no hurt  -MM 0-->no hurt  -KW 0-->no hurt  -KW    Posttreatment Pain Rating -- -- 0-->no hurt  -MM 0-->no hurt  -KW 0-->no hurt  -KW       Oral Motor Structure and Function    Dentition Assessment -- natural, present and adequate  -MB -- -- other (see comments)  implants  -    Secretion Management -- WNL/[facility]  -MB -- -- WNL/WFL  -    Mucosal Quality -- moist, healthy  -MB -- -- moist, healthy  -    Volitional Swallow -- -- -- -- WFL  -    Volitional Cough -- -- -- -- WFL  -       Oral Musculature and Cranial Nerve Assessment    Oral Motor General Assessment -- [facility]  -MB -- -- generalized oral motor weakness  -       General Eating/Swallowing Observations    Respiratory Support Currently in Use -- nasal cannula  -MB -- -- nasal cannula  -    Eating/Swallowing Skills -- -- -- -- self-fed  -    Positioning During Eating -- -- -- -- upright in bed  -    Utensils Used -- -- -- -- spoon; straw; cup  -    Consistencies Trialed -- -- -- -- regular textures; thin liquids; nectar/syrup-thick liquids; honey-thick liquids; pudding thick  -       Clinical Swallow Eval    Oral Prep Phase -- -- -- -- impaired  -KW    Oral Transit -- -- -- -- WFL  -    Oral Residue -- -- -- -- impaired  -KW    Pharyngeal Phase -- -- -- -- no overt signs/symptoms of pharyngeal impairment  -KW    Esophageal Phase -- -- -- -- unremarkable  -    Clinical Swallow Evaluation Summary -- -- -- -- see top of report  -       Oral Prep Concerns    Oral Prep Concerns -- -- -- -- prolonged mastication  -KW    Prolonged Mastication -- -- -- -- regular consistencies  -KW       Oral Residue  Concerns    Oral Residue Concerns -- -- -- -- diffuse residue throughout oral cavity  -KW    Diffuse Residue Throughout Oral Cavity -- -- -- -- regular consistencies  -KW    Oral Residue Concerns, Comment -- -- -- -- clears with time  -KW       MBS/VFSS    Utensils Used -- spoon; straw  -MB -- -- --    Consistencies Trialed -- regular textures; soft textures; thin liquids; nectar/syrup-thick liquids; honey-thick liquids; pudding thick  -MB -- -- --       MBS/VFSS Interpretation    Oral Prep Phase -- impaired oral phase of swallowing  -MB -- -- --    Oral Transit Phase -- impaired  -MB -- -- --    Oral Residue -- impaired  -MB -- -- --    VFSS Summary -- See MBS note  -MB -- -- --       Oral Preparatory Phase    Oral Preparatory Phase -- prolonged manipulation  -MB -- -- --    Prolonged Manipulation -- regular textures  -MB -- -- --       Oral Transit Phase    Impaired Oral Transit Phase -- premature spillage of liquids into pharynx  -MB -- -- --    Premature Spillage of Liquids into Pharynx -- thin liquids  -MB -- -- --       Oral Residue    Impaired Oral Residue -- diffuse residue throughout oral cavity  -MB -- -- --    Diffuse Residue throughout Oral Cavity -- all consistencies tested  -MB -- -- --       Initiation of Pharyngeal Swallow    Initiation of Pharyngeal Swallow -- bolus in valleculae; bolus in pyriform sinuses  -MB -- -- --    Pharyngeal Phase -- impaired pharyngeal phase of swallowing  -MB -- -- --    Penetration Before the Swallow -- thin liquids  -MB -- -- --    Penetration During the Swallow -- thin liquids  -MB -- -- --    Response to Penetration -- no response  -MB -- -- --    Pharyngeal Residue -- all consistencies tested  -MB -- -- --       Clinical Impression    Daily Summary of Progress (SLP) progress towards functional goals is fair  -MB -- progress toward functional goals is good  -MM progress toward functional goals is good  -KW --    Barriers to Overall Progress (SLP) Shortness of breath   -MB -- Anxious  -MM anxious  -KW hearing  -    SLP Swallowing Diagnosis -- mild; oral dysphagia; mild-moderate; pharyngeal dysphagia  -MB -- -- R/O pharyngeal dysphagia  -    Functional Impact -- risk of aspiration/pneumonia; risk of malnutrition; risk of dehydration  -MB -- -- risk of aspiration/pneumonia  -    Rehab Potential/Prognosis, Swallowing -- adequate, monitor progress closely  -MB -- -- good, to achieve stated therapy goals  -    Swallow Criteria for Skilled Therapeutic Interventions Met -- demonstrates skilled criteria  -MB -- -- demonstrates skilled criteria  -    Plan for Continued Treatment (SLP) Continue to follow  -MB -- Continue to follow  - continue to follow  - --       Recommendations    Therapy Frequency (Swallow) -- 2 days per week  -MB -- -- PRN  -    Predicted Duration Therapy Intervention (Days) -- until discharge  -MB -- -- until discharge  -    SLP Diet Recommendation -- soft textures; ground; nectar thick liquids  -MB -- -- regular textures; thin liquids  -    Recommended Precautions and Strategies -- upright posture during/after eating; small bites of food and sips of liquid; alternate between small bites of food and sips of liquid; general aspiration precautions  -MB -- -- upright posture during/after eating; small bites of food and sips of liquid  -    Oral Care Recommendations -- Oral Care BID/PRN  -MB -- -- Oral Care BID/PRN  -    SLP Rec. for Method of Medication Administration -- meds whole; with pudding or applesauce  -MB -- -- meds whole; with thin liquids  -    Monitor for Signs of Aspiration -- yes; cough; gurgly voice; throat clearing; notify SLP if any concerns  -MB -- -- notify SLP if any concerns  -    Anticipated Discharge Disposition (SLP) -- unknown  -MB -- -- unknown  -       Swallow Goals (SLP)    Oral Nutrition/Hydration Goal Selection (SLP) -- oral nutrition/hydration, SLP goal 1  -MB oral nutrition/hydration, SLP goal 1  -MM -- oral  nutrition/hydration, SLP goal 1  -KW    Lingual Strengthening Goal Selection (SLP) -- lingual strengthening, SLP goal 1  -MB -- -- --    Pharyngeal Strengthening Exercise Goal Selection (SLP) -- pharyngeal strengthening exercise, SLP goal 1  -MB -- -- --    Additional Documentation -- lingual strengthening goal selection (SLP); pharyngeal strengthening exercise goal selection (SLP)  -MB -- -- --       Oral Nutrition/Hydration Goal 1 (SLP)    Oral Nutrition/Hydration Goal 1, SLP LTG: Patient will tolerate LRD with no overt s/s of aspiration.  -MB LTG: Patient will tolerate LRD with no overt s/s of aspiration.  -MB LTG: Patient will tolerate LRD with no overt s/s of aspiration.  -MM LTG: Patient will tolerate LRD with no overt s/s of aspiration.  -KW LTG: Patient will tolerate LRD with no overt s/s of aspiration.  -KW    Time Frame (Oral Nutrition/Hydration Goal 1, SLP) short term goal (STG); by discharge  -MB short term goal (STG); by discharge  -MB short term goal (STG); by discharge  -MM short term goal (STG); by discharge  -KW short term goal (STG); by discharge  -KW    Barriers (Oral Nutrition/Hydration Goal 1, SLP) Hearing impairment, shortness of breath  -MB hearing  -MB hearing  -MM hearing  -KW hearing  -KW    Progress/Outcomes (Oral Nutrition/Hydration Goal 1, SLP) continuing progress toward goal  -MB -- continuing progress toward goal  -MM continuing progress toward goal  -KW goal ongoing  -KW       Lingual Strengthening Goal 1 (SLP)    Activity (Lingual Strengthening Goal 1, SLP) increase tongue back strength  -MB increase tongue back strength  -MB -- -- --    Increase Tongue Back Strength lingual movement exercises  -MB lingual movement exercises  -MB -- -- --    [place]/Accuracy (Lingual Strengthening Goal 1, SLP) independently (over 90% accuracy)  -MB independently (over 90% accuracy)  -MB -- -- --    Time Frame (Lingual Strengthening Goal 1, SLP) short term goal (STG); by discharge  -MB short term  goal (STG); by discharge  -MB -- -- --    Barriers (Lingual Strengthening Goal 1, SLP) -- n/a  -MB -- -- --    Progress/Outcomes (Lingual Strengthening Goal 1, SLP) -- goal ongoing  -MB -- -- --       Pharyngeal Strengthening Exercise Goal 1 (SLP)    Activity (Pharyngeal Strengthening Goal 1, SLP) increase anterior movement of the hyolaryngeal complex; increase squeeze/positive pressure generation  -MB increase anterior movement of the hyolaryngeal complex; increase squeeze/positive pressure generation  -MB -- -- --    Increase Anterior Movement of the Hyolaryngeal Complex shaker  -MB shaker  -MB -- -- --    Increase Squeeze/Positive Pressure Generation hard effortful swallow  -MB hard effortful swallow  -MB -- -- --    [place]/Accuracy (Pharyngeal Strengthening Goal 1, SLP) independently (over 90% accuracy)  -MB independently (over 90% accuracy)  -MB -- -- --    Time Frame (Pharyngeal Strengthening Goal 1, SLP) short term goal (STG); by discharge  -MB short term goal (STG); by discharge  -MB -- -- --    Barriers (Pharyngeal Strengthening Goal 1, SLP) -- n/a  -MB -- -- --    Progress/Outcomes (Pharyngeal Strengthening Goal 1, SLP) -- goal ongoing  -MB -- -- --          User Key  (r) = Recorded By, (t) = Taken By, (c) = Cosigned By    Initials Name Effective Dates    Vesta Prabhakar, CCC-SLP 06/16/21 -     Erinn Montanez, MS CCC-SLP 06/16/21 -     Philly Roque, MS CCC-SLP 06/16/21 -                 EDUCATION  The patient has been educated in the following areas:   Dysphagia (Swallowing Impairment).        SLP GOALS     Row Name 10/12/21 0835 10/11/21 1225 10/11/21 0920       Oral Nutrition/Hydration Goal 1 (SLP)    Oral Nutrition/Hydration Goal 1, SLP LTG: Patient will tolerate LRD with no overt s/s of aspiration.  -MB LTG: Patient will tolerate LRD with no overt s/s of aspiration.  -MB LTG: Patient will tolerate LRD with no overt s/s of aspiration.  -MM    Time Frame (Oral  Nutrition/Hydration Goal 1, SLP) short term goal (STG); by discharge  -MB short term goal (STG); by discharge  -MB short term goal (STG); by discharge  -MM    Barriers (Oral Nutrition/Hydration Goal 1, SLP) Hearing impairment, shortness of breath  -MB hearing  -MB hearing  -MM    Progress/Outcomes (Oral Nutrition/Hydration Goal 1, SLP) continuing progress toward goal  -MB -- continuing progress toward goal  -MM       Lingual Strengthening Goal 1 (SLP)    Activity (Lingual Strengthening Goal 1, SLP) increase tongue back strength  -MB increase tongue back strength  -MB --    Increase Tongue Back Strength lingual movement exercises  -MB lingual movement exercises  -MB --    [place]/Accuracy (Lingual Strengthening Goal 1, SLP) independently (over 90% accuracy)  -MB independently (over 90% accuracy)  -MB --    Time Frame (Lingual Strengthening Goal 1, SLP) short term goal (STG); by discharge  -MB short term goal (STG); by discharge  -MB --    Barriers (Lingual Strengthening Goal 1, SLP) -- n/a  -MB --    Progress/Outcomes (Lingual Strengthening Goal 1, SLP) -- goal ongoing  -MB --       Pharyngeal Strengthening Exercise Goal 1 (SLP)    Activity (Pharyngeal Strengthening Goal 1, SLP) increase anterior movement of the hyolaryngeal complex; increase squeeze/positive pressure generation  -MB increase anterior movement of the hyolaryngeal complex; increase squeeze/positive pressure generation  -MB --    Increase Anterior Movement of the Hyolaryngeal Complex shaker  -MB shaker  -MB --    Increase Squeeze/Positive Pressure Generation hard effortful swallow  -MB hard effortful swallow  -MB --    [place]/Accuracy (Pharyngeal Strengthening Goal 1, SLP) independently (over 90% accuracy)  -MB independently (over 90% accuracy)  -MB --    Time Frame (Pharyngeal Strengthening Goal 1, SLP) short term goal (STG); by discharge  -MB short term goal (STG); by discharge  -MB --    Barriers (Pharyngeal Strengthening Goal 1, SLP) --  n/a  -MB --    Progress/Outcomes (Pharyngeal Strengthening Goal 1, SLP) -- goal ongoing  -MB --    Row Name 10/10/21 0846 10/09/21 1315          Oral Nutrition/Hydration Goal 1 (SLP)    Oral Nutrition/Hydration Goal 1, SLP LTG: Patient will tolerate LRD with no overt s/s of aspiration.  -KW LTG: Patient will tolerate LRD with no overt s/s of aspiration.  -KW     Time Frame (Oral Nutrition/Hydration Goal 1, SLP) short term goal (STG); by discharge  -KW short term goal (STG); by discharge  -KW     Barriers (Oral Nutrition/Hydration Goal 1, SLP) hearing  -KW hearing  -KW     Progress/Outcomes (Oral Nutrition/Hydration Goal 1, SLP) continuing progress toward goal  -KW goal ongoing  -KW           User Key  (r) = Recorded By, (t) = Taken By, (c) = Cosigned By    Initials Name Provider Type    Vesta Prabhakar CCC-SLP Speech and Language Pathologist    Erinn Montanez, MS CCC-SLP Speech and Language Pathologist    MM Philly Hinojosa, MS CCC-SLP Speech and Language Pathologist                   Time Calculation:    Time Calculation- SLP     Row Name 10/12/21 0913             Time Calculation- SLP    SLP Start Time 0835  -MB      SLP Stop Time 0913  -MB      SLP Time Calculation (min) 38 min  -MB      SLP Received On 10/12/21  -MB              Untimed Charges    20165-LE Treatment Swallow Minutes 38  -MB              Total Minutes    Untimed Charges Total Minutes 38  -MB       Total Minutes 38  -MB            User Key  (r) = Recorded By, (t) = Taken By, (c) = Cosigned By    Initials Name Provider Type    eVsta Prabhakar CCC-SLP Speech and Language Pathologist                Therapy Charges for Today     Code Description Service Date Service Provider Modifiers Qty    17463473791 HC ST MOTION FLUORO EVAL SWALLOW 5 10/11/2021 Vesta Ramirez CCC-SLP GN 1    [id number] HC ST TREATMENT SWALLOW 3 10/12/2021 Vesta Ramirez CCC-SLP GN 1               VALENTIN Peoples  10/12/2021

## 2021-10-12 NOTE — PLAN OF CARE
Goal Outcome Evaluation:  Plan of Care Reviewed With: patient        Progress: no change  Outcome Summary: PT evaluation completed for placement of B unna boots on lower legs. Unna boots placed from met heads to tib tub B with capillary refill < 3 secs. The patient also performed mobility with bed to chair. Erik requires a little more assist today and reports difficulty breathing. Her O2 sat was within therapeutic levels on room air. PT will continue to monitor the unna boots and plan to re-assess in 7 days. PT will also continue with mobility to improve respiration and activity tolerance. Recommend discharge to SNF.

## 2021-10-13 LAB
ANION GAP SERPL CALCULATED.3IONS-SCNC: 12 MMOL/L (ref 5–15)
BACTERIA SPEC AEROBE CULT: NORMAL
BACTERIA SPEC AEROBE CULT: NORMAL
BUN SERPL-MCNC: 62 MG/DL (ref 8–23)
BUN/CREAT SERPL: 26.6 (ref 7–25)
CALCIUM SPEC-SCNC: 8.7 MG/DL (ref 8.6–10.5)
CHLORIDE SERPL-SCNC: 109 MMOL/L (ref 98–107)
CO2 SERPL-SCNC: 18 MMOL/L (ref 22–29)
CREAT SERPL-MCNC: 2.33 MG/DL (ref 0.57–1)
DEPRECATED RDW RBC AUTO: 48.2 FL (ref 37–54)
ERYTHROCYTE [DISTWIDTH] IN BLOOD BY AUTOMATED COUNT: 14.2 % (ref 12.3–15.4)
GFR SERPL CREATININE-BSD FRML MDRD: 20 ML/MIN/1.73
GIANT PLATELETS: ABNORMAL
GLUCOSE BLDC GLUCOMTR-MCNC: 161 MG/DL (ref 70–130)
GLUCOSE BLDC GLUCOMTR-MCNC: 195 MG/DL (ref 70–130)
GLUCOSE BLDC GLUCOMTR-MCNC: 303 MG/DL (ref 70–130)
GLUCOSE BLDC GLUCOMTR-MCNC: 316 MG/DL (ref 70–130)
GLUCOSE SERPL-MCNC: 200 MG/DL (ref 65–99)
HCT VFR BLD AUTO: 27.9 % (ref 34–46.6)
HGB BLD-MCNC: 8.8 G/DL (ref 12–15.9)
LYMPHOCYTES # BLD MANUAL: 1.02 10*3/MM3 (ref 0.7–3.1)
LYMPHOCYTES NFR BLD MANUAL: 3 % (ref 5–12)
LYMPHOCYTES NFR BLD MANUAL: 7 % (ref 19.6–45.3)
MCH RBC QN AUTO: 29.4 PG (ref 26.6–33)
MCHC RBC AUTO-ENTMCNC: 31.5 G/DL (ref 31.5–35.7)
MCV RBC AUTO: 93.3 FL (ref 79–97)
METAMYELOCYTES NFR BLD MANUAL: 1 % (ref 0–0)
MONOCYTES # BLD AUTO: 0.44 10*3/MM3 (ref 0.1–0.9)
NEUTROPHILS # BLD AUTO: 12.88 10*3/MM3 (ref 1.7–7)
NEUTROPHILS NFR BLD MANUAL: 87 % (ref 42.7–76)
NEUTS BAND NFR BLD MANUAL: 1 % (ref 0–5)
PLASMA CELL PREC NFR BLD MANUAL: 1 % (ref 0–0)
PLATELET # BLD AUTO: 245 10*3/MM3 (ref 140–450)
PMV BLD AUTO: 11.3 FL (ref 6–12)
POIKILOCYTOSIS BLD QL SMEAR: ABNORMAL
POTASSIUM SERPL-SCNC: 4 MMOL/L (ref 3.5–5.2)
RBC # BLD AUTO: 2.99 10*6/MM3 (ref 3.77–5.28)
SODIUM SERPL-SCNC: 139 MMOL/L (ref 136–145)
WBC # BLD AUTO: 14.64 10*3/MM3 (ref 3.4–10.8)
WBC MORPH BLD: NORMAL

## 2021-10-13 PROCEDURE — 80048 BASIC METABOLIC PNL TOTAL CA: CPT | Performed by: NURSE PRACTITIONER

## 2021-10-13 PROCEDURE — 94799 UNLISTED PULMONARY SVC/PX: CPT

## 2021-10-13 PROCEDURE — 97116 GAIT TRAINING THERAPY: CPT

## 2021-10-13 PROCEDURE — 25010000002 ENOXAPARIN PER 10 MG: Performed by: FAMILY MEDICINE

## 2021-10-13 PROCEDURE — 82962 GLUCOSE BLOOD TEST: CPT

## 2021-10-13 PROCEDURE — 63710000001 INSULIN DETEMIR PER 5 UNITS: Performed by: NURSE PRACTITIONER

## 2021-10-13 PROCEDURE — 63710000001 INSULIN LISPRO (HUMAN) PER 5 UNITS: Performed by: FAMILY MEDICINE

## 2021-10-13 PROCEDURE — 25010000002 FUROSEMIDE PER 20 MG: Performed by: NURSE PRACTITIONER

## 2021-10-13 PROCEDURE — 85025 COMPLETE CBC W/AUTO DIFF WBC: CPT | Performed by: NURSE PRACTITIONER

## 2021-10-13 PROCEDURE — 25010000002 AZITHROMYCIN PER 500 MG: Performed by: NURSE PRACTITIONER

## 2021-10-13 PROCEDURE — 97530 THERAPEUTIC ACTIVITIES: CPT

## 2021-10-13 PROCEDURE — 63710000001 PREDNISONE PER 1 MG: Performed by: NURSE PRACTITIONER

## 2021-10-13 PROCEDURE — 92526 ORAL FUNCTION THERAPY: CPT

## 2021-10-13 PROCEDURE — 85007 BL SMEAR W/DIFF WBC COUNT: CPT | Performed by: NURSE PRACTITIONER

## 2021-10-13 PROCEDURE — 25010000002 PIPERACILLIN SOD-TAZOBACTAM PER 1 G: Performed by: NURSE PRACTITIONER

## 2021-10-13 RX ORDER — PREDNISONE 20 MG/1
40 TABLET ORAL
Status: DISCONTINUED | OUTPATIENT
Start: 2021-10-13 | End: 2021-10-14

## 2021-10-13 RX ORDER — FUROSEMIDE 10 MG/ML
40 INJECTION INTRAMUSCULAR; INTRAVENOUS ONCE
Status: COMPLETED | OUTPATIENT
Start: 2021-10-13 | End: 2021-10-13

## 2021-10-13 RX ADMIN — ENOXAPARIN SODIUM 30 MG: 30 INJECTION SUBCUTANEOUS at 08:50

## 2021-10-13 RX ADMIN — AMLODIPINE BESYLATE 5 MG: 5 TABLET ORAL at 08:50

## 2021-10-13 RX ADMIN — INSULIN LISPRO 3 UNITS: 100 INJECTION, SOLUTION INTRAVENOUS; SUBCUTANEOUS at 12:45

## 2021-10-13 RX ADMIN — OXYBUTYNIN CHLORIDE 10 MG: 5 TABLET, EXTENDED RELEASE ORAL at 08:48

## 2021-10-13 RX ADMIN — PIPERACILLIN SODIUM AND TAZOBACTAM SODIUM 4.5 G: 4; .5 INJECTION, POWDER, LYOPHILIZED, FOR SOLUTION INTRAVENOUS at 20:52

## 2021-10-13 RX ADMIN — IPRATROPIUM BROMIDE AND ALBUTEROL SULFATE 1.5 ML: 2.5; .5 SOLUTION RESPIRATORY (INHALATION) at 09:31

## 2021-10-13 RX ADMIN — INSULIN DETEMIR 15 UNITS: 100 INJECTION, SOLUTION SUBCUTANEOUS at 08:56

## 2021-10-13 RX ADMIN — BUDESONIDE AND FORMOTEROL FUMARATE DIHYDRATE 2 PUFF: 160; 4.5 AEROSOL RESPIRATORY (INHALATION) at 06:09

## 2021-10-13 RX ADMIN — IPRATROPIUM BROMIDE AND ALBUTEROL SULFATE 1.5 ML: 2.5; .5 SOLUTION RESPIRATORY (INHALATION) at 20:00

## 2021-10-13 RX ADMIN — FUROSEMIDE 40 MG: 10 INJECTION, SOLUTION INTRAVENOUS at 14:18

## 2021-10-13 RX ADMIN — INSULIN LISPRO 10 UNITS: 100 INJECTION, SOLUTION INTRAVENOUS; SUBCUTANEOUS at 20:51

## 2021-10-13 RX ADMIN — IPRATROPIUM BROMIDE AND ALBUTEROL SULFATE 1.5 ML: 2.5; .5 SOLUTION RESPIRATORY (INHALATION) at 06:09

## 2021-10-13 RX ADMIN — DOCUSATE SODIUM 50 MG AND SENNOSIDES 8.6 MG 1 TABLET: 8.6; 5 TABLET, FILM COATED ORAL at 20:52

## 2021-10-13 RX ADMIN — BUDESONIDE AND FORMOTEROL FUMARATE DIHYDRATE 2 PUFF: 160; 4.5 AEROSOL RESPIRATORY (INHALATION) at 20:00

## 2021-10-13 RX ADMIN — PREDNISONE 40 MG: 20 TABLET ORAL at 08:50

## 2021-10-13 RX ADMIN — INSULIN LISPRO 3 UNITS: 100 INJECTION, SOLUTION INTRAVENOUS; SUBCUTANEOUS at 08:49

## 2021-10-13 RX ADMIN — PIPERACILLIN SODIUM AND TAZOBACTAM SODIUM 4.5 G: 4; .5 INJECTION, POWDER, LYOPHILIZED, FOR SOLUTION INTRAVENOUS at 08:46

## 2021-10-13 RX ADMIN — AZITHROMYCIN MONOHYDRATE 500 MG: 500 INJECTION, POWDER, LYOPHILIZED, FOR SOLUTION INTRAVENOUS at 14:18

## 2021-10-13 RX ADMIN — INSULIN DETEMIR 15 UNITS: 100 INJECTION, SOLUTION SUBCUTANEOUS at 20:51

## 2021-10-13 RX ADMIN — LOSARTAN POTASSIUM 25 MG: 50 TABLET, FILM COATED ORAL at 08:50

## 2021-10-13 RX ADMIN — GUAIFENESIN 1200 MG: 600 TABLET, EXTENDED RELEASE ORAL at 08:48

## 2021-10-13 RX ADMIN — DOCUSATE SODIUM 50 MG AND SENNOSIDES 8.6 MG 1 TABLET: 8.6; 5 TABLET, FILM COATED ORAL at 08:49

## 2021-10-13 RX ADMIN — GUAIFENESIN 1200 MG: 600 TABLET, EXTENDED RELEASE ORAL at 20:52

## 2021-10-13 RX ADMIN — IPRATROPIUM BROMIDE AND ALBUTEROL SULFATE 1.5 ML: 2.5; .5 SOLUTION RESPIRATORY (INHALATION) at 14:03

## 2021-10-13 RX ADMIN — SODIUM CHLORIDE, PRESERVATIVE FREE 10 ML: 5 INJECTION INTRAVENOUS at 08:48

## 2021-10-13 RX ADMIN — SODIUM CHLORIDE, PRESERVATIVE FREE 10 ML: 5 INJECTION INTRAVENOUS at 20:52

## 2021-10-13 RX ADMIN — LEVOTHYROXINE SODIUM 75 MCG: 75 TABLET ORAL at 06:16

## 2021-10-13 RX ADMIN — ATORVASTATIN CALCIUM 40 MG: 40 TABLET, FILM COATED ORAL at 08:49

## 2021-10-13 RX ADMIN — INSULIN LISPRO 10 UNITS: 100 INJECTION, SOLUTION INTRAVENOUS; SUBCUTANEOUS at 17:59

## 2021-10-13 NOTE — PROGRESS NOTES
[facility] Medicine Services  INPATIENT PROGRESS NOTE    Length of Stay: 5  Date of Admission: 10/8/2021  Primary Care Physician: Devon Zuñiga MD    Subjective   Chief Complaint: Follow-up shortness of breath  HPI   Patient was seen earlier this morning and then reevaluated again while sitting up in the chair.  Looks some better today.  She continues to complain of shortness of breath and I have reassured her that her oxygen saturation is maintaining 96 to 100% on room air.  She has some mild upper airway expiratory wheezes on expiration that sound all upper airway.  She has no sputum production.  Lungs sound fairly clear posterior.  No rales heard.  Unna boots were placed yesterday with some improvement in lower extremity edema.  She reports edema has been chronic at least for the past month.  She received a dose of Lasix yesterday and I gave her another dose of Lasix today.  X-ray yesterday did show pulmonary edema.  She was able to ambulate 10 feet with physical therapy.  She has sat in a chair for the first time yesterday.  She sat in the chair today.    Review of systems  Constitutional: Positive for fatigue. Negative for activity change, appetite change, chills and fever.   HENT: Positive for hearing loss.  Negative for congestion and trouble swallowing.    Eyes: Negative for photophobia and visual disturbance.   Respiratory: Positive for cough and shortness of breath. Negative for wheezing.    Cardiovascular: Positive for leg swelling (Chronic). Negative for chest pain.   Gastrointestinal: Negative for constipation, diarrhea, nausea and vomiting.   Endocrine: Negative for cold intolerance, heat intolerance and polyuria.   Genitourinary: Negative for dysuria, frequency and urgency.   Musculoskeletal: Positive for gait problem.   Skin: Negative for color change, pallor, rash and wound.   Allergic/Immunologic: Negative for immunocompromised state.   Neurological:  Positive for weakness. Negative for light-headedness.   Hematological: Negative for adenopathy. Does not bruise/bleed easily.   Psychiatric/Behavioral: Negative for agitation, behavioral problems and confusion    All pertinent negatives and positives are as above. All other systems have been reviewed and are negative unless otherwise stated.     Objective    Temp:  [97.6 °F (36.4 °C)-98 °F (36.7 °C)] 98 °F (36.7 °C)  Heart Rate:  [68-97] 81  Resp:  [16-18] 16  BP: (155-168)/(56-77) 157/76  Physical Exam  Vitals and nursing note reviewed.   Constitutional:       Comments: Sitting up in chair.  Oxygen saturation 98 to 100% on room air.  She reports shortness of breath and I explained to her that she has maintained saturation greater than 94 to 100% on room air for the past 3 days now.  No sputum production.  HENT:      Head: Normocephalic and atraumatic.      Nose: No congestion.      Mouth/Throat:      Pharynx: Oropharynx is clear. No oropharyngeal exudate or posterior oropharyngeal erythema.      Comments: Hard of hearing  Eyes:      Extraocular Movements: Extraocular movements intact.      Pupils: Pupils are equal, round, and reactive to light.   Cardiovascular:      Rate and Rhythm: Normal rate and regular rhythm.      Heart sounds: No murmur heard.  Pulmonary:      Breath sounds: Wheeze (faint expiratory wheeze anterior upper airway)No rhonchi or rales.      Comments: No oxygen in use.  Saturation  %.  Abdominal:      Palpations: Abdomen is soft.      Tenderness: There is no abdominal tenderness.   Genitourinary:     Comments: WIC in place with luis urine return.  Musculoskeletal:         General: Swelling (Bilateral lower extremity edema some improved with Unna boots placed 10/12.) present. No tenderness.      Cervical back: Normal range of motion and neck supple.   Skin:     General: Skin is warm and dry.       Comments: Unna boots bilateral lower extremities knee down  Neurological:      General: No  focal deficit present.      Mental Status: She is alert and oriented to person, place, and time.   Psychiatric:         Mood and Affect: Mood normal.         Behavior: Behavior normal.         Thought Content: Thought content normal.         Judgment: Judgment normal.     Results Review:  I have reviewed the labs, radiology results, and diagnostic studies.    Laboratory Data:      Results from last 7 days   Lab Units 10/13/21  0500 10/12/21  0628 10/11/21  0746 10/08/21  1910   WBC 10*3/mm3 14.64* 15.11* 13.63* 8.72   HEMOGLOBIN g/dL 8.8* 9.1* 8.7* 8.9*   HEMATOCRIT % 27.9* 28.2* 27.7* 28.2*   PLATELETS 10*3/mm3 245 255 245 222        Results from last 7 days   Lab Units 10/13/21  0500 10/12/21  0628 10/12/21  0628 10/11/21  0615 10/11/21  0615 10/10/21  0538 10/10/21  0538 10/09/21  1207 10/09/21  1207 10/08/21  1910 10/08/21  1910   SODIUM mmol/L 139  --  140  --  140  --  135*  --  136  --  135*   POTASSIUM mmol/L 4.0  --  3.9  --  4.5  --  4.8  --  4.7  --  4.0   CHLORIDE mmol/L 109*  --  108*  --  108*  --  104  --  104  --  103   CO2 mmol/L 18.0*  --  19.0*  --  19.0*  --  16.0*  --  18.0*  --  21.0*   BUN mg/dL 62*  --  65*  --  60*  --  53*  --  46*  --  40*   CREATININE mg/dL 2.33*  --  2.23*  --  2.44*  --  2.44*  --  2.45*  --  2.52*   GLUCOSE mg/dL 200*   < > 166*   < > 203*   < > 334*   < > 313*   < > 159*   CALCIUM mg/dL 8.7  --  8.9  --  8.7  --  8.4*  --  8.5*  --  8.6   ALT (SGPT) U/L  --   --   --   --   --   --   --   --   --   --  13    < > = values in this interval not displayed.     Culture Data:      Blood Culture   Date Value Ref Range Status   10/08/2021 No growth at 3 days  Preliminary   10/08/2021 No growth at 3 days  Preliminary     Urine Culture   Date Value Ref Range Status   10/08/2021 >100,000 CFU/mL Escherichia coli (A)  Final     Escherichia coli       ISMAEL     Ampicillin <=2  Susceptible     Ampicillin + Sulbactam <=2  Susceptible     Cefazolin <=4  Susceptible     Cefepime <=1   Susceptible     Ceftazidime <=1  Susceptible     Ceftriaxone <=1  Susceptible     Gentamicin <=1  Susceptible     Levofloxacin <=0.12  Susceptible     Nitrofurantoin <=16  Susceptible     Piperacillin + Tazobactam <=4  Susceptible     Tetracycline <=1  Susceptible     Trimethoprim + Sulfamethoxazole <=20  Susceptible      Radiology Data:   Imaging Results (Last 7 Days)     Procedure Component Value Units Date/Time    XR Chest 1 View [[id number]] Collected: 10/12/21 1005     Updated: 10/12/21 1011    Narrative:      EXAM: XR CHEST 1 VW-     INDICATION: Short of breath; R13.12-Dysphagia, oropharyngeal phase;  N39.0-Urinary tract infection, site not specified; J18.9-Pneumonia,  unspecified organism; R68.89-Other general symptoms and signs     COMPARISON: 10/8/2021     FINDINGS:     Cardiac silhouette is normal in size and stable. Small bilateral pleural  effusions appear unchanged. Interval increase in bilateral patchy  airspace opacity. Emphysema and chronic interstitial changes are again  noted. No visible pneumothorax. Old RIGHT humeral neck fracture. No  acute osseous finding.       Impression:         1.  Worsening of bilateral patchy airspace opacity. Differential  includes multifocal pneumonia and pulmonary edema.   2.  No change in small bilateral pleural effusions.  This report was finalized on 10/12/2021 10:08 by Dr. Haakn Robles MD.    FL Video Swallow With Speech Single Contrast [[id number]] Collected: 10/11/21 1245     Updated: 10/11/21 1249    Narrative:      EXAMINATION:  FL VIDEO SWALLOW W SPEECH SINGLE-CONTRAST-  10/11/2021  12:26 PM CDT     HISTORY: Ongoing dysphagia with no s/s at bedside; pneumonia; concern  for silent aspiration; R13.12-Dysphagia, oropharyngeal phase;  N39.0-Urinary tract infection, site not specified; J18.9-Pneumonia,  unspecified organism.     COMPARISON: No comparison study.     TECHNIQUE: The patient was given honey, nectar, water, pudding, fruit  and cracker consistencies  mixed with barium for swallowing.     FLUOROSCOPY TIME: 1 minute 27 seconds.     NUMBER OF IMAGES: 4.4 mGy.     FINDINGS: There was laryngeal penetration with water consistency barium  on 2 separate swallowing sequences. There were no other episodes of  penetration or aspiration. The epiglottis appears to invert normally.  There was some spillover of the liquid substances.       Impression:      1. There were 2 episodes of laryngeal penetration with water consistency  barium. No aspiration was observed.  2. Please see the speech pathology report separately.  This report was finalized on 10/11/2021 12:46 by Dr. Johnathan Brennan MD.    CT Chest Without Contrast Diagnostic [[id number]] Collected: 10/08/21 2010     Updated: 10/08/21 2019    Narrative:      EXAMINATION: CT CHEST WO CONTRAST DIAGNOSTIC- 10/8/2021 8:10 PM CDT     HISTORY: Shortness of breath, wheezing     COMPARISON: Chest x-ray 10/8/2021     DOSE: 263 mGy-cm     TECHNIQUE: Sequential imaging was performed from the thoracic inlet  through the upper abdomen without the use of IV contrast.  Sagittal and  coronal reformations were made from the original source data and  reviewed. Automated exposure control was also utilized to decrease  patient radiation dose.     FINDINGS:   Visualized thyroid gland is grossly normal in appearance. Trachea and  main bronchi are patent. The esophagus is grossly normal in appearance.     No pathologically enlarged axillary lymph nodes are identified. Some  mildly prominent mediastinal lymph nodes measure 10 mm in short axis.     The heart is normal in size. There is no pericardial effusion. Coronary  artery calcifications are present. Atherosclerotic calcifications are  seen within the aorta and its branch vessels. The ascending thoracic  aorta is normal in caliber. Main pulmonary artery is dilated, suggesting  pulmonary arterial hypertension. The distal aortic arch measures 3.4 cm  in caliber.     There are small bilateral  pleural effusions. Severe emphysematous  changes are present. There is diffuse bronchial wall thickening. Some  interlobular septal thickening is noted. Groundglass airspace opacities  are seen throughout the lungs. Some areas of patchy nodularity are seen  in the left upper lobe measuring 12 mm and 10 mm in size.     Review of the visualized portion of the upper abdomen demonstrates no  acute findings.     Review of the visualized osseous structures demonstrates no acute or  aggressive lesions.        Impression:         1. Bilateral airspace disease superimposed upon emphysematous changes,  concerning for multifocal pneumonia.     2. Small bilateral pleural effusions.     3. Atherosclerosis of the aorta and coronary arteries.  4. Pulmonary arterial hypertension.  5. Mildly prominent mediastinal lymph nodes may be reactive. Attention  on follow-up recommended.  6. Noncalcified pulmonary nodules for which follow-up CT is recommended  in 3-6 months per Fleischner Society guidelines.        This report was finalized on 10/08/2021 20:16 by Dr. Erik Schmitt MD.    XR Chest 1 View [[id number]] Collected: 10/08/21 1958     Updated: 10/08/21 2002    Narrative:      EXAMINATION: XR CHEST 1 VW- 10/8/2021 7:58 PM CDT     HISTORY: Shortness of breath, wheezing     COMPARISON: 8/30/2021     FINDINGS:  The heart and mediastinal contours are stable. Atherosclerotic  calcifications are seen in the aorta. Chronic interstitial changes are  present. However, there are worsening airspace opacities throughout both  lungs, particularly in the lung bases. There are small pleural effusions  bilaterally. Retrocardiac consolidation is noted. An old right humeral  fracture is suspected.       Impression:      Bilateral airspace disease concerning for pneumonia  superimposed upon chronic changes. Small bilateral pleural effusions.  This report was finalized on 10/08/2021 19:59 by Dr. Erik Schmitt MD.        Intake/Output    Intake/Output  Summary (Last 24 hours) at 10/13/2021 1541  Last data filed at 10/13/2021 1418  Gross per 24 hour   Intake 710 ml   Output 1425 ml   Net -715 ml     Scheduled Meds  amLODIPine, 5 mg, Oral, Q24H  atorvastatin, 40 mg, Oral, Daily  budesonide-formoterol, 2 puff, Inhalation, BID - RT  enoxaparin, 30 mg, Subcutaneous, Q24H  guaiFENesin, 1,200 mg, Oral, Q12H  insulin detemir, 15 Units, Subcutaneous, BID  insulin lispro, 0-14 Units, Subcutaneous, 4x Daily With Meals & Nightly  ipratropium-albuterol, 1.5 mL, Nebulization, 4x Daily - RT  levothyroxine, 75 mcg, Oral, Q AM  losartan, 25 mg, Oral, Q24H  oxybutynin XL, 10 mg, Oral, Daily  piperacillin-tazobactam, 4.5 g, Intravenous, Q12H  predniSONE, 40 mg, Oral, Daily With Breakfast  sennosides-docusate, 1 tablet, Oral, BID  sodium chloride, 10 mL, Intravenous, Q12H      I have reviewed the patient current medications.     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  To ER 10/8/2021 with shortness of breath and cough for 2 days.  Resident at [facility]. PO2 80 on 2 L.  Chest x-ray bilateral airspace disease concerning for pneumonia.  CT angiogram of the chest bilateral airspace disease superimposed on emphysematous changes concerning for multifocal pneumonia.  Pulmonary arterial hypertension.  Prominent mediastinal lymph node may be reactive.  Noncalcified pulmonary nodule follow-up CT 3 to 6 months.  Solu-Medrol IV, doxycycline, Rocephin given in ER.     2.  Multifocal pneumonia.  Cefepime IV,  vancomycin IV started on admit and changed to Zosyn and azithromycin on 10/9 for atypical coverage. Completes azithromycin today.  Plans for Augmentin at discharge.  Strep pneumonia negative, Legionella negative, respiratory PCR panel negative.  MRSA PCR negative.   Blood cultures no growth at 4 days.  Oxygen off.  WBC 14.64.  Has been on steroids.    3.  Reported worsening shortness of breath yesterday noted to have worsening expiratory wheezes.  She was given Solu-Medrol IV, Lasix IV.  Chest x-ray showed worsening patchy airspace disease.  Pulmonary edema noted.  Lungs sound improved today.  She still complains of shortness of breath.  I discussed with her that saturation is maintaining normal on room air.  Lasix 40 mg IV x1 dose today.    4.  Continue duo nebs, incentive spirometry, flutter valve Mucinex, Symbicort.  Solu-Medrol IV x1 dose on 10/12.  Prednisone 40 mg orally daily started today and plans for tapering dose.    5.  Urine culture E. coli sensitive to Zosyn.    6.  Physical therapy consulted.  Unna boots placed yesterday with some improvement of bilateral lower extremity edema.  No open wounds.  Ambulated 10 feet today and sat in the chair.  Nectar soft diet per speech.    7.  Chronic kidney disease stage V with baseline creatinine 1.9-2.1.  Creatinine 2.52 on admit 2.33 today    8.  Diabetes mellitus type 2.  A1c 8.5.  Sliding scale insulin.  Glucoses 195, 161, 200.  Levemir increased to 15 units twice daily on 10/11.  Glucoses some improved and should continue to improve when tapered off steroids.     9.  Hypertension.  Blood pressure 157/76.  Continue Norvasc and losartan.    10.  Lovenox for deep vein thrombosis prophylaxis.  Synthroid for hypothyroidism.    11.  Wound care evaluated noting scarring and blanchable erythema with healed pressure injuries.       CODE STATUS/advance care planning: Full code  Patient surrogate decision-maker is her son, Chaka.     The above documentation resulted from a  face-to-face encounter by me Nohemi MORILLO, [facility].    Discharge Planning: I expect patient to be discharged to [facility] to be determined.     Electronically signed by YISEL Salamanca, 10/13/2021, 15:41 CDT.

## 2021-10-13 NOTE — PLAN OF CARE
"Goal Outcome Evaluation:  Plan of Care Reviewed With: patient        Progress: improving  Outcome Summary: Observed pt with mechanical soft and nectar thick lunch. Pt appears to be less short of breath today. She states that she does feel better, but is tired as she has been awake since 0200. No overt s/s of aspiration were observed. She was about to finish her nectar thick tea and when asked if she wanted more to drink stated \"I'll just drink water.\" Educated that since it is with a meal it should be nectar thick, so SLP provided nectar thick apple juice instead. Continue current diet, SLP will continue to follow.  "

## 2021-10-13 NOTE — PLAN OF CARE
Goal Outcome Evaluation:  Plan of Care Reviewed With: patient        Progress: no change  Outcome Summary: A&Ox4. VSS. No c/o pain. Con't IV abx. Turn and reposition every 2hrs.  in place. Resting well.

## 2021-10-13 NOTE — PLAN OF CARE
Goal Outcome Evaluation:  Plan of Care Reviewed With: patient         Patient alert and oriented X 4. RA. SOA present but maintaining saturation above 90% on room air. VSS. Lungs sounds wheezy . BL Lower extermities swollen. Unna boots placed by therapy since 10/12. Infrequent Cough present. Assist X2 , up in chair and transferred back to bed, BSC. Thickened liquid with soft diet, takes whole med with apple sauce. Pure wick. IV IID. Continue to monitor.

## 2021-10-13 NOTE — THERAPY TREATMENT NOTE
"Acute Care - Speech Language Pathology   Swallow Treatment Note [facility]     Patient Name: Honey Canales  : 3/27/1933  MRN: [id number]  Today's Date: 10/13/2021               Admit Date: 10/8/2021  Observed pt with mechanical soft and nectar thick lunch. Pt appears to be less short of breath today. She states that she does feel better, but is tired as she has been awake since 0200. No overt s/s of aspiration were observed. She was about to finish her nectar thick tea and when asked if she wanted more to drink stated \"I'll just drink water.\" Educated that since it is with a meal it should be nectar thick, so SLP provided nectar thick apple juice instead. Continue current diet, SLP will continue to follow.  Vesta Ramirez, CCC-SLP 10/13/2021 13:21 CDT    Visit Dx:     ICD-10-CM ICD-9-CM   1. Oropharyngeal dysphagia  R13.12 787.22   2. Urinary tract infection without hematuria, site unspecified  N39.0 599.0   3. Pneumonia due to infectious organism, unspecified laterality, unspecified part of lung  J18.9 486   4. 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)   • Hallux valgus, right   • Hallux valgus, left   • Foot pain, bilateral   • Acquired hammer toes of both feet   • Benign essential HTN   • Benign meningioma (HCC)   • Callus   • Chronic kidney disease   • History of colon polyps   • Hyperlipidemia LDL goal <100   • Loss of appetite   • Non-toxic multinodular goiter   • Pulmonary fibrosis (HCC)   • Uncontrolled type 2 diabetes mellitus without complication, with long-term " current use of insulin   • Closed nondisplaced fracture of pelvis with routine healing   • Closed fracture of anatomical neck of right humerus   • Frequent falls   • Acute pain due to injury   • Transaminitis   • Stage 3b chronic kidney disease (HCC)   • Hepatitis C   • Hypoglycemia   • Right hydronephrosis   • Abdominal fluid collection   • Compression fracture of fifth lumbar vertebra (HCC)   • Hyponatremia   • Personal history of fall   • Acute kidney injury (HCC)   • HCAP (healthcare-associated pneumonia)   • Acute hypoxemic respiratory failure (HCC)   • Acute cystitis with hematuria   • Chronic kidney disease (CKD), stage IV (severe) (HCC)   • Multifocal pneumonia     Past Medical History:   Diagnosis Date   • Allergic rhinitis    • Aneurysm (HCC)    • Arthritis    • Broken shoulder     Right shoulder    • Cerebral aneurysm 2009    2ND ONE FOUND   • Cerebral aneurysm     NON TREATABLE   • Chronic laryngitis    • CKD (chronic kidney disease)    • Colon polyps    • COPD (chronic obstructive pulmonary disease) (HCC)    • Deviated nasal septum    • Diabetes mellitus (HCC)    • Disorder of kidney and ureter    • Disturbance of smell and taste    • Dizziness    • Encounter for imaging to screen for metal prior to MRI     NO MRI, METAL CLIPS IN HEAD   • Eustachian tube dysfunction    • GERD (gastroesophageal reflux disease)    • Globus sensation    • Headache    • Hepatitis     B   • Hepatitis A    • History of stomach ulcers    • Hyperlipidemia    • Hypothyroid    • Laryngitis sicca    • Lung nodule    • Nocturia    • PONV (postoperative nausea and vomiting)    • Sensorineural hearing loss    • Spinal stenosis    • Urge incontinence    • Urgency of urination    • Urinary frequency    • Urinary incontinence    • UTI (urinary tract infection)      Past Surgical History:   Procedure Laterality Date   • BLADDER SURGERY  08/2016    [employer] Interstem   • BRAIN SURGERY      ANUERYSM REMOVED   • BREAST SURGERY Bilateral   "   BIOPSY   • CARPAL TUNNEL RELEASE     • CATARACT EXTRACTION, BILATERAL     • CEREBRAL ANEURYSM REPAIR     •  SECTION      X4   • CHOLECYSTECTOMY     • HYSTERECTOMY     • INTERSTIM PLACEMENT N/A 10/18/2018    Procedure: INTERSTIM STAGES 1 AND 2 LEAD AND GENERATOR REMOVAL AND REPLACEMENT;  Surgeon: Archie Avery MD;  Location: [facility];  Service: Urology   • JOINT REPLACEMENT Right     KNEE   • KNEE ARTHROSCOPY     • THYROIDECTOMY     • TONSILLECTOMY         SLP Recommendation and Plan                                         Daily Summary of Progress (SLP): progress toward functional goals is good (10/13/21 1305)    Plan for Continued Treatment (SLP): Continue to follow (10/13/21 1305)              Plan of Care Reviewed With: patient  Progress: improving  Outcome Summary: Observed pt with mechanical soft and nectar thick lunch. Pt appears to be less short of breath today. She states that she does feel better, but is tired as she has been awake since 0200. No overt s/s of aspiration were observed. She was about to finish her nectar thick tea and when asked if she wanted more to drink stated \"I'll just drink water.\" Educated that since it is with a meal it should be nectar thick, so SLP provided nectar thick apple juice instead. Continue current diet, SLP will continue to follow.      SWALLOW EVALUATION (last 72 hours)     SLP Adult Swallow Evaluation     Row Name 10/13/21 1305 10/12/21 0835 10/11/21 1225 10/11/21 0920          Rehab Evaluation    Document Type therapy note (daily note)  -MB therapy note (daily note)  -MB re-evaluation  -MB therapy note (daily note)  -MM     Subjective Information complains of; fatigue  -MB complains of; dyspnea  -MB no complaints  -MB complains of; fatigue; dyspnea  -MM     Patient Observations alert; cooperative  -MB alert; cooperative  -MB alert; cooperative  -MB alert; cooperative; agree to therapy  -MM     Patient/Family/Caregiver Comments/Observations No family " present  -MB No family present  -MB No family present  -MB No family present.  -MM     Care Plan Review -- -- -- care plan/treatment goals reviewed  -     Care Plan Review, Other Participant(s) -- -- -- other (see comments)  STEPHAN Monge  -     Patient Effort good  -MB good  -MB -- good  -MM            General Information    Patient Profile Reviewed -- -- yes  -MB --     Pertinent History Of Current Problem -- -- Pneumonia  -MB --     Current Method of Nutrition -- -- regular textures; thin liquids  -MB --     Precautions/Limitations, Vision -- -- WFL  -MB --     Precautions/Limitations, Hearing -- -- hearing impairment, bilaterally  -MB --     Prior Level of Function-Communication -- -- WFL  -MB --     Prior Level of Function-Swallowing -- -- no diet consistency restrictions  -MB --     Plans/Goals Discussed with -- -- patient  -MB --     Barriers to Rehab -- -- hearing deficit  -MB --     Patient's Goals for Discharge -- -- patient did not state  -MB --            Pain    Additional Documentation Pain Scale: FACES Pre/Post-Treatment (Group)  -MB Pain Scale: FACES Pre/Post-Treatment (Group)  -MB Pain Scale: FACES Pre/Post-Treatment (Group)  -MB Pain Scale: FACES Pre/Post-Treatment (Group)  -MM            Pain Scale: FACES Pre/Post-Treatment    Pain: FACES Scale, Pretreatment 0-->no hurt  -MB 4-->hurts little more  -MB 0-->no hurt  -MB 0-->no hurt  -MM     Posttreatment Pain Rating -- -- -- 0-->no hurt  -MM            Oral Motor Structure and Function    Dentition Assessment -- -- natural, present and adequate  -MB --     Secretion Management -- -- WNL/WFL  -MB --     Mucosal Quality -- -- moist, healthy  -MB --            Oral Musculature and Cranial Nerve Assessment    Oral Motor General Assessment -- -- WFL  -MB --            General Eating/Swallowing Observations    Respiratory Support Currently in Use -- -- nasal cannula  -MB --            MBS/VFSS    Utensils Used -- -- spoon; straw  -MB --     Consistencies  Trialed -- -- regular textures; soft textures; thin liquids; nectar/syrup-thick liquids; honey-thick liquids; pudding thick  -MB --            MBS/VFSS Interpretation    Oral Prep Phase -- -- impaired oral phase of swallowing  -MB --     Oral Transit Phase -- -- impaired  -MB --     Oral Residue -- -- impaired  -MB --     VFSS Summary -- -- See MBS note  -MB --            Oral Preparatory Phase    Oral Preparatory Phase -- -- prolonged manipulation  -MB --     Prolonged Manipulation -- -- regular textures  -MB --            Oral Transit Phase    Impaired Oral Transit Phase -- -- premature spillage of liquids into pharynx  -MB --     Premature Spillage of Liquids into Pharynx -- -- thin liquids  -MB --            Oral Residue    Impaired Oral Residue -- -- diffuse residue throughout oral cavity  -MB --     Diffuse Residue throughout Oral Cavity -- -- all consistencies tested  -MB --            Initiation of Pharyngeal Swallow    Initiation of Pharyngeal Swallow -- -- bolus in valleculae; bolus in pyriform sinuses  -MB --     Pharyngeal Phase -- -- impaired pharyngeal phase of swallowing  -MB --     Penetration Before the Swallow -- -- thin liquids  -MB --     Penetration During the Swallow -- -- thin liquids  -MB --     Response to Penetration -- -- no response  -MB --     Pharyngeal Residue -- -- all consistencies tested  -MB --            Clinical Impression    Daily Summary of Progress (SLP) progress toward functional goals is good  -MB progress towards functional goals is fair  -MB -- progress toward functional goals is good  -MM     Barriers to Overall Progress (SLP) none  -MB Shortness of breath  -MB -- Anxious  -MM     SLP Swallowing Diagnosis -- -- mild; oral dysphagia; mild-moderate; pharyngeal dysphagia  -MB --     Functional Impact -- -- risk of aspiration/pneumonia; risk of malnutrition; risk of dehydration  -MB --     Rehab Potential/Prognosis, Swallowing -- -- adequate, monitor progress closely  -MB --      Swallow Criteria for Skilled Therapeutic Interventions Met -- -- demonstrates skilled criteria  -MB --     Plan for Continued Treatment (SLP) Continue to follow  -MB Continue to follow  -MB -- Continue to follow  -MM            Recommendations    Therapy Frequency (Swallow) -- -- 2 days per week  -MB --     Predicted Duration Therapy Intervention (Days) -- -- until discharge  -MB --     SLP Diet Recommendation -- -- soft textures; ground; nectar thick liquids  -MB --     Recommended Precautions and Strategies -- -- upright posture during/after eating; small bites of food and sips of liquid; alternate between small bites of food and sips of liquid; general aspiration precautions  -MB --     Oral Care Recommendations -- -- Oral Care BID/PRN  -MB --     SLP Rec. for Method of Medication Administration -- -- meds whole; with pudding or applesauce  -MB --     Monitor for Signs of Aspiration -- -- yes; cough; gurgly voice; throat clearing; notify SLP if any concerns  -MB --     Anticipated Discharge Disposition (SLP) -- -- unknown  -MB --            Swallow Goals (SLP)    Oral Nutrition/Hydration Goal Selection (SLP) -- -- oral nutrition/hydration, SLP goal 1  -MB oral nutrition/hydration, SLP goal 1  -MM     Lingual Strengthening Goal Selection (SLP) -- -- lingual strengthening, SLP goal 1  -MB --     Pharyngeal Strengthening Exercise Goal Selection (SLP) -- -- pharyngeal strengthening exercise, SLP goal 1  -MB --     Additional Documentation -- -- lingual strengthening goal selection (SLP); pharyngeal strengthening exercise goal selection (SLP)  -MB --            Oral Nutrition/Hydration Goal 1 (SLP)    Oral Nutrition/Hydration Goal 1, SLP LTG: Patient will tolerate LRD with no overt s/s of aspiration.  -MB LTG: Patient will tolerate LRD with no overt s/s of aspiration.  -MB LTG: Patient will tolerate LRD with no overt s/s of aspiration.  -MB LTG: Patient will tolerate LRD with no overt s/s of aspiration.  -MM      Time Frame (Oral Nutrition/Hydration Goal 1, SLP) short term goal (STG); by discharge  -MB short term goal (STG); by discharge  -MB short term goal (STG); by discharge  -MB short term goal (STG); by discharge  -MM     Barriers (Oral Nutrition/Hydration Goal 1, SLP) Hearing impairment, shortness of breath  -MB Hearing impairment, shortness of breath  -MB hearing  -MB hearing  -MM     Progress/Outcomes (Oral Nutrition/Hydration Goal 1, SLP) continuing progress toward goal  -MB continuing progress toward goal  -MB -- continuing progress toward goal  -MM            Lingual Strengthening Goal 1 (SLP)    Activity (Lingual Strengthening Goal 1, SLP) increase tongue back strength  -MB increase tongue back strength  -MB increase tongue back strength  -MB --     Increase Tongue Back Strength lingual movement exercises  -MB lingual movement exercises  -MB lingual movement exercises  -MB --     [place]/Accuracy (Lingual Strengthening Goal 1, SLP) independently (over 90% accuracy)  -MB independently (over 90% accuracy)  -MB independently (over 90% accuracy)  -MB --     Time Frame (Lingual Strengthening Goal 1, SLP) short term goal (STG); by discharge  -MB short term goal (STG); by discharge  -MB short term goal (STG); by discharge  -MB --     Barriers (Lingual Strengthening Goal 1, SLP) -- -- n/a  -MB --     Progress/Outcomes (Lingual Strengthening Goal 1, SLP) -- -- goal ongoing  -MB --            Pharyngeal Strengthening Exercise Goal 1 (SLP)    Activity (Pharyngeal Strengthening Goal 1, SLP) increase anterior movement of the hyolaryngeal complex; increase squeeze/positive pressure generation  -MB increase anterior movement of the hyolaryngeal complex; increase squeeze/positive pressure generation  -MB increase anterior movement of the hyolaryngeal complex; increase squeeze/positive pressure generation  -MB --     Increase Anterior Movement of the Hyolaryngeal Complex shaker  -MB shaker  -MB shaker  -MB --     Increase  Squeeze/Positive Pressure Generation hard effortful swallow  -MB hard effortful swallow  -MB hard effortful swallow  -MB --     [place]/Accuracy (Pharyngeal Strengthening Goal 1, SLP) independently (over 90% accuracy)  -MB independently (over 90% accuracy)  -MB independently (over 90% accuracy)  -MB --     Time Frame (Pharyngeal Strengthening Goal 1, SLP) short term goal (STG); by discharge  -MB short term goal (STG); by discharge  -MB short term goal (STG); by discharge  -MB --     Barriers (Pharyngeal Strengthening Goal 1, SLP) -- -- n/a  -MB --     Progress/Outcomes (Pharyngeal Strengthening Goal 1, SLP) -- -- goal ongoing  -MB --           User Key  (r) = Recorded By, (t) = Taken By, (c) = Cosigned By    Initials Name Effective Dates    Vesta Prabhakar, CCC-SLP 06/16/21 -     MM Philly Hinojosa, MS CCC-SLP 06/16/21 -                 EDUCATION  The patient has been educated in the following areas:   Dysphagia (Swallowing Impairment).        SLP GOALS     Row Name 10/13/21 1305 10/12/21 0835 10/11/21 1225       Oral Nutrition/Hydration Goal 1 (SLP)    Oral Nutrition/Hydration Goal 1, SLP LTG: Patient will tolerate LRD with no overt s/s of aspiration.  -MB LTG: Patient will tolerate LRD with no overt s/s of aspiration.  -MB LTG: Patient will tolerate LRD with no overt s/s of aspiration.  -MB    Time Frame (Oral Nutrition/Hydration Goal 1, SLP) short term goal (STG); by discharge  -MB short term goal (STG); by discharge  -MB short term goal (STG); by discharge  -MB    Barriers (Oral Nutrition/Hydration Goal 1, SLP) Hearing impairment, shortness of breath  -MB Hearing impairment, shortness of breath  -MB hearing  -MB    Progress/Outcomes (Oral Nutrition/Hydration Goal 1, SLP) continuing progress toward goal  -MB continuing progress toward goal  -MB --       Lingual Strengthening Goal 1 (SLP)    Activity (Lingual Strengthening Goal 1, SLP) increase tongue back strength  -MB increase tongue back strength   -MB increase tongue back strength  -MB    Increase Tongue Back Strength lingual movement exercises  -MB lingual movement exercises  -MB lingual movement exercises  -MB    [place]/Accuracy (Lingual Strengthening Goal 1, SLP) independently (over 90% accuracy)  -MB independently (over 90% accuracy)  -MB independently (over 90% accuracy)  -MB    Time Frame (Lingual Strengthening Goal 1, SLP) short term goal (STG); by discharge  -MB short term goal (STG); by discharge  -MB short term goal (STG); by discharge  -MB    Barriers (Lingual Strengthening Goal 1, SLP) -- -- n/a  -MB    Progress/Outcomes (Lingual Strengthening Goal 1, SLP) -- -- goal ongoing  -MB       Pharyngeal Strengthening Exercise Goal 1 (SLP)    Activity (Pharyngeal Strengthening Goal 1, SLP) increase anterior movement of the hyolaryngeal complex; increase squeeze/positive pressure generation  -MB increase anterior movement of the hyolaryngeal complex; increase squeeze/positive pressure generation  -MB increase anterior movement of the hyolaryngeal complex; increase squeeze/positive pressure generation  -MB    Increase Anterior Movement of the Hyolaryngeal Complex shaker  -MB shaker  -MB shaker  -MB    Increase Squeeze/Positive Pressure Generation hard effortful swallow  -MB hard effortful swallow  -MB hard effortful swallow  -MB    [place]/Accuracy (Pharyngeal Strengthening Goal 1, SLP) independently (over 90% accuracy)  -MB independently (over 90% accuracy)  -MB independently (over 90% accuracy)  -MB    Time Frame (Pharyngeal Strengthening Goal 1, SLP) short term goal (STG); by discharge  -MB short term goal (STG); by discharge  -MB short term goal (STG); by discharge  -MB    Barriers (Pharyngeal Strengthening Goal 1, SLP) -- -- n/a  -MB    Progress/Outcomes (Pharyngeal Strengthening Goal 1, SLP) -- -- goal ongoing  -MB    Row Name 10/11/21 0920             Oral Nutrition/Hydration Goal 1 (SLP)    Oral Nutrition/Hydration Goal 1, SLP LTG:  Patient will tolerate LRD with no overt s/s of aspiration.  -MM      Time Frame (Oral Nutrition/Hydration Goal 1, SLP) short term goal (STG); by discharge  -MM      Barriers (Oral Nutrition/Hydration Goal 1, SLP) hearing  -MM      Progress/Outcomes (Oral Nutrition/Hydration Goal 1, SLP) continuing progress toward goal  -MM            User Key  (r) = Recorded By, (t) = Taken By, (c) = Cosigned By    Initials Name Provider Type    Vesta Prabhakar CCC-SLP Speech and Language Pathologist    Philly Roque, MS CCC-SLP Speech and Language Pathologist                   Time Calculation:    Time Calculation- SLP     Row Name 10/13/21 1320             Time Calculation- SLP    SLP Start Time 1305  -MB      SLP Stop Time 1320  -MB      SLP Time Calculation (min) 15 min  -MB      SLP Received On 10/13/21  -MB              Untimed Charges    06302-SI Treatment Swallow Minutes 15  -MB              Total Minutes    Untimed Charges Total Minutes 15  -MB       Total Minutes 15  -MB            User Key  (r) = Recorded By, (t) = Taken By, (c) = Cosigned By    Initials Name Provider Type    Vesta Prabhakar CCC-SLP Speech and Language Pathologist                Therapy Charges for Today     Code Description Service Date Service Provider Modifiers Qty    63989477041 HC ST TREATMENT SWALLOW 3 10/12/2021 Vesta Ramirez CCC-SLP GN 1    [id number] HC ST TREATMENT SWALLOW 1 10/13/2021 Vesta Ramirez CCC-SLP GN 1               VALENTIN Peoples  10/13/2021

## 2021-10-13 NOTE — THERAPY TREATMENT NOTE
Acute Care - Physical Therapy Treatment Note   [place]     Patient Name: Honey Canales  : 3/27/1933  MRN: [id number]  Today's Date: 10/13/2021      Visit Dx:     ICD-10-CM ICD-9-CM   1. Oropharyngeal dysphagia  R13.12 787.22   2. Urinary tract infection without hematuria, site unspecified  N39.0 599.0   3. Pneumonia due to infectious organism, unspecified laterality, unspecified part of lung  J18.9 486   4. 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)   • Hallux valgus, right   • Hallux valgus, left   • Foot pain, bilateral   • Acquired hammer toes of both feet   • Benign essential HTN   • Benign meningioma (HCC)   • Callus   • Chronic kidney disease   • History of colon polyps   • Hyperlipidemia LDL goal <100   • Loss of appetite   • Non-toxic multinodular goiter   • Pulmonary fibrosis (HCC)   • Uncontrolled type 2 diabetes mellitus without complication, with long-term current use of insulin   • Closed nondisplaced fracture of pelvis with routine healing   • Closed fracture of anatomical neck of right humerus   • Frequent falls   • Acute pain due to injury   • Transaminitis   • Stage 3b chronic kidney disease (HCC)   • Hepatitis C   • Hypoglycemia   • Right hydronephrosis   • Abdominal fluid collection   • Compression fracture of fifth lumbar vertebra (HCC)   • Hyponatremia   • Personal history of fall   • Acute kidney injury (HCC)   • HCAP (healthcare-associated pneumonia)   • Acute hypoxemic respiratory failure (HCC)   • Acute cystitis with hematuria   • Chronic kidney  disease (CKD), stage IV (severe) (HCC)   • Multifocal pneumonia     Past Medical History:   Diagnosis Date   • Allergic rhinitis    • Aneurysm (HCC)    • Arthritis    • Broken shoulder     Right shoulder    • Cerebral aneurysm 2009    2ND ONE FOUND   • Cerebral aneurysm     NON TREATABLE   • Chronic laryngitis    • CKD (chronic kidney disease)    • Colon polyps    • COPD (chronic obstructive pulmonary disease) (HCC)    • Deviated nasal septum    • Diabetes mellitus (HCC)    • Disorder of kidney and ureter    • Disturbance of smell and taste    • Dizziness    • Encounter for imaging to screen for metal prior to MRI     NO MRI, METAL CLIPS IN HEAD   • Eustachian tube dysfunction    • GERD (gastroesophageal reflux disease)    • Globus sensation    • Headache    • Hepatitis     B   • Hepatitis A    • History of stomach ulcers    • Hyperlipidemia    • Hypothyroid    • Laryngitis sicca    • Lung nodule    • Nocturia    • PONV (postoperative nausea and vomiting)    • Sensorineural hearing loss    • Spinal stenosis    • Urge incontinence    • Urgency of urination    • Urinary frequency    • Urinary incontinence    • UTI (urinary tract infection)      Past Surgical History:   Procedure Laterality Date   • BLADDER SURGERY  2016    [employer]onic Interstem   • BRAIN SURGERY      ANUERYSM REMOVED   • BREAST SURGERY Bilateral     BIOPSY   • CARPAL TUNNEL RELEASE     • CATARACT EXTRACTION, BILATERAL     • CEREBRAL ANEURYSM REPAIR     •  SECTION      X4   • CHOLECYSTECTOMY     • HYSTERECTOMY     • INTERSTIM PLACEMENT N/A 10/18/2018    Procedure: INTERSTIM STAGES 1 AND 2 LEAD AND GENERATOR REMOVAL AND REPLACEMENT;  Surgeon: Archie Avery MD;  Location: [facility];  Service: Urology   • JOINT REPLACEMENT Right     KNEE   • KNEE ARTHROSCOPY     • THYROIDECTOMY     • TONSILLECTOMY       PT Assessment (last 12 hours)     PT Evaluation and Treatment     Row Name 10/13/21 1340 10/13/21 1320       Physical Therapy Time and  Intention    Subjective Information complains of; fatigue  -WK --    Document Type therapy note (daily note)  -WK --    Mode of Treatment physical therapy  -WK --    Session Not Performed -- other (see comments)  -WK    Comment L unna boot intace, Nsg removed coban on R LE and PTA replaced  -WK still eating lunch  -WK    Row Name 10/13/21 1340          General Information    Existing Precautions/Restrictions fall; other (see comments)  BLE unna boots  -WK     Row Name 10/13/21 1340          Pain Scale: Numbers Pre/Post-Treatment    Pretreatment Pain Rating 0/10 - no pain  -WK     Posttreatment Pain Rating 0/10 - no pain  -WK     Row Name 10/13/21 1340          Bed Mobility    Scooting/Bridging Suring (Bed Mobility) maximum assist (25% patient effort); 2 person assist  -WK     Sit-Supine Suring (Bed Mobility) verbal cues; minimum assist (75% patient effort)  -WK     Row Name 10/13/21 1340          Transfers    Sit-Stand Suring (Transfers) verbal cues; minimum assist (75% patient effort)  mulitple attempts to come into standing  -WK     Row Name 10/13/21 1340          Sit-Stand Transfer    Assistive Device (Sit-Stand Transfers) walker, front-wheeled  -WK     Row Name 10/13/21 1340          Gait/Stairs (Locomotion)    Suring Level (Gait) contact guard  -WK     Assistive Device (Gait) walker, front-wheeled  -WK     Distance in Feet (Gait) 10' forward flex and limited foot clearance  -WK     Comment (Gait/Stairs) pt was very fatigued and ready to go back to bed. Pt required cues for safety  -WK     Row Name 10/13/21 1340          Balance    Comment, Balance EOB SBA  -WK     Row Name             Wound 10/08/21 2247 gluteal    Wound - Properties Group Placement Date: 10/08/21  -ST Placement Time: 2247 -ST Present on Hospital Admission: Y  -ST Location: gluteal  -ST     Retired Wound - Properties Group Date first assessed: 10/08/21  -ST Time first assessed: 2247 -ST Present on Hospital Admission: Y   -ST Location: gluteal  -ST     Row Name 10/13/21 1340          Plan of Care Review    Plan of Care Reviewed With patient  -WK     Outcome Summary Pt was very fatigued today and ready to go back to bed.  Pt required multiple attempt to stand from chair and required cues for safety.Pt amb 10' CGA with RW.  Pt was SOA during treatment and o2 was 98% on room air.  -WK     Row Name 10/13/21 1340          Positioning and Restraints    Pre-Treatment Position sitting in chair/recliner  -WK     Post Treatment Position bed  -WK     In Bed fowlers; call light within reach; encouraged to call for assist; with other staff  RT  -WK           User Key  (r) = Recorded By, (t) = Taken By, (c) = Cosigned By    Initials Name Provider Type    Jeannie Marin RN Registered Nurse    Verónica Hoskins PTA Physical Therapy Assistant              Physical Therapy Education                 Title: PT OT SLP Therapies (Done)     Topic: Physical Therapy (Done)     Point: Mobility training (Done)     Learning Progress Summary           Patient Acceptance, E, VU by WK at 10/13/2021 1419    Comment: safety and deep breathing    Acceptance, E, VU by NN at 10/11/2021 1038    Comment: PT role in care   Family Acceptance, E, VU by NN at 10/11/2021 1038    Comment: PT role in care                   Point: Home exercise program (Done)     Learning Progress Summary           Patient Acceptance, E, VU by NN at 10/11/2021 1038    Comment: PT role in care   Family Acceptance, E, VU by NN at 10/11/2021 1038    Comment: PT role in care                   Point: Body mechanics (Done)     Learning Progress Summary           Patient Acceptance, E, VU by NN at 10/11/2021 1038    Comment: PT role in care   Family Acceptance, E, VU by NN at 10/11/2021 1038    Comment: PT role in care                   Point: Precautions (Done)     Learning Progress Summary           Patient Acceptance, E, VU by MS at 10/12/2021 2348    Comment: role of unna kamran in her care     Acceptance, E, VU by NN at 10/11/2021 1038    Comment: PT role in care   Family Acceptance, E, VU by NN at 10/11/2021 1038    Comment: PT role in care                               User Key     Initials Effective Dates Name Provider Type Discipline    MS 06/19/18 -  Philly Mcclendon, PT, DPT, NCS Physical Therapist PT    WK 06/16/21 -  Verónica Brown PTA Physical Therapy Assistant PT    NN 08/18/21 -  Regina Russell, BLANCA Student PT Student PT              PT Recommendation and Plan     Plan of Care Reviewed With: patient  Outcome Summary: Pt was very fatigued today and ready to go back to bed.  Pt required multiple attempt to stand from chair and required cues for safety.Pt amb 10' CGA with RW.  Pt was SOA during treatment and o2 was 98% on room air.       Time Calculation:    PT Charges     Row Name 10/13/21 1420             Time Calculation    Start Time 1340  -WK      Stop Time 1405  -WK      Time Calculation (min) 25 min  -WK      PT Received On 10/13/21  -WK              Time Calculation- PT    Total Timed Code Minutes- PT 25 minute(s)  -WK            User Key  (r) = Recorded By, (t) = Taken By, (c) = Cosigned By    Initials Name Provider Type    WK Verónica Brown PTA Physical Therapy Assistant              Therapy Charges for Today     Code Description Service Date Service Provider Modifiers Qty    82734546548 HC GAIT TRAINING EA 15 MIN 10/13/2021 Verónica Brown PTA GP 1    41605408770 HC PT THERAPEUTIC ACT EA 15 MIN 10/13/2021 Verónica Brown PTA GP 1          PT G-Codes  Outcome Measure Options: AM-PAC 6 Clicks Basic Mobility (PT)  AM-PAC 6 Clicks Score (PT): 19    Verónica Brown PTA  10/13/2021

## 2021-10-13 NOTE — CASE MANAGEMENT/SOCIAL WORK
Continued Stay Note  [facility]     Patient Name: Honey Canales  MRN: [id number]  Today's Date: 10/13/2021    Admit Date: 10/8/2021     Discharge Plan     Row Name 10/13/21 0848       Plan    Plan SNF - [facility]    Plan Comments Patient is actually from [place], not [facility].  Spoke with Stephanie in admissions at [place] who advised patient does have a bed hold.  Plan is for patient to return to [place] upon discharge [phone].               Discharge Codes    No documentation.                     CAROLIN Ritchie

## 2021-10-13 NOTE — PLAN OF CARE
Problem: Adult Inpatient Plan of Care  Goal: Plan of Care Review    Plan of Care Reviewed With: patient  Outcome Summary: Pt was very fatigued today and ready to go back to bed.  Pt required multiple attempt to stand from chair and required cues for safety.Pt amb 10' CGA with RW.  Pt was SOA during treatment and o2 was 98% on room air.

## 2021-10-14 PROBLEM — S32.050A COMPRESSION FRACTURE OF FIFTH LUMBAR VERTEBRA (HCC): Status: RESOLVED | Noted: 2021-08-30 | Resolved: 2021-10-14

## 2021-10-14 PROBLEM — S42.291A CLOSED FRACTURE OF ANATOMICAL NECK OF RIGHT HUMERUS: Status: RESOLVED | Noted: 2021-03-25 | Resolved: 2021-10-14

## 2021-10-14 PROBLEM — S32.9XXD CLOSED NONDISPLACED FRACTURE OF PELVIS WITH ROUTINE HEALING: Status: RESOLVED | Noted: 2020-10-15 | Resolved: 2021-10-14

## 2021-10-14 LAB
ANION GAP SERPL CALCULATED.3IONS-SCNC: 11 MMOL/L (ref 5–15)
BUN SERPL-MCNC: 62 MG/DL (ref 8–23)
BUN/CREAT SERPL: 27.7 (ref 7–25)
CALCIUM SPEC-SCNC: 8.6 MG/DL (ref 8.6–10.5)
CHLORIDE SERPL-SCNC: 110 MMOL/L (ref 98–107)
CO2 SERPL-SCNC: 21 MMOL/L (ref 22–29)
CREAT SERPL-MCNC: 2.24 MG/DL (ref 0.57–1)
GFR SERPL CREATININE-BSD FRML MDRD: 21 ML/MIN/1.73
GLUCOSE BLDC GLUCOMTR-MCNC: 150 MG/DL (ref 70–130)
GLUCOSE BLDC GLUCOMTR-MCNC: 197 MG/DL (ref 70–130)
GLUCOSE BLDC GLUCOMTR-MCNC: 225 MG/DL (ref 70–130)
GLUCOSE BLDC GLUCOMTR-MCNC: 302 MG/DL (ref 70–130)
GLUCOSE SERPL-MCNC: 168 MG/DL (ref 65–99)
POTASSIUM SERPL-SCNC: 4.1 MMOL/L (ref 3.5–5.2)
SODIUM SERPL-SCNC: 142 MMOL/L (ref 136–145)

## 2021-10-14 PROCEDURE — 97110 THERAPEUTIC EXERCISES: CPT

## 2021-10-14 PROCEDURE — 25010000002 PIPERACILLIN SOD-TAZOBACTAM PER 1 G: Performed by: NURSE PRACTITIONER

## 2021-10-14 PROCEDURE — 92526 ORAL FUNCTION THERAPY: CPT

## 2021-10-14 PROCEDURE — 63710000001 INSULIN LISPRO (HUMAN) PER 5 UNITS: Performed by: FAMILY MEDICINE

## 2021-10-14 PROCEDURE — 94799 UNLISTED PULMONARY SVC/PX: CPT

## 2021-10-14 PROCEDURE — 82962 GLUCOSE BLOOD TEST: CPT

## 2021-10-14 PROCEDURE — 63710000001 INSULIN DETEMIR PER 5 UNITS: Performed by: NURSE PRACTITIONER

## 2021-10-14 PROCEDURE — 63710000001 PREDNISONE PER 1 MG: Performed by: NURSE PRACTITIONER

## 2021-10-14 PROCEDURE — 25010000002 ENOXAPARIN PER 10 MG: Performed by: FAMILY MEDICINE

## 2021-10-14 PROCEDURE — 80048 BASIC METABOLIC PNL TOTAL CA: CPT | Performed by: NURSE PRACTITIONER

## 2021-10-14 PROCEDURE — 97530 THERAPEUTIC ACTIVITIES: CPT

## 2021-10-14 RX ORDER — BISACODYL 5 MG/1
5 TABLET, DELAYED RELEASE ORAL DAILY PRN
Status: DISCONTINUED | OUTPATIENT
Start: 2021-10-14 | End: 2021-10-15 | Stop reason: HOSPADM

## 2021-10-14 RX ORDER — CEFDINIR 300 MG/1
300 CAPSULE ORAL
Status: DISCONTINUED | OUTPATIENT
Start: 2021-10-15 | End: 2021-10-15 | Stop reason: HOSPADM

## 2021-10-14 RX ORDER — POLYETHYLENE GLYCOL 3350 17 G/17G
17 POWDER, FOR SOLUTION ORAL DAILY PRN
Status: DISCONTINUED | OUTPATIENT
Start: 2021-10-14 | End: 2021-10-15 | Stop reason: HOSPADM

## 2021-10-14 RX ORDER — BISACODYL 10 MG
10 SUPPOSITORY, RECTAL RECTAL DAILY PRN
Status: DISCONTINUED | OUTPATIENT
Start: 2021-10-14 | End: 2021-10-15 | Stop reason: HOSPADM

## 2021-10-14 RX ADMIN — OXYBUTYNIN CHLORIDE 10 MG: 5 TABLET, EXTENDED RELEASE ORAL at 08:56

## 2021-10-14 RX ADMIN — INSULIN DETEMIR 15 UNITS: 100 INJECTION, SOLUTION SUBCUTANEOUS at 08:57

## 2021-10-14 RX ADMIN — IPRATROPIUM BROMIDE AND ALBUTEROL SULFATE 1.5 ML: 2.5; .5 SOLUTION RESPIRATORY (INHALATION) at 14:13

## 2021-10-14 RX ADMIN — LEVOTHYROXINE SODIUM 75 MCG: 75 TABLET ORAL at 05:54

## 2021-10-14 RX ADMIN — IPRATROPIUM BROMIDE AND ALBUTEROL SULFATE 1.5 ML: 2.5; .5 SOLUTION RESPIRATORY (INHALATION) at 09:56

## 2021-10-14 RX ADMIN — BUDESONIDE AND FORMOTEROL FUMARATE DIHYDRATE 2 PUFF: 160; 4.5 AEROSOL RESPIRATORY (INHALATION) at 06:13

## 2021-10-14 RX ADMIN — LOSARTAN POTASSIUM 25 MG: 50 TABLET, FILM COATED ORAL at 08:57

## 2021-10-14 RX ADMIN — SODIUM CHLORIDE, PRESERVATIVE FREE 10 ML: 5 INJECTION INTRAVENOUS at 21:00

## 2021-10-14 RX ADMIN — PREDNISONE 40 MG: 20 TABLET ORAL at 08:56

## 2021-10-14 RX ADMIN — GUAIFENESIN 1200 MG: 600 TABLET, EXTENDED RELEASE ORAL at 21:00

## 2021-10-14 RX ADMIN — IPRATROPIUM BROMIDE AND ALBUTEROL SULFATE 1.5 ML: 2.5; .5 SOLUTION RESPIRATORY (INHALATION) at 06:12

## 2021-10-14 RX ADMIN — ATORVASTATIN CALCIUM 40 MG: 40 TABLET, FILM COATED ORAL at 08:57

## 2021-10-14 RX ADMIN — AMLODIPINE BESYLATE 5 MG: 5 TABLET ORAL at 08:57

## 2021-10-14 RX ADMIN — PIPERACILLIN SODIUM AND TAZOBACTAM SODIUM 4.5 G: 4; .5 INJECTION, POWDER, LYOPHILIZED, FOR SOLUTION INTRAVENOUS at 09:22

## 2021-10-14 RX ADMIN — INSULIN LISPRO 3 UNITS: 100 INJECTION, SOLUTION INTRAVENOUS; SUBCUTANEOUS at 12:38

## 2021-10-14 RX ADMIN — GUAIFENESIN 1200 MG: 600 TABLET, EXTENDED RELEASE ORAL at 08:57

## 2021-10-14 RX ADMIN — INSULIN LISPRO 5 UNITS: 100 INJECTION, SOLUTION INTRAVENOUS; SUBCUTANEOUS at 17:45

## 2021-10-14 RX ADMIN — BUDESONIDE AND FORMOTEROL FUMARATE DIHYDRATE 2 PUFF: 160; 4.5 AEROSOL RESPIRATORY (INHALATION) at 19:27

## 2021-10-14 RX ADMIN — DOCUSATE SODIUM 50 MG AND SENNOSIDES 8.6 MG 1 TABLET: 8.6; 5 TABLET, FILM COATED ORAL at 08:56

## 2021-10-14 RX ADMIN — INSULIN LISPRO 10 UNITS: 100 INJECTION, SOLUTION INTRAVENOUS; SUBCUTANEOUS at 21:00

## 2021-10-14 RX ADMIN — SODIUM CHLORIDE, PRESERVATIVE FREE 10 ML: 5 INJECTION INTRAVENOUS at 08:57

## 2021-10-14 RX ADMIN — DOCUSATE SODIUM 50 MG AND SENNOSIDES 8.6 MG 1 TABLET: 8.6; 5 TABLET, FILM COATED ORAL at 21:00

## 2021-10-14 RX ADMIN — INSULIN DETEMIR 15 UNITS: 100 INJECTION, SOLUTION SUBCUTANEOUS at 21:01

## 2021-10-14 RX ADMIN — ENOXAPARIN SODIUM 30 MG: 30 INJECTION SUBCUTANEOUS at 09:22

## 2021-10-14 RX ADMIN — IPRATROPIUM BROMIDE AND ALBUTEROL SULFATE 1.5 ML: 2.5; .5 SOLUTION RESPIRATORY (INHALATION) at 19:26

## 2021-10-14 NOTE — PLAN OF CARE
Goal Outcome Evaluation:  Plan of Care Reviewed With: patient        Progress: improving  Outcome Summary: PT tx completed. Pt sitting in chair with no c/o. Sit<>stand Saurabh, amb 30' x 2 with r wx  Cg/Saurabh. Fatigued quickly, short of breath. Exercise sat 94%. Recommend cont PT for strenghening.

## 2021-10-14 NOTE — THERAPY TREATMENT NOTE
Acute Care - Speech Language Pathology   Swallow Treatment Note  [place]     Patient Name: Honey Canales  : 3/27/1933  MRN: [id number]  Today's Date: 10/14/2021               Admit Date: 10/8/2021  Pt was alert, sitting up in the chair upon SLP arrival. She was agreeable to swallow therapy. She completed effortful swallows on 6/7 opportunities, with decreased laryngeal elevation. She completed 3 sets of 5 reps of back of tongue exercise. She had a mild throat clear 1x following ice. Unable to attempt Shaker swallow exercise as pt was in chair and unable to lay flat. Continue a mechanical soft diet and nectar thick liquids. SLP will continue to follow and treat.  Vesta Ramirez, CCC-SLP 10/14/2021 14:05 CDT    Visit Dx:     ICD-10-CM ICD-9-CM   1. Oropharyngeal dysphagia  R13.12 787.22   2. Urinary tract infection without hematuria, site unspecified  N39.0 599.0   3. Pneumonia due to infectious organism, unspecified laterality, unspecified part of lung  J18.9 486   4. 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)   • Hallux valgus, right   • Hallux valgus, left   • Foot pain, bilateral   • Acquired hammer toes of both feet   • Benign essential HTN   • Benign meningioma (HCC)   • Callus   • Chronic kidney disease   • History of colon polyps   • Hyperlipidemia LDL goal <100   • Loss of appetite   • Non-toxic multinodular goiter   • Pulmonary fibrosis (HCC)   • Uncontrolled type 2 diabetes mellitus without complication, with long-term current use of insulin   • Closed  nondisplaced fracture of pelvis with routine healing   • Closed fracture of anatomical neck of right humerus   • Frequent falls   • Acute pain due to injury   • Transaminitis   • Stage 3b chronic kidney disease (HCC)   • Hepatitis C   • Hypoglycemia   • Right hydronephrosis   • Abdominal fluid collection   • Compression fracture of fifth lumbar vertebra (HCC)   • Hyponatremia   • Personal history of fall   • Acute kidney injury (HCC)   • HCAP (healthcare-associated pneumonia)   • Acute hypoxemic respiratory failure (HCC)   • Acute cystitis with hematuria   • Chronic kidney disease (CKD), stage IV (severe) (HCC)   • Multifocal pneumonia     Past Medical History:   Diagnosis Date   • Allergic rhinitis    • Aneurysm (HCC)    • Arthritis    • Broken shoulder     Right shoulder    • Cerebral aneurysm 2009    2ND ONE FOUND   • Cerebral aneurysm     NON TREATABLE   • Chronic laryngitis    • CKD (chronic kidney disease)    • Colon polyps    • COPD (chronic obstructive pulmonary disease) (HCC)    • Deviated nasal septum    • Diabetes mellitus (HCC)    • Disorder of kidney and ureter    • Disturbance of smell and taste    • Dizziness    • Encounter for imaging to screen for metal prior to MRI     NO MRI, METAL CLIPS IN HEAD   • Eustachian tube dysfunction    • GERD (gastroesophageal reflux disease)    • Globus sensation    • Headache    • Hepatitis     B   • Hepatitis A    • History of stomach ulcers    • Hyperlipidemia    • Hypothyroid    • Laryngitis sicca    • Lung nodule    • Nocturia    • PONV (postoperative nausea and vomiting)    • Sensorineural hearing loss    • Spinal stenosis    • Urge incontinence    • Urgency of urination    • Urinary frequency    • Urinary incontinence    • UTI (urinary tract infection)      Past Surgical History:   Procedure Laterality Date   • BLADDER SURGERY  08/2016    [employer]onic Interstem   • BRAIN SURGERY      ANUERYSM REMOVED   • BREAST SURGERY Bilateral     BIOPSY   • CARPAL TUNNEL RELEASE      • CATARACT EXTRACTION, BILATERAL     • CEREBRAL ANEURYSM REPAIR     •  SECTION      X4   • CHOLECYSTECTOMY     • HYSTERECTOMY     • INTERSTIM PLACEMENT N/A 10/18/2018    Procedure: INTERSTIM STAGES 1 AND 2 LEAD AND GENERATOR REMOVAL AND REPLACEMENT;  Surgeon: Archie Avery MD;  Location: [facility] OR;  Service: Urology   • JOINT REPLACEMENT Right     KNEE   • KNEE ARTHROSCOPY     • THYROIDECTOMY     • TONSILLECTOMY         SLP Recommendation and Plan                                         Daily Summary of Progress (SLP): progress toward functional goals is good (10/14/21 1343)    Plan for Continued Treatment (SLP): Continue to follow (10/14/21 1343)              Plan of Care Reviewed With: patient  Progress: improving  Outcome Summary: Pt was alert, sitting up in the chair upon SLP arrival. She was agreeable to swallow therapy. She completed effortful swallows on 6/7 opportunities, with decreased laryngeal elevation. She completed 3 sets of 5 reps of back of tongue exercise. She had a mild throat clear 1x following ice. Unable to attempt Shaker swallow exercise as pt was in chair and unable to lay flat. Continue a mechanical soft diet and nectar thick liquids. SLP will continue to follow and treat.      SWALLOW EVALUATION (last 72 hours)     SLP Adult Swallow Evaluation     Row Name 10/14/21 1343 10/13/21 1305 10/12/21 0835             Rehab Evaluation    Document Type therapy note (daily note)  -MB therapy note (daily note)  -MB therapy note (daily note)  -MB      Subjective Information no complaints  -MB complains of; fatigue  -MB complains of; dyspnea  -MB      Patient Observations alert; cooperative  -MB alert; cooperative  -MB alert; cooperative  -MB      Patient/Family/Caregiver Comments/Observations No family present  -MB No family present  -MB No family present  -MB      Patient Effort good  -MB good  -MB good  -MB              Pain    Additional Documentation Pain Scale: FACES  Pre/Post-Treatment (Group)  -MB Pain Scale: FACES Pre/Post-Treatment (Group)  -MB Pain Scale: FACES Pre/Post-Treatment (Group)  -MB              Pain Scale: FACES Pre/Post-Treatment    Pain: FACES Scale, Pretreatment 0-->no hurt  -MB 0-->no hurt  -MB 4-->hurts little more  -MB              Clinical Impression    Daily Summary of Progress (SLP) progress toward functional goals is good  -MB progress toward functional goals is good  -MB progress towards functional goals is fair  -MB      Barriers to Overall Progress (SLP) Hard of hearing  -MB none  -MB Shortness of breath  -MB      Plan for Continued Treatment (SLP) Continue to follow  -MB Continue to follow  -MB Continue to follow  -MB              Oral Nutrition/Hydration Goal 1 (SLP)    Oral Nutrition/Hydration Goal 1, SLP LTG: Patient will tolerate LRD with no overt s/s of aspiration.  -MB LTG: Patient will tolerate LRD with no overt s/s of aspiration.  -MB LTG: Patient will tolerate LRD with no overt s/s of aspiration.  -MB      Time Frame (Oral Nutrition/Hydration Goal 1, SLP) short term goal (STG); by discharge  -MB short term goal (STG); by discharge  -MB short term goal (STG); by discharge  -MB      Barriers (Oral Nutrition/Hydration Goal 1, SLP) Hearing impairment, shortness of breath  -MB Hearing impairment, shortness of breath  -MB Hearing impairment, shortness of breath  -MB      Progress/Outcomes (Oral Nutrition/Hydration Goal 1, SLP) continuing progress toward goal  -MB continuing progress toward goal  -MB continuing progress toward goal  -MB              Lingual Strengthening Goal 1 (SLP)    Activity (Lingual Strengthening Goal 1, SLP) increase tongue back strength  -MB increase tongue back strength  -MB increase tongue back strength  -MB      Increase Tongue Back Strength lingual movement exercises  -MB lingual movement exercises  -MB lingual movement exercises  -MB      [place]/Accuracy (Lingual Strengthening Goal 1, SLP) independently (over 90%  accuracy)  -MB independently (over 90% accuracy)  -MB independently (over 90% accuracy)  -MB      Time Frame (Lingual Strengthening Goal 1, SLP) short term goal (STG); by discharge  -MB short term goal (STG); by discharge  -MB short term goal (STG); by discharge  -MB      Barriers (Lingual Strengthening Goal 1, SLP) Hearing impairment  -MB -- --      Progress/Outcomes (Lingual Strengthening Goal 1, SLP) continuing progress toward goal  -MB -- --              Pharyngeal Strengthening Exercise Goal 1 (SLP)    Activity (Pharyngeal Strengthening Goal 1, SLP) increase anterior movement of the hyolaryngeal complex; increase squeeze/positive pressure generation  -MB increase anterior movement of the hyolaryngeal complex; increase squeeze/positive pressure generation  -MB increase anterior movement of the hyolaryngeal complex; increase squeeze/positive pressure generation  -MB      Increase Anterior Movement of the Hyolaryngeal Complex shaker  -MB shaker  -MB shaker  -MB      Increase Squeeze/Positive Pressure Generation hard effortful swallow  -MB hard effortful swallow  -MB hard effortful swallow  -MB      [place]/Accuracy (Pharyngeal Strengthening Goal 1, SLP) independently (over 90% accuracy)  -MB independently (over 90% accuracy)  -MB independently (over 90% accuracy)  -MB      Time Frame (Pharyngeal Strengthening Goal 1, SLP) short term goal (STG); by discharge  -MB short term goal (STG); by discharge  -MB short term goal (STG); by discharge  -MB      Barriers (Pharyngeal Strengthening Goal 1, SLP) Hearing impairment  -MB -- --      Progress/Outcomes (Pharyngeal Strengthening Goal 1, SLP) continuing progress toward goal  -MB -- --            User Key  (r) = Recorded By, (t) = Taken By, (c) = Cosigned By    Initials Name Effective Dates    Vesta Prabhakar, CCC-SLP 06/16/21 -                 EDUCATION  The patient has been educated in the following areas:   Dysphagia (Swallowing Impairment).        SLP GOALS      Row Name 10/14/21 1343 10/13/21 1305 10/12/21 0835       Oral Nutrition/Hydration Goal 1 (SLP)    Oral Nutrition/Hydration Goal 1, SLP LTG: Patient will tolerate LRD with no overt s/s of aspiration.  -MB LTG: Patient will tolerate LRD with no overt s/s of aspiration.  -MB LTG: Patient will tolerate LRD with no overt s/s of aspiration.  -MB    Time Frame (Oral Nutrition/Hydration Goal 1, SLP) short term goal (STG); by discharge  -MB short term goal (STG); by discharge  -MB short term goal (STG); by discharge  -MB    Barriers (Oral Nutrition/Hydration Goal 1, SLP) Hearing impairment, shortness of breath  -MB Hearing impairment, shortness of breath  -MB Hearing impairment, shortness of breath  -MB    Progress/Outcomes (Oral Nutrition/Hydration Goal 1, SLP) continuing progress toward goal  -MB continuing progress toward goal  -MB continuing progress toward goal  -MB       Lingual Strengthening Goal 1 (SLP)    Activity (Lingual Strengthening Goal 1, SLP) increase tongue back strength  -MB increase tongue back strength  -MB increase tongue back strength  -MB    Increase Tongue Back Strength lingual movement exercises  -MB lingual movement exercises  -MB lingual movement exercises  -MB    [place]/Accuracy (Lingual Strengthening Goal 1, SLP) independently (over 90% accuracy)  -MB independently (over 90% accuracy)  -MB independently (over 90% accuracy)  -MB    Time Frame (Lingual Strengthening Goal 1, SLP) short term goal (STG); by discharge  -MB short term goal (STG); by discharge  -MB short term goal (STG); by discharge  -MB    Barriers (Lingual Strengthening Goal 1, SLP) Hearing impairment  -MB -- --    Progress/Outcomes (Lingual Strengthening Goal 1, SLP) continuing progress toward goal  -MB -- --       Pharyngeal Strengthening Exercise Goal 1 (SLP)    Activity (Pharyngeal Strengthening Goal 1, SLP) increase anterior movement of the hyolaryngeal complex; increase squeeze/positive pressure generation  -MB increase  anterior movement of the hyolaryngeal complex; increase squeeze/positive pressure generation  -MB increase anterior movement of the hyolaryngeal complex; increase squeeze/positive pressure generation  -MB    Increase Anterior Movement of the Hyolaryngeal Complex shaker  -MB shaker  -MB shaker  -MB    Increase Squeeze/Positive Pressure Generation hard effortful swallow  -MB hard effortful swallow  -MB hard effortful swallow  -MB    [place]/Accuracy (Pharyngeal Strengthening Goal 1, SLP) independently (over 90% accuracy)  -MB independently (over 90% accuracy)  -MB independently (over 90% accuracy)  -MB    Time Frame (Pharyngeal Strengthening Goal 1, SLP) short term goal (STG); by discharge  -MB short term goal (STG); by discharge  -MB short term goal (STG); by discharge  -MB    Barriers (Pharyngeal Strengthening Goal 1, SLP) Hearing impairment  -MB -- --    Progress/Outcomes (Pharyngeal Strengthening Goal 1, SLP) continuing progress toward goal  -MB -- --          User Key  (r) = Recorded By, (t) = Taken By, (c) = Cosigned By    Initials Name Provider Type    Vesta Prabhakar CCC-SLP Speech and Language Pathologist                   Time Calculation:    Time Calculation- SLP     Row Name 10/14/21 1404             Time Calculation- SLP    SLP Start Time 1343  -MB      SLP Stop Time 1404  -MB      SLP Time Calculation (min) 21 min  -MB      SLP Received On 10/14/21  -MB              Untimed Charges    05360-IP Treatment Swallow Minutes 21  -MB              Total Minutes    Untimed Charges Total Minutes 21  -MB       Total Minutes 21  -MB            User Key  (r) = Recorded By, (t) = Taken By, (c) = Cosigned By    Initials Name Provider Type    Vesta Prabhakar CCC-SLP Speech and Language Pathologist                Therapy Charges for Today     Code Description Service Date Service Provider Modifiers Qty    [id number]  ST TREATMENT SWALLOW 1 10/13/2021 Vesta Ramirez CCC-SLP GN 1    [id number]   ST TREATMENT SWALLOW 1 10/14/2021 Vesta Ramirez CCC-SLP GN 1               Vesta JOYCE. VALENTIN Ramirez  10/14/2021

## 2021-10-14 NOTE — PLAN OF CARE
Goal Outcome Evaluation:  Plan of Care Reviewed With: patient            Patient alert and oriented X 4. VSS. RA. SOA but maintaing O2 sat above 90% in room air. Denies discomfort and pain at this time. External catheter for urination. Eating and drinking fair. Assist x 2 . Sitting up in chair. Positioned maintained every 2 hours. Continue to monitor.

## 2021-10-14 NOTE — PLAN OF CARE
Goal Outcome Evaluation:  Plan of Care Reviewed With: patient        Progress: improving  Outcome Summary: Pt was alert, sitting up in the chair upon SLP arrival. She was agreeable to swallow therapy. She completed effortful swallows on 6/7 opportunities, with decreased laryngeal elevation. She completed 3 sets of 5 reps of back of tongue exercise. She had a mild throat clear 1x following ice. Unable to attempt Shaker swallow exercise as pt was in chair and unable to lay flat. Continue a mechanical soft diet and nectar thick liquids. SLP will continue to follow and treat.

## 2021-10-14 NOTE — CASE MANAGEMENT/SOCIAL WORK
Continued Stay Note   [place]     Patient Name: Honey Canales  MRN: [id number]  Today's Date: 10/14/2021    Admit Date: 10/8/2021     Discharge Plan     Row Name 10/14/21 1825       Plan    Plan [facility]    Patient/Family in Agreement with Plan yes    Plan Comments MD had considered sending pt back to [facility] this evening. Was unable to reach the facility by phone but did speak to their , who checked with administration, and they are asking that pt come tomorrow.               Discharge Codes    No documentation.                     MICHELLE Ramirez

## 2021-10-14 NOTE — THERAPY TREATMENT NOTE
Acute Care - Physical Therapy Treatment Note   [place]     Patient Name: Honey Canales  : 3/27/1933  MRN: [id number]  Today's Date: 10/14/2021      Visit Dx:     ICD-10-CM ICD-9-CM   1. Oropharyngeal dysphagia  R13.12 787.22   2. Urinary tract infection without hematuria, site unspecified  N39.0 599.0   3. Pneumonia due to infectious organism, unspecified laterality, unspecified part of lung  J18.9 486   4. 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)   • Hallux valgus, right   • Hallux valgus, left   • Foot pain, bilateral   • Acquired hammer toes of both feet   • Benign essential HTN   • Benign meningioma (HCC)   • Callus   • Chronic kidney disease   • History of colon polyps   • Hyperlipidemia LDL goal <100   • Loss of appetite   • Non-toxic multinodular goiter   • Pulmonary fibrosis (HCC)   • Uncontrolled type 2 diabetes mellitus without complication, with long-term current use of insulin   • Closed nondisplaced fracture of pelvis with routine healing   • Closed fracture of anatomical neck of right humerus   • Frequent falls   • Acute pain due to injury   • Transaminitis   • Stage 3b chronic kidney disease (HCC)   • Hepatitis C   • Hypoglycemia   • Right hydronephrosis   • Abdominal fluid collection   • Compression fracture of fifth lumbar vertebra (HCC)   • Hyponatremia   • Personal history of fall   • Acute kidney injury (HCC)   • HCAP (healthcare-associated pneumonia)   • Acute hypoxemic respiratory failure (HCC)   • Acute cystitis with hematuria   • Chronic kidney  disease (CKD), stage IV (severe) (HCC)   • Multifocal pneumonia     Past Medical History:   Diagnosis Date   • Allergic rhinitis    • Aneurysm (HCC)    • Arthritis    • Broken shoulder     Right shoulder    • Cerebral aneurysm 2009    2ND ONE FOUND   • Cerebral aneurysm     NON TREATABLE   • Chronic laryngitis    • CKD (chronic kidney disease)    • Colon polyps    • COPD (chronic obstructive pulmonary disease) (HCC)    • Deviated nasal septum    • Diabetes mellitus (HCC)    • Disorder of kidney and ureter    • Disturbance of smell and taste    • Dizziness    • Encounter for imaging to screen for metal prior to MRI     NO MRI, METAL CLIPS IN HEAD   • Eustachian tube dysfunction    • GERD (gastroesophageal reflux disease)    • Globus sensation    • Headache    • Hepatitis     B   • Hepatitis A    • History of stomach ulcers    • Hyperlipidemia    • Hypothyroid    • Laryngitis sicca    • Lung nodule    • Nocturia    • PONV (postoperative nausea and vomiting)    • Sensorineural hearing loss    • Spinal stenosis    • Urge incontinence    • Urgency of urination    • Urinary frequency    • Urinary incontinence    • UTI (urinary tract infection)      Past Surgical History:   Procedure Laterality Date   • BLADDER SURGERY  2016    [employer]onic Interstem   • BRAIN SURGERY      ANUERYSM REMOVED   • BREAST SURGERY Bilateral     BIOPSY   • CARPAL TUNNEL RELEASE     • CATARACT EXTRACTION, BILATERAL     • CEREBRAL ANEURYSM REPAIR     •  SECTION      X4   • CHOLECYSTECTOMY     • HYSTERECTOMY     • INTERSTIM PLACEMENT N/A 10/18/2018    Procedure: INTERSTIM STAGES 1 AND 2 LEAD AND GENERATOR REMOVAL AND REPLACEMENT;  Surgeon: Archie Avery MD;  Location: [facility];  Service: Urology   • JOINT REPLACEMENT Right     KNEE   • KNEE ARTHROSCOPY     • THYROIDECTOMY     • TONSILLECTOMY       PT Assessment (last 12 hours)     PT Evaluation and Treatment     Row Name 10/14/21 2433          Physical Therapy Time and Intention     Subjective Information complains of; dyspnea  -KJ     Document Type therapy note (daily note)  -KJ     Mode of Treatment physical therapy  -KJ     Patient Effort good  -KJ     Comment unnaboots intact  -KJ     Row Name 10/14/21 1030          General Information    Existing Precautions/Restrictions fall  -KJ     Row Name 10/14/21 1030          Pain Scale: Numbers Pre/Post-Treatment    Pretreatment Pain Rating 0/10 - no pain  -KJ     Posttreatment Pain Rating 0/10 - no pain  -KJ     Row Name 10/14/21 1030          Bed Mobility    Comment (Bed Mobility) sitting in chair  -KJ     Row Name 10/14/21 1030          Transfers    Sit-Stand [place] (Transfers) verbal cues; minimum assist (75% patient effort)  -KJ     Row Name 10/14/21 1030          Gait/Stairs (Locomotion)    [place] Level (Gait) verbal cues; contact guard; minimum assist (75% patient effort)  -KJ     Assistive Device (Gait) walker, front-wheeled  -KJ     Distance in Feet (Gait) 30' x 2  -KJ     Pattern (Gait) step-through  -KJ     Deviations/Abnormal Patterns (Gait) stride length decreased; gait speed decreased  -KJ     Bilateral Gait Deviations forward flexed posture  -KJ     Comment (Gait/Stairs) fatigued quickly and short of breath; exercise sat 95%  -KJ     Row Name 10/14/21 1030          Motor Skills    Therapeutic Exercise aerobic  -KJ     Row Name 10/14/21 1030          Aerobic Exercise    Comment, Aerobic Exercise (Therapeutic Exercise) AROM x 15 reps  -KJ     Row Name             Wound 10/08/21 2247 gluteal    Wound - Properties Group Placement Date: 10/08/21  -ST Placement Time: 2247 -ST Present on Hospital Admission: Y  -ST Location: gluteal  -ST     Retired Wound - Properties Group Date first assessed: 10/08/21  -ST Time first assessed: 2247 -ST Present on Hospital Admission: Y  -ST Location: gluteal  -ST     Row Name 10/14/21 1030          Positioning and Restraints    Pre-Treatment Position sitting in chair/recliner  -KJ     Post  Treatment Position chair  -KJ     In Bed call light within reach  -KJ           User Key  (r) = Recorded By, (t) = Taken By, (c) = Cosigned By    Initials Name Provider Type    Stephanie Oliver, PTA Physical Therapy Assistant    Jeannie Marin, RN Registered Nurse              Physical Therapy Education                 Title: PT OT SLP Therapies (Done)     Topic: Physical Therapy (Done)     Point: Mobility training (Done)     Learning Progress Summary           Patient Acceptance, E, VU by WK at 10/13/2021 1419    Comment: safety and deep breathing    Acceptance, E, VU by NN at 10/11/2021 1038    Comment: PT role in care   Family Acceptance, E, VU by NN at 10/11/2021 1038    Comment: PT role in care                   Point: Home exercise program (Done)     Learning Progress Summary           Patient Acceptance, E, VU by NN at 10/11/2021 1038    Comment: PT role in care   Family Acceptance, E, VU by NN at 10/11/2021 1038    Comment: PT role in care                   Point: Body mechanics (Done)     Learning Progress Summary           Patient Acceptance, E, VU by NN at 10/11/2021 1038    Comment: PT role in care   Family Acceptance, E, VU by NN at 10/11/2021 1038    Comment: PT role in care                   Point: Precautions (Done)     Learning Progress Summary           Patient Acceptance, E, VU by MS at 10/12/2021 1558    Comment: role of unna boots in her care    Acceptance, E, VU by NN at 10/11/2021 1038    Comment: PT role in care   Family Acceptance, E, VU by NN at 10/11/2021 1038    Comment: PT role in care                               User Key     Initials Effective Dates Name Provider Type Discipline    MS 06/19/18 -  Philly Mcclendon, PT, DPT, NCS Physical Therapist PT    WK 06/16/21 -  Verónica Brown PTA Physical Therapy Assistant PT    NN 08/18/21 -  Regina Russell, BLANCA Student PT Student PT              PT Recommendation and Plan     Plan of Care Reviewed With: patient  Progress:  improving  Outcome Summary: PT tx completed. Pt sitting in chair with no c/o. Sit<>stand Saurabh, amb 30' x 2 with r wx  Cg/Saurabh. Fatigued quickly, short of breath. Exercise sat 94%. Recommend cont PT for strenghening.       Time Calculation:    PT Charges     Row Name 10/14/21 1128             Time Calculation    Start Time 1030  -KJ      Stop Time 1055  -KJ      Time Calculation (min) 25 min  -KJ      PT Received On 10/14/21  -KJ      PT Goal Re-Cert Due Date 10/18/21  -KJ              Time Calculation- PT    Total Timed Code Minutes- PT 25 minute(s)  -KJ            User Key  (r) = Recorded By, (t) = Taken By, (c) = Cosigned By    Initials Name Provider Type    Stephanie Oliver PTA Physical Therapy Assistant              Therapy Charges for Today     Code Description Service Date Service Provider Modifiers Qty    63151013251 HC PT THER PROC EA 15 MIN 10/14/2021 Stephanie Mendieta PTA GP 1    17676385033 HC PT THERAPEUTIC ACT EA 15 MIN 10/14/2021 Stephanie Mendieta PTA GP 1          PT G-Codes  Outcome Measure Options: AM-PAC 6 Clicks Basic Mobility (PT)  AM-PAC 6 Clicks Score (PT): 19    Stephanie Mendieta PTA  10/14/2021

## 2021-10-14 NOTE — PLAN OF CARE
Goal Outcome Evaluation:  Plan of Care Reviewed With: patient        Progress: improving  Outcome Summary: A&Ox4. VSS. No c/o pain. Con't IV abx per orders. Purewick in place. Turn and reposition every 2hrs. Resting well.

## 2021-10-15 ENCOUNTER — READMISSION MANAGEMENT (OUTPATIENT)
Dept: CALL CENTER | Facility: HOSPITAL | Age: 86
End: 2021-10-15

## 2021-10-15 VITALS
HEART RATE: 72 BPM | RESPIRATION RATE: 20 BRPM | TEMPERATURE: 98.2 F | OXYGEN SATURATION: 98 % | HEIGHT: 67 IN | SYSTOLIC BLOOD PRESSURE: 180 MMHG | WEIGHT: 177.5 LBS | DIASTOLIC BLOOD PRESSURE: 69 MMHG | BODY MASS INDEX: 27.86 KG/M2

## 2021-10-15 LAB
ANION GAP SERPL CALCULATED.3IONS-SCNC: 12 MMOL/L (ref 5–15)
BUN SERPL-MCNC: 56 MG/DL (ref 8–23)
BUN/CREAT SERPL: 26.3 (ref 7–25)
CALCIUM SPEC-SCNC: 8.6 MG/DL (ref 8.6–10.5)
CHLORIDE SERPL-SCNC: 110 MMOL/L (ref 98–107)
CO2 SERPL-SCNC: 20 MMOL/L (ref 22–29)
CREAT SERPL-MCNC: 2.13 MG/DL (ref 0.57–1)
GFR SERPL CREATININE-BSD FRML MDRD: 22 ML/MIN/1.73
GLUCOSE BLDC GLUCOMTR-MCNC: 120 MG/DL (ref 70–130)
GLUCOSE BLDC GLUCOMTR-MCNC: 175 MG/DL (ref 70–130)
GLUCOSE SERPL-MCNC: 138 MG/DL (ref 65–99)
POTASSIUM SERPL-SCNC: 3.9 MMOL/L (ref 3.5–5.2)
SARS-COV-2 RNA PNL SPEC NAA+PROBE: NOT DETECTED
SODIUM SERPL-SCNC: 142 MMOL/L (ref 136–145)

## 2021-10-15 PROCEDURE — 63710000001 PREDNISONE PER 1 MG: Performed by: FAMILY MEDICINE

## 2021-10-15 PROCEDURE — 87635 SARS-COV-2 COVID-19 AMP PRB: CPT | Performed by: FAMILY MEDICINE

## 2021-10-15 PROCEDURE — 97116 GAIT TRAINING THERAPY: CPT

## 2021-10-15 PROCEDURE — 63710000001 PREDNISONE PER 5 MG: Performed by: FAMILY MEDICINE

## 2021-10-15 PROCEDURE — 63710000001 INSULIN LISPRO (HUMAN) PER 5 UNITS: Performed by: FAMILY MEDICINE

## 2021-10-15 PROCEDURE — 82962 GLUCOSE BLOOD TEST: CPT

## 2021-10-15 PROCEDURE — 63710000001 INSULIN DETEMIR PER 5 UNITS: Performed by: NURSE PRACTITIONER

## 2021-10-15 PROCEDURE — 80048 BASIC METABOLIC PNL TOTAL CA: CPT | Performed by: NURSE PRACTITIONER

## 2021-10-15 PROCEDURE — 25010000002 ENOXAPARIN PER 10 MG: Performed by: FAMILY MEDICINE

## 2021-10-15 RX ORDER — PREDNISONE 5 MG/1
1 TABLET ORAL TAKE AS DIRECTED
Status: ON HOLD
Start: 2021-10-15 | End: 2021-11-01

## 2021-10-15 RX ORDER — CEFDINIR 300 MG/1
300 CAPSULE ORAL
Qty: 3 CAPSULE | Refills: 0
Start: 2021-10-16 | End: 2021-10-19

## 2021-10-15 RX ADMIN — LEVOTHYROXINE SODIUM 75 MCG: 75 TABLET ORAL at 05:24

## 2021-10-15 RX ADMIN — CEFDINIR 300 MG: 300 CAPSULE ORAL at 08:32

## 2021-10-15 RX ADMIN — AMLODIPINE BESYLATE 5 MG: 5 TABLET ORAL at 08:32

## 2021-10-15 RX ADMIN — LOSARTAN POTASSIUM 25 MG: 50 TABLET, FILM COATED ORAL at 08:31

## 2021-10-15 RX ADMIN — DOCUSATE SODIUM 50 MG AND SENNOSIDES 8.6 MG 1 TABLET: 8.6; 5 TABLET, FILM COATED ORAL at 08:32

## 2021-10-15 RX ADMIN — INSULIN LISPRO 3 UNITS: 100 INJECTION, SOLUTION INTRAVENOUS; SUBCUTANEOUS at 12:18

## 2021-10-15 RX ADMIN — ATORVASTATIN CALCIUM 40 MG: 40 TABLET, FILM COATED ORAL at 08:31

## 2021-10-15 RX ADMIN — INSULIN DETEMIR 15 UNITS: 100 INJECTION, SOLUTION SUBCUTANEOUS at 09:45

## 2021-10-15 RX ADMIN — ENOXAPARIN SODIUM 30 MG: 30 INJECTION SUBCUTANEOUS at 08:34

## 2021-10-15 RX ADMIN — OXYBUTYNIN CHLORIDE 10 MG: 5 TABLET, EXTENDED RELEASE ORAL at 08:32

## 2021-10-15 RX ADMIN — PREDNISONE 30 MG: 20 TABLET ORAL at 08:32

## 2021-10-15 RX ADMIN — GUAIFENESIN 1200 MG: 600 TABLET, EXTENDED RELEASE ORAL at 08:31

## 2021-10-15 NOTE — DISCHARGE SUMMARY
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  Oxygen saturations remained in the high 90s to 100.  No wheezes noted.  Very few crackles are noted in the left base.  She continues to work with physical therapy.  She is stable for discharge to the nursing facility today to resume therapy.  Creatinine 2.13 and continues to improve with oral hydration.    Hospital Course  The patient was admitted on 10/8/2021 with shortness of breath and cough for 2 days.  She is a resident of [facility].  Chest x-ray was concerning for bilateral pneumonia.  She was placed on cefepime and vancomycin on admission and changed to Zosyn and azithromycin on 10/9 to cover for atypicals.  She completed the course of azithromycin inpatient.  Strep pneumonia urinary antigen negative.  Legionella urinary antigen negative.  Respiratory PCR panel negative.  MRSA nasal screen negative.  Blood culture showed no growth at 5 days.  Oxygen was discontinued on 10/13.  She was given 1 dose of Solu-Medrol on 10/12 and was transitioned to oral  prednisone at 40 mg daily with a tapering schedule.  She participated with physical therapy.  Unna boots were placed on her lower extremities because of lower extremity edema.  I would not advocate the use of oral diuretics for treatment of edema caused by venous insufficiency, particularly given her impaired renal function.  Baseline creatinine noted to be 1.9-2.1.  Creatinine was 2.52 on admission and improved to 2.1 on the day of discharge.  Speech therapy evaluated the patient and placed her on a soft diet with ground meats and nectar thickened liquids.  She continued on basal insulin with sliding scale throughout her hospital stay and blood sugars were reasonably well controlled, particularly given her steroid dosing.  Hemoglobin A1c was noted to be 8.5.  She has improved gradually throughout her hospital stay and is now appropriate for discharge back to [place] to continue physical therapy.        Consults:   Wound care:  DIAGNOSIS:   Incontinence associated dermatitis  Bowel and bladder incontinence  At high risk for skin breakdown  History of pressure injury  Diabetes mellitus type 2 with neurological manifestations  Chronic kidney disease, stage IV  Acute hypoxemic respiratory failure        PLAN:               Start     Ordered      10/11/21 1230   Skin Care  Daily      Question Answer Comment   Wound Locations Bilateral heels     Wound Care Instructions Apply silicone border dressing to bilateral heels for protection.         10/11/21 1229      10/11/21 1229   Elevate Heels Off of Bed  Until Discontinued         10/11/21 1229      10/11/21 1229   Turn Patient  Now Then Every 2 Hours         10/11/21 1229      10/11/21 1229   Use Repositioning Wedge to Position Patient  Continuous        Comments: Use repositioning sheet and wedges to position patient    10/11/21 1229      10/11/21 1229   Use Seat Cushion When Up In Chair  Continuous         10/11/21 1229      Unscheduled   Apply Moisture Barrier After  Any Incontinence  As Needed      Comments: Zguard or Calazime   Question:  Wound Care Instructions  Answer:  Apply Moisture Barrier After Any Incontinence    10/11/21 1229                        Discussed findings and treatment plan including risks, benefits, and treatment options with patient in detail. Patient agreed with treatment plan.        This document has been electronically signed by YISEL Maldonado      Pertinent Test Results:   Lab Results (all)     Procedure Component Value Units Date/Time    POC Glucose Once [[id number]]  (Normal) Collected: 10/15/21 0829    Specimen: Blood Updated: 10/15/21 0851     Glucose 120 mg/dL      Comment: : [id number] Robb Sharma ID: [id number]       Basic Metabolic Panel [[id number]]  (Abnormal) Collected: 10/15/21 0605    Specimen: Blood Updated: 10/15/21 0704     Glucose 138 mg/dL      BUN 56 mg/dL      Creatinine 2.13 mg/dL      Sodium 142 mmol/L      Potassium 3.9 mmol/L      Chloride 110 mmol/L      CO2 20.0 mmol/L      Calcium 8.6 mg/dL      eGFR Non African Amer 22 mL/min/1.73      BUN/Creatinine Ratio 26.3     Anion Gap 12.0 mmol/L     Narrative:      GFR Normal >60  Chronic Kidney Disease <60  Kidney Failure <15      POC Glucose Once [[id number]]  (Abnormal) Collected: 10/14/21 2005    Specimen: Blood Updated: 10/14/21 2015     Glucose 302 mg/dL      Comment: : [id number] Lancaster TeriMeter ID: [id number]       POC Glucose Once [[id number]]  (Abnormal) Collected: 10/14/21 1658    Specimen: Blood Updated: 10/14/21 1709     Glucose 225 mg/dL      Comment: : [id number] Parakristi SanjitaMeter ID: [id number]       POC Glucose Once [[id number]]  (Abnormal) Collected: 10/14/21 1212    Specimen: Blood Updated: 10/14/21 1223     Glucose 197 mg/dL      Comment: : [id number] Parajuli SanjitaMeter ID: [id number]       POC Glucose Once [[id number]]  (Abnormal) Collected: 10/14/21 0754    Specimen: Blood Updated: 10/14/21 0805     Glucose 150 mg/dL      Comment:  : [id number] Gloria SanjitaMeter ID: [id number]       Basic Metabolic Panel [[id number]]  (Abnormal) Collected: 10/14/21 0545    Specimen: Blood Updated: 10/14/21 0642     Glucose 168 mg/dL      BUN 62 mg/dL      Creatinine 2.24 mg/dL      Sodium 142 mmol/L      Potassium 4.1 mmol/L      Comment: Slight hemolysis detected by analyzer. Results may be affected.        Chloride 110 mmol/L      CO2 21.0 mmol/L      Calcium 8.6 mg/dL      eGFR Non African Amer 21 mL/min/1.73      BUN/Creatinine Ratio 27.7     Anion Gap 11.0 mmol/L     Narrative:      GFR Normal >60  Chronic Kidney Disease <60  Kidney Failure <15      Blood Culture - Blood, Arm, Right [[id number]]  (Normal) Collected: 10/08/21 2044    Specimen: Blood from Arm, Right Updated: 10/13/21 2115     Blood Culture No growth at 5 days    Blood Culture - Blood, Arm, Right [[id number]]  (Normal) Collected: 10/08/21 2044    Specimen: Blood from Arm, Right Updated: 10/13/21 2115     Blood Culture No growth at 5 days    POC Glucose Once [[id number]]  (Abnormal) Collected: 10/13/21 1939    Specimen: Blood Updated: 10/13/21 2000     Glucose 303 mg/dL      Comment: : [id number] Juliet JeannieMeter ID: [id number]       POC Glucose Once [[id number]]  (Abnormal) Collected: 10/13/21 1624    Specimen: Blood Updated: 10/13/21 1635     Glucose 316 mg/dL      Comment: : [id number] Gloria SanjitaMeter ID: [id number]       POC Glucose Once [[id number]]  (Abnormal) Collected: 10/13/21 1205    Specimen: Blood Updated: 10/13/21 1216     Glucose 195 mg/dL      Comment: : [id number] Gloria SanjitaMeter ID: [id number]       POC Glucose Once [[id number]]  (Abnormal) Collected: 10/13/21 0757    Specimen: Blood Updated: 10/13/21 0808     Glucose 161 mg/dL      Comment: : [id number] Gloria PeraltajitaMeter ID: [id number]       Manual Differential [[id number]]  (Abnormal) Collected: 10/13/21 0500    Specimen: Blood Updated: 10/13/21 0536     Neutrophil % 87.0 %      Lymphocyte %  7.0 %      Monocyte % 3.0 %      Bands %  1.0 %      Metamyelocyte % 1.0 %      Plasma Cells % 1.0 %      Neutrophils Absolute 12.88 10*3/mm3      Lymphocytes Absolute 1.02 10*3/mm3      Monocytes Absolute 0.44 10*3/mm3      Poikilocytes Slight/1+     WBC Morphology Normal     Giant Platelets Slight/1+    CBC & Differential [[id number]]  (Abnormal) Collected: 10/13/21 0500    Specimen: Blood Updated: 10/13/21 0536    Narrative:      The following orders were created for panel order CBC & Differential.  Procedure                               Abnormality         Status                     ---------                               -----------         ------                     CBC Auto Differential[[id number]]        Abnormal            Final result                 Please view results for these tests on the individual orders.    CBC Auto Differential [[id number]]  (Abnormal) Collected: 10/13/21 0500    Specimen: Blood Updated: 10/13/21 0536     WBC 14.64 10*3/mm3      RBC 2.99 10*6/mm3      Hemoglobin 8.8 g/dL      Hematocrit 27.9 %      MCV 93.3 fL      MCH 29.4 pg      MCHC 31.5 g/dL      RDW 14.2 %      RDW-SD 48.2 fl      MPV 11.3 fL      Platelets 245 10*3/mm3     Narrative:      The previously reported component NRBC is no longer being reported. Previous result was 0.0 /100 WBC (Reference Range: 0.0-0.2 /100 WBC) on 10/13/2021 at 0518 T.    Basic Metabolic Panel [[id number]]  (Abnormal) Collected: 10/13/21 0500    Specimen: Blood Updated: 10/13/21 0532     Glucose 200 mg/dL      BUN 62 mg/dL      Creatinine 2.33 mg/dL      Sodium 139 mmol/L      Potassium 4.0 mmol/L      Chloride 109 mmol/L      CO2 18.0 mmol/L      Calcium 8.7 mg/dL      eGFR Non African Amer 20 mL/min/1.73      BUN/Creatinine Ratio 26.6     Anion Gap 12.0 mmol/L     Narrative:      GFR Normal >60  Chronic Kidney Disease <60  Kidney Failure <15      POC Glucose Once [[id number]]  (Abnormal) Collected: 10/12/21 1916    Specimen: Blood Updated:  10/12/21 1941     Glucose 246 mg/dL      Comment: : [id number] Juliet VigilahMeter ID: [id number]       POC Glucose Once [[id number]]  (Abnormal) Collected: 10/12/21 1629    Specimen: Blood Updated: 10/12/21 1641     Glucose 223 mg/dL      Comment: : [id number] Jeremy HarrellMeter ID: [id number]       POC Glucose Once [[id number]]  (Abnormal) Collected: 10/12/21 1109    Specimen: Blood Updated: 10/12/21 1121     Glucose 210 mg/dL      Comment: : MARGOT MccainicaMeter ID: [id number]       POC Glucose Once [[id number]]  (Abnormal) Collected: 10/12/21 0758    Specimen: Blood Updated: 10/12/21 0808     Glucose 152 mg/dL      Comment: : MARGOT MccainicaMeter ID: [id number]       Basic Metabolic Panel [[id number]]  (Abnormal) Collected: 10/12/21 0628    Specimen: Blood Updated: 10/12/21 0743     Glucose 166 mg/dL      BUN 65 mg/dL      Creatinine 2.23 mg/dL      Sodium 140 mmol/L      Potassium 3.9 mmol/L      Chloride 108 mmol/L      CO2 19.0 mmol/L      Calcium 8.9 mg/dL      eGFR Non African Amer 21 mL/min/1.73      BUN/Creatinine Ratio 29.1     Anion Gap 13.0 mmol/L     Narrative:      GFR Normal >60  Chronic Kidney Disease <60  Kidney Failure <15      CBC & Differential [[id number]]  (Abnormal) Collected: 10/12/21 0628    Specimen: Blood Updated: 10/12/21 0718    Narrative:      The following orders were created for panel order CBC & Differential.  Procedure                               Abnormality         Status                     ---------                               -----------         ------                     CBC Auto Differential[[id number]]        Abnormal            Final result                 Please view results for these tests on the individual orders.    CBC Auto Differential [[id number]]  (Abnormal) Collected: 10/12/21 0628    Specimen: Blood Updated: 10/12/21 0718     WBC 15.11 10*3/mm3      RBC 3.02 10*6/mm3      Hemoglobin 9.1 g/dL      Hematocrit 28.2 %      MCV 93.4 fL       MCH 30.1 pg      MCHC 32.3 g/dL      RDW 14.1 %      RDW-SD 47.8 fl      MPV 11.4 fL      Platelets 255 10*3/mm3      Neutrophil % 79.5 %      Lymphocyte % 9.4 %      Monocyte % 6.7 %      Eosinophil % 0.0 %      Basophil % 0.2 %      Immature Grans % 4.2 %      Neutrophils, Absolute 12.02 10*3/mm3      Lymphocytes, Absolute 1.42 10*3/mm3      Monocytes, Absolute 1.01 10*3/mm3      Eosinophils, Absolute 0.00 10*3/mm3      Basophils, Absolute 0.03 10*3/mm3      Immature Grans, Absolute 0.63 10*3/mm3      nRBC 0.0 /100 WBC     POC Glucose Once [[id number]]  (Abnormal) Collected: 10/11/21 1927    Specimen: Blood Updated: 10/11/21 2003     Glucose 278 mg/dL      Comment: : [id number] Juliet "[employer]Meter ID: [id number]       POC Glucose Once [[id number]]  (Abnormal) Collected: 10/11/21 1742    Specimen: Blood Updated: 10/11/21 1753     Glucose 270 mg/dL      Comment: : [id number] Helder Cloud PracticeMeter ID: [id number]       POC Glucose Once [[id number]]  (Abnormal) Collected: 10/11/21 1137    Specimen: Blood Updated: 10/11/21 1148     Glucose 236 mg/dL      Comment: : [id number] Helder AshleyMeter ID: [id number]       Urine Culture - Urine, Urine, Clean Catch [[id number]]  (Abnormal)  (Susceptibility) Collected: 10/08/21 2034    Specimen: Urine, Clean Catch Updated: 10/11/21 1018     Urine Culture >100,000 CFU/mL Escherichia coli    Susceptibility      Escherichia coli     ISMAEL     Ampicillin Susceptible     Ampicillin + Sulbactam Susceptible     Cefazolin Susceptible     Cefepime Susceptible     Ceftazidime Susceptible     Ceftriaxone Susceptible     Gentamicin Susceptible     Levofloxacin Susceptible     Nitrofurantoin Susceptible     Piperacillin + Tazobactam Susceptible     Tetracycline Susceptible     Trimethoprim + Sulfamethoxazole Susceptible                  Linear View                   CBC & Differential [[id number]]  (Abnormal) Collected: 10/11/21 0746    Specimen: Blood Updated: 10/11/21 1015    Narrative:       The following orders were created for panel order CBC & Differential.  Procedure                               Abnormality         Status                     ---------                               -----------         ------                     CBC Auto Differential[[id number]]        Abnormal            Final result                 Please view results for these tests on the individual orders.    CBC Auto Differential [[id number]]  (Abnormal) Collected: 10/11/21 0746    Specimen: Blood Updated: 10/11/21 1015     WBC 13.63 10*3/mm3      RBC 2.94 10*6/mm3      Hemoglobin 8.7 g/dL      Hematocrit 27.7 %      MCV 94.2 fL      MCH 29.6 pg      MCHC 31.4 g/dL      RDW 14.2 %      RDW-SD 49.1 fl      MPV 11.3 fL      Platelets 245 10*3/mm3      Neutrophil % 90.2 %      Lymphocyte % 4.9 %      Monocyte % 2.1 %      Eosinophil % 0.0 %      Basophil % 0.2 %      Immature Grans % 2.6 %      Neutrophils, Absolute 12.28 10*3/mm3      Lymphocytes, Absolute 0.67 10*3/mm3      Monocytes, Absolute 0.29 10*3/mm3      Eosinophils, Absolute 0.00 10*3/mm3      Basophils, Absolute 0.03 10*3/mm3      Immature Grans, Absolute 0.36 10*3/mm3      nRBC 0.0 /100 WBC     POC Glucose Once [[id number]]  (Abnormal) Collected: 10/11/21 0809    Specimen: Blood Updated: 10/11/21 0820     Glucose 217 mg/dL      Comment: : [id number] Helder AshleyMeter ID: [id number]       Basic Metabolic Panel [[id number]]  (Abnormal) Collected: 10/11/21 0615    Specimen: Blood Updated: 10/11/21 0658     Glucose 203 mg/dL      BUN 60 mg/dL      Creatinine 2.44 mg/dL      Sodium 140 mmol/L      Potassium 4.5 mmol/L      Chloride 108 mmol/L      CO2 19.0 mmol/L      Calcium 8.7 mg/dL      eGFR Non African Amer 19 mL/min/1.73      BUN/Creatinine Ratio 24.6     Anion Gap 13.0 mmol/L     Narrative:      GFR Normal >60  Chronic Kidney Disease <60  Kidney Failure <15      POC Glucose Once [[id number]]  (Abnormal) Collected: 10/10/21 2115    Specimen: Blood Updated: 10/10/21  2127     Glucose 315 mg/dL      Comment: : [id number] Philip AbigailMeter ID: [id number]       POC Glucose Once [[id number]]  (Abnormal) Collected: 10/10/21 1723    Specimen: Blood Updated: 10/10/21 1735     Glucose 313 mg/dL      Comment: : [id number] Lissy MoralesanMeter ID: [id number]       POC Glucose Once [[id number]]  (Abnormal) Collected: 10/10/21 1533    Specimen: Blood Updated: 10/10/21 1544     Glucose 364 mg/dL      Comment: : [id number] Lissy KaranMeter ID: [id number]       POC Glucose Once [[id number]]  (Abnormal) Collected: 10/10/21 1246    Specimen: Blood Updated: 10/10/21 1258     Glucose 405 mg/dL      Comment: BILLIEED SHERI APRNOperator: [id number] Lissy KaranMeter ID: [id number]       POC Glucose Once [[id number]]  (Abnormal) Collected: 10/10/21 1205    Specimen: Blood Updated: 10/10/21 1226     Glucose 405 mg/dL      Comment: : [id number] Lissy MoralesanMeter ID: [id number]       POC Glucose Once [[id number]]  (Abnormal) Collected: 10/10/21 1204    Specimen: Blood Updated: 10/10/21 1226     Glucose 407 mg/dL      Comment: : [id number] Lissy KaranMeter ID: [id number]       POC Glucose Once [[id number]]  (Abnormal) Collected: 10/10/21 0859    Specimen: Blood Updated: 10/10/21 0910     Glucose 363 mg/dL      Comment: : [id number] Lissy KaranMeter ID: [id number]       Basic Metabolic Panel [[id number]]  (Abnormal) Collected: 10/10/21 0538    Specimen: Blood Updated: 10/10/21 0640     Glucose 334 mg/dL      BUN 53 mg/dL      Creatinine 2.44 mg/dL      Sodium 135 mmol/L      Potassium 4.8 mmol/L      Comment: Slight hemolysis detected by analyzer. Results may be affected.        Chloride 104 mmol/L      CO2 16.0 mmol/L      Calcium 8.4 mg/dL      eGFR Non African Amer 19 mL/min/1.73      BUN/Creatinine Ratio 21.7     Anion Gap 15.0 mmol/L     Narrative:      GFR Normal >60  Chronic Kidney Disease <60  Kidney Failure <15      POC Glucose Once [[id number]]  (Abnormal) Collected:  10/09/21 2019    Specimen: Blood Updated: 10/09/21 2030     Glucose 376 mg/dL      Comment: : [id number] Philip AbigailMeter ID: [id number]       POC Glucose Once [[id number]]  (Abnormal) Collected: 10/09/21 1658    Specimen: Blood Updated: 10/09/21 1709     Glucose 328 mg/dL      Comment: : [id number] Adam EmmaMeter ID: [id number]       Legionella Antigen, Urine - Urine, Urine, Clean Catch [[id number]]  (Normal) Collected: 10/09/21 1242    Specimen: Urine, Clean Catch Updated: 10/09/21 1410     LEGIONELLA ANTIGEN, URINE Negative    Extra Tubes [[id number]] Collected: 10/09/21 1207    Specimen: Blood, Venous Line Updated: 10/09/21 1315    Narrative:      The following orders were created for panel order Extra Tubes.  Procedure                               Abnormality         Status                     ---------                               -----------         ------                     Lavender Top[[id number]]                                     Final result               Gold Top - SST[[id number]]                                   Final result                 Please view results for these tests on the individual orders.    Lavender Top [[id number]] Collected: 10/09/21 1207    Specimen: Blood Updated: 10/09/21 1315     Extra Tube hold for add-on     Comment: Auto resulted       Gold Top - SST [[id number]] Collected: 10/09/21 1207    Specimen: Blood Updated: 10/09/21 1315     Extra Tube Hold for add-ons.     Comment: Auto resulted.       Basic Metabolic Panel [[id number]]  (Abnormal) Collected: 10/09/21 1207    Specimen: Blood Updated: 10/09/21 1240     Glucose 313 mg/dL      BUN 46 mg/dL      Creatinine 2.45 mg/dL      Sodium 136 mmol/L      Potassium 4.7 mmol/L      Chloride 104 mmol/L      CO2 18.0 mmol/L      Calcium 8.5 mg/dL      eGFR Non African Amer 19 mL/min/1.73      BUN/Creatinine Ratio 18.8     Anion Gap 14.0 mmol/L     Narrative:      GFR Normal >60  Chronic Kidney Disease <60  Kidney Failure <15       POC Glucose Once [[id number]]  (Abnormal) Collected: 10/09/21 1138    Specimen: Blood Updated: 10/09/21 1150     Glucose 302 mg/dL      Comment: : WOJCIECH ScottellMeter ID: [id number]       POC Glucose Once [[id number]]  (Abnormal) Collected: 10/09/21 0807    Specimen: Blood Updated: 10/09/21 0819     Glucose 254 mg/dL      Comment: : WOJCIECH ScottellMeter ID: [id number]       MRSA Screen, PCR (Inpatient) - Swab, Nares [[id number]]  (Normal) Collected: 10/09/21 0408    Specimen: Swab from Nares Updated: 10/09/21 0547     MRSA PCR No MRSA Detected    POC Glucose Once [[id number]]  (Abnormal) Collected: 10/09/21 0024    Specimen: Blood Updated: 10/09/21 0221     Glucose 302 mg/dL      Comment: : [id number] Claudia MaeerMeter ID: [id number]       Respiratory Panel, PCR (WITHOUT COVID) - Swab, Nasopharynx [[id number]]  (Normal) Collected: 10/08/21 2201    Specimen: Swab from Nasopharynx Updated: 10/08/21 2344     ADENOVIRUS, PCR Not Detected     Coronavirus 229E Not Detected     Coronavirus HKU1 Not Detected     Coronavirus NL63 Not Detected     Coronavirus OC43 Not Detected     Human Metapneumovirus Not Detected     Human Rhinovirus/Enterovirus Not Detected     Influenza B PCR Not Detected     Parainfluenza Virus 1 Not Detected     Parainfluenza Virus 2 Not Detected     Parainfluenza Virus 3 Not Detected     Parainfluenza Virus 4 Not Detected     Bordetella pertussis pcr Not Detected     Chlamydophila pneumoniae PCR Not Detected     Mycoplasma pneumo by PCR Not Detected     Influenza A PCR Not Detected     RSV, PCR Not Detected     Bordetella parapertussis PCR Not Detected    Narrative:      The coronavirus on the RVP is NOT COVID-19 and is NOT indicative of infection with COVID-19.    In the setting of a positive respiratory panel with a viral infection PLUS a negative procalcitonin without other underlying concern for bacterial infection, consider observing off antibiotics or  discontinuation of antibiotics and continue supportive care. If the respiratory panel is positive for atypical bacterial infection (Bordetella pertussis, Chlamydophila pneumoniae, or Mycoplasma pneumoniae), consider antibiotic de-escalation to target atypical bacterial infection.    S. Pneumo Ag Urine or CSF - Urine, Urine, Clean Catch [[id number]]  (Normal) Collected: 10/08/21 2034    Specimen: Urine, Clean Catch Updated: 10/08/21 2306     Strep Pneumo Ag Negative    Hemoglobin A1c [[id number]]  (Abnormal) Collected: 10/08/21 1910    Specimen: Blood Updated: 10/08/21 2300     Hemoglobin A1C 5.80 %     Narrative:      Hemoglobin A1C Ranges:    Increased Risk for Diabetes  5.7% to 6.4%  Diabetes                     >= 6.5%  Diabetic Goal                < 7.0%    Blood Gas, Arterial - [[id number]]  (Abnormal) Collected: 10/08/21 2208    Specimen: Arterial Blood Updated: 10/08/21 2213     Site Right Radial     Duane's Test Positive     pH, Arterial 7.426 pH units      pCO2, Arterial 28.1 mm Hg      Comment: 84 Value below reference range        pO2, Arterial 80.8 mm Hg      Comment: 84 Value below reference range        HCO3, Arterial 18.5 mmol/L      Comment: 84 Value below reference range        Base Excess, Arterial -5.0 mmol/L      Comment: 84 Value below reference range        O2 Saturation, Arterial 97.4 %      Temperature 37.0 C      Barometric Pressure for Blood Gas 750 mmHg      Modality Nasal Cannula     Flow Rate 2.0 lpm      Ventilator Mode NA     Collected by [id number]     Comment: Meter: [id number]     :  [id number]        pCO2, Temperature Corrected 28.1 mm Hg      pH, Temp Corrected 7.426 pH Units      pO2, Temperature Corrected 80.8 mm Hg     Troponin [[id number]]  (Abnormal) Collected: 10/08/21 2044    Specimen: Blood Updated: 10/08/21 2121     Troponin T 0.043 ng/mL     Narrative:      Troponin T Reference Range:  <= 0.03 ng/mL-   Negative for AMI  >0.03 ng/mL-     Abnormal for myocardial  necrosis.  Clinicians would have to utilize clinical acumen, EKG, Troponin and serial changes to determine if it is an Acute Myocardial Infarction or myocardial injury due to an underlying chronic condition.       Results may be falsely decreased if patient taking Biotin.      Lactic Acid, Plasma [[id number]]  (Normal) Collected: 10/08/21 2044    Specimen: Blood Updated: 10/08/21 2108     Lactate 1.7 mmol/L     Urinalysis With Microscopic If Indicated (No Culture) - Urine, Clean Catch [[id number]]  (Abnormal) Collected: 10/08/21 2034    Specimen: Urine, Clean Catch Updated: 10/08/21 2048     Color, UA Yellow     Appearance, UA Turbid     pH, UA 5.5     Specific Gravity, UA 1.015     Glucose, UA Negative     Ketones, UA Negative     Bilirubin, UA Negative     Blood, UA Moderate (2+)     Protein,  mg/dL (2+)     Leuk Esterase, UA Large (3+)     Nitrite, UA Positive     Urobilinogen, UA 0.2 E.U./dL    Urinalysis, Microscopic Only - Urine, Clean Catch [[id number]]  (Abnormal) Collected: 10/08/21 2034    Specimen: Urine, Clean Catch Updated: 10/08/21 2048     RBC, UA 21-30 /HPF      WBC, UA Too Numerous to Count /HPF      Bacteria, UA 4+ /HPF      Squamous Epithelial Cells, UA 0-2 /HPF      Hyaline Casts, UA 3-6 /LPF      Methodology Automated Microscopy    COVID PRE-OP / PRE-PROCEDURE SCREENING ORDER (NO ISOLATION) - Swab, Nasal Cavity [[id number]]  (Normal) Collected: 10/08/21 1914    Specimen: Swab from Nasal Cavity Updated: 10/08/21 2003    Narrative:      The following orders were created for panel order COVID PRE-OP / PRE-PROCEDURE SCREENING ORDER (NO ISOLATION) - Swab, Nasal Cavity.  Procedure                               Abnormality         Status                     ---------                               -----------         ------                     COVID-19,[facility]...[[id number]]  Normal              Final result                 Please view results for these tests on the individual orders.     COVID-19,[facility] IN-HOUSE,Nasal Swab No Transport Media 3-4 HR TAT - Swab, Nasal Cavity [[id number]]  (Normal) Collected: 10/08/21 1914    Specimen: Swab from Nasal Cavity Updated: 10/08/21 2003     COVID19 Not Detected    Narrative:      Fact sheet for providers: https://www.fda.gov/media/653152/download     Fact sheet for patients: https://www.fda.gov/media/730422/download    Test performed by PCR.    Consider negative results in combination with clinical observations, patient history, and epidemiological information.  Fact sheet for providers: https://www.fda.gov/media/463008/download     Fact sheet for patients: https://www.fda.gov/media/869237/download    Test performed by PCR.    Consider negative results in combination with clinical observations, patient history, and epidemiological information.    Troponin [[id number]]  (Abnormal) Collected: 10/08/21 1910    Specimen: Blood Updated: 10/08/21 1947     Troponin T 0.039 ng/mL     Narrative:      Troponin T Reference Range:  <= 0.03 ng/mL-   Negative for AMI  >0.03 ng/mL-     Abnormal for myocardial necrosis.  Clinicians would have to utilize clinical acumen, EKG, Troponin and serial changes to determine if it is an Acute Myocardial Infarction or myocardial injury due to an underlying chronic condition.       Results may be falsely decreased if patient taking Biotin.      Comprehensive Metabolic Panel [[id number]]  (Abnormal) Collected: 10/08/21 1910    Specimen: Blood Updated: 10/08/21 1946     Glucose 159 mg/dL      BUN 40 mg/dL      Creatinine 2.52 mg/dL      Sodium 135 mmol/L      Potassium 4.0 mmol/L      Chloride 103 mmol/L      CO2 21.0 mmol/L      Calcium 8.6 mg/dL      Total Protein 6.2 g/dL      Albumin 3.00 g/dL      ALT (SGPT) 13 U/L      AST (SGOT) 18 U/L      Alkaline Phosphatase 62 U/L      Total Bilirubin 0.3 mg/dL      eGFR Non African Amer 18 mL/min/1.73      Globulin 3.2 gm/dL      A/G Ratio 0.9 g/dL      BUN/Creatinine Ratio 15.9     Anion Gap  11.0 mmol/L     Narrative:      GFR Normal >60  Chronic Kidney Disease <60  Kidney Failure <15      BNP [[id number]]  (Abnormal) Collected: 10/08/21 1910    Specimen: Blood Updated: 10/08/21 1942     proBNP 7,241.0 pg/mL     Narrative:      Among patients with dyspnea, NT-proBNP is highly sensitive for the detection of acute congestive heart failure. In addition NT-proBNP of <300 pg/ml effectively rules out acute congestive heart failure with 99% negative predictive value.    Results may be falsely decreased if patient taking Biotin.      CBC & Differential [[id number]]  (Abnormal) Collected: 10/08/21 1910    Specimen: Blood Updated: 10/08/21 1921    Narrative:      The following orders were created for panel order CBC & Differential.  Procedure                               Abnormality         Status                     ---------                               -----------         ------                     CBC Auto Differential[[id number]]        Abnormal            Final result                 Please view results for these tests on the individual orders.    CBC Auto Differential [[id number]]  (Abnormal) Collected: 10/08/21 1910    Specimen: Blood Updated: 10/08/21 1921     WBC 8.72 10*3/mm3      RBC 2.96 10*6/mm3      Hemoglobin 8.9 g/dL      Hematocrit 28.2 %      MCV 95.3 fL      MCH 30.1 pg      MCHC 31.6 g/dL      RDW 14.1 %      RDW-SD 48.6 fl      MPV 10.9 fL      Platelets 222 10*3/mm3      Neutrophil % 67.9 %      Lymphocyte % 18.6 %      Monocyte % 12.3 %      Eosinophil % 0.1 %      Basophil % 0.1 %      Immature Grans % 1.0 %      Neutrophils, Absolute 5.92 10*3/mm3      Lymphocytes, Absolute 1.62 10*3/mm3      Monocytes, Absolute 1.07 10*3/mm3      Eosinophils, Absolute 0.01 10*3/mm3      Basophils, Absolute 0.01 10*3/mm3      Immature Grans, Absolute 0.09 10*3/mm3      nRBC 0.0 /100 WBC         Imaging Results (Last 7 Days)     Procedure Component Value Units Date/Time    XR Chest 1 View [[id number]]  Collected: 10/12/21 1005     Updated: 10/12/21 1011    Narrative:      EXAM: XR CHEST 1 VW-     INDICATION: Short of breath; R13.12-Dysphagia, oropharyngeal phase;  N39.0-Urinary tract infection, site not specified; J18.9-Pneumonia,  unspecified organism; R68.89-Other general symptoms and signs     COMPARISON: 10/8/2021     FINDINGS:     Cardiac silhouette is normal in size and stable. Small bilateral pleural  effusions appear unchanged. Interval increase in bilateral patchy  airspace opacity. Emphysema and chronic interstitial changes are again  noted. No visible pneumothorax. Old RIGHT humeral neck fracture. No  acute osseous finding.       Impression:         1.  Worsening of bilateral patchy airspace opacity. Differential  includes multifocal pneumonia and pulmonary edema.   2.  No change in small bilateral pleural effusions.  This report was finalized on 10/12/2021 10:08 by Dr. Hakan Robles MD.    FL Video Swallow With Speech Single Contrast [[id number]] Collected: 10/11/21 1245     Updated: 10/11/21 1249    Narrative:      EXAMINATION:  FL VIDEO SWALLOW W SPEECH SINGLE-CONTRAST-  10/11/2021  12:26 PM CDT     HISTORY: Ongoing dysphagia with no s/s at bedside; pneumonia; concern  for silent aspiration; R13.12-Dysphagia, oropharyngeal phase;  N39.0-Urinary tract infection, site not specified; J18.9-Pneumonia,  unspecified organism.     COMPARISON: No comparison study.     TECHNIQUE: The patient was given honey, nectar, water, pudding, fruit  and cracker consistencies mixed with barium for swallowing.     FLUOROSCOPY TIME: 1 minute 27 seconds.     NUMBER OF IMAGES: 4.4 mGy.     FINDINGS: There was laryngeal penetration with water consistency barium  on 2 separate swallowing sequences. There were no other episodes of  penetration or aspiration. The epiglottis appears to invert normally.  There was some spillover of the liquid substances.       Impression:      1. There were 2 episodes of laryngeal penetration with  water consistency  barium. No aspiration was observed.  2. Please see the speech pathology report separately.  This report was finalized on 10/11/2021 12:46 by Dr. Johnathan Brennan MD.    CT Chest Without Contrast Diagnostic [[id number]] Collected: 10/08/21 2010     Updated: 10/08/21 2019    Narrative:      EXAMINATION: CT CHEST WO CONTRAST DIAGNOSTIC- 10/8/2021 8:10 PM CDT     HISTORY: Shortness of breath, wheezing     COMPARISON: Chest x-ray 10/8/2021     DOSE: 263 mGy-cm     TECHNIQUE: Sequential imaging was performed from the thoracic inlet  through the upper abdomen without the use of IV contrast.  Sagittal and  coronal reformations were made from the original source data and  reviewed. Automated exposure control was also utilized to decrease  patient radiation dose.     FINDINGS:   Visualized thyroid gland is grossly normal in appearance. Trachea and  main bronchi are patent. The esophagus is grossly normal in appearance.     No pathologically enlarged axillary lymph nodes are identified. Some  mildly prominent mediastinal lymph nodes measure 10 mm in short axis.     The heart is normal in size. There is no pericardial effusion. Coronary  artery calcifications are present. Atherosclerotic calcifications are  seen within the aorta and its branch vessels. The ascending thoracic  aorta is normal in caliber. Main pulmonary artery is dilated, suggesting  pulmonary arterial hypertension. The distal aortic arch measures 3.4 cm  in caliber.     There are small bilateral pleural effusions. Severe emphysematous  changes are present. There is diffuse bronchial wall thickening. Some  interlobular septal thickening is noted. Groundglass airspace opacities  are seen throughout the lungs. Some areas of patchy nodularity are seen  in the left upper lobe measuring 12 mm and 10 mm in size.     Review of the visualized portion of the upper abdomen demonstrates no  acute findings.     Review of the visualized osseous structures  "demonstrates no acute or  aggressive lesions.        Impression:         1. Bilateral airspace disease superimposed upon emphysematous changes,  concerning for multifocal pneumonia.     2. Small bilateral pleural effusions.     3. Atherosclerosis of the aorta and coronary arteries.  4. Pulmonary arterial hypertension.  5. Mildly prominent mediastinal lymph nodes may be reactive. Attention  on follow-up recommended.  6. Noncalcified pulmonary nodules for which follow-up CT is recommended  in 3-6 months per Fleischner Society guidelines.        This report was finalized on 10/08/2021 20:16 by Dr. Erik Schmitt MD.    XR Chest 1 View [[id number]] Collected: 10/08/21 1958     Updated: 10/08/21 2002    Narrative:      EXAMINATION: XR CHEST 1 VW- 10/8/2021 7:58 PM CDT     HISTORY: Shortness of breath, wheezing     COMPARISON: 8/30/2021     FINDINGS:  The heart and mediastinal contours are stable. Atherosclerotic  calcifications are seen in the aorta. Chronic interstitial changes are  present. However, there are worsening airspace opacities throughout both  lungs, particularly in the lung bases. There are small pleural effusions  bilaterally. Retrocardiac consolidation is noted. An old right humeral  fracture is suspected.       Impression:      Bilateral airspace disease concerning for pneumonia  superimposed upon chronic changes. Small bilateral pleural effusions.  This report was finalized on 10/08/2021 19:59 by Dr. Erik Schmitt MD.            Condition on Discharge:    Stable and improved    Physical Exam on Discharge:  /69 (BP Location: Left arm, Patient Position: Sitting)   Pulse 72   Temp 98.2 °F (36.8 °C) (Oral)   Resp 20   Ht 170.7 cm (67.2\")   Wt 80.5 kg (177 lb 8 oz)   SpO2 98%   BMI 27.64 kg/m²   Physical Exam     Constitutional:       Comments: Sitting up in bed.  Oxygen saturation 98 to 100% on room air. No sputum production.  HENT:      Head: Normocephalic and atraumatic.      Nose: No " congestion.      Pharynx: Oropharynx is clear.     Comments: Hard of hearing  Cardiovascular:      Rate and Rhythm: Normal rate and regular rhythm.      Heart sounds: No murmur heard.  Pulmonary:      Breath sounds: Occasional left basilar crackles that clear with deep inhalation.  Abdominal:      Palpations: Abdomen is soft.      Tenderness: There is no abdominal tenderness.   Genitourinary:     Comments: WIC in place.  Musculoskeletal:         General: Swelling (Unna boots placed 10/12.) present. No tenderness.      Cervical back: Normal range of motion and neck supple.   Skin:     General: Skin is warm and dry.       Comments: Unna boots bilateral lower extremities  Neurological:      General: No focal deficit present.      Mental Status: She is alert and oriented to person, place, and time.   Psychiatric:         Mood and Affect: Mood normal.            Judgment: Judgment normal.     Discharge Disposition:  Home or Self Care    Discharge Medications:     Discharge Medications      New Medications      Instructions Start Date   cefdinir 300 MG capsule  Commonly known as: OMNICEF   300 mg, Oral, Every 24 Hours Scheduled   Start Date: October 16, 2021     fluticasone-salmeterol 100-50 MCG/DOSE DISKUS  Commonly known as: ADVAIR   1 puff, Inhalation, 2 Times Daily - RT      predniSONE 5 MG (21) tablet therapy pack dose pack   5 mg, Oral, Take As Directed, Take as directed on package instructions.         Continue These Medications      Instructions Start Date   acetaminophen 500 MG tablet  Commonly known as: TYLENOL   500 mg, Oral, Every 6 Hours PRN      amLODIPine 5 MG tablet  Commonly known as: NORVASC   5 mg, Oral, Daily      atorvastatin 40 MG tablet  Commonly known as: LIPITOR   40 mg, Oral, Nightly      GENTEAL TEARS MODERATE PF OP   2 drops, Both Eyes, Every Night at Bedtime      insulin detemir 100 UNIT/ML injection  Commonly known as: LEVEMIR   10 Units, Subcutaneous, 2 Times Daily      insulin lispro 100  UNIT/ML injection  Commonly known as: humaLOG   Inject under the skin TID WM as per sliding scale: 150-199= 2 units 200-249= 3 units 250-299= 4 units 300-349= 5 units 350-399= 6 units >400= 7 units and call MD      levothyroxine 75 MCG tablet  Commonly known as: SYNTHROID, LEVOTHROID   75 mcg, Oral, Daily      losartan 25 MG tablet  Commonly known as: COZAAR   25 mg, Oral, Daily      Mirabegron ER 50 MG tablet sustained-release 24 hour 24 hr tablet  Commonly known as: MYRBETRIQ   50 mg, Oral, Daily      multivitamin with minerals tablet tablet   1 tablet, Oral, Daily      ondansetron 4 MG tablet  Commonly known as: ZOFRAN   4 mg, Oral, Every 6 Hours PRN      polyethylene glycol 17 g packet  Commonly known as: MIRALAX   17 g, Oral, Daily      prochlorperazine 10 MG tablet  Commonly known as: COMPAZINE   10 mg, Oral, Every 6 Hours PRN      sennosides-docusate 8.6-50 MG per tablet  Commonly known as: PERICOLACE   1 tablet, Oral, 2 Times Daily      Vitamin C 500 MG capsule   500 mg, Oral, Daily      ZINC 15 PO   220 mg, Oral, Daily         Stop These Medications    BENEFIBER DRINK MIX PO     HYDROcodone-acetaminophen 7.5-325 MG per tablet  Commonly known as: NORCO     levocetirizine 5 MG tablet  Commonly known as: XYZAL     PARoxetine 10 MG tablet  Commonly known as: PAXIL            Discharge Diet:   Diet Instructions     Diet: Soft Texture; Nectar / Syrup Thick Liquids; Ground      Discharge Diet: Soft Texture    Fluid Consistency: Nectar / Syrup Thick Liquids    Soft Options: Ground          Discharge Care Plan / Instructions:   Discharged to [facility]  Remove Unna boots 10/18/2021 and replace with compression stockings to knee height.  Compression stockings to be applied in a.m. and removed at at bedtime.    Activity at Discharge:   Activity Instructions     Activity as Tolerated             Electronically signed by Lloyd Martins DO, 10/15/21, 08:54 CDT.    Time: Discharge Over 30 min    Part of  this note may be an electronic transcription/translation of spoken language to printed text using the Dragon Dictation system.

## 2021-10-15 NOTE — THERAPY TREATMENT NOTE
Acute Care - Physical Therapy Treatment Note  [facility]     Patient Name: Honey Canales  : 3/27/1933  MRN: [id number]  Today's Date: 10/15/2021      Visit Dx:     ICD-10-CM ICD-9-CM   1. Oropharyngeal dysphagia  R13.12 787.22   2. Urinary tract infection without hematuria, site unspecified  N39.0 599.0   3. Pneumonia due to infectious organism, unspecified laterality, unspecified part of lung  J18.9 486   4. Decreased functional activity tolerance  R68.89 780.99   5. 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)   • Benign essential HTN   • Pulmonary fibrosis (HCC)   • Stage 3b chronic kidney disease (HCC)   • Hepatitis C   • HCAP (healthcare-associated pneumonia)   • Acute hypoxemic respiratory failure (HCC)   • Acute cystitis with hematuria   • Chronic kidney disease (CKD), stage IV (severe) (HCC)     Past Medical History:   Diagnosis Date   • Acquired hammer toes of both feet 10/15/2020   • Allergic rhinitis    • Aneurysm (HCC)    • Arthritis    • Benign meningioma (HCC) 10/15/2020   • Broken shoulder     Right shoulder    • Cerebral aneurysm 2009    2ND ONE FOUND   • Cerebral aneurysm     NON TREATABLE   • Chronic laryngitis    • CKD (chronic kidney disease)    • Colon polyps    • COPD (chronic obstructive pulmonary disease) (HCC)    • Deviated nasal septum    • Diabetes mellitus (HCC)    • Disorder of kidney and ureter    • Disturbance of smell and taste    • Dizziness    • Encounter for imaging to screen for metal prior to MRI     NO MRI, METAL CLIPS IN HEAD   • Eustachian tube dysfunction    • GERD (gastroesophageal reflux disease)    • Globus sensation    • Hallux valgus, right 2018   • Headache    • Hepatitis     B   • Hepatitis A    • History of stomach ulcers    • Hyperlipidemia    •  Hypothyroid    • Laryngitis sicca    • Lung nodule    • Nocturia    • Non-toxic multinodular goiter 10/15/2020   • PONV (postoperative nausea and vomiting)    • Sensorineural hearing loss    • Spinal stenosis    • Uncontrolled type 2 diabetes mellitus without complication, with long-term current use of insulin 2013   • Urge incontinence    • Urgency of urination    • Urinary frequency    • Urinary incontinence    • UTI (urinary tract infection)      Past Surgical History:   Procedure Laterality Date   • BLADDER SURGERY  2016    [employer]onic Interstem   • BRAIN SURGERY      ANUERYSM REMOVED   • BREAST SURGERY Bilateral     BIOPSY   • CARPAL TUNNEL RELEASE     • CATARACT EXTRACTION, BILATERAL     • CEREBRAL ANEURYSM REPAIR     •  SECTION      X4   • CHOLECYSTECTOMY     • HYSTERECTOMY     • INTERSTIM PLACEMENT N/A 10/18/2018    Procedure: INTERSTIM STAGES 1 AND 2 LEAD AND GENERATOR REMOVAL AND REPLACEMENT;  Surgeon: Archie Avery MD;  Location: [facility];  Service: Urology   • JOINT REPLACEMENT Right     KNEE   • KNEE ARTHROSCOPY     • THYROIDECTOMY     • TONSILLECTOMY       PT Assessment (last 12 hours)     PT Evaluation and Treatment     Row Name 10/15/21 1120 10/15/21 0841       Physical Therapy Time and Intention    Subjective Information no complaints  -AB --    Document Type therapy note (daily note)  -AB --    Mode of Treatment physical therapy  -AB physical therapy  -AB    Session Not Performed -- other (see comments)  -AB    Comment, Session Not Performed -- eating  -AB    Comment Unna boots check Re-enforced L unnaboot with more coban at toes.  -AB --    Row Name 10/15/21 1120          General Information    Existing Precautions/Restrictions fall  -AB     Row Name 10/15/21 1120          Bed Mobility    Comment (Bed Mobility) up in chair  -AB     Row Name 10/15/21 1120          Transfers    Sit-Stand [place] (Transfers) verbal cues; minimum assist (75% patient effort)  -AB     Row Name  10/15/21 1120          Sit-Stand Transfer    Assistive Device (Sit-Stand Transfers) walker, front-wheeled  -AB     Row Name 10/15/21 1120          Gait/Stairs (Locomotion)    [place] Level (Gait) verbal cues; contact guard  -AB     Assistive Device (Gait) walker, front-wheeled  -AB     Distance in Feet (Gait) 30 x 2  -AB     Pattern (Gait) step-through  -AB     Deviations/Abnormal Patterns (Gait) stride length decreased; gait speed decreased  -AB     Bilateral Gait Deviations forward flexed posture  -AB     Row Name             Wound 10/08/21 2247 gluteal    Wound - Properties Group Placement Date: 10/08/21  -ST Placement Time: 2247 -ST Present on Hospital Admission: Y  -ST Location: gluteal  -ST     Retired Wound - Properties Group Date first assessed: 10/08/21  -ST Time first assessed: 2247 -ST Present on Hospital Admission: Y  -ST Location: gluteal  -ST     Row Name 10/15/21 1120          Positioning and Restraints    Pre-Treatment Position sitting in chair/recliner  -AB     Post Treatment Position chair  -AB     In Chair reclined; sitting; call light within reach  -AB           User Key  (r) = Recorded By, (t) = Taken By, (c) = Cosigned By    Initials Name Provider Type    AB Mariposa Cavazos PTA Physical Therapy Assistant    Jeannie Marin RN Registered Nurse              Physical Therapy Education                 Title: PT OT SLP Therapies (Done)     Topic: Physical Therapy (Done)     Point: Mobility training (Done)     Learning Progress Summary           Patient Acceptance, E, VU by WK at 10/13/2021 1419    Comment: safety and deep breathing    Acceptance, E, VU by NN at 10/11/2021 1038    Comment: PT role in care   Family Acceptance, E, VU by NN at 10/11/2021 1038    Comment: PT role in care                   Point: Home exercise program (Done)     Learning Progress Summary           Patient Acceptance, E, VU by NN at 10/11/2021 1038    Comment: PT role in care   Family Acceptance, E, VU by NN  at 10/11/2021 1038    Comment: PT role in care                   Point: Body mechanics (Done)     Learning Progress Summary           Patient Acceptance, E, VU by NN at 10/11/2021 1038    Comment: PT role in care   Family Acceptance, E, VU by NN at 10/11/2021 1038    Comment: PT role in care                   Point: Precautions (Done)     Learning Progress Summary           Patient Acceptance, E, VU by MS at 10/12/2021 1558    Comment: role of unna boots in her care    Acceptance, E, VU by NN at 10/11/2021 1038    Comment: PT role in care   Family Acceptance, E, VU by NN at 10/11/2021 1038    Comment: PT role in care                               User Key     Initials Effective Dates Name Provider Type Discipline    MS 06/19/18 -  Philly Mcclendon, PT, DPT, NCS Physical Therapist PT    WK 06/16/21 -  Verónica Brown PTA Physical Therapy Assistant PT    NN 08/18/21 -  Regina Russell, BLANCA Student PT Student PT              PT Recommendation and Plan             Time Calculation:    PT Charges     Row Name 10/15/21 1120             Time Calculation    Start Time 1120  -AB      Stop Time 1137  -AB      Time Calculation (min) 17 min  -AB      PT Received On 10/15/21  -AB              Time Calculation- PT    Total Timed Code Minutes- PT 17 minute(s)  -AB            User Key  (r) = Recorded By, (t) = Taken By, (c) = Cosigned By    Initials Name Provider Type    AB Mariposa Cavazos PTA Physical Therapy Assistant              Therapy Charges for Today     Code Description Service Date Service Provider Modifiers Qty    57220246046 HC GAIT TRAINING EA 15 MIN 10/15/2021 Mariposa Cavazos PTA GP 1          PT G-Codes  Outcome Measure Options: AM-PAC 6 Clicks Basic Mobility (PT)  AM-PAC 6 Clicks Score (PT): 19    Mariposa Cavazos PTA  10/15/2021

## 2021-10-15 NOTE — PLAN OF CARE
Goal Outcome Evaluation:         Patient is A+Ox4, PETERSEN- edema noted to BLE and to left arm- Up to chair with asst x1- BM today, voiding incont- purewic used when up to chair- Accuchecks completely - will go back to [place] per the facility van

## 2021-10-15 NOTE — PROGRESS NOTES
[facility] Medicine Services  INPATIENT PROGRESS NOTE    Length of Stay: 6  Date of Admission: 10/8/2021  Primary Care Physician: Devon Zuñiga MD    Subjective     Chief Complaint:     Shortness of breath    HPI     The patient is not short of breath and has no complaints of dyspnea.  Oxygen saturations are  percent on room air.  There are no wheezes noted today.  She has a few expiratory crackles in the left base but that clears quickly with deep expansion.  She ambulated 30 feet x 2 with a rolling walker today with physical therapy.  Exercise O2 sat 94%.  She continues to be generally weak but should be appropriate to return to the nursing facility tomorrow for continuation of rehab.  BMP this a.m. shows creatinine 2.24 with BUN 62.  Creatinine is nearing baseline of 1.8-2.1.    Review of Systems     All pertinent negatives and positives are as above. All other systems have been reviewed and are negative unless otherwise stated.     Objective    Temp:  [97.5 °F (36.4 °C)-98.1 °F (36.7 °C)] 97.5 °F (36.4 °C)  Heart Rate:  [71-84] 75  Resp:  [16-22] 22  BP: (148-168)/(64-85) 166/64    Lab Results (last 24 hours)     Procedure Component Value Units Date/Time    POC Glucose Once [[id number]]  (Abnormal) Collected: 10/14/21 1658    Specimen: Blood Updated: 10/14/21 1709     Glucose 225 mg/dL      Comment: : [id number] Parajuli SanjitaMeter ID: [id number]       POC Glucose Once [[id number]]  (Abnormal) Collected: 10/14/21 1212    Specimen: Blood Updated: 10/14/21 1223     Glucose 197 mg/dL      Comment: : [id number] Parajuli SanjitaMeter ID: [id number]       POC Glucose Once [[id number]]  (Abnormal) Collected: 10/14/21 0754    Specimen: Blood Updated: 10/14/21 0805     Glucose 150 mg/dL      Comment: : [id number] Parajuli SanjitaMeter ID: [id number]       Basic Metabolic Panel [[id number]]  (Abnormal) Collected: 10/14/21 0545    Specimen: Blood Updated: 10/14/21  0642     Glucose 168 mg/dL      BUN 62 mg/dL      Creatinine 2.24 mg/dL      Sodium 142 mmol/L      Potassium 4.1 mmol/L      Comment: Slight hemolysis detected by analyzer. Results may be affected.        Chloride 110 mmol/L      CO2 21.0 mmol/L      Calcium 8.6 mg/dL      eGFR Non African Amer 21 mL/min/1.73      BUN/Creatinine Ratio 27.7     Anion Gap 11.0 mmol/L     Narrative:      GFR Normal >60  Chronic Kidney Disease <60  Kidney Failure <15      Blood Culture - Blood, Arm, Right [[id number]]  (Normal) Collected: 10/08/21 2044    Specimen: Blood from Arm, Right Updated: 10/13/21 2115     Blood Culture No growth at 5 days    Blood Culture - Blood, Arm, Right [[id number]]  (Normal) Collected: 10/08/21 2044    Specimen: Blood from Arm, Right Updated: 10/13/21 2115     Blood Culture No growth at 5 days    POC Glucose Once [[id number]]  (Abnormal) Collected: 10/13/21 1939    Specimen: Blood Updated: 10/13/21 2000     Glucose 303 mg/dL      Comment: : [id number] Chung SarahMeter ID: [id number]             Imaging Results (Last 24 Hours)     ** No results found for the last 24 hours. **             Intake/Output Summary (Last 24 hours) at 10/14/2021 1904  Last data filed at 10/14/2021 1749  Gross per 24 hour   Intake 460 ml   Output 1700 ml   Net -1240 ml       Physical Exam  Constitutional:       Comments: Sitting up in bed.  Oxygen saturation 98 to 100% on room air. No sputum production.  HENT:      Head: Normocephalic and atraumatic.      Nose: No congestion.      Pharynx: Oropharynx is clear.     Comments: Hard of hearing  Cardiovascular:      Rate and Rhythm: Normal rate and regular rhythm.      Heart sounds: No murmur heard.  Pulmonary:      Breath sounds: Occasional left basilar crackles that clear with deep inhalation.  Abdominal:      Palpations: Abdomen is soft.      Tenderness: There is no abdominal tenderness.   Genitourinary:     Comments: WIC in place.  Musculoskeletal:         General: Swelling (Unna  boots placed 10/12.) present. No tenderness.      Cervical back: Normal range of motion and neck supple.   Skin:     General: Skin is warm and dry.       Comments: Unna boots bilateral lower extremities  Neurological:      General: No focal deficit present.      Mental Status: She is alert and oriented to person, place, and time.   Psychiatric:         Mood and Affect: Mood normal.            Judgment: Judgment normal.     Results Review:  I have reviewed the labs, radiology results, and diagnostic studies since my last progress note and made treatment changes reflective of the results.   I have reviewed the current medications.    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  Decrease prednisone to 30 mg p.o. daily  BMP in a.m.    Electronically signed by Lloyd Martins DO, 10/14/21, 19:04 CDT.

## 2021-10-15 NOTE — THERAPY DISCHARGE NOTE
Acute Care - Physical Therapy Discharge Summary  [facility]       Patient Name: Honey Canales  : 3/27/1933  MRN: [id number]    Today's Date: 10/15/2021                 Admit Date: 10/8/2021      PT Recommendation and Plan    Visit Dx:    ICD-10-CM ICD-9-CM   1. Oropharyngeal dysphagia  R13.12 787.22   2. Urinary tract infection without hematuria, site unspecified  N39.0 599.0   3. Pneumonia due to infectious organism, unspecified laterality, unspecified part of lung  J18.9 486   4. Decreased functional activity tolerance  R68.89 780.99   5. Compression fracture of L5 vertebra, initial encounter ([facility])  S32.050A 805.4            PT Charges     Row Name 10/15/21 1120             Time Calculation    Start Time 1120  -AB      Stop Time 1137  -AB      Time Calculation (min) 17 min  -AB      PT Received On 10/15/21  -AB              Time Calculation- PT    Total Timed Code Minutes- PT 17 minute(s)  -AB            User Key  (r) = Recorded By, (t) = Taken By, (c) = Cosigned By    Initials Name Provider Type    AB Mariposa Cavazos, PTA Physical Therapy Assistant                 PT Rehab Goals     Row Name 10/15/21 1500             Transfer Goal 1 (PT)    Activity/Assistive Device (Transfer Goal 1, PT) sit-to-stand/stand-to-sit; bed-to-chair/chair-to-bed; walker, rolling  -AB      [place] Level/Cues Needed (Transfer Goal 1, PT) standby assist  -AB      Time Frame (Transfer Goal 1, PT) long term goal (LTG)  -AB      Progress/Outcome (Transfer Goal 1, PT) goal not met  -AB              Gait Training Goal 1 (PT)    Activity/Assistive Device (Gait Training Goal 1, PT) gait (walking locomotion); decrease fall risk; diminish gait deviation; improve balance and speed; increase endurance/gait distance; increase energy conservation; walker, rolling  -AB      [place] Level (Gait Training Goal 1, PT) standby assist  -AB      Distance (Gait Training Goal 1, PT) 50ft with one or less rest break.  -AB      Time Frame (Gait  Training Goal 1, PT) long term goal (LTG)  -AB      Progress/Outcome (Gait Training Goal 1, PT) goal not met  -AB              Problem Specific Goal 1 (PT)    Problem Specific Goal 1 (PT) L unna boot will stay in place to provide optimal compression for edema control.  -AB      Time Frame (Problem Specific Goal 1, PT) long-term goal (LTG); by discharge  -AB      Progress/Outcome (Problem Specific Goal 1, PT) goal partially met  -AB              Problem Specific Goal 2 (PT)    Problem Specific Goal 2 (PT) R unna boot will stay in place to provide optimal compression for edema control  -AB      Time Frame (Problem Specific Goal 2, PT) long-term goal (LTG); by discharge  -AB      Progress/Outcome (Problem Specific Goal 2, PT) goal partially met  -AB            User Key  (r) = Recorded By, (t) = Taken By, (c) = Cosigned By    Initials Name Provider Type Discipline    AB Mariposa Cavazos PTA Physical Therapy Assistant PT                Therapy Charges for Today     Code Description Service Date Service Provider Modifiers Qty    [id number]  GAIT TRAINING EA 15 MIN 10/15/2021 Mariposa Cavazos PTA GP 1          PT Discharge Summary  Anticipated Discharge Disposition (PT): skilled nursing facility  Reason for Discharge: Discharge from facility  Outcomes Achieved: Refer to plan of care for updates on goals achieved  Discharge Destination: SNF      Mariposa Cavazos PTA   10/15/2021

## 2021-10-15 NOTE — PLAN OF CARE
Goal Outcome Evaluation:  Plan of Care Reviewed With: patient        Progress: improving  Outcome Summary: A/Ox4, [place]. Denies having pain. O2 sats stable on RA. Q2 turning. Voiding per PW. Blood sugars monitored. Able to tolerate pills via crushed in applesauce. VSS. PPP. Fatou boots on BLE. Safety maintained.

## 2021-10-15 NOTE — CASE MANAGEMENT/SOCIAL WORK
Continued Stay Note   [facility]     Patient Name: Honey Canales  MRN: [id number]  Today's Date: 10/15/2021    Admit Date: 10/8/2021     Discharge Plan     Row Name 10/15/21 0906       Plan    Final Discharge Disposition Code 03 - skilled nursing facility (SNF)    Final Note Pt is being dcd back to [facility] today, skilled level. Left message with admissions to inform. Pt will need updated Covid test prior to dc. Faxed dc summary and orders to [facility]. Call report number is [phone]               Discharge Codes    No documentation.               Expected Discharge Date and Time     Expected Discharge Date Expected Discharge Time    Oct 15, 2021             RENETTA Galarza

## 2021-10-15 NOTE — DISCHARGE PLACEMENT REQUEST
"Ava Canales (88 y.o. Female)             Date of Birth Social Security Number Address Home Phone MRN    03/27/1933  [address] [place] [address] [phone] [id number]    [employer] Marital Status             Restorationist        Admission Date Admission Type Admitting Provider Attending Provider Department, Room/Bed    10/8/21 Emergency Lloyd Martins DO Robinson, Maurice S,  [facility] 3A, 335/1    Discharge Date Discharge Disposition Discharge Destination           Home or Self Care              Attending Provider: Lloyd Martins DO    Allergies: Codeine Phosphate [Codeine], Collagen, Accupril [Quinapril Hcl], Aspirin, Adhesive Tape, Celebrex [Celecoxib], Lyrica [Pregabalin], Pantoprazole Sodium, Pentoxifylline, Sulfa Antibiotics, Tramadol, Vioxx [Rofecoxib]    Isolation: None   Infection: None   Code Status: CPR   Advance Care Planning Activity    Ht: 170.7 cm (67.2\")   Wt: 80.5 kg (177 lb 8 oz)    Admission Cmt: None   Principal Problem: HCAP (healthcare-associated pneumonia) [J18.9]                 Active Insurance as of 10/8/2021     Primary Coverage     Payor Plan Insurance Group Employer/Plan Group    MEDICARE MEDICARE A & B      Payor Plan Address Payor Plan Phone Number Payor Plan Fax Number Effective Dates    [address] [phone]  3/1/1998 - None Entered    [facility] [id number]       Subscriber Name Subscriber Birth Date Member ID       AVA CANALES 3/27/1933 [id number]           Secondary Coverage     Payor Plan Insurance Group Employer/Plan Group    [facility] [id number]     Payor Plan Address Payor Plan Phone Number Payor Plan Fax Number Effective Dates    [address]   1/1/2016 - None Entered    [facility] [phone]       Subscriber Name Subscriber Birth Date Member MANJULA BRAVO 2/26/1928 [id number]                 Emergency Contacts      (Rel.) Home Phone Work Phone Mobile Phone    Chaka Canales (Son) -- -- " ----- Message from MARK Greco sent at 2/19/2021  7:47 AM EST -----  Call and let her know she is not pregnant.  We will send her adderall in.   [phone]                 Discharge Summary      Lloyd Martins DO at 10/15/21 0854              [facility] Medicine Services  DISCHARGE SUMMARY       Date of Admission: 10/8/2021  Date of Discharge:  10/15/2021  Primary Care Physician: Devon Zuñiga MD    Discharge Diagnoses:  Active Hospital Problems    Diagnosis    • **HCAP (healthcare-associated pneumonia)    • Acute cystitis with hematuria    • Chronic kidney disease (CKD), stage IV (severe) (HCC)    • Acute hypoxemic respiratory failure (HCC)    • Hepatitis C    • Stage 3b chronic kidney disease (HCC)    • Benign essential HTN    • Pulmonary fibrosis (HCC)    • 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  Oxygen saturations remained in the high 90s to 100.  No wheezes noted.  Very few crackles are noted in the left base.  She continues to work with physical therapy.  She is stable for discharge to the nursing facility today to resume therapy.  Creatinine 2.13 and continues to improve with oral hydration.    Hospital Course  The patient was admitted on 10/8/2021 with shortness of breath and cough for 2 days.  She is a resident of [facility].  Chest x-ray was concerning for bilateral pneumonia.  She was placed on cefepime and vancomycin on admission and changed to Zosyn and azithromycin on 10/9 to cover for atypicals.  She completed the course of azithromycin inpatient.  Strep pneumonia urinary antigen negative.  Legionella urinary antigen negative.  Respiratory PCR panel negative.  MRSA nasal screen negative.  Blood culture showed no growth at 5 days.  Oxygen was  discontinued on 10/13.  She was given 1 dose of Solu-Medrol on 10/12 and was transitioned to oral prednisone at 40 mg daily with a tapering schedule.  She participated with physical therapy.  Unna boots were placed on her lower extremities because of lower extremity edema.  I would not advocate the use of oral diuretics for treatment of edema caused by venous insufficiency, particularly given her impaired renal function.  Baseline creatinine noted to be 1.9-2.1.  Creatinine was 2.52 on admission and improved to 2.1 on the day of discharge.  Speech therapy evaluated the patient and placed her on a soft diet with ground meats and nectar thickened liquids.  She continued on basal insulin with sliding scale throughout her hospital stay and blood sugars were reasonably well controlled, particularly given her steroid dosing.  Hemoglobin A1c was noted to be 8.5.  She has improved gradually throughout her hospital stay and is now appropriate for discharge back to [place] to continue physical therapy.        Consults:   Wound care:  DIAGNOSIS:   Incontinence associated dermatitis  Bowel and bladder incontinence  At high risk for skin breakdown  History of pressure injury  Diabetes mellitus type 2 with neurological manifestations  Chronic kidney disease, stage IV  Acute hypoxemic respiratory failure        PLAN:               Start     Ordered      10/11/21 1230   Skin Care  Daily      Question Answer Comment   Wound Locations Bilateral heels     Wound Care Instructions Apply silicone border dressing to bilateral heels for protection.         10/11/21 1229      10/11/21 1229   Elevate Heels Off of Bed  Until Discontinued         10/11/21 1229      10/11/21 1229   Turn Patient  Now Then Every 2 Hours         10/11/21 1229      10/11/21 1229   Use Repositioning Wedge to Position Patient  Continuous        Comments: Use repositioning sheet and wedges to position patient    10/11/21 1229      10/11/21 1229   Use Seat Cushion  When Up In Chair  Continuous         10/11/21 1229      Unscheduled   Apply Moisture Barrier After Any Incontinence  As Needed      Comments: Zguard or Calazime   Question:  Wound Care Instructions  Answer:  Apply Moisture Barrier After Any Incontinence    10/11/21 1229                        Discussed findings and treatment plan including risks, benefits, and treatment options with patient in detail. Patient agreed with treatment plan.        This document has been electronically signed by YISEL Maldonado      Pertinent Test Results:   Lab Results (all)     Procedure Component Value Units Date/Time    POC Glucose Once [[id number]]  (Normal) Collected: 10/15/21 0829    Specimen: Blood Updated: 10/15/21 0851     Glucose 120 mg/dL      Comment: : [id number] Robb FiorellaSayda ID: [id number]       Basic Metabolic Panel [[id number]]  (Abnormal) Collected: 10/15/21 0605    Specimen: Blood Updated: 10/15/21 0704     Glucose 138 mg/dL      BUN 56 mg/dL      Creatinine 2.13 mg/dL      Sodium 142 mmol/L      Potassium 3.9 mmol/L      Chloride 110 mmol/L      CO2 20.0 mmol/L      Calcium 8.6 mg/dL      eGFR Non African Amer 22 mL/min/1.73      BUN/Creatinine Ratio 26.3     Anion Gap 12.0 mmol/L     Narrative:      GFR Normal >60  Chronic Kidney Disease <60  Kidney Failure <15      POC Glucose Once [[id number]]  (Abnormal) Collected: 10/14/21 2005    Specimen: Blood Updated: 10/14/21 2015     Glucose 302 mg/dL      Comment: : [id number] [place] TeriMeter ID: [id number]       POC Glucose Once [[id number]]  (Abnormal) Collected: 10/14/21 1658    Specimen: Blood Updated: 10/14/21 1709     Glucose 225 mg/dL      Comment: : [id number] Parajuli SanjitaMeter ID: [id number]       POC Glucose Once [[id number]]  (Abnormal) Collected: 10/14/21 1212    Specimen: Blood Updated: 10/14/21 1223     Glucose 197 mg/dL      Comment: : [id number] Parajuli SanjitaMeter ID: [id number]       POC Glucose Once [[id number]]  (Abnormal)  Collected: 10/14/21 0754    Specimen: Blood Updated: 10/14/21 0805     Glucose 150 mg/dL      Comment: : [id number] Gloria ColontaMeter ID: [id number]       Basic Metabolic Panel [[id number]]  (Abnormal) Collected: 10/14/21 0545    Specimen: Blood Updated: 10/14/21 0642     Glucose 168 mg/dL      BUN 62 mg/dL      Creatinine 2.24 mg/dL      Sodium 142 mmol/L      Potassium 4.1 mmol/L      Comment: Slight hemolysis detected by analyzer. Results may be affected.        Chloride 110 mmol/L      CO2 21.0 mmol/L      Calcium 8.6 mg/dL      eGFR Non African Amer 21 mL/min/1.73      BUN/Creatinine Ratio 27.7     Anion Gap 11.0 mmol/L     Narrative:      GFR Normal >60  Chronic Kidney Disease <60  Kidney Failure <15      Blood Culture - Blood, Arm, Right [[id number]]  (Normal) Collected: 10/08/21 2044    Specimen: Blood from Arm, Right Updated: 10/13/21 2115     Blood Culture No growth at 5 days    Blood Culture - Blood, Arm, Right [[id number]]  (Normal) Collected: 10/08/21 2044    Specimen: Blood from Arm, Right Updated: 10/13/21 2115     Blood Culture No growth at 5 days    POC Glucose Once [[id number]]  (Abnormal) Collected: 10/13/21 1939    Specimen: Blood Updated: 10/13/21 2000     Glucose 303 mg/dL      Comment: : [id number] Juliet JeannieMeter ID: [id number]       POC Glucose Once [[id number]]  (Abnormal) Collected: 10/13/21 1624    Specimen: Blood Updated: 10/13/21 1635     Glucose 316 mg/dL      Comment: : [id number] Gloria SanjitaMeter ID: [id number]       POC Glucose Once [[id number]]  (Abnormal) Collected: 10/13/21 1205    Specimen: Blood Updated: 10/13/21 1216     Glucose 195 mg/dL      Comment: : [id number] Gloria ColontaMeter ID: [id number]       POC Glucose Once [[id number]]  (Abnormal) Collected: 10/13/21 0757    Specimen: Blood Updated: 10/13/21 0808     Glucose 161 mg/dL      Comment: : [id number] Gloria ColontaMeter ID: [id number]       Manual Differential [[id number]]  (Abnormal)  Collected: 10/13/21 0500    Specimen: Blood Updated: 10/13/21 0536     Neutrophil % 87.0 %      Lymphocyte % 7.0 %      Monocyte % 3.0 %      Bands %  1.0 %      Metamyelocyte % 1.0 %      Plasma Cells % 1.0 %      Neutrophils Absolute 12.88 10*3/mm3      Lymphocytes Absolute 1.02 10*3/mm3      Monocytes Absolute 0.44 10*3/mm3      Poikilocytes Slight/1+     WBC Morphology Normal     Giant Platelets Slight/1+    CBC & Differential [[id number]]  (Abnormal) Collected: 10/13/21 0500    Specimen: Blood Updated: 10/13/21 0536    Narrative:      The following orders were created for panel order CBC & Differential.  Procedure                               Abnormality         Status                     ---------                               -----------         ------                     CBC Auto Differential[[id number]]        Abnormal            Final result                 Please view results for these tests on the individual orders.    CBC Auto Differential [[id number]]  (Abnormal) Collected: 10/13/21 0500    Specimen: Blood Updated: 10/13/21 0536     WBC 14.64 10*3/mm3      RBC 2.99 10*6/mm3      Hemoglobin 8.8 g/dL      Hematocrit 27.9 %      MCV 93.3 fL      MCH 29.4 pg      MCHC 31.5 g/dL      RDW 14.2 %      RDW-SD 48.2 fl      MPV 11.3 fL      Platelets 245 10*3/mm3     Narrative:      The previously reported component NRBC is no longer being reported. Previous result was 0.0 /100 WBC (Reference Range: 0.0-0.2 /100 WBC) on 10/13/2021 at [address] [place] [address].    Basic Metabolic Panel [[id number]]  (Abnormal) Collected: 10/13/21 0500    Specimen: Blood Updated: 10/13/21 0532     Glucose 200 mg/dL      BUN 62 mg/dL      Creatinine 2.33 mg/dL      Sodium 139 mmol/L      Potassium 4.0 mmol/L      Chloride 109 mmol/L      CO2 18.0 mmol/L      Calcium 8.7 mg/dL      eGFR Non African Amer 20 mL/min/1.73      BUN/Creatinine Ratio 26.6     Anion Gap 12.0 mmol/L     Narrative:      GFR Normal >60  Chronic Kidney Disease <60  Kidney Failure  <15      POC Glucose Once [[id number]]  (Abnormal) Collected: 10/12/21 1916    Specimen: Blood Updated: 10/12/21 1941     Glucose 246 mg/dL      Comment: : [id number] Juliet VigilahMeter ID: [id number]       POC Glucose Once [[id number]]  (Abnormal) Collected: 10/12/21 1629    Specimen: Blood Updated: 10/12/21 1641     Glucose 223 mg/dL      Comment: : [id number] Jeremy AprilMeter ID: [id number]       POC Glucose Once [[id number]]  (Abnormal) Collected: 10/12/21 1109    Specimen: Blood Updated: 10/12/21 1121     Glucose 210 mg/dL      Comment: : MARGOT MccaniicaMeter ID: [id number]       POC Glucose Once [[id number]]  (Abnormal) Collected: 10/12/21 0758    Specimen: Blood Updated: 10/12/21 0808     Glucose 152 mg/dL      Comment: : MARGOT Cole EricaMeter ID: [id number]       Basic Metabolic Panel [[id number]]  (Abnormal) Collected: 10/12/21 0628    Specimen: Blood Updated: 10/12/21 0743     Glucose 166 mg/dL      BUN 65 mg/dL      Creatinine 2.23 mg/dL      Sodium 140 mmol/L      Potassium 3.9 mmol/L      Chloride 108 mmol/L      CO2 19.0 mmol/L      Calcium 8.9 mg/dL      eGFR Non African Amer 21 mL/min/1.73      BUN/Creatinine Ratio 29.1     Anion Gap 13.0 mmol/L     Narrative:      GFR Normal >60  Chronic Kidney Disease <60  Kidney Failure <15      CBC & Differential [[id number]]  (Abnormal) Collected: 10/12/21 0628    Specimen: Blood Updated: 10/12/21 0718    Narrative:      The following orders were created for panel order CBC & Differential.  Procedure                               Abnormality         Status                     ---------                               -----------         ------                     CBC Auto Differential[[id number]]        Abnormal            Final result                 Please view results for these tests on the individual orders.    CBC Auto Differential [[id number]]  (Abnormal) Collected: 10/12/21 0628    Specimen: Blood Updated: 10/12/21 0718     WBC 15.11  10*3/mm3      RBC 3.02 10*6/mm3      Hemoglobin 9.1 g/dL      Hematocrit 28.2 %      MCV 93.4 fL      MCH 30.1 pg      MCHC 32.3 g/dL      RDW 14.1 %      RDW-SD 47.8 fl      MPV 11.4 fL      Platelets 255 10*3/mm3      Neutrophil % 79.5 %      Lymphocyte % 9.4 %      Monocyte % 6.7 %      Eosinophil % 0.0 %      Basophil % 0.2 %      Immature Grans % 4.2 %      Neutrophils, Absolute 12.02 10*3/mm3      Lymphocytes, Absolute 1.42 10*3/mm3      Monocytes, Absolute 1.01 10*3/mm3      Eosinophils, Absolute 0.00 10*3/mm3      Basophils, Absolute 0.03 10*3/mm3      Immature Grans, Absolute 0.63 10*3/mm3      nRBC 0.0 /100 WBC     POC Glucose Once [[id number]]  (Abnormal) Collected: 10/11/21 1927    Specimen: Blood Updated: 10/11/21 2003     Glucose 278 mg/dL      Comment: : [id number] Juliet DennisMetdot ID: [id number]       POC Glucose Once [[id number]]  (Abnormal) Collected: 10/11/21 1742    Specimen: Blood Updated: 10/11/21 1753     Glucose 270 mg/dL      Comment: : [id number] Helder MongeMeter ID: [id number]       POC Glucose Once [[id number]]  (Abnormal) Collected: 10/11/21 1137    Specimen: Blood Updated: 10/11/21 1148     Glucose 236 mg/dL      Comment: : [id number] Helder MongeMeter ID: [id number]       Urine Culture - Urine, Urine, Clean Catch [[id number]]  (Abnormal)  (Susceptibility) Collected: 10/08/21 2034    Specimen: Urine, Clean Catch Updated: 10/11/21 1018     Urine Culture >100,000 CFU/mL Escherichia coli    Susceptibility      Escherichia coli     ISMAEL     Ampicillin Susceptible     Ampicillin + Sulbactam Susceptible     Cefazolin Susceptible     Cefepime Susceptible     Ceftazidime Susceptible     Ceftriaxone Susceptible     Gentamicin Susceptible     Levofloxacin Susceptible     Nitrofurantoin Susceptible     Piperacillin + Tazobactam Susceptible     Tetracycline Susceptible     Trimethoprim + Sulfamethoxazole Susceptible                  Linear View                   CBC & Differential  [[id number]]  (Abnormal) Collected: 10/11/21 0746    Specimen: Blood Updated: 10/11/21 1015    Narrative:      The following orders were created for panel order CBC & Differential.  Procedure                               Abnormality         Status                     ---------                               -----------         ------                     CBC Auto Differential[[id number]]        Abnormal            Final result                 Please view results for these tests on the individual orders.    CBC Auto Differential [[id number]]  (Abnormal) Collected: 10/11/21 0746    Specimen: Blood Updated: 10/11/21 1015     WBC 13.63 10*3/mm3      RBC 2.94 10*6/mm3      Hemoglobin 8.7 g/dL      Hematocrit 27.7 %      MCV 94.2 fL      MCH 29.6 pg      MCHC 31.4 g/dL      RDW 14.2 %      RDW-SD 49.1 fl      MPV 11.3 fL      Platelets 245 10*3/mm3      Neutrophil % 90.2 %      Lymphocyte % 4.9 %      Monocyte % 2.1 %      Eosinophil % 0.0 %      Basophil % 0.2 %      Immature Grans % 2.6 %      Neutrophils, Absolute 12.28 10*3/mm3      Lymphocytes, Absolute 0.67 10*3/mm3      Monocytes, Absolute 0.29 10*3/mm3      Eosinophils, Absolute 0.00 10*3/mm3      Basophils, Absolute 0.03 10*3/mm3      Immature Grans, Absolute 0.36 10*3/mm3      nRBC 0.0 /100 WBC     POC Glucose Once [[id number]]  (Abnormal) Collected: 10/11/21 0809    Specimen: Blood Updated: 10/11/21 0820     Glucose 217 mg/dL      Comment: : [id number] Helder WashingtonMeter ID: [id number]       Basic Metabolic Panel [[id number]]  (Abnormal) Collected: 10/11/21 0615    Specimen: Blood Updated: 10/11/21 0658     Glucose 203 mg/dL      BUN 60 mg/dL      Creatinine 2.44 mg/dL      Sodium 140 mmol/L      Potassium 4.5 mmol/L      Chloride 108 mmol/L      CO2 19.0 mmol/L      Calcium 8.7 mg/dL      eGFR Non African Amer 19 mL/min/1.73      BUN/Creatinine Ratio 24.6     Anion Gap 13.0 mmol/L     Narrative:      GFR Normal >60  Chronic Kidney Disease <60  Kidney Failure <15       POC Glucose Once [[id number]]  (Abnormal) Collected: 10/10/21 2115    Specimen: Blood Updated: 10/10/21 2127     Glucose 315 mg/dL      Comment: : [id number] Philip AlmodovarilMeter ID: [id number]       POC Glucose Once [[id number]]  (Abnormal) Collected: 10/10/21 1723    Specimen: Blood Updated: 10/10/21 1735     Glucose 313 mg/dL      Comment: : [id number] Lissy KaranMeter ID: [id number]       POC Glucose Once [[id number]]  (Abnormal) Collected: 10/10/21 1533    Specimen: Blood Updated: 10/10/21 1544     Glucose 364 mg/dL      Comment: : [id number] Lissy KaranMeter ID: [id number]       POC Glucose Once [[id number]]  (Abnormal) Collected: 10/10/21 1246    Specimen: Blood Updated: 10/10/21 1258     Glucose 405 mg/dL      Comment: BILLIEED SHERI APRNOperator: [id number] Lissy AndrewanMeter ID: [id number]       POC Glucose Once [[id number]]  (Abnormal) Collected: 10/10/21 1205    Specimen: Blood Updated: 10/10/21 1226     Glucose 405 mg/dL      Comment: : [id number] Lissy KaranMeter ID: [id number]       POC Glucose Once [[id number]]  (Abnormal) Collected: 10/10/21 1204    Specimen: Blood Updated: 10/10/21 1226     Glucose 407 mg/dL      Comment: : [id number] Lissy AndrewanMeter ID: [id number]       POC Glucose Once [[id number]]  (Abnormal) Collected: 10/10/21 0859    Specimen: Blood Updated: 10/10/21 0910     Glucose 363 mg/dL      Comment: : [id number] Lissy AndrewanMeter ID: [id number]       Basic Metabolic Panel [[id number]]  (Abnormal) Collected: 10/10/21 0538    Specimen: Blood Updated: 10/10/21 0640     Glucose 334 mg/dL      BUN 53 mg/dL      Creatinine 2.44 mg/dL      Sodium 135 mmol/L      Potassium 4.8 mmol/L      Comment: Slight hemolysis detected by analyzer. Results may be affected.        Chloride 104 mmol/L      CO2 16.0 mmol/L      Calcium 8.4 mg/dL      eGFR Non African Amer 19 mL/min/1.73      BUN/Creatinine Ratio 21.7     Anion Gap 15.0 mmol/L     Narrative:      GFR Normal  >60  Chronic Kidney Disease <60  Kidney Failure <15      POC Glucose Once [[id number]]  (Abnormal) Collected: 10/09/21 2019    Specimen: Blood Updated: 10/09/21 2030     Glucose 376 mg/dL      Comment: : [id number] Philip HawkinsgailMeter ID: [id number]       POC Glucose Once [[id number]]  (Abnormal) Collected: 10/09/21 1658    Specimen: Blood Updated: 10/09/21 1709     Glucose 328 mg/dL      Comment: : [id number] Adam HullaMeter ID: [id number]       Legionella Antigen, Urine - Urine, Urine, Clean Catch [[id number]]  (Normal) Collected: 10/09/21 1242    Specimen: Urine, Clean Catch Updated: 10/09/21 1410     LEGIONELLA ANTIGEN, URINE Negative    Extra Tubes [[id number]] Collected: 10/09/21 1207    Specimen: Blood, Venous Line Updated: 10/09/21 1315    Narrative:      The following orders were created for panel order Extra Tubes.  Procedure                               Abnormality         Status                     ---------                               -----------         ------                     Lavender Top[[id number]]                                     Final result               Gold Top - SST[[id number]]                                   Final result                 Please view results for these tests on the individual orders.    Lavender Top [[id number]] Collected: 10/09/21 1207    Specimen: Blood Updated: 10/09/21 1315     Extra Tube hold for add-on     Comment: Auto resulted       Gold Top - SST [[id number]] Collected: 10/09/21 1207    Specimen: Blood Updated: 10/09/21 1315     Extra Tube Hold for add-ons.     Comment: Auto resulted.       Basic Metabolic Panel [[id number]]  (Abnormal) Collected: 10/09/21 1207    Specimen: Blood Updated: 10/09/21 1240     Glucose 313 mg/dL      BUN 46 mg/dL      Creatinine 2.45 mg/dL      Sodium 136 mmol/L      Potassium 4.7 mmol/L      Chloride 104 mmol/L      CO2 18.0 mmol/L      Calcium 8.5 mg/dL      eGFR Non African Amer 19 mL/min/1.73      BUN/Creatinine Ratio 18.8      Anion Gap 14.0 mmol/L     Narrative:      GFR Normal >60  Chronic Kidney Disease <60  Kidney Failure <15      POC Glucose Once [[id number]]  (Abnormal) Collected: 10/09/21 1138    Specimen: Blood Updated: 10/09/21 1150     Glucose 302 mg/dL      Comment: : WOJCIECH ScottellMeter ID: [id number]       POC Glucose Once [[id number]]  (Abnormal) Collected: 10/09/21 0807    Specimen: Blood Updated: 10/09/21 0819     Glucose 254 mg/dL      Comment: : WOJCIECH Malone MachellMeter ID: [id number]       MRSA Screen, PCR (Inpatient) - Swab, Nares [[id number]]  (Normal) Collected: 10/09/21 0408    Specimen: Swab from Nares Updated: 10/09/21 0547     MRSA PCR No MRSA Detected    POC Glucose Once [[id number]]  (Abnormal) Collected: 10/09/21 0024    Specimen: Blood Updated: 10/09/21 0221     Glucose 302 mg/dL      Comment: : [id number] Claudia MaeerMeter ID: [id number]       Respiratory Panel, PCR (WITHOUT COVID) - Swab, Nasopharynx [[id number]]  (Normal) Collected: 10/08/21 2201    Specimen: Swab from Nasopharynx Updated: 10/08/21 2344     ADENOVIRUS, PCR Not Detected     Coronavirus 229E Not Detected     Coronavirus HKU1 Not Detected     Coronavirus NL63 Not Detected     Coronavirus OC43 Not Detected     Human Metapneumovirus Not Detected     Human Rhinovirus/Enterovirus Not Detected     Influenza B PCR Not Detected     Parainfluenza Virus 1 Not Detected     Parainfluenza Virus 2 Not Detected     Parainfluenza Virus 3 Not Detected     Parainfluenza Virus 4 Not Detected     Bordetella pertussis pcr Not Detected     Chlamydophila pneumoniae PCR Not Detected     Mycoplasma pneumo by PCR Not Detected     Influenza A PCR Not Detected     RSV, PCR Not Detected     Bordetella parapertussis PCR Not Detected    Narrative:      The coronavirus on the RVP is NOT COVID-19 and is NOT indicative of infection with COVID-19.    In the setting of a positive respiratory panel with a viral infection PLUS a negative  procalcitonin without other underlying concern for bacterial infection, consider observing off antibiotics or discontinuation of antibiotics and continue supportive care. If the respiratory panel is positive for atypical bacterial infection (Bordetella pertussis, Chlamydophila pneumoniae, or Mycoplasma pneumoniae), consider antibiotic de-escalation to target atypical bacterial infection.    S. Pneumo Ag Urine or CSF - Urine, Urine, Clean Catch [[id number]]  (Normal) Collected: 10/08/21 2034    Specimen: Urine, Clean Catch Updated: 10/08/21 2306     Strep Pneumo Ag Negative    Hemoglobin A1c [[id number]]  (Abnormal) Collected: 10/08/21 1910    Specimen: Blood Updated: 10/08/21 2300     Hemoglobin A1C 5.80 %     Narrative:      Hemoglobin A1C Ranges:    Increased Risk for Diabetes  5.7% to 6.4%  Diabetes                     >= 6.5%  Diabetic Goal                < 7.0%    Blood Gas, Arterial - [[id number]]  (Abnormal) Collected: 10/08/21 2208    Specimen: Arterial Blood Updated: 10/08/21 2213     Site Right Radial     Duane's Test Positive     pH, Arterial 7.426 pH units      pCO2, Arterial 28.1 mm Hg      Comment: 84 Value below reference range        pO2, Arterial 80.8 mm Hg      Comment: 84 Value below reference range        HCO3, Arterial 18.5 mmol/L      Comment: 84 Value below reference range        Base Excess, Arterial -5.0 mmol/L      Comment: 84 Value below reference range        O2 Saturation, Arterial 97.4 %      Temperature 37.0 C      Barometric Pressure for Blood Gas 750 mmHg      Modality Nasal Cannula     Flow Rate 2.0 lpm      Ventilator Mode NA     Collected by [id number]     Comment: Meter: [id number]     :  [id number]        pCO2, Temperature Corrected 28.1 mm Hg      pH, Temp Corrected 7.426 pH Units      pO2, Temperature Corrected 80.8 mm Hg     Troponin [[id number]]  (Abnormal) Collected: 10/08/21 2044    Specimen: Blood Updated: 10/08/21 2121     Troponin T 0.043 ng/mL     Narrative:       Troponin T Reference Range:  <= 0.03 ng/mL-   Negative for AMI  >0.03 ng/mL-     Abnormal for myocardial necrosis.  Clinicians would have to utilize clinical acumen, EKG, Troponin and serial changes to determine if it is an Acute Myocardial Infarction or myocardial injury due to an underlying chronic condition.       Results may be falsely decreased if patient taking Biotin.      Lactic Acid, Plasma [[id number]]  (Normal) Collected: 10/08/21 2044    Specimen: Blood Updated: 10/08/21 2108     Lactate 1.7 mmol/L     Urinalysis With Microscopic If Indicated (No Culture) - Urine, Clean Catch [[id number]]  (Abnormal) Collected: 10/08/21 2034    Specimen: Urine, Clean Catch Updated: 10/08/21 2048     Color, UA Yellow     Appearance, UA Turbid     pH, UA 5.5     Specific Gravity, UA 1.015     Glucose, UA Negative     Ketones, UA Negative     Bilirubin, UA Negative     Blood, UA Moderate (2+)     Protein,  mg/dL (2+)     Leuk Esterase, UA Large (3+)     Nitrite, UA Positive     Urobilinogen, UA 0.2 E.U./dL    Urinalysis, Microscopic Only - Urine, Clean Catch [[id number]]  (Abnormal) Collected: 10/08/21 2034    Specimen: Urine, Clean Catch Updated: 10/08/21 2048     RBC, UA 21-30 /HPF      WBC, UA Too Numerous to Count /HPF      Bacteria, UA 4+ /HPF      Squamous Epithelial Cells, UA 0-2 /HPF      Hyaline Casts, UA 3-6 /LPF      Methodology Automated Microscopy    COVID PRE-OP / PRE-PROCEDURE SCREENING ORDER (NO ISOLATION) - Swab, Nasal Cavity [[id number]]  (Normal) Collected: 10/08/21 1914    Specimen: Swab from Nasal Cavity Updated: 10/08/21 2003    Narrative:      The following orders were created for panel order COVID PRE-OP / PRE-PROCEDURE SCREENING ORDER (NO ISOLATION) - Swab, Nasal Cavity.  Procedure                               Abnormality         Status                     ---------                               -----------         ------                     COVID-19,[facility]...[[id number]]  Normal               Final result                 Please view results for these tests on the individual orders.    COVID-19,[facility] IN-HOUSE,Nasal Swab No Transport Media 3-4 HR TAT - Swab, Nasal Cavity [[id number]]  (Normal) Collected: 10/08/21 1914    Specimen: Swab from Nasal Cavity Updated: 10/08/21 2003     COVID19 Not Detected    Narrative:      Fact sheet for providers: https://www.fda.gov/media/334689/download     Fact sheet for patients: https://www.fda.gov/media/598300/download    Test performed by PCR.    Consider negative results in combination with clinical observations, patient history, and epidemiological information.  Fact sheet for providers: https://www.fda.gov/media/695558/download     Fact sheet for patients: https://www.fda.gov/media/425000/download    Test performed by PCR.    Consider negative results in combination with clinical observations, patient history, and epidemiological information.    Troponin [[id number]]  (Abnormal) Collected: 10/08/21 1910    Specimen: Blood Updated: 10/08/21 1947     Troponin T 0.039 ng/mL     Narrative:      Troponin T Reference Range:  <= 0.03 ng/mL-   Negative for AMI  >0.03 ng/mL-     Abnormal for myocardial necrosis.  Clinicians would have to utilize clinical acumen, EKG, Troponin and serial changes to determine if it is an Acute Myocardial Infarction or myocardial injury due to an underlying chronic condition.       Results may be falsely decreased if patient taking Biotin.      Comprehensive Metabolic Panel [[id number]]  (Abnormal) Collected: 10/08/21 1910    Specimen: Blood Updated: 10/08/21 1946     Glucose 159 mg/dL      BUN 40 mg/dL      Creatinine 2.52 mg/dL      Sodium 135 mmol/L      Potassium 4.0 mmol/L      Chloride 103 mmol/L      CO2 21.0 mmol/L      Calcium 8.6 mg/dL      Total Protein 6.2 g/dL      Albumin 3.00 g/dL      ALT (SGPT) 13 U/L      AST (SGOT) 18 U/L      Alkaline Phosphatase 62 U/L      Total Bilirubin 0.3 mg/dL      eGFR Non  Amer 18  mL/min/1.73      Globulin 3.2 gm/dL      A/G Ratio 0.9 g/dL      BUN/Creatinine Ratio 15.9     Anion Gap 11.0 mmol/L     Narrative:      GFR Normal >60  Chronic Kidney Disease <60  Kidney Failure <15      BNP [[id number]]  (Abnormal) Collected: 10/08/21 1910    Specimen: Blood Updated: 10/08/21 1942     proBNP 7,241.0 pg/mL     Narrative:      Among patients with dyspnea, NT-proBNP is highly sensitive for the detection of acute congestive heart failure. In addition NT-proBNP of <300 pg/ml effectively rules out acute congestive heart failure with 99% negative predictive value.    Results may be falsely decreased if patient taking Biotin.      CBC & Differential [[id number]]  (Abnormal) Collected: 10/08/21 1910    Specimen: Blood Updated: 10/08/21 1921    Narrative:      The following orders were created for panel order CBC & Differential.  Procedure                               Abnormality         Status                     ---------                               -----------         ------                     CBC Auto Differential[[id number]]        Abnormal            Final result                 Please view results for these tests on the individual orders.    CBC Auto Differential [[id number]]  (Abnormal) Collected: 10/08/21 1910    Specimen: Blood Updated: 10/08/21 1921     WBC 8.72 10*3/mm3      RBC 2.96 10*6/mm3      Hemoglobin 8.9 g/dL      Hematocrit 28.2 %      MCV 95.3 fL      MCH 30.1 pg      MCHC 31.6 g/dL      RDW 14.1 %      RDW-SD 48.6 fl      MPV 10.9 fL      Platelets 222 10*3/mm3      Neutrophil % 67.9 %      Lymphocyte % 18.6 %      Monocyte % 12.3 %      Eosinophil % 0.1 %      Basophil % 0.1 %      Immature Grans % 1.0 %      Neutrophils, Absolute 5.92 10*3/mm3      Lymphocytes, Absolute 1.62 10*3/mm3      Monocytes, Absolute 1.07 10*3/mm3      Eosinophils, Absolute 0.01 10*3/mm3      Basophils, Absolute 0.01 10*3/mm3      Immature Grans, Absolute 0.09 10*3/mm3      nRBC 0.0 /100 WBC         Imaging  Results (Last 7 Days)     Procedure Component Value Units Date/Time    XR Chest 1 View [[id number]] Collected: 10/12/21 1005     Updated: 10/12/21 1011    Narrative:      EXAM: XR CHEST 1 VW-     INDICATION: Short of breath; R13.12-Dysphagia, oropharyngeal phase;  N39.0-Urinary tract infection, site not specified; J18.9-Pneumonia,  unspecified organism; R68.89-Other general symptoms and signs     COMPARISON: 10/8/2021     FINDINGS:     Cardiac silhouette is normal in size and stable. Small bilateral pleural  effusions appear unchanged. Interval increase in bilateral patchy  airspace opacity. Emphysema and chronic interstitial changes are again  noted. No visible pneumothorax. Old RIGHT humeral neck fracture. No  acute osseous finding.       Impression:         1.  Worsening of bilateral patchy airspace opacity. Differential  includes multifocal pneumonia and pulmonary edema.   2.  No change in small bilateral pleural effusions.  This report was finalized on 10/12/2021 10:08 by Dr. Hakan Robles MD.    FL Video Swallow With Speech Single Contrast [[id number]] Collected: 10/11/21 1245     Updated: 10/11/21 1249    Narrative:      EXAMINATION:  FL VIDEO SWALLOW W SPEECH SINGLE-CONTRAST-  10/11/2021  12:26 PM CDT     HISTORY: Ongoing dysphagia with no s/s at bedside; pneumonia; concern  for silent aspiration; R13.12-Dysphagia, oropharyngeal phase;  N39.0-Urinary tract infection, site not specified; J18.9-Pneumonia,  unspecified organism.     COMPARISON: No comparison study.     TECHNIQUE: The patient was given honey, nectar, water, pudding, fruit  and cracker consistencies mixed with barium for swallowing.     FLUOROSCOPY TIME: 1 minute 27 seconds.     NUMBER OF IMAGES: 4.4 mGy.     FINDINGS: There was laryngeal penetration with water consistency barium  on 2 separate swallowing sequences. There were no other episodes of  penetration or aspiration. The epiglottis appears to invert normally.  There was some spillover of  the liquid substances.       Impression:      1. There were 2 episodes of laryngeal penetration with water consistency  barium. No aspiration was observed.  2. Please see the speech pathology report separately.  This report was finalized on 10/11/2021 12:46 by Dr. Johnathan Brennan MD.    CT Chest Without Contrast Diagnostic [[id number]] Collected: 10/08/21 2010     Updated: 10/08/21 2019    Narrative:      EXAMINATION: CT CHEST WO CONTRAST DIAGNOSTIC- 10/8/2021 8:10 PM CDT     HISTORY: Shortness of breath, wheezing     COMPARISON: Chest x-ray 10/8/2021     DOSE: 263 mGy-cm     TECHNIQUE: Sequential imaging was performed from the thoracic inlet  through the upper abdomen without the use of IV contrast.  Sagittal and  coronal reformations were made from the original source data and  reviewed. Automated exposure control was also utilized to decrease  patient radiation dose.     FINDINGS:   Visualized thyroid gland is grossly normal in appearance. Trachea and  main bronchi are patent. The esophagus is grossly normal in appearance.     No pathologically enlarged axillary lymph nodes are identified. Some  mildly prominent mediastinal lymph nodes measure 10 mm in short axis.     The heart is normal in size. There is no pericardial effusion. Coronary  artery calcifications are present. Atherosclerotic calcifications are  seen within the aorta and its branch vessels. The ascending thoracic  aorta is normal in caliber. Main pulmonary artery is dilated, suggesting  pulmonary arterial hypertension. The distal aortic arch measures 3.4 cm  in caliber.     There are small bilateral pleural effusions. Severe emphysematous  changes are present. There is diffuse bronchial wall thickening. Some  interlobular septal thickening is noted. Groundglass airspace opacities  are seen throughout the lungs. Some areas of patchy nodularity are seen  in the left upper lobe measuring 12 mm and 10 mm in size.     Review of the visualized portion of  "the upper abdomen demonstrates no  acute findings.     Review of the visualized osseous structures demonstrates no acute or  aggressive lesions.        Impression:         1. Bilateral airspace disease superimposed upon emphysematous changes,  concerning for multifocal pneumonia.     2. Small bilateral pleural effusions.     3. Atherosclerosis of the aorta and coronary arteries.  4. Pulmonary arterial hypertension.  5. Mildly prominent mediastinal lymph nodes may be reactive. Attention  on follow-up recommended.  6. Noncalcified pulmonary nodules for which follow-up CT is recommended  in 3-6 months per Fleischner Society guidelines.        This report was finalized on 10/08/2021 20:16 by Dr. Erik Schmitt MD.    XR Chest 1 View [[id number]] Collected: 10/08/21 1958     Updated: 10/08/21 2002    Narrative:      EXAMINATION: XR CHEST 1 VW- 10/8/2021 7:58 PM CDT     HISTORY: Shortness of breath, wheezing     COMPARISON: 8/30/2021     FINDINGS:  The heart and mediastinal contours are stable. Atherosclerotic  calcifications are seen in the aorta. Chronic interstitial changes are  present. However, there are worsening airspace opacities throughout both  lungs, particularly in the lung bases. There are small pleural effusions  bilaterally. Retrocardiac consolidation is noted. An old right humeral  fracture is suspected.       Impression:      Bilateral airspace disease concerning for pneumonia  superimposed upon chronic changes. Small bilateral pleural effusions.  This report was finalized on 10/08/2021 19:59 by Dr. Erik Schmitt MD.            Condition on Discharge:    Stable and improved    Physical Exam on Discharge:  /69 (BP Location: Left arm, Patient Position: Sitting)   Pulse 72   Temp 98.2 °F (36.8 °C) (Oral)   Resp 20   Ht 170.7 cm (67.2\")   Wt 80.5 kg (177 lb 8 oz)   SpO2 98%   BMI 27.64 kg/m²   Physical Exam     Constitutional:       Comments: Sitting up in bed.  Oxygen saturation 98 to 100% on " room air. No sputum production.  HENT:      Head: Normocephalic and atraumatic.      Nose: No congestion.      Pharynx: Oropharynx is clear.     Comments: Hard of hearing  Cardiovascular:      Rate and Rhythm: Normal rate and regular rhythm.      Heart sounds: No murmur heard.  Pulmonary:      Breath sounds: Occasional left basilar crackles that clear with deep inhalation.  Abdominal:      Palpations: Abdomen is soft.      Tenderness: There is no abdominal tenderness.   Genitourinary:     Comments: WIC in place.  Musculoskeletal:         General: Swelling (Unna boots placed 10/12.) present. No tenderness.      Cervical back: Normal range of motion and neck supple.   Skin:     General: Skin is warm and dry.       Comments: Unna boots bilateral lower extremities  Neurological:      General: No focal deficit present.      Mental Status: She is alert and oriented to person, place, and time.   Psychiatric:         Mood and Affect: Mood normal.            Judgment: Judgment normal.     Discharge Disposition:  Home or Self Care    Discharge Medications:     Discharge Medications      New Medications      Instructions Start Date   cefdinir 300 MG capsule  Commonly known as: OMNICEF   300 mg, Oral, Every 24 Hours Scheduled   Start Date: October 16, 2021     fluticasone-salmeterol 100-50 MCG/DOSE DISKUS  Commonly known as: ADVAIR   1 puff, Inhalation, 2 Times Daily - RT      predniSONE 5 MG (21) tablet therapy pack dose pack   5 mg, Oral, Take As Directed, Take as directed on package instructions.         Continue These Medications      Instructions Start Date   acetaminophen 500 MG tablet  Commonly known as: TYLENOL   500 mg, Oral, Every 6 Hours PRN      amLODIPine 5 MG tablet  Commonly known as: NORVASC   5 mg, Oral, Daily      atorvastatin 40 MG tablet  Commonly known as: LIPITOR   40 mg, Oral, Nightly      GENTEAL TEARS MODERATE PF OP   2 drops, Both Eyes, Every Night at Bedtime      insulin detemir 100 UNIT/ML  injection  Commonly known as: LEVEMIR   10 Units, Subcutaneous, 2 Times Daily      insulin lispro 100 UNIT/ML injection  Commonly known as: humaLOG   Inject under the skin TID WM as per sliding scale: 150-199= 2 units 200-249= 3 units 250-299= 4 units 300-349= 5 units 350-399= 6 units >400= 7 units and call MD      levothyroxine 75 MCG tablet  Commonly known as: SYNTHROID, LEVOTHROID   75 mcg, Oral, Daily      losartan 25 MG tablet  Commonly known as: COZAAR   25 mg, Oral, Daily      Mirabegron ER 50 MG tablet sustained-release 24 hour 24 hr tablet  Commonly known as: MYRBETRIQ   50 mg, Oral, Daily      multivitamin with minerals tablet tablet   1 tablet, Oral, Daily      ondansetron 4 MG tablet  Commonly known as: ZOFRAN   4 mg, Oral, Every 6 Hours PRN      polyethylene glycol 17 g packet  Commonly known as: MIRALAX   17 g, Oral, Daily      prochlorperazine 10 MG tablet  Commonly known as: COMPAZINE   10 mg, Oral, Every 6 Hours PRN      sennosides-docusate 8.6-50 MG per tablet  Commonly known as: PERICOLACE   1 tablet, Oral, 2 Times Daily      Vitamin C 500 MG capsule   500 mg, Oral, Daily      ZINC 15 PO   220 mg, Oral, Daily         Stop These Medications    BENEFIBER DRINK MIX PO     HYDROcodone-acetaminophen 7.5-325 MG per tablet  Commonly known as: NORCO     levocetirizine 5 MG tablet  Commonly known as: XYZAL     PARoxetine 10 MG tablet  Commonly known as: PAXIL            Discharge Diet:   Diet Instructions     Diet: Soft Texture; Nectar / Syrup Thick Liquids; Ground      Discharge Diet: Soft Texture    Fluid Consistency: Nectar / Syrup Thick Liquids    Soft Options: Ground          Discharge Care Plan / Instructions:   Discharged to [facility]  Remove Unna boots 10/18/2021 and replace with compression stockings to knee height.  Compression stockings to be applied in a.m. and removed at at bedtime.    Activity at Discharge:   Activity Instructions     Activity as Tolerated              Electronically signed by Lloyd Chu DO, 10/15/21, 08:54 CDT.    Time: Discharge Over 30 min    Part of this note may be an electronic transcription/translation of spoken language to printed text using the Dragon Dictation system.        Electronically signed by Lloyd Chu DO at 10/15/21 0902       Discharge Order (From admission, onward)     Start     Ordered    10/15/21 0850  Discharge patient  Once        Expected Discharge Date: 10/15/21    Discharge Disposition: Home or Self Care    Physician of Record for Attribution - Please select from Treatment Team: LLOYD CHU [[id number]]    Review needed by CMO to determine Physician of Record: No       Question Answer Comment   Physician of Record for Attribution - Please select from Treatment Team LLOYD CHU    Review needed by CMO to determine Physician of Record No        10/15/21 0853

## 2021-10-15 NOTE — CASE MANAGEMENT/SOCIAL WORK
Continued Stay Note   [place]     Patient Name: Honey Canales  MRN: [id number]  Today's Date: 10/15/2021    Admit Date: 10/8/2021     Discharge Plan     Row Name 10/15/21 1035       Plan    Final Note [facility] will send a van at 1pm to pick pt up. Pt son is aware.    Row Name 10/15/21 0906       Plan    Final Discharge Disposition Code 03 - skilled nursing facility (SNF)    Final Note Pt is being dcd back to [facility] today, skilled level. Left message with admissions to inform. Pt will need updated Covid test prior to dc. Faxed dc summary and orders to [facility]. Call report number is [phone]               Discharge Codes    No documentation.               Expected Discharge Date and Time     Expected Discharge Date Expected Discharge Time    Oct 15, 2021             RENETTA Galarza

## 2021-10-17 NOTE — THERAPY DISCHARGE NOTE
Acute Care - Speech Language Pathology Discharge Summary  [facility]       Patient Name: Honey Canales  : 3/27/1933  MRN: [id number]    Today's Date: 10/17/2021                   Admit Date: 10/8/2021      SLP Recommendation and Plan  Continue a mechanical soft diet and nectar thick liquids. Pt would benefit from either home health or outpatient SLP services depending on requirements, qualifications. Thanks!   Darleen Corral, CCC-SLP 10/17/2021 09:31 CDT    Visit Dx:    ICD-10-CM ICD-9-CM   1. Oropharyngeal dysphagia  R13.12 787.22   2. Urinary tract infection without hematuria, site unspecified  N39.0 599.0   3. Pneumonia due to infectious organism, unspecified laterality, unspecified part of lung  J18.9 486   4. Decreased functional activity tolerance  R68.89 780.99   5. Compression fracture of L5 vertebra, initial encounter ([facility])  S32.050A 805.4                SLP GOALS     Row Name 10/17/21 0929 10/14/21 1343          Oral Nutrition/Hydration Goal 1 (SLP)    Oral Nutrition/Hydration Goal 1, SLP LTG: Patient will tolerate LRD with no overt s/s of aspiration.  -TM LTG: Patient will tolerate LRD with no overt s/s of aspiration.  -MB     Time Frame (Oral Nutrition/Hydration Goal 1, SLP) short term goal (STG); by discharge  -TM short term goal (STG); by discharge  -MB     Barriers (Oral Nutrition/Hydration Goal 1, SLP) Hearing impairment, shortness of breath  -TM Hearing impairment, shortness of breath  -MB     Progress/Outcomes (Oral Nutrition/Hydration Goal 1, SLP) goal partially met; discharged from facility  -TM continuing progress toward goal  -MB            Lingual Strengthening Goal 1 (SLP)    Activity (Lingual Strengthening Goal 1, SLP) increase tongue back strength  -TM increase tongue back strength  -MB     Increase Tongue Back Strength lingual movement exercises  -TM lingual movement exercises  -MB     [place]/Accuracy (Lingual Strengthening Goal 1, SLP) independently (over 90% accuracy)  -  independently (over 90% accuracy)  -MB     Time Frame (Lingual Strengthening Goal 1, SLP) short term goal (STG); by discharge  -TM short term goal (STG); by discharge  -MB     Barriers (Lingual Strengthening Goal 1, SLP) Hearing impairment  -TM Hearing impairment  -MB     Progress/Outcomes (Lingual Strengthening Goal 1, SLP) goal partially met; discharged from facility  -TM continuing progress toward goal  -MB            Pharyngeal Strengthening Exercise Goal 1 (SLP)    Activity (Pharyngeal Strengthening Goal 1, SLP) increase anterior movement of the hyolaryngeal complex; increase squeeze/positive pressure generation  -TM increase anterior movement of the hyolaryngeal complex; increase squeeze/positive pressure generation  -MB     Increase Anterior Movement of the Hyolaryngeal Complex shaker  -TM shaker  -MB     Increase Squeeze/Positive Pressure Generation hard effortful swallow  -TM hard effortful swallow  -MB     [place]/Accuracy (Pharyngeal Strengthening Goal 1, SLP) independently (over 90% accuracy)  -TM independently (over 90% accuracy)  -MB     Time Frame (Pharyngeal Strengthening Goal 1, SLP) short term goal (STG); by discharge  -TM short term goal (STG); by discharge  -MB     Barriers (Pharyngeal Strengthening Goal 1, SLP) Hearing impairment  -TM Hearing impairment  -MB     Progress/Outcomes (Pharyngeal Strengthening Goal 1, SLP) goal partially met; discharged from facility  -TM continuing progress toward goal  -MB           User Key  (r) = Recorded By, (t) = Taken By, (c) = Cosigned By    Initials Name Provider Type    Vesta Prabhakar, CCC-SLP Speech and Language Pathologist    Darleen Brown CCC-SLP Speech and Language Pathologist                        SLP Discharge Summary  Anticipated Discharge Disposition (SLP): home, home with home health, home with OP services  Reason for Discharge: discharge from this facility  Progress Toward Achieving Short/long Term Goals: goals partially met  within established timelines  Discharge Destination: home, home w/ home health, home w/ OP services      Darleen Corral CCC-SLP  10/17/2021

## 2021-10-31 ENCOUNTER — HOSPITAL ENCOUNTER (INPATIENT)
Facility: HOSPITAL | Age: 86
LOS: 4 days | Discharge: NURSING, REHAB OR LONG-TERM CARE | End: 2021-11-05
Attending: INTERNAL MEDICINE | Admitting: INTERNAL MEDICINE

## 2021-10-31 ENCOUNTER — APPOINTMENT (OUTPATIENT)
Dept: GENERAL RADIOLOGY | Facility: HOSPITAL | Age: 86
End: 2021-10-31

## 2021-10-31 DIAGNOSIS — I50.9 CONGESTIVE HEART FAILURE, UNSPECIFIED HF CHRONICITY, UNSPECIFIED HEART FAILURE TYPE (HCC): ICD-10-CM

## 2021-10-31 DIAGNOSIS — Z74.09 IMPAIRED MOBILITY: ICD-10-CM

## 2021-10-31 DIAGNOSIS — J96.20 ACUTE ON CHRONIC RESPIRATORY FAILURE, UNSPECIFIED WHETHER WITH HYPOXIA OR HYPERCAPNIA (HCC): ICD-10-CM

## 2021-10-31 DIAGNOSIS — Z78.9 DECREASED ACTIVITIES OF DAILY LIVING (ADL): ICD-10-CM

## 2021-10-31 DIAGNOSIS — J81.0 ACUTE PULMONARY EDEMA (HCC): Primary | ICD-10-CM

## 2021-10-31 DIAGNOSIS — R60.0 BILATERAL LOWER EXTREMITY EDEMA: ICD-10-CM

## 2021-10-31 DIAGNOSIS — N18.9 CHRONIC KIDNEY DISEASE, UNSPECIFIED CKD STAGE: ICD-10-CM

## 2021-10-31 LAB
ALBUMIN SERPL-MCNC: 3.1 G/DL (ref 3.5–5.2)
ALBUMIN/GLOB SERPL: 1.2 G/DL
ALP SERPL-CCNC: 83 U/L (ref 39–117)
ALT SERPL W P-5'-P-CCNC: 23 U/L (ref 1–33)
ANION GAP SERPL CALCULATED.3IONS-SCNC: 11 MMOL/L (ref 5–15)
APTT PPP: 30.5 SECONDS (ref 24.1–35)
ARTERIAL PATENCY WRIST A: ABNORMAL
AST SERPL-CCNC: 27 U/L (ref 1–32)
ATMOSPHERIC PRESS: 757 MMHG
BACTERIA UR QL AUTO: ABNORMAL /HPF
BASE EXCESS BLDA CALC-SCNC: -3.2 MMOL/L (ref 0–2)
BASOPHILS # BLD AUTO: 0.01 10*3/MM3 (ref 0–0.2)
BASOPHILS NFR BLD AUTO: 0.2 % (ref 0–1.5)
BDY SITE: ABNORMAL
BILIRUB SERPL-MCNC: 0.2 MG/DL (ref 0–1.2)
BILIRUB UR QL STRIP: NEGATIVE
BODY TEMPERATURE: 37 C
BUN SERPL-MCNC: 24 MG/DL (ref 8–23)
BUN/CREAT SERPL: 13.6 (ref 7–25)
CALCIUM SPEC-SCNC: 8.9 MG/DL (ref 8.6–10.5)
CHLORIDE SERPL-SCNC: 107 MMOL/L (ref 98–107)
CLARITY UR: CLEAR
CO2 SERPL-SCNC: 21 MMOL/L (ref 22–29)
COLOR UR: YELLOW
CREAT SERPL-MCNC: 1.76 MG/DL (ref 0.57–1)
D DIMER PPP FEU-MCNC: 1 MG/L (FEU) (ref 0–0.5)
DEPRECATED RDW RBC AUTO: 58.2 FL (ref 37–54)
EOSINOPHIL # BLD AUTO: 0.11 10*3/MM3 (ref 0–0.4)
EOSINOPHIL NFR BLD AUTO: 2.1 % (ref 0.3–6.2)
ERYTHROCYTE [DISTWIDTH] IN BLOOD BY AUTOMATED COUNT: 16.9 % (ref 12.3–15.4)
GFR SERPL CREATININE-BSD FRML MDRD: 27 ML/MIN/1.73
GLOBULIN UR ELPH-MCNC: 2.6 GM/DL
GLUCOSE SERPL-MCNC: 159 MG/DL (ref 65–99)
GLUCOSE UR STRIP-MCNC: NEGATIVE MG/DL
HCO3 BLDA-SCNC: 20.8 MMOL/L (ref 20–26)
HCT VFR BLD AUTO: 28.3 % (ref 34–46.6)
HGB BLD-MCNC: 8.9 G/DL (ref 12–15.9)
HGB UR QL STRIP.AUTO: ABNORMAL
HYALINE CASTS UR QL AUTO: ABNORMAL /LPF
IMM GRANULOCYTES # BLD AUTO: 0.02 10*3/MM3 (ref 0–0.05)
IMM GRANULOCYTES NFR BLD AUTO: 0.4 % (ref 0–0.5)
INR PPP: 1.04 (ref 0.91–1.09)
KETONES UR QL STRIP: NEGATIVE
LEUKOCYTE ESTERASE UR QL STRIP.AUTO: NEGATIVE
LYMPHOCYTES # BLD AUTO: 1.48 10*3/MM3 (ref 0.7–3.1)
LYMPHOCYTES NFR BLD AUTO: 28 % (ref 19.6–45.3)
Lab: ABNORMAL
MCH RBC QN AUTO: 29.9 PG (ref 26.6–33)
MCHC RBC AUTO-ENTMCNC: 31.4 G/DL (ref 31.5–35.7)
MCV RBC AUTO: 95 FL (ref 79–97)
MODALITY: ABNORMAL
MONOCYTES # BLD AUTO: 0.4 10*3/MM3 (ref 0.1–0.9)
MONOCYTES NFR BLD AUTO: 7.6 % (ref 5–12)
NEUTROPHILS NFR BLD AUTO: 3.26 10*3/MM3 (ref 1.7–7)
NEUTROPHILS NFR BLD AUTO: 61.7 % (ref 42.7–76)
NITRITE UR QL STRIP: NEGATIVE
NRBC BLD AUTO-RTO: 0 /100 WBC (ref 0–0.2)
NT-PROBNP SERPL-MCNC: 2610 PG/ML (ref 0–1800)
PCO2 BLDA: 32.1 MM HG (ref 35–45)
PCO2 TEMP ADJ BLD: 32.1 MM HG (ref 35–45)
PH BLDA: 7.42 PH UNITS (ref 7.35–7.45)
PH UR STRIP.AUTO: <=5 [PH] (ref 5–8)
PH, TEMP CORRECTED: 7.42 PH UNITS (ref 7.35–7.45)
PLATELET # BLD AUTO: 146 10*3/MM3 (ref 140–450)
PMV BLD AUTO: 10.9 FL (ref 6–12)
PO2 BLDA: 74.2 MM HG (ref 83–108)
PO2 TEMP ADJ BLD: 74.2 MM HG (ref 83–108)
POTASSIUM SERPL-SCNC: 4.6 MMOL/L (ref 3.5–5.2)
PROT SERPL-MCNC: 5.7 G/DL (ref 6–8.5)
PROT UR QL STRIP: ABNORMAL
PROTHROMBIN TIME: 13.2 SECONDS (ref 11.9–14.6)
RBC # BLD AUTO: 2.98 10*6/MM3 (ref 3.77–5.28)
RBC # UR: ABNORMAL /HPF
REF LAB TEST METHOD: ABNORMAL
SAO2 % BLDCOA: 96.7 % (ref 94–99)
SODIUM SERPL-SCNC: 139 MMOL/L (ref 136–145)
SP GR UR STRIP: 1.01 (ref 1–1.03)
SQUAMOUS #/AREA URNS HPF: ABNORMAL /HPF
TROPONIN T SERPL-MCNC: 0.03 NG/ML (ref 0–0.03)
UROBILINOGEN UR QL STRIP: ABNORMAL
VENTILATOR MODE: ABNORMAL
WBC # BLD AUTO: 5.28 10*3/MM3 (ref 3.4–10.8)
WBC UR QL AUTO: ABNORMAL /HPF

## 2021-10-31 PROCEDURE — 81001 URINALYSIS AUTO W/SCOPE: CPT | Performed by: NURSE PRACTITIONER

## 2021-10-31 PROCEDURE — 36600 WITHDRAWAL OF ARTERIAL BLOOD: CPT

## 2021-10-31 PROCEDURE — 87635 SARS-COV-2 COVID-19 AMP PRB: CPT | Performed by: NURSE PRACTITIONER

## 2021-10-31 PROCEDURE — 93005 ELECTROCARDIOGRAM TRACING: CPT | Performed by: STUDENT IN AN ORGANIZED HEALTH CARE EDUCATION/TRAINING PROGRAM

## 2021-10-31 PROCEDURE — 82803 BLOOD GASES ANY COMBINATION: CPT

## 2021-10-31 PROCEDURE — 93005 ELECTROCARDIOGRAM TRACING: CPT | Performed by: NURSE PRACTITIONER

## 2021-10-31 PROCEDURE — 85025 COMPLETE CBC W/AUTO DIFF WBC: CPT | Performed by: NURSE PRACTITIONER

## 2021-10-31 PROCEDURE — 93010 ELECTROCARDIOGRAM REPORT: CPT | Performed by: INTERNAL MEDICINE

## 2021-10-31 PROCEDURE — 94640 AIRWAY INHALATION TREATMENT: CPT

## 2021-10-31 PROCEDURE — 83880 ASSAY OF NATRIURETIC PEPTIDE: CPT | Performed by: NURSE PRACTITIONER

## 2021-10-31 PROCEDURE — 80053 COMPREHEN METABOLIC PANEL: CPT | Performed by: NURSE PRACTITIONER

## 2021-10-31 PROCEDURE — 94799 UNLISTED PULMONARY SVC/PX: CPT

## 2021-10-31 PROCEDURE — 85610 PROTHROMBIN TIME: CPT | Performed by: NURSE PRACTITIONER

## 2021-10-31 PROCEDURE — 51702 INSERT TEMP BLADDER CATH: CPT

## 2021-10-31 PROCEDURE — 85730 THROMBOPLASTIN TIME PARTIAL: CPT | Performed by: NURSE PRACTITIONER

## 2021-10-31 PROCEDURE — 84484 ASSAY OF TROPONIN QUANT: CPT | Performed by: NURSE PRACTITIONER

## 2021-10-31 PROCEDURE — 99285 EMERGENCY DEPT VISIT HI MDM: CPT

## 2021-10-31 PROCEDURE — 71045 X-RAY EXAM CHEST 1 VIEW: CPT

## 2021-10-31 PROCEDURE — 85379 FIBRIN DEGRADATION QUANT: CPT | Performed by: NURSE PRACTITIONER

## 2021-10-31 PROCEDURE — 25010000002 METHYLPREDNISOLONE PER 125 MG: Performed by: NURSE PRACTITIONER

## 2021-10-31 RX ORDER — IPRATROPIUM BROMIDE AND ALBUTEROL SULFATE 2.5; .5 MG/3ML; MG/3ML
3 SOLUTION RESPIRATORY (INHALATION) ONCE
Status: COMPLETED | OUTPATIENT
Start: 2021-10-31 | End: 2021-10-31

## 2021-10-31 RX ORDER — BUMETANIDE 0.25 MG/ML
2 INJECTION INTRAMUSCULAR; INTRAVENOUS ONCE
Status: COMPLETED | OUTPATIENT
Start: 2021-10-31 | End: 2021-10-31

## 2021-10-31 RX ORDER — METHYLPREDNISOLONE SODIUM SUCCINATE 125 MG/2ML
125 INJECTION, POWDER, LYOPHILIZED, FOR SOLUTION INTRAMUSCULAR; INTRAVENOUS ONCE
Status: COMPLETED | OUTPATIENT
Start: 2021-10-31 | End: 2021-10-31

## 2021-10-31 RX ORDER — SODIUM CHLORIDE 0.9 % (FLUSH) 0.9 %
10 SYRINGE (ML) INJECTION AS NEEDED
Status: DISCONTINUED | OUTPATIENT
Start: 2021-10-31 | End: 2021-11-05 | Stop reason: HOSPADM

## 2021-10-31 RX ADMIN — METHYLPREDNISOLONE SODIUM SUCCINATE 125 MG: 125 INJECTION, POWDER, FOR SOLUTION INTRAMUSCULAR; INTRAVENOUS at 23:46

## 2021-10-31 RX ADMIN — IPRATROPIUM BROMIDE AND ALBUTEROL SULFATE 3 ML: 2.5; .5 SOLUTION RESPIRATORY (INHALATION) at 23:26

## 2021-10-31 RX ADMIN — BUMETANIDE 2 MG: 0.25 INJECTION, SOLUTION INTRAMUSCULAR; INTRAVENOUS at 23:47

## 2021-11-01 ENCOUNTER — APPOINTMENT (OUTPATIENT)
Dept: NUCLEAR MEDICINE | Facility: HOSPITAL | Age: 86
End: 2021-11-01

## 2021-11-01 ENCOUNTER — APPOINTMENT (OUTPATIENT)
Dept: CARDIOLOGY | Facility: HOSPITAL | Age: 86
End: 2021-11-01

## 2021-11-01 ENCOUNTER — APPOINTMENT (OUTPATIENT)
Dept: ULTRASOUND IMAGING | Facility: HOSPITAL | Age: 86
End: 2021-11-01

## 2021-11-01 PROBLEM — D61.818 PANCYTOPENIA: Status: ACTIVE | Noted: 2021-11-01

## 2021-11-01 PROBLEM — J81.0 ACUTE PULMONARY EDEMA (HCC): Status: ACTIVE | Noted: 2021-11-01

## 2021-11-01 LAB
ALBUMIN SERPL-MCNC: 3.1 G/DL (ref 3.5–5.2)
ALBUMIN/GLOB SERPL: 1.1 G/DL
ALP SERPL-CCNC: 92 U/L (ref 39–117)
ALT SERPL W P-5'-P-CCNC: 22 U/L (ref 1–33)
ANION GAP SERPL CALCULATED.3IONS-SCNC: 11 MMOL/L (ref 5–15)
ANION GAP SERPL CALCULATED.3IONS-SCNC: 13 MMOL/L (ref 5–15)
AST SERPL-CCNC: 18 U/L (ref 1–32)
BH CV ECHO MEAS - AO MAX PG (FULL): 27.6 MMHG
BH CV ECHO MEAS - AO MAX PG: 34.8 MMHG
BH CV ECHO MEAS - AO MEAN PG (FULL): 14.3 MMHG
BH CV ECHO MEAS - AO MEAN PG: 18.3 MMHG
BH CV ECHO MEAS - AO ROOT AREA (BSA CORRECTED): 1.7
BH CV ECHO MEAS - AO ROOT AREA: 8.6 CM^2
BH CV ECHO MEAS - AO ROOT DIAM: 3.3 CM
BH CV ECHO MEAS - AO V2 MAX: 295 CM/SEC
BH CV ECHO MEAS - AO V2 MEAN: 196.7 CM/SEC
BH CV ECHO MEAS - AO V2 VTI: 71 CM
BH CV ECHO MEAS - AVA(I,A): 1.2 CM^2
BH CV ECHO MEAS - AVA(I,D): 1.2 CM^2
BH CV ECHO MEAS - AVA(V,A): 1.3 CM^2
BH CV ECHO MEAS - AVA(V,D): 1.3 CM^2
BH CV ECHO MEAS - BSA(HAYCOCK): 2 M^2
BH CV ECHO MEAS - BSA: 1.9 M^2
BH CV ECHO MEAS - BZI_BMI: 30.3 KILOGRAMS/M^2
BH CV ECHO MEAS - BZI_METRIC_HEIGHT: 167.6 CM
BH CV ECHO MEAS - BZI_METRIC_WEIGHT: 85.3 KG
BH CV ECHO MEAS - EDV(CUBED): 83.5 ML
BH CV ECHO MEAS - EDV(MOD-SP4): 64.3 ML
BH CV ECHO MEAS - EDV(TEICH): 86.3 ML
BH CV ECHO MEAS - EF(CUBED): 83.4 %
BH CV ECHO MEAS - EF(MOD-SP4): 66.9 %
BH CV ECHO MEAS - EF(TEICH): 76.6 %
BH CV ECHO MEAS - ESV(CUBED): 13.8 ML
BH CV ECHO MEAS - ESV(MOD-SP4): 21.3 ML
BH CV ECHO MEAS - ESV(TEICH): 20.2 ML
BH CV ECHO MEAS - FS: 45.1 %
BH CV ECHO MEAS - IVS/LVPW: 1.3
BH CV ECHO MEAS - IVSD: 1.3 CM
BH CV ECHO MEAS - LA DIMENSION: 4.4 CM
BH CV ECHO MEAS - LA/AO: 1.3
BH CV ECHO MEAS - LAT PEAK E' VEL: 5.4 CM/SEC
BH CV ECHO MEAS - LV DIASTOLIC VOL/BSA (35-75): 33 ML/M^2
BH CV ECHO MEAS - LV MASS(C)D: 174.1 GRAMS
BH CV ECHO MEAS - LV MASS(C)DI: 89.4 GRAMS/M^2
BH CV ECHO MEAS - LV MAX PG: 7.2 MMHG
BH CV ECHO MEAS - LV MEAN PG: 4 MMHG
BH CV ECHO MEAS - LV SYSTOLIC VOL/BSA (12-30): 10.9 ML/M^2
BH CV ECHO MEAS - LV V1 MAX: 134 CM/SEC
BH CV ECHO MEAS - LV V1 MEAN: 84.3 CM/SEC
BH CV ECHO MEAS - LV V1 VTI: 30.8 CM
BH CV ECHO MEAS - LVIDD: 4.4 CM
BH CV ECHO MEAS - LVIDS: 2.4 CM
BH CV ECHO MEAS - LVLD AP4: 6.7 CM
BH CV ECHO MEAS - LVLS AP4: 5.5 CM
BH CV ECHO MEAS - LVOT AREA (M): 2.8 CM^2
BH CV ECHO MEAS - LVOT AREA: 2.8 CM^2
BH CV ECHO MEAS - LVOT DIAM: 1.9 CM
BH CV ECHO MEAS - LVPWD: 0.96 CM
BH CV ECHO MEAS - MED PEAK E' VEL: 6.53 CM/SEC
BH CV ECHO MEAS - MV A MAX VEL: 114 CM/SEC
BH CV ECHO MEAS - MV DEC TIME: 0.2 SEC
BH CV ECHO MEAS - MV E MAX VEL: 131 CM/SEC
BH CV ECHO MEAS - MV E/A: 1.1
BH CV ECHO MEAS - RAP SYSTOLE: 5 MMHG
BH CV ECHO MEAS - RVSP: 38.2 MMHG
BH CV ECHO MEAS - SI(AO): 311.7 ML/M^2
BH CV ECHO MEAS - SI(CUBED): 35.7 ML/M^2
BH CV ECHO MEAS - SI(LVOT): 44.8 ML/M^2
BH CV ECHO MEAS - SI(MOD-SP4): 22.1 ML/M^2
BH CV ECHO MEAS - SI(TEICH): 33.9 ML/M^2
BH CV ECHO MEAS - SV(AO): 607.3 ML
BH CV ECHO MEAS - SV(CUBED): 69.6 ML
BH CV ECHO MEAS - SV(LVOT): 87.3 ML
BH CV ECHO MEAS - SV(MOD-SP4): 43 ML
BH CV ECHO MEAS - SV(TEICH): 66.1 ML
BH CV ECHO MEAS - TR MAX VEL: 288 CM/SEC
BH CV ECHO MEASUREMENTS AVERAGE E/E' RATIO: 21.96
BILIRUB SERPL-MCNC: 0.2 MG/DL (ref 0–1.2)
BUN SERPL-MCNC: 24 MG/DL (ref 8–23)
BUN SERPL-MCNC: 26 MG/DL (ref 8–23)
BUN/CREAT SERPL: 13.7 (ref 7–25)
BUN/CREAT SERPL: 14 (ref 7–25)
BURR CELLS BLD QL SMEAR: ABNORMAL
CALCIUM SPEC-SCNC: 8.8 MG/DL (ref 8.6–10.5)
CALCIUM SPEC-SCNC: 8.9 MG/DL (ref 8.6–10.5)
CHLORIDE SERPL-SCNC: 105 MMOL/L (ref 98–107)
CHLORIDE SERPL-SCNC: 109 MMOL/L (ref 98–107)
CO2 SERPL-SCNC: 20 MMOL/L (ref 22–29)
CO2 SERPL-SCNC: 21 MMOL/L (ref 22–29)
CREAT SERPL-MCNC: 1.75 MG/DL (ref 0.57–1)
CREAT SERPL-MCNC: 1.86 MG/DL (ref 0.57–1)
DEPRECATED RDW RBC AUTO: 59 FL (ref 37–54)
EOSINOPHIL # BLD MANUAL: 0.03 10*3/MM3 (ref 0–0.4)
EOSINOPHIL NFR BLD MANUAL: 1 % (ref 0.3–6.2)
ERYTHROCYTE [DISTWIDTH] IN BLOOD BY AUTOMATED COUNT: 16.9 % (ref 12.3–15.4)
GFR SERPL CREATININE-BSD FRML MDRD: 26 ML/MIN/1.73
GFR SERPL CREATININE-BSD FRML MDRD: 27 ML/MIN/1.73
GIANT PLATELETS: ABNORMAL
GLOBULIN UR ELPH-MCNC: 2.8 GM/DL
GLUCOSE BLDC GLUCOMTR-MCNC: 180 MG/DL (ref 70–130)
GLUCOSE BLDC GLUCOMTR-MCNC: 256 MG/DL (ref 70–130)
GLUCOSE BLDC GLUCOMTR-MCNC: 273 MG/DL (ref 70–130)
GLUCOSE BLDC GLUCOMTR-MCNC: 277 MG/DL (ref 70–130)
GLUCOSE SERPL-MCNC: 169 MG/DL (ref 65–99)
GLUCOSE SERPL-MCNC: 314 MG/DL (ref 65–99)
HCT VFR BLD AUTO: 29.6 % (ref 34–46.6)
HGB BLD-MCNC: 9.2 G/DL (ref 12–15.9)
LEFT ATRIUM VOLUME INDEX: 27.7 ML/M2
LEFT ATRIUM VOLUME: 54 CM3
LYMPHOCYTES # BLD MANUAL: 0.36 10*3/MM3 (ref 0.7–3.1)
LYMPHOCYTES NFR BLD MANUAL: 11.1 % (ref 19.6–45.3)
LYMPHOCYTES NFR BLD MANUAL: 2 % (ref 5–12)
MAGNESIUM SERPL-MCNC: 1.7 MG/DL (ref 1.6–2.4)
MAXIMAL PREDICTED HEART RATE: 132 BPM
MCH RBC QN AUTO: 29.7 PG (ref 26.6–33)
MCHC RBC AUTO-ENTMCNC: 31.1 G/DL (ref 31.5–35.7)
MCV RBC AUTO: 95.5 FL (ref 79–97)
METAMYELOCYTES NFR BLD MANUAL: 1 % (ref 0–0)
MONOCYTES # BLD AUTO: 0.07 10*3/MM3 (ref 0.1–0.9)
NEUTROPHILS # BLD AUTO: 2.76 10*3/MM3 (ref 1.7–7)
NEUTROPHILS NFR BLD MANUAL: 83.8 % (ref 42.7–76)
NEUTS BAND NFR BLD MANUAL: 1 % (ref 0–5)
PLATELET # BLD AUTO: 137 10*3/MM3 (ref 140–450)
PMV BLD AUTO: 11.4 FL (ref 6–12)
POIKILOCYTOSIS BLD QL SMEAR: ABNORMAL
POTASSIUM SERPL-SCNC: 4.3 MMOL/L (ref 3.5–5.2)
POTASSIUM SERPL-SCNC: 4.5 MMOL/L (ref 3.5–5.2)
PROT SERPL-MCNC: 5.9 G/DL (ref 6–8.5)
QT INTERVAL: 402 MS
QTC INTERVAL: 442 MS
RBC # BLD AUTO: 3.1 10*6/MM3 (ref 3.77–5.28)
SARS-COV-2 RNA PNL SPEC NAA+PROBE: NOT DETECTED
SMALL PLATELETS BLD QL SMEAR: ABNORMAL
SODIUM SERPL-SCNC: 139 MMOL/L (ref 136–145)
SODIUM SERPL-SCNC: 140 MMOL/L (ref 136–145)
STRESS TARGET HR: 112 BPM
TROPONIN T SERPL-MCNC: 0.03 NG/ML (ref 0–0.03)
WBC # BLD AUTO: 3.25 10*3/MM3 (ref 3.4–10.8)
WBC MORPH BLD: NORMAL

## 2021-11-01 PROCEDURE — 63710000001 INSULIN DETEMIR PER 5 UNITS: Performed by: INTERNAL MEDICINE

## 2021-11-01 PROCEDURE — 93306 TTE W/DOPPLER COMPLETE: CPT | Performed by: INTERNAL MEDICINE

## 2021-11-01 PROCEDURE — 94640 AIRWAY INHALATION TREATMENT: CPT

## 2021-11-01 PROCEDURE — 85007 BL SMEAR W/DIFF WBC COUNT: CPT | Performed by: INTERNAL MEDICINE

## 2021-11-01 PROCEDURE — 0 TECHNETIUM ALBUMIN AGGREGATED: Performed by: INTERNAL MEDICINE

## 2021-11-01 PROCEDURE — A9540 TC99M MAA: HCPCS | Performed by: INTERNAL MEDICINE

## 2021-11-01 PROCEDURE — 78582 LUNG VENTILAT&PERFUS IMAGING: CPT

## 2021-11-01 PROCEDURE — 94799 UNLISTED PULMONARY SVC/PX: CPT

## 2021-11-01 PROCEDURE — 63710000001 INSULIN LISPRO (HUMAN) PER 5 UNITS: Performed by: INTERNAL MEDICINE

## 2021-11-01 PROCEDURE — 80053 COMPREHEN METABOLIC PANEL: CPT | Performed by: INTERNAL MEDICINE

## 2021-11-01 PROCEDURE — 25010000002 ENOXAPARIN PER 10 MG: Performed by: NURSE PRACTITIONER

## 2021-11-01 PROCEDURE — 84484 ASSAY OF TROPONIN QUANT: CPT | Performed by: INTERNAL MEDICINE

## 2021-11-01 PROCEDURE — 93970 EXTREMITY STUDY: CPT

## 2021-11-01 PROCEDURE — 82962 GLUCOSE BLOOD TEST: CPT

## 2021-11-01 PROCEDURE — 85025 COMPLETE CBC W/AUTO DIFF WBC: CPT | Performed by: INTERNAL MEDICINE

## 2021-11-01 PROCEDURE — 93970 EXTREMITY STUDY: CPT | Performed by: SURGERY

## 2021-11-01 PROCEDURE — 93306 TTE W/DOPPLER COMPLETE: CPT

## 2021-11-01 PROCEDURE — 83735 ASSAY OF MAGNESIUM: CPT | Performed by: INTERNAL MEDICINE

## 2021-11-01 RX ORDER — NITROGLYCERIN 0.4 MG/1
0.4 TABLET SUBLINGUAL
Status: DISCONTINUED | OUTPATIENT
Start: 2021-11-01 | End: 2021-11-05 | Stop reason: HOSPADM

## 2021-11-01 RX ORDER — LOSARTAN POTASSIUM 50 MG/1
25 TABLET ORAL DAILY
Status: DISCONTINUED | OUTPATIENT
Start: 2021-11-01 | End: 2021-11-03

## 2021-11-01 RX ORDER — ACETAMINOPHEN 500 MG
500 TABLET ORAL EVERY 6 HOURS PRN
Status: DISCONTINUED | OUTPATIENT
Start: 2021-11-01 | End: 2021-11-01 | Stop reason: SDUPTHER

## 2021-11-01 RX ORDER — MULTIPLE VITAMINS W/ MINERALS TAB 9MG-400MCG
1 TAB ORAL DAILY
Status: DISCONTINUED | OUTPATIENT
Start: 2021-11-01 | End: 2021-11-05 | Stop reason: HOSPADM

## 2021-11-01 RX ORDER — SODIUM CHLORIDE 0.9 % (FLUSH) 0.9 %
10 SYRINGE (ML) INJECTION AS NEEDED
Status: DISCONTINUED | OUTPATIENT
Start: 2021-11-01 | End: 2021-11-05 | Stop reason: HOSPADM

## 2021-11-01 RX ORDER — ACETAMINOPHEN 160 MG/5ML
650 SOLUTION ORAL EVERY 4 HOURS PRN
Status: DISCONTINUED | OUTPATIENT
Start: 2021-11-01 | End: 2021-11-05 | Stop reason: HOSPADM

## 2021-11-01 RX ORDER — ONDANSETRON 2 MG/ML
4 INJECTION INTRAMUSCULAR; INTRAVENOUS EVERY 6 HOURS PRN
Status: DISCONTINUED | OUTPATIENT
Start: 2021-11-01 | End: 2021-11-05 | Stop reason: HOSPADM

## 2021-11-01 RX ORDER — ZINC SULFATE 50(220)MG
220 CAPSULE ORAL DAILY
Status: DISCONTINUED | OUTPATIENT
Start: 2021-11-01 | End: 2021-11-02

## 2021-11-01 RX ORDER — POLYETHYLENE GLYCOL 3350 17 G/17G
17 POWDER, FOR SOLUTION ORAL DAILY
Status: DISCONTINUED | OUTPATIENT
Start: 2021-11-01 | End: 2021-11-05 | Stop reason: HOSPADM

## 2021-11-01 RX ORDER — BUDESONIDE AND FORMOTEROL FUMARATE DIHYDRATE 80; 4.5 UG/1; UG/1
2 AEROSOL RESPIRATORY (INHALATION)
Status: DISCONTINUED | OUTPATIENT
Start: 2021-11-01 | End: 2021-11-05 | Stop reason: HOSPADM

## 2021-11-01 RX ORDER — NICOTINE POLACRILEX 4 MG
15 LOZENGE BUCCAL
Status: DISCONTINUED | OUTPATIENT
Start: 2021-11-01 | End: 2021-11-05 | Stop reason: HOSPADM

## 2021-11-01 RX ORDER — SODIUM CHLORIDE 0.9 % (FLUSH) 0.9 %
10 SYRINGE (ML) INJECTION EVERY 12 HOURS SCHEDULED
Status: DISCONTINUED | OUTPATIENT
Start: 2021-11-01 | End: 2021-11-05 | Stop reason: HOSPADM

## 2021-11-01 RX ORDER — IPRATROPIUM BROMIDE AND ALBUTEROL SULFATE 2.5; .5 MG/3ML; MG/3ML
3 SOLUTION RESPIRATORY (INHALATION)
Status: DISCONTINUED | OUTPATIENT
Start: 2021-11-01 | End: 2021-11-05 | Stop reason: HOSPADM

## 2021-11-01 RX ORDER — OXYBUTYNIN CHLORIDE 5 MG/1
5 TABLET, EXTENDED RELEASE ORAL DAILY
Status: DISCONTINUED | OUTPATIENT
Start: 2021-11-01 | End: 2021-11-05 | Stop reason: HOSPADM

## 2021-11-01 RX ORDER — ATORVASTATIN CALCIUM 40 MG/1
40 TABLET, FILM COATED ORAL NIGHTLY
Status: DISCONTINUED | OUTPATIENT
Start: 2021-11-01 | End: 2021-11-05 | Stop reason: HOSPADM

## 2021-11-01 RX ORDER — DEXTROSE MONOHYDRATE 25 G/50ML
25 INJECTION, SOLUTION INTRAVENOUS
Status: DISCONTINUED | OUTPATIENT
Start: 2021-11-01 | End: 2021-11-05 | Stop reason: HOSPADM

## 2021-11-01 RX ORDER — AMOXICILLIN 250 MG
1 CAPSULE ORAL 2 TIMES DAILY
Status: DISCONTINUED | OUTPATIENT
Start: 2021-11-01 | End: 2021-11-05 | Stop reason: HOSPADM

## 2021-11-01 RX ORDER — ASCORBIC ACID 500 MG
500 TABLET ORAL DAILY
Status: DISCONTINUED | OUTPATIENT
Start: 2021-11-01 | End: 2021-11-02

## 2021-11-01 RX ORDER — AMLODIPINE BESYLATE 5 MG/1
5 TABLET ORAL DAILY
Status: DISCONTINUED | OUTPATIENT
Start: 2021-11-01 | End: 2021-11-02

## 2021-11-01 RX ORDER — ONDANSETRON 4 MG/1
4 TABLET, FILM COATED ORAL EVERY 6 HOURS PRN
Status: DISCONTINUED | OUTPATIENT
Start: 2021-11-01 | End: 2021-11-05 | Stop reason: HOSPADM

## 2021-11-01 RX ORDER — LEVOTHYROXINE SODIUM 0.07 MG/1
75 TABLET ORAL
Status: DISCONTINUED | OUTPATIENT
Start: 2021-11-01 | End: 2021-11-05 | Stop reason: HOSPADM

## 2021-11-01 RX ORDER — ACETAMINOPHEN 650 MG/1
650 SUPPOSITORY RECTAL EVERY 4 HOURS PRN
Status: DISCONTINUED | OUTPATIENT
Start: 2021-11-01 | End: 2021-11-05 | Stop reason: HOSPADM

## 2021-11-01 RX ORDER — PROCHLORPERAZINE MALEATE 10 MG
10 TABLET ORAL EVERY 6 HOURS PRN
Status: DISCONTINUED | OUTPATIENT
Start: 2021-11-01 | End: 2021-11-05 | Stop reason: HOSPADM

## 2021-11-01 RX ORDER — ACETAMINOPHEN 325 MG/1
650 TABLET ORAL EVERY 4 HOURS PRN
Status: DISCONTINUED | OUTPATIENT
Start: 2021-11-01 | End: 2021-11-05 | Stop reason: HOSPADM

## 2021-11-01 RX ORDER — ONDANSETRON 4 MG/1
4 TABLET, FILM COATED ORAL EVERY 6 HOURS PRN
Status: DISCONTINUED | OUTPATIENT
Start: 2021-11-01 | End: 2021-11-01 | Stop reason: SDUPTHER

## 2021-11-01 RX ORDER — BUMETANIDE 0.25 MG/ML
2 INJECTION INTRAMUSCULAR; INTRAVENOUS EVERY 12 HOURS
Status: DISCONTINUED | OUTPATIENT
Start: 2021-11-01 | End: 2021-11-02

## 2021-11-01 RX ADMIN — INSULIN LISPRO 6 UNITS: 100 INJECTION, SOLUTION INTRAVENOUS; SUBCUTANEOUS at 12:16

## 2021-11-01 RX ADMIN — Medication 1 TABLET: at 09:26

## 2021-11-01 RX ADMIN — ATORVASTATIN CALCIUM 40 MG: 40 TABLET, FILM COATED ORAL at 20:36

## 2021-11-01 RX ADMIN — INSULIN DETEMIR 10 UNITS: 100 INJECTION, SOLUTION SUBCUTANEOUS at 09:30

## 2021-11-01 RX ADMIN — OXYCODONE HYDROCHLORIDE AND ACETAMINOPHEN 500 MG: 500 TABLET ORAL at 09:26

## 2021-11-01 RX ADMIN — BUDESONIDE AND FORMOTEROL FUMARATE DIHYDRATE 2 PUFF: 80; 4.5 AEROSOL RESPIRATORY (INHALATION) at 21:07

## 2021-11-01 RX ADMIN — HYPROMELLOSE: 0 GEL OPHTHALMIC at 20:36

## 2021-11-01 RX ADMIN — IPRATROPIUM BROMIDE AND ALBUTEROL SULFATE 3 ML: 2.5; .5 SOLUTION RESPIRATORY (INHALATION) at 20:58

## 2021-11-01 RX ADMIN — LOSARTAN POTASSIUM 25 MG: 50 TABLET, FILM COATED ORAL at 09:26

## 2021-11-01 RX ADMIN — KIT FOR THE PREPARATION OF TECHNETIUM TC 99M ALBUMIN AGGREGATED 1 DOSE: 2.5 INJECTION, POWDER, FOR SOLUTION INTRAVENOUS at 07:54

## 2021-11-01 RX ADMIN — INSULIN LISPRO 6 UNITS: 100 INJECTION, SOLUTION INTRAVENOUS; SUBCUTANEOUS at 16:56

## 2021-11-01 RX ADMIN — BUMETANIDE 2 MG: 0.25 INJECTION, SOLUTION INTRAMUSCULAR; INTRAVENOUS at 09:26

## 2021-11-01 RX ADMIN — ENOXAPARIN SODIUM 90 MG: 100 INJECTION SUBCUTANEOUS at 03:55

## 2021-11-01 RX ADMIN — LEVOTHYROXINE SODIUM 75 MCG: 75 TABLET ORAL at 09:26

## 2021-11-01 RX ADMIN — IPRATROPIUM BROMIDE AND ALBUTEROL SULFATE 3 ML: 2.5; .5 SOLUTION RESPIRATORY (INHALATION) at 07:13

## 2021-11-01 RX ADMIN — DOCUSATE SODIUM 50 MG AND SENNOSIDES 8.6 MG 1 TABLET: 8.6; 5 TABLET, FILM COATED ORAL at 09:26

## 2021-11-01 RX ADMIN — INSULIN DETEMIR 10 UNITS: 100 INJECTION, SOLUTION SUBCUTANEOUS at 20:34

## 2021-11-01 RX ADMIN — BUMETANIDE 2 MG: 0.25 INJECTION, SOLUTION INTRAMUSCULAR; INTRAVENOUS at 20:35

## 2021-11-01 RX ADMIN — ZINC SULFATE 220 MG (50 MG) CAPSULE 220 MG: CAPSULE at 09:27

## 2021-11-01 RX ADMIN — AMLODIPINE BESYLATE 5 MG: 5 TABLET ORAL at 09:26

## 2021-11-01 RX ADMIN — DOCUSATE SODIUM 50 MG AND SENNOSIDES 8.6 MG 1 TABLET: 8.6; 5 TABLET, FILM COATED ORAL at 20:36

## 2021-11-01 RX ADMIN — IPRATROPIUM BROMIDE AND ALBUTEROL SULFATE 3 ML: 2.5; .5 SOLUTION RESPIRATORY (INHALATION) at 10:42

## 2021-11-01 RX ADMIN — Medication 10 ML: at 09:27

## 2021-11-01 RX ADMIN — OXYBUTYNIN CHLORIDE 5 MG: 5 TABLET, EXTENDED RELEASE ORAL at 09:26

## 2021-11-01 RX ADMIN — INSULIN LISPRO 2 UNITS: 100 INJECTION, SOLUTION INTRAVENOUS; SUBCUTANEOUS at 09:27

## 2021-11-01 NOTE — H&P
[facility] Medicine Services  HISTORY AND PHYSICAL    Date of Admission: 10/31/2021  Primary Care Physician: Devon Zuñiga MD    Subjective     Chief Complaint: Shortness of breath    History of Present Illness  Patient is an 88-year-old white female past medical history pulmonary fibrosis COPD type 2 diabetes chronic kidney disease stage IIIb.  She presents after being hospitalized and discharged on the 18th for HCAP with increasing shortness of breath especially the last couple of days.  She is also been wheezing is more edematous than she has been poor appetite is noted.  She is evaluated in the emergency room chest x-ray is got pleural effusions and evidence of volume overload.  Her BNP is elevated as well her creatinine is actually below baseline she is not requiring oxygen currently she is audibly wheezing.  She states she has had some sputum production very minimal with a yellow tinge.  Given these findings and her worsening volume overload we have been asked to admit the patient for what appears to be acute on chronic heart failure versus volume overload from renal failure.        Review of Systems   14 point review of systems negative except for as per HPI    Otherwise complete ROS reviewed and negative except as mentioned in the HPI.    Past Medical History:   Past Medical History:   Diagnosis Date   • Acquired hammer toes of both feet 10/15/2020   • Allergic rhinitis    • Aneurysm (HCC)    • Arthritis    • Benign meningioma ([facility]) 10/15/2020   • Broken shoulder     Right shoulder    • Cerebral aneurysm 2009    2ND ONE FOUND   • Cerebral aneurysm     NON TREATABLE   • Chronic laryngitis    • CKD (chronic kidney disease)    • Colon polyps    • COPD (chronic obstructive pulmonary disease) ([facility])    • Deviated nasal septum    • Diabetes mellitus (HCC)    • Disorder of kidney and ureter    • Disturbance of smell and taste    • Dizziness    • Encounter for imaging to  screen for metal prior to MRI     NO MRI, METAL CLIPS IN HEAD   • Eustachian tube dysfunction    • GERD (gastroesophageal reflux disease)    • Globus sensation    • Hallux valgus, right 2018   • Headache    • Hepatitis     B   • Hepatitis A    • History of stomach ulcers    • Hyperlipidemia    • Hypothyroid    • Laryngitis sicca    • Lung nodule    • Nocturia    • Non-toxic multinodular goiter 10/15/2020   • PONV (postoperative nausea and vomiting)    • Sensorineural hearing loss    • Spinal stenosis    • Uncontrolled type 2 diabetes mellitus without complication, with long-term current use of insulin 2013   • Urge incontinence    • Urgency of urination    • Urinary frequency    • Urinary incontinence    • UTI (urinary tract infection)      Past Surgical History:  Past Surgical History:   Procedure Laterality Date   • BLADDER SURGERY  2016    [employer]tronic Interstem   • BRAIN SURGERY      ANUERYSM REMOVED   • BREAST SURGERY Bilateral     BIOPSY   • CARPAL TUNNEL RELEASE     • CATARACT EXTRACTION, BILATERAL     • CEREBRAL ANEURYSM REPAIR     •  SECTION      X4   • CHOLECYSTECTOMY     • HYSTERECTOMY     • INTERSTIM PLACEMENT N/A 10/18/2018    Procedure: INTERSTIM STAGES 1 AND 2 LEAD AND GENERATOR REMOVAL AND REPLACEMENT;  Surgeon: Archie Avery MD;  Location: [facility];  Service: Urology   • JOINT REPLACEMENT Right     KNEE   • KNEE ARTHROSCOPY     • THYROIDECTOMY     • TONSILLECTOMY       Social History:  reports that she quit smoking about 28 years ago. Her smoking use included cigarettes. She has never used smokeless tobacco. She reports that she does not drink alcohol and does not use drugs.    Family History: family history includes Cancer in her brother; No Known Problems in her father and mother.       Allergies:  Allergies   Allergen Reactions   • Codeine Phosphate [Codeine] Unknown (See Comments)     CHEST PAIN   • Collagen Other (See Comments)     CAT GUT SUTURES \ WOUND WOULD NOT HEAL  AND GOT INFECTION   • Accupril [Quinapril Hcl] Other (See Comments)     COUGH   • Aspirin Other (See Comments)     HISTORY OF STOMACH ULCERS   • Adhesive Tape Rash     SKIN BLISTERS   • Celebrex [Celecoxib] Nausea Only   • Lyrica [Pregabalin] Other (See Comments)     GAINED 50 POUNDS   • Pantoprazole Sodium Diarrhea   • Pentoxifylline Diarrhea   • Sulfa Antibiotics Nausea And Vomiting   • Tramadol Nausea And Vomiting   • Vioxx [Rofecoxib] Nausea And Vomiting       Medications:  Prior to Admission medications    Medication Sig Start Date End Date Taking? Authorizing Provider   amLODIPine (NORVASC) 5 MG tablet Take 5 mg by mouth Daily.   Yes Maureen Hyatt MD   Ascorbic Acid (Vitamin C) 500 MG capsule Take 500 mg by mouth Daily.   Yes Maureen Hyatt MD   atorvastatin (LIPITOR) 40 MG tablet Take 40 mg by mouth Every Night.   Yes ProviderMaureen MD   Dextran 70-Hypromellose (GENTEAL TEARS MODERATE PF OP) Administer 2 drops to both eyes every night at bedtime.   Yes ProviderMaureen MD   fluticasone-salmeterol (ADVAIR) 100-50 MCG/DOSE DISKUS Inhale 2 (Two) Times a Day.   Yes ProviderMaureen MD   insulin detemir (LEVEMIR) 100 UNIT/ML injection Inject 10 Units under the skin into the appropriate area as directed 2 (Two) Times a Day. 9/3/21  Yes Brisa Feliciano APRN   levothyroxine (SYNTHROID, LEVOTHROID) 75 MCG tablet Take 75 mcg by mouth Daily.   Yes ProviderMaureen MD   losartan (COZAAR) 25 MG tablet Take 25 mg by mouth Daily.   Yes ProviderMaureen MD   Mirabegron ER (MYRBETRIQ) 50 MG tablet sustained-release 24 hour 24 hr tablet Take 50 mg by mouth Daily.   Yes ProviderMaureen MD   multivitamin with minerals (MULTIVITAMIN ADULT PO) Take 1 tablet by mouth Daily.   Yes Maureen Hyatt MD   polyethylene glycol (polyethylene glycol) 17 g packet Take 17 g by mouth Daily. 3/31/21  Yes Brisa Feliciano APRN   sennosides-docusate (PERICOLACE) 8.6-50 MG per tablet Take 1 tablet by  "mouth 2 (Two) Times a Day. 3/30/21  Yes Brisa Feliciano APRN   Zinc Sulfate (ZINC 15 PO) Take 220 mg by mouth Daily.   Yes Maureen Hyatt MD   acetaminophen (TYLENOL) 500 MG tablet Take 500 mg by mouth Every 6 (Six) Hours As Needed for Mild Pain  or Fever.    Maureen Hyatt MD   fluticasone-salmeterol (ADVAIR) 100-50 MCG/DOSE DISKUS Inhale 1 puff 2 (Two) Times a Day. 10/15/21   Lloyd Martins DO   insulin lispro (humaLOG) 100 UNIT/ML injection Inject under the skin TID WM as per sliding scale:  150-199= 2 units  200-249= 3 units  250-299= 4 units  300-349= 5 units  350-399= 6 units  >400= 7 units and call MD    Maureen Hyatt MD   ondansetron (ZOFRAN) 4 MG tablet Take 4 mg by mouth Every 6 (Six) Hours As Needed for Nausea or Vomiting.    Maureen Hyatt MD   predniSONE 5 MG (21) tablet therapy pack dose pack Take 1 tablet by mouth Take As Directed. Take as directed on package instructions. 10/15/21   Lloyd Martins DO   prochlorperazine (COMPAZINE) 10 MG tablet Take 10 mg by mouth Every 6 (Six) Hours As Needed for Nausea or Vomiting.    Maureen Hyatt MD     I have utilized all available immediate resources to obtain, update, and review the patient's current medications.    Objective     Vital Signs: /64 (BP Location: Right arm, Patient Position: Lying)   Pulse 68   Temp 97.6 °F (36.4 °C) (Oral)   Resp 18   Ht 167.6 cm (66\")   Wt 85.6 kg (188 lb 11.2 oz)   SpO2 99%   BMI 30.46 kg/m²   Physical Exam   Gen.:  Well-nourished well-developed white female in no acute distress  HEENT: Atraumatic, normocephalic.  Pupils equal, round, and reactive to light. Extraocular movements intact.  Sclerae anicteric.  External ears negative.  Mucous membranes moist.  Neck is supple without lymphadenopathy.  Positive JVD is noted.  No carotid bruits are auscultated.  Oropharynx is without erythema or exudate.   Chest: Bilateral wheezes rales bilaterally diminished breath sounds in " the bases  CV: Regular rate and rhythm.  Normal S1-S2.  No gallops, murmurs. or rubs.  Abdomen: Soft, nontender, nondistended.  Active bowel sounds,  No hepatosplenomegaly.  No masses.  No hernias.  Extremities: No clubbing, 3+ pitting edema, or cyanosis.  Capillary refill is normal.  Pulses are 2+ and symmetric at radial and dorsalis pedis.  Posterior tibial pulses are intact and 2+ palpable.  Neuro: Patient is awake, alert, and oriented ×3.  Cranial nerves II through XII are grossly intact.  Motor is 5 out of 5 bilaterally.  DTRs are 2+ and symmetric bilaterally. Sensory exam is nonfocal  Skin: Warm, dry, and intact.  No evidence of breakdown.  Sensorium: Normal thought and affect    Nursing notes and vital signs reviewed        Results Reviewed:  Lab Results (last 24 hours)     Procedure Component Value Units Date/Time    COVID PRE-OP / PRE-PROCEDURE SCREENING ORDER (NO ISOLATION) - Swab, Nasal Cavity [[id number]]  (Normal) Collected: 10/31/21 2322    Specimen: Swab from Nasal Cavity Updated: 11/01/21 0009    Narrative:      The following orders were created for panel order COVID PRE-OP / PRE-PROCEDURE SCREENING ORDER (NO ISOLATION) - Swab, Nasal Cavity.  Procedure                               Abnormality         Status                     ---------                               -----------         ------                     COVID-19,[facility] IN-SAUL...[[id number]]  Normal              Final result                 Please view results for these tests on the individual orders.    COVID-19,[facility] IN-HOUSE,Nasal Swab No Transport Media 3-4 HR TAT - Swab, Nasal Cavity [[id number]]  (Normal) Collected: 10/31/21 2322    Specimen: Swab from Nasal Cavity Updated: 11/01/21 0009     COVID19 Not Detected    Narrative:      Fact sheet for providers: https://www.fda.gov/media/732364/download     Fact sheet for patients: https://www.fda.gov/media/347164/download    Test performed by PCR.    Consider negative results in  combination with clinical observations, patient history, and epidemiological information.    Comprehensive Metabolic Panel [[id number]]  (Abnormal) Collected: 10/31/21 2321    Specimen: Blood Updated: 10/31/21 2354     Glucose 159 mg/dL      BUN 24 mg/dL      Creatinine 1.76 mg/dL      Sodium 139 mmol/L      Potassium 4.6 mmol/L      Comment: Specimen hemolyzed.  Results may be affected.        Chloride 107 mmol/L      CO2 21.0 mmol/L      Calcium 8.9 mg/dL      Total Protein 5.7 g/dL      Albumin 3.10 g/dL      ALT (SGPT) 23 U/L      Comment: Specimen hemolyzed.  Results may be affected.        AST (SGOT) 27 U/L      Comment: Specimen hemolyzed.  Results may be affected.        Alkaline Phosphatase 83 U/L      Total Bilirubin 0.2 mg/dL      eGFR Non African Amer 27 mL/min/1.73      Globulin 2.6 gm/dL      A/G Ratio 1.2 g/dL      BUN/Creatinine Ratio 13.6     Anion Gap 11.0 mmol/L     Narrative:      GFR Normal >60  Chronic Kidney Disease <60  Kidney Failure <15      Urinalysis, Microscopic Only - Urine, Catheter [[id number]]  (Abnormal) Collected: 10/31/21 2343    Specimen: Urine, Catheter Updated: 10/31/21 2353     RBC, UA 3-5 /HPF      WBC, UA 0-2 /HPF      Bacteria, UA None Seen /HPF      Squamous Epithelial Cells, UA 0-2 /HPF      Hyaline Casts, UA 0-2 /LPF      Methodology Automated Microscopy    Urinalysis With Culture If Indicated - Urine, Catheter [[id number]]  (Abnormal) Collected: 10/31/21 2343    Specimen: Urine, Catheter Updated: 10/31/21 2353     Color, UA Yellow     Appearance, UA Clear     pH, UA <=5.0     Specific Gravity, UA 1.015     Glucose, UA Negative     Ketones, UA Negative     Bilirubin, UA Negative     Blood, UA Small (1+)     Protein,  mg/dL (2+)     Leuk Esterase, UA Negative     Nitrite, UA Negative     Urobilinogen, UA 0.2 E.U./dL    Troponin [[id number]]  (Normal) Collected: 10/31/21 2321    Specimen: Blood Updated: 10/31/21 2348     Troponin T 0.028 ng/mL     Narrative:       Troponin T Reference Range:  <= 0.03 ng/mL-   Negative for AMI  >0.03 ng/mL-     Abnormal for myocardial necrosis.  Clinicians would have to utilize clinical acumen, EKG, Troponin and serial changes to determine if it is an Acute Myocardial Infarction or myocardial injury due to an underlying chronic condition.       Results may be falsely decreased if patient taking Biotin.      BNP [[id number]]  (Abnormal) Collected: 10/31/21 2321    Specimen: Blood Updated: 10/31/21 2347     proBNP 2,610.0 pg/mL     Narrative:      Among patients with dyspnea, NT-proBNP is highly sensitive for the detection of acute congestive heart failure. In addition NT-proBNP of <300 pg/ml effectively rules out acute congestive heart failure with 99% negative predictive value.    Results may be falsely decreased if patient taking Biotin.      D-dimer, Quantitative [[id number]]  (Abnormal) Collected: 10/31/21 2321    Specimen: Blood Updated: 10/31/21 2338     D-Dimer, Quantitative 1.00 mg/L (FEU)     Narrative:      Reference Range is 0-0.50 mg/L FEU. However, results <0.50 mg/L FEU tends to rule out DVT or PE. Results >0.50 mg/L FEU are not useful in predicting absence or presence of DVT or PE.      Protime-INR [[id number]]  (Normal) Collected: 10/31/21 2321    Specimen: Blood Updated: 10/31/21 2338     Protime 13.2 Seconds      INR 1.04    aPTT [[id number]]  (Normal) Collected: 10/31/21 2321    Specimen: Blood Updated: 10/31/21 2338     PTT 30.5 seconds     Blood Gas, Arterial - [[id number]]  (Abnormal) Collected: 10/31/21 2317    Specimen: Arterial Blood Updated: 10/31/21 2334     Site Right Brachial     Duane's Test N/A     pH, Arterial 7.419 pH units      pCO2, Arterial 32.1 mm Hg      Comment: 84 Value below reference range        pO2, Arterial 74.2 mm Hg      Comment: 84 Value below reference range        HCO3, Arterial 20.8 mmol/L      Base Excess, Arterial -3.2 mmol/L      Comment: 84 Value below reference range        O2 Saturation,  Arterial 96.7 %      Temperature 37.0 C      Barometric Pressure for Blood Gas 757 mmHg      Modality Room Air     Ventilator Mode NA     Collected by [id number]     Comment: Meter: [id number]     :  [id number]        pCO2, Temperature Corrected 32.1 mm Hg      pH, Temp Corrected 7.419 pH Units      pO2, Temperature Corrected 74.2 mm Hg     CBC & Differential [[id number]]  (Abnormal) Collected: 10/31/21 2321    Specimen: Blood Updated: 10/31/21 2327    Narrative:      The following orders were created for panel order CBC & Differential.  Procedure                               Abnormality         Status                     ---------                               -----------         ------                     CBC Auto Differential[[id number]]        Abnormal            Final result                 Please view results for these tests on the individual orders.    CBC Auto Differential [[id number]]  (Abnormal) Collected: 10/31/21 2321    Specimen: Blood Updated: 10/31/21 2327     WBC 5.28 10*3/mm3      RBC 2.98 10*6/mm3      Hemoglobin 8.9 g/dL      Hematocrit 28.3 %      MCV 95.0 fL      MCH 29.9 pg      MCHC 31.4 g/dL      RDW 16.9 %      RDW-SD 58.2 fl      MPV 10.9 fL      Platelets 146 10*3/mm3      Neutrophil % 61.7 %      Lymphocyte % 28.0 %      Monocyte % 7.6 %      Eosinophil % 2.1 %      Basophil % 0.2 %      Immature Grans % 0.4 %      Neutrophils, Absolute 3.26 10*3/mm3      Lymphocytes, Absolute 1.48 10*3/mm3      Monocytes, Absolute 0.40 10*3/mm3      Eosinophils, Absolute 0.11 10*3/mm3      Basophils, Absolute 0.01 10*3/mm3      Immature Grans, Absolute 0.02 10*3/mm3      nRBC 0.0 /100 WBC         Imaging Results (Last 24 Hours)     Procedure Component Value Units Date/Time    XR Chest 1 View [[id number]] Resulted: 10/31/21 2352     Updated: 10/31/21 2352        I have personally reviewed and interpreted the radiology studies and ECG obtained at time of admission.     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  Acute pulmonary edema plans to admit the patient continue Bumex IV she states she is already feeling better after 1 dose.  We will check a 2D echo to further evaluate heart function.  Monitor renal function closely with diuresis.  Daily weights supplemental O2 as and if needed.  Also can give her some breathing treatments to see if that will help with her wheezing as well.  2.  Hypertension resume home medications monitor BP.  3.  Pulmonary fibrosis hard to tell how much of this is related to that negative VQ scan as well on the patient supplemental O2 if needed diurese neb treatments.  4.  Type 2 diabetes Accu-Chek sliding scale insulin resume home medications.  She has had a recent A1c.  5.  Medications reviewed and resumed as appropriate.    Patient seen after midnight       Code Status/Advanced Care Plan: Full    The patient's surrogate decision maker is patient's son.     I discussed my findings and recommendations with the patient.    Estimated length of stay is greater than 2 nights.     The patient was seen and examined by me on November 1, 2021 at 5:15 AM.    Electronically signed by Rebel Shin MD, 11/01/21, 06:08 CDT.

## 2021-11-01 NOTE — CASE MANAGEMENT/SOCIAL WORK
Continued Stay Note   [place]     Patient Name: Honey Canales  MRN: [id number]  Today's Date: 11/1/2021    Admit Date: 10/31/2021     Discharge Plan     Row Name 11/01/21 1234       Plan    Plan Comments Pt does have a bed hold and can return to [place] when medically stable.    Final Discharge Disposition Code 03 - skilled nursing facility (SNF)               Discharge Codes    No documentation.                     RENETTA Galarza

## 2021-11-01 NOTE — PLAN OF CARE
Goal Outcome Evaluation:  Plan of Care Reviewed With: patient        Patient alert and oriented x4. VSS stable. RA. SOA O2 sat upper 90s to 100 % in room air. Wheezy , loud at a times with activities specially while she is getting traansferred to chair or bed. Patient was frequently pivoted to chair r/t her transport for Echo/doppler and perfusion test. +4 BL LLE edema. Howard cath to strict I &O . Fair yellow color clean ouotput. Son at bedside for few hours in afternoon. Education provided on Incentive spirotmeter. Eating and drinking fair. LBM x1 small today. Feels urgency but used bedpan. , Tele. Continue to monitor.

## 2021-11-01 NOTE — PROGRESS NOTES
[facility] Medicine Services  INPATIENT PROGRESS NOTE    Length of Stay: 0  Date of Admission: 10/31/2021  Primary Care Physician: Devon Zuñiga MD    Subjective   Chief Complaint: Follow-up  HPI   Patient seen after midnight by Dr. Shin.    Patient resting in bed with son at bedside.  She states she feels about the same in comparison to admission.  She states that she has been having lower extremity edema since at least April, however worse in the last 1 to 2 months.  She does have shortness of breath, especially with exertion.  She denies any chest pain.  She has had a cough, nonproductive.  She denies abdominal pain, nausea, or vomiting.  She does complain of dysphagia, which seems to be chronic in nature and no worse than usual.  She has been working with speech therapy at [facility].    Review of Systems   All pertinent negatives and positives are as above. All other systems have been reviewed and are negative unless otherwise stated.     Objective    Temp:  [96.5 °F (35.8 °C)-98 °F (36.7 °C)] 97.8 °F (36.6 °C)  Heart Rate:  [63-82] 82  Resp:  [18-28] 18  BP: (157-188)/(58-82) 157/77  Physical Exam  Vitals and nursing note reviewed.   Constitutional:       General: She is not in acute distress.     Appearance: Normal appearance. She is ill-appearing.   HENT:      Head: Normocephalic and atraumatic.   Cardiovascular:      Rate and Rhythm: Normal rate and regular rhythm.      Pulses: Normal pulses.      Heart sounds: Murmur heard.        Comments: Sinus-sinus arrhythmia 75-87  Pulmonary:      Breath sounds: No wheezing, rhonchi or rales.      Comments: Tachypnea with conversation.  Grossly diminished breath sounds throughout.  Abdominal:      General: Bowel sounds are normal. There is no distension.      Palpations: Abdomen is soft.      Tenderness: There is no abdominal tenderness.   Musculoskeletal:         General: Normal range of motion.      Cervical  NURSE NOTES:

 Patient resting,patient had soft brown stools today.   postion for comfort.Bed aalrm is 
on,call light within reach. back: Normal range of motion and neck supple. No tenderness.      Right lower leg: Edema (Severe) present.      Left lower leg: Edema (Severe) present.   Skin:     General: Skin is warm and dry.      Findings: No erythema or rash.   Neurological:      General: No focal deficit present.      Mental Status: She is alert and oriented to person, place, and time.   Psychiatric:         Mood and Affect: Mood normal.         Behavior: Behavior normal.         Thought Content: Thought content normal.         Judgment: Judgment normal.     Results Review:  I have reviewed the labs, radiology results, and diagnostic studies.    Laboratory Data:   Results from last 7 days   Lab Units 11/01/21  0616 10/31/21  2321   WBC 10*3/mm3 3.25* 5.28   HEMOGLOBIN g/dL 9.2* 8.9*   HEMATOCRIT % 29.6* 28.3*   PLATELETS 10*3/mm3 137* 146     Results from last 7 days   Lab Units 11/01/21  0616 10/31/21  2321   SODIUM mmol/L 140 139   POTASSIUM mmol/L 4.3 4.6   CHLORIDE mmol/L 109* 107   CO2 mmol/L 20.0* 21.0*   BUN mg/dL 24* 24*   CREATININE mg/dL 1.75* 1.76*   CALCIUM mg/dL 8.9 8.9   BILIRUBIN mg/dL 0.2 0.2   ALK PHOS U/L 92 83   ALT (SGPT) U/L 22 23   AST (SGOT) U/L 18 27   GLUCOSE mg/dL 169* 159*     Radiology Data:   Imaging Results (Last 24 Hours)     Procedure Component Value Units Date/Time    NM Lung Ventilation Perfusion [[id number]] Collected: 11/01/21 0830     Updated: 11/01/21 1007    Narrative:      EXAMINATION: NM LUNG VENTILATION PERFUSION-     11/1/2021 7:53 AM CDT     HISTORY: Abnormal xray - pleural effusion     Nuclear medicine perfusion lung scan.  Dose: 5.0 mCi technetium MAA.  Route of administration: IV injection.     Comparison is made with a chest x-ray performed approximately 8 hours  ago (October 31, 2021 at 11:37 PM).     Perfusion of the lungs is slightly patchy though given the amount of  parenchymal infiltrate on the chest x-ray there is no focal peripheral  defect to suggest pulmonary embolism.     Summary:  1.  Low probability for pulmonary embolism.  This report was finalized on 11/01/2021 10:04 by Dr. Cristóbal Barriga MD.    XR Chest 1 View [[id number]] Collected: 11/01/21 0814     Updated: 11/01/21 0828    Narrative:      EXAMINATION: XR CHEST 1 VW-     10/31/2021 11:52 PM CDT     HISTORY: AUDIBLE WHEEZING/SWOLLEN EXTREMITIES     1 view chest x-ray.     Comparison is made with the one view chest x-ray from October 12, 2021.     Heart size is within normal limits and unchanged.  Normal mediastinum.     There is patchy mixed infiltrate again seen throughout both lungs.  Slightly lower lung volumes though the bilateral infiltrate is not  significantly changed.  Underlying interstitial component again seen.     Old healed fracture deformity of the proximal right humerus.     Trace amount of pleural fluid.     No pneumothorax.     Summary:  1. Patchy bilateral infiltrate or edema is not significantly changed  from 3 weeks ago.  This report was finalized on 11/01/2021 08:25 by Dr. Cristóbal Barriga MD.        I have reviewed the patient current medications.     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  Patient presented to the emergency department 10/31/2021 with complaints of shortness of breath and lower extremity edema.  Patient was recently admitted to our facility from 10/8 through 10/15 presenting with shortness of breath as well.  Chest x-ray was concerning for bilateral pneumonia.  She was treated with azithromycin and Zosyn, completing course of therapy with Omnicef.  2.  Chest x-ray on admission showed patchy bilateral infiltrate not significantly changed from 3 weeks ago.  D-dimer was elevated.  VQ scan was performed secondary to renal function, which showed low  probability for pulmonary embolus.  3.  Patient has had no fever, white blood cell count was normal on admission.  She was recently appropriately treated for healthcare associated pneumonia.  BNP was elevated on admission, and she exhibits fluid overload.  Would continue diuresis at this time with Bumex 2 mg IV every 12 hours.  Reassess BMP this afternoon, renal function actually seems to be better than baseline presently.  Strict intake and output, daily weights, cardiac diet.  4.  Awaiting results of transthoracic echocardiogram.  We will also perform a venous Doppler ultrasound.  Unsure why the patient has been placed on full dose Lovenox at this time, however will continue until venous Doppler ultrasound results are known.  5.  Consider Unna boot placement for compression purposes.  6.  Continue Symbicort, DuoNebs.  Add incentive spirometer.  7.  Blood pressure trend reviewed.  Continue losartan, monitor renal function closely.  We will continue Norvasc for now, but could consider discontinuation as this may be contributory to her lower extremity edema.  Medications may need to be adjusted based on results of echocardiogram, will hold on adding beta-blocker for now.  8.   to follow for coordination of discharge back to skilled nursing facility.  Patient does have a bed hold.  9.  Labs in a.m.    Discharge Planning: I expect the patient to be discharged to SNF in ? days.    Electronically signed by YISEL Kitchen, 11/1/2021, 13:56 CDT.

## 2021-11-01 NOTE — CASE MANAGEMENT/SOCIAL WORK
Discharge Planning Assessment  [facility]     Patient Name: Honey Canales  MRN: [id number]  Today's Date: 11/1/2021    Admit Date: 10/31/2021     Discharge Needs Assessment     Row Name 11/01/21 1041       Living Environment    Lives With facility resident    Name(s) of Who Lives With Patient [facility]    Current Living Arrangements residential facility    Primary Care Provided by child(wolf)    Provides Primary Care For no one, unable/limited ability to care for self    Caregiving Concerns elderly , weak    Family Caregiver if Needed child(wolf), adult    Family Caregiver Names Chaka basurto    Quality of Family Relationships helpful; involved; supportive    Able to Return to Prior Arrangements yes       Resource/Environmental Concerns    Resource/Environmental Concerns none    Transportation Concerns car, none       Transition Planning    Patient/Family Anticipates Transition to long-term care facility    Patient/Family Anticipated Services at Transition skilled nursing    Transportation Anticipated family or friend will provide       Discharge Needs Assessment    Readmission Within the Last 30 Days no previous admission in last 30 days    Current Outpatient/Agency/Support Group skilled nursing facility    Equipment Currently Used at Home none    Concerns to be Addressed no discharge needs identified    Anticipated Changes Related to Illness inability to care for self    Equipment Needed After Discharge none    Discharge Coordination/Progress Patient from [facility] in [place], [place]. patient plans to return at time of discharge. Patient has son who is advocate and attentive to needs, Chaka Canales. Patient has rx coverage and PCP. SW confirmed with Stephanie Helm with [facility]               Discharge Plan    No documentation.               Continued Care and Services - Admitted Since 10/31/2021    Coordination has not been started for this encounter.     Selected Continued Care - Prior Encounters Includes  selections from prior encounters from 8/2/2021 to 11/1/2021    Discharged on 10/15/2021 Admission date: 10/8/2021 - Discharge disposition: Skilled Nursing Facility ([facility] - External)    Destination     Service Provider Selected Services Address Phone Fax Patient Preferred    [facility]  Skilled Nursing [address] [id number] [phone] --                Discharged on 9/3/2021 Admission date: 8/30/2021 - Discharge disposition: Skilled Nursing Facility ([facility] - External)    Destination     Service Provider Selected Services Address Phone Fax Patient Preferred    [facility]  Skilled Nursing [address] [id number] [phone] --                       Demographic Summary    No documentation.                Functional Status    No documentation.                Psychosocial    No documentation.                Abuse/Neglect    No documentation.                Legal    No documentation.                Substance Abuse    No documentation.                Patient Forms    No documentation.                   Phoebe Pruitt RN

## 2021-11-01 NOTE — ED PROVIDER NOTES
Subjective   Patient is a 88-year-old white female presents emergency department from [facility] with shortness of breath and wheezing.  She states she has been wheezing for about the past week however it is been much worse tonight.  She was recently hospitalized here at [facility] October 8-15 with HCAP, cystitis, CKD, cute on chronic respiratory failure.  She denies any fever or chills.  No nausea or vomiting.  Patient is very hard of hearing.      History provided by:  Patient   used: No        Review of Systems   Constitutional: Negative.    HENT: Negative.    Eyes: Negative.    Respiratory:        Patient is a 88-year-old white female presents emergency department from [facility] with shortness of breath and wheezing.  She states she has been wheezing for about the past week however it is been much worse tonight.  She was recently hospitalized here at [facility] October 8-15 with HCAP, cystitis, CKD, cute on chronic respiratory failure.  She denies any fever or chills.  No nausea or vomiting.  Patient is very hard of hearing.     Cardiovascular: Negative.    Gastrointestinal: Negative.    Endocrine: Negative.    Genitourinary: Negative.    Musculoskeletal: Negative.    Skin: Negative.    Allergic/Immunologic: Negative.    Neurological: Negative.    Hematological: Negative.    Psychiatric/Behavioral: Negative.    All other systems reviewed and are negative.      Past Medical History:   Diagnosis Date   • Acquired hammer toes of both feet 10/15/2020   • Allergic rhinitis    • Aneurysm (Formerly [facility])    • Arthritis    • Benign meningioma (Formerly [facility]) 10/15/2020   • Broken shoulder     Right shoulder    • Cerebral aneurysm 2009    2ND ONE FOUND   • Cerebral aneurysm     NON TREATABLE   • Chronic laryngitis    • CKD (chronic kidney disease)    • Colon polyps    • COPD (chronic obstructive pulmonary disease) (Formerly [facility])    • Deviated nasal septum    • Diabetes mellitus (Formerly [facility])    •  Disorder of kidney and ureter    • Disturbance of smell and taste    • Dizziness    • Encounter for imaging to screen for metal prior to MRI     NO MRI, METAL CLIPS IN HEAD   • Eustachian tube dysfunction    • GERD (gastroesophageal reflux disease)    • Globus sensation    • Hallux valgus, right 2018   • Headache    • Hepatitis     B   • Hepatitis A    • History of stomach ulcers    • Hyperlipidemia    • Hypothyroid    • Laryngitis sicca    • Lung nodule    • Nocturia    • Non-toxic multinodular goiter 10/15/2020   • PONV (postoperative nausea and vomiting)    • Sensorineural hearing loss    • Spinal stenosis    • Uncontrolled type 2 diabetes mellitus without complication, with long-term current use of insulin 2013   • Urge incontinence    • Urgency of urination    • Urinary frequency    • Urinary incontinence    • UTI (urinary tract infection)        Allergies   Allergen Reactions   • Codeine Phosphate [Codeine] Unknown (See Comments)     CHEST PAIN   • Collagen Other (See Comments)     CAT GUT SUTURES \ WOUND WOULD NOT HEAL AND GOT INFECTION   • Accupril [Quinapril Hcl] Other (See Comments)     COUGH   • Aspirin Other (See Comments)     HISTORY OF STOMACH ULCERS   • Adhesive Tape Rash     SKIN BLISTERS   • Celebrex [Celecoxib] Nausea Only   • Lyrica [Pregabalin] Other (See Comments)     GAINED 50 POUNDS   • Pantoprazole Sodium Diarrhea   • Pentoxifylline Diarrhea   • Sulfa Antibiotics Nausea And Vomiting   • Tramadol Nausea And Vomiting   • Vioxx [Rofecoxib] Nausea And Vomiting       Past Surgical History:   Procedure Laterality Date   • BLADDER SURGERY  2016    Medtronic Interstem   • BRAIN SURGERY      ANUERYSM REMOVED   • BREAST SURGERY Bilateral     BIOPSY   • CARPAL TUNNEL RELEASE     • CATARACT EXTRACTION, BILATERAL     • CEREBRAL ANEURYSM REPAIR     •  SECTION      X4   • CHOLECYSTECTOMY     • HYSTERECTOMY     • INTERSTIM PLACEMENT N/A 10/18/2018    Procedure: INTERSTIM STAGES 1 AND 2  LEAD AND GENERATOR REMOVAL AND REPLACEMENT;  Surgeon: Archie Avery MD;  Location: [facility] OR;  Service: Urology   • JOINT REPLACEMENT Right     KNEE   • KNEE ARTHROSCOPY     • THYROIDECTOMY     • TONSILLECTOMY         Family History   Problem Relation Age of Onset   • Cancer Brother         BLADDER   • No Known Problems Father    • No Known Problems Mother        Social History     Socioeconomic History   • Marital status:    Tobacco Use   • Smoking status: Former Smoker     Types: Cigarettes     Quit date:      Years since quittin.8   • Smokeless tobacco: Never Used   • Tobacco comment: SMOKED OVER 40 YEARS   Vaping Use   • Vaping Use: Never used   Substance and Sexual Activity   • Alcohol use: No   • Drug use: No   • Sexual activity: Defer       Prior to Admission medications    Medication Sig Start Date End Date Taking? Authorizing Provider   acetaminophen (TYLENOL) 500 MG tablet Take 500 mg by mouth Every 6 (Six) Hours As Needed for Mild Pain  or Fever.    ProviderMaureen MD   amLODIPine (NORVASC) 5 MG tablet Take 5 mg by mouth Daily.    ProviderMaureen MD   Ascorbic Acid (Vitamin C) 500 MG capsule Take 500 mg by mouth Daily.    ProviderMaureen MD   atorvastatin (LIPITOR) 40 MG tablet Take 40 mg by mouth Every Night.    ProviderMaureen MD   Dextran 70-Hypromellose (GENTEAL TEARS MODERATE PF OP) Administer 2 drops to both eyes every night at bedtime.    ProviderMaureen MD   fluticasone-salmeterol (ADVAIR) 100-50 MCG/DOSE DISKUS Inhale 1 puff 2 (Two) Times a Day. 10/15/21   Lloyd Martins DO   insulin detemir (LEVEMIR) 100 UNIT/ML injection Inject 10 Units under the skin into the appropriate area as directed 2 (Two) Times a Day. 9/3/21   Brisa Feliciano APRN   insulin lispro (humaLOG) 100 UNIT/ML injection Inject under the skin TID WM as per sliding scale:  150-199= 2 units  200-249= 3 units  250-299= 4 units  300-349= 5 units  350-399= 6 units  >400= 7 units  "and call MD    Maureen Hyatt MD   levothyroxine (SYNTHROID, LEVOTHROID) 75 MCG tablet Take 75 mcg by mouth Daily.    Maureen Hyatt MD   losartan (COZAAR) 25 MG tablet Take 25 mg by mouth Daily.    Maureen Hyatt MD   Mirabegron ER (MYRBETRIQ) 50 MG tablet sustained-release 24 hour 24 hr tablet Take 50 mg by mouth Daily.    Maureen Hyatt MD   multivitamin with minerals (MULTIVITAMIN ADULT PO) Take 1 tablet by mouth Daily.    Maureen Hyatt MD   ondansetron (ZOFRAN) 4 MG tablet Take 4 mg by mouth Every 6 (Six) Hours As Needed for Nausea or Vomiting.    Maureen Hyatt MD   polyethylene glycol (polyethylene glycol) 17 g packet Take 17 g by mouth Daily. 3/31/21   Brisa Feliciano APRN   predniSONE 5 MG (21) tablet therapy pack dose pack Take 1 tablet by mouth Take As Directed. Take as directed on package instructions. 10/15/21   Lloyd Martins DO   prochlorperazine (COMPAZINE) 10 MG tablet Take 10 mg by mouth Every 6 (Six) Hours As Needed for Nausea or Vomiting.    Maureen Hyatt MD   sennosides-docusate (PERICOLACE) 8.6-50 MG per tablet Take 1 tablet by mouth 2 (Two) Times a Day. 3/30/21   Brisa Feliciano APRN   Zinc Sulfate (ZINC 15 PO) Take 220 mg by mouth Daily.    Maureen Hyatt MD       /56 (BP Location: Right arm, Patient Position: Lying)   Pulse 72   Temp 97.8 °F (36.6 °C) (Oral)   Resp 18   Ht 167.6 cm (66\")   Wt 82.1 kg (181 lb)   SpO2 96%   BMI 29.21 kg/m²     Objective   Physical Exam  Vitals and nursing note reviewed.   Constitutional:       Comments: Chronically i ill-appearing.  Respirations are 30.  There is audible wheezing noted.  Patient is very short of breath with short sentences.   HENT:      Head: Normocephalic and atraumatic.   Eyes:      Conjunctiva/sclera: Conjunctivae normal.      Pupils: Pupils are equal, round, and reactive to light.   Neck:      Thyroid: No thyromegaly.      Trachea: No tracheal deviation. "   Cardiovascular:      Rate and Rhythm: Normal rate and regular rhythm.      Heart sounds: Normal heart sounds.   Pulmonary:      Effort: Tachypnea, accessory muscle usage and respiratory distress present.      Breath sounds: No wheezing or rales.      Comments: Expiratory wheezing noted throughout as well as scattered crackles as well.  There is diminished lung sounds bilateral bases  Chest:      Chest wall: No tenderness.   Abdominal:      General: Bowel sounds are normal.      Palpations: Abdomen is soft.   Musculoskeletal:         General: Normal range of motion.      Cervical back: Normal range of motion and neck supple.      Right lower leg: Edema present.      Left lower leg: Edema present.      Comments: 2-3+ edema lower extremities bilaterally   Skin:     General: Skin is warm and dry.   Neurological:      Mental Status: She is alert and oriented to person, place, and time.      Cranial Nerves: No cranial nerve deficit.      Deep Tendon Reflexes: Reflexes are normal and symmetric.   Psychiatric:         Behavior: Behavior normal.         Thought Content: Thought content normal.         Judgment: Judgment normal.         Procedures         Lab Results (last 24 hours)     Procedure Component Value Units Date/Time    POC Glucose Once [[id number]]  (Abnormal) Collected: 11/04/21 1658    Specimen: Blood Updated: 11/04/21 1709     Glucose 246 mg/dL      Comment: : WOJCIECH Malone MachellMeter ID: [id number]       POC Glucose Once [[id number]]  (Abnormal) Collected: 11/04/21 2125    Specimen: Blood Updated: 11/04/21 2138     Glucose 355 mg/dL      Comment: : [id number] Palacios AbigailMeter ID: [id number]       POC Glucose Once [[id number]]  (Abnormal) Collected: 11/04/21 2127    Specimen: Blood Updated: 11/04/21 2138     Glucose 369 mg/dL      Comment: : [id number] Palacios AbigailMeter ID: [id number]       Basic Metabolic Panel [[id number]]  (Abnormal) Collected: 11/05/21 0527    Specimen: Blood  Updated: 11/05/21 0624     Glucose 165 mg/dL      BUN 38 mg/dL      Creatinine 2.00 mg/dL      Sodium 140 mmol/L      Potassium 3.9 mmol/L      Chloride 105 mmol/L      CO2 23.0 mmol/L      Calcium 8.4 mg/dL      eGFR Non African Amer 24 mL/min/1.73      BUN/Creatinine Ratio 19.0     Anion Gap 12.0 mmol/L     Narrative:      GFR Normal >60  Chronic Kidney Disease <60  Kidney Failure <15      POC Glucose Once [[id number]]  (Abnormal) Collected: 11/05/21 0813    Specimen: Blood Updated: 11/05/21 0836     Glucose 144 mg/dL      Comment: : MICHEL ValdovinosHealthSouth [facility] Hospital [facility] Southern Arizonachantal ID: [id number]       COVID PRE-OP / PRE-PROCEDURE SCREENING ORDER (NO ISOLATION) - Swab, Nasal Cavity [[id number]]  (Normal) Collected: 11/05/21 1110    Specimen: Swab from Nasal Cavity Updated: 11/05/21 1218    Narrative:      The following orders were created for panel order COVID PRE-OP / PRE-PROCEDURE SCREENING ORDER (NO ISOLATION) - Swab, Nasal Cavity.  Procedure                               Abnormality         Status                     ---------                               -----------         ------                     COVID-19,[facility] IN-SAUL...[[id number]]  Normal              Final result                 Please view results for these tests on the individual orders.    COVID-19,[facility] IN-HOUSE,Nasal Swab No Transport Media 3-4 HR TAT - Swab, Nasal Cavity [[id number]]  (Normal) Collected: 11/05/21 1110    Specimen: Swab from Nasal Cavity Updated: 11/05/21 1218     COVID19 Not Detected    Narrative:      Fact sheet for providers: https://www.fda.gov/media/327863/download     Fact sheet for patients: https://www.fda.gov/media/119366/download    Test performed by PCR.    Consider negative results in combination with clinical observations, patient history, and epidemiological information.    POC Glucose Once [[id number]]  (Abnormal) Collected: 11/05/21 1202    Specimen: Blood Updated: 11/05/21 1213     Glucose 203 mg/dL      Comment: :  LEONELMICHELLE Caraballo LaDWinslow Indian Healthcare Centerter ID: [id number]             US Venous Doppler Lower Extremity Bilateral (duplex)   Final Result   Impression: There is no evidence of deep venous thrombosis or   superficial thrombophlebitis of right or left lower extremities.       Comments: Bilateral lower extremity venous duplex exam was performed   using color Doppler flow, Doppler waveform analysis, and grayscale   imaging, with and without compression. There is no evidence of deep   venous thrombosis in the common femoral, superficial femoral, popliteal,   peroneal, anterior tibial, and posterior tibial veins bilaterally. No   thrombus is identified in the saphenofemoral junctions and greater   saphenous veins bilaterally.            This report was finalized on 11/01/2021 14:51 by Dr. Braydon Ugarte MD.      NM Lung Ventilation Perfusion   Final Result      XR Chest 1 View   Final Result          ED Course  ED Course as of 11/05/21 1408   Mon Nov 01, 2021   0033 Pt is doing some better after solumedrol, nebulizer treatments and bumex. Pulmonary edema noted on cxr. Dimer elevated at 1 however GFR 27. Do not feel that primary dx on this pt is pulmonary embolus. Will not order cta at this time, will discuss with hospitalist as well. Will give lovenox prophylactic ally and vq in am. Spoke with son and in agreement with care plan  [CW]      ED Course User Index  [CW] Olivia Zacarias, APRN          MDM  Number of Diagnoses or Management Options  Acute on chronic respiratory failure, unspecified whether with hypoxia or hypercapnia (HCC): established and worsening  Acute pulmonary edema (HCC): established and worsening  Chronic kidney disease, unspecified CKD stage: established and worsening  Congestive heart failure, unspecified HF chronicity, unspecified heart failure type (HCC): established and worsening     Amount and/or Complexity of Data Reviewed  Clinical lab tests: ordered and reviewed  Tests in the radiology section of  CPT®: ordered and reviewed  Decide to obtain previous medical records or to obtain history from someone other than the patient: yes    Patient Progress  Patient progress: stable      Final diagnoses:   Acute pulmonary edema (HCC)   Congestive heart failure, unspecified HF chronicity, unspecified heart failure type (HCC)   Acute on chronic respiratory failure, unspecified whether with hypoxia or hypercapnia (HCC)   Chronic kidney disease, unspecified CKD stage          Olivia Zacarias, APRN  11/05/21 1405

## 2021-11-01 NOTE — ED NOTES
Informed pt and son unable to get a ct of chest due her kidneys not filtering properly  Voiced understanding     Shira Canales, RN  11/01/21 0040

## 2021-11-01 NOTE — PLAN OF CARE
Goal Outcome Evaluation:  Plan of Care Reviewed With: patient        Progress: no change  Outcome Summary: Pt arrived from ER this am. A/Ox4. [place]. Denies having pain. BLE swollen with pitting edema. Pedal pulses are weak with doppler. F/C in place. VSS. Buttocks excoriated, pics in LDA. Safety maintained.

## 2021-11-01 NOTE — ED NOTES
Please help schedule patient for follow up appointment in 1 month on a Tuesday afternoon with a CBC same day. Thanks. The following fall interventions were initiated:    [x] Patient and/or family given education   [x] Call light within reach and educated on how to use   [x] Bed rails up per protocol    [x] Bed locked and in the lowest position   [x] Bed alarm set and on loudest setting   [x] Fall wrist band applied   [x] Non skid footwear applied   [] Room free of clutter   [] Patient items within reach   [] Adequate lighting provided  [] Falls sign present    [] Patient moved closer to nursing station   [] Restraints applied        Shira Canales, RN  10/31/21 2332

## 2021-11-02 LAB
ANION GAP SERPL CALCULATED.3IONS-SCNC: 10 MMOL/L (ref 5–15)
BUN SERPL-MCNC: 29 MG/DL (ref 8–23)
BUN/CREAT SERPL: 15.1 (ref 7–25)
CALCIUM SPEC-SCNC: 8.7 MG/DL (ref 8.6–10.5)
CHLORIDE SERPL-SCNC: 108 MMOL/L (ref 98–107)
CO2 SERPL-SCNC: 22 MMOL/L (ref 22–29)
CREAT SERPL-MCNC: 1.92 MG/DL (ref 0.57–1)
DEPRECATED RDW RBC AUTO: 59.6 FL (ref 37–54)
ERYTHROCYTE [DISTWIDTH] IN BLOOD BY AUTOMATED COUNT: 17 % (ref 12.3–15.4)
GFR SERPL CREATININE-BSD FRML MDRD: 25 ML/MIN/1.73
GLUCOSE BLDC GLUCOMTR-MCNC: 133 MG/DL (ref 70–130)
GLUCOSE BLDC GLUCOMTR-MCNC: 168 MG/DL (ref 70–130)
GLUCOSE BLDC GLUCOMTR-MCNC: 216 MG/DL (ref 70–130)
GLUCOSE BLDC GLUCOMTR-MCNC: 254 MG/DL (ref 70–130)
GLUCOSE SERPL-MCNC: 151 MG/DL (ref 65–99)
HCT VFR BLD AUTO: 26.2 % (ref 34–46.6)
HGB BLD-MCNC: 8.4 G/DL (ref 12–15.9)
MCH RBC QN AUTO: 31 PG (ref 26.6–33)
MCHC RBC AUTO-ENTMCNC: 32.1 G/DL (ref 31.5–35.7)
MCV RBC AUTO: 96.7 FL (ref 79–97)
PLATELET # BLD AUTO: 157 10*3/MM3 (ref 140–450)
PMV BLD AUTO: 11.4 FL (ref 6–12)
POTASSIUM SERPL-SCNC: 4.1 MMOL/L (ref 3.5–5.2)
RBC # BLD AUTO: 2.71 10*6/MM3 (ref 3.77–5.28)
SODIUM SERPL-SCNC: 140 MMOL/L (ref 136–145)
WBC # BLD AUTO: 5.32 10*3/MM3 (ref 3.4–10.8)

## 2021-11-02 PROCEDURE — 94799 UNLISTED PULMONARY SVC/PX: CPT

## 2021-11-02 PROCEDURE — 85027 COMPLETE CBC AUTOMATED: CPT | Performed by: NURSE PRACTITIONER

## 2021-11-02 PROCEDURE — 29580 STRAPPING UNNA BOOT: CPT | Performed by: PHYSICAL THERAPIST

## 2021-11-02 PROCEDURE — 80048 BASIC METABOLIC PNL TOTAL CA: CPT | Performed by: NURSE PRACTITIONER

## 2021-11-02 PROCEDURE — 63710000001 INSULIN DETEMIR PER 5 UNITS: Performed by: INTERNAL MEDICINE

## 2021-11-02 PROCEDURE — 97161 PT EVAL LOW COMPLEX 20 MIN: CPT | Performed by: PHYSICAL THERAPIST

## 2021-11-02 PROCEDURE — 82962 GLUCOSE BLOOD TEST: CPT

## 2021-11-02 PROCEDURE — 63710000001 PREDNISONE PER 1 MG: Performed by: NURSE PRACTITIONER

## 2021-11-02 PROCEDURE — 25010000002 ENOXAPARIN PER 10 MG: Performed by: INTERNAL MEDICINE

## 2021-11-02 PROCEDURE — 63710000001 INSULIN LISPRO (HUMAN) PER 5 UNITS: Performed by: NURSE PRACTITIONER

## 2021-11-02 RX ORDER — SODIUM BICARBONATE 650 MG/1
650 TABLET ORAL 2 TIMES DAILY
Status: DISCONTINUED | OUTPATIENT
Start: 2021-11-02 | End: 2021-11-05 | Stop reason: HOSPADM

## 2021-11-02 RX ORDER — BUMETANIDE 0.25 MG/ML
1 INJECTION INTRAMUSCULAR; INTRAVENOUS DAILY
Status: DISCONTINUED | OUTPATIENT
Start: 2021-11-03 | End: 2021-11-05

## 2021-11-02 RX ORDER — PREDNISONE 20 MG/1
40 TABLET ORAL DAILY
Status: DISCONTINUED | OUTPATIENT
Start: 2021-11-02 | End: 2021-11-05 | Stop reason: HOSPADM

## 2021-11-02 RX ADMIN — IPRATROPIUM BROMIDE AND ALBUTEROL SULFATE 3 ML: 2.5; .5 SOLUTION RESPIRATORY (INHALATION) at 13:32

## 2021-11-02 RX ADMIN — ATORVASTATIN CALCIUM 40 MG: 40 TABLET, FILM COATED ORAL at 21:40

## 2021-11-02 RX ADMIN — DOCUSATE SODIUM 50 MG AND SENNOSIDES 8.6 MG 1 TABLET: 8.6; 5 TABLET, FILM COATED ORAL at 08:11

## 2021-11-02 RX ADMIN — LEVOTHYROXINE SODIUM 75 MCG: 75 TABLET ORAL at 05:56

## 2021-11-02 RX ADMIN — OXYBUTYNIN CHLORIDE 5 MG: 5 TABLET, EXTENDED RELEASE ORAL at 08:11

## 2021-11-02 RX ADMIN — BUMETANIDE 2 MG: 0.25 INJECTION, SOLUTION INTRAMUSCULAR; INTRAVENOUS at 08:08

## 2021-11-02 RX ADMIN — IPRATROPIUM BROMIDE AND ALBUTEROL SULFATE 3 ML: 2.5; .5 SOLUTION RESPIRATORY (INHALATION) at 09:30

## 2021-11-02 RX ADMIN — IPRATROPIUM BROMIDE AND ALBUTEROL SULFATE 3 ML: 2.5; .5 SOLUTION RESPIRATORY (INHALATION) at 06:22

## 2021-11-02 RX ADMIN — INSULIN LISPRO 3 UNITS: 100 INJECTION, SOLUTION INTRAVENOUS; SUBCUTANEOUS at 18:02

## 2021-11-02 RX ADMIN — SODIUM BICARBONATE 650 MG: 650 TABLET ORAL at 12:40

## 2021-11-02 RX ADMIN — OXYCODONE HYDROCHLORIDE AND ACETAMINOPHEN 500 MG: 500 TABLET ORAL at 08:11

## 2021-11-02 RX ADMIN — LOSARTAN POTASSIUM 25 MG: 50 TABLET, FILM COATED ORAL at 08:10

## 2021-11-02 RX ADMIN — INSULIN DETEMIR 10 UNITS: 100 INJECTION, SOLUTION SUBCUTANEOUS at 09:41

## 2021-11-02 RX ADMIN — BUDESONIDE AND FORMOTEROL FUMARATE DIHYDRATE 2 PUFF: 80; 4.5 AEROSOL RESPIRATORY (INHALATION) at 06:22

## 2021-11-02 RX ADMIN — INSULIN LISPRO 8 UNITS: 100 INJECTION, SOLUTION INTRAVENOUS; SUBCUTANEOUS at 21:52

## 2021-11-02 RX ADMIN — IPRATROPIUM BROMIDE AND ALBUTEROL SULFATE 3 ML: 2.5; .5 SOLUTION RESPIRATORY (INHALATION) at 19:04

## 2021-11-02 RX ADMIN — BUDESONIDE AND FORMOTEROL FUMARATE DIHYDRATE 2 PUFF: 80; 4.5 AEROSOL RESPIRATORY (INHALATION) at 19:05

## 2021-11-02 RX ADMIN — HYPROMELLOSE: 0 GEL OPHTHALMIC at 21:41

## 2021-11-02 RX ADMIN — INSULIN LISPRO 5 UNITS: 100 INJECTION, SOLUTION INTRAVENOUS; SUBCUTANEOUS at 12:14

## 2021-11-02 RX ADMIN — SODIUM BICARBONATE 650 MG: 650 TABLET ORAL at 21:40

## 2021-11-02 RX ADMIN — ENOXAPARIN SODIUM 90 MG: 100 INJECTION SUBCUTANEOUS at 05:56

## 2021-11-02 RX ADMIN — Medication 10 ML: at 21:53

## 2021-11-02 RX ADMIN — PREDNISONE 40 MG: 20 TABLET ORAL at 12:40

## 2021-11-02 RX ADMIN — Medication 10 ML: at 08:11

## 2021-11-02 RX ADMIN — ZINC SULFATE 220 MG (50 MG) CAPSULE 220 MG: CAPSULE at 08:11

## 2021-11-02 RX ADMIN — INSULIN DETEMIR 10 UNITS: 100 INJECTION, SOLUTION SUBCUTANEOUS at 21:47

## 2021-11-02 RX ADMIN — Medication 1 TABLET: at 08:10

## 2021-11-02 NOTE — PLAN OF CARE
Goal Outcome Evaluation:  Plan of Care Reviewed With: patient        Progress: no change  Outcome Summary: Pt pleasant A&O. Encourage turns w assist, bottom excoriated as well abrahan between thighs, barrier cream applied as needed. BLE severe pitting edema, legs elevated on pillows as well as bed. Legs buster with some petechiae, blanchable. FC in place, draining, yellow urine. Audible wheezing, lung sounds diminished.

## 2021-11-02 NOTE — PROGRESS NOTES
"    [facility]  INPATIENT PROGRESS NOTE    Length of Stay: 1  Date of Admission: 10/31/2021  Primary Care Physician: Devon Zuñiga MD    Subjective   Chief Complaint: Follow-up lower extremity edema  HPI   Patient resting in bed.  Son is not at bedside presently.  She states her breathing is \"not good\".  She is still having shortness of breath at rest.  She states she has been coughing very little.  She denies any chest pain.  She denies abdominal pain, nausea, or vomiting.  She states she has a good appetite.    Review of Systems   All pertinent negatives and positives are as above. All other systems have been reviewed and are negative unless otherwise stated.     Objective    Temp:  [96.4 °F (35.8 °C)-97.7 °F (36.5 °C)] 96.4 °F (35.8 °C)  Heart Rate:  [65-84] 75  Resp:  [16-18] 16  BP: (132-157)/(51-77) 132/58  Physical Exam  Vitals and nursing note reviewed.   Constitutional:       General: She is not in acute distress.     Appearance: She is ill-appearing.   HENT:      Head: Normocephalic and atraumatic.      Ears:      Comments: [place]  Cardiovascular:      Rate and Rhythm: Normal rate and regular rhythm.      Heart sounds: Murmur heard.        Comments: Sinus bradycardia-sinus 59-78  Pulmonary:      Breath sounds: Wheezing present. No rhonchi or rales.      Comments: Tachypnea with conversation. Diminished breath sounds.  Abdominal:      General: Bowel sounds are normal. There is no distension.      Palpations: Abdomen is soft.      Tenderness: There is no abdominal tenderness.   Musculoskeletal:      Cervical back: Normal range of motion and neck supple. No tenderness.      Right lower leg: Edema present.      Left lower leg: Edema present.      Comments: Edema improved compared to yesterday, lower extremity skin starting to wrinkle   Skin:     General: Skin is warm and dry.      Findings: No erythema or rash.   Neurological:      General: No focal deficit " present.      Mental Status: She is alert and oriented to person, place, and time.   Psychiatric:         Mood and Affect: Mood normal.         Behavior: Behavior normal.         Thought Content: Thought content normal.         Judgment: Judgment normal.     Results Review:  I have reviewed the labs, radiology results, and diagnostic studies.    Laboratory Data:   Results from last 7 days   Lab Units 11/02/21 0455 11/01/21 0616 10/31/21  2321   WBC 10*3/mm3 5.32 3.25* 5.28   HEMOGLOBIN g/dL 8.4* 9.2* 8.9*   HEMATOCRIT % 26.2* 29.6* 28.3*   PLATELETS 10*3/mm3 157 137* 146     Results from last 7 days   Lab Units 11/02/21  0455 11/01/21  1403 11/01/21 0616 10/31/21  2321 10/31/21  2321   SODIUM mmol/L 140 139 140   < > 139   POTASSIUM mmol/L 4.1 4.5 4.3   < > 4.6   CHLORIDE mmol/L 108* 105 109*   < > 107   CO2 mmol/L 22.0 21.0* 20.0*   < > 21.0*   BUN mg/dL 29* 26* 24*   < > 24*   CREATININE mg/dL 1.92* 1.86* 1.75*   < > 1.76*   CALCIUM mg/dL 8.7 8.8 8.9   < > 8.9   BILIRUBIN mg/dL  --   --  0.2  --  0.2   ALK PHOS U/L  --   --  92  --  83   ALT (SGPT) U/L  --   --  22  --  23   AST (SGOT) U/L  --   --  18  --  27   GLUCOSE mg/dL 151* 314* 169*   < > 159*    < > = values in this interval not displayed.     Radiology Data:   Imaging Results (Last 24 Hours)     Procedure Component Value Units Date/Time    US Venous Doppler Lower Extremity Bilateral (duplex) [[id number]] Collected: 11/01/21 1450     Updated: 11/01/21 1456    Narrative:      History: Swelling       Impression:      Impression: There is no evidence of deep venous thrombosis or  superficial thrombophlebitis of right or left lower extremities.     Comments: Bilateral lower extremity venous duplex exam was performed  using color Doppler flow, Doppler waveform analysis, and grayscale  imaging, with and without compression. There is no evidence of deep  venous thrombosis in the common femoral, superficial femoral, popliteal,  peroneal, anterior tibial, and  posterior tibial veins bilaterally. No  thrombus is identified in the saphenofemoral junctions and greater  saphenous veins bilaterally.         This report was finalized on 11/01/2021 14:51 by Dr. Braydon Ugarte MD.      Results for orders placed during the hospital encounter of 10/31/21    Adult Transthoracic Echo Complete W/ Cont if Necessary Per Protocol    Interpretation Summary  · Left ventricular ejection fraction appears to be 66 - 70%. Left ventricular systolic function is normal.  · Moderate aortic valve stenosis is present.  · Left ventricular diastolic function is consistent with (grade II w/high LAP) pseudonormalization.  · Estimated right ventricular systolic pressure from tricuspid regurgitation is mildly elevated (35-45 mmHg).  · Normal size and function of the right ventricle.      Intake/Output Summary (Last 24 hours) at 11/2/2021 1126  Last data filed at 11/2/2021 0327  Gross per 24 hour   Intake 360 ml   Output 1200 ml   Net -840 ml     I have reviewed the patient current medications.     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  Patient presented to the emergency department 10/31/2021 with complaints of shortness of breath and lower extremity edema.  Patient was recently admitted to our facility from 10/8 through 10/15 presenting with shortness of breath as well.  Chest x-ray was concerning for bilateral pneumonia.  She was treated with azithromycin and Zosyn, completing course of therapy with Omnicef.  2.  Chest x-ray on admission showed patchy bilateral infiltrate not significantly changed from 3 weeks ago.  D-dimer was elevated.  VQ scan was performed rather than CTA secondary to renal function, which showed low probability for  pulmonary embolus.  3.  Patient has had no fever, white blood cell count was normal on admission.  She was recently appropriately treated for healthcare associated pneumonia.  BNP was elevated on admission, and she exhibits fluid overload.  Her transthoracic echocardiogram showed grade 2 diastolic dysfunction with preserved ejection fraction.  Mildly elevated right ventricular systolic pressure.  Moderate aortic valve stenosis present.  We will continue diuresis, decrease to 1 mg daily due to worsening renal function.  Renal function is actually felt to be better than baseline presently.  We will add oral sodium bicarbonate as well.  Continue strict intake and output, daily weights, cardiac diet.  4.  Venous Doppler ultrasound negative for DVT.  Some veins not well visualized due to edema.  Will place Unna boots for compression purposes, PT consulted for placement.  5.  Continue Symbicort, DuoNebs and incentive spirometry.  We will add oral prednisone given wheezing today.  6.  Will monitor blood pressure closely.  Norvasc discontinued today as this may be contributory to lower extremity edema.  Continue losartan, may need additional antihypertensives.  7.  Lovenox for DVT prophylaxis.  8.  Continue Howard catheter for intake and output purposes while on IV diuresis, may be able to discontinue tomorrow.  Patient is excoriated in her perineal area per nursing.  9.  Last blood glucoses-273, 151, 133.  Last hemoglobin A1c from October is 5.8%.  Will continue Levemir, increase to moderate-high-dose sliding scale.  Monitor her blood glucoses closely while on steroid therapy.  10.  Labs in a.m.  11.  Patient does have a bed hold at skilled nursing facility.   following.    Discharge Planning: I expect the patient to be discharged to SNF in 2-3 days.    Electronically signed by YISEL Kitchen, 11/2/2021, 11:20 CDT.

## 2021-11-02 NOTE — PLAN OF CARE
Goal Outcome Evaluation:  Plan of Care Reviewed With: patient        Progress: no change  Outcome Summary: Pt presents alert and oriented x4. She SOA at rest and increased with any movement. B LEs are swollen but it is easy to see that the swelling has gone down from the loose skin. I placed an unna boot on each lower extremity from the met heads to the tibial tuberocity and covered with coban. Capillary refill was < 3 secs. She has no open wounds on her lower legs under the unna boot dressings. PT will continue to follow for care of unna boots. Pt would benefit from skilled PT and OT services for mobility and ADL training.

## 2021-11-02 NOTE — PLAN OF CARE
Goal Outcome Evaluation:  Plan of Care Reviewed With: patient         Patient alert and oriented x 4. RA. VSS.SOA. Satting upper 90s in room air.Lung sounds wheezy expiratory. Swollen legs and foot, perineal area and umbilical region. Unna boot placed by therapy today.  Skin red at buttom. Howard cath continue to monitor output. Returned output clear yellow and fair. Eating and drinking fair. Fall precaution maintained. Using incentive spirometer per instruction. Son at bedside for a while during lunch time. Continue to monitor.

## 2021-11-02 NOTE — THERAPY WOUND CARE TREATMENT
Acute Care - Wound/Debridement Initial Evaluation  [facility]     Patient Name: Honey Canales  : 3/27/1933  MRN: [id number]  Today's Date: 2021                Admit Date: 10/31/2021    Visit Dx:    ICD-10-CM ICD-9-CM   1. Acute pulmonary edema (HCC)  J81.0 518.4   2. Congestive heart failure, unspecified HF chronicity, unspecified heart failure type (HCC)  I50.9 428.0   3. Acute on chronic respiratory failure, unspecified whether with hypoxia or hypercapnia (HCC)  J96.20 518.84   4. Chronic kidney disease, unspecified CKD stage  N18.9 585.9   5. 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)   • Benign essential HTN   • Pulmonary fibrosis (HCC)   • Stage 3b chronic kidney disease (HCC)   • Hepatitis C   • HCAP (healthcare-associated pneumonia)   • Acute hypoxemic respiratory failure (HCC)   • Acute cystitis with hematuria   • Chronic kidney disease (CKD), stage IV (severe) (HCC)   • Acute pulmonary edema (HCC)   • Pancytopenia (HCC)        Past Medical History:   Diagnosis Date   • Acquired hammer toes of both feet 10/15/2020   • Allergic rhinitis    • Aneurysm (HCC)    • Arthritis    • Benign meningioma (HCC) 10/15/2020   • Broken shoulder     Right shoulder    • Cerebral aneurysm 2009    2ND ONE FOUND   • Cerebral aneurysm     NON TREATABLE   • Chronic laryngitis    • CKD (chronic kidney disease)    • Colon polyps    • COPD (chronic obstructive pulmonary disease) (HCC)    • Deviated nasal septum    • Diabetes mellitus (HCC)    • Disorder of kidney and ureter    • Disturbance of smell and taste    • Dizziness    • Encounter for imaging to screen for metal prior to MRI     NO MRI, METAL CLIPS IN HEAD   • Eustachian tube dysfunction    • GERD (gastroesophageal reflux disease)    • Globus sensation    • Hallux valgus, right 2018    • Headache    • Hepatitis     B   • Hepatitis A    • History of stomach ulcers    • Hyperlipidemia    • Hypothyroid    • Laryngitis sicca    • Lung nodule    • Nocturia    • Non-toxic multinodular goiter 10/15/2020   • PONV (postoperative nausea and vomiting)    • Sensorineural hearing loss    • Spinal stenosis    • Uncontrolled type 2 diabetes mellitus without complication, with long-term current use of insulin 2013   • Urge incontinence    • Urgency of urination    • Urinary frequency    • Urinary incontinence    • UTI (urinary tract infection)         Past Surgical History:   Procedure Laterality Date   • BLADDER SURGERY  2016    [employer]onic Interstem   • BRAIN SURGERY      ANUERYSM REMOVED   • BREAST SURGERY Bilateral     BIOPSY   • CARPAL TUNNEL RELEASE     • CATARACT EXTRACTION, BILATERAL     • CEREBRAL ANEURYSM REPAIR     •  SECTION      X4   • CHOLECYSTECTOMY     • HYSTERECTOMY     • INTERSTIM PLACEMENT N/A 10/18/2018    Procedure: INTERSTIM STAGES 1 AND 2 LEAD AND GENERATOR REMOVAL AND REPLACEMENT;  Surgeon: Archie Avery MD;  Location: [facility] OR;  Service: Urology   • JOINT REPLACEMENT Right     KNEE   • KNEE ARTHROSCOPY     • THYROIDECTOMY     • TONSILLECTOMY             Wound 10/08/21 2247 gluteal (Active)   Dressing Appearance dry; intact; open to air 21   Closure Open to air 21 0813   Base blanchable; moist; pink 21 0813   Periwound blanchable; pink 21 0813   Periwound Temperature warm 21 0813   Periwound Skin Turgor soft 21 0813   Drainage Amount none 21 0813         WOUND DEBRIDEMENT                     PT Assessment (last 12 hours)     PT Evaluation and Treatment     Row Name 21 1400          Physical Therapy Time and Intention    Subjective Information complains of; swelling; dyspnea  -MS     Document Type evaluation; wound care  -MS     Mode of Treatment physical therapy  -MS     Row Name 21 1400          General  Information    Patient Profile Reviewed yes  -MS     Pertinent History of Current Functional Problem presents with increased swelling and SOA, volume overload-treated, possible pneumonia  -MS     Existing Precautions/Restrictions other (see comments)  B unna boots  -MS     Limitations/Impairments hearing  -MS     Barriers to Rehab medically complex  -MS     Row Name 11/02/21 1400          Sensory Assessment (Somatosensory)    Sensory Assessment (Somatosensory) sensation intact  -MS     Row Name 11/02/21 1400          Cognition    Orientation Status (Cognition) oriented x 4  -MS     Row Name 11/02/21 1400          Pain    Additional Documentation Pain Scale: Numbers Pre/Post-Treatment (Group)  -MS     Row Name 11/02/21 1400          Pain Scale: Numbers Pre/Post-Treatment    Pretreatment Pain Rating 0/10 - no pain  -MS     Posttreatment Pain Rating 0/10 - no pain  -MS     Row Name 11/02/21 1400          Bed Mobility    Comment (Bed Mobility) pt sat EOB modified independently  -MS     Row Name 11/02/21 1400          Edema Assessment & Management    Location (Edema) lower extremity, left; lower extremity, right  -MS     Additional Documentation Edema Symptoms/Interventions (Group); Location (Edema) (Row)  -MS     Row Name 11/02/21 1400          Lower Extremity Edema Measures, Left    Lower Extremity, Left (Edema) other (see comments)  met heads to tib tub  -MS     Row Name 11/02/21 1400          Lower Extremity Edema Measures, Right    Lower Extremity, Right (Edema) other (see comments)  met head to tib tub  -MS     Row Name 11/02/21 1400          Edema Symptoms/Interventions    Description (Edema) pitting  -MS     Pitting Scale (Edema) 3-->moderate  -MS     Associated Symptoms (Edema) hair growth changes; skin color changes; skin creases diminished; tissue elasticity loss  -MS     Row Name             Wound 10/08/21 2247 gluteal    Wound - Properties Group Placement Date: 10/08/21  -ST Placement Time: 2247 -ST Present  on Hospital Admission: Y  -ST Location: gluteal  -ST     Retired Wound - Properties Group Date first assessed: 10/08/21  -ST Time first assessed: 2247  -ST Present on Hospital Admission: Y  -ST Location: gluteal  -ST     Row Name 11/02/21 1400          Plan of Care Review    Plan of Care Reviewed With patient  -MS     Progress no change  -MS     Outcome Summary Pt presents alert and oriented x4. She SOA at rest and increased with any movement. B LEs are swollen but it is easy to see that the swelling has gone down from the loose skin. I placed an unna boot on each lower extremity from the met heads to the tibial tuberocity and covered with coban. Capillary refill was < 3 secs. She has no open wounds on her lower legs under the unna boot dressings. PT will continue to follow for care of unna boots. Pt would benefit from skilled PT and OT services for mobility and ADL training.  -MS     Row Name 11/02/21 1400          Physical Therapy Goals    Problem Specific Goal Selection (PT) problem specific goal 1, PT; problem specific goal 2, PT  -MS     Row Name 11/02/21 1400          Problem Specific Goal 1 (PT)    Problem Specific Goal 1 (PT) R unna boot will stay in place for 7 days to provide optimal compression for edema control  -MS     Time Frame (Problem Specific Goal 1, PT) long-term goal (LTG); by discharge  -MS     Progress/Outcome (Problem Specific Goal 1, PT) goal ongoing  -MS     Row Name 11/02/21 1400          Problem Specific Goal 2 (PT)    Problem Specific Goal 2 (PT) L unna boot will stay in place for 7 days to provide optimal compression for edema control.  -MS     Time Frame (Problem Specific Goal 2, PT) long-term goal (LTG); by discharge  -MS     Progress/Outcome (Problem Specific Goal 2, PT) goal ongoing  -MS     Row Name 11/02/21 1400          Positioning and Restraints    Post Treatment Position bed  -MS     In Bed fowlers; call light within reach; encouraged to call for assist; side rails up x2; legs  elevated  pillow under L side to pressure relive buttock  -MS     Row Name 11/02/21 1400          Therapy Assessment/Plan (PT)    Patient/Family Therapy Goals Statement (PT) decrease swelling  -MS     Rehab Potential (PT) good, to achieve stated therapy goals  -MS     Criteria for Skilled Interventions Met (PT) yes; meets criteria; skilled treatment is necessary  -MS     Predicted Duration of Therapy Intervention (PT) until discharge  -MS           User Key  (r) = Recorded By, (t) = Taken By, (c) = Cosigned By    Initials Name Provider Type    MS Philly Mcclendon, PT, DPT, NCS Physical Therapist    Jeannie Marin RN Registered Nurse              Physical Therapy Education                 Title: PT OT SLP Therapies (In Progress)     Topic: Physical Therapy (In Progress)     Point: Mobility training (Not Started)     Learner Progress:  Not documented in this visit.          Point: Home exercise program (Not Started)     Learner Progress:  Not documented in this visit.          Point: Body mechanics (Not Started)     Learner Progress:  Not documented in this visit.          Point: Precautions (Done)     Learning Progress Summary           Patient Acceptance, E, VU by MS at 11/2/2021 1507    Comment: role of PT in her care and use of unna boots                               User Key     Initials Effective Dates Name Provider Type Discipline    MS 06/19/18 -  Philly Mcclendon, PT, DPT, NCS Physical Therapist PT                Recommendation and Plan  Planned Therapy Interventions (PT): wound care, patient/family education  Therapy Frequency (PT): daily (check daily and reassess need in 7 days)  Plan of Care Reviewed With: patient   Progress: no change       Progress: no change  Outcome Summary: Pt presents alert and oriented x4. She SOA at rest and increased with any movement. B LEs are swollen but it is easy to see that the swelling has gone down from the loose skin. I placed an unna boot on each lower extremity  from the met heads to the tibial tuberocity and covered with coban. Capillary refill was < 3 secs. She has no open wounds on her lower legs under the unna boot dressings. PT will continue to follow for care of unna boots. Pt would benefit from skilled PT and OT services for mobility and ADL training.  Plan of Care Reviewed With: patient            Time Calculation   PT Charges     Row Name 11/02/21 1506             Time Calculation    Start Time 1400  -MS      Stop Time 1445  -MS      Time Calculation (min) 45 min  -MS      PT Received On 11/02/21  -MS      PT Goal Re-Cert Due Date 11/08/21  -MS              Untimed Charges    PT Eval/Re-eval Minutes 15  -MS      Wound Care 97703 Unna boot  -MS      29580-Unna Boot 30  -MS              Total Minutes    Untimed Charges Total Minutes 45  -MS       Total Minutes 45  -MS            User Key  (r) = Recorded By, (t) = Taken By, (c) = Cosigned By    Initials Name Provider Type    MS Philly Mcclendon, PT, DPT, NCS Physical Therapist                  Therapy Charges for Today     Code Description Service Date Service Provider Modifiers Qty    22683470459 HC PT EVAL LOW COMPLEXITY 1 11/2/2021 Philly Mcclendon PT, DPT, NCS GP 1    48769992154 HC PT STAPPING UNNA BOOT 11/2/2021 Philly Mcclendon PT, DPT, NCS GP 2                    Philly Mcclendon PT, DPT, NCS  11/2/2021

## 2021-11-03 PROBLEM — J81.0 ACUTE PULMONARY EDEMA: Status: RESOLVED | Noted: 2021-11-01 | Resolved: 2021-11-03

## 2021-11-03 PROBLEM — I50.33 ACUTE ON CHRONIC DIASTOLIC HEART FAILURE (HCC): Status: ACTIVE | Noted: 2021-11-03

## 2021-11-03 LAB
ANION GAP SERPL CALCULATED.3IONS-SCNC: 11 MMOL/L (ref 5–15)
BUN SERPL-MCNC: 30 MG/DL (ref 8–23)
BUN/CREAT SERPL: 14.8 (ref 7–25)
CALCIUM SPEC-SCNC: 8.7 MG/DL (ref 8.6–10.5)
CHLORIDE SERPL-SCNC: 107 MMOL/L (ref 98–107)
CO2 SERPL-SCNC: 22 MMOL/L (ref 22–29)
CREAT SERPL-MCNC: 2.03 MG/DL (ref 0.57–1)
DEPRECATED RDW RBC AUTO: 60.4 FL (ref 37–54)
ERYTHROCYTE [DISTWIDTH] IN BLOOD BY AUTOMATED COUNT: 17.3 % (ref 12.3–15.4)
GFR SERPL CREATININE-BSD FRML MDRD: 23 ML/MIN/1.73
GLUCOSE BLDC GLUCOMTR-MCNC: 121 MG/DL (ref 70–130)
GLUCOSE BLDC GLUCOMTR-MCNC: 179 MG/DL (ref 70–130)
GLUCOSE BLDC GLUCOMTR-MCNC: 265 MG/DL (ref 70–130)
GLUCOSE BLDC GLUCOMTR-MCNC: 296 MG/DL (ref 70–130)
GLUCOSE SERPL-MCNC: 131 MG/DL (ref 65–99)
HCT VFR BLD AUTO: 26.3 % (ref 34–46.6)
HGB BLD-MCNC: 8.4 G/DL (ref 12–15.9)
MCH RBC QN AUTO: 30.7 PG (ref 26.6–33)
MCHC RBC AUTO-ENTMCNC: 31.9 G/DL (ref 31.5–35.7)
MCV RBC AUTO: 96 FL (ref 79–97)
PLATELET # BLD AUTO: 172 10*3/MM3 (ref 140–450)
PMV BLD AUTO: 11.3 FL (ref 6–12)
POTASSIUM SERPL-SCNC: 3.9 MMOL/L (ref 3.5–5.2)
RBC # BLD AUTO: 2.74 10*6/MM3 (ref 3.77–5.28)
SODIUM SERPL-SCNC: 140 MMOL/L (ref 136–145)
WBC # BLD AUTO: 5.27 10*3/MM3 (ref 3.4–10.8)

## 2021-11-03 PROCEDURE — 25010000002 ENOXAPARIN PER 10 MG: Performed by: NURSE PRACTITIONER

## 2021-11-03 PROCEDURE — 97166 OT EVAL MOD COMPLEX 45 MIN: CPT

## 2021-11-03 PROCEDURE — 94799 UNLISTED PULMONARY SVC/PX: CPT

## 2021-11-03 PROCEDURE — 25010000002 CHLOROTHIAZIDE PER 500 MG: Performed by: NURSE PRACTITIONER

## 2021-11-03 PROCEDURE — 63710000001 PREDNISONE PER 1 MG: Performed by: NURSE PRACTITIONER

## 2021-11-03 PROCEDURE — 85027 COMPLETE CBC AUTOMATED: CPT | Performed by: NURSE PRACTITIONER

## 2021-11-03 PROCEDURE — 80048 BASIC METABOLIC PNL TOTAL CA: CPT | Performed by: NURSE PRACTITIONER

## 2021-11-03 PROCEDURE — 63710000001 INSULIN LISPRO (HUMAN) PER 5 UNITS: Performed by: NURSE PRACTITIONER

## 2021-11-03 PROCEDURE — 82962 GLUCOSE BLOOD TEST: CPT

## 2021-11-03 PROCEDURE — 97164 PT RE-EVAL EST PLAN CARE: CPT | Performed by: PHYSICAL THERAPIST

## 2021-11-03 PROCEDURE — 97530 THERAPEUTIC ACTIVITIES: CPT | Performed by: PHYSICAL THERAPIST

## 2021-11-03 PROCEDURE — 63710000001 INSULIN DETEMIR PER 5 UNITS: Performed by: INTERNAL MEDICINE

## 2021-11-03 RX ORDER — HYDRALAZINE HYDROCHLORIDE 25 MG/1
25 TABLET, FILM COATED ORAL EVERY 8 HOURS SCHEDULED
Status: DISCONTINUED | OUTPATIENT
Start: 2021-11-03 | End: 2021-11-05 | Stop reason: HOSPADM

## 2021-11-03 RX ORDER — CHLOROTHIAZIDE SODIUM 500 MG/1
500 INJECTION INTRAVENOUS ONCE
Status: COMPLETED | OUTPATIENT
Start: 2021-11-03 | End: 2021-11-03

## 2021-11-03 RX ADMIN — BUDESONIDE AND FORMOTEROL FUMARATE DIHYDRATE 2 PUFF: 80; 4.5 AEROSOL RESPIRATORY (INHALATION) at 06:06

## 2021-11-03 RX ADMIN — Medication 1 TABLET: at 10:13

## 2021-11-03 RX ADMIN — DOCUSATE SODIUM 50 MG AND SENNOSIDES 8.6 MG 1 TABLET: 8.6; 5 TABLET, FILM COATED ORAL at 08:24

## 2021-11-03 RX ADMIN — INSULIN DETEMIR 10 UNITS: 100 INJECTION, SOLUTION SUBCUTANEOUS at 20:12

## 2021-11-03 RX ADMIN — HYPROMELLOSE: 0 GEL OPHTHALMIC at 20:12

## 2021-11-03 RX ADMIN — INSULIN LISPRO 8 UNITS: 100 INJECTION, SOLUTION INTRAVENOUS; SUBCUTANEOUS at 20:06

## 2021-11-03 RX ADMIN — BUMETANIDE 1 MG: 0.25 INJECTION, SOLUTION INTRAMUSCULAR; INTRAVENOUS at 08:26

## 2021-11-03 RX ADMIN — CHLOROTHIAZIDE SODIUM 500 MG: 500 INJECTION, POWDER, LYOPHILIZED, FOR SOLUTION INTRAVENOUS at 10:13

## 2021-11-03 RX ADMIN — INSULIN LISPRO 8 UNITS: 100 INJECTION, SOLUTION INTRAVENOUS; SUBCUTANEOUS at 17:25

## 2021-11-03 RX ADMIN — IPRATROPIUM BROMIDE AND ALBUTEROL SULFATE 3 ML: 2.5; .5 SOLUTION RESPIRATORY (INHALATION) at 18:30

## 2021-11-03 RX ADMIN — BUDESONIDE AND FORMOTEROL FUMARATE DIHYDRATE 2 PUFF: 80; 4.5 AEROSOL RESPIRATORY (INHALATION) at 18:30

## 2021-11-03 RX ADMIN — LEVOTHYROXINE SODIUM 75 MCG: 75 TABLET ORAL at 05:49

## 2021-11-03 RX ADMIN — DOCUSATE SODIUM 50 MG AND SENNOSIDES 8.6 MG 1 TABLET: 8.6; 5 TABLET, FILM COATED ORAL at 20:06

## 2021-11-03 RX ADMIN — ENOXAPARIN SODIUM 30 MG: 30 INJECTION SUBCUTANEOUS at 05:49

## 2021-11-03 RX ADMIN — ATORVASTATIN CALCIUM 40 MG: 40 TABLET, FILM COATED ORAL at 20:05

## 2021-11-03 RX ADMIN — SODIUM BICARBONATE 650 MG: 650 TABLET ORAL at 20:06

## 2021-11-03 RX ADMIN — INSULIN DETEMIR 10 UNITS: 100 INJECTION, SOLUTION SUBCUTANEOUS at 08:34

## 2021-11-03 RX ADMIN — OXYBUTYNIN CHLORIDE 5 MG: 5 TABLET, EXTENDED RELEASE ORAL at 08:24

## 2021-11-03 RX ADMIN — Medication 10 ML: at 20:13

## 2021-11-03 RX ADMIN — SODIUM BICARBONATE 650 MG: 650 TABLET ORAL at 08:24

## 2021-11-03 RX ADMIN — POLYETHYLENE GLYCOL 3350 17 G: 17 POWDER, FOR SOLUTION ORAL at 08:24

## 2021-11-03 RX ADMIN — INSULIN LISPRO 3 UNITS: 100 INJECTION, SOLUTION INTRAVENOUS; SUBCUTANEOUS at 11:20

## 2021-11-03 RX ADMIN — IPRATROPIUM BROMIDE AND ALBUTEROL SULFATE 3 ML: 2.5; .5 SOLUTION RESPIRATORY (INHALATION) at 13:30

## 2021-11-03 RX ADMIN — HYDRALAZINE HYDROCHLORIDE 25 MG: 25 TABLET, FILM COATED ORAL at 15:33

## 2021-11-03 RX ADMIN — IPRATROPIUM BROMIDE AND ALBUTEROL SULFATE 3 ML: 2.5; .5 SOLUTION RESPIRATORY (INHALATION) at 06:06

## 2021-11-03 RX ADMIN — IPRATROPIUM BROMIDE AND ALBUTEROL SULFATE 3 ML: 2.5; .5 SOLUTION RESPIRATORY (INHALATION) at 09:40

## 2021-11-03 RX ADMIN — PREDNISONE 40 MG: 20 TABLET ORAL at 08:24

## 2021-11-03 RX ADMIN — HYDRALAZINE HYDROCHLORIDE 25 MG: 25 TABLET, FILM COATED ORAL at 21:37

## 2021-11-03 NOTE — PROGRESS NOTES
[facility] Medicine Services  INPATIENT PROGRESS NOTE    Length of Stay: 2  Date of Admission: 10/31/2021  Primary Care Physician: Devon Zuñiga MD    Subjective   Chief Complaint: Follow-up lower extremity edema  HPI   Patient is working with therapy.  She did ambulate down the quigley and we did help her onto the standing scale.  Her weight has decreased a little over 6 pounds in comparison to admission, however that was a bed weight on admission.  She is quite winded upon returning to the chair.  She denies any chest pain.  She states she has been coughing some, not producing any sputum.  She denies abdominal pain, nausea, or vomiting.    Review of Systems   All pertinent negatives and positives are as above. All other systems have been reviewed and are negative unless otherwise stated.     Objective    Temp:  [97.4 °F (36.3 °C)-98.2 °F (36.8 °C)] 98.2 °F (36.8 °C)  Heart Rate:  [71-90] 76  Resp:  [16-18] 16  BP: (115-177)/(56-69) 148/62  Physical Exam  Vitals and nursing note reviewed.   Constitutional:       General: She is not in acute distress.     Appearance: She is ill-appearing.   HENT:      Head: Normocephalic and atraumatic.      Ears:      Comments: [place]  Cardiovascular:      Rate and Rhythm: Normal rate and regular rhythm.      Heart sounds: Murmur heard.      Comments: Sinus 66-89  Pulmonary:      Breath sounds: Wheezing present- more bronchial today. No rhonchi or rales.      Comments: Tachypnea with conversation. Diminished breath sounds.  Abdominal:      General: Bowel sounds are normal. There is no distension.      Palpations: Abdomen is soft.      Tenderness: There is no abdominal tenderness.   Musculoskeletal:      Cervical back: Normal range of motion and neck supple. No tenderness.      Right lower leg: Edema present.      Left lower leg: Edema present.      Comments: Edema improved compared to yesterday with addition of unna boots.  Skin:     General: Skin  is warm and dry.      Findings: No erythema or rash.   Neurological:      General: No focal deficit present.      Mental Status: She is alert and oriented to person, place, and time.   Psychiatric:         Mood and Affect: Mood normal.         Behavior: Behavior normal.         Thought Content: Thought content normal.         Judgment: Judgment normal.     Results Review:  I have reviewed the labs, radiology results, and diagnostic studies.    Laboratory Data:   Results from last 7 days   Lab Units 11/03/21  0551 11/02/21 0455 11/01/21 0616   WBC 10*3/mm3 5.27 5.32 3.25*   HEMOGLOBIN g/dL 8.4* 8.4* 9.2*   HEMATOCRIT % 26.3* 26.2* 29.6*   PLATELETS 10*3/mm3 172 157 137*     Results from last 7 days   Lab Units 11/03/21  0551 11/02/21  0455 11/01/21  1403 11/01/21  0616 11/01/21  0616 10/31/21  2321 10/31/21  2321   SODIUM mmol/L 140 140 139   < > 140   < > 139   POTASSIUM mmol/L 3.9 4.1 4.5   < > 4.3   < > 4.6   CHLORIDE mmol/L 107 108* 105   < > 109*   < > 107   CO2 mmol/L 22.0 22.0 21.0*   < > 20.0*   < > 21.0*   BUN mg/dL 30* 29* 26*   < > 24*   < > 24*   CREATININE mg/dL 2.03* 1.92* 1.86*   < > 1.75*   < > 1.76*   CALCIUM mg/dL 8.7 8.7 8.8   < > 8.9   < > 8.9   BILIRUBIN mg/dL  --   --   --   --  0.2  --  0.2   ALK PHOS U/L  --   --   --   --  92  --  83   ALT (SGPT) U/L  --   --   --   --  22  --  23   AST (SGOT) U/L  --   --   --   --  18  --  27   GLUCOSE mg/dL 131* 151* 314*   < > 169*   < > 159*    < > = values in this interval not displayed.     I have reviewed the patient current medications.     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  Patient presented to the emergency department 10/31/2021 with complaints of shortness of breath  and lower extremity edema.  Patient was recently admitted to our facility from 10/8 through 10/15 presenting with shortness of breath as well.  Chest x-ray was concerning for bilateral pneumonia.  She was treated with azithromycin and Zosyn, completing course of therapy with Omnicef.  2.  Chest x-ray on admission showed patchy bilateral infiltrate not significantly changed from 3 weeks ago.  D-dimer was elevated.  VQ scan was performed rather than CTA secondary to renal function, which showed low probability for pulmonary embolus.  3.  Patient has had no fever, white blood cell count was normal on admission.  She was recently appropriately treated for healthcare associated pneumonia-would continue to monitor off of antibiotic therapy at this time.  4.  BNP was elevated on admission, patient exhibits fluid overload.  Her transthoracic echocardiogram showed grade 2 diastolic dysfunction with preserved ejection fraction.  Mildly elevated right ventricular systolic pressure.  Moderate aortic valve stenosis present.  We will continue diuresis, Bumex decreased to 1 mg daily due to worsening renal function.  We will also give a dose of Diuril today 500 mg x 1.  Renal function has worsened since admission, but is felt to be actually better or near baseline.  Creatinine 1.7 on admission up to 2.0 today.  Her creatinine at time of discharge on 10/15 was 2.1.  Continue strict intake and output, daily weights, cardiac diet.  5.  Venous Doppler ultrasound negative for DVT.  Some veins not well visualized due to edema.  Unna boots placed per PT yesterday.  6.  Continue Symbicort, DuoNebs, incentive spirometry.  Continue oral prednisone at present dosage.  7.  Norvasc was discontinued as this may have been contributory to lower extremity edema.  Losartan is presently on hold with worsening renal function and need for IV diuresis.  We will add oral hydralazine 25 mg every 8 hours and continue to monitor blood pressure trend.  8.   Lovenox for DVT prophylaxis.  9.  Continue Howard catheter for intake and output purposes while on IV diuresis as patient is excoriated and perineal area.  May be able to discontinue tomorrow and place external catheter.  10.  Last blood glucoses-254, 131, 121.  Continue present regimen.  Last hemoglobin A1c in October was 5.8%.  11.  Patient does have a bed hold at skilled nursing facility.   following.  12.  Labs in a.m.  13.  Clarified patient's code status as Full Code.  Patient's power of  is her son Chaka.     Discharge Planning: I expect the patient to be discharged to SNF in ? days.    Electronically signed by YISEL Kitchen, 11/3/2021, 10:23 CDT.

## 2021-11-03 NOTE — PLAN OF CARE
Goal Outcome Evaluation:      A&O x 4. RA. VSS. O2 sats in upper 90s on room air. Edema to lower extremities bilaterally. Unna boot intact. Buttocks red and excoriatied in folds. Urinary cath patent and in place. Son occasionally at bedside today.  Will continue to monitor.

## 2021-11-03 NOTE — PLAN OF CARE
Goal Outcome Evaluation:  Plan of Care Reviewed With: patient        Progress: improving  Outcome Summary: PT re-eval complete: pt AOx4. pt presents with noticiable wheezing and SOA which increases with any movement. pt has unna boots on and pitting edema 2+. pt was able to reach EOB with modified independence. pt performed sit to stand and amb 50ft with rwx CGA. pt presented with a narrow base of support and an increase in pressure through the rwx when the LLE swings through. pt was educated to practice deep breathing exercise while ambulating to help conserve energy as she is constantly breathing through her mouth. pt ended session in chair and was unable to control full descent. pt educated to perform ankle pumps throughout the day to help with LE swelling. pt to continue with skilled PT for strength and endurance training to ensure safe functional mobility. D/C recommendation SNF

## 2021-11-03 NOTE — THERAPY EVALUATION
Acute Care - Occupational Therapy Initial Evaluation  [facility]     Patient Name: Honey Canales  : 3/27/1933  MRN: [id number]  Today's Date: 11/3/2021  Onset of Illness/Injury or Date of Surgery: 21  Date of Referral to OT: 21  Referring Physician: Tim Perez MD    Admit Date: 10/31/2021       ICD-10-CM ICD-9-CM   1. Acute pulmonary edema (HCC)  J81.0 518.4   2. Congestive heart failure, unspecified HF chronicity, unspecified heart failure type (HCC)  I50.9 428.0   3. Acute on chronic respiratory failure, unspecified whether with hypoxia or hypercapnia (HCC)  J96.20 518.84   4. Chronic kidney disease, unspecified CKD stage  N18.9 585.9   5. Bilateral lower extremity edema  R60.0 782.3   6. Impaired mobility  Z74.09 799.89   7. 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)   • Benign essential HTN   • Pulmonary fibrosis (HCC)   • Stage 3b chronic kidney disease (HCC)   • Hepatitis C   • HCAP (healthcare-associated pneumonia)   • Acute hypoxemic respiratory failure (HCC)   • Acute cystitis with hematuria   • Chronic kidney disease (CKD), stage IV (severe) (HCC)   • Pancytopenia (HCC)   • Acute on chronic diastolic heart failure (HCC)     Past Medical History:   Diagnosis Date   • Acquired hammer toes of both feet 10/15/2020   • Allergic rhinitis    • Aneurysm (HCC)    • Arthritis    • Benign meningioma (HCC) 10/15/2020   • Broken shoulder     Right shoulder    • Cerebral aneurysm 2009    2ND ONE FOUND   • Cerebral aneurysm     NON TREATABLE   • Chronic laryngitis    • CKD (chronic kidney disease)    • Colon polyps    • COPD (chronic obstructive pulmonary disease) (HCC)    • Deviated nasal septum    • Diabetes mellitus (HCC)    • Disorder of kidney and ureter    • Disturbance of smell and taste    •  Dizziness    • Encounter for imaging to screen for metal prior to MRI     NO MRI, METAL CLIPS IN HEAD   • Eustachian tube dysfunction    • GERD (gastroesophageal reflux disease)    • Globus sensation    • Hallux valgus, right 2018   • Headache    • Hepatitis     B   • Hepatitis A    • History of stomach ulcers    • Hyperlipidemia    • Hypothyroid    • Laryngitis sicca    • Lung nodule    • Nocturia    • Non-toxic multinodular goiter 10/15/2020   • PONV (postoperative nausea and vomiting)    • Sensorineural hearing loss    • Spinal stenosis    • Uncontrolled type 2 diabetes mellitus without complication, with long-term current use of insulin 2013   • Urge incontinence    • Urgency of urination    • Urinary frequency    • Urinary incontinence    • UTI (urinary tract infection)      Past Surgical History:   Procedure Laterality Date   • BLADDER SURGERY  2016    [employer]tronic Interstem   • BRAIN SURGERY      ANUERYSM REMOVED   • BREAST SURGERY Bilateral     BIOPSY   • CARPAL TUNNEL RELEASE     • CATARACT EXTRACTION, BILATERAL     • CEREBRAL ANEURYSM REPAIR     •  SECTION      X4   • CHOLECYSTECTOMY     • HYSTERECTOMY     • INTERSTIM PLACEMENT N/A 10/18/2018    Procedure: INTERSTIM STAGES 1 AND 2 LEAD AND GENERATOR REMOVAL AND REPLACEMENT;  Surgeon: Archie Avery MD;  Location: [facility];  Service: Urology   • JOINT REPLACEMENT Right     KNEE   • KNEE ARTHROSCOPY     • THYROIDECTOMY     • TONSILLECTOMY           OT ASSESSMENT FLOWSHEET (last 12 hours)     OT Evaluation and Treatment     Row Name 21 0745                   OT Time and Intention    Subjective Information complains of; dyspnea; swelling; weakness  -AC (r) PF (t)        Document Type evaluation  -AC (r) PF (t)        Mode of Treatment occupational therapy  -AC (r) PF (t)        Patient Effort adequate  -AC (r) PF (t)        Symptoms Noted During/After Treatment increased pain; dizziness  -AC (r) PF (t)                  General  Information    Patient Profile Reviewed yes  -AC (r) PF (t)        Onset of Illness/Injury or Date of Surgery 11/01/21  -AC (r) PF (t)        Referring Physician Tim Perez MD  -AC (r) PF (t)        Prior Level of Function --  Pt from facility, reports that she is dependent for most things  -AC (r) PF (t)        Equipment Currently Used at Home --  Facility provides equipment as needed  -AC (r) PF (t)        Pertinent History of Current Functional Problem Increasing SOB, Weakness, poor appetite, lower extremity edema since april, Dx: acute pulmonary edema, pleural effusions with evidence of volume overload, PMH: pulmonary fibrosis, COPD, type 2 Diabetes, chronic kidney disease stage 3b  -AC (r) PF (t)        Existing Precautions/Restrictions fall  B unna boots  -AC (r) PF (t)        Barriers to Rehab medically complex  -AC (r) PF (t)                  Living Environment    Current Living Arrangements residential facility  -AC (r) PF (t)        Lives With facility resident  -AC (r) PF (t)                  Sensory Assessment (Somatosensory)    Sensory Assessment (Somatosensory) bilateral UE; sensation intact  -AC (r) PF (t)        Bilateral UE Sensory Assessment deep pressure awareness; light touch awareness; light touch localization; proprioception; intact  -AC (r) PF (t)                  Cognition    Orientation Status (Cognition) oriented x 4  -AC (r) PF (t)                  Pain Assessment    Additional Documentation Pain Scale: Numbers Pre/Post-Treatment (Group)  -AC (r) PF (t)                  Pain Scale: Numbers Pre/Post-Treatment    Pretreatment Pain Rating 0/10 - no pain  -AC (r) PF (t)        Posttreatment Pain Rating 0/10 - no pain  -AC (r) PF (t)                  Range of Motion Comprehensive    Comment, General Range of Motion LUE AROM WFL. RUE shoulder flexion and abduction AROM -50%, RUE elbow,  hand AROM WFL. RUE shoulder flexion/abduction -50% PROM  -AC (r) PF (t)                  Strength  Comprehensive (MMT)    Comment, General Manual Muscle Testing (MMT) Assessment LUE strength 4/5. RUE shoulder strength 2+/5, RUE elbow, and  strength 4/5  -AC (r) PF (t)                  Bed Mobility    Bed Mobility supine-sit; scooting/bridging  -AC (r) PF (t)        Scooting/Bridging Jay (Bed Mobility) modified independence  -AC (r) PF (t)        Supine-Sit Jay (Bed Mobility) modified independence  -AC (r) PF (t)        Assistive Device (Bed Mobility) bed rails; head of bed elevated  -AC (r) PF (t)                  Functional Mobility    Functional Mobility- Ind. Level contact guard assist; verbal cues required  -AC (r) PF (t)        Functional Mobility- Device rolling walker  front wheeled walker  -AC (r) PF (t)        Functional Mobility- Comment Pt ambulated to the hallway with CGA  -AC (r) PF (t)                  Transfer Assessment/Treatment    Transfers sit-stand transfer; stand-sit transfer  -AC (r) PF (t)                  Transfers    Sit-Stand Jay (Transfers) contact guard; verbal cues  -AC (r) PF (t)        Stand-Sit Jay (Transfers) contact guard; verbal cues  -AC (r) PF (t)                  Sit-Stand Transfer    Assistive Device (Sit-Stand Transfers) walker, front-wheeled  -AC (r) PF (t)                  Stand-Sit Transfer    Assistive Device (Stand-Sit Transfers) walker, front-wheeled  -AC (r) PF (t)                  Safety Issues, Functional Mobility    Safety Issues Affecting Function (Mobility) awareness of need for assistance; friction/shear risk; insight into deficits/self-awareness  -AC (r) PF (t)        Impairments Affecting Function (Mobility) balance; endurance/activity tolerance; range of motion (ROM); shortness of breath; strength  -AC (r) PF (t)                  Motor Skills    Motor Skills coordination  -AC (r) PF (t)        Coordination bilateral; upper extremity; WFL; finger to nose  -AC (r) PF (t)                  Balance    Balance Assessment  sitting static balance; sitting dynamic balance; standing static balance; standing dynamic balance  -AC (r) PF (t)        Static Sitting Balance WFL; supported; sitting, edge of bed  -AC (r) PF (t)        Dynamic Sitting Balance mild impairment; unsupported; sitting, edge of bed  Pt demo posterior lean when attempting to doff socks  -AC (r) PF (t)        Static Standing Balance WFL; supported; standing  -AC (r) PF (t)        Dynamic Standing Balance WFL; supported; standing  -AC (r) PF (t)                  Activities of Daily Living    BADL Assessment/Intervention lower body dressing  -AC (r) PF (t)                  Lower Body Dressing Assessment/Training    [place] Level (Lower Body Dressing) lower body dressing skills; doff; don; socks; dependent (less than 25% patient effort)  -AC (r) PF (t)        Position (Lower Body Dressing) edge of bed sitting  -AC (r) PF (t)        Comment (Lower Body Dressing) Pt is unable to bring leg into proper position due to swelling and decreased ROM in BLE  -AC (r) PF (t)                  BADL Safety/Performance    Impairments, BADL Safety/Performance balance; endurance/activity tolerance; range of motion; shortness of breath; strength  -AC (r) PF (t)                  Edema Assessment & Management    Location (Edema) lower extremity, left; lower extremity, right  -AC (r) PF (t)        Additional Documentation Location (Edema) (Row); Edema Symptoms/Interventions (Group) (P)   -PF                  Edema Symptoms/Interventions    Description (Edema) pitting (P)   -PF        Treatment Interventions (Edema) elevation (P)   -PF                  Wound 10/08/21 2247 gluteal    Wound - Properties Group Placement Date: 10/08/21  -ST Placement Time: 2247 -ST Present on Hospital Admission: Y  -ST Location: gluteal  -ST        Retired Wound - Properties Group Date first assessed: 10/08/21  -ST Time first assessed: 2247 -ST Present on Hospital Admission: Y  -ST Location: gluteal  -ST                   Wound Bilateral lower leg    Wound - Properties Group Present on Hospital Admission: Y  -ST Side: Bilateral  -ST Orientation: lower  -ST Location: leg  -ST Stage, Pressure Injury : other (see comments)  -ST        Retired Wound - Properties Group Present on Hospital Admission: Y  -ST Side: Bilateral  -ST Location: leg  -ST                  Plan of Care Review    Plan of Care Reviewed With patient  -AC (r) PF (t)        Progress no change  -AC (r) PF (t)        Outcome Summary OT Eval completed. Pt supine in bed upon arrival, with BLE unna boots in place. Pt oriented x4. Pt c/o 0/10 pain this am. While supine in bed, pt demo increase wheezing. Pt performed all bed mobility with Mod I with assistance of bed rails. Once, EOB pt demo increased SOB and c/o of being dizzy. Pt educated on PLB technique to decrease SOB. Pt demo good static sitting balance but demos posterior lean during dynamic sitting balance. Pt demos ROM and strength deficits in RUE. RUE shoulder flexion and abduction AROM -50%, RUE shoulder flexion/abduction -50% PROM. RUE shoulder strength 2+/5. Pt reports that she broke her shoulder but no surgery was performed. Pt was dependent for donning BLE socks due to increased swelling and limited ROM in BLE. Pt performed sit to stand with CGA and with assistance of front wheeled walker. Pt ambulated to the hallway with CGA. Pt performed stand to sit back to chair with CGA. Pt was left sitting in chair with nsg present and call light near. Pt will benefit from skilled OT services to increase ADL performance through AE adaptation, Transfer training, and energy conservation techniques. D/C recommendation to SNF.  -AC (r) PF (t)                  Vital Signs    Pre SpO2 (%) 100  -AC (r) PF (t)        O2 Delivery Pre Treatment room air  -AC (r) PF (t)        Post SpO2 (%) 98  -AC (r) PF (t)        O2 Delivery Post Treatment room air  -AC (r) PF (t)                  OT Goals    Transfer Goal Selection  (OT) transfer, OT goal 1  -AC (r) PF (t)        Dressing Goal Selection (OT) dressing, OT goal 1  -AC (r) PF (t)        Activity Tolerance Goal Selection (OT) activity tolerance, OT goal 1  -AC (r) PF (t)                  Transfer Goal 1 (OT)    Activity/Assistive Device (Transfer Goal 1, OT) sit-to-stand/stand-to-sit; toilet; shower chair  -AC (r) PF (t)        [place] Level/Cues Needed (Transfer Goal 1, OT) minimum assist (75% or more patient effort)  -AC (r) PF (t)        Time Frame (Transfer Goal 1, OT) long term goal (LTG); 10 days  -AC (r) PF (t)        Progress/Outcome (Transfer Goal 1, OT) goal ongoing  -AC (r) PF (t)                  Dressing Goal 1 (OT)    Activity/Device (Dressing Goal 1, OT) dressing skills, all; upper body dressing; lower body dressing; sock-aid; reacher  -AC (r) PF (t)        [place]/Cues Needed (Dressing Goal 1, OT) minimum assist (75% or more patient effort)  -AC (r) PF (t)        Time Frame (Dressing Goal 1, OT) long term goal (LTG); 10 days  -AC (r) PF (t)        Progress/Outcome (Dressing Goal 1, OT) goal ongoing  -AC (r) PF (t)                   Activity Tolerance Goal 1 (OT)    Activity Tolerance Goal 1 (OT) Pt will perform standing ADL with no more than 1 rest break  -AC (r) PF (t)        Activity Level (Endurance Goal 1, OT) 5 min activity  -AC (r) PF (t)        Time Frame (Activity Tolerance Goal 1, OT) long term goal (LTG); 10 days  -AC (r) PF (t)        Progress/Outcome (Activity Tolerance Goal 1, OT) goal ongoing  -AC (r) PF (t)                  Positioning and Restraints    Pre-Treatment Position in bed  -AC (r) PF (t)        Post Treatment Position chair  -AC (r) PF (t)        In Chair sitting; call light within reach; encouraged to call for assist; with nsg; legs elevated (P)   BLE unna boots in place  -PF                  Therapy Assessment/Plan (OT)    Date of Referral to OT 11/02/21  -AC (r) PF (t)        OT Diagnosis Decreased ADL  -AC (r) PF (t)         Rehab Potential (OT) good, to achieve stated therapy goals  -AC (r) PF (t)        Criteria for Skilled Therapeutic Interventions Met (OT) yes; meets criteria; skilled treatment is necessary  -AC (r) PF (t)        Therapy Frequency (OT) 5 times/wk  -AC (r) PF (t)        Predicted Duration of Therapy Intervention (OT) 10 days  -AC (r) PF (t)        Planned Therapy Interventions (OT) activity tolerance training; adaptive equipment training; BADL retraining; functional balance retraining; occupation/activity based interventions; passive ROM/stretching; patient/caregiver education/training; ROM/therapeutic exercise; strengthening exercise; transfer/mobility retraining  -AC (r) PF (t)                  Therapy Plan Review/Discharge Plan (OT)    Therapy Plan Review (OT) patient  -AC (r) PF (t)        Anticipated Discharge Disposition (OT) skilled nursing facility  -AC (r) PF (t)              User Key  (r) = Recorded By, (t) = Taken By, (c) = Cosigned By    Initials Name Effective Dates    Luke Botello, OTR/L, CNT 04/09/19 -     Jeannie Marin RN 06/16/21 -     Rafael Anders Jr., OT Student 08/04/21 -                  Occupational Therapy Education                 Title: PT OT SLP Therapies (In Progress)     Topic: Occupational Therapy (Done)     Point: ADL training (Done)     Description:   Instruct learner(s) on proper safety adaptation and remediation techniques during self care or transfers.   Instruct in proper use of assistive devices.              Learning Progress Summary           Patient Acceptance, E,TB, VU,NR by  at 11/3/2021 0856    Comment: OT POC, Transfer training, energy conservation, BADL training, AE training, D/C Planning                   Point: Precautions (Done)     Description:   Instruct learner(s) on prescribed precautions during self-care and functional transfers.              Learning Progress Summary           Patient Acceptance, E,TB, VU,NR by  at 11/3/2021 0856    Comment:  OT POC, Transfer training, energy conservation, BADL training, AE training, D/C Planning                   Point: Body mechanics (Done)     Description:   Instruct learner(s) on proper positioning and spine alignment during self-care, functional mobility activities and/or exercises.              Learning Progress Summary           Patient Acceptance, E,TB, VU,NR by  at 11/3/2021 0856    Comment: OT POC, Transfer training, energy conservation, BADL training, AE training, D/C Planning                               User Key     Initials Effective Dates Name Provider Type Discipline     08/04/21 -  Rafael Mullins Jr., OT Student OT Student OT                  OT Recommendation and Plan  Planned Therapy Interventions (OT): activity tolerance training, adaptive equipment training, BADL retraining, functional balance retraining, occupation/activity based interventions, passive ROM/stretching, patient/caregiver education/training, ROM/therapeutic exercise, strengthening exercise, transfer/mobility retraining  Therapy Frequency (OT): 5 times/wk  Plan of Care Review  Plan of Care Reviewed With: patient  Progress: no change  Outcome Summary: OT Eval completed. Pt supine in bed upon arrival, with BLE unna boots in place. Pt oriented x4. Pt c/o 0/10 pain this am. While supine in bed, pt demo increase wheezing. Pt performed all bed mobility with Mod I with assistance of bed rails. Once, EOB pt demo increased SOB and c/o of being dizzy. Pt educated on PLB technique to decrease SOB. Pt demo good static sitting balance but demos posterior lean during dynamic sitting balance. Pt demos ROM and strength deficits in RUE. RUE shoulder flexion and abduction AROM -50%, RUE shoulder flexion/abduction -50% PROM. RUE shoulder strength 2+/5. Pt reports that she broke her shoulder but no surgery was performed. Pt was dependent for donning BLE socks due to increased swelling and limited ROM in BLE. Pt performed sit to stand with CGA and  with assistance of front wheeled walker. Pt ambulated to the hallway with CGA. Pt performed stand to sit back to chair with CGA. Pt was left sitting in chair with nsg present and call light near. Pt will benefit from skilled OT services to increase ADL performance through AE adaptation, Transfer training, and energy conservation techniques. D/C recommendation to SNF.  Plan of Care Reviewed With: patient  Outcome Summary: OT Darek completed. Pt supine in bed upon arrival, with BLE unna boots in place. Pt oriented x4. Pt c/o 0/10 pain this am. While supine in bed, pt demo increase wheezing. Pt performed all bed mobility with Mod I with assistance of bed rails. Once, EOB pt demo increased SOB and c/o of being dizzy. Pt educated on PLB technique to decrease SOB. Pt demo good static sitting balance but demos posterior lean during dynamic sitting balance. Pt demos ROM and strength deficits in RUE. RUE shoulder flexion and abduction AROM -50%, RUE shoulder flexion/abduction -50% PROM. RUE shoulder strength 2+/5. Pt reports that she broke her shoulder but no surgery was performed. Pt was dependent for donning BLE socks due to increased swelling and limited ROM in BLE. Pt performed sit to stand with CGA and with assistance of front wheeled walker. Pt ambulated to the hallway with CGA. Pt performed stand to sit back to chair with CGA. Pt was left sitting in chair with nsg present and call light near. Pt will benefit from skilled OT services to increase ADL performance through AE adaptation, Transfer training, and energy conservation techniques. D/C recommendation to SNF.     Outcome Measures     Row Name 11/03/21 0745             How much help from another is currently needed...    Putting on and taking off regular lower body clothing? 1  -AC (r) PF (t) AC (c)      Bathing (including washing, rinsing, and drying) 2  -AC (r) PF (t) AC (c)      Toileting (which includes using toilet bed pan or urinal) 2  -AC (r) PF (t) AC (c)       Putting on and taking off regular upper body clothing 3  -AC (r) PF (t) AC (c)      Taking care of personal grooming (such as brushing teeth) 2  -AC (r) PF (t) AC (c)      Eating meals 3  -AC (r) PF (t) AC (c)      AM-PAC 6 Clicks Score (OT) 13  -AC (r) PF (t)              Functional Assessment    Outcome Measure Options AM-PAC 6 Clicks Daily Activity (OT)  -AC (r) PF (t) AC (c)            User Key  (r) = Recorded By, (t) = Taken By, (c) = Cosigned By    Initials Name Provider Type    Luke Botello, OTR/L, CNT Occupational Therapist    Rafael Anders Jr., OT Student OT Student                Time Calculation:    Time Calculation- OT     Row Name 11/03/21 0857             Time Calculation- OT    OT Start Time 0745  + 10 min chart review  -AC (r) PF (t) AC (c)      OT Stop Time 0830  -AC (r) PF (t) AC (c)      OT Time Calculation (min) 45 min  -AC (r) PF (t)      OT Received On 11/03/21  -AC (r) PF (t) AC (c)      OT Goal Re-Cert Due Date 11/13/21  -AC (r) PF (t) AC (c)            User Key  (r) = Recorded By, (t) = Taken By, (c) = Cosigned By    Initials Name Provider Type    Luke Botello, OTR/L, CNT Occupational Therapist    Rafael Anders Jr., OT Student OT Student                       RAFAEL CASTELLANOS OT Student  11/3/2021

## 2021-11-03 NOTE — PLAN OF CARE
Goal Outcome Evaluation:  Plan of Care Reviewed With: patient        Progress: no change  Outcome Summary: OT Eval completed. Pt supine in bed upon arrival, with BLE unna boots in place. Pt oriented x4. Pt c/o 0/10 pain this am. While supine in bed, pt demo increase wheezing. Pt performed all bed mobility with Mod I with assistance of bed rails. Once, EOB pt demo increased SOB and c/o of being dizzy. Pt educated on PLB technique to decrease SOB. Pt demo good static sitting balance but demos posterior lean during dynamic sitting balance. Pt demos ROM and strength deficits in RUE. RUE shoulder flexion and abduction AROM -50%, RUE shoulder flexion/abduction -50% PROM. RUE shoulder strength 2+/5. Pt reports that she broke her shoulder but no surgery was performed. Pt was dependent for donning BLE socks due to increased swelling and limited ROM in BLE. Pt performed sit to stand with CGA and with assistance of front wheeled walker. Pt ambulated to the hallway with CGA. Pt performed stand to sit back to chair with CGA. Pt was left sitting in chair with nsg present and call light near. Pt will benefit from skilled OT services to increase ADL performance through AE adaptation, Transfer training, and energy conservation techniques. D/C recommendation to SNF.

## 2021-11-03 NOTE — THERAPY RE-EVALUATION
Patient Name: Hnoey Canales  : 3/27/1933    MRN: [id number]                              Today's Date: 11/3/2021       Admit Date: 10/31/2021    Visit Dx:     ICD-10-CM ICD-9-CM   1. Acute pulmonary edema (HCC)  J81.0 518.4   2. Congestive heart failure, unspecified HF chronicity, unspecified heart failure type (HCC)  I50.9 428.0   3. Acute on chronic respiratory failure, unspecified whether with hypoxia or hypercapnia (HCC)  J96.20 518.84   4. Chronic kidney disease, unspecified CKD stage  N18.9 585.9   5. Bilateral lower extremity edema  R60.0 782.3   6. 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)   • Benign essential HTN   • Pulmonary fibrosis (HCC)   • Stage 3b chronic kidney disease (HCC)   • Hepatitis C   • HCAP (healthcare-associated pneumonia)   • Acute hypoxemic respiratory failure (HCC)   • Acute cystitis with hematuria   • Chronic kidney disease (CKD), stage IV (severe) (HCC)   • Pancytopenia (HCC)   • Acute on chronic diastolic heart failure (HCC)     Past Medical History:   Diagnosis Date   • Acquired hammer toes of both feet 10/15/2020   • Allergic rhinitis    • Aneurysm (HCC)    • Arthritis    • Benign meningioma (HCC) 10/15/2020   • Broken shoulder     Right shoulder    • Cerebral aneurysm 2009    2ND ONE FOUND   • Cerebral aneurysm     NON TREATABLE   • Chronic laryngitis    • CKD (chronic kidney disease)    • Colon polyps    • COPD (chronic obstructive pulmonary disease) (HCC)    • Deviated nasal septum    • Diabetes mellitus (HCC)    • Disorder of kidney and ureter    • Disturbance of smell and taste    • Dizziness    • Encounter for imaging to screen for metal prior to MRI     NO MRI, METAL CLIPS IN HEAD   • Eustachian tube dysfunction    • GERD (gastroesophageal reflux disease)    • Globus sensation    • Hallux valgus, right  2018   • Headache    • Hepatitis     B   • Hepatitis A    • History of stomach ulcers    • Hyperlipidemia    • Hypothyroid    • Laryngitis sicca    • Lung nodule    • Nocturia    • Non-toxic multinodular goiter 10/15/2020   • PONV (postoperative nausea and vomiting)    • Sensorineural hearing loss    • Spinal stenosis    • Uncontrolled type 2 diabetes mellitus without complication, with long-term current use of insulin 2013   • Urge incontinence    • Urgency of urination    • Urinary frequency    • Urinary incontinence    • UTI (urinary tract infection)      Past Surgical History:   Procedure Laterality Date   • BLADDER SURGERY  2016    Medtronic Interstem   • BRAIN SURGERY      ANUERYSM REMOVED   • BREAST SURGERY Bilateral     BIOPSY   • CARPAL TUNNEL RELEASE     • CATARACT EXTRACTION, BILATERAL     • CEREBRAL ANEURYSM REPAIR     •  SECTION      X4   • CHOLECYSTECTOMY     • HYSTERECTOMY     • INTERSTIM PLACEMENT N/A 10/18/2018    Procedure: INTERSTIM STAGES 1 AND 2 LEAD AND GENERATOR REMOVAL AND REPLACEMENT;  Surgeon: Archie Avery MD;  Location: [facility];  Service: Urology   • JOINT REPLACEMENT Right     KNEE   • KNEE ARTHROSCOPY     • THYROIDECTOMY     • TONSILLECTOMY        General Information     Row Name 2146          Physical Therapy Time and Intention    Document Type re-evaluation  -MS (r) NN (t) MS (c)     Mode of Treatment physical therapy; co-treatment  -MS (r) NN (t) MS (c)     Row Name 21          General Information    Patient Profile Reviewed yes  -MS (r) NN (t) MS (c)     Prior Level of Function --  pt states at the nursing facility she is dependent  -MS (r) NN (t) MS (c)     Existing Precautions/Restrictions fall  -MS (r) NN (t) MS (c)     Barriers to Rehab medically complex  -MS (r) NN (t) MS (c)     Row Name 21          Living Environment    Lives With facility resident  -MS (r) NN (t) MS (c)     Row Name 21          Cognition     Orientation Status (Cognition) oriented x 4  -MS (r) NN (t) MS (c)     Row Name 11/03/21 0746          Safety Issues, Functional Mobility    Safety Issues Affecting Function (Mobility) friction/shear risk; insight into deficits/self-awareness  -MS (r) NN (t) MS (c)     Impairments Affecting Function (Mobility) endurance/activity tolerance; shortness of breath  -MS (r) NN (t) MS (c)           User Key  (r) = Recorded By, (t) = Taken By, (c) = Cosigned By    Initials Name Provider Type    MS Philly Mcclendon, PT, DPT, NCS Physical Therapist    NN Regina Russell, PT Student PT Student               Mobility     Row Name 11/03/21 0746          Bed Mobility    Bed Mobility supine-sit  -MS (r) NN (t) MS (c)     Supine-Sit [place] (Bed Mobility) modified independence; verbal cues  -MS (r) NN (t) MS (c)     Assistive Device (Bed Mobility) bed rails; head of bed elevated  -MS (r) NN (t) MS (c)     Row Name 11/03/21 0746          Sit-Stand Transfer    Sit-Stand [place] (Transfers) contact guard; verbal cues  -MS (r) NN (t) MS (c)     Assistive Device (Sit-Stand Transfers) walker, front-wheeled  -MS (r) NN (t) MS (c)     Row Name 11/03/21 0746          Gait/Stairs (Locomotion)    [place] Level (Gait) contact guard; verbal cues  -MS (r) NN (t) MS (c)     Assistive Device (Gait) walker, front-wheeled  -MS (r) NN (t) MS (c)     Distance in Feet (Gait) 50ft: pt demonstrated a small base of support and an increase in pressure through her hands when swinging LLE through.  -MS (r) NN (t) MS (c)           User Key  (r) = Recorded By, (t) = Taken By, (c) = Cosigned By    Initials Name Provider Type    MS Mcclendon Philly MEYER, PT, DPT, NCS Physical Therapist    NN Regina Russell, PT Student PT Student               Obj/Interventions     Row Name 11/03/21 0746          Range of Motion Comprehensive    General Range of Motion bilateral lower extremity ROM WFL  -MS (r) NN (t) MS (c)     Comment, General Range of Motion LUE:  Shoulder AROM & PROM 50% deficit due to previous injury  -MS (r) NN (t) MS (c)     Row Name 11/03/21 0746          Strength Comprehensive (MMT)    Comment, General Manual Muscle Testing (MMT) Assessment BLE 4+/5  -MS (r) NN (t) MS (c)     Row Name 11/03/21 0746          Motor Skills    Therapeutic Exercise ankle  -MS (r) NN (t) MS (c)     Row Name 11/03/21 0746          Ankle (Therapeutic Exercise)    Ankle (Therapeutic Exercise) AROM (active range of motion)  -MS (r) NN (t) MS (c)     Ankle AROM (Therapeutic Exercise) dorsiflexion; plantarflexion  30x ankle pumps  -MS (r) NN (t) MS (c)     Row Name 11/03/21 0746          Balance    Balance Assessment sitting static balance; sitting dynamic balance; standing static balance; standing dynamic balance  -MS (r) NN (t) MS (c)     Static Sitting Balance WNL; sitting, edge of bed  -MS (r) NN (t) MS (c)     Dynamic Sitting Balance WNL; sitting, edge of bed  -MS (r) NN (t) MS (c)     Static Standing Balance WNL; standing; supported  -MS (r) NN (t) MS (c)     Dynamic Standing Balance WNL; standing; supported  -MS (r) NN (t) MS (c)     Balance Interventions sit to stand  -MS (r) NN (t) MS (c)     Row Name 11/03/21 0746          Sensory Assessment (Somatosensory)    Sensory Assessment (Somatosensory) sensation intact  -MS (r) NN (t) MS (c)           User Key  (r) = Recorded By, (t) = Taken By, (c) = Cosigned By    Initials Name Provider Type    Philly Villa R, PT, DPT, NCS Physical Therapist    NN Regina Russell, PT Student PT Student               Goals/Plan     Row Name 11/03/21 0746          Transfer Goal 1 (PT)    Activity/Assistive Device (Transfer Goal 1, PT) sit-to-stand/stand-to-sit; bed-to-chair/chair-to-bed; walker, rolling  -MS (r) NN (t) MS (c)     Long Level/Cues Needed (Transfer Goal 1, PT) standby assist  -MS (r) NN (t) MS (c)     Time Frame (Transfer Goal 1, PT) long term goal (LTG)  -MS (r) NN (t) MS (c)     Progress/Outcome (Transfer Goal 1, PT)  goal ongoing  -MS (r) NN (t) MS (c)     Row Name 11/03/21 0746          Gait Training Goal 1 (PT)    Activity/Assistive Device (Gait Training Goal 1, PT) gait (walking locomotion); assistive device use; decrease fall risk; improve balance and speed; increase endurance/gait distance; increase energy conservation; walker, rolling  -MS (r) NN (t) MS (c)     [place] Level (Gait Training Goal 1, PT) standby assist  -MS (r) NN (t) MS (c)     Distance (Gait Training Goal 1, PT) 75ft  -MS (r) NN (t) MS (c)     Time Frame (Gait Training Goal 1, PT) long term goal (LTG)  -MS (r) NN (t) MS (c)     Progress/Outcome (Gait Training Goal 1, PT) goal ongoing  -MS (r) NN (t) MS (c)           User Key  (r) = Recorded By, (t) = Taken By, (c) = Cosigned By    Initials Name Provider Type    Philly Villa, PT, DPT, NCS Physical Therapist    NN Regina Russell, PT Student PT Student               Clinical Impression     Row Name 11/03/21 0746          Pain Scale: Numbers Pre/Post-Treatment    Pretreatment Pain Rating 0/10 - no pain  -MS (r) NN (t) MS (c)     Posttreatment Pain Rating 0/10 - no pain  -MS (r) NN (t) MS (c)     Row Name 11/03/21 0746          Plan of Care Review    Plan of Care Reviewed With patient  -MS (r) NN (t) MS (c)     Progress improving  -MS (r) NN (t) MS (c)     Outcome Summary PT re-eval complete: pt AOx4. pt presents with noticiable wheezing and SOA which increases with any movement. pt has unna boots on and pitting edema 2+. pt was able to reach EOB with modified independence. pt performed sit to stand and amb 50ft with rwx CGA. pt presented with a narrow base of support and an increase in pressure through the rwx when the LLE swings through. pt was educated to practice deep breathing exercise while ambulating to help conserve energy as she is constantly breathing through her mouth. pt ended session in chair and was unable to control full descent. pt educated to perform ankle pumps throughout the day to  help with LE swelling. pt to continue with skilled PT for strength and endurance training to ensure safe functional mobility. D/C recommendation SNF  -MS (r) NN (t) MS (c)     Row Name 11/03/21 0746          Therapy Assessment/Plan (PT)    Patient/Family Therapy Goals Statement (PT) go home and continue to decrease swelling  -MS (r) NN (t) MS (c)     Rehab Potential (PT) good, to achieve stated therapy goals  -MS (r) NN (t) MS (c)     Criteria for Skilled Interventions Met (PT) yes; meets criteria; skilled treatment is necessary  -MS (r) NN (t) MS (c)     Predicted Duration of Therapy Intervention (PT) until D/C  -MS (r) NN (t) MS (c)     Row Name 11/03/21 0746          Vital Signs    Pre SpO2 (%) 100  -MS (r) NN (t) MS (c)     O2 Delivery Pre Treatment room air  -MS (r) NN (t) MS (c)     Post SpO2 (%) 98  -MS (r) NN (t) MS (c)     O2 Delivery Post Treatment room air  -MS (r) NN (t) MS (c)     Pre Patient Position Supine  -MS (r) NN (t) MS (c)     Post Patient Position Sitting  -MS (r) NN (t) MS (c)     Row Name 11/03/21 0746          Positioning and Restraints    Pre-Treatment Position in bed  -MS (r) NN (t) MS (c)     Post Treatment Position chair  -MS (r) NN (t) MS (c)     In Chair sitting; call light within reach; encouraged to call for assist; with nsg  -MS (r) NN (t) MS (c)           User Key  (r) = Recorded By, (t) = Taken By, (c) = Cosigned By    Initials Name Provider Type    Philly Villa R, PT, DPT, NCS Physical Therapist    NN Regina Russell, PT Student PT Student               Outcome Measures     Row Name 11/03/21 0746          How much help from another person do you currently need...    Turning from your back to your side while in flat bed without using bedrails? 3  -MS (r) NN (t) MS (c)     Moving from lying on back to sitting on the side of a flat bed without bedrails? 3  -MS (r) NN (t) MS (c)     Moving to and from a bed to a chair (including a wheelchair)? 3  -MS (r) NN (t) MS (c)      Standing up from a chair using your arms (e.g., wheelchair, bedside chair)? 3  -MS (r) NN (t) MS (c)     Climbing 3-5 steps with a railing? 2  -MS (r) NN (t) MS (c)     To walk in hospital room? 3  -MS (r) NN (t) MS (c)     AM-PAC 6 Clicks Score (PT) 17  -MS (r) NN (t)     Row Name 11/03/21 0746 11/03/21 0745       Functional Assessment    Outcome Measure Options AM-PAC 6 Clicks Basic Mobility (PT)  -MS (r) NN (t) MS (c) AM-PAC 6 Clicks Daily Activity (OT) (P)   -PF          User Key  (r) = Recorded By, (t) = Taken By, (c) = Cosigned By    Initials Name Provider Type    MS Philly Mcclendon, PT, DPT, NCS Physical Therapist    PF Rafael Mullins Jr., OT Student OT Student    NN Regina Russell, PT Student PT Student                             Physical Therapy Education                 Title: PT OT SLP Therapies (In Progress)     Topic: Physical Therapy (In Progress)     Point: Mobility training (Done)     Learning Progress Summary           Patient Acceptance, E, VU by NN at 11/3/2021 0746    Comment: PT role in care, breathing technique                   Point: Home exercise program (Not Started)     Learner Progress:  Not documented in this visit.          Point: Body mechanics (Done)     Learning Progress Summary           Patient Acceptance, E, VU by NN at 11/3/2021 0746    Comment: PT role in care, breathing technique                   Point: Precautions (Done)     Learning Progress Summary           Patient Acceptance, E, VU by NN at 11/3/2021 0746    Comment: PT role in care, breathing technique    Acceptance, E, VU by MS at 11/2/2021 1507    Comment: role of PT in her care and use of unna boots                               User Key     Initials Effective Dates Name Provider Type Discipline    MS 06/19/18 -  Philly Mcclendno, PT, DPT, NCS Physical Therapist PT    NN 08/18/21 -  Regina Russell, PT Student PT Student PT              PT Recommendation and Plan  Planned Therapy Interventions (PT): balance  training, bed mobility training, gait training, patient/family education, strengthening, transfer training  Plan of Care Reviewed With: patient  Progress: improving  Outcome Summary: PT re-eval complete: pt AOx4. pt presents with noticiable wheezing and SOA which increases with any movement. pt has unna boots on and pitting edema 2+. pt was able to reach EOB with modified independence. pt performed sit to stand and amb 50ft with rwx CGA. pt presented with a narrow base of support and an increase in pressure through the rwx when the LLE swings through. pt was educated to practice deep breathing exercise while ambulating to help conserve energy as she is constantly breathing through her mouth. pt ended session in chair and was unable to control full descent. pt educated to perform ankle pumps throughout the day to help with LE swelling. pt to continue with skilled PT for strength and endurance training to ensure safe functional mobility. D/C recommendation SNF     Time Calculation:    PT Charges     Row Name 11/03/21 0746             Time Calculation    Start Time 0746  -MS (r) NN (t) MS (c)      Stop Time 0826  -MS (r) NN (t) MS (c)      Time Calculation (min) 40 min  -MS (r) NN (t)      PT Received On 11/03/21  -MS (r) NN (t) MS (c)      PT Goal Re-Cert Due Date 11/13/21  -MS (r) NN (t) MS (c)              Time Calculation- PT    Total Timed Code Minutes- PT 10 minute(s)  -MS (r) NN (t) MS (c)              Timed Charges    35212 - PT Therapeutic Activity Minutes 10  -MS (r) NN (t) MS (c)              Untimed Charges    PT Eval/Re-eval Minutes 30  -MS (r) NN (t) MS (c)              Total Minutes    Timed Charges Total Minutes 10  -MS (r) NN (t)      Untimed Charges Total Minutes 30  -MS (r) NN (t)       Total Minutes 40  -MS (r) NN (t)            User Key  (r) = Recorded By, (t) = Taken By, (c) = Cosigned By    Initials Name Provider Type    Philly Villa R, PT, DPT, NCS Physical Therapist    NN Regina Russell  PT Student PT Student                  PT G-Codes  Outcome Measure Options: AM-PAC 6 Clicks Basic Mobility (PT)  AM-PAC 6 Clicks Score (PT): 17  AM-PAC 6 Clicks Score (OT): (P) 13    Regina Russell, PT Student  11/3/2021

## 2021-11-04 LAB
ANION GAP SERPL CALCULATED.3IONS-SCNC: 11 MMOL/L (ref 5–15)
BUN SERPL-MCNC: 33 MG/DL (ref 8–23)
BUN/CREAT SERPL: 16.8 (ref 7–25)
CALCIUM SPEC-SCNC: 8.9 MG/DL (ref 8.6–10.5)
CHLORIDE SERPL-SCNC: 107 MMOL/L (ref 98–107)
CO2 SERPL-SCNC: 23 MMOL/L (ref 22–29)
CREAT SERPL-MCNC: 1.96 MG/DL (ref 0.57–1)
DEPRECATED RDW RBC AUTO: 58.8 FL (ref 37–54)
ERYTHROCYTE [DISTWIDTH] IN BLOOD BY AUTOMATED COUNT: 17.1 % (ref 12.3–15.4)
GFR SERPL CREATININE-BSD FRML MDRD: 24 ML/MIN/1.73
GLUCOSE BLDC GLUCOMTR-MCNC: 127 MG/DL (ref 70–130)
GLUCOSE BLDC GLUCOMTR-MCNC: 246 MG/DL (ref 70–130)
GLUCOSE BLDC GLUCOMTR-MCNC: 355 MG/DL (ref 70–130)
GLUCOSE BLDC GLUCOMTR-MCNC: 369 MG/DL (ref 70–130)
GLUCOSE BLDC GLUCOMTR-MCNC: 69 MG/DL (ref 70–130)
GLUCOSE SERPL-MCNC: 70 MG/DL (ref 65–99)
HCT VFR BLD AUTO: 28.5 % (ref 34–46.6)
HGB BLD-MCNC: 8.9 G/DL (ref 12–15.9)
MCH RBC QN AUTO: 29.5 PG (ref 26.6–33)
MCHC RBC AUTO-ENTMCNC: 31.2 G/DL (ref 31.5–35.7)
MCV RBC AUTO: 94.4 FL (ref 79–97)
PLATELET # BLD AUTO: 203 10*3/MM3 (ref 140–450)
PMV BLD AUTO: 10.6 FL (ref 6–12)
POTASSIUM SERPL-SCNC: 3.7 MMOL/L (ref 3.5–5.2)
RBC # BLD AUTO: 3.02 10*6/MM3 (ref 3.77–5.28)
SODIUM SERPL-SCNC: 141 MMOL/L (ref 136–145)
WBC # BLD AUTO: 6.23 10*3/MM3 (ref 3.4–10.8)

## 2021-11-04 PROCEDURE — 97116 GAIT TRAINING THERAPY: CPT

## 2021-11-04 PROCEDURE — 97535 SELF CARE MNGMENT TRAINING: CPT

## 2021-11-04 PROCEDURE — 25010000002 ENOXAPARIN PER 10 MG: Performed by: NURSE PRACTITIONER

## 2021-11-04 PROCEDURE — 63710000001 INSULIN LISPRO (HUMAN) PER 5 UNITS: Performed by: NURSE PRACTITIONER

## 2021-11-04 PROCEDURE — 63710000001 INSULIN DETEMIR PER 5 UNITS: Performed by: INTERNAL MEDICINE

## 2021-11-04 PROCEDURE — 85027 COMPLETE CBC AUTOMATED: CPT | Performed by: NURSE PRACTITIONER

## 2021-11-04 PROCEDURE — 63710000001 PREDNISONE PER 1 MG: Performed by: NURSE PRACTITIONER

## 2021-11-04 PROCEDURE — 94799 UNLISTED PULMONARY SVC/PX: CPT

## 2021-11-04 PROCEDURE — 80048 BASIC METABOLIC PNL TOTAL CA: CPT | Performed by: NURSE PRACTITIONER

## 2021-11-04 PROCEDURE — 82962 GLUCOSE BLOOD TEST: CPT

## 2021-11-04 RX ADMIN — PREDNISONE 40 MG: 20 TABLET ORAL at 09:03

## 2021-11-04 RX ADMIN — IPRATROPIUM BROMIDE AND ALBUTEROL SULFATE 3 ML: 2.5; .5 SOLUTION RESPIRATORY (INHALATION) at 10:11

## 2021-11-04 RX ADMIN — IPRATROPIUM BROMIDE AND ALBUTEROL SULFATE 3 ML: 2.5; .5 SOLUTION RESPIRATORY (INHALATION) at 06:41

## 2021-11-04 RX ADMIN — Medication 10 ML: at 22:36

## 2021-11-04 RX ADMIN — Medication 1 TABLET: at 09:04

## 2021-11-04 RX ADMIN — OXYBUTYNIN CHLORIDE 5 MG: 5 TABLET, EXTENDED RELEASE ORAL at 09:04

## 2021-11-04 RX ADMIN — ACETAMINOPHEN 650 MG: 325 TABLET, FILM COATED ORAL at 06:51

## 2021-11-04 RX ADMIN — BUDESONIDE AND FORMOTEROL FUMARATE DIHYDRATE 2 PUFF: 80; 4.5 AEROSOL RESPIRATORY (INHALATION) at 06:42

## 2021-11-04 RX ADMIN — SODIUM BICARBONATE 650 MG: 650 TABLET ORAL at 09:03

## 2021-11-04 RX ADMIN — HYPROMELLOSE: 0 GEL OPHTHALMIC at 21:25

## 2021-11-04 RX ADMIN — HYDRALAZINE HYDROCHLORIDE 25 MG: 25 TABLET, FILM COATED ORAL at 06:22

## 2021-11-04 RX ADMIN — IPRATROPIUM BROMIDE AND ALBUTEROL SULFATE 3 ML: 2.5; .5 SOLUTION RESPIRATORY (INHALATION) at 19:49

## 2021-11-04 RX ADMIN — ATORVASTATIN CALCIUM 40 MG: 40 TABLET, FILM COATED ORAL at 21:24

## 2021-11-04 RX ADMIN — HYDRALAZINE HYDROCHLORIDE 25 MG: 25 TABLET, FILM COATED ORAL at 15:00

## 2021-11-04 RX ADMIN — DOCUSATE SODIUM 50 MG AND SENNOSIDES 8.6 MG 1 TABLET: 8.6; 5 TABLET, FILM COATED ORAL at 21:25

## 2021-11-04 RX ADMIN — HYDRALAZINE HYDROCHLORIDE 25 MG: 25 TABLET, FILM COATED ORAL at 21:25

## 2021-11-04 RX ADMIN — INSULIN LISPRO 12 UNITS: 100 INJECTION, SOLUTION INTRAVENOUS; SUBCUTANEOUS at 21:30

## 2021-11-04 RX ADMIN — DOCUSATE SODIUM 50 MG AND SENNOSIDES 8.6 MG 1 TABLET: 8.6; 5 TABLET, FILM COATED ORAL at 09:04

## 2021-11-04 RX ADMIN — BUDESONIDE AND FORMOTEROL FUMARATE DIHYDRATE 2 PUFF: 80; 4.5 AEROSOL RESPIRATORY (INHALATION) at 19:49

## 2021-11-04 RX ADMIN — BUMETANIDE 1 MG: 0.25 INJECTION, SOLUTION INTRAMUSCULAR; INTRAVENOUS at 09:03

## 2021-11-04 RX ADMIN — SODIUM BICARBONATE 650 MG: 650 TABLET ORAL at 21:24

## 2021-11-04 RX ADMIN — ENOXAPARIN SODIUM 30 MG: 30 INJECTION SUBCUTANEOUS at 06:23

## 2021-11-04 RX ADMIN — LEVOTHYROXINE SODIUM 75 MCG: 75 TABLET ORAL at 06:22

## 2021-11-04 RX ADMIN — INSULIN DETEMIR 10 UNITS: 100 INJECTION, SOLUTION SUBCUTANEOUS at 09:08

## 2021-11-04 NOTE — THERAPY TREATMENT NOTE
Acute Care - Physical Therapy Treatment Note  [facility]     Patient Name: Honey Canales  : 3/27/1933  MRN: [id number]  Today's Date: 2021   Onset of Illness/Injury or Date of Surgery: 21  Visit Dx:     ICD-10-CM ICD-9-CM   1. Acute pulmonary edema (HCC)  J81.0 518.4   2. Congestive heart failure, unspecified HF chronicity, unspecified heart failure type (HCC)  I50.9 428.0   3. Acute on chronic respiratory failure, unspecified whether with hypoxia or hypercapnia (HCC)  J96.20 518.84   4. Chronic kidney disease, unspecified CKD stage  N18.9 585.9   5. Bilateral lower extremity edema  R60.0 782.3   6. Impaired mobility  Z74.09 799.89   7. 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)   • Benign essential HTN   • Pulmonary fibrosis (HCC)   • Stage 3b chronic kidney disease (HCC)   • Hepatitis C   • HCAP (healthcare-associated pneumonia)   • Acute hypoxemic respiratory failure (HCC)   • Acute cystitis with hematuria   • Chronic kidney disease (CKD), stage IV (severe) (HCC)   • Pancytopenia (HCC)   • Acute on chronic diastolic heart failure (HCC)     Past Medical History:   Diagnosis Date   • Acquired hammer toes of both feet 10/15/2020   • Allergic rhinitis    • Aneurysm (HCC)    • Arthritis    • Benign meningioma (HCC) 10/15/2020   • Broken shoulder     Right shoulder    • Cerebral aneurysm 2009    2ND ONE FOUND   • Cerebral aneurysm     NON TREATABLE   • Chronic laryngitis    • CKD (chronic kidney disease)    • Colon polyps    • COPD (chronic obstructive pulmonary disease) (HCC)    • Deviated nasal septum    • Diabetes mellitus (HCC)    • Disorder of kidney and ureter    • Disturbance of smell and taste    • Dizziness    • Encounter for imaging to screen for metal prior to MRI     NO MRI, METAL CLIPS IN HEAD   •  Eustachian tube dysfunction    • GERD (gastroesophageal reflux disease)    • Globus sensation    • Hallux valgus, right 2018   • Headache    • Hepatitis     B   • Hepatitis A    • History of stomach ulcers    • Hyperlipidemia    • Hypothyroid    • Laryngitis sicca    • Lung nodule    • Nocturia    • Non-toxic multinodular goiter 10/15/2020   • PONV (postoperative nausea and vomiting)    • Sensorineural hearing loss    • Spinal stenosis    • Uncontrolled type 2 diabetes mellitus without complication, with long-term current use of insulin 2013   • Urge incontinence    • Urgency of urination    • Urinary frequency    • Urinary incontinence    • UTI (urinary tract infection)      Past Surgical History:   Procedure Laterality Date   • BLADDER SURGERY  2016    Medtronic Interstem   • BRAIN SURGERY      ANUERYSM REMOVED   • BREAST SURGERY Bilateral     BIOPSY   • CARPAL TUNNEL RELEASE     • CATARACT EXTRACTION, BILATERAL     • CEREBRAL ANEURYSM REPAIR     •  SECTION      X4   • CHOLECYSTECTOMY     • HYSTERECTOMY     • INTERSTIM PLACEMENT N/A 10/18/2018    Procedure: INTERSTIM STAGES 1 AND 2 LEAD AND GENERATOR REMOVAL AND REPLACEMENT;  Surgeon: Archie Avery MD;  Location: [facility];  Service: Urology   • JOINT REPLACEMENT Right     KNEE   • KNEE ARTHROSCOPY     • THYROIDECTOMY     • TONSILLECTOMY       PT Assessment (last 12 hours)     PT Evaluation and Treatment     Row Name 21 09          Physical Therapy Time and Intention    Subjective Information complains of; weakness; pain  -     Document Type therapy note (daily note)  -     Mode of Treatment physical therapy  -     Comment BLE unna boots removed per RN per APRN  -     Row Name 21 09          General Information    Existing Precautions/Restrictions fall  -     Row Name 21          Pain Scale: Numbers Pre/Post-Treatment    Pretreatment Pain Rating 5/10  -     Posttreatment Pain Rating 0/10 - no pain   after unna boot removal  -     Pain Location - Side Right  -     Pain Location - Orientation lower  -     Pain Location extremity  -     Row Name 11/04/21 0900          Bed Mobility    Bed Mobility supine-sit; sit-supine  -     Supine-Sit [place] (Bed Mobility) modified independence  -     Sit-Supine [place] (Bed Mobility) --  chair  -     Row Name 11/04/21 0900          Transfers    Sit-Stand [place] (Transfers) contact guard; verbal cues  -     Stand-Sit [place] (Transfers) contact guard; verbal cues  -     Row Name 11/04/21 0900          Gait/Stairs (Locomotion)    [place] Level (Gait) contact guard; verbal cues  -     Assistive Device (Gait) walker, front-wheeled  -     Distance in Feet (Gait) 60  -     Deviations/Abnormal Patterns (Gait) gait speed decreased; stride length decreased; mere decreased  -     Bilateral Gait Deviations heel strike decreased  -     Row Name             Wound 10/08/21 2247 gluteal    Wound - Properties Group Placement Date: 10/08/21  -ST Placement Time: 2247  -ST Present on Hospital Admission: Y  -ST Location: gluteal  -ST     Retired Wound - Properties Group Date first assessed: 10/08/21  -ST Time first assessed: 2247  -ST Present on Hospital Admission: Y  -ST Location: gluteal  -ST     Row Name 11/04/21 0900          Wound Bilateral lower leg    Wound - Properties Group Present on Hospital Admission: Y  -ST Side: Bilateral  -ST Orientation: lower  -ST Location: leg  -ST Stage, Pressure Injury : other (see comments)  -ST     Care, Wound other (see comments)  unna boots removed  -     Retired Wound - Properties Group Present on Hospital Admission: Y  -ST Side: Bilateral  -ST Location: leg  -ST     Row Name 11/04/21 0900          Plan of Care Review    Plan of Care Reviewed With patient  -     Progress improving  -     Outcome Summary pt c/o increased pain RLE, removed BLE unna boots per RN per APRN, pain relieved in RLE  upon removal of unna boots, pt trans to EOB sba, sit-stand cga, pt amb 60 feet rwx cga, trans to chair cga  -     Row Name 11/04/21 0900          Vital Signs    Post SpO2 (%) 98  -     O2 Delivery Post Treatment room air  -     Row Name 11/04/21 0900          Positioning and Restraints    Pre-Treatment Position in bed  -     Post Treatment Position chair  -     In Chair reclined; call light within reach; encouraged to call for assist; exit alarm on; notified [facility]           User Key  (r) = Recorded By, (t) = Taken By, (c) = Cosigned By    Initials Name Provider Type     Joselin Warren, JAIME Physical Therapy Assistant    Jeannie Marin RN Registered Nurse              Physical Therapy Education                 Title: PT OT SLP Therapies (In Progress)     Topic: Physical Therapy (In Progress)     Point: Mobility training (Done)     Learning Progress Summary           Patient Acceptance, E, VU by  at 11/3/2021 0746    Comment: PT role in care, breathing technique                   Point: Home exercise program (Not Started)     Learner Progress:  Not documented in this visit.          Point: Body mechanics (Done)     Learning Progress Summary           Patient Acceptance, E, VU by  at 11/3/2021 0746    Comment: PT role in care, breathing technique                   Point: Precautions (Done)     Learning Progress Summary           Patient Acceptance, E,TB, VU by  at 11/4/2021 0952    Comment: call for assist    Acceptance, E, VU by  at 11/3/2021 0746    Comment: PT role in care, breathing technique    Acceptance, E, VU by MS at 11/2/2021 1507    Comment: role of PT in her care and use of unna boots                               User Key     Initials Effective Dates Name Provider Type [facility] 06/16/21 -  Joselin Warren PTA Physical Therapy Assistant PT    MS 06/19/18 -  Philly Mcclendon, PT, DPT, NCS Physical Therapist PT    NN 08/18/21 -  Regina Russell, PT Student PT Student PT               PT Recommendation and Plan     Plan of Care Reviewed With: patient  Progress: improving  Outcome Summary: pt c/o increased pain RLE, removed BLE unna boots per RN per APRN, pain relieved in RLE upon removal of unna boots, pt trans to EOB sba, sit-stand cga, pt amb 60 feet rwx cga, trans to chair cga   Outcome Measures     Row Name 11/03/21 0745             How much help from another is currently needed...    Putting on and taking off regular lower body clothing? 1  -AC (r) PF (t) AC (c)      Bathing (including washing, rinsing, and drying) 2  -AC (r) PF (t) AC (c)      Toileting (which includes using toilet bed pan or urinal) 2  -AC (r) PF (t) AC (c)      Putting on and taking off regular upper body clothing 3  -AC (r) PF (t) AC (c)      Taking care of personal grooming (such as brushing teeth) 2  -AC (r) PF (t) AC (c)      Eating meals 3  -AC (r) PF (t) AC (c)      AM-PAC 6 Clicks Score (OT) 13  -AC (r) PF (t)              Functional Assessment    Outcome Measure Options AM-PAC 6 Clicks Daily Activity (OT)  -AC (r) PF (t) AC (c)            User Key  (r) = Recorded By, (t) = Taken By, (c) = Cosigned By    Initials Name Provider Type     Luke Koroma, OTR/L, CNT Occupational Therapist    PF Rafael Mullins Jr., OT Student OT Student                 Time Calculation:    PT Charges     Row Name 11/04/21 0952             Time Calculation    Start Time 0900  -      Stop Time 0923  -      Time Calculation (min) 23 min  -AH      PT Received On 11/04/21  -              Time Calculation- PT    Total Timed Code Minutes- PT 23 minute(s)  -AH              Timed Charges    26344 - Gait Training Minutes  23  -AH              Total Minutes    Timed Charges Total Minutes 23  -AH       Total Minutes 23  -AH            User Key  (r) = Recorded By, (t) = Taken By, (c) = Cosigned By    Initials Name Provider Type     Joselin Warren, PTA Physical Therapy Assistant              Therapy Charges for Today     Code  Description Service Date Service Provider Modifiers Qty    90357176316 HC GAIT TRAINING EA 15 MIN 11/4/2021 Joselin Warren, PTA GP 2          PT G-Codes  Outcome Measure Options: AM-PAC 6 Clicks Basic Mobility (PT)  AM-PAC 6 Clicks Score (PT): 17  AM-PAC 6 Clicks Score (OT): 13    Joselin Warren PTA  11/4/2021

## 2021-11-04 NOTE — CASE MANAGEMENT/SOCIAL WORK
Continued Stay Note   Oral     Patient Name: Honey Canales  MRN: [id number]  Today's Date: 11/4/2021    Admit Date: 10/31/2021     Discharge Plan     Row Name 11/04/21 1125       Plan    Plan Comments Events noted. Plan is still for return to [place]. Will continue to follow.               Discharge Codes    No documentation.                     RENETTA Galarza

## 2021-11-04 NOTE — PLAN OF CARE
Goal Outcome Evaluation:      A&O x 4. RA. VSS. O2 sats in upper 90s on room air. Edema to lower extremities bilaterally. Unna boots removed today ace wraps on BLE, CSM intact. Buttocks red and excoriatied in folds. External cath in place. Son occasionally at bedside today.  Will continue to monitor

## 2021-11-04 NOTE — THERAPY TREATMENT NOTE
Acute Care - Occupational Therapy Treatment Note  [facility]     Patient Name: Honey Canales  : 3/27/1933  MRN: [id number]  Today's Date: 2021  Onset of Illness/Injury or Date of Surgery: 21  Date of Referral to OT: 21  Referring Physician: Tim Perez MD    Admit Date: 10/31/2021       ICD-10-CM ICD-9-CM   1. Acute pulmonary edema (HCC)  J81.0 518.4   2. Congestive heart failure, unspecified HF chronicity, unspecified heart failure type (HCC)  I50.9 428.0   3. Acute on chronic respiratory failure, unspecified whether with hypoxia or hypercapnia (HCC)  J96.20 518.84   4. Chronic kidney disease, unspecified CKD stage  N18.9 585.9   5. Bilateral lower extremity edema  R60.0 782.3   6. Impaired mobility  Z74.09 799.89   7. 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)   • Benign essential HTN   • Pulmonary fibrosis (HCC)   • Stage 3b chronic kidney disease (HCC)   • Hepatitis C   • HCAP (healthcare-associated pneumonia)   • Acute hypoxemic respiratory failure (HCC)   • Acute cystitis with hematuria   • Chronic kidney disease (CKD), stage IV (severe) (HCC)   • Pancytopenia (HCC)   • Acute on chronic diastolic heart failure (HCC)     Past Medical History:   Diagnosis Date   • Acquired hammer toes of both feet 10/15/2020   • Allergic rhinitis    • Aneurysm (HCC)    • Arthritis    • Benign meningioma (HCC) 10/15/2020   • Broken shoulder     Right shoulder    • Cerebral aneurysm 2009    2ND ONE FOUND   • Cerebral aneurysm     NON TREATABLE   • Chronic laryngitis    • CKD (chronic kidney disease)    • Colon polyps    • COPD (chronic obstructive pulmonary disease) (HCC)    • Deviated nasal septum    • Diabetes mellitus (HCC)    • Disorder of kidney and ureter    • Disturbance of smell and taste    • Dizziness     • Encounter for imaging to screen for metal prior to MRI     NO MRI, METAL CLIPS IN HEAD   • Eustachian tube dysfunction    • GERD (gastroesophageal reflux disease)    • Globus sensation    • Hallux valgus, right 2018   • Headache    • Hepatitis     B   • Hepatitis A    • History of stomach ulcers    • Hyperlipidemia    • Hypothyroid    • Laryngitis sicca    • Lung nodule    • Nocturia    • Non-toxic multinodular goiter 10/15/2020   • PONV (postoperative nausea and vomiting)    • Sensorineural hearing loss    • Spinal stenosis    • Uncontrolled type 2 diabetes mellitus without complication, with long-term current use of insulin 2013   • Urge incontinence    • Urgency of urination    • Urinary frequency    • Urinary incontinence    • UTI (urinary tract infection)      Past Surgical History:   Procedure Laterality Date   • BLADDER SURGERY  2016    Medtronic Interstem   • BRAIN SURGERY      ANUERYSM REMOVED   • BREAST SURGERY Bilateral     BIOPSY   • CARPAL TUNNEL RELEASE     • CATARACT EXTRACTION, BILATERAL     • CEREBRAL ANEURYSM REPAIR     •  SECTION      X4   • CHOLECYSTECTOMY     • HYSTERECTOMY     • INTERSTIM PLACEMENT N/A 10/18/2018    Procedure: INTERSTIM STAGES 1 AND 2 LEAD AND GENERATOR REMOVAL AND REPLACEMENT;  Surgeon: Archie Avery MD;  Location: [facility];  Service: Urology   • JOINT REPLACEMENT Right     KNEE   • KNEE ARTHROSCOPY     • THYROIDECTOMY     • TONSILLECTOMY           OT ASSESSMENT FLOWSHEET (last 12 hours)     OT Evaluation and Treatment     Row Name 21 1137                   OT Time and Intention    Subjective Information no complaints  -TS        Document Type therapy note (daily note)  -TS        Mode of Treatment occupational therapy  -TS        Patient Effort good  -TS        Comment retrieved waffle cushion for when up in chair  -TS                  General Information    Existing Precautions/Restrictions fall  -TS                  Cognition     Personal Safety Interventions fall prevention program maintained; gait belt; nonskid shoes/slippers when out of bed  -TS                  Pain Scale: Numbers Pre/Post-Treatment    Pretreatment Pain Rating 0/10 - no pain  -TS        Posttreatment Pain Rating 0/10 - no pain  -TS                  Functional Mobility    Functional Mobility- Ind. Level standby assist  -TS        Functional Mobility- Device rolling walker  -TS        Functional Mobility- Comment in room, in BR  -TS                  Transfer Assessment/Treatment    Transfers sit-stand transfer; stand-sit transfer  -TS                  Transfers    Sit-Stand [place] (Transfers) contact guard; standby assist  -TS        Stand-Sit [place] (Transfers) standby assist  -TS                  Sit-Stand Transfer    Assistive Device (Sit-Stand Transfers) walker, front-wheeled  -TS                  Stand-Sit Transfer    Assistive Device (Stand-Sit Transfers) walker, front-wheeled  -TS                  Wound 10/08/21 2247 gluteal    Wound - Properties Group Placement Date: 10/08/21  -ST Placement Time: 2247 -ST Present on Hospital Admission: Y  -ST Location: gluteal  -ST        Retired Wound - Properties Group Date first assessed: 10/08/21  -ST Time first assessed: 2247 -ST Present on Hospital Admission: Y  -ST Location: gluteal  -ST                  Wound Bilateral lower leg    Wound - Properties Group Present on Hospital Admission: Y  -ST Side: Bilateral  -ST Orientation: lower  -ST Location: leg  -ST Stage, Pressure Injury : other (see comments)  -ST        Retired Wound - Properties Group Present on Hospital Admission: Y  -ST Side: Bilateral  -ST Location: leg  -ST                  Positioning and Restraints    Pre-Treatment Position sitting in chair/recliner  -TS        Post Treatment Position chair  -TS        In Chair reclined; call light within reach; encouraged to call for assist; with family/caregiver  -TS              User Key  (r) = Recorded By,  (t) = Taken By, (c) = Cosigned By    Initials Name Effective Dates    TS Jammie Power COTA 06/16/21 -     Jeanine Marin RN 06/16/21 -                  Occupational Therapy Education                 Title: PT OT SLP Therapies (In Progress)     Topic: Occupational Therapy (Done)     Point: ADL training (Done)     Description:   Instruct learner(s) on proper safety adaptation and remediation techniques during self care or transfers.   Instruct in proper use of assistive devices.              Learning Progress Summary           Patient Acceptance, E,TB, VU,NR by PF at 11/3/2021 0856    Comment: OT POC, Transfer training, energy conservation, BADL training, AE training, D/C Planning                   Point: Precautions (Done)     Description:   Instruct learner(s) on prescribed precautions during self-care and functional transfers.              Learning Progress Summary           Patient Acceptance, E,TB, VU,NR by PF at 11/3/2021 0856    Comment: OT POC, Transfer training, energy conservation, BADL training, AE training, D/C Planning                   Point: Body mechanics (Done)     Description:   Instruct learner(s) on proper positioning and spine alignment during self-care, functional mobility activities and/or exercises.              Learning Progress Summary           Patient Acceptance, E,TB, VU,NR by PF at 11/3/2021 0856    Comment: OT POC, Transfer training, energy conservation, BADL training, AE training, D/C Planning                               User Key     Initials Effective Dates Name Provider Type Discipline     08/04/21 -  Rafael Mullins Jr., OT Student OT Student OT                  OT Recommendation and Plan           Outcome Measures     Row Name 11/04/21 1300 11/03/21 0745          How much help from another is currently needed...    Putting on and taking off regular lower body clothing? 2  -TS 1  -AC (r) PF (t) AC (c)     Bathing (including washing, rinsing, and drying) 2  -TS 2   -AC (r) PF (t) AC (c)     Toileting (which includes using toilet bed pan or urinal) 3  -TS 2  -AC (r) PF (t) AC (c)     Putting on and taking off regular upper body clothing 3  -TS 3  -AC (r) PF (t) AC (c)     Taking care of personal grooming (such as brushing teeth) 3  -TS 2  -AC (r) PF (t) AC (c)     Eating meals 4  -TS 3  -AC (r) PF (t) AC (c)     AM-PAC 6 Clicks Score (OT) 17  -TS 13  -AC (r) PF (t)            Functional Assessment    Outcome Measure Options -- AM-PAC 6 Clicks Daily Activity (OT)  -AC (r) PF (t) AC (c)           User Key  (r) = Recorded By, (t) = Taken By, (c) = Cosigned By    Initials Name Provider Type     Luke Koroma, OTR/L, CNT Occupational Therapist     Jammie Power COTA Occupational Therapy Assistant     Rafael Mullins Jr., OT Student OT Student                Time Calculation:    Time Calculation- OT     Row Name 11/04/21 1309 11/04/21 0952          Time Calculation- OT    OT Start Time 1137  -TS --     OT Stop Time 1200  -TS --     OT Time Calculation (min) 23 min  -TS --     Total Timed Code Minutes- OT 23 minute(s)  -TS --     OT Received On 11/04/21  -TS --            Timed Charges    92047 - Gait Training Minutes  -- 23  -AH     46730 - OT Self Care/Mgmt Minutes 23  -TS --            Total Minutes    Timed Charges Total Minutes 23  -TS 23  -AH      Total Minutes 23  -TS 23  -AH           User Key  (r) = Recorded By, (t) = Taken By, (c) = Cosigned By    Initials Name Provider Type     Joselin Warren, JAIME Physical Therapy Assistant     Jammie Power COTA Occupational Therapy Assistant              Therapy Charges for Today     Code Description Service Date Service Provider Modifiers Qty    83522139671 HC OT SELF CARE/MGMT/TRAIN EA 15 MIN 11/4/2021 Jammie Power COTA GO 2               Jammie HERNÁNDEZ. DIANE Power  11/4/2021

## 2021-11-04 NOTE — PROGRESS NOTES
[facility] Medicine Services  INPATIENT PROGRESS NOTE    Length of Stay: 3  Date of Admission: 10/31/2021  Primary Care Physician: Devon Zuñiga MD    Subjective   Chief Complaint: Follow-up  HPI   Patient resting in bed.  She states she feels about the same today.  She continues to have shortness of breath, states this is no worse than it has been.  She has not been coughing much.  She denies any chest pain.  She denies abdominal pain, nausea, or vomiting.  She complains of pain in the right leg today between her calf and ankle.    Review of Systems   All pertinent negatives and positives are as above. All other systems have been reviewed and are negative unless otherwise stated.     Objective    Temp:  [97.7 °F (36.5 °C)-98.2 °F (36.8 °C)] 98.2 °F (36.8 °C)  Heart Rate:  [64-83] 64  Resp:  [16-18] 18  BP: (134-177)/(51-72) 134/51  Physical Exam  Vitals and nursing note reviewed.   Constitutional:       General: She is not in acute distress.     Appearance: She is ill-appearing.   HENT:      Head: Normocephalic and atraumatic.      Ears:      Comments: [place]  Cardiovascular:      Rate and Rhythm: Normal rate and regular rhythm.      Heart sounds: Murmur heard.      Comments: Sinus 61-84  Pulmonary:      Breath sounds: Wheezing present- bronchial. No rhonchi or rales.      Comments: Tachypnea with conversation. Diminished breath sounds.  Abdominal:      General: Bowel sounds are normal. There is no distension.      Palpations: Abdomen is soft.      Tenderness: There is no abdominal tenderness.   Musculoskeletal:      Cervical back: Normal range of motion and neck supple. Right calf/ankle tenderness.     Right lower leg: Edema present.      Left lower leg: Edema present.      Comments: Edema improved with addition of unna boots.  Skin:     General: Skin is warm and dry.      Findings: No erythema or rash.   Neurological:      General: No focal deficit present.      Mental  Status: She is alert and oriented to person, place, and time.   Psychiatric:         Mood and Affect: Mood normal.         Behavior: Behavior normal.         Thought Content: Thought content normal.         Judgment: Judgment normal.      Results Review:  I have reviewed the labs, radiology results, and diagnostic studies.    Laboratory Data:   Results from last 7 days   Lab Units 11/04/21  0642 11/03/21  0551 11/02/21  0455   WBC 10*3/mm3 6.23 5.27 5.32   HEMOGLOBIN g/dL 8.9* 8.4* 8.4*   HEMATOCRIT % 28.5* 26.3* 26.2*   PLATELETS 10*3/mm3 203 172 157     Results from last 7 days   Lab Units 11/04/21  0642 11/03/21  0551 11/02/21  0455 11/01/21  1403 11/01/21  0616 10/31/21  2321 10/31/21  2321   SODIUM mmol/L 141 140 140   < > 140   < > 139   POTASSIUM mmol/L 3.7 3.9 4.1   < > 4.3   < > 4.6   CHLORIDE mmol/L 107 107 108*   < > 109*   < > 107   CO2 mmol/L 23.0 22.0 22.0   < > 20.0*   < > 21.0*   BUN mg/dL 33* 30* 29*   < > 24*   < > 24*   CREATININE mg/dL 1.96* 2.03* 1.92*   < > 1.75*   < > 1.76*   CALCIUM mg/dL 8.9 8.7 8.7   < > 8.9   < > 8.9   BILIRUBIN mg/dL  --   --   --   --  0.2  --  0.2   ALK PHOS U/L  --   --   --   --  92  --  83   ALT (SGPT) U/L  --   --   --   --  22  --  23   AST (SGOT) U/L  --   --   --   --  18  --  27   GLUCOSE mg/dL 70 131* 151*   < > 169*   < > 159*    < > = values in this interval not displayed.       Intake/Output Summary (Last 24 hours) at 11/4/2021 0978  Last data filed at 11/4/2021 0422  Gross per 24 hour   Intake --   Output 2250 ml   Net -2250 ml     I have reviewed the patient current medications.     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  Patient presented to the emergency department 10/31/2021  with complaints of shortness of breath and lower extremity edema.  Patient was recently admitted to our facility from 10/8 through 10/15 presenting with shortness of breath as well.  Chest x-ray was concerning for bilateral pneumonia.  She was treated with azithromycin and Zosyn, completing course of therapy with Omnicef.  2.  Chest x-ray on admission showed patchy bilateral infiltrate not significantly changed from 3 weeks ago.  D-dimer was elevated.  VQ scan was performed rather than CTA secondary to renal function, which showed low probability for pulmonary embolus.  3.  Patient has had no fever, white blood cell count was normal on admission.  She was recently appropriately treated for healthcare associated pneumonia-would continue to monitor off of antibiotic therapy at this time.  4.  BNP was elevated on admission, patient exhibits fluid overload.  Her transthoracic echocardiogram showed grade 2 diastolic dysfunction with preserved ejection fraction.  Mildly elevated right ventricular systolic pressure.  Moderate aortic valve stenosis present.  We will continue diuresis with Bumex 1 mg daily IV, consider switching to oral therapy tomorrow.  She is status post 1 dose of Diuril 500 mg x 1 yesterday.  Her renal function is improved from yesterday, creatinine has gone from 2.0-1.9.  This is felt to be actually better or near her baseline.  Her creatinine at time of discharge on 10/15 was 2.1.  Continue strict intake and output, daily weights, cardiac diet.  5.  Discontinue Howard catheter.  Place pure wick catheter.  6.  Venous Doppler ultrasound negative for DVT.  Some veins not well visualized due to edema.  Unna boots placed per PT on 11/2.  Will ask PT to evaluate as she is complaining of right leg pain, may need to be removed.  If these are removed, have asked nursing to use Ace wraps in place for compression.  7.  Continue Symbicort, DuoNebs, incentive spirometry.  Continue oral prednisone.  8.  Norvasc was  discontinued as this may have been contributory to lower extremity edema.  Will resume losartan tomorrow if renal function remains stable.  Oral hydralazine 25 mg every 8 hours added yesterday.  Blood pressure trend improving.  9.  Lovenox for DVT prophylaxis.  10.  Last blood glucoses-296, 70, 69.  Will change Levemir from every 12 dosing to daily dosing.  Continue sliding scale as needed.  Last hemoglobin A1c in October was 5.8%.  11.  Patient does have a bed hold at skilled nursing facility.   following.  12.  BMP in AM    Discharge Planning: I expect the patient to be discharged to SNF in 1-2 days.    Electronically signed by YISEL Kitchen, 11/4/2021, 09:41 CDT.

## 2021-11-04 NOTE — PLAN OF CARE
Goal Outcome Evaluation:  Plan of Care Reviewed With: patient        Progress: improving  Outcome Summary: pt c/o increased pain RLE, removed BLE unna boots per RN per APRN, pain relieved in RLE upon removal of unna boots, pt trans to EOB sba, sit-stand cga, pt amb 60 feet rwx cga, trans to chair cga

## 2021-11-05 VITALS
DIASTOLIC BLOOD PRESSURE: 56 MMHG | WEIGHT: 181 LBS | TEMPERATURE: 97.8 F | HEART RATE: 72 BPM | RESPIRATION RATE: 18 BRPM | OXYGEN SATURATION: 96 % | HEIGHT: 66 IN | BODY MASS INDEX: 29.09 KG/M2 | SYSTOLIC BLOOD PRESSURE: 147 MMHG

## 2021-11-05 LAB
ANION GAP SERPL CALCULATED.3IONS-SCNC: 12 MMOL/L (ref 5–15)
BUN SERPL-MCNC: 38 MG/DL (ref 8–23)
BUN/CREAT SERPL: 19 (ref 7–25)
CALCIUM SPEC-SCNC: 8.4 MG/DL (ref 8.6–10.5)
CHLORIDE SERPL-SCNC: 105 MMOL/L (ref 98–107)
CO2 SERPL-SCNC: 23 MMOL/L (ref 22–29)
CREAT SERPL-MCNC: 2 MG/DL (ref 0.57–1)
GFR SERPL CREATININE-BSD FRML MDRD: 24 ML/MIN/1.73
GLUCOSE BLDC GLUCOMTR-MCNC: 144 MG/DL (ref 70–130)
GLUCOSE BLDC GLUCOMTR-MCNC: 203 MG/DL (ref 70–130)
GLUCOSE SERPL-MCNC: 165 MG/DL (ref 65–99)
POTASSIUM SERPL-SCNC: 3.9 MMOL/L (ref 3.5–5.2)
SARS-COV-2 RNA PNL SPEC NAA+PROBE: NOT DETECTED
SODIUM SERPL-SCNC: 140 MMOL/L (ref 136–145)

## 2021-11-05 PROCEDURE — 63710000001 INSULIN LISPRO (HUMAN) PER 5 UNITS: Performed by: NURSE PRACTITIONER

## 2021-11-05 PROCEDURE — 94664 DEMO&/EVAL PT USE INHALER: CPT

## 2021-11-05 PROCEDURE — 25010000002 ENOXAPARIN PER 10 MG: Performed by: NURSE PRACTITIONER

## 2021-11-05 PROCEDURE — 94799 UNLISTED PULMONARY SVC/PX: CPT

## 2021-11-05 PROCEDURE — 97110 THERAPEUTIC EXERCISES: CPT

## 2021-11-05 PROCEDURE — 63710000001 PREDNISONE PER 1 MG: Performed by: NURSE PRACTITIONER

## 2021-11-05 PROCEDURE — 63710000001 INSULIN DETEMIR PER 5 UNITS: Performed by: NURSE PRACTITIONER

## 2021-11-05 PROCEDURE — 97535 SELF CARE MNGMENT TRAINING: CPT

## 2021-11-05 PROCEDURE — 97116 GAIT TRAINING THERAPY: CPT

## 2021-11-05 PROCEDURE — 87635 SARS-COV-2 COVID-19 AMP PRB: CPT | Performed by: INTERNAL MEDICINE

## 2021-11-05 PROCEDURE — 82962 GLUCOSE BLOOD TEST: CPT

## 2021-11-05 PROCEDURE — 80048 BASIC METABOLIC PNL TOTAL CA: CPT | Performed by: NURSE PRACTITIONER

## 2021-11-05 RX ORDER — BUMETANIDE 1 MG/1
1 TABLET ORAL DAILY
Status: DISCONTINUED | OUTPATIENT
Start: 2021-11-05 | End: 2021-11-05 | Stop reason: HOSPADM

## 2021-11-05 RX ORDER — BUMETANIDE 1 MG/1
1 TABLET ORAL DAILY
Start: 2021-11-05

## 2021-11-05 RX ORDER — IPRATROPIUM BROMIDE AND ALBUTEROL SULFATE 2.5; .5 MG/3ML; MG/3ML
3 SOLUTION RESPIRATORY (INHALATION) 4 TIMES DAILY PRN
Qty: 360 ML
Start: 2021-11-05 | End: 2023-01-01 | Stop reason: ALTCHOICE

## 2021-11-05 RX ORDER — HYDRALAZINE HYDROCHLORIDE 25 MG/1
25 TABLET, FILM COATED ORAL EVERY 8 HOURS SCHEDULED
Start: 2021-11-05

## 2021-11-05 RX ADMIN — INSULIN DETEMIR 10 UNITS: 100 INJECTION, SOLUTION SUBCUTANEOUS at 09:16

## 2021-11-05 RX ADMIN — ENOXAPARIN SODIUM 30 MG: 30 INJECTION SUBCUTANEOUS at 06:02

## 2021-11-05 RX ADMIN — BUDESONIDE AND FORMOTEROL FUMARATE DIHYDRATE 2 PUFF: 80; 4.5 AEROSOL RESPIRATORY (INHALATION) at 06:11

## 2021-11-05 RX ADMIN — INSULIN LISPRO 3 UNITS: 100 INJECTION, SOLUTION INTRAVENOUS; SUBCUTANEOUS at 12:24

## 2021-11-05 RX ADMIN — Medication 1 TABLET: at 09:16

## 2021-11-05 RX ADMIN — IPRATROPIUM BROMIDE AND ALBUTEROL SULFATE 3 ML: 2.5; .5 SOLUTION RESPIRATORY (INHALATION) at 06:11

## 2021-11-05 RX ADMIN — BUMETANIDE 1 MG: 1 TABLET ORAL at 09:16

## 2021-11-05 RX ADMIN — PREDNISONE 40 MG: 20 TABLET ORAL at 09:16

## 2021-11-05 RX ADMIN — IPRATROPIUM BROMIDE AND ALBUTEROL SULFATE 3 ML: 2.5; .5 SOLUTION RESPIRATORY (INHALATION) at 10:33

## 2021-11-05 RX ADMIN — SODIUM BICARBONATE 650 MG: 650 TABLET ORAL at 09:16

## 2021-11-05 RX ADMIN — POLYETHYLENE GLYCOL 3350 17 G: 17 POWDER, FOR SOLUTION ORAL at 09:16

## 2021-11-05 RX ADMIN — HYDRALAZINE HYDROCHLORIDE 25 MG: 25 TABLET, FILM COATED ORAL at 06:02

## 2021-11-05 RX ADMIN — LEVOTHYROXINE SODIUM 75 MCG: 75 TABLET ORAL at 06:02

## 2021-11-05 RX ADMIN — OXYBUTYNIN CHLORIDE 5 MG: 5 TABLET, EXTENDED RELEASE ORAL at 09:16

## 2021-11-05 RX ADMIN — DOCUSATE SODIUM 50 MG AND SENNOSIDES 8.6 MG 1 TABLET: 8.6; 5 TABLET, FILM COATED ORAL at 09:16

## 2021-11-05 NOTE — PLAN OF CARE
Goal Outcome Evaluation:  Plan of Care Reviewed With: (P) patient        Progress: (P) no change  Outcome Summary: (P) OT tx complete. Pt sitting up in chair. She performs sponge bath act with set up and additional assist needed for perineal hygiene and purewick repositioning. She did req mod X 2 to stand. PTA in rm to re-wrap BLE ace wraps. Pt educated on retrograde massage from toes to groin for edema management. She plans to dc today. Continue OT POC

## 2021-11-05 NOTE — PLAN OF CARE
Goal Outcome Evaluation:           Progress: improving  Outcome Summary: PT tx completed. Pt supine in bed, c/o BLE hurting and edematous. SBA bed mobility, CG transfers, amb 35' x 2 with r wx CG. Fatigues quickly and short of breath. Exercise sat on room air after walking 97%. Benefit from cont'd PT for strengtheing.

## 2021-11-05 NOTE — PROGRESS NOTES
Continued Stay Note  [facility]     Patient Name: Honey Canales  MRN: [id number]  Today's Date: 11/5/2021    Admit Date: 10/31/2021     Discharge Plan     Row Name 11/05/21 0915       Plan    Plan [facility]    Final Discharge Disposition Code 03 - skilled nursing facility (SNF)    Final Note Patient is discharged today, back to [facility] CARLOS notified facility of discharge. CARLOS faxed discharge summary to [facility] at [phone]. Patient's nurse will call report to [phone].                       Expected Discharge Date and Time     Expected Discharge Date Expected Discharge Time    Nov 5, 2021             ROSA JonW MSW Student

## 2021-11-05 NOTE — THERAPY DISCHARGE NOTE
Acute Care - Occupational Therapy Discharge  [facility]    Patient Name: Honey Canales  : 3/27/1933    MRN: [id number]                              Today's Date: 2021       Admit Date: 10/31/2021    Visit Dx:     ICD-10-CM ICD-9-CM   1. Acute pulmonary edema (HCC)  J81.0 518.4   2. Congestive heart failure, unspecified HF chronicity, unspecified heart failure type (HCC)  I50.9 428.0   3. Acute on chronic respiratory failure, unspecified whether with hypoxia or hypercapnia (HCC)  J96.20 518.84   4. Chronic kidney disease, unspecified CKD stage  N18.9 585.9   5. Bilateral lower extremity edema  R60.0 782.3   6. Impaired mobility  Z74.09 799.89   7. 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)   • Benign essential HTN   • Pulmonary fibrosis (HCC)   • Stage 3b chronic kidney disease (HCC)   • Hepatitis C   • HCAP (healthcare-associated pneumonia)   • Acute hypoxemic respiratory failure (HCC)   • Acute cystitis with hematuria   • Chronic kidney disease (CKD), stage IV (severe) (HCC)   • Pancytopenia (HCC)   • Acute on chronic diastolic heart failure (HCC)     Past Medical History:   Diagnosis Date   • Acquired hammer toes of both feet 10/15/2020   • Allergic rhinitis    • Aneurysm (HCC)    • Arthritis    • Benign meningioma (HCC) 10/15/2020   • Broken shoulder     Right shoulder    • Cerebral aneurysm 2009    2ND ONE FOUND   • Cerebral aneurysm     NON TREATABLE   • Chronic laryngitis    • CKD (chronic kidney disease)    • Colon polyps    • COPD (chronic obstructive pulmonary disease) (HCC)    • Deviated nasal septum    • Diabetes mellitus (HCC)    • Disorder of kidney and ureter    • Disturbance of smell and taste    • Dizziness    • Encounter for imaging to screen for metal prior to MRI     NO MRI, METAL CLIPS IN HEAD   •  Eustachian tube dysfunction    • GERD (gastroesophageal reflux disease)    • Globus sensation    • Hallux valgus, right 2018   • Headache    • Hepatitis     B   • Hepatitis A    • History of stomach ulcers    • Hyperlipidemia    • Hypothyroid    • Laryngitis sicca    • Lung nodule    • Nocturia    • Non-toxic multinodular goiter 10/15/2020   • PONV (postoperative nausea and vomiting)    • Sensorineural hearing loss    • Spinal stenosis    • Uncontrolled type 2 diabetes mellitus without complication, with long-term current use of insulin 2013   • Urge incontinence    • Urgency of urination    • Urinary frequency    • Urinary incontinence    • UTI (urinary tract infection)      Past Surgical History:   Procedure Laterality Date   • BLADDER SURGERY  2016    Medtronic Interstem   • BRAIN SURGERY      ANUERYSM REMOVED   • BREAST SURGERY Bilateral     BIOPSY   • CARPAL TUNNEL RELEASE     • CATARACT EXTRACTION, BILATERAL     • CEREBRAL ANEURYSM REPAIR     •  SECTION      X4   • CHOLECYSTECTOMY     • HYSTERECTOMY     • INTERSTIM PLACEMENT N/A 10/18/2018    Procedure: INTERSTIM STAGES 1 AND 2 LEAD AND GENERATOR REMOVAL AND REPLACEMENT;  Surgeon: Archie Avery MD;  Location: [facility];  Service: Urology   • JOINT REPLACEMENT Right     KNEE   • KNEE ARTHROSCOPY     • THYROIDECTOMY     • TONSILLECTOMY        General Information     Row Name 21 111          OT Time and Intention    Document Type therapy note (daily note)  -MW (r) EJ (t) MW (c)     Mode of Treatment occupational therapy  -MW (r) EJ (t) MW (c)     Row Name 21 111          General Information    Patient Profile Reviewed yes  -MW (r) EJ (t) MW (c)     Existing Precautions/Restrictions fall  -MW (r) EJ (t) MW (c)     Row Name 21 111          Cognition    Orientation Status (Cognition) oriented x 4  -MW (r) EJ (t) MW (c)     Row Name 21 111          Safety Issues, Functional Mobility    Safety Issues Affecting  Function (Mobility) friction/shear risk; insight into deficits/self-awareness  -MW (r) EJ (t) MW (c)     Impairments Affecting Function (Mobility) endurance/activity tolerance; shortness of breath; strength; postural/trunk control  -MW (r) EJ (t) MW (c)           User Key  (r) = Recorded By, (t) = Taken By, (c) = Cosigned By    Initials Name Provider Type    MW Mariah Myers, OTR/L Occupational Therapist    Lora Curran OT Student OT Student               Mobility/ADL's     Row Name 11/05/21 1114          Bed Mobility    Comment (Bed Mobility) up in chair  -MW (r) EJ (t) MW (c)     Row Name 11/05/21 1114          Transfers    Comment (Transfers) sit to stand from chair  -MW (r) EJ (t) MW (c)     Sit-Stand [place] (Transfers) verbal cues; nonverbal cues (demo/gesture); moderate assist (50% patient effort); 2 person assist  -MW (r) EJ (t) MW (c)     Row Name 11/05/21 1114          Sit-Stand Transfer    Assistive Device (Sit-Stand Transfers) walker, front-wheeled  -MW (r) EJ (t) MW (c)     Row Name 11/05/21 1114          Activities of Daily Living    BADL Assessment/Intervention bathing; upper body dressing; lower body dressing; toileting  -MW (r) EJ (t) MW (c)     Row Name 11/05/21 1114          Bathing Assessment/Intervention    [place] Level (Bathing) bathing skills; set up; minimum assist (75% patient effort); moderate assist (50% patient effort)  -MW (r) EJ (t) MW (c)     Position (Bathing) supported sitting  -MW (r) EJ (t) MW (c)     Comment (Bathing) sponge bath. OTS set up and pt was able to complete all bathing except for perineal area.  -MW (r) EJ (t) MW (c)     Row Name 11/05/21 1114          Upper Body Dressing Assessment/Training    [place] Level (Upper Body Dressing) doff; don; front opening garment; minimum assist (75% patient effort)  -MW (r) EJ (t) MW (c)     Position (Upper Body Dressing) supported sitting  -MW (r) EJ (t) MW (c)     Row Name 11/05/21 1114          Lower Body  Dressing Assessment/Training    [place] Level (Lower Body Dressing) doff; don; pants/bottoms; socks; maximum assist (25% patient effort)  -MW (r) EJ (t) MW (c)     Position (Lower Body Dressing) supported standing  -MW (r) EJ (t) MW (c)     Row Name 11/05/21 1114          Grooming Assessment/Training    [place] Level (Grooming) grooming skills; hair care, combing/brushing; wash face, hands; set up  -MW (r) EJ (t) MW (c)     Position (Grooming) supported sitting  -MW (r) EJ (t) MW (c)     Row Name 11/05/21 1114          Toileting Assessment/Training    [place] Level (Toileting) toileting skills; adjust/manage clothing; change pad/brief; perform perineal hygiene; maximum assist (25% patient effort)  -MW (r) EJ (t) MW (c)     Position (Toileting) unsupported sitting  -MW (r) EJ (t) MW (c)     Comment (Toileting) repositioning purewick  - changing brief d/t leakage  -MW (r) EJ (t) MW (c)           User Key  (r) = Recorded By, (t) = Taken By, (c) = Cosigned By    Initials Name Provider Type    Mariah Goldstein, OTR/L Occupational Therapist    Lora Curran, OT Student OT Student               Obj/Interventions     Row Name 11/05/21 1114          Balance    Balance Assessment sitting static balance; sitting dynamic balance  -MW (r) EJ (t) MW (c)     Static Sitting Balance WFL; unsupported; sitting in chair  -MW (r) EJ (t) MW (c)     Dynamic Sitting Balance WFL; unsupported; sitting in chair  -MW (r) EJ (t) MW (c)           User Key  (r) = Recorded By, (t) = Taken By, (c) = Cosigned By    Initials Name Provider Type    Mariah Goldstein, OTR/L Occupational Therapist    Lora Curran, OT Student OT Student               Goals/Plan     Row Name 11/05/21 1114          Transfer Goal 1 (OT)    Activity/Assistive Device (Transfer Goal 1, OT) sit-to-stand/stand-to-sit; toilet; shower chair  -MW (r) EJ (t) MW (c)     [place] Level/Cues Needed (Transfer Goal 1, OT) minimum assist (75% or more  patient effort)  -MW (r) EJ (t) MW (c)     Time Frame (Transfer Goal 1, OT) long term goal (LTG); 10 days  -MW (r) EJ (t) MW (c)     Progress/Outcome (Transfer Goal 1, OT) goal not met; discharged from facility  -MW (r) EJ (t) MW (c)     Row Name 11/05/21 1114          Dressing Goal 1 (OT)    Activity/Device (Dressing Goal 1, OT) dressing skills, all; upper body dressing; lower body dressing; sock-aid; reacher  -MW (r) EJ (t) MW (c)     [place]/Cues Needed (Dressing Goal 1, OT) minimum assist (75% or more patient effort)  -MW (r) EJ (t) MW (c)     Time Frame (Dressing Goal 1, OT) long term goal (LTG); 10 days  -MW (r) EJ (t) MW (c)     Progress/Outcome (Dressing Goal 1, OT) goal not met; discharged from facility  -MW (r) EJ (t) MW (c)     Row Name 11/05/21 1114           Activity Tolerance Goal 1 (OT)    Activity Tolerance Goal 1 (OT) Pt will perform standing ADL with no more than 1 rest break  -MW (r) EJ (t) MW (c)     Activity Level (Endurance Goal 1, OT) 5 min activity  -MW (r) EJ (t) MW (c)     Time Frame (Activity Tolerance Goal 1, OT) short term goal (STG); 10 days  -MW (r) EJ (t) MW (c)     Progress/Outcome (Activity Tolerance Goal 1, OT) goal not met; discharged from facility  -MW (r) EJ (t) MW (c)           User Key  (r) = Recorded By, (t) = Taken By, (c) = Cosigned By    Initials Name Provider Type    MW Mariah Myers, OTR/L Occupational Therapist    Lora Curran, OT Student OT Student               Clinical Impression     Row Name 11/05/21 1412          Pain Assessment    Additional Documentation Pain Scale: Numbers Pre/Post-Treatment (Group)  -MW (r) EJ (t) MW (c)     Row Name 11/05/21 1411          Pain Scale: Numbers Pre/Post-Treatment    Pretreatment Pain Rating 5/10  -MW (r) EJ (t) MW (c)     Posttreatment Pain Rating 5/10  -MW (r) EJ (t) MW (c)     Pain Location - Side Bilateral  -MW (r) EJ (t) MW (c)     Pain Location - Orientation lower  -MW (r) EJ (t) MW (c)     Pain Location  extremity  -MW (r) EJ (t) MW (c)     Pain Intervention(s) Repositioned; Ambulation/increased activity  re-wrap BLE ace wraps  -MW (r) EJ (t) MW (c)     Row Name 11/05/21 1411          Plan of Care Review    Plan of Care Reviewed With patient  -MW (r) EJ (t) MW (c)     Progress no change  -MW (r) EJ (t) MW (c)     Outcome Summary OT tx complete. Pt sitting up in chair. She performs sponge bath act with set up and additional assist needed for perineal hygiene and purewick repositioning. She did req mod X 2 to stand. PTA in rm to re-wrap BLE ace wraps. Pt educated on retrograde massage from toes to groin for edema management. She plans to dc today. Continue OT POC  -MW (r) EJ (t) MW (c)     Row Name 11/05/21 1411          Therapy Assessment/Plan (OT)    Patient/Family Therapy Goal Statement (OT) dc to SNF today  -MW (r) EJ (t) MW (c)     Row Name 11/05/21 1411          Therapy Plan Review/Discharge Plan (OT)    Anticipated Discharge Disposition (OT) skilled nursing facility  -MW (r) EJ (t) MW (c)     Row Name 11/05/21 1411          Vital Signs    O2 Delivery Pre Treatment room air  -MW (r) EJ (t) MW (c)     O2 Delivery Intra Treatment room air  -MW (r) EJ (t) MW (c)     O2 Delivery Post Treatment room air  -MW (r) EJ (t) MW (c)     Row Name 11/05/21 1411          Positioning and Restraints    Pre-Treatment Position sitting in chair/recliner  -MW (r) EJ (t) MW (c)     Post Treatment Position chair  -MW (r) EJ (t) MW (c)     In Chair reclined; call light within reach; encouraged to call for assist; exit alarm on; legs elevated  -MW (r) EJ (t) MW (c)           User Key  (r) = Recorded By, (t) = Taken By, (c) = Cosigned By    Initials Name Provider Type    Mariah Goldstein, OTR/L Occupational Therapist    Lora Curran, OT Student OT Student               Outcome Measures     Row Name 11/05/21 1114          How much help from another is currently needed...    Putting on and taking off regular lower body clothing? 1   -MW (r) EJ (t) MW (c)     Bathing (including washing, rinsing, and drying) 3  -MW (r) EJ (t) MW (c)     Toileting (which includes using toilet bed pan or urinal) 2  -MW (r) EJ (t) MW (c)     Putting on and taking off regular upper body clothing 3  -MW (r) EJ (t) MW (c)     Taking care of personal grooming (such as brushing teeth) 4  -MW (r) EJ (t) MW (c)     Eating meals 4  -MW (r) EJ (t) MW (c)     AM-PAC 6 Clicks Score (OT) 17  -MW (r) EJ (t)     Row Name 11/05/21 1114          Functional Assessment    Outcome Measure Options AM-PAC 6 Clicks Daily Activity (OT)  -MW (r) EJ (t) MW (c)           User Key  (r) = Recorded By, (t) = Taken By, (c) = Cosigned By    Initials Name Provider Type    Mariah Goldstein, OTR/L Occupational Therapist    Lora Curran, OT Student OT Student              Occupational Therapy Education                 Title: PT OT SLP Therapies (Resolved)     Topic: Occupational Therapy (Resolved)     Point: ADL training (Resolved)     Description:   Instruct learner(s) on proper safety adaptation and remediation techniques during self care or transfers.   Instruct in proper use of assistive devices.              Learning Progress Summary           Patient Acceptance, E,TB,D, NR by NATALIO at 11/5/2021 1415    Acceptance, E,TB, VU,NR by PF at 11/3/2021 0856    Comment: OT POC, Transfer training, energy conservation, BADL training, AE training, D/C Planning                   Point: Home exercise program (Resolved)     Description:   Instruct learner(s) on appropriate technique for monitoring, assisting and/or progressing therapeutic exercises/activities.              Learner Progress:  Not documented in this visit.          Point: Precautions (Resolved)     Description:   Instruct learner(s) on prescribed precautions during self-care and functional transfers.              Learning Progress Summary           Patient Acceptance, E,TB,D, NR by NATALIO at 11/5/2021 1415    Acceptance, E,TB, VU,NR by PF at  11/3/2021 0856    Comment: OT POC, Transfer training, energy conservation, BADL training, AE training, D/C Planning                   Point: Body mechanics (Resolved)     Description:   Instruct learner(s) on proper positioning and spine alignment during self-care, functional mobility activities and/or exercises.              Learning Progress Summary           Patient Acceptance, E,TB,D, NR by EJ at 11/5/2021 1415    Acceptance, E,TB, VU,NR by PF at 11/3/2021 0856    Comment: OT POC, Transfer training, energy conservation, BADL training, AE training, D/C Planning                               User Key     Initials Effective Dates Name Provider Type Discipline    EJ 08/04/21 -  Lora Lockett, OT Student OT Student OT    PF 08/04/21 -  Rafael Mullins Jr., OT Student OT Student OT              OT Recommendation and Plan  Retired Outcome Summary/Treatment Plan (OT)  Anticipated Discharge Disposition (OT): skilled nursing facility  Plan of Care Review  Plan of Care Reviewed With: patient  Progress: no change  Outcome Summary: OT tx complete. Pt sitting up in chair. She performs sponge bath act with set up and additional assist needed for perineal hygiene and purewick repositioning. She did req mod X 2 to stand. PTA in rm to re-wrap BLE ace wraps. Pt educated on retrograde massage from toes to groin for edema management. She plans to dc today. Continue OT POC  Plan of Care Reviewed With: patient  Outcome Summary: OT tx complete. Pt sitting up in chair. She performs sponge bath act with set up and additional assist needed for perineal hygiene and purewick repositioning. She did req mod X 2 to stand. PTA in rm to re-wrap BLE ace wraps. Pt educated on retrograde massage from toes to groin for edema management. She plans to dc today. Continue OT POC     Time Calculation:    Time Calculation- OT     Row Name 11/05/21 1114             Time Calculation- OT    OT Start Time 1114  -MW (r) EJ (t) MW (c)      OT Stop Time 1140   -MW (r) EJ (t) MW (c)      OT Time Calculation (min) 26 min  -MW (r) EJ (t)      OT Received On 11/05/21  -MW (r) EJ (t) MW (c)            User Key  (r) = Recorded By, (t) = Taken By, (c) = Cosigned By    Initials Name Provider Type    MW Mariah Myers, OTR/L Occupational Therapist    Radha Curran, OT Student OT Student                       RADHA GARCIA, OT Student  11/5/2021

## 2021-11-05 NOTE — PLAN OF CARE
Goal Outcome Evaluation:     Problem: Adult Inpatient Plan of Care  Goal: Plan of Care Review  Outcome: Ongoing, Progressing  Flowsheets (Taken 11/5/2021 [id number])  Progress: improving (Pended)  Plan of Care Reviewed With: patient (Pended)  Outcome Summary: Pt. in bed being turned every 2 hours. no complaints of pain this shift. remains on room air. safety maintained will continue to monitor. (Pended)

## 2021-11-05 NOTE — THERAPY TREATMENT NOTE
Acute Care - Physical Therapy Treatment Note  [facility]     Patient Name: Honey Canales  : 3/27/1933  MRN: [id number]  Today's Date: 2021   Onset of Illness/Injury or Date of Surgery: 21  Visit Dx:     ICD-10-CM ICD-9-CM   1. Acute pulmonary edema (HCC)  J81.0 518.4   2. Congestive heart failure, unspecified HF chronicity, unspecified heart failure type (HCC)  I50.9 428.0   3. Acute on chronic respiratory failure, unspecified whether with hypoxia or hypercapnia (HCC)  J96.20 518.84   4. Chronic kidney disease, unspecified CKD stage  N18.9 585.9   5. Bilateral lower extremity edema  R60.0 782.3   6. Impaired mobility  Z74.09 799.89   7. 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)   • Benign essential HTN   • Pulmonary fibrosis (HCC)   • Stage 3b chronic kidney disease (HCC)   • Hepatitis C   • HCAP (healthcare-associated pneumonia)   • Acute hypoxemic respiratory failure (HCC)   • Acute cystitis with hematuria   • Chronic kidney disease (CKD), stage IV (severe) (HCC)   • Pancytopenia (HCC)   • Acute on chronic diastolic heart failure (HCC)     Past Medical History:   Diagnosis Date   • Acquired hammer toes of both feet 10/15/2020   • Allergic rhinitis    • Aneurysm (HCC)    • Arthritis    • Benign meningioma (HCC) 10/15/2020   • Broken shoulder     Right shoulder    • Cerebral aneurysm 2009    2ND ONE FOUND   • Cerebral aneurysm     NON TREATABLE   • Chronic laryngitis    • CKD (chronic kidney disease)    • Colon polyps    • COPD (chronic obstructive pulmonary disease) (HCC)    • Deviated nasal septum    • Diabetes mellitus (HCC)    • Disorder of kidney and ureter    • Disturbance of smell and taste    • Dizziness    • Encounter for imaging to screen for metal prior to MRI     NO MRI, METAL CLIPS IN HEAD   •  Eustachian tube dysfunction    • GERD (gastroesophageal reflux disease)    • Globus sensation    • Hallux valgus, right 2018   • Headache    • Hepatitis     B   • Hepatitis A    • History of stomach ulcers    • Hyperlipidemia    • Hypothyroid    • Laryngitis sicca    • Lung nodule    • Nocturia    • Non-toxic multinodular goiter 10/15/2020   • PONV (postoperative nausea and vomiting)    • Sensorineural hearing loss    • Spinal stenosis    • Uncontrolled type 2 diabetes mellitus without complication, with long-term current use of insulin 2013   • Urge incontinence    • Urgency of urination    • Urinary frequency    • Urinary incontinence    • UTI (urinary tract infection)      Past Surgical History:   Procedure Laterality Date   • BLADDER SURGERY  2016    [employer]tronic Interstem   • BRAIN SURGERY      ANUERYSM REMOVED   • BREAST SURGERY Bilateral     BIOPSY   • CARPAL TUNNEL RELEASE     • CATARACT EXTRACTION, BILATERAL     • CEREBRAL ANEURYSM REPAIR     •  SECTION      X4   • CHOLECYSTECTOMY     • HYSTERECTOMY     • INTERSTIM PLACEMENT N/A 10/18/2018    Procedure: INTERSTIM STAGES 1 AND 2 LEAD AND GENERATOR REMOVAL AND REPLACEMENT;  Surgeon: Archie Avery MD;  Location: [facility] OR;  Service: Urology   • JOINT REPLACEMENT Right     KNEE   • KNEE ARTHROSCOPY     • THYROIDECTOMY     • TONSILLECTOMY       PT Assessment (last 12 hours)     PT Evaluation and Treatment     Row Name 21 0821          Physical Therapy Time and Intention    Subjective Information complains of; pain; dyspnea  after walking  -KJ     Document Type therapy note (daily note)  -KJ     Mode of Treatment physical therapy  -KJ     Patient Effort good  -KJ     Row Name 21 0869          General Information    Existing Precautions/Restrictions fall  -KJ     Row Name 21 0819          Pain Scale: Numbers Pre/Post-Treatment    Pretreatment Pain Rating 4/10  -KJ     Posttreatment Pain Rating 6/10  -KJ     Pain Location -  Side Bilateral  -KJ     Pain Location - Orientation lower  -KJ     Pain Location extremity  -KJ     Row Name 11/05/21 0840          Bed Mobility    Supine-Sit Richardson (Bed Mobility) verbal cues; modified independence  -KJ     Assistive Device (Bed Mobility) bed rails  -KJ     Row Name 11/05/21 0840          Transfers    Sit-Stand Richardson (Transfers) verbal cues; contact guard  -KJ     Stand-Sit Richardson (Transfers) verbal cues; supervision  -KJ     Row Name 11/05/21 0840          Sit-Stand Transfer    Assistive Device (Sit-Stand Transfers) walker, front-wheeled  -KJ     Row Name 11/05/21 0840          Stand-Sit Transfer    Assistive Device (Stand-Sit Transfers) walker, front-wheeled  -KJ     Row Name 11/05/21 0840          Gait/Stairs (Locomotion)    Richardson Level (Gait) verbal cues; contact guard  -KJ     Assistive Device (Gait) walker, front-wheeled  -KJ     Distance in Feet (Gait) 35' x 2  -KJ     Deviations/Abnormal Patterns (Gait) gait speed decreased; stride length decreased  -KJ     Bilateral Gait Deviations forward flexed posture  -KJ     Row Name 11/05/21 0840          Motor Skills    Therapeutic Exercise aerobic  -KJ     Row Name 11/05/21 0840          Aerobic Exercise    Comment, Aerobic Exercise (Therapeutic Exercise) AROM x 15 reps  -KJ     Row Name             Wound 10/08/21 2247 gluteal    Wound - Properties Group Placement Date: 10/08/21  -ST Placement Time: 2247 -ST Present on Hospital Admission: Y  -ST Location: gluteal  -ST     Retired Wound - Properties Group Date first assessed: 10/08/21  -ST Time first assessed: 2247 -ST Present on Hospital Admission: Y  -ST Location: gluteal  -ST     Row Name             Wound Bilateral lower leg    Wound - Properties Group Present on Hospital Admission: Y  -ST Side: Bilateral  -ST Orientation: lower  -ST Location: leg  -ST Stage, Pressure Injury : other (see comments)  -ST     Retired Wound - Properties Group Present on Hospital  Admission: Y  -ST Side: Bilateral  -ST Location: leg  -ST     Row Name 11/05/21 0840          Positioning and Restraints    Pre-Treatment Position in bed  -KJ     Post Treatment Position chair  -KJ     In Bed call light within reach; exit alarm on  -KJ           User Key  (r) = Recorded By, (t) = Taken By, (c) = Cosigned By    Initials Name Provider Type    Stephanie Oliver, PTA Physical Therapy Assistant    Jeannie Marin, RN Registered Nurse              Physical Therapy Education                 Title: PT OT SLP Therapies (In Progress)     Topic: Physical Therapy (In Progress)     Point: Mobility training (Done)     Learning Progress Summary           Patient Acceptance, E, VU by  at 11/3/2021 0746    Comment: PT role in care, breathing technique                   Point: Home exercise program (Not Started)     Learner Progress:  Not documented in this visit.          Point: Body mechanics (Done)     Learning Progress Summary           Patient Acceptance, E, VU by  at 11/3/2021 0746    Comment: PT role in care, breathing technique                   Point: Precautions (Done)     Learning Progress Summary           Patient Acceptance, E,TB, VU by  at 11/4/2021 0952    Comment: call for assist    Acceptance, E, VU by  at 11/3/2021 0746    Comment: PT role in care, breathing technique    Acceptance, E, VU by MS at 11/2/2021 1507    Comment: role of PT in her care and use of unna boots                               User Key     Initials Effective Dates Name Provider Type [facility] 06/16/21 -  Joselin Warren, PTA Physical Therapy Assistant PT    MS 06/19/18 -  Philly Mcclendon, PT, DPT, NCS Physical Therapist PT     08/18/21 -  Regina Russell, PT Student PT Student PT              PT Recommendation and Plan     Progress: improving  Outcome Summary: PT tx completed. Pt supine in bed, c/o BLE hurting and edematous. SBA bed mobility, CG transfers, amb 35' x 2 with r wx CG. Fatigues quickly and short  of breath. Exercise sat on room air after walking 97%. Benefit from cont'd PT for strengtheing.   Outcome Measures     Row Name 11/04/21 1300 11/03/21 0745          How much help from another is currently needed...    Putting on and taking off regular lower body clothing? 2  -TS 1  -AC (r) PF (t) AC (c)     Bathing (including washing, rinsing, and drying) 2  -TS 2  -AC (r) PF (t) AC (c)     Toileting (which includes using toilet bed pan or urinal) 3  -TS 2  -AC (r) PF (t) AC (c)     Putting on and taking off regular upper body clothing 3  -TS 3  -AC (r) PF (t) AC (c)     Taking care of personal grooming (such as brushing teeth) 3  -TS 2  -AC (r) PF (t) AC (c)     Eating meals 4  -TS 3  -AC (r) PF (t) AC (c)     AM-PAC 6 Clicks Score (OT) 17  -TS 13  -AC (r) PF (t)            Functional Assessment    Outcome Measure Options -- AM-PAC 6 Clicks Daily Activity (OT)  -AC (r) PF (t) AC (c)           User Key  (r) = Recorded By, (t) = Taken By, (c) = Cosigned By    Initials Name Provider Type    Luke Botello, OTR/L, CNT Occupational Therapist    TS Jammie Power, DIANE Occupational Therapy Assistant    PF Rafael Mullins Jr., OT Student OT Student                 Time Calculation:    PT Charges     Row Name 11/05/21 0950             Time Calculation    Start Time 0840  -KJ      Stop Time 0905  -KJ      Time Calculation (min) 25 min  -KJ      PT Received On 11/05/21  -KJ      PT Goal Re-Cert Due Date 11/13/21  -KJ              Time Calculation- PT    Total Timed Code Minutes- PT 25 minute(s)  -KJ            User Key  (r) = Recorded By, (t) = Taken By, (c) = Cosigned By    Initials Name Provider Type    Stephanie Oliver PTA Physical Therapy Assistant              Therapy Charges for Today     Code Description Service Date Service Provider Modifiers Qty    37563280683 HC GAIT TRAINING EA 15 MIN 11/5/2021 Stephanie Mendieta PTA GP 1    74603066701 HC PT THER PROC EA 15 MIN 11/5/2021 Stephanie Mendieta, PTA GP 1           PT G-Codes  Outcome Measure Options: AM-PAC 6 Clicks Basic Mobility (PT)  AM-PAC 6 Clicks Score (PT): 17  AM-PAC 6 Clicks Score (OT): 17    Stephanie Mendieta, PTA  11/5/2021

## 2021-11-05 NOTE — DISCHARGE SUMMARY
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)      Consults: None    Procedures Performed: None    Pertinent Test Results:   Lab Results (all)     Procedure Component Value Units Date/Time    Basic Metabolic Panel [[id number]]  (Abnormal) Collected: 11/05/21 0527    Specimen: Blood Updated: 11/05/21 0624     Glucose 165 mg/dL      BUN 38 mg/dL      Creatinine 2.00 mg/dL      Sodium 140 mmol/L      Potassium 3.9 mmol/L      Chloride 105 mmol/L      CO2 23.0 mmol/L      Calcium 8.4 mg/dL      eGFR Non African Amer 24 mL/min/1.73      BUN/Creatinine Ratio 19.0     Anion Gap 12.0 mmol/L     POC Glucose Once [[id number]]  (Abnormal) Collected: 11/04/21 2127    Specimen: Blood Updated: 11/04/21 2138     Glucose 369 mg/dL      Comment: : [id number] Philip AbigailMeter ID: [id number]       POC Glucose Once [[id number]]  (Abnormal) Collected: 11/04/21 2125    Specimen: Blood Updated: 11/04/21 2138     Glucose 355 mg/dL      Comment: : [id number] Philip AbigailMeter ID: [id number]       POC Glucose Once [[id number]]  (Abnormal) Collected: 11/04/21 1658    Specimen: Blood Updated: 11/04/21 1709     Glucose 246 mg/dL      Comment: : WOJCIECH Malone Auvik NetworksellMeter ID: [id number]       POC Glucose Once [[id number]]  (Normal) Collected: 11/04/21 1151    Specimen: Blood Updated: 11/04/21 1201     Glucose 127 mg/dL      Comment: : WOJCIECH Malone MachellMeter ID: [id number]        POC Glucose Once [[id number]]  (Abnormal) Collected: 11/04/21 0752    Specimen: Blood Updated: 11/04/21 0803     Glucose 69 mg/dL      Comment: : WOJCIECH ScottellMeter ID: [id number]       Basic Metabolic Panel [[id number]]  (Abnormal) Collected: 11/04/21 0642    Specimen: Blood Updated: 11/04/21 0723     Glucose 70 mg/dL      BUN 33 mg/dL      Creatinine 1.96 mg/dL      Sodium 141 mmol/L      Potassium 3.7 mmol/L      Chloride 107 mmol/L      CO2 23.0 mmol/L      Calcium 8.9 mg/dL      eGFR Non African Amer 24 mL/min/1.73      BUN/Creatinine Ratio 16.8     Anion Gap 11.0 mmol/L     CBC (No Diff) [[id number]]  (Abnormal) Collected: 11/04/21 0642    Specimen: Blood Updated: 11/04/21 0700     WBC 6.23 10*3/mm3      RBC 3.02 10*6/mm3      Hemoglobin 8.9 g/dL      Hematocrit 28.5 %      MCV 94.4 fL      MCH 29.5 pg      MCHC 31.2 g/dL      RDW 17.1 %      RDW-SD 58.8 fl      MPV 10.6 fL      Platelets 203 10*3/mm3     POC Glucose Once [[id number]]  (Abnormal) Collected: 11/03/21 2003    Specimen: Blood Updated: 11/03/21 2016     Glucose 296 mg/dL      Comment: : [id number] Bryan TeriMeter ID: [id number]       POC Glucose Once [[id number]]  (Abnormal) Collected: 11/03/21 1642    Specimen: Blood Updated: 11/03/21 1653     Glucose 265 mg/dL      Comment: : WOJCIECH Malone MachellMeter ID: [id number]       POC Glucose Once [[id number]]  (Abnormal) Collected: 11/03/21 1113    Specimen: Blood Updated: 11/03/21 1125     Glucose 179 mg/dL      Comment: : WOJCIECH Malone MachellMeter ID: [id number]       POC Glucose Once [[id number]]  (Normal) Collected: 11/03/21 0748    Specimen: Blood Updated: 11/03/21 0759     Glucose 121 mg/dL      Comment: : STEPHANIOVENAMAN Malone MachellMeter ID: [id number]       Basic Metabolic Panel [[id number]]  (Abnormal) Collected: 11/03/21 0551    Specimen: Blood Updated: 11/03/21 0703     Glucose 131 mg/dL      BUN 30 mg/dL      Creatinine 2.03 mg/dL      Sodium 140 mmol/L       Potassium 3.9 mmol/L      Chloride 107 mmol/L      CO2 22.0 mmol/L      Calcium 8.7 mg/dL      eGFR Non African Amer 23 mL/min/1.73      BUN/Creatinine Ratio 14.8     Anion Gap 11.0 mmol/L     CBC (No Diff) [[id number]]  (Abnormal) Collected: 11/03/21 0551    Specimen: Blood Updated: 11/03/21 0645     WBC 5.27 10*3/mm3      RBC 2.74 10*6/mm3      Hemoglobin 8.4 g/dL      Hematocrit 26.3 %      MCV 96.0 fL      MCH 30.7 pg      MCHC 31.9 g/dL      RDW 17.3 %      RDW-SD 60.4 fl      MPV 11.3 fL      Platelets 172 10*3/mm3     POC Glucose Once [[id number]]  (Abnormal) Collected: 11/02/21 2149    Specimen: Blood Updated: 11/02/21 2210     Glucose 254 mg/dL      Comment: : [id number] Juliet WWA GroupMeter ID: [id number]       POC Glucose Once [[id number]]  (Abnormal) Collected: 11/02/21 1712    Specimen: Blood Updated: 11/02/21 1724     Glucose 168 mg/dL      Comment: : [id number] viviotaMeter ID: [id number]       POC Glucose Once [[id number]]  (Abnormal) Collected: 11/02/21 1147    Specimen: Blood Updated: 11/02/21 1157     Glucose 216 mg/dL      Comment: : [id number] viviotaMeter ID: [id number]       POC Glucose Once [[id number]]  (Abnormal) Collected: 11/02/21 0751    Specimen: Blood Updated: 11/02/21 0802     Glucose 133 mg/dL      Comment: : [id number] clipsync SanjitaMeter ID: [id number]       Basic Metabolic Panel [[id number]]  (Abnormal) Collected: 11/02/21 0455    Specimen: Blood Updated: 11/02/21 0620     Glucose 151 mg/dL      BUN 29 mg/dL      Creatinine 1.92 mg/dL      Sodium 140 mmol/L      Potassium 4.1 mmol/L      Chloride 108 mmol/L      CO2 22.0 mmol/L      Calcium 8.7 mg/dL      eGFR Non African Amer 25 mL/min/1.73      BUN/Creatinine Ratio 15.1     Anion Gap 10.0 mmol/L     CBC (No Diff) [[id number]]  (Abnormal) Collected: 11/02/21 0455    Specimen: Blood Updated: 11/02/21 0609     WBC 5.32 10*3/mm3      RBC 2.71 10*6/mm3      Hemoglobin 8.4 g/dL      Hematocrit 26.2 %       MCV 96.7 fL      MCH 31.0 pg      MCHC 32.1 g/dL      RDW 17.0 %      RDW-SD 59.6 fl      MPV 11.4 fL      Platelets 157 10*3/mm3     POC Glucose Once [[id number]]  (Abnormal) Collected: 11/01/21 2033    Specimen: Blood Updated: 11/01/21 2046     Glucose 273 mg/dL      Comment: : [id number] Mars ([place]) AmandaMeter ID: [id number]       POC Glucose Once [[id number]]  (Abnormal) Collected: 11/01/21 1633    Specimen: Blood Updated: 11/01/21 1644     Glucose 256 mg/dL      Comment: : [id number] ParaYAZUO SanjitaMeter ID: [id number]       Basic Metabolic Panel [[id number]]  (Abnormal) Collected: 11/01/21 1403    Specimen: Blood Updated: 11/01/21 1435     Glucose 314 mg/dL      BUN 26 mg/dL      Creatinine 1.86 mg/dL      Sodium 139 mmol/L      Potassium 4.5 mmol/L      Comment: Slight hemolysis detected by analyzer. Results may be affected.        Chloride 105 mmol/L      CO2 21.0 mmol/L      Calcium 8.8 mg/dL      eGFR Non African Amer 26 mL/min/1.73      BUN/Creatinine Ratio 14.0     Anion Gap 13.0 mmol/L     POC Glucose Once [[id number]]  (Abnormal) Collected: 11/01/21 1154    Specimen: Blood Updated: 11/01/21 1217     Glucose 277 mg/dL      Comment: : [id number] [email or web address] SanjitaMeter ID: [id number]       POC Glucose Once [[id number]]  (Abnormal) Collected: 11/01/21 0814    Specimen: Blood Updated: 11/01/21 0825     Glucose 180 mg/dL      Comment: : [id number] [email or web address] SanjitaMeter ID: [id number]       Manual Differential [[id number]]  (Abnormal) Collected: 11/01/21 0616    Specimen: Blood Updated: 11/01/21 0748     Neutrophil % 83.8 %      Lymphocyte % 11.1 %      Monocyte % 2.0 %      Eosinophil % 1.0 %      Bands %  1.0 %      Metamyelocyte % 1.0 %      Neutrophils Absolute 2.76 10*3/mm3      Lymphocytes Absolute 0.36 10*3/mm3      Monocytes Absolute 0.07 10*3/mm3      Eosinophils Absolute 0.03 10*3/mm3      Crenated RBC's Large/3+     Poikilocytes Large/3+     WBC Morphology Normal     Platelet Estimate  Decreased     Giant Platelets Mod/2+    CBC Auto Differential [[id number]]  (Abnormal) Collected: 11/01/21 0616    Specimen: Blood Updated: 11/01/21 0748     WBC 3.25 10*3/mm3      RBC 3.10 10*6/mm3      Hemoglobin 9.2 g/dL      Hematocrit 29.6 %      MCV 95.5 fL      MCH 29.7 pg      MCHC 31.1 g/dL      RDW 16.9 %      RDW-SD 59.0 fl      MPV 11.4 fL      Platelets 137 10*3/mm3     Comprehensive Metabolic Panel [[id number]]  (Abnormal) Collected: 11/01/21 0616    Specimen: Blood Updated: 11/01/21 0738     Glucose 169 mg/dL      BUN 24 mg/dL      Creatinine 1.75 mg/dL      Sodium 140 mmol/L      Potassium 4.3 mmol/L      Chloride 109 mmol/L      CO2 20.0 mmol/L      Calcium 8.9 mg/dL      Total Protein 5.9 g/dL      Albumin 3.10 g/dL      ALT (SGPT) 22 U/L      AST (SGOT) 18 U/L      Alkaline Phosphatase 92 U/L      Total Bilirubin 0.2 mg/dL      eGFR Non African Amer 27 mL/min/1.73      Globulin 2.8 gm/dL      A/G Ratio 1.1 g/dL      BUN/Creatinine Ratio 13.7     Anion Gap 11.0 mmol/L     Troponin [[id number]]  (Normal) Collected: 11/01/21 0616    Specimen: Blood Updated: 11/01/21 0736     Troponin T 0.026 ng/mL     Magnesium [[id number]]  (Normal) Collected: 11/01/21 0616    Specimen: Blood Updated: 11/01/21 0732     Magnesium 1.7 mg/dL     COVID PRE-OP / PRE-PROCEDURE SCREENING ORDER (NO ISOLATION) - Swab, Nasal Cavity [[id number]]  (Normal) Collected: 10/31/21 2322    Specimen: Swab from Nasal Cavity Updated: 11/01/21 0009    COVID-19,[facility] IN-HOUSE,Nasal Swab No Transport Media 3-4 HR TAT - Swab, Nasal Cavity [[id number]]  (Normal) Collected: 10/31/21 2322    Specimen: Swab from Nasal Cavity Updated: 11/01/21 0009     COVID19 Not Detected    Comprehensive Metabolic Panel [[id number]]  (Abnormal) Collected: 10/31/21 2321    Specimen: Blood Updated: 10/31/21 6657     Glucose 159 mg/dL      BUN 24 mg/dL      Creatinine 1.76 mg/dL      Sodium 139 mmol/L      Potassium 4.6 mmol/L      Comment: Specimen hemolyzed.   Results may be affected.        Chloride 107 mmol/L      CO2 21.0 mmol/L      Calcium 8.9 mg/dL      Total Protein 5.7 g/dL      Albumin 3.10 g/dL      ALT (SGPT) 23 U/L      Comment: Specimen hemolyzed.  Results may be affected.        AST (SGOT) 27 U/L      Comment: Specimen hemolyzed.  Results may be affected.        Alkaline Phosphatase 83 U/L      Total Bilirubin 0.2 mg/dL      eGFR Non African Amer 27 mL/min/1.73      Globulin 2.6 gm/dL      A/G Ratio 1.2 g/dL      BUN/Creatinine Ratio 13.6     Anion Gap 11.0 mmol/L     Urinalysis, Microscopic Only - Urine, Catheter [[id number]]  (Abnormal) Collected: 10/31/21 2343    Specimen: Urine, Catheter Updated: 10/31/21 2353     RBC, UA 3-5 /HPF      WBC, UA 0-2 /HPF      Bacteria, UA None Seen /HPF      Squamous Epithelial Cells, UA 0-2 /HPF      Hyaline Casts, UA 0-2 /LPF      Methodology Automated Microscopy    Urinalysis With Culture If Indicated - Urine, Catheter [[id number]]  (Abnormal) Collected: 10/31/21 2343    Specimen: Urine, Catheter Updated: 10/31/21 2353     Color, UA Yellow     Appearance, UA Clear     pH, UA <=5.0     Specific Gravity, UA 1.015     Glucose, UA Negative     Ketones, UA Negative     Bilirubin, UA Negative     Blood, UA Small (1+)     Protein,  mg/dL (2+)     Leuk Esterase, UA Negative     Nitrite, UA Negative     Urobilinogen, UA 0.2 E.U./dL    Troponin [[id number]]  (Normal) Collected: 10/31/21 2321    Specimen: Blood Updated: 10/31/21 2348     Troponin T 0.028 ng/mL     BNP [[id number]]  (Abnormal) Collected: 10/31/21 2321    Specimen: Blood Updated: 10/31/21 2347     proBNP 2,610.0 pg/mL     D-dimer, Quantitative [[id number]]  (Abnormal) Collected: 10/31/21 2321    Specimen: Blood Updated: 10/31/21 2338     D-Dimer, Quantitative 1.00 mg/L (FEU)     Protime-INR [[id number]]  (Normal) Collected: 10/31/21 2321    Specimen: Blood Updated: 10/31/21 2338     Protime 13.2 Seconds      INR 1.04    aPTT [[id number]]  (Normal) Collected: 10/31/21  2321    Specimen: Blood Updated: 10/31/21 2338     PTT 30.5 seconds     Blood Gas, Arterial - [[id number]]  (Abnormal) Collected: 10/31/21 2317    Specimen: Arterial Blood Updated: 10/31/21 2334     Site Right Brachial     Duane's Test N/A     pH, Arterial 7.419 pH units      pCO2, Arterial 32.1 mm Hg      Comment: 84 Value below reference range        pO2, Arterial 74.2 mm Hg      Comment: 84 Value below reference range        HCO3, Arterial 20.8 mmol/L      Base Excess, Arterial -3.2 mmol/L      Comment: 84 Value below reference range        O2 Saturation, Arterial 96.7 %      Temperature 37.0 C      Barometric Pressure for Blood Gas 757 mmHg      Modality Room Air     Ventilator Mode NA     Collected by [id number]     Comment: Meter: [id number]     :  [id number]        pCO2, Temperature Corrected 32.1 mm Hg      pH, Temp Corrected 7.419 pH Units      pO2, Temperature Corrected 74.2 mm Hg     CBC & Differential [[id number]]  (Abnormal) Collected: 10/31/21 2321    Specimen: Blood Updated: 10/31/21 2327    CBC Auto Differential [[id number]]  (Abnormal) Collected: 10/31/21 2321    Specimen: Blood Updated: 10/31/21 2327     WBC 5.28 10*3/mm3      RBC 2.98 10*6/mm3      Hemoglobin 8.9 g/dL      Hematocrit 28.3 %      MCV 95.0 fL      MCH 29.9 pg      MCHC 31.4 g/dL      RDW 16.9 %      RDW-SD 58.2 fl      MPV 10.9 fL      Platelets 146 10*3/mm3      Neutrophil % 61.7 %      Lymphocyte % 28.0 %      Monocyte % 7.6 %      Eosinophil % 2.1 %      Basophil % 0.2 %      Immature Grans % 0.4 %      Neutrophils, Absolute 3.26 10*3/mm3      Lymphocytes, Absolute 1.48 10*3/mm3      Monocytes, Absolute 0.40 10*3/mm3      Eosinophils, Absolute 0.11 10*3/mm3      Basophils, Absolute 0.01 10*3/mm3      Immature Grans, Absolute 0.02 10*3/mm3      nRBC 0.0 /100 WBC         Imaging Results (All)     Procedure Component Value Units Date/Time    US Venous Doppler Lower Extremity Bilateral (duplex) [[id number]] Collected: 11/01/21  1450     Updated: 11/01/21 1456    Narrative:      History: Swelling       Impression:      Impression: There is no evidence of deep venous thrombosis or  superficial thrombophlebitis of right or left lower extremities.     Comments: Bilateral lower extremity venous duplex exam was performed  using color Doppler flow, Doppler waveform analysis, and grayscale  imaging, with and without compression. There is no evidence of deep  venous thrombosis in the common femoral, superficial femoral, popliteal,  peroneal, anterior tibial, and posterior tibial veins bilaterally. No  thrombus is identified in the saphenofemoral junctions and greater  saphenous veins bilaterally.         This report was finalized on 11/01/2021 14:51 by Dr. Braydon Ugarte MD.    NM Lung Ventilation Perfusion [[id number]] Collected: 11/01/21 0830     Updated: 11/01/21 1007    Narrative:      EXAMINATION: NM LUNG VENTILATION PERFUSION-     11/1/2021 7:53 AM CDT     HISTORY: Abnormal xray - pleural effusion     Nuclear medicine perfusion lung scan.  Dose: 5.0 mCi technetium MAA.  Route of administration: IV injection.     Comparison is made with a chest x-ray performed approximately 8 hours  ago (October 31, 2021 at 11:37 PM).     Perfusion of the lungs is slightly patchy though given the amount of  parenchymal infiltrate on the chest x-ray there is no focal peripheral  defect to suggest pulmonary embolism.     Summary:  1. Low probability for pulmonary embolism.  This report was finalized on 11/01/2021 10:04 by Dr. Cristóbal Barriga MD.    XR Chest 1 View [[id number]] Collected: 11/01/21 0814     Updated: 11/01/21 0828    Narrative:      EXAMINATION: XR CHEST 1 VW-     10/31/2021 11:52 PM CDT     HISTORY: AUDIBLE WHEEZING/SWOLLEN EXTREMITIES     1 view chest x-ray.     Comparison is made with the one view chest x-ray from October 12, 2021.     Heart size is within normal limits and unchanged.  Normal mediastinum.     There is patchy mixed infiltrate  again seen throughout both lungs.  Slightly lower lung volumes though the bilateral infiltrate is not  significantly changed.  Underlying interstitial component again seen.     Old healed fracture deformity of the proximal right humerus.     Trace amount of pleural fluid.     No pneumothorax.     Summary:  1. Patchy bilateral infiltrate or edema is not significantly changed  from 3 weeks ago.  This report was finalized on 11/01/2021 08:25 by Dr. Cristóbal Barriga MD.        History of Present Illness on Day of Discharge:   Patient is sleeping in bed and he is easily arousable on arrival.  Patient does not have any complaints of pain currently.  He states that her legs feel much better since changing from Unna boots to Ace wraps.  She does feel that the swelling has gotten somewhat better.  She does complain of shortness of breath but it has improved since admission.  He has no complaints of chest pain.  He is agreeable to going back to her nursing home today.    Hospital Course:  The patient is a 88 y.o. female who presented to [facility] from [facility] with complaints of shortness of breath and lower extremity edema.  She has recently been admitted to our facility from 10/8/2021 through 10/15/2021 also presenting with shortness of breath, she was then diagnosed with bilateral pneumonia.  She was treated with azithromycin and Zosyn prior to completing her course of therapy with Omnicef.  Her chest x-ray on this admission showed patchy bilateral infiltrates not significantly changed from that of 3 weeks ago.  Her D-dimer was also elevated.  VQ scan's were performed at that time since a CTA was contraindicated due to her renal function.  This showed a low probability for pulmonary embolism.  Patient also had no fever or elevated white blood cell count, pneumonia was low on suspicion at this point in time.  Her BNP was also elevated and she exhibited fluid overload.  She was admitted to  "hospitalist.    Due to increased BNP and signs of fluid overload an echocardiogram was performed.  It showed grade 2 diastolic dysfunction with preserved ejection fraction, mildly elevated right ventricular systolic pressure, and moderate aortic valve stenosis.  We then chose to diurese with Bumex and continue to watch creatinine levels.  She was also given a one-time dose of IV Diuril.  Although her creatinine did worsen in comparison to admission, her renal function is still felt to be at or near baseline presently with creatinine of 2.0.  We will continue Bumex 1 mg oral for fluid management.  We would recommend checking a BMP at least weekly to monitor renal function while on Bumex.    We performed a venous Doppler due to edema and positive D-dimer.  It was negative for any signs of DVT although some veins not well visualized due to her edema.  Unna boots were placed by PT for compression.  Patient complained that they were uncomfortable in the following days and we decided to switch to Ace wrap for compression.  We recommend compression stockings on discharge if available at skilled nursing facility.    Home medication Norvasc was discontinued as it may be contributing to her lower extremity edema.  We also chose to pause on losartan due to her increased creatinine levels.  With renal function at or near baseline, she will be able to continue losartan on discharge.  We do recommend that she stays off of her Norvasc.  We also decided to add hydralazine 25 mg every 8 for blood pressure control.    Recommend patient continue Symbicort, DuoNebs, and incentive spirometry.  Patient is agreeable to going back to her nursing home today.  She is medically stable and ready for discharge.    Condition on Discharge: Medically stable    Physical Exam on Discharge:  /56 (BP Location: Right arm, Patient Position: Lying)   Pulse 72   Temp 97.8 °F (36.6 °C) (Oral)   Resp 18   Ht 167.6 cm (66\")   Wt 82.1 kg (181 lb)  "  SpO2 96%   BMI 29.21 kg/m²   Physical Exam  Physical Exam  Vitals and nursing note reviewed.   Constitutional:       General: She is not in acute distress.     Appearance: She is ill-appearing.   HENT:      Head: Normocephalic and atraumatic.      Ears:      Comments: [place]  Cardiovascular:      Rate and Rhythm: Normal rate and regular rhythm.      Heart sounds: Murmur heard.      Comments: Sinus 61-83  Pulmonary:      Breath sounds: Wheezing present- bronchial. No rhonchi or rales.      Comments: Tachypnea with conversation. Diminished breath sounds.  Abdominal:      General: Bowel sounds are normal. There is no distension.      Palpations: Abdomen is soft.      Tenderness: There is no abdominal tenderness.   Musculoskeletal:      Cervical back: Normal range of motion and neck supple.      Right lower leg: Edema present.      Left lower leg: Edema present.      Comments: Edema and discomfort improved with addition of Ace wraps.  Skin:     General: Skin is warm and dry.      Findings: No erythema or rash.   Neurological:      General: No focal deficit present.      Mental Status: She is alert and oriented to person, place, and time.   Psychiatric:         Mood and Affect: Mood normal.         Behavior: Behavior normal.         Thought Content: Thought content normal.         Judgment: Judgment normal.     Discharge Disposition:  Skilled Nursing Facility (DC - External)    Discharge Medications:     Discharge Medications      New Medications      Instructions Start Date   bumetanide 1 MG tablet  Commonly known as: BUMEX   1 mg, Oral, Daily      hydrALAZINE 25 MG tablet  Commonly known as: APRESOLINE   25 mg, Oral, Every 8 Hours Scheduled      ipratropium-albuterol 0.5-2.5 mg/3 ml nebulizer  Commonly known as: DUO-NEB   3 mL, Nebulization, 4 Times Daily PRN         Continue These Medications      Instructions Start Date   acetaminophen 500 MG tablet  Commonly known as: TYLENOL   500 mg, Oral, Every 6 Hours PRN       atorvastatin 40 MG tablet  Commonly known as: LIPITOR   40 mg, Oral, Nightly      fluticasone-salmeterol 100-50 MCG/DOSE DISKUS  Commonly known as: ADVAIR   1 puff, Inhalation, 2 Times Daily - RT      GENTEAL TEARS MODERATE PF OP   2 drops, Both Eyes, Every Night at Bedtime      insulin detemir 100 UNIT/ML injection  Commonly known as: LEVEMIR   10 Units, Subcutaneous, 2 Times Daily      insulin lispro 100 UNIT/ML injection  Commonly known as: humaLOG   Inject under the skin TID WM as per sliding scale: 150-199= 2 units 200-249= 3 units 250-299= 4 units 300-349= 5 units 350-399= 6 units >400= 7 units and call MD      levothyroxine 75 MCG tablet  Commonly known as: SYNTHROID, LEVOTHROID   75 mcg, Oral, Daily      losartan 25 MG tablet  Commonly known as: COZAAR   25 mg, Oral, Daily      Menthol-Zinc Oxide 0.44-20.6 % ointment   Apply to coccyx/buttocks Q shift change      Mirabegron ER 50 MG tablet sustained-release 24 hour 24 hr tablet  Commonly known as: MYRBETRIQ   50 mg, Oral, Daily      multivitamin with minerals tablet tablet   1 tablet, Oral, Daily      ondansetron 4 MG tablet  Commonly known as: ZOFRAN   4 mg, Oral, Every 6 Hours PRN      polyethylene glycol 17 g packet  Commonly known as: MIRALAX   17 g, Oral, Daily      prochlorperazine 10 MG tablet  Commonly known as: COMPAZINE   10 mg, Oral, Every 6 Hours PRN      sennosides-docusate 8.6-50 MG per tablet  Commonly known as: PERICOLACE   1 tablet, Oral, 2 Times Daily         Stop These Medications    amLODIPine 5 MG tablet  Commonly known as: NORVASC     Vitamin C 500 MG capsule     ZINC 15 PO          Discharge Diet:   Diet Instructions     Diet: Soft Texture, Consistent Carbohydrate, Cardiac; Nectar / Syrup Thick Liquids; Ground; Nectar / Syrup Thick      Discharge Diet:  Soft Texture  Consistent Carbohydrate  Cardiac       Fluid Consistency: Nectar / Syrup Thick Liquids    Soft Options: Ground    Fluid Consistency: Nectar / Syrup Thick        Activity  at Discharge:   Activity Instructions     Measure Weight      Up WIth Assist          Discharge Care Plan/Instructions:   1.  Return with any new or worsening symptoms.  2.  Stop taking Norvasc, vitamin C, and zinc.  3.  Start taking Bumex and hydralazine.  4.  Utilize nebulizer as needed.  5.  Measure daily weights to monitor fluid balance.  6.  Continue placing compression stockings on patient's lower extremities to help control edema and symptoms.  7.  Recommend weekly monitoring of BMP for at least 4 weeks to monitor creatinine function while utilizing Bumex.    Follow-up Appointments:   1.  With nursing facility physician as soon as possible.    Test Results Pending at Discharge: None    Electronically signed by YISEL Kitchen, 11/5/2021, 08:25 CDT.    Time: 35 minutes

## 2021-11-05 NOTE — THERAPY DISCHARGE NOTE
Acute Care - Physical Therapy Discharge Summary  [facility]       Patient Name: Honey Canales  : 3/27/1933  MRN: [id number]    Today's Date: 2021  Onset of Illness/Injury or Date of Surgery: 21       Referring Physician: Tim Perez MD      Admit Date: 10/31/2021      PT Recommendation and Plan    Visit Dx:    ICD-10-CM ICD-9-CM   1. Acute pulmonary edema (HCC)  J81.0 518.4   2. Congestive heart failure, unspecified HF chronicity, unspecified heart failure type (HCC)  I50.9 428.0   3. Acute on chronic respiratory failure, unspecified whether with hypoxia or hypercapnia (HCC)  J96.20 518.84   4. Chronic kidney disease, unspecified CKD stage  N18.9 585.9   5. Bilateral lower extremity edema  R60.0 782.3   6. Impaired mobility  Z74.09 799.89   7. Decreased activities of daily living (ADL)  Z78.9 V49.89        Outcome Measures     Row Name 21 1300 21 0745          How much help from another is currently needed...    Putting on and taking off regular lower body clothing? 2  -TS 1  -AC (r) PF (t) AC (c)     Bathing (including washing, rinsing, and drying) 2  -TS 2  -AC (r) PF (t) AC (c)     Toileting (which includes using toilet bed pan or urinal) 3  -TS 2  -AC (r) PF (t) AC (c)     Putting on and taking off regular upper body clothing 3  -TS 3  -AC (r) PF (t) AC (c)     Taking care of personal grooming (such as brushing teeth) 3  -TS 2  -AC (r) PF (t) AC (c)     Eating meals 4  -TS 3  -AC (r) PF (t) AC (c)     AM-PAC 6 Clicks Score (OT) 17  -TS 13  -AC (r) PF (t)            Functional Assessment    Outcome Measure Options -- AM-PAC 6 Clicks Daily Activity (OT)  -AC (r) PF (t) AC (c)           User Key  (r) = Recorded By, (t) = Taken By, (c) = Cosigned By    Initials Name Provider Type    Luke Botello, OTR/L, CNT Occupational Therapist    Jammie Spaulding COTA Occupational Therapy Assistant    Rafael Anders Jr., OT Student OT Student                 PT Charges     Row  Name 11/05/21 0950             Time Calculation    Start Time 0840  -KJ      Stop Time 0905  -KJ      Time Calculation (min) 25 min  -KJ      PT Received On 11/05/21  -KJ      PT Goal Re-Cert Due Date 11/13/21  -KJ              Time Calculation- PT    Total Timed Code Minutes- PT 25 minute(s)  -KJ            User Key  (r) = Recorded By, (t) = Taken By, (c) = Cosigned By    Initials Name Provider Type    Stephanie Oliver, PTA Physical Therapy Assistant                 PT Rehab Goals     Row Name 11/05/21 1446             Transfer Goal 1 (PT)    Activity/Assistive Device (Transfer Goal 1, PT) sit-to-stand/stand-to-sit; bed-to-chair/chair-to-bed; walker, rolling  -AB      [place] Level/Cues Needed (Transfer Goal 1, PT) standby assist  -AB      Time Frame (Transfer Goal 1, PT) long term goal (LTG)  -AB      Progress/Outcome (Transfer Goal 1, PT) goal not met  -AB              Gait Training Goal 1 (PT)    Activity/Assistive Device (Gait Training Goal 1, PT) gait (walking locomotion); assistive device use; decrease fall risk; improve balance and speed; increase endurance/gait distance; increase energy conservation; walker, rolling  -AB      [place] Level (Gait Training Goal 1, PT) standby assist  -AB      Distance (Gait Training Goal 1, PT) 75ft  -AB      Time Frame (Gait Training Goal 1, PT) long term goal (LTG)  -AB      Progress/Outcome (Gait Training Goal 1, PT) goal not met  -AB              Problem Specific Goal 1 (PT)    Problem Specific Goal 1 (PT) R unna boot will stay in place for 7 days to provide optimal compression for edema control  -AB      Time Frame (Problem Specific Goal 1, PT) long-term goal (LTG); by discharge  -AB      Progress/Outcome (Problem Specific Goal 1, PT) goal partially met  -AB              Problem Specific Goal 2 (PT)    Problem Specific Goal 2 (PT) L unna boot will stay in place for 7 days to provide optimal compression for edema control.  -AB      Time Frame (Problem Specific  Goal 2, PT) long-term goal (LTG); by discharge  -AB      Progress/Outcome (Problem Specific Goal 2, PT) goal partially met  -AB            User Key  (r) = Recorded By, (t) = Taken By, (c) = Cosigned By    Initials Name Provider Type Discipline    Mariposa Carlton, PTA Physical Therapy Assistant PT                    PT Discharge Summary  Anticipated Discharge Disposition (PT): skilled nursing facility  Reason for Discharge: Discharge from facility  Outcomes Achieved: Refer to plan of care for updates on goals achieved  Discharge Destination: SNF      Mariposa Cavazos PTA   11/5/2021

## 2021-11-12 ENCOUNTER — DOCUMENTATION (OUTPATIENT)
Dept: CT IMAGING | Facility: HOSPITAL | Age: 86
End: 2021-11-12

## 2021-11-12 NOTE — PROGRESS NOTES
Spoke with patient son about incidental findings on previous imaging. He requested that I fax results to [facility] and Dr. Zuñiga's office. I sent to both places.

## 2021-11-24 ENCOUNTER — APPOINTMENT (OUTPATIENT)
Dept: GENERAL RADIOLOGY | Facility: HOSPITAL | Age: 86
End: 2021-11-24

## 2021-11-24 ENCOUNTER — HOSPITAL ENCOUNTER (INPATIENT)
Facility: HOSPITAL | Age: 86
LOS: 3 days | Discharge: NURSING, REHAB OR LONG-TERM CARE | End: 2021-11-27
Attending: EMERGENCY MEDICINE

## 2021-11-24 DIAGNOSIS — I50.9 CONGESTIVE HEART FAILURE, UNSPECIFIED HF CHRONICITY, UNSPECIFIED HEART FAILURE TYPE (HCC): Primary | ICD-10-CM

## 2021-11-24 DIAGNOSIS — R60.0 BILATERAL LEG EDEMA: ICD-10-CM

## 2021-11-24 LAB
ALBUMIN SERPL-MCNC: 3.2 G/DL (ref 3.5–5.2)
ALBUMIN/GLOB SERPL: 1.1 G/DL
ALP SERPL-CCNC: 66 U/L (ref 39–117)
ALT SERPL W P-5'-P-CCNC: 17 U/L (ref 1–33)
ANION GAP SERPL CALCULATED.3IONS-SCNC: 13 MMOL/L (ref 5–15)
APTT PPP: 24.7 SECONDS (ref 24.1–35)
ARTERIAL PATENCY WRIST A: POSITIVE
AST SERPL-CCNC: 22 U/L (ref 1–32)
ATMOSPHERIC PRESS: 753 MMHG
BACTERIA UR QL AUTO: ABNORMAL /HPF
BASE EXCESS BLDA CALC-SCNC: 0.4 MMOL/L (ref 0–2)
BASOPHILS # BLD AUTO: 0.02 10*3/MM3 (ref 0–0.2)
BASOPHILS NFR BLD AUTO: 0.3 % (ref 0–1.5)
BDY SITE: NORMAL
BILIRUB SERPL-MCNC: 0.3 MG/DL (ref 0–1.2)
BILIRUB UR QL STRIP: NEGATIVE
BODY TEMPERATURE: 37 C
BUN SERPL-MCNC: 31 MG/DL (ref 8–23)
BUN/CREAT SERPL: 14.5 (ref 7–25)
CALCIUM SPEC-SCNC: 9 MG/DL (ref 8.6–10.5)
CHLORIDE SERPL-SCNC: 103 MMOL/L (ref 98–107)
CLARITY UR: ABNORMAL
CO2 SERPL-SCNC: 24 MMOL/L (ref 22–29)
COLOR UR: YELLOW
CREAT SERPL-MCNC: 2.14 MG/DL (ref 0.57–1)
CRP SERPL-MCNC: 6.44 MG/DL (ref 0–0.5)
D-LACTATE SERPL-SCNC: 1.4 MMOL/L (ref 0.5–2)
DEPRECATED RDW RBC AUTO: 53.4 FL (ref 37–54)
EOSINOPHIL # BLD AUTO: 0.02 10*3/MM3 (ref 0–0.4)
EOSINOPHIL NFR BLD AUTO: 0.3 % (ref 0.3–6.2)
ERYTHROCYTE [DISTWIDTH] IN BLOOD BY AUTOMATED COUNT: 15.2 % (ref 12.3–15.4)
GAS FLOW AIRWAY: 2.5 LPM
GFR SERPL CREATININE-BSD FRML MDRD: 22 ML/MIN/1.73
GLOBULIN UR ELPH-MCNC: 2.8 GM/DL
GLUCOSE SERPL-MCNC: 90 MG/DL (ref 65–99)
GLUCOSE UR STRIP-MCNC: NEGATIVE MG/DL
HCO3 BLDA-SCNC: 24.8 MMOL/L (ref 20–26)
HCT VFR BLD AUTO: 30.6 % (ref 34–46.6)
HGB BLD-MCNC: 9.8 G/DL (ref 12–15.9)
HGB UR QL STRIP.AUTO: ABNORMAL
HYALINE CASTS UR QL AUTO: ABNORMAL /LPF
IMM GRANULOCYTES # BLD AUTO: 0.06 10*3/MM3 (ref 0–0.05)
IMM GRANULOCYTES NFR BLD AUTO: 0.8 % (ref 0–0.5)
INR PPP: 0.93 (ref 0.91–1.09)
KETONES UR QL STRIP: NEGATIVE
LEUKOCYTE ESTERASE UR QL STRIP.AUTO: ABNORMAL
LYMPHOCYTES # BLD AUTO: 1.6 10*3/MM3 (ref 0.7–3.1)
LYMPHOCYTES NFR BLD AUTO: 20.1 % (ref 19.6–45.3)
Lab: NORMAL
MCH RBC QN AUTO: 30.5 PG (ref 26.6–33)
MCHC RBC AUTO-ENTMCNC: 32 G/DL (ref 31.5–35.7)
MCV RBC AUTO: 95.3 FL (ref 79–97)
MODALITY: NORMAL
MONOCYTES # BLD AUTO: 0.84 10*3/MM3 (ref 0.1–0.9)
MONOCYTES NFR BLD AUTO: 10.5 % (ref 5–12)
NEUTROPHILS NFR BLD AUTO: 5.44 10*3/MM3 (ref 1.7–7)
NEUTROPHILS NFR BLD AUTO: 68 % (ref 42.7–76)
NITRITE UR QL STRIP: POSITIVE
NRBC BLD AUTO-RTO: 0 /100 WBC (ref 0–0.2)
NT-PROBNP SERPL-MCNC: 8765 PG/ML (ref 0–1800)
PCO2 BLDA: 38 MM HG (ref 35–45)
PCO2 TEMP ADJ BLD: 38 MM HG (ref 35–45)
PH BLDA: 7.42 PH UNITS (ref 7.35–7.45)
PH UR STRIP.AUTO: 5.5 [PH] (ref 5–8)
PH, TEMP CORRECTED: 7.42 PH UNITS (ref 7.35–7.45)
PLATELET # BLD AUTO: 214 10*3/MM3 (ref 140–450)
PMV BLD AUTO: 11.1 FL (ref 6–12)
PO2 BLDA: 98.2 MM HG (ref 83–108)
PO2 TEMP ADJ BLD: 98.2 MM HG (ref 83–108)
POTASSIUM SERPL-SCNC: 3.6 MMOL/L (ref 3.5–5.2)
PROCALCITONIN SERPL-MCNC: 0.6 NG/ML (ref 0–0.25)
PROT SERPL-MCNC: 6 G/DL (ref 6–8.5)
PROT UR QL STRIP: ABNORMAL
PROTHROMBIN TIME: 12.1 SECONDS (ref 11.9–14.6)
RBC # BLD AUTO: 3.21 10*6/MM3 (ref 3.77–5.28)
RBC # UR STRIP: ABNORMAL /HPF
REF LAB TEST METHOD: ABNORMAL
SAO2 % BLDCOA: 98.9 % (ref 94–99)
SARS-COV-2 RNA PNL SPEC NAA+PROBE: NOT DETECTED
SODIUM SERPL-SCNC: 140 MMOL/L (ref 136–145)
SP GR UR STRIP: 1.01 (ref 1–1.03)
SQUAMOUS #/AREA URNS HPF: ABNORMAL /HPF
TROPONIN T SERPL-MCNC: 0.04 NG/ML (ref 0–0.03)
UROBILINOGEN UR QL STRIP: ABNORMAL
VENTILATOR MODE: NORMAL
WBC # UR STRIP: ABNORMAL /HPF
WBC CLUMPS # UR AUTO: ABNORMAL /HPF
WBC NRBC COR # BLD: 7.98 10*3/MM3 (ref 3.4–10.8)

## 2021-11-24 PROCEDURE — 87186 SC STD MICRODIL/AGAR DIL: CPT | Performed by: EMERGENCY MEDICINE

## 2021-11-24 PROCEDURE — 93005 ELECTROCARDIOGRAM TRACING: CPT | Performed by: EMERGENCY MEDICINE

## 2021-11-24 PROCEDURE — 63710000001 INSULIN DETEMIR PER 5 UNITS: Performed by: FAMILY MEDICINE

## 2021-11-24 PROCEDURE — 99285 EMERGENCY DEPT VISIT HI MDM: CPT

## 2021-11-24 PROCEDURE — 63710000001 INSULIN LISPRO (HUMAN) PER 5 UNITS: Performed by: FAMILY MEDICINE

## 2021-11-24 PROCEDURE — 87076 CULTURE ANAEROBE IDENT EACH: CPT | Performed by: EMERGENCY MEDICINE

## 2021-11-24 PROCEDURE — 83605 ASSAY OF LACTIC ACID: CPT | Performed by: EMERGENCY MEDICINE

## 2021-11-24 PROCEDURE — 87077 CULTURE AEROBIC IDENTIFY: CPT | Performed by: EMERGENCY MEDICINE

## 2021-11-24 PROCEDURE — 85025 COMPLETE CBC W/AUTO DIFF WBC: CPT | Performed by: EMERGENCY MEDICINE

## 2021-11-24 PROCEDURE — 94640 AIRWAY INHALATION TREATMENT: CPT

## 2021-11-24 PROCEDURE — 81001 URINALYSIS AUTO W/SCOPE: CPT | Performed by: EMERGENCY MEDICINE

## 2021-11-24 PROCEDURE — 93010 ELECTROCARDIOGRAM REPORT: CPT | Performed by: INTERNAL MEDICINE

## 2021-11-24 PROCEDURE — 36600 WITHDRAWAL OF ARTERIAL BLOOD: CPT

## 2021-11-24 PROCEDURE — 85730 THROMBOPLASTIN TIME PARTIAL: CPT | Performed by: EMERGENCY MEDICINE

## 2021-11-24 PROCEDURE — 85610 PROTHROMBIN TIME: CPT | Performed by: EMERGENCY MEDICINE

## 2021-11-24 PROCEDURE — 25010000002 FUROSEMIDE PER 20 MG: Performed by: EMERGENCY MEDICINE

## 2021-11-24 PROCEDURE — 84145 PROCALCITONIN (PCT): CPT | Performed by: EMERGENCY MEDICINE

## 2021-11-24 PROCEDURE — 87086 URINE CULTURE/COLONY COUNT: CPT | Performed by: EMERGENCY MEDICINE

## 2021-11-24 PROCEDURE — 87040 BLOOD CULTURE FOR BACTERIA: CPT | Performed by: EMERGENCY MEDICINE

## 2021-11-24 PROCEDURE — 83880 ASSAY OF NATRIURETIC PEPTIDE: CPT | Performed by: EMERGENCY MEDICINE

## 2021-11-24 PROCEDURE — 86140 C-REACTIVE PROTEIN: CPT | Performed by: EMERGENCY MEDICINE

## 2021-11-24 PROCEDURE — 80053 COMPREHEN METABOLIC PANEL: CPT | Performed by: EMERGENCY MEDICINE

## 2021-11-24 PROCEDURE — 94799 UNLISTED PULMONARY SVC/PX: CPT

## 2021-11-24 PROCEDURE — 82962 GLUCOSE BLOOD TEST: CPT

## 2021-11-24 PROCEDURE — 82803 BLOOD GASES ANY COMBINATION: CPT

## 2021-11-24 PROCEDURE — 71045 X-RAY EXAM CHEST 1 VIEW: CPT

## 2021-11-24 PROCEDURE — 87635 SARS-COV-2 COVID-19 AMP PRB: CPT | Performed by: EMERGENCY MEDICINE

## 2021-11-24 PROCEDURE — 84484 ASSAY OF TROPONIN QUANT: CPT | Performed by: EMERGENCY MEDICINE

## 2021-11-24 RX ORDER — AMOXICILLIN 250 MG
1 CAPSULE ORAL 2 TIMES DAILY
Status: DISCONTINUED | OUTPATIENT
Start: 2021-11-24 | End: 2021-11-27 | Stop reason: HOSPADM

## 2021-11-24 RX ORDER — NICOTINE POLACRILEX 4 MG
15 LOZENGE BUCCAL
Status: DISCONTINUED | OUTPATIENT
Start: 2021-11-24 | End: 2021-11-27 | Stop reason: HOSPADM

## 2021-11-24 RX ORDER — ONDANSETRON 4 MG/1
4 TABLET, FILM COATED ORAL EVERY 6 HOURS PRN
Status: DISCONTINUED | OUTPATIENT
Start: 2021-11-24 | End: 2021-11-27 | Stop reason: HOSPADM

## 2021-11-24 RX ORDER — OXYBUTYNIN CHLORIDE 5 MG/1
10 TABLET, EXTENDED RELEASE ORAL DAILY
Status: DISCONTINUED | OUTPATIENT
Start: 2021-11-25 | End: 2021-11-27 | Stop reason: HOSPADM

## 2021-11-24 RX ORDER — LEVOTHYROXINE SODIUM 0.07 MG/1
75 TABLET ORAL
Status: DISCONTINUED | OUTPATIENT
Start: 2021-11-25 | End: 2021-11-27 | Stop reason: HOSPADM

## 2021-11-24 RX ORDER — SODIUM CHLORIDE 0.9 % (FLUSH) 0.9 %
10 SYRINGE (ML) INJECTION EVERY 12 HOURS SCHEDULED
Status: DISCONTINUED | OUTPATIENT
Start: 2021-11-24 | End: 2021-11-27 | Stop reason: HOSPADM

## 2021-11-24 RX ORDER — DEXTROSE MONOHYDRATE 25 G/50ML
25 INJECTION, SOLUTION INTRAVENOUS
Status: DISCONTINUED | OUTPATIENT
Start: 2021-11-24 | End: 2021-11-27 | Stop reason: HOSPADM

## 2021-11-24 RX ORDER — IPRATROPIUM BROMIDE AND ALBUTEROL SULFATE 2.5; .5 MG/3ML; MG/3ML
3 SOLUTION RESPIRATORY (INHALATION) 4 TIMES DAILY PRN
Status: DISCONTINUED | OUTPATIENT
Start: 2021-11-24 | End: 2021-11-27 | Stop reason: HOSPADM

## 2021-11-24 RX ORDER — ASPIRIN 81 MG/1
324 TABLET, CHEWABLE ORAL ONCE
Status: COMPLETED | OUTPATIENT
Start: 2021-11-24 | End: 2021-11-24

## 2021-11-24 RX ORDER — ACETAMINOPHEN 325 MG/1
650 TABLET ORAL EVERY 4 HOURS PRN
Status: DISCONTINUED | OUTPATIENT
Start: 2021-11-24 | End: 2021-11-27 | Stop reason: HOSPADM

## 2021-11-24 RX ORDER — ATORVASTATIN CALCIUM 40 MG/1
40 TABLET, FILM COATED ORAL NIGHTLY
Status: DISCONTINUED | OUTPATIENT
Start: 2021-11-24 | End: 2021-11-27 | Stop reason: HOSPADM

## 2021-11-24 RX ORDER — BUDESONIDE AND FORMOTEROL FUMARATE DIHYDRATE 160; 4.5 UG/1; UG/1
2 AEROSOL RESPIRATORY (INHALATION)
Status: DISCONTINUED | OUTPATIENT
Start: 2021-11-24 | End: 2021-11-27 | Stop reason: HOSPADM

## 2021-11-24 RX ORDER — POLYETHYLENE GLYCOL 3350 17 G/17G
17 POWDER, FOR SOLUTION ORAL DAILY
Status: DISCONTINUED | OUTPATIENT
Start: 2021-11-25 | End: 2021-11-27 | Stop reason: HOSPADM

## 2021-11-24 RX ORDER — HYDRALAZINE HYDROCHLORIDE 25 MG/1
25 TABLET, FILM COATED ORAL EVERY 8 HOURS SCHEDULED
Status: DISCONTINUED | OUTPATIENT
Start: 2021-11-24 | End: 2021-11-25

## 2021-11-24 RX ORDER — SODIUM CHLORIDE 0.9 % (FLUSH) 0.9 %
10 SYRINGE (ML) INJECTION AS NEEDED
Status: DISCONTINUED | OUTPATIENT
Start: 2021-11-24 | End: 2021-11-27 | Stop reason: HOSPADM

## 2021-11-24 RX ORDER — NITROGLYCERIN 20 MG/100ML
5-200 INJECTION INTRAVENOUS
Status: DISCONTINUED | OUTPATIENT
Start: 2021-11-24 | End: 2021-11-25

## 2021-11-24 RX ORDER — LOSARTAN POTASSIUM 50 MG/1
25 TABLET ORAL DAILY
Status: DISCONTINUED | OUTPATIENT
Start: 2021-11-25 | End: 2021-11-25

## 2021-11-24 RX ADMIN — IPRATROPIUM BROMIDE AND ALBUTEROL SULFATE 3 ML: 2.5; .5 SOLUTION RESPIRATORY (INHALATION) at 22:19

## 2021-11-24 RX ADMIN — FUROSEMIDE 10 MG/HR: 10 INJECTION, SOLUTION INTRAMUSCULAR; INTRAVENOUS at 23:56

## 2021-11-24 RX ADMIN — ASPIRIN 324 MG: 81 TABLET, CHEWABLE ORAL at 19:16

## 2021-11-24 RX ADMIN — BUDESONIDE AND FORMOTEROL FUMARATE DIHYDRATE 2 PUFF: 160; 4.5 AEROSOL RESPIRATORY (INHALATION) at 22:17

## 2021-11-24 RX ADMIN — ATORVASTATIN CALCIUM 40 MG: 40 TABLET, FILM COATED ORAL at 23:36

## 2021-11-24 RX ADMIN — HYDRALAZINE HYDROCHLORIDE 25 MG: 25 TABLET, FILM COATED ORAL at 23:36

## 2021-11-24 RX ADMIN — DOCUSATE SODIUM 50 MG AND SENNOSIDES 8.6 MG 1 TABLET: 8.6; 5 TABLET, FILM COATED ORAL at 23:36

## 2021-11-24 RX ADMIN — INSULIN DETEMIR 10 UNITS: 100 INJECTION, SOLUTION SUBCUTANEOUS at 23:52

## 2021-11-24 RX ADMIN — SODIUM CHLORIDE, PRESERVATIVE FREE 10 ML: 5 INJECTION INTRAVENOUS at 23:43

## 2021-11-24 RX ADMIN — NITROGLYCERIN 5 MCG/MIN: 20 INJECTION INTRAVENOUS at 18:50

## 2021-11-24 RX ADMIN — INSULIN LISPRO 2 UNITS: 100 INJECTION, SOLUTION INTRAVENOUS; SUBCUTANEOUS at 23:52

## 2021-11-25 LAB
ANION GAP SERPL CALCULATED.3IONS-SCNC: 12 MMOL/L (ref 5–15)
ANION GAP SERPL CALCULATED.3IONS-SCNC: 12 MMOL/L (ref 5–15)
ANISOCYTOSIS BLD QL: NORMAL
BUN SERPL-MCNC: 29 MG/DL (ref 8–23)
BUN SERPL-MCNC: 31 MG/DL (ref 8–23)
BUN/CREAT SERPL: 14.1 (ref 7–25)
BUN/CREAT SERPL: 14.5 (ref 7–25)
CALCIUM SPEC-SCNC: 8.4 MG/DL (ref 8.6–10.5)
CALCIUM SPEC-SCNC: 8.4 MG/DL (ref 8.6–10.5)
CHLORIDE SERPL-SCNC: 104 MMOL/L (ref 98–107)
CHLORIDE SERPL-SCNC: 106 MMOL/L (ref 98–107)
CO2 SERPL-SCNC: 21 MMOL/L (ref 22–29)
CO2 SERPL-SCNC: 24 MMOL/L (ref 22–29)
CREAT SERPL-MCNC: 2.06 MG/DL (ref 0.57–1)
CREAT SERPL-MCNC: 2.14 MG/DL (ref 0.57–1)
DEPRECATED RDW RBC AUTO: 51.8 FL (ref 37–54)
ERYTHROCYTE [DISTWIDTH] IN BLOOD BY AUTOMATED COUNT: 15.3 % (ref 12.3–15.4)
GFR SERPL CREATININE-BSD FRML MDRD: 22 ML/MIN/1.73
GFR SERPL CREATININE-BSD FRML MDRD: 23 ML/MIN/1.73
GLUCOSE BLDC GLUCOMTR-MCNC: 124 MG/DL (ref 70–130)
GLUCOSE BLDC GLUCOMTR-MCNC: 156 MG/DL (ref 70–130)
GLUCOSE BLDC GLUCOMTR-MCNC: 188 MG/DL (ref 70–130)
GLUCOSE BLDC GLUCOMTR-MCNC: 194 MG/DL (ref 70–130)
GLUCOSE BLDC GLUCOMTR-MCNC: 69 MG/DL (ref 70–130)
GLUCOSE BLDC GLUCOMTR-MCNC: 99 MG/DL (ref 70–130)
GLUCOSE SERPL-MCNC: 197 MG/DL (ref 65–99)
GLUCOSE SERPL-MCNC: 90 MG/DL (ref 65–99)
HCT VFR BLD AUTO: 26.6 % (ref 34–46.6)
HGB BLD-MCNC: 8.6 G/DL (ref 12–15.9)
HYPOCHROMIA BLD QL: NORMAL
LYMPHOCYTES # BLD MANUAL: 1.4 10*3/MM3 (ref 0.7–3.1)
LYMPHOCYTES NFR BLD MANUAL: 7 % (ref 5–12)
MAGNESIUM SERPL-MCNC: 1.7 MG/DL (ref 1.6–2.4)
MCH RBC QN AUTO: 29.9 PG (ref 26.6–33)
MCHC RBC AUTO-ENTMCNC: 32.3 G/DL (ref 31.5–35.7)
MCV RBC AUTO: 92.4 FL (ref 79–97)
MICROCYTES BLD QL: NORMAL
MONOCYTES # BLD: 0.43 10*3/MM3 (ref 0.1–0.9)
NEUTROPHILS # BLD AUTO: 4.26 10*3/MM3 (ref 1.7–7)
NEUTROPHILS NFR BLD MANUAL: 70 % (ref 42.7–76)
NEUTS VAC BLD QL SMEAR: NORMAL
PLAT MORPH BLD: NORMAL
PLATELET # BLD AUTO: 184 10*3/MM3 (ref 140–450)
PMV BLD AUTO: 10.9 FL (ref 6–12)
POLYCHROMASIA BLD QL SMEAR: NORMAL
POTASSIUM SERPL-SCNC: 3.1 MMOL/L (ref 3.5–5.2)
POTASSIUM SERPL-SCNC: 3.7 MMOL/L (ref 3.5–5.2)
QT INTERVAL: 388 MS
QTC INTERVAL: 444 MS
RBC # BLD AUTO: 2.88 10*6/MM3 (ref 3.77–5.28)
SODIUM SERPL-SCNC: 139 MMOL/L (ref 136–145)
SODIUM SERPL-SCNC: 140 MMOL/L (ref 136–145)
VARIANT LYMPHS NFR BLD MANUAL: 23 % (ref 19.6–45.3)
WBC NRBC COR # BLD: 6.08 10*3/MM3 (ref 3.4–10.8)

## 2021-11-25 PROCEDURE — 85007 BL SMEAR W/DIFF WBC COUNT: CPT | Performed by: FAMILY MEDICINE

## 2021-11-25 PROCEDURE — 94799 UNLISTED PULMONARY SVC/PX: CPT

## 2021-11-25 PROCEDURE — 80048 BASIC METABOLIC PNL TOTAL CA: CPT | Performed by: FAMILY MEDICINE

## 2021-11-25 PROCEDURE — 25010000002 FUROSEMIDE PER 20 MG

## 2021-11-25 PROCEDURE — 25010000002 FUROSEMIDE PER 20 MG: Performed by: EMERGENCY MEDICINE

## 2021-11-25 PROCEDURE — 85027 COMPLETE CBC AUTOMATED: CPT | Performed by: FAMILY MEDICINE

## 2021-11-25 PROCEDURE — 63710000001 INSULIN DETEMIR PER 5 UNITS: Performed by: FAMILY MEDICINE

## 2021-11-25 PROCEDURE — 80048 BASIC METABOLIC PNL TOTAL CA: CPT | Performed by: NURSE PRACTITIONER

## 2021-11-25 PROCEDURE — 83735 ASSAY OF MAGNESIUM: CPT | Performed by: NURSE PRACTITIONER

## 2021-11-25 PROCEDURE — 63710000001 INSULIN LISPRO (HUMAN) PER 5 UNITS: Performed by: FAMILY MEDICINE

## 2021-11-25 PROCEDURE — 25010000002 CEFTRIAXONE PER 250 MG: Performed by: NURSE PRACTITIONER

## 2021-11-25 PROCEDURE — 25010000002 ENOXAPARIN PER 10 MG: Performed by: NURSE PRACTITIONER

## 2021-11-25 PROCEDURE — 82962 GLUCOSE BLOOD TEST: CPT

## 2021-11-25 RX ORDER — FUROSEMIDE 10 MG/ML
80 INJECTION INTRAMUSCULAR; INTRAVENOUS ONCE
Status: COMPLETED | OUTPATIENT
Start: 2021-11-25 | End: 2021-11-25

## 2021-11-25 RX ORDER — IPRATROPIUM BROMIDE AND ALBUTEROL SULFATE 2.5; .5 MG/3ML; MG/3ML
3 SOLUTION RESPIRATORY (INHALATION)
Status: DISCONTINUED | OUTPATIENT
Start: 2021-11-25 | End: 2021-11-27 | Stop reason: HOSPADM

## 2021-11-25 RX ORDER — POTASSIUM CHLORIDE 750 MG/1
40 CAPSULE, EXTENDED RELEASE ORAL EVERY 4 HOURS
Status: COMPLETED | OUTPATIENT
Start: 2021-11-25 | End: 2021-11-25

## 2021-11-25 RX ORDER — HYDRALAZINE HYDROCHLORIDE 50 MG/1
50 TABLET, FILM COATED ORAL EVERY 8 HOURS SCHEDULED
Status: DISCONTINUED | OUTPATIENT
Start: 2021-11-25 | End: 2021-11-27 | Stop reason: HOSPADM

## 2021-11-25 RX ADMIN — IPRATROPIUM BROMIDE AND ALBUTEROL SULFATE 3 ML: 2.5; .5 SOLUTION RESPIRATORY (INHALATION) at 11:07

## 2021-11-25 RX ADMIN — POTASSIUM CHLORIDE 40 MEQ: 10 CAPSULE, COATED, EXTENDED RELEASE ORAL at 12:33

## 2021-11-25 RX ADMIN — INSULIN DETEMIR 10 UNITS: 100 INJECTION, SOLUTION SUBCUTANEOUS at 09:52

## 2021-11-25 RX ADMIN — ACETAMINOPHEN 650 MG: 325 TABLET ORAL at 21:39

## 2021-11-25 RX ADMIN — DOCUSATE SODIUM 50 MG AND SENNOSIDES 8.6 MG 1 TABLET: 8.6; 5 TABLET, FILM COATED ORAL at 21:34

## 2021-11-25 RX ADMIN — HYDRALAZINE HYDROCHLORIDE 25 MG: 25 TABLET, FILM COATED ORAL at 05:37

## 2021-11-25 RX ADMIN — IPRATROPIUM BROMIDE AND ALBUTEROL SULFATE 3 ML: 2.5; .5 SOLUTION RESPIRATORY (INHALATION) at 07:32

## 2021-11-25 RX ADMIN — SACUBITRIL AND VALSARTAN 1 TABLET: 24; 26 TABLET, FILM COATED ORAL at 21:35

## 2021-11-25 RX ADMIN — FUROSEMIDE 80 MG: 10 INJECTION INTRAMUSCULAR; INTRAVENOUS at 21:34

## 2021-11-25 RX ADMIN — IPRATROPIUM BROMIDE AND ALBUTEROL SULFATE 3 ML: 2.5; .5 SOLUTION RESPIRATORY (INHALATION) at 19:35

## 2021-11-25 RX ADMIN — INSULIN LISPRO 2 UNITS: 100 INJECTION, SOLUTION INTRAVENOUS; SUBCUTANEOUS at 18:22

## 2021-11-25 RX ADMIN — LOSARTAN POTASSIUM 25 MG: 50 TABLET, FILM COATED ORAL at 09:38

## 2021-11-25 RX ADMIN — ENOXAPARIN SODIUM 30 MG: 30 INJECTION SUBCUTANEOUS at 12:32

## 2021-11-25 RX ADMIN — HYDRALAZINE HYDROCHLORIDE 50 MG: 50 TABLET ORAL at 16:12

## 2021-11-25 RX ADMIN — INSULIN LISPRO 2 UNITS: 100 INJECTION, SOLUTION INTRAVENOUS; SUBCUTANEOUS at 21:35

## 2021-11-25 RX ADMIN — OXYBUTYNIN CHLORIDE 10 MG: 5 TABLET, EXTENDED RELEASE ORAL at 09:38

## 2021-11-25 RX ADMIN — LEVOTHYROXINE SODIUM 75 MCG: 75 TABLET ORAL at 05:37

## 2021-11-25 RX ADMIN — FUROSEMIDE 10 MG/HR: 10 INJECTION, SOLUTION INTRAMUSCULAR; INTRAVENOUS at 10:34

## 2021-11-25 RX ADMIN — HYDRALAZINE HYDROCHLORIDE 50 MG: 50 TABLET ORAL at 21:35

## 2021-11-25 RX ADMIN — BUDESONIDE AND FORMOTEROL FUMARATE DIHYDRATE 2 PUFF: 160; 4.5 AEROSOL RESPIRATORY (INHALATION) at 19:35

## 2021-11-25 RX ADMIN — SODIUM CHLORIDE, PRESERVATIVE FREE 10 ML: 5 INJECTION INTRAVENOUS at 09:40

## 2021-11-25 RX ADMIN — BUDESONIDE AND FORMOTEROL FUMARATE DIHYDRATE 2 PUFF: 160; 4.5 AEROSOL RESPIRATORY (INHALATION) at 07:32

## 2021-11-25 RX ADMIN — SODIUM CHLORIDE, PRESERVATIVE FREE 10 ML: 5 INJECTION INTRAVENOUS at 21:35

## 2021-11-25 RX ADMIN — ATORVASTATIN CALCIUM 40 MG: 40 TABLET, FILM COATED ORAL at 21:35

## 2021-11-25 RX ADMIN — IPRATROPIUM BROMIDE AND ALBUTEROL SULFATE 3 ML: 2.5; .5 SOLUTION RESPIRATORY (INHALATION) at 14:53

## 2021-11-25 RX ADMIN — SODIUM CHLORIDE 1 G: 900 INJECTION INTRAVENOUS at 09:40

## 2021-11-25 RX ADMIN — DOCUSATE SODIUM 50 MG AND SENNOSIDES 8.6 MG 1 TABLET: 8.6; 5 TABLET, FILM COATED ORAL at 09:42

## 2021-11-25 RX ADMIN — POTASSIUM CHLORIDE 40 MEQ: 10 CAPSULE, COATED, EXTENDED RELEASE ORAL at 16:12

## 2021-11-26 LAB
ANION GAP SERPL CALCULATED.3IONS-SCNC: 12 MMOL/L (ref 5–15)
BUN SERPL-MCNC: 30 MG/DL (ref 8–23)
BUN/CREAT SERPL: 14.5 (ref 7–25)
CALCIUM SPEC-SCNC: 8.5 MG/DL (ref 8.6–10.5)
CHLORIDE SERPL-SCNC: 105 MMOL/L (ref 98–107)
CO2 SERPL-SCNC: 24 MMOL/L (ref 22–29)
CREAT SERPL-MCNC: 2.07 MG/DL (ref 0.57–1)
DEPRECATED RDW RBC AUTO: 53.2 FL (ref 37–54)
ERYTHROCYTE [DISTWIDTH] IN BLOOD BY AUTOMATED COUNT: 15.3 % (ref 12.3–15.4)
GFR SERPL CREATININE-BSD FRML MDRD: 23 ML/MIN/1.73
GLUCOSE BLDC GLUCOMTR-MCNC: 194 MG/DL (ref 70–130)
GLUCOSE BLDC GLUCOMTR-MCNC: 196 MG/DL (ref 70–130)
GLUCOSE BLDC GLUCOMTR-MCNC: 290 MG/DL (ref 70–130)
GLUCOSE SERPL-MCNC: 134 MG/DL (ref 65–99)
HCT VFR BLD AUTO: 28.9 % (ref 34–46.6)
HGB BLD-MCNC: 8.8 G/DL (ref 12–15.9)
MCH RBC QN AUTO: 28.8 PG (ref 26.6–33)
MCHC RBC AUTO-ENTMCNC: 30.4 G/DL (ref 31.5–35.7)
MCV RBC AUTO: 94.4 FL (ref 79–97)
PLATELET # BLD AUTO: 195 10*3/MM3 (ref 140–450)
PMV BLD AUTO: 10.9 FL (ref 6–12)
POTASSIUM SERPL-SCNC: 3.8 MMOL/L (ref 3.5–5.2)
RBC # BLD AUTO: 3.06 10*6/MM3 (ref 3.77–5.28)
SODIUM SERPL-SCNC: 141 MMOL/L (ref 136–145)
WBC NRBC COR # BLD: 5.2 10*3/MM3 (ref 3.4–10.8)

## 2021-11-26 PROCEDURE — 94799 UNLISTED PULMONARY SVC/PX: CPT

## 2021-11-26 PROCEDURE — 29580 STRAPPING UNNA BOOT: CPT

## 2021-11-26 PROCEDURE — 63710000001 INSULIN LISPRO (HUMAN) PER 5 UNITS: Performed by: FAMILY MEDICINE

## 2021-11-26 PROCEDURE — 25010000002 METHYLPREDNISOLONE PER 125 MG: Performed by: NURSE PRACTITIONER

## 2021-11-26 PROCEDURE — 25010000002 ENOXAPARIN PER 10 MG: Performed by: NURSE PRACTITIONER

## 2021-11-26 PROCEDURE — 85027 COMPLETE CBC AUTOMATED: CPT | Performed by: NURSE PRACTITIONER

## 2021-11-26 PROCEDURE — 94664 DEMO&/EVAL PT USE INHALER: CPT

## 2021-11-26 PROCEDURE — 94640 AIRWAY INHALATION TREATMENT: CPT

## 2021-11-26 PROCEDURE — 63710000001 INSULIN DETEMIR PER 5 UNITS: Performed by: NURSE PRACTITIONER

## 2021-11-26 PROCEDURE — 82962 GLUCOSE BLOOD TEST: CPT

## 2021-11-26 PROCEDURE — 97162 PT EVAL MOD COMPLEX 30 MIN: CPT

## 2021-11-26 PROCEDURE — 25010000002 CEFTRIAXONE PER 250 MG: Performed by: NURSE PRACTITIONER

## 2021-11-26 PROCEDURE — 25010000002 FUROSEMIDE PER 20 MG

## 2021-11-26 PROCEDURE — 80048 BASIC METABOLIC PNL TOTAL CA: CPT | Performed by: NURSE PRACTITIONER

## 2021-11-26 RX ORDER — GUAIFENESIN 600 MG/1
1200 TABLET, EXTENDED RELEASE ORAL EVERY 12 HOURS SCHEDULED
Status: DISCONTINUED | OUTPATIENT
Start: 2021-11-26 | End: 2021-11-26 | Stop reason: ALTCHOICE

## 2021-11-26 RX ORDER — SPIRONOLACTONE 25 MG/1
25 TABLET ORAL EVERY OTHER DAY
COMMUNITY
End: 2021-11-27 | Stop reason: HOSPADM

## 2021-11-26 RX ORDER — CARBOXYMETHYLCELLULOSE SODIUM 5 MG/ML
1 SOLUTION/ DROPS OPHTHALMIC NIGHTLY PRN
COMMUNITY

## 2021-11-26 RX ORDER — BENZONATATE 100 MG/1
200 CAPSULE ORAL 3 TIMES DAILY PRN
Status: DISCONTINUED | OUTPATIENT
Start: 2021-11-26 | End: 2021-11-27 | Stop reason: HOSPADM

## 2021-11-26 RX ORDER — PREDNISONE 20 MG/1
20 TABLET ORAL
Status: DISCONTINUED | OUTPATIENT
Start: 2021-11-27 | End: 2021-11-27

## 2021-11-26 RX ORDER — DEXTROMETHORPHAN POLISTIREX 30 MG/5ML
60 SUSPENSION ORAL EVERY 12 HOURS SCHEDULED
Status: DISCONTINUED | OUTPATIENT
Start: 2021-11-26 | End: 2021-11-27 | Stop reason: HOSPADM

## 2021-11-26 RX ORDER — POTASSIUM CHLORIDE 750 MG/1
20 CAPSULE, EXTENDED RELEASE ORAL DAILY
Status: COMPLETED | OUTPATIENT
Start: 2021-11-26 | End: 2021-11-27

## 2021-11-26 RX ORDER — METHYLPREDNISOLONE SODIUM SUCCINATE 125 MG/2ML
80 INJECTION, POWDER, LYOPHILIZED, FOR SOLUTION INTRAMUSCULAR; INTRAVENOUS ONCE
Status: COMPLETED | OUTPATIENT
Start: 2021-11-26 | End: 2021-11-26

## 2021-11-26 RX ADMIN — ENOXAPARIN SODIUM 30 MG: 30 INJECTION SUBCUTANEOUS at 12:10

## 2021-11-26 RX ADMIN — HYDRALAZINE HYDROCHLORIDE 50 MG: 50 TABLET ORAL at 06:01

## 2021-11-26 RX ADMIN — GUAIFENESIN 400 MG: 100 SOLUTION ORAL at 22:07

## 2021-11-26 RX ADMIN — FUROSEMIDE 120 MG: 10 INJECTION, SOLUTION INTRAMUSCULAR; INTRAVENOUS at 09:10

## 2021-11-26 RX ADMIN — POLYETHYLENE GLYCOL 3350 17 G: 17 POWDER, FOR SOLUTION ORAL at 09:01

## 2021-11-26 RX ADMIN — BUDESONIDE AND FORMOTEROL FUMARATE DIHYDRATE 2 PUFF: 160; 4.5 AEROSOL RESPIRATORY (INHALATION) at 20:21

## 2021-11-26 RX ADMIN — SODIUM CHLORIDE, PRESERVATIVE FREE 10 ML: 5 INJECTION INTRAVENOUS at 22:08

## 2021-11-26 RX ADMIN — INSULIN LISPRO 2 UNITS: 100 INJECTION, SOLUTION INTRAVENOUS; SUBCUTANEOUS at 18:24

## 2021-11-26 RX ADMIN — DOCUSATE SODIUM 50 MG AND SENNOSIDES 8.6 MG 1 TABLET: 8.6; 5 TABLET, FILM COATED ORAL at 22:06

## 2021-11-26 RX ADMIN — HYDRALAZINE HYDROCHLORIDE 50 MG: 50 TABLET ORAL at 22:07

## 2021-11-26 RX ADMIN — SODIUM CHLORIDE 1 G: 900 INJECTION INTRAVENOUS at 09:00

## 2021-11-26 RX ADMIN — IPRATROPIUM BROMIDE AND ALBUTEROL SULFATE 3 ML: 2.5; .5 SOLUTION RESPIRATORY (INHALATION) at 07:42

## 2021-11-26 RX ADMIN — OXYBUTYNIN CHLORIDE 10 MG: 5 TABLET, EXTENDED RELEASE ORAL at 09:00

## 2021-11-26 RX ADMIN — DOCUSATE SODIUM 50 MG AND SENNOSIDES 8.6 MG 1 TABLET: 8.6; 5 TABLET, FILM COATED ORAL at 09:00

## 2021-11-26 RX ADMIN — IPRATROPIUM BROMIDE AND ALBUTEROL SULFATE 3 ML: 2.5; .5 SOLUTION RESPIRATORY (INHALATION) at 11:13

## 2021-11-26 RX ADMIN — EMPAGLIFLOZIN 10 MG: 10 TABLET, FILM COATED ORAL at 22:08

## 2021-11-26 RX ADMIN — IPRATROPIUM BROMIDE AND ALBUTEROL SULFATE 3 ML: 2.5; .5 SOLUTION RESPIRATORY (INHALATION) at 20:20

## 2021-11-26 RX ADMIN — BUDESONIDE AND FORMOTEROL FUMARATE DIHYDRATE 2 PUFF: 160; 4.5 AEROSOL RESPIRATORY (INHALATION) at 07:42

## 2021-11-26 RX ADMIN — METHYLPREDNISOLONE SODIUM SUCCINATE 80 MG: 125 INJECTION, POWDER, FOR SOLUTION INTRAMUSCULAR; INTRAVENOUS at 15:26

## 2021-11-26 RX ADMIN — GUAIFENESIN 400 MG: 100 SOLUTION ORAL at 15:47

## 2021-11-26 RX ADMIN — ATORVASTATIN CALCIUM 40 MG: 40 TABLET, FILM COATED ORAL at 22:06

## 2021-11-26 RX ADMIN — SODIUM CHLORIDE, PRESERVATIVE FREE 10 ML: 5 INJECTION INTRAVENOUS at 09:01

## 2021-11-26 RX ADMIN — IPRATROPIUM BROMIDE AND ALBUTEROL SULFATE 3 ML: 2.5; .5 SOLUTION RESPIRATORY (INHALATION) at 15:14

## 2021-11-26 RX ADMIN — INSULIN LISPRO 4 UNITS: 100 INJECTION, SOLUTION INTRAVENOUS; SUBCUTANEOUS at 22:09

## 2021-11-26 RX ADMIN — LEVOTHYROXINE SODIUM 75 MCG: 75 TABLET ORAL at 06:01

## 2021-11-26 RX ADMIN — SACUBITRIL AND VALSARTAN 1 TABLET: 24; 26 TABLET, FILM COATED ORAL at 09:00

## 2021-11-26 RX ADMIN — INSULIN LISPRO 2 UNITS: 100 INJECTION, SOLUTION INTRAVENOUS; SUBCUTANEOUS at 12:10

## 2021-11-26 RX ADMIN — SACUBITRIL AND VALSARTAN 1 TABLET: 24; 26 TABLET, FILM COATED ORAL at 22:06

## 2021-11-26 RX ADMIN — INSULIN DETEMIR 10 UNITS: 100 INJECTION, SOLUTION SUBCUTANEOUS at 09:10

## 2021-11-26 RX ADMIN — POTASSIUM CHLORIDE 20 MEQ: 10 CAPSULE, COATED, EXTENDED RELEASE ORAL at 15:26

## 2021-11-26 RX ADMIN — DEXTROMETHORPHAN POLISTIREX 60 MG: 30 SUSPENSION ORAL at 15:26

## 2021-11-26 RX ADMIN — HYDRALAZINE HYDROCHLORIDE 50 MG: 50 TABLET ORAL at 15:26

## 2021-11-26 RX ADMIN — DEXTROMETHORPHAN POLISTIREX 60 MG: 30 SUSPENSION ORAL at 22:07

## 2021-11-27 VITALS
RESPIRATION RATE: 22 BRPM | BODY MASS INDEX: 30.7 KG/M2 | HEART RATE: 87 BPM | TEMPERATURE: 98 F | OXYGEN SATURATION: 92 % | HEIGHT: 66 IN | DIASTOLIC BLOOD PRESSURE: 76 MMHG | WEIGHT: 191 LBS | SYSTOLIC BLOOD PRESSURE: 152 MMHG

## 2021-11-27 PROBLEM — I35.0 AORTIC STENOSIS: Chronic | Status: ACTIVE | Noted: 2021-11-27

## 2021-11-27 PROBLEM — B96.20 E. COLI UTI (URINARY TRACT INFECTION): Status: ACTIVE | Noted: 2021-11-27

## 2021-11-27 PROBLEM — N39.0 E. COLI UTI (URINARY TRACT INFECTION): Status: ACTIVE | Noted: 2021-11-27

## 2021-11-27 PROBLEM — I36.1 TRICUSPID VALVE REGURGITATION, NONRHEUMATIC: Status: ACTIVE | Noted: 2021-11-27

## 2021-11-27 LAB
ANION GAP SERPL CALCULATED.3IONS-SCNC: 13 MMOL/L (ref 5–15)
BACTERIA SPEC AEROBE CULT: ABNORMAL
BACTERIA SPEC AEROBE CULT: ABNORMAL
BUN SERPL-MCNC: 35 MG/DL (ref 8–23)
BUN/CREAT SERPL: 15.8 (ref 7–25)
CALCIUM SPEC-SCNC: 8.5 MG/DL (ref 8.6–10.5)
CHLORIDE SERPL-SCNC: 103 MMOL/L (ref 98–107)
CO2 SERPL-SCNC: 24 MMOL/L (ref 22–29)
CREAT SERPL-MCNC: 2.22 MG/DL (ref 0.57–1)
DEPRECATED RDW RBC AUTO: 51.8 FL (ref 37–54)
ERYTHROCYTE [DISTWIDTH] IN BLOOD BY AUTOMATED COUNT: 15.1 % (ref 12.3–15.4)
GFR SERPL CREATININE-BSD FRML MDRD: 21 ML/MIN/1.73
GLUCOSE BLDC GLUCOMTR-MCNC: 231 MG/DL (ref 70–130)
GLUCOSE BLDC GLUCOMTR-MCNC: 306 MG/DL (ref 70–130)
GLUCOSE SERPL-MCNC: 244 MG/DL (ref 65–99)
HCT VFR BLD AUTO: 30.5 % (ref 34–46.6)
HGB BLD-MCNC: 9.3 G/DL (ref 12–15.9)
MCH RBC QN AUTO: 28.4 PG (ref 26.6–33)
MCHC RBC AUTO-ENTMCNC: 30.5 G/DL (ref 31.5–35.7)
MCV RBC AUTO: 93.3 FL (ref 79–97)
PLATELET # BLD AUTO: 217 10*3/MM3 (ref 140–450)
PMV BLD AUTO: 10.8 FL (ref 6–12)
POTASSIUM SERPL-SCNC: 4.5 MMOL/L (ref 3.5–5.2)
RBC # BLD AUTO: 3.27 10*6/MM3 (ref 3.77–5.28)
SODIUM SERPL-SCNC: 140 MMOL/L (ref 136–145)
WBC NRBC COR # BLD: 3.25 10*3/MM3 (ref 3.4–10.8)

## 2021-11-27 PROCEDURE — 94799 UNLISTED PULMONARY SVC/PX: CPT

## 2021-11-27 PROCEDURE — 94664 DEMO&/EVAL PT USE INHALER: CPT

## 2021-11-27 PROCEDURE — 82962 GLUCOSE BLOOD TEST: CPT

## 2021-11-27 PROCEDURE — 63710000001 INSULIN LISPRO (HUMAN) PER 5 UNITS: Performed by: FAMILY MEDICINE

## 2021-11-27 PROCEDURE — 85027 COMPLETE CBC AUTOMATED: CPT | Performed by: NURSE PRACTITIONER

## 2021-11-27 PROCEDURE — 25010000002 FUROSEMIDE PER 20 MG

## 2021-11-27 PROCEDURE — 25010000002 CEFTRIAXONE PER 250 MG: Performed by: NURSE PRACTITIONER

## 2021-11-27 PROCEDURE — 63710000001 INSULIN DETEMIR PER 5 UNITS: Performed by: NURSE PRACTITIONER

## 2021-11-27 PROCEDURE — 63710000001 PREDNISONE PER 1 MG: Performed by: NURSE PRACTITIONER

## 2021-11-27 PROCEDURE — 80048 BASIC METABOLIC PNL TOTAL CA: CPT | Performed by: NURSE PRACTITIONER

## 2021-11-27 RX ORDER — AMOXICILLIN 500 MG/1
500 CAPSULE ORAL EVERY 12 HOURS SCHEDULED
Qty: 8 CAPSULE | Refills: 0 | Status: SHIPPED | OUTPATIENT
Start: 2021-11-27 | End: 2021-12-01

## 2021-11-27 RX ORDER — AMOXICILLIN 500 MG/1
500 CAPSULE ORAL EVERY 8 HOURS SCHEDULED
Status: DISCONTINUED | OUTPATIENT
Start: 2021-11-27 | End: 2021-11-27

## 2021-11-27 RX ORDER — AMOXICILLIN 500 MG/1
500 CAPSULE ORAL EVERY 12 HOURS SCHEDULED
Status: DISCONTINUED | OUTPATIENT
Start: 2021-11-27 | End: 2021-11-27 | Stop reason: HOSPADM

## 2021-11-27 RX ADMIN — IPRATROPIUM BROMIDE AND ALBUTEROL SULFATE 3 ML: 2.5; .5 SOLUTION RESPIRATORY (INHALATION) at 10:32

## 2021-11-27 RX ADMIN — INSULIN LISPRO 5 UNITS: 100 INJECTION, SOLUTION INTRAVENOUS; SUBCUTANEOUS at 12:26

## 2021-11-27 RX ADMIN — POTASSIUM CHLORIDE 20 MEQ: 10 CAPSULE, COATED, EXTENDED RELEASE ORAL at 09:26

## 2021-11-27 RX ADMIN — FUROSEMIDE 120 MG: 10 INJECTION, SOLUTION INTRAMUSCULAR; INTRAVENOUS at 09:23

## 2021-11-27 RX ADMIN — EMPAGLIFLOZIN 10 MG: 10 TABLET, FILM COATED ORAL at 09:30

## 2021-11-27 RX ADMIN — INSULIN LISPRO 2 UNITS: 100 INJECTION, SOLUTION INTRAVENOUS; SUBCUTANEOUS at 09:24

## 2021-11-27 RX ADMIN — BUDESONIDE AND FORMOTEROL FUMARATE DIHYDRATE 2 PUFF: 160; 4.5 AEROSOL RESPIRATORY (INHALATION) at 06:56

## 2021-11-27 RX ADMIN — SODIUM CHLORIDE 1 G: 900 INJECTION INTRAVENOUS at 09:23

## 2021-11-27 RX ADMIN — GUAIFENESIN 400 MG: 100 SOLUTION ORAL at 12:28

## 2021-11-27 RX ADMIN — LEVOTHYROXINE SODIUM 75 MCG: 75 TABLET ORAL at 05:34

## 2021-11-27 RX ADMIN — GUAIFENESIN 400 MG: 100 SOLUTION ORAL at 09:23

## 2021-11-27 RX ADMIN — SACUBITRIL AND VALSARTAN 1 TABLET: 24; 26 TABLET, FILM COATED ORAL at 09:26

## 2021-11-27 RX ADMIN — DOCUSATE SODIUM 50 MG AND SENNOSIDES 8.6 MG 1 TABLET: 8.6; 5 TABLET, FILM COATED ORAL at 09:26

## 2021-11-27 RX ADMIN — DEXTROMETHORPHAN POLISTIREX 60 MG: 30 SUSPENSION ORAL at 09:24

## 2021-11-27 RX ADMIN — POLYETHYLENE GLYCOL 3350 17 G: 17 POWDER, FOR SOLUTION ORAL at 09:30

## 2021-11-27 RX ADMIN — HYDRALAZINE HYDROCHLORIDE 50 MG: 50 TABLET ORAL at 05:34

## 2021-11-27 RX ADMIN — IPRATROPIUM BROMIDE AND ALBUTEROL SULFATE 3 ML: 2.5; .5 SOLUTION RESPIRATORY (INHALATION) at 06:56

## 2021-11-27 RX ADMIN — SODIUM CHLORIDE, PRESERVATIVE FREE 10 ML: 5 INJECTION INTRAVENOUS at 09:25

## 2021-11-27 RX ADMIN — INSULIN DETEMIR 10 UNITS: 100 INJECTION, SOLUTION SUBCUTANEOUS at 09:25

## 2021-11-27 RX ADMIN — OXYBUTYNIN CHLORIDE 10 MG: 5 TABLET, EXTENDED RELEASE ORAL at 09:26

## 2021-11-27 RX ADMIN — PREDNISONE 20 MG: 20 TABLET ORAL at 09:26

## 2021-11-27 RX ADMIN — GUAIFENESIN 400 MG: 100 SOLUTION ORAL at 05:34

## 2021-11-29 LAB
BACTERIA SPEC AEROBE CULT: NORMAL
BACTERIA SPEC AEROBE CULT: NORMAL

## 2021-12-06 ENCOUNTER — OFFICE VISIT (OUTPATIENT)
Dept: CARDIOLOGY | Facility: CLINIC | Age: 86
End: 2021-12-06

## 2021-12-06 VITALS
DIASTOLIC BLOOD PRESSURE: 60 MMHG | HEART RATE: 77 BPM | BODY MASS INDEX: 27.8 KG/M2 | WEIGHT: 173 LBS | OXYGEN SATURATION: 99 % | SYSTOLIC BLOOD PRESSURE: 138 MMHG | HEIGHT: 66 IN

## 2021-12-06 DIAGNOSIS — I50.32 CHRONIC DIASTOLIC CONGESTIVE HEART FAILURE (HCC): ICD-10-CM

## 2021-12-06 DIAGNOSIS — I35.0 NONRHEUMATIC AORTIC VALVE STENOSIS: Primary | Chronic | ICD-10-CM

## 2021-12-06 DIAGNOSIS — I10 BENIGN ESSENTIAL HTN: ICD-10-CM

## 2021-12-06 PROCEDURE — 99204 OFFICE O/P NEW MOD 45 MIN: CPT | Performed by: INTERNAL MEDICINE

## 2021-12-06 PROCEDURE — 93000 ELECTROCARDIOGRAM COMPLETE: CPT | Performed by: INTERNAL MEDICINE

## 2021-12-06 NOTE — PATIENT INSTRUCTIONS
"Low-Sodium Eating Plan  Sodium, which is an element that makes up salt, helps you maintain a healthy balance of fluids in your body. Too much sodium can increase your blood pressure and cause fluid and waste to be held in your body.  Your health care provider or dietitian may recommend following this plan if you have high blood pressure (hypertension), kidney disease, liver disease, or heart failure. Eating less sodium can help lower your blood pressure, reduce swelling, and protect your heart, liver, and kidneys.  What are tips for following this plan?  Reading food labels  · The Nutrition Facts label lists the amount of sodium in one serving of the food. If you eat more than one serving, you must multiply the listed amount of sodium by the number of servings.  · Choose foods with less than 140 mg of sodium per serving.  · Avoid foods with 300 mg of sodium or more per serving.  Shopping    · Look for lower-sodium products, often labeled as \"low-sodium\" or \"no salt added.\"  · Always check the sodium content, even if foods are labeled as \"unsalted\" or \"no salt added.\"  · Buy fresh foods.  ? Avoid canned foods and pre-made or frozen meals.  ? Avoid canned, cured, or processed meats.  · Buy breads that have less than 80 mg of sodium per slice.    Cooking    · Eat more home-cooked food and less restaurant, buffet, and fast food.  · Avoid adding salt when cooking. Use salt-free seasonings or herbs instead of table salt or sea salt. Check with your health care provider or pharmacist before using salt substitutes.  · Cook with plant-based oils, such as canola, sunflower, or olive oil.    Meal planning  · When eating at a restaurant, ask that your food be prepared with less salt or no salt, if possible. Avoid dishes labeled as brined, pickled, cured, smoked, or made with soy sauce, miso, or teriyaki sauce.  · Avoid foods that contain MSG (monosodium glutamate). MSG is sometimes added to Chinese food, bouillon, and some " "canned foods.  · Make meals that can be grilled, baked, poached, roasted, or steamed. These are generally made with less sodium.  General information  Most people on this plan should limit their sodium intake to 1,500-2,000 mg (milligrams) of sodium each day.  What foods should I eat?  Fruits  Fresh, frozen, or canned fruit. Fruit juice.  Vegetables  Fresh or frozen vegetables. \"No salt added\" canned vegetables. \"No salt added\" tomato sauce and paste. Low-sodium or reduced-sodium tomato and vegetable juice.  Grains  Low-sodium cereals, including oats, puffed wheat and rice, and shredded wheat. Low-sodium crackers. Unsalted rice. Unsalted pasta. Low-sodium bread. Whole-grain breads and whole-grain pasta.  Meats and other proteins  Fresh or frozen (no salt added) meat, poultry, seafood, and fish. Low-sodium canned tuna and salmon. Unsalted nuts. Dried peas, beans, and lentils without added salt. Unsalted canned beans. Eggs. Unsalted nut butters.  Dairy  Milk. Soy milk. Cheese that is naturally low in sodium, such as ricotta cheese, fresh mozzarella, or Swiss cheese. Low-sodium or reduced-sodium cheese. Cream cheese. Yogurt.  Seasonings and condiments  Fresh and dried herbs and spices. Salt-free seasonings. Low-sodium mustard and ketchup. Sodium-free salad dressing. Sodium-free light mayonnaise. Fresh or refrigerated horseradish. Lemon juice. Vinegar.  Other foods  Homemade, reduced-sodium, or low-sodium soups. Unsalted popcorn and pretzels. Low-salt or salt-free chips.  The items listed above may not be a complete list of foods and beverages you can eat. Contact a dietitian for more information.  What foods should I avoid?  Vegetables  Sauerkraut, pickled vegetables, and relishes. Olives. French fries. Onion rings. Regular canned vegetables (not low-sodium or reduced-sodium). Regular canned tomato sauce and paste (not low-sodium or reduced-sodium). Regular tomato and vegetable juice (not low-sodium or reduced-sodium). " Frozen vegetables in sauces.  Grains  Instant hot cereals. Bread stuffing, pancake, and biscuit mixes. Croutons. Seasoned rice or pasta mixes. Noodle soup cups. Boxed or frozen macaroni and cheese. Regular salted crackers. Self-rising flour.  Meats and other proteins  Meat or fish that is salted, canned, smoked, spiced, or pickled. Precooked or cured meat, such as sausages or meat loaves. Rose. Ham. Pepperoni. Hot dogs. Corned beef. Chipped beef. Salt pork. Jerky. Pickled herring. Anchovies and sardines. Regular canned tuna. Salted nuts.  Dairy  Processed cheese and cheese spreads. Hard cheeses. Cheese curds. Blue cheese. Feta cheese. String cheese. Regular cottage cheese. Buttermilk. Canned milk.  Fats and oils  Salted butter. Regular margarine. Ghee. Rose fat.  Seasonings and condiments  Onion salt, garlic salt, seasoned salt, table salt, and sea salt. Canned and packaged gravies. Worcestershire sauce. Tartar sauce. Barbecue sauce. Teriyaki sauce. Soy sauce, including reduced-sodium. Steak sauce. Fish sauce. Oyster sauce. Cocktail sauce. Horseradish that you find on the shelf. Regular ketchup and mustard. Meat flavorings and tenderizers. Bouillon cubes. Hot sauce. Pre-made or packaged marinades. Pre-made or packaged taco seasonings. Relishes. Regular salad dressings. Salsa.  Other foods  Salted popcorn and pretzels. Corn chips and puffs. Potato and tortilla chips. Canned or dried soups. Pizza. Frozen entrees and pot pies.  The items listed above may not be a complete list of foods and beverages you should avoid. Contact a dietitian for more information.  Summary  · Eating less sodium can help lower your blood pressure, reduce swelling, and protect your heart, liver, and kidneys.  · Most people on this plan should limit their sodium intake to 1,500-2,000 mg (milligrams) of sodium each day.  · Canned, boxed, and frozen foods are high in sodium. Restaurant foods, fast foods, and pizza are also very high in sodium.  You also get sodium by adding salt to food.  · Try to cook at home, eat more fresh fruits and vegetables, and eat less fast food and canned, processed, or prepared foods.  This information is not intended to replace advice given to you by your health care provider. Make sure you discuss any questions you have with your health care provider.  Document Revised: 01/22/2021 Document Reviewed: 11/18/2020  [employer] Patient Education © 2021 [employer]

## 2021-12-06 NOTE — PROGRESS NOTES
Subjective:     Encounter Date:12/06/2021      Patient ID: Honey Canales is a 88 y.o. female with a history of moderate aortic valve stenosis, diastolic dysfunction, chronic kidney disease, presenting here to establish care given a history of aortic valve stenosis.    Referring Provider: YISEL Davies  Reason for Referral: Aortic stenosis    Chief Complaint: Here today to establish care, history of aortic valve stenosis    This patient presents today to establish care.  This is an 88-year-old female with aortic valve stenosis, chronic diastolic heart failure, chronic kidney disease, COPD.  The patient was recently admitted to the hospital with complaints of shortness of breath, lower extremity edema and was discharged back to skilled nursing facility.  She continues to have lower extreme edema, shortness of breath, dyspnea exertion as generally weak and fatigued.  Otherwise, she has been doing reasonably well since discharge but still does not feel well at baseline.  She is taking medications as prescribed.  Blood pressure has been stable.  The patient notes occasional cough and wheezing.  She does have a history of COPD and takes medications for this.  No reports of orthopnea, PND at this time.  No chest discomfort, palpitations.  No lightheadedness, dizziness or syncope.stablish care.  She is an 88-year-old female with a history of moderate aortic valve stenosis, diastolic dysfunction, COPD, chronic kidney disease.    Congestive Heart Failure  Presents for initial visit. The disease course has been stable. Associated symptoms include edema, fatigue and shortness of breath. Pertinent negatives include no abdominal pain, chest pain, near-syncope, orthopnea, palpitations, paroxysmal nocturnal dyspnea or unexpected weight change. The symptoms have been stable. Past treatments include salt and fluid restriction, vasodilators and angiotensin receptor blockers. The treatment provided moderate relief. Compliance  with prior treatments has been good.     The following portions of the patient's history were reviewed and updated as appropriate: allergies, current medications, past family history, past medical history, past social history, past surgical history and problem list.     Past Medical History:   Diagnosis Date   • Acquired hammer toes of both feet 10/15/2020   • Allergic rhinitis    • Aneurysm (HCC)    • Arthritis    • Benign meningioma (HCC) 10/15/2020   • Broken shoulder     Right shoulder    • Cerebral aneurysm 2009    2ND ONE FOUND   • Cerebral aneurysm     NON TREATABLE   • Chronic laryngitis    • CKD (chronic kidney disease)    • Colon polyps    • COPD (chronic obstructive pulmonary disease) (HCC)    • Deviated nasal septum    • Diabetes mellitus (HCC)    • Disorder of kidney and ureter    • Disturbance of smell and taste    • Dizziness    • Encounter for imaging to screen for metal prior to MRI     NO MRI, METAL CLIPS IN HEAD   • Eustachian tube dysfunction    • GERD (gastroesophageal reflux disease)    • Globus sensation    • Hallux valgus, right 2018   • Headache    • Hepatitis     B   • Hepatitis A    • History of stomach ulcers    • Hyperlipidemia    • Hypothyroid    • Laryngitis sicca    • Lung nodule    • Nocturia    • Non-toxic multinodular goiter 10/15/2020   • PONV (postoperative nausea and vomiting)    • Sensorineural hearing loss    • Spinal stenosis    • Uncontrolled type 2 diabetes mellitus without complication, with long-term current use of insulin 2013   • Urge incontinence    • Urgency of urination    • Urinary frequency    • Urinary incontinence    • UTI (urinary tract infection)      Past Surgical History:   Procedure Laterality Date   • BLADDER SURGERY  2016    [employer] Interstem   • BRAIN SURGERY      ANUERYSM REMOVED   • BREAST SURGERY Bilateral     BIOPSY   • CARPAL TUNNEL RELEASE     • CATARACT EXTRACTION, BILATERAL     • CEREBRAL ANEURYSM REPAIR     •  SECTION       X4   • CHOLECYSTECTOMY     • HYSTERECTOMY     • INTERSTIM PLACEMENT N/A 10/18/2018    Procedure: INTERSTIM STAGES 1 AND 2 LEAD AND GENERATOR REMOVAL AND REPLACEMENT;  Surgeon: Archie Avery MD;  Location: [facility];  Service: Urology   • JOINT REPLACEMENT Right     KNEE   • KNEE ARTHROSCOPY     • THYROIDECTOMY     • TONSILLECTOMY         Current Outpatient Medications:   •  acetaminophen (TYLENOL) 500 MG tablet, Take 500 mg by mouth Every 6 (Six) Hours As Needed for Mild Pain  or Fever., Disp: , Rfl:   •  atorvastatin (LIPITOR) 40 MG tablet, Take 40 mg by mouth Every Night., Disp: , Rfl:   •  bumetanide (BUMEX) 1 MG tablet, Take 1 tablet by mouth Daily. (Patient taking differently: Take 1 mg by mouth 2 (Two) Times a Day.), Disp: , Rfl:   •  carboxymethylcellulose (REFRESH PLUS) 0.5 % solution, Administer 1 drop to both eyes At Night As Needed for Dry Eyes., Disp: , Rfl:   •  empagliflozin (JARDIANCE) 10 MG tablet tablet, Take 10 mg by mouth Daily., Disp: , Rfl:   •  fluticasone-salmeterol (ADVAIR) 100-50 MCG/DOSE DISKUS, Inhale 1 puff 2 (Two) Times a Day., Disp: , Rfl:   •  hydrALAZINE (APRESOLINE) 25 MG tablet, Take 1 tablet by mouth Every 8 (Eight) Hours., Disp: , Rfl:   •  insulin detemir (LEVEMIR) 100 UNIT/ML injection, Inject 10 Units under the skin into the appropriate area as directed 2 (Two) Times a Day., Disp: , Rfl: 12  •  insulin lispro (humaLOG) 100 UNIT/ML injection, Inject 2-7 Units under the skin into the appropriate area as directed 3 (Three) Times a Day With Meals. Inject under the skin TID WM as per sliding scale: 150-199= 2 units 200-249= 3 units 250-299= 4 units 300-349= 5 units 350-399= 6 units >400= 7 units and call MD , Disp: , Rfl:   •  ipratropium-albuterol (DUO-NEB) 0.5-2.5 mg/3 ml nebulizer, Take 3 mL by nebulization 4 (Four) Times a Day As Needed for Wheezing., Disp: 360 mL, Rfl:   •  levothyroxine (SYNTHROID, LEVOTHROID) 75 MCG tablet, Take 75 mcg by mouth Daily., Disp: , Rfl:   •   Mirabegron ER (MYRBETRIQ) 50 MG tablet sustained-release 24 hour 24 hr tablet, Take 50 mg by mouth Daily., Disp: , Rfl:   •  polyethylene glycol (polyethylene glycol) 17 g packet, Take 17 g by mouth Daily., Disp: , Rfl:   •  prochlorperazine (COMPAZINE) 10 MG tablet, Take 10 mg by mouth Every 6 (Six) Hours As Needed for Nausea or Vomiting., Disp: , Rfl:   •  sacubitril-valsartan (ENTRESTO) 24-26 MG tablet, Take 1 tablet by mouth 2 (Two) Times a Day., Disp: , Rfl:     Allergies   Allergen Reactions   • Codeine Phosphate [Codeine] Unknown (See Comments)     CHEST PAIN   • Collagen Other (See Comments)     CAT GUT SUTURES \ WOUND WOULD NOT HEAL AND GOT INFECTION   • Accupril [Quinapril Hcl] Other (See Comments)     COUGH   • Aspirin Other (See Comments)     HISTORY OF STOMACH ULCERS   • Adhesive Tape Rash     SKIN BLISTERS   • Celebrex [Celecoxib] Nausea Only   • Lyrica [Pregabalin] Other (See Comments)     GAINED 50 POUNDS   • Pantoprazole Sodium Diarrhea   • Pentoxifylline Diarrhea   • Sulfa Antibiotics Nausea And Vomiting   • Tramadol Nausea And Vomiting   • Vioxx [Rofecoxib] Nausea And Vomiting     Social History     Tobacco Use   • Smoking status: Former Smoker     Types: Cigarettes     Quit date:      Years since quittin.9   • Smokeless tobacco: Never Used   • Tobacco comment: SMOKED OVER 40 YEARS   Substance Use Topics   • Alcohol use: No     Family History   Problem Relation Age of Onset   • Cancer Brother         BLADDER   • No Known Problems Father    • No Known Problems Mother      Review of Systems   Constitutional: Positive for decreased appetite, fatigue and malaise/fatigue. Negative for chills and unexpected weight change.   HENT: Negative for congestion and hearing loss.    Eyes: Negative for blurred vision and pain.   Cardiovascular: Positive for dyspnea on exertion and leg swelling. Negative for chest pain, near-syncope, orthopnea, palpitations, paroxysmal nocturnal dyspnea and syncope.    Respiratory: Positive for shortness of breath. Negative for cough and wheezing.    Endocrine: Negative for cold intolerance and heat intolerance.   Hematologic/Lymphatic: Negative for adenopathy and bleeding problem.   Skin: Negative for color change and rash.   Musculoskeletal: Positive for arthritis. Negative for myalgias and neck pain.   Gastrointestinal: Negative for abdominal pain, nausea and vomiting.   Genitourinary: Negative for dysuria and frequency.   Neurological: Positive for loss of balance and weakness. Negative for dizziness, headaches, light-headedness and numbness.   Psychiatric/Behavioral: Negative for altered mental status and memory loss.   Allergic/Immunologic: Negative for hives and persistent infections.       ECG 12 Lead    Date/Time: 12/6/2021 4:55 PM  Performed by: Seth Rueda MD  Authorized by: Seth Rueda MD   Comparison: compared with previous ECG from 11/24/2021  Similar to previous ECG  Rhythm: sinus rhythm  Rate: normal  BPM: 85  Conduction: conduction normal  QRS axis: normal  Other findings: non-specific ST-T wave changes    Clinical impression: non-specific ECG             Objective:     Vitals reviewed.   Constitutional:       General: Not in acute distress.     Appearance: Normal appearance. Well-developed. Chronically ill-appearing. Not toxic-appearing or diaphoretic.   Eyes:      General: Lids are normal.      Extraocular Movements: Extraocular movements intact.      Pupils: Pupils are equal, round, and reactive to light.   HENT:      Head: Normocephalic and atraumatic.      Right Ear: External ear normal.      Left Ear: External ear normal.      Nose: Nose normal.    Mouth/Throat:      Mouth: Mucous membranes are not pale, not dry and not cyanotic.   Neck:      Thyroid: No thyroid mass or thyromegaly.      Vascular: No carotid bruit, hepatojugular reflux or JVD. JVD normal.      Trachea: No tracheal deviation.      Lymphadenopathy: No cervical  "adenopathy.   Pulmonary:      Effort: Pulmonary effort is normal. No accessory muscle usage or respiratory distress.      Breath sounds: Decreased breath sounds present. Wheezing present. No rhonchi. No rales.   Chest:      Chest wall: Not tender to palpatation.   Cardiovascular:      Normal rate. Regular rhythm.      Murmurs: There is a grade 2/6 harsh midsystolic murmur at the URSB, radiating to the neck.      No gallop.   Pulses:     Intact distal pulses.   Edema:     Peripheral edema present.  Abdominal:      General: Bowel sounds are normal. There is no distension or abdominal bruit.      Palpations: Abdomen is soft.      Tenderness: There is no abdominal tenderness.   Musculoskeletal: Normal range of motion.         General: No tenderness or deformity.      Extremities: No clubbing present.     Cervical back: Neck supple. No edema. Skin:     General: Skin is warm and dry. There is no cyanosis.      Findings: No erythema or rash.   Neurological:      General: No focal deficit present.      Mental Status: Oriented to person, place, and time and oriented to person, place and time.      Cranial Nerves: No cranial nerve deficit.   Psychiatric:         Attention and Perception: Attention normal.         Mood and Affect: Mood normal.         Speech: Speech normal.         Behavior: Behavior normal. Behavior is cooperative.         Thought Content: Thought content normal.       /60   Pulse 77   Ht 167.6 cm (66\")   Wt 78.5 kg (173 lb)   SpO2 99%   BMI 27.92 kg/m²     Data/Lab Review:     Results for orders placed during the hospital encounter of 10/31/21    Adult Transthoracic Echo Complete W/ Cont if Necessary Per Protocol    Interpretation Summary  · Left ventricular ejection fraction appears to be 66 - 70%. Left ventricular systolic function is normal.  · Moderate aortic valve stenosis is present.  · Left ventricular diastolic function is consistent with (grade II w/high LAP) pseudonormalization.  · " Estimated right ventricular systolic pressure from tricuspid regurgitation is mildly elevated (35-45 mmHg).  · Normal size and function of the right ventricle.    Lab Results   Component Value Date    WBC 3.25 (L) 11/27/2021    HGB 9.3 (L) 11/27/2021    HCT 30.5 (L) 11/27/2021    MCV 93.3 11/27/2021     11/27/2021     Lab Results   Component Value Date    GLUCOSE 244 (H) 11/27/2021    CALCIUM 8.5 (L) 11/27/2021     11/27/2021    K 4.5 11/27/2021    CO2 24.0 11/27/2021     11/27/2021    BUN 35 (H) 11/27/2021    CREATININE 2.22 (H) 11/27/2021    EGFRIFAFRI 30 (L) 10/15/2020    EGFRIFNONA 21 (L) 11/27/2021    BCR 15.8 11/27/2021    ANIONGAP 13.0 11/27/2021     Lab Results   Component Value Date    TSH 1.710 08/30/2021         Assessment:          Diagnosis Plan   1. Nonrheumatic aortic valve stenosis  ECG 12 Lead   2. Chronic diastolic congestive heart failure (HCC)     3. Benign essential HTN          Plan:       1.  Nonrheumatic aortic valve stenosis: The patient was found to have moderate aortic valve stenosis on recent echocardiogram.  She has some degree of chronic shortness of breath, dyspnea exertion and edema.  It is not thought that she is symptomatic only from valvular stenosis, however.  At this time, she would not be a candidate for aortic valve replacement however we will continue to follow her with serial echocardiograms over time.    2.  Chronic diastolic heart failure: Patient has some degree of chronic shortness of breath, dyspnea on exertion, edema.  She is currently on Bumex.  We talked about low sodium and fluid restriction changes to diet which may be helpful.  In addition, the patient is on hydralazine, Entresto.  Continue to monitor weight.    3.  Essential hypertension: Blood pressure is well controlled at this time.  Continue current medications.    We will see the patient back in 4-6 weeks to reevaluate.

## 2022-01-01 ENCOUNTER — OFFICE VISIT (OUTPATIENT)
Dept: CARDIOLOGY | Facility: CLINIC | Age: 87
End: 2022-01-01

## 2022-01-01 VITALS
SYSTOLIC BLOOD PRESSURE: 110 MMHG | DIASTOLIC BLOOD PRESSURE: 60 MMHG | WEIGHT: 140 LBS | BODY MASS INDEX: 22.5 KG/M2 | HEIGHT: 66 IN | OXYGEN SATURATION: 98 % | HEART RATE: 76 BPM

## 2022-01-01 DIAGNOSIS — I50.32 CHRONIC DIASTOLIC HEART FAILURE: Primary | ICD-10-CM

## 2022-01-01 DIAGNOSIS — I35.0 NONRHEUMATIC AORTIC VALVE STENOSIS: Chronic | ICD-10-CM

## 2022-01-01 PROCEDURE — 99214 OFFICE O/P EST MOD 30 MIN: CPT | Performed by: INTERNAL MEDICINE

## 2022-01-01 PROCEDURE — 93000 ELECTROCARDIOGRAM COMPLETE: CPT | Performed by: INTERNAL MEDICINE

## 2022-01-01 RX ORDER — SODIUM BICARBONATE 325 MG/1
325 TABLET ORAL 2 TIMES DAILY
COMMUNITY

## 2022-01-01 RX ORDER — AMOXICILLIN 250 MG
1 CAPSULE ORAL DAILY
COMMUNITY

## 2022-01-01 RX ORDER — MULTIPLE VITAMINS W/ MINERALS TAB 9MG-400MCG
1 TAB ORAL DAILY
COMMUNITY
End: 2023-01-01 | Stop reason: ALTCHOICE

## 2022-01-01 RX ORDER — ZINC GLUCONATE 50 MG
1 TABLET ORAL DAILY
COMMUNITY
End: 2023-01-01 | Stop reason: ALTCHOICE

## 2022-01-10 ENCOUNTER — OFFICE VISIT (OUTPATIENT)
Dept: CARDIOLOGY | Facility: CLINIC | Age: 87
End: 2022-01-10

## 2022-01-10 VITALS
OXYGEN SATURATION: 99 % | DIASTOLIC BLOOD PRESSURE: 56 MMHG | WEIGHT: 155 LBS | SYSTOLIC BLOOD PRESSURE: 130 MMHG | HEART RATE: 61 BPM | BODY MASS INDEX: 24.91 KG/M2 | HEIGHT: 66 IN

## 2022-01-10 DIAGNOSIS — I10 BENIGN ESSENTIAL HTN: ICD-10-CM

## 2022-01-10 DIAGNOSIS — I35.0 NONRHEUMATIC AORTIC VALVE STENOSIS: Chronic | ICD-10-CM

## 2022-01-10 DIAGNOSIS — I50.33 ACUTE ON CHRONIC DIASTOLIC HEART FAILURE: Primary | ICD-10-CM

## 2022-01-10 PROCEDURE — 99213 OFFICE O/P EST LOW 20 MIN: CPT | Performed by: INTERNAL MEDICINE

## 2022-01-10 RX ORDER — GLIPIZIDE 10 MG/1
10 TABLET ORAL DAILY
COMMUNITY

## 2022-01-10 RX ORDER — SPIRONOLACTONE 25 MG/1
25 TABLET ORAL DAILY
COMMUNITY
End: 2023-01-01 | Stop reason: ALTCHOICE

## 2022-01-10 RX ORDER — ALLOPURINOL 100 MG/1
100 TABLET ORAL DAILY
COMMUNITY
End: 2023-01-01 | Stop reason: ALTCHOICE

## 2022-01-10 RX ORDER — FERROUS SULFATE 325(65) MG
650 TABLET ORAL
COMMUNITY
End: 2023-01-01 | Stop reason: ALTCHOICE

## 2022-01-10 RX ORDER — ERGOCALCIFEROL 1.25 MG/1
50000 CAPSULE ORAL WEEKLY
COMMUNITY
End: 2023-01-01 | Stop reason: ALTCHOICE

## 2022-01-10 RX ORDER — ONDANSETRON 4 MG/1
4 TABLET, FILM COATED ORAL EVERY EVENING
COMMUNITY
End: 2022-01-01

## 2022-01-10 NOTE — PROGRESS NOTES
Subjective:     Encounter Date:01/10/2022      Patient ID: Honey Canales is a 88 y.o. female with moderate aortic valve stenosis, diastolic dysfunction, chronic kidney disease, presenting today for follow-up.    Chief Complaint: Follow-up of diastolic dysfunction, aortic valve disease    This patient presents today for scheduled follow-up.  She is known to have moderate aortic valve stenosis, diastolic dysfunction.  She has been doing reasonably well.  Her weight is down.  She is still short of breath but does not exert herself very often.  Her peripheral edema has decreased significantly and she only has pedal edema at this time.  She is following a low-sodium diet at her nursing facility.  She does not describe orthopnea, PND at this time.  No chest discomfort.  No lightheadedness, dizziness or syncope.  She continues to follow closely with nephrology.  She was recommended to stay on her current dose of diuretics recently as her renal function was found to be stable, according to her report.  Otherwise, no side effects or problems from her medications.  Blood pressure has been well controlled.  Overall, she says that she seems to be improving.    Congestive Heart Failure  Presents for follow-up visit. Associated symptoms include edema, fatigue and shortness of breath. Pertinent negatives include no abdominal pain, chest pain, orthopnea, palpitations, paroxysmal nocturnal dyspnea or unexpected weight change. The symptoms have been improving.       Current Outpatient Medications:   •  allopurinol (ZYLOPRIM) 100 MG tablet, Take 100 mg by mouth Daily., Disp: , Rfl:   •  atorvastatin (LIPITOR) 40 MG tablet, Take 40 mg by mouth Every Night., Disp: , Rfl:   •  bumetanide (BUMEX) 1 MG tablet, Take 1 tablet by mouth Daily. (Patient taking differently: Take 1 mg by mouth 2 (Two) Times a Day.), Disp: , Rfl:   •  carboxymethylcellulose (REFRESH PLUS) 0.5 % solution, Administer 1 drop to both eyes At Night As Needed for Dry  Eyes., Disp: , Rfl:   •  ferrous sulfate 325 (65 FE) MG tablet, Take 650 mg by mouth Daily With Breakfast., Disp: , Rfl:   •  fluticasone-salmeterol (ADVAIR) 100-50 MCG/DOSE DISKUS, Inhale 1 puff 2 (Two) Times a Day., Disp: , Rfl:   •  glipizide (GLUCOTROL) 10 MG tablet, Take 10 mg by mouth Daily., Disp: , Rfl:   •  hydrALAZINE (APRESOLINE) 25 MG tablet, Take 1 tablet by mouth Every 8 (Eight) Hours., Disp: , Rfl:   •  insulin detemir (LEVEMIR) 100 UNIT/ML injection, Inject 10 Units under the skin into the appropriate area as directed 2 (Two) Times a Day., Disp: , Rfl: 12  •  insulin lispro (humaLOG) 100 UNIT/ML injection, Inject 2-7 Units under the skin into the appropriate area as directed 3 (Three) Times a Day With Meals. Inject under the skin TID WM as per sliding scale: 150-199= 2 units 200-249= 3 units 250-299= 4 units 300-349= 5 units 350-399= 6 units >400= 7 units and call MD , Disp: , Rfl:   •  ipratropium-albuterol (DUO-NEB) 0.5-2.5 mg/3 ml nebulizer, Take 3 mL by nebulization 4 (Four) Times a Day As Needed for Wheezing., Disp: 360 mL, Rfl:   •  levothyroxine (SYNTHROID, LEVOTHROID) 75 MCG tablet, Take 75 mcg by mouth Daily., Disp: , Rfl:   •  Mirabegron ER (MYRBETRIQ) 50 MG tablet sustained-release 24 hour 24 hr tablet, Take 50 mg by mouth Daily., Disp: , Rfl:   •  ondansetron (ZOFRAN) 4 MG tablet, Take 4 mg by mouth Every Evening., Disp: , Rfl:   •  polyethylene glycol (polyethylene glycol) 17 g packet, Take 17 g by mouth Daily., Disp: , Rfl:   •  sacubitril-valsartan (ENTRESTO) 24-26 MG tablet, Take 1 tablet by mouth 2 (Two) Times a Day., Disp: , Rfl:   •  spironolactone (ALDACTONE) 50 MG tablet, Take 50 mg by mouth Daily., Disp: , Rfl:   •  vitamin D (ERGOCALCIFEROL) 1.25 MG (16963 UT) capsule capsule, Take 50,000 Units by mouth 1 (One) Time Per Week., Disp: , Rfl:   •  acetaminophen (TYLENOL) 500 MG tablet, Take 500 mg by mouth Every 6 (Six) Hours As Needed for Mild Pain  or Fever., Disp: , Rfl:      Allergies   Allergen Reactions   • Codeine Phosphate [Codeine] Unknown (See Comments)     CHEST PAIN   • Collagen Other (See Comments)     CAT GUT SUTURES \ WOUND WOULD NOT HEAL AND GOT INFECTION   • Accupril [Quinapril Hcl] Other (See Comments)     COUGH   • Aspirin Other (See Comments)     HISTORY OF STOMACH ULCERS   • Adhesive Tape Rash     SKIN BLISTERS   • Celebrex [Celecoxib] Nausea Only   • Lyrica [Pregabalin] Other (See Comments)     GAINED 50 POUNDS   • Pantoprazole Sodium Diarrhea   • Pentoxifylline Diarrhea   • Sulfa Antibiotics Nausea And Vomiting   • Tramadol Nausea And Vomiting   • Vioxx [Rofecoxib] Nausea And Vomiting     Social History     Tobacco Use   • Smoking status: Former Smoker     Types: Cigarettes     Quit date:      Years since quittin.0   • Smokeless tobacco: Never Used   • Tobacco comment: SMOKED OVER 40 YEARS   Substance Use Topics   • Alcohol use: No     Review of Systems   Constitutional: Positive for fatigue and malaise/fatigue. Negative for fever, unexpected weight change and weight loss.   Cardiovascular: Positive for dyspnea on exertion and leg swelling. Negative for chest pain, orthopnea, palpitations, paroxysmal nocturnal dyspnea and syncope.   Respiratory: Positive for shortness of breath. Negative for cough and wheezing.    Hematologic/Lymphatic: Negative for adenopathy and bleeding problem.   Gastrointestinal: Negative for abdominal pain, nausea and vomiting.   Neurological: Positive for loss of balance and weakness. Negative for dizziness and headaches.     Procedures: None today although I did review the ECG from 2021 showing normal sinus rhythm, ventricular rate 85, nonspecific ST changes       Objective:     Vitals reviewed.   Constitutional:       General: Not in acute distress.     Appearance: Well-developed. Chronically ill-appearing.   Eyes:      Extraocular Movements: Extraocular movements intact.      Pupils: Pupils are equal, round, and reactive to  "light.   HENT:      Head: Normocephalic and atraumatic.   Neck:      Thyroid: No thyroid mass.      Vascular: No JVD.   Pulmonary:      Effort: Pulmonary effort is normal.      Breath sounds: Normal breath sounds. No wheezing. No rhonchi. No rales.   Cardiovascular:      Normal rate. Regular rhythm.      Murmurs: There is a grade 2/6 harsh midsystolic murmur at the URSB, radiating to the neck.      No gallop.   Pulses:     Intact distal pulses.   Edema:     Ankle: bilateral 1+ edema of the ankle.     Feet: bilateral 1+ edema of the feet.  Abdominal:      General: Bowel sounds are normal. There is no distension.      Palpations: Abdomen is soft.      Tenderness: There is no abdominal tenderness.   Musculoskeletal: Normal range of motion.      Extremities: No clubbing present.Skin:     General: Skin is warm and dry. There is no cyanosis.      Findings: No erythema or rash.   Neurological:      Mental Status: Alert and oriented to person, place, and time.      Cranial Nerves: No cranial nerve deficit.       /56   Pulse 61   Ht 167.6 cm (66\")   Wt 70.3 kg (155 lb)   SpO2 99%   BMI 25.02 kg/m²     Data/Lab Review:     Results for orders placed during the hospital encounter of 10/31/21    Adult Transthoracic Echo Complete W/ Cont if Necessary Per Protocol    Interpretation Summary  · Left ventricular ejection fraction appears to be 66 - 70%. Left ventricular systolic function is normal.  · Moderate aortic valve stenosis is present.  · Left ventricular diastolic function is consistent with (grade II w/high LAP) pseudonormalization.  · Estimated right ventricular systolic pressure from tricuspid regurgitation is mildly elevated (35-45 mmHg).  · Normal size and function of the right ventricle.        Assessment:          Diagnosis Plan   1. Acute on chronic diastolic heart failure (HCC)     2. Nonrheumatic aortic valve stenosis     3. Benign essential HTN            Plan:       1.  Acute on chronic diastolic heart " failure: Clinically improving with a decrease in weight, peripheral edema and improvement in symptoms.  Continue sodium and fluid restriction.  Continue weight monitoring.  Continue current dose of diuretics, currently being managed by nephrology.     2.  Nonrheumatic aortic valve stenosis: Moderate at last check.  Clinically stable at this time.     3.  Essential hypertension: The patient's blood pressure remains well controlled.  Therefore, continue current medications.     We will see the patient back in 3 months unless otherwise needed sooner.

## 2022-01-13 DIAGNOSIS — Z86.010 HISTORY OF COLON POLYPS: ICD-10-CM

## 2022-01-13 DIAGNOSIS — N18.9 CHRONIC KIDNEY DISEASE, UNSPECIFIED CKD STAGE: Primary | ICD-10-CM

## 2022-01-13 DIAGNOSIS — Z12.11 ENCOUNTER FOR SCREENING COLONOSCOPY: ICD-10-CM

## 2022-01-14 ENCOUNTER — OFFICE VISIT (OUTPATIENT)
Dept: HEMATOLOGY | Age: 87
End: 2022-01-14
Payer: MEDICARE

## 2022-01-14 ENCOUNTER — HOSPITAL ENCOUNTER (OUTPATIENT)
Dept: INFUSION THERAPY | Age: 87
Discharge: HOME | End: 2022-01-14
Payer: MEDICARE

## 2022-01-14 VITALS
HEART RATE: 74 BPM | SYSTOLIC BLOOD PRESSURE: 128 MMHG | OXYGEN SATURATION: 97 % | DIASTOLIC BLOOD PRESSURE: 84 MMHG | BODY MASS INDEX: 23.61 KG/M2 | WEIGHT: 150.4 LBS | HEIGHT: 67 IN

## 2022-01-14 DIAGNOSIS — D50.8 OTHER IRON DEFICIENCY ANEMIA: ICD-10-CM

## 2022-01-14 DIAGNOSIS — R53.83 FATIGUE, UNSPECIFIED TYPE: ICD-10-CM

## 2022-01-14 DIAGNOSIS — D50.8 OTHER IRON DEFICIENCY ANEMIA: Primary | ICD-10-CM

## 2022-01-14 DIAGNOSIS — N18.4 CHRONIC KIDNEY DISEASE (CKD), STAGE IV (SEVERE) (HCC): ICD-10-CM

## 2022-01-14 DIAGNOSIS — N18.4 ANEMIA DUE TO STAGE 4 CHRONIC KIDNEY DISEASE (HCC): ICD-10-CM

## 2022-01-14 DIAGNOSIS — D63.1 ANEMIA DUE TO STAGE 4 CHRONIC KIDNEY DISEASE (HCC): ICD-10-CM

## 2022-01-14 DIAGNOSIS — N18.9 CHRONIC KIDNEY DISEASE, UNSPECIFIED CKD STAGE: ICD-10-CM

## 2022-01-14 LAB
FERRITIN: 83.1 NG/ML (ref 13–150)
FOLATE: 10.9 NG/ML (ref 4.8–37.3)
HAPTOGLOBIN: 215 MG/DL (ref 30–200)
HCT VFR BLD CALC: 34.2 % (ref 34.1–44.9)
HEMOGLOBIN: 10.6 G/DL (ref 11.2–15.7)
LACTATE DEHYDROGENASE: 366 U/L (ref 313–618)
MCH RBC QN AUTO: 29.6 PG (ref 25.6–32.2)
MCHC RBC AUTO-ENTMCNC: 31 G/DL (ref 32.3–35.5)
MCV RBC AUTO: 95.5 FL (ref 79.4–94.8)
PDW BLD-RTO: 15.7 % (ref 11.7–14.4)
PLATELET # BLD: 196 K/UL (ref 182–369)
PMV BLD AUTO: 10.4 FL (ref 7.4–10.4)
RBC # BLD: 3.58 M/UL (ref 3.93–5.22)
VITAMIN B-12: 375 PG/ML (ref 211–946)
WBC # BLD: 6.7 K/UL (ref 3.98–10.04)

## 2022-01-14 PROCEDURE — 99204 OFFICE O/P NEW MOD 45 MIN: CPT | Performed by: NURSE PRACTITIONER

## 2022-01-14 PROCEDURE — 85027 COMPLETE CBC AUTOMATED: CPT

## 2022-01-14 PROCEDURE — 99212 OFFICE O/P EST SF 10 MIN: CPT

## 2022-01-14 PROCEDURE — 1090F PRES/ABSN URINE INCON ASSESS: CPT | Performed by: NURSE PRACTITIONER

## 2022-01-14 PROCEDURE — 1036F TOBACCO NON-USER: CPT | Performed by: NURSE PRACTITIONER

## 2022-01-14 PROCEDURE — 1123F ACP DISCUSS/DSCN MKR DOCD: CPT | Performed by: NURSE PRACTITIONER

## 2022-01-14 PROCEDURE — 83615 LACTATE (LD) (LDH) ENZYME: CPT

## 2022-01-14 PROCEDURE — G8484 FLU IMMUNIZE NO ADMIN: HCPCS | Performed by: NURSE PRACTITIONER

## 2022-01-14 PROCEDURE — G8420 CALC BMI NORM PARAMETERS: HCPCS | Performed by: NURSE PRACTITIONER

## 2022-01-14 PROCEDURE — 4040F PNEUMOC VAC/ADMIN/RCVD: CPT | Performed by: NURSE PRACTITIONER

## 2022-01-14 PROCEDURE — G8427 DOCREV CUR MEDS BY ELIG CLIN: HCPCS | Performed by: NURSE PRACTITIONER

## 2022-01-14 ASSESSMENT — ENCOUNTER SYMPTOMS
NAUSEA: 1
ABDOMINAL PAIN: 0
BACK PAIN: 1
COUGH: 0
WHEEZING: 0
DIARRHEA: 0
SHORTNESS OF BREATH: 1
SORE THROAT: 0
EYE DISCHARGE: 0
CONSTIPATION: 1
VOMITING: 0
EYE ITCHING: 0

## 2022-01-14 NOTE — PROGRESS NOTES
OP HEMATOLOGY/ONCOLOGY CONSULTATION      Pt Name: Jackson Carvajal  YOB: 1933  MRN: [id number]  Referring provider: PREMA Morrison  Requesting provider: Dr. Jeanna Eden  Reason for consultation: CKD associated anemia  Date of evaluation: 1/14/2022    History Obtained From:  patient, EMR, family member - son, Samanta Quinones:    Chief Complaint   Patient presents with    New Patient     CKD associated anemia     HISTORY OF PRESENT ILLNESS:    Jackson Carvajal is a 80 y.o.  female referred to the clinic by Dr. Jeanna Eden regarding CKD associated anemia. Hematology consultation is performed 1/14/2022. Sherie Filter resides at [facility]. She has multiple comorbidities to include stage IV CKD, COPD, type 2 diabetes mellitus, CHF, chronic respiratory failure, GERD, viral hepatitis (A&B), HTN, OAB with bladder neuro device, Oxygen therapy PRN, Vitamin D deficiency, renal cysts  She has had multiple hospitalizations over the past 6 months. She has had worsening renal function. She is referred to consider RACHEL for CKD associated anemia. Symptoms include fatigue, exertional shortness of breath. Labs 12/16/2021:  · CBC: WBC 6.49, Hgb 9.6/MCV 91.0, platelets 258,556  · CMP: Creatinine 2.23, GFR 21, calcium 8.52, total protein 5.85  · Iron: 44.7, TIBC 257, iron saturation 17%  · Vitamin D 25: 17.2  · Urinalysis: Trace intact blood  · PTH: 25.8    Oral iron was initiated by nephrology. Lily Saulo states Sherie Сергей has been taking oral iron for the past 2 weeks. Sherie Filter states her stools have since been dark. Prior to the iron, she denied melena or hematochezia.       Past Medical History:   Diagnosis Date    Aneurysm ([facility] [address] 1993    Aneurysm ([facility] [address]     Breast cyst     Chronic pulmonary disease     CKD (chronic kidney disease)     Colon polyps     DM (diabetes mellitus) (HCC)     GERD (gastroesophageal reflux disease)     Hepatitis     High cholesterol     History of stomach ulcers     Lung nodule     left/BG    Stomach ache reflux    Thyroid disorder     Thyroid nodule     Urinary incontinence      Past Surgical History:   Procedure Laterality Date    BLADDER SURGERY  2016?  BRAIN ANEURYSM SURGERY      BREAST SURGERY      BIOPSY    CARPAL TUNNEL RELEASE      CATARACT REMOVAL       SECTION      TIMES 4    COLONOSCOPY  3-    DR Ilan Connor    COLONOSCOPY  13    Robin    CYST REMOVAL      FINGER    GALLBLADDER SURGERY      HYSTERECTOMY      KNEE ARTHROSCOPY      KNEE SURGERY      RTK    OTHER SURGICAL HISTORY      arteriorgram, surgeon said leave it alone no surgery.  TONSILLECTOMY AND ADENOIDECTOMY      UPPER GASTROINTESTINAL ENDOSCOPY  2010    DR Fred Salter UPPER GASTROINTESTINAL ENDOSCOPY  10/25/2013    Louise       Current Outpatient Medications:     bumetanide (BUMEX) 0.5 MG tablet, , Disp: , Rfl:     losartan (COZAAR) 100 MG tablet, , Disp: , Rfl:     insulin detemir (LEVEMIR) 100 UNIT/ML injection vial, Inject into the skin, Disp: , Rfl:     levothyroxine (SYNTHROID) 75 MCG tablet, Take 75 mcg by mouth Daily. , Disp: , Rfl:     Levocetirizine Dihydrochloride (XYZAL PO), Take 5 mg by mouth daily. , Disp: , Rfl:     PARoxetine (PAXIL) 10 MG tablet, Take 10 mg by mouth every morning., Disp: , Rfl:     Atorvastatin Calcium (LIPITOR PO), Take 80 mg by mouth daily. , Disp: , Rfl:     GlipiZIDE (GLUCOTROL PO), Take 10 mg by mouth daily. , Disp: , Rfl:    Allergies:    Allergies   Allergen Reactions    Celebrex [Celecoxib]     Codeine     Pregabalin     Protonix [Pantoprazole Sodium] Diarrhea    Quinapril Hcl     Sulfa Antibiotics     Tramadol     Trental [Pentoxifylline Er] Diarrhea    Vioxx [Rofecoxib]      Social History     Tobacco Use    Smoking status: Former Smoker     Start date:      Quit date: 1993     Years since quittin.9    Smokeless tobacco: Never Used    Tobacco comment: quit    Vaping Use    Vaping Use: Never used   Substance Use Topics    Alcohol use: No    Drug use: No     Family History   Problem Relation Age of Onset    Stroke Mother     High Blood Pressure Mother     Lung Cancer Brother     Esophageal Cancer Brother     Other Brother         CKD    High Blood Pressure Father     High Blood Pressure Sister      Health Maintenance   Topic Date Due    Potassium monitoring  Never done    Creatinine monitoring  Never done    Lipid screen  Never done    Depression Screen  Never done    DTaP/Tdap/Td vaccine (1 - Tdap) Never done    Shingles Vaccine (1 of 2) Never done    Annual Wellness Visit (AWV)  Never done    Flu vaccine (1) 09/01/2021    COVID-19 Vaccine (3 - Booster for Pfizer series) 09/02/2021    Pneumococcal 65+ yrs at Risk Vaccine  Completed    Hepatitis A vaccine  Aged Out    Hib vaccine  Aged Out    Meningococcal (ACWY) vaccine  Aged Out     Subjective   Review of Systems   Constitutional: Positive for fatigue and unexpected weight change (gain ). Negative for fever. HENT: Positive for hearing loss. Negative for dental problem, mouth sores, nosebleeds and sore throat. Eyes: Negative for discharge and itching. Respiratory: Positive for shortness of breath (exertional). Negative for cough and wheezing. Cardiovascular: Positive for leg swelling. Negative for chest pain and palpitations. Gastrointestinal: Positive for constipation and nausea. Negative for abdominal pain, diarrhea and vomiting. GERD   Endocrine: Positive for heat intolerance. Negative for cold intolerance. Genitourinary: Negative for dysuria, frequency, hematuria and urgency. History of UTIs   Musculoskeletal: Positive for arthralgias and back pain. Negative for joint swelling and myalgias. Skin: Negative for pallor and rash. Allergic/Immunologic: Positive for environmental allergies. Negative for immunocompromised state. Neurological: Positive for weakness.  Negative for seizures, syncope and numbness. Balance issues   Hematological: Negative for adenopathy. Bruises/bleeds easily. Psychiatric/Behavioral: Negative for agitation, behavioral problems and confusion. The patient is not nervous/anxious. Objective   Physical Exam  Vitals reviewed. Constitutional:       General: She is not in acute distress. Appearance: She is well-developed. She is not toxic-appearing or diaphoretic. Comments: Wearing a facial mask. Appears chronically ill    HENT:      Head: Normocephalic and atraumatic. Right Ear: External ear normal.      Left Ear: External ear normal.      Nose: Nose normal.      Mouth/Throat:      Mouth: Mucous membranes are moist.   Eyes:      General: No scleral icterus. Right eye: No discharge. Left eye: No discharge. Conjunctiva/sclera: Conjunctivae normal.   Neck:      Trachea: No tracheal deviation. Cardiovascular:      Rate and Rhythm: Normal rate and regular rhythm. Heart sounds: Murmur heard. Pulmonary:      Effort: Pulmonary effort is normal. No respiratory distress. Breath sounds: Normal breath sounds. No wheezing or rales. Chest:   Breasts:      Right: No supraclavicular adenopathy. Left: No supraclavicular adenopathy. Abdominal:      General: Bowel sounds are normal. There is no distension. Palpations: Abdomen is soft. Tenderness: There is no abdominal tenderness. There is no guarding. Genitourinary:     Comments: Exam deferred  Musculoskeletal:         General: No tenderness or deformity. Cervical back: Neck supple. No muscular tenderness. Right lower leg: Edema (trace - 1+) present. Left lower leg: Edema (trace - 1+) present. Comments: Normal ROM all four extremities. Changes of arthritis    Lymphadenopathy:      Cervical:      Right cervical: No superficial or deep cervical adenopathy. Left cervical: No superficial or deep cervical adenopathy.       Upper Body:      Right upper body: No supraclavicular adenopathy. Left upper body: No supraclavicular adenopathy. Comments:      Skin:     General: Skin is warm and dry. Coloration: Skin is pale. Findings: Bruising (BUE) present. No rash. Neurological:      Mental Status: She is alert and oriented to person, place, and time. Comments: follows commands, non-focal   Psychiatric:         Behavior: Behavior normal. Behavior is cooperative. Thought Content: Thought content normal.         Judgment: Judgment normal.      Comments: Alert and oriented to person, place and time. /84   Pulse 74   Ht 5' 6.5\" (1.689 m)   Wt 150 lb 6.4 oz (68.2 kg)   SpO2 97%   BMI 23.91 kg/m²   Wt Readings from Last 3 Encounters:   01/14/22 150 lb 6.4 oz (68.2 kg)   09/22/20 146 lb (66.2 kg)   01/17/20 147 lb (66.7 kg)     ASSESSMENT/PLAN:    1. Other iron deficiency anemia    2. Chronic kidney disease (CKD), stage IV (severe) ([facility] [address]    3. Anemia due to stage 4 chronic kidney disease ([facility] [address]    4. Fatigue, unspecified type      CBC 1/14/2022:  Lab Results   Component Value Date    WBC 6.70 01/14/2022    HGB 10.6 (L) 01/14/2022    HCT 34.2 01/14/2022    MCV 95.5 (H) 01/14/2022     01/14/2022    LABLYMP 1.90 03/08/2012    LYMPHOPCT 33.6 03/08/2012    RBC 3.58 (L) 01/14/2022    MCH 29.6 01/14/2022    MCHC 31.0 (L) 01/14/2022    RDW 15.7 (H) 01/14/2022     Orders Placed This Encounter   Procedures    Ferritin    Vitamin B12    Folate    Lactate Dehydrogenase    Blood Occult Stool Screen #1     The following labs were also requested:  · SPEP with immunofixation  · Serum light chains  · Copper  · Zinc  · Erythropoietin  · Haptoglobin    Hgb has improved from 9.6 on 12/16/2021, currently 10.6. Kirby Myers has been on oral iron supplement approximately 2 weeks. Anticipate arranging parenteral iron with Venofer 500 mg IV weekly in clinic.  R/B/E discused  RACHEL therapy can be considered granted that the labs above are normal, iron is replaced and Hgb falls below 10. Plan of care was discussed with Jessica Leon and her son, Sally Ivan. All questions answered. Check CBC in 1 month (order given to patient's son- Sally Ivan to take to [facility])  Stool for OB x3 (stool kits given to patient's son to take to [facility] and collect, specimen to be returned to this clinic). Return in about 8 weeks (around 3/11/2022) for follow up with PREMA Zamarripa. I have seen, examined and reviewed this patient medication list, appropriate labs and imaging studies. I reviewed relevant medical records and others physicians notes. I discussed the plan of care with the patient. I answered all questions to the patients satisfaction. I have also reviewed the chief complaint (CC) and part of the history (History of Present Illness (HPI), Past Family Social History [facility]), or Review of Systems (ROS) and made changes when appropriated. Office note and labs from Dr. Vania Webster reviewed. Dictated utilizing Dragon transcription software.         PREMA Germain  5:01 PM  1/14/2022

## 2022-01-16 LAB — ERYTHROPOIETIN: 9 MU/ML (ref 4–27)

## 2022-01-17 DIAGNOSIS — E61.1 LOW IRON: ICD-10-CM

## 2022-01-17 DIAGNOSIS — D50.9 IRON DEFICIENCY ANEMIA, UNSPECIFIED IRON DEFICIENCY ANEMIA TYPE: ICD-10-CM

## 2022-01-17 RX ORDER — SODIUM CHLORIDE 0.9 % (FLUSH) 0.9 %
5-40 SYRINGE (ML) INJECTION PRN
Status: CANCELLED | OUTPATIENT
Start: 2022-01-18

## 2022-01-17 RX ORDER — MEPERIDINE HYDROCHLORIDE 25 MG/ML
12.5 INJECTION INTRAMUSCULAR; INTRAVENOUS; SUBCUTANEOUS PRN
Status: CANCELLED | OUTPATIENT
Start: 2022-01-18

## 2022-01-17 RX ORDER — HEPARIN SODIUM (PORCINE) LOCK FLUSH IV SOLN 100 UNIT/ML 100 UNIT/ML
500 SOLUTION INTRAVENOUS PRN
Status: CANCELLED | OUTPATIENT
Start: 2022-01-18

## 2022-01-17 RX ORDER — ACETAMINOPHEN 325 MG/1
650 TABLET ORAL
Status: CANCELLED | OUTPATIENT
Start: 2022-01-18

## 2022-01-17 RX ORDER — SODIUM CHLORIDE 9 MG/ML
INJECTION, SOLUTION INTRAVENOUS CONTINUOUS
Status: CANCELLED | OUTPATIENT
Start: 2022-01-18

## 2022-01-17 RX ORDER — DIPHENHYDRAMINE HYDROCHLORIDE 50 MG/ML
50 INJECTION INTRAMUSCULAR; INTRAVENOUS
Status: CANCELLED | OUTPATIENT
Start: 2022-01-18

## 2022-01-17 RX ORDER — ONDANSETRON 2 MG/ML
8 INJECTION INTRAMUSCULAR; INTRAVENOUS
Status: CANCELLED | OUTPATIENT
Start: 2022-01-18

## 2022-01-17 RX ORDER — EPINEPHRINE 1 MG/ML
0.3 INJECTION, SOLUTION, CONCENTRATE INTRAVENOUS PRN
Status: CANCELLED | OUTPATIENT
Start: 2022-01-18

## 2022-01-17 RX ORDER — ALBUTEROL SULFATE 90 UG/1
4 AEROSOL, METERED RESPIRATORY (INHALATION) PRN
Status: CANCELLED | OUTPATIENT
Start: 2022-01-18

## 2022-01-17 RX ORDER — SODIUM CHLORIDE 9 MG/ML
25 INJECTION, SOLUTION INTRAVENOUS PRN
Status: CANCELLED | OUTPATIENT
Start: 2022-01-18

## 2022-01-18 LAB
COPPER: 116.2 UG/DL (ref 80–155)
ZINC: 51.2 UG/DL (ref 60–120)

## 2022-01-19 LAB
ALBUMIN SERPL-MCNC: 3.35 G/DL (ref 3.75–5.01)
ALPHA-1-GLOBULIN: 0.44 G/DL (ref 0.19–0.46)
ALPHA-2-GLOBULIN: 1.02 G/DL (ref 0.48–1.05)
BETA GLOBULIN: 0.72 G/DL (ref 0.48–1.1)
FREE KAPPA LIGHT CHAINS: 6.6 MG/DL (ref 0.37–1.94)
FREE KAPPA/LAMBDA RATIO: 0.88 (ref 0.26–1.65)
FREE LAMBDA LIGHT CHAINS: 7.47 MG/DL (ref 0.57–2.63)
GAMMA GLOBULIN: 0.77 G/DL (ref 0.62–1.51)
IGA: 186 MG/DL (ref 68–408)
IGG: 767 MG/DL (ref 768–1632)
IGM: 31 MG/DL (ref 35–263)
IMMUNOFIXATION REFLEX: ABNORMAL
SPE/IFE INTERPRETATION: ABNORMAL
TOTAL PROTEIN: 6.3 G/DL (ref 6.3–8.2)

## 2022-01-20 ENCOUNTER — TELEPHONE (OUTPATIENT)
Dept: HEMATOLOGY | Age: 87
End: 2022-01-20

## 2022-01-20 ENCOUNTER — TELEPHONE (OUTPATIENT)
Dept: INFUSION THERAPY | Age: 87
End: 2022-01-20

## 2022-01-20 NOTE — TELEPHONE ENCOUNTER
Attempted to contact patient per PREMA Gupta to inform her of the need to begin zinc 50 mg daily. No answer, left message.

## 2022-02-11 ENCOUNTER — HOSPITAL ENCOUNTER (OUTPATIENT)
Dept: INFUSION THERAPY | Age: 87
Discharge: HOME | End: 2022-02-11
Payer: MEDICARE

## 2022-02-11 VITALS
HEART RATE: 84 BPM | HEIGHT: 65 IN | WEIGHT: 147 LBS | TEMPERATURE: 97.8 F | SYSTOLIC BLOOD PRESSURE: 107 MMHG | DIASTOLIC BLOOD PRESSURE: 67 MMHG | BODY MASS INDEX: 24.49 KG/M2 | OXYGEN SATURATION: 100 %

## 2022-02-11 DIAGNOSIS — N18.9 CHRONIC KIDNEY DISEASE, UNSPECIFIED CKD STAGE: Primary | ICD-10-CM

## 2022-02-11 DIAGNOSIS — E61.1 LOW IRON: ICD-10-CM

## 2022-02-11 DIAGNOSIS — D50.9 IRON DEFICIENCY ANEMIA, UNSPECIFIED IRON DEFICIENCY ANEMIA TYPE: ICD-10-CM

## 2022-02-11 PROCEDURE — 96360 HYDRATION IV INFUSION INIT: CPT

## 2022-02-11 PROCEDURE — 2580000003 HC RX 258: Performed by: NURSE PRACTITIONER

## 2022-02-11 PROCEDURE — 6360000002 HC RX W HCPCS: Performed by: NURSE PRACTITIONER

## 2022-02-11 PROCEDURE — 96374 THER/PROPH/DIAG INJ IV PUSH: CPT

## 2022-02-11 PROCEDURE — 96361 HYDRATE IV INFUSION ADD-ON: CPT

## 2022-02-11 RX ORDER — EPINEPHRINE 1 MG/ML
0.3 INJECTION, SOLUTION, CONCENTRATE INTRAVENOUS PRN
OUTPATIENT
Start: 2022-02-18

## 2022-02-11 RX ORDER — SODIUM CHLORIDE 9 MG/ML
INJECTION, SOLUTION INTRAVENOUS CONTINUOUS
Status: CANCELLED | OUTPATIENT
Start: 2022-02-18

## 2022-02-11 RX ORDER — SODIUM CHLORIDE 9 MG/ML
25 INJECTION, SOLUTION INTRAVENOUS PRN
OUTPATIENT
Start: 2022-02-18

## 2022-02-11 RX ORDER — ONDANSETRON 2 MG/ML
8 INJECTION INTRAMUSCULAR; INTRAVENOUS
OUTPATIENT
Start: 2022-02-18

## 2022-02-11 RX ORDER — ACETAMINOPHEN 325 MG/1
650 TABLET ORAL
OUTPATIENT
Start: 2022-02-18

## 2022-02-11 RX ORDER — SODIUM CHLORIDE 9 MG/ML
INJECTION, SOLUTION INTRAVENOUS CONTINUOUS
OUTPATIENT
Start: 2022-02-18

## 2022-02-11 RX ORDER — MEPERIDINE HYDROCHLORIDE 25 MG/ML
12.5 INJECTION INTRAMUSCULAR; INTRAVENOUS; SUBCUTANEOUS PRN
OUTPATIENT
Start: 2022-02-18

## 2022-02-11 RX ORDER — HEPARIN SODIUM (PORCINE) LOCK FLUSH IV SOLN 100 UNIT/ML 100 UNIT/ML
500 SOLUTION INTRAVENOUS PRN
OUTPATIENT
Start: 2022-02-18

## 2022-02-11 RX ORDER — ALBUTEROL SULFATE 90 UG/1
4 AEROSOL, METERED RESPIRATORY (INHALATION) PRN
OUTPATIENT
Start: 2022-02-18

## 2022-02-11 RX ORDER — SODIUM CHLORIDE 0.9 % (FLUSH) 0.9 %
5-40 SYRINGE (ML) INJECTION PRN
OUTPATIENT
Start: 2022-02-18

## 2022-02-11 RX ORDER — DIPHENHYDRAMINE HYDROCHLORIDE 50 MG/ML
50 INJECTION INTRAMUSCULAR; INTRAVENOUS
OUTPATIENT
Start: 2022-02-18

## 2022-02-11 RX ADMIN — IRON SUCROSE 500 MG: 20 INJECTION, SOLUTION INTRAVENOUS at 12:41

## 2022-02-18 ENCOUNTER — HOSPITAL ENCOUNTER (OUTPATIENT)
Dept: INFUSION THERAPY | Age: 87
Discharge: HOME | End: 2022-02-18
Payer: MEDICARE

## 2022-02-18 VITALS
HEART RATE: 83 BPM | WEIGHT: 142 LBS | TEMPERATURE: 97.9 F | DIASTOLIC BLOOD PRESSURE: 72 MMHG | SYSTOLIC BLOOD PRESSURE: 127 MMHG | HEIGHT: 65 IN | OXYGEN SATURATION: 100 % | RESPIRATION RATE: 18 BRPM | BODY MASS INDEX: 23.66 KG/M2

## 2022-02-18 DIAGNOSIS — D50.9 IRON DEFICIENCY ANEMIA, UNSPECIFIED IRON DEFICIENCY ANEMIA TYPE: ICD-10-CM

## 2022-02-18 DIAGNOSIS — N18.9 CHRONIC KIDNEY DISEASE, UNSPECIFIED CKD STAGE: ICD-10-CM

## 2022-02-18 DIAGNOSIS — E61.1 LOW IRON: Primary | ICD-10-CM

## 2022-02-18 LAB
BASOPHILS ABSOLUTE: 0.05 K/UL (ref 0.01–0.08)
BASOPHILS RELATIVE PERCENT: 0.7 % (ref 0.1–1.2)
EOSINOPHILS ABSOLUTE: 0.08 K/UL (ref 0.04–0.54)
EOSINOPHILS RELATIVE PERCENT: 1.1 % (ref 0.7–7)
HCT VFR BLD CALC: 32.1 % (ref 34.1–44.9)
HEMOGLOBIN: 10.9 G/DL (ref 11.2–15.7)
LYMPHOCYTES ABSOLUTE: 1.71 K/UL (ref 1.18–3.74)
LYMPHOCYTES RELATIVE PERCENT: 22.5 % (ref 19.3–53.1)
MCH RBC QN AUTO: 31.1 PG (ref 25.6–32.2)
MCHC RBC AUTO-ENTMCNC: 34 G/DL (ref 32.3–35.5)
MCV RBC AUTO: 91.7 FL (ref 79.4–94.8)
MONOCYTES ABSOLUTE: 0.55 K/UL (ref 0.24–0.82)
MONOCYTES RELATIVE PERCENT: 7.2 % (ref 4.7–12.5)
NEUTROPHILS ABSOLUTE: 5.21 K/UL (ref 1.56–6.13)
NEUTROPHILS RELATIVE PERCENT: 68.5 % (ref 34–71.1)
PDW BLD-RTO: 15.7 % (ref 11.7–14.4)
PLATELET # BLD: 214 K/UL (ref 182–369)
PMV BLD AUTO: 10.8 FL (ref 7.4–10.4)
RBC # BLD: 3.5 M/UL (ref 3.93–5.22)
WBC # BLD: 7.6 K/UL (ref 3.98–10.04)

## 2022-02-18 PROCEDURE — 6360000002 HC RX W HCPCS: Performed by: NURSE PRACTITIONER

## 2022-02-18 PROCEDURE — 2580000003 HC RX 258: Performed by: NURSE PRACTITIONER

## 2022-02-18 PROCEDURE — 96366 THER/PROPH/DIAG IV INF ADDON: CPT

## 2022-02-18 PROCEDURE — 85025 COMPLETE CBC W/AUTO DIFF WBC: CPT

## 2022-02-18 PROCEDURE — 96365 THER/PROPH/DIAG IV INF INIT: CPT

## 2022-02-18 PROCEDURE — 96367 TX/PROPH/DG ADDL SEQ IV INF: CPT

## 2022-02-18 PROCEDURE — 36415 COLL VENOUS BLD VENIPUNCTURE: CPT

## 2022-02-18 RX ORDER — MEPERIDINE HYDROCHLORIDE 25 MG/ML
12.5 INJECTION INTRAMUSCULAR; INTRAVENOUS; SUBCUTANEOUS PRN
OUTPATIENT
Start: 2022-02-25

## 2022-02-18 RX ORDER — SODIUM CHLORIDE 0.9 % (FLUSH) 0.9 %
5-40 SYRINGE (ML) INJECTION PRN
OUTPATIENT
Start: 2022-02-25

## 2022-02-18 RX ORDER — DIPHENHYDRAMINE HYDROCHLORIDE 50 MG/ML
50 INJECTION INTRAMUSCULAR; INTRAVENOUS
OUTPATIENT
Start: 2022-02-25

## 2022-02-18 RX ORDER — ACETAMINOPHEN 325 MG/1
650 TABLET ORAL
OUTPATIENT
Start: 2022-02-25

## 2022-02-18 RX ORDER — ONDANSETRON 2 MG/ML
8 INJECTION INTRAMUSCULAR; INTRAVENOUS
OUTPATIENT
Start: 2022-02-25

## 2022-02-18 RX ORDER — HEPARIN SODIUM (PORCINE) LOCK FLUSH IV SOLN 100 UNIT/ML 100 UNIT/ML
500 SOLUTION INTRAVENOUS PRN
OUTPATIENT
Start: 2022-02-25

## 2022-02-18 RX ORDER — SODIUM CHLORIDE 9 MG/ML
INJECTION, SOLUTION INTRAVENOUS CONTINUOUS
OUTPATIENT
Start: 2022-02-25

## 2022-02-18 RX ORDER — EPINEPHRINE 1 MG/ML
0.3 INJECTION, SOLUTION, CONCENTRATE INTRAVENOUS PRN
OUTPATIENT
Start: 2022-02-25

## 2022-02-18 RX ORDER — SODIUM CHLORIDE 9 MG/ML
25 INJECTION, SOLUTION INTRAVENOUS PRN
OUTPATIENT
Start: 2022-02-25

## 2022-02-18 RX ORDER — ALBUTEROL SULFATE 90 UG/1
4 AEROSOL, METERED RESPIRATORY (INHALATION) PRN
OUTPATIENT
Start: 2022-02-25

## 2022-02-18 RX ORDER — SODIUM CHLORIDE 9 MG/ML
INJECTION, SOLUTION INTRAVENOUS CONTINUOUS
Status: DISCONTINUED | OUTPATIENT
Start: 2022-02-18 | End: 2022-02-19 | Stop reason: HOSPADM

## 2022-02-18 RX ADMIN — IRON SUCROSE 500 MG: 20 INJECTION, SOLUTION INTRAVENOUS at 12:55

## 2022-03-08 NOTE — PROGRESS NOTES
OP HEMATOLOGY/ONCOLOGY PROGRESS NOTE      Pt Name: Jax Mccall  YOB: 1933  MRN: [id number]  Referring provider: PREMA Mendez  Nephrology: Dr. Iraida Pires  Date of evaluation: 3/10/2022    History Obtained From:  patient, EMR, family member - son, Betsy Begun:    Chief Complaint   Patient presents with    Follow-up     Other iron deficiency anemia     HISTORY OF PRESENT ILLNESS:    Jax Mccall is a 80 y.o.  female       HEMATOLOGY HISTORY: Multifactorial Anemia  Hyacinth Barrera was seen in hematology consultation 1/14/2022, referred by Dr. Iraida Pires regarding CKD associated anemia. Timo Sin resides at [facility]. She has multiple comorbidities to include stage IV CKD, COPD, type 2 diabetes mellitus, CHF, chronic respiratory failure, GERD, viral hepatitis (A&B), HTN, OAB with bladder neuro device, Oxygen therapy PRN, Vitamin D deficiency, renal cysts  She has had multiple hospitalizations over the past 6 months. She has had worsening renal function. She is referred to consider RACHEL for CKD associated anemia. Symptoms include fatigue, exertional shortness of breath. Labs 12/16/2021:  · CBC: WBC 6.49, Hgb 9.6/MCV 91.0, platelets 600,384  · CMP: Creatinine 2.23, GFR 21, calcium 8.52, total protein 5.85  · Iron: 44.7, TIBC 257, iron saturation 17%  · Vitamin D 25: 17.2  · Urinalysis: Trace intact blood  · PTH: 25.8    Oral iron was initiated by nephrology. At hematology consultation, Delbert Krishnamurthy states Timo Sin has been taking oral iron for the past 2 weeks. Timo Sin states her stools have since been dark. Prior to the iron, she denied melena or hematochezia. Labs 1/14/2022:  · LDH: 366  · Haptoglobin: 215  · SPEP: Decreased albumin region.  Restriction of protein migration in the gamma region.  HAIDER gel shows a slight restriction at the application point in IgG kappa which could be artifact, a specific immune response, or early monoclonal protein.  Suggest close clinical follow-up and repeat HAIDER in 1-2 months. · Kappa light chain: 6.60, Lambda light chain: 7.47, L/K ratio 1.13  · Ig, IgA: 186, IgM: 31  · Zinc: 51.2 ()  · Copper: 116.2  · EPO: 9  · B12: 375  · Folate: 10.9  · Ferritin: 83.1    TREATMENT SUMMARY:  1. Venofer 500 mg IV delivered 2022 and 2022  2. Zinc 50 mg daily initiated 2021  3. Anticipate Retacrit 10,000 units weekly if Hgb falls below 10    Past Medical History:   Diagnosis Date    [facility]neur[facility] [address] [address]     Aneurysm ([facility] [address]     Breast cyst     Chronic pulmonary disease     CKD (chronic kidney disease)     Colon polyps     DM (diabetes mellitus) (HCC)     GERD (gastroesophageal reflux disease)     Hepatitis     High cholesterol     History of stomach ulcers     Lung nodule     left/BG    Stomach ache reflux    Thyroid disorder     Thyroid nodule     Urinary incontinence      Past Surgical History:   Procedure Laterality Date    BLADDER SURGERY  ?  BRAIN ANEURYSM SURGERY      BREAST SURGERY      BIOPSY    CARPAL TUNNEL RELEASE      CATARACT REMOVAL       SECTION      TIMES 4    COLONOSCOPY  3-    DR Vanessa Bello    COLONOSCOPY  13    [facility]    CYST REMOVAL      FINGER    GALLBLADDER SURGERY      HYSTERECTOMY      KNEE ARTHROSCOPY      KNEE SURGERY      RTK    OTHER SURGICAL HISTORY      arteriorgram, surgeon said leave it alone no surgery.     TONSILLECTOMY AND ADENOIDECTOMY      UPPER GASTROINTESTINAL ENDOSCOPY  2010    DR Gaetano Mora UPPER GASTROINTESTINAL ENDOSCOPY  10/25/2013    [facility]       Current Outpatient Medications:     atorvastatin (LIPITOR) 40 MG tablet, , Disp: , Rfl:     allopurinol (ZYLOPRIM) 100 MG tablet, , Disp: , Rfl:     ferrous sulfate (IRON 325) 325 (65 Fe) MG tablet, Take 650 mg by mouth daily (with breakfast), Disp: , Rfl:     fluticasone-salmeterol (ADVAIR) 100-50 MCG/DOSE diskus inhaler, , Disp: , Rfl:     hydrALAZINE (APRESOLINE) 25 MG tablet, Take 25 mg by mouth, Disp: , Rfl:     mirabegron (MYRBETRIQ) 50 MG TB24, Take 50 mg by mouth daily, Disp: , Rfl:     polyethylene glycol (GLYCOLAX) 17 GM/SCOOP powder, Take 17 g by mouth daily, Disp: , Rfl:     ondansetron (ZOFRAN) 4 MG tablet, Take 4 mg by mouth every evening, Disp: , Rfl:     ergocalciferol (ERGOCALCIFEROL) 1.25 MG (72006 UT) capsule, Take 50,000 Units by mouth once a week, Disp: , Rfl:     carboxymethylcellulose (REFRESH PLUS) 0.5 % SOLN ophthalmic solution, Apply 1 drop to eye nightly as needed, Disp: , Rfl:     acetaminophen (TYLENOL) 500 MG tablet, Take 500 mg by mouth every 6 hours as needed, Disp: , Rfl:     bumetanide (BUMEX) 1 MG tablet, , Disp: , Rfl:     ENTRESTO 24-26 MG per tablet, , Disp: , Rfl:     zinc 50 MG CAPS, Take 50 mg by mouth daily, Disp: 30 capsule, Rfl: 2    bumetanide (BUMEX) 0.5 MG tablet, , Disp: , Rfl:     losartan (COZAAR) 100 MG tablet, , Disp: , Rfl:     insulin detemir (LEVEMIR) 100 UNIT/ML injection vial, Inject into the skin, Disp: , Rfl:     levothyroxine (SYNTHROID) 75 MCG tablet, Take 75 mcg by mouth Daily. , Disp: , Rfl:     Levocetirizine Dihydrochloride (XYZAL PO), Take 5 mg by mouth daily. , Disp: , Rfl:     PARoxetine (PAXIL) 10 MG tablet, Take 10 mg by mouth every morning., Disp: , Rfl:     GlipiZIDE (GLUCOTROL PO), Take 10 mg by mouth daily. , Disp: , Rfl:     Atorvastatin Calcium (LIPITOR PO), Take 80 mg by mouth daily. (Patient not taking: Reported on 3/10/2022), Disp: , Rfl:    Allergies:    Allergies   Allergen Reactions    Celebrex [Celecoxib]     Codeine     Pregabalin     Protonix [Pantoprazole Sodium] Diarrhea    Quinapril Hcl     Sulfa Antibiotics     Tramadol     Trental [Pentoxifylline Er] Diarrhea    Vioxx [Rofecoxib]      Social History     Tobacco Use    Smoking status: Former Smoker     Start date:      Quit date: 1993     Years since quittin.0    Smokeless tobacco: Never Used    Tobacco comment: quit 1993   Vaping Use    Vaping Use: Never used   Substance Use Topics    Alcohol use: No    Drug use: No     Family History   Problem Relation Age of Onset    Stroke Mother     High Blood Pressure Mother     Lung Cancer Brother     Esophageal Cancer Brother     Other Brother         CKD    High Blood Pressure Father     High Blood Pressure Sister      Health Maintenance   Topic Date Due    Potassium monitoring  Never done    Creatinine monitoring  Never done    Lipid screen  Never done    Depression Screen  Never done    DTaP/Tdap/Td vaccine (1 - Tdap) Never done    Shingles Vaccine (1 of 2) Never done   [employer] Visit (AWV)  Never done    COVID-19 Vaccine (3 - Booster for Fre[place] Peter series) 08/02/2021    Flu vaccine (1) 09/01/2021    Pneumococcal 65+ yrs at Risk Vaccine  Completed    Hepatitis A vaccine  Aged Out    Hib vaccine  Aged Out    Meningococcal (ACWY) vaccine  Aged Out     Subjective   Review of Systems   Constitutional: Positive for fatigue. Negative for fever and unexpected weight change. HENT: Positive for hearing loss. Negative for dental problem, mouth sores, nosebleeds and sore throat. Eyes: Negative for discharge and itching. Respiratory: Positive for shortness of breath (exertional). Negative for cough and wheezing. Cardiovascular: Positive for leg swelling. Negative for chest pain and palpitations. Gastrointestinal: Positive for constipation and nausea. Negative for abdominal pain, diarrhea and vomiting. GERD   Endocrine: Positive for polydipsia. Negative for cold intolerance. Genitourinary: Negative for dysuria, frequency, hematuria and urgency. History of UTIs   Musculoskeletal: Positive for arthralgias and back pain. Negative for joint swelling and myalgias. Skin: Negative for pallor and rash. Allergic/Immunologic: Positive for environmental allergies. Negative for immunocompromised state. Neurological: Positive for weakness. Negative for seizures, syncope and numbness. Balance issues   Hematological: Negative for adenopathy. Bruises/bleeds easily. Psychiatric/Behavioral: Negative for agitation, behavioral problems and confusion. The patient is not nervous/anxious. Objective   Physical Exam  Vitals reviewed. Constitutional:       General: She is not in acute distress. Appearance: She is well-developed. She is not toxic-appearing or diaphoretic. Comments: Wearing a facial mask. Appears chronically ill    HENT:      Head: Normocephalic and atraumatic. Right Ear: External ear normal.      Left Ear: External ear normal.      Ears:      Comments: Very hard of hearing     Nose: Nose normal.      Mouth/Throat:      Mouth: Mucous membranes are moist.   Eyes:      General: No scleral icterus. Right eye: No discharge. Left eye: No discharge. Conjunctiva/sclera: Conjunctivae normal.   Neck:      Trachea: No tracheal deviation. Cardiovascular:      Rate and Rhythm: Normal rate and regular rhythm. Heart sounds: Murmur heard. Pulmonary:      Effort: Pulmonary effort is normal. No respiratory distress. Breath sounds: Normal breath sounds. No wheezing or rales. Chest:   Breasts:      Right: No supraclavicular adenopathy. Left: No supraclavicular adenopathy. Abdominal:      General: Bowel sounds are normal. There is no distension. Palpations: Abdomen is soft. Tenderness: There is no abdominal tenderness. There is no guarding. Genitourinary:     Comments: Exam deferred  Musculoskeletal:         General: No tenderness or deformity. Cervical back: Neck supple. No muscular tenderness. Right lower leg: Edema (trace - 1+) present. Left lower leg: Edema (trace - 1+) present. Comments: Normal ROM all four extremities. Changes of arthritis.  Presents in a wheelchair   Lymphadenopathy:      Cervical:      Right cervical: No superficial or deep cervical adenopathy. Left cervical: No superficial or deep cervical adenopathy. Upper Body:      Right upper body: No supraclavicular adenopathy. Left upper body: No supraclavicular adenopathy. Comments:      Skin:     General: Skin is warm and dry. Coloration: Skin is pale. Findings: Bruising (BUE) present. No rash. Neurological:      Mental Status: She is alert and oriented to person, place, and time. Comments: follows commands, non-focal   Psychiatric:         Behavior: Behavior normal. Behavior is cooperative. Thought Content: Thought content normal.         Judgment: Judgment normal.      Comments: Alert and oriented to person, place and time. /62   Pulse 100   Ht 5' 6.5\" (1.689 m)   Wt 141 lb (64 kg)   SpO2 98%   BMI 22.42 kg/m²   Wt Readings from Last 3 Encounters:   03/10/22 141 lb (64 kg)   22 142 lb (64.4 kg)   22 147 lb (66.7 kg)     Labs 2022:  · LDH: 366  · Haptoglobin: 215  · SPEP: Decreased albumin region.  Restriction of protein migration in the gamma region.  HAIDER gel shows a slight restriction at the application point in IgG kappa which could be artifact, a specific immune response, or early monoclonal protein. Suggest close clinical follow-up and repeat HAIDER in 1-2 months. · Kappa light chain: 6.60, Lambda light chain: 7.47, L/K ratio 1.13  · Ig, IgA: 186, IgM: 31  · Zinc: 51.2 ()  · Copper: 116.2  · EPO: 9  · B12: 375  · Folate: 10.9  · Ferritin: 83.1    ASSESSMENT/PLAN:    1. Anemia due to stage 4 chronic kidney disease (HCC)    2. Stage 4 chronic kidney disease (HCC)    3. Other iron deficiency anemia    4.  Zinc deficiency      CBC 2022:  Lab Results   Component Value Date    WBC 7.00 03/10/2022    HGB 10.1 (L) 03/10/2022    HCT 30.5 (L) 03/10/2022    MCV 93.6 03/10/2022     03/10/2022    LABLYMP 1.90 2012    LYMPHOPCT 22.5 2022    RBC 3.26 (L) 03/10/2022    MCH 31.0 03/10/2022    MCHC 33.1 03/10/2022    RDW 16.2 (H) 03/10/2022     Hgb is stable, post iron infusion at 10.1  Encouraged to submit stool for occult blood as previously requested. Has been on zinc for approximately 7 weeks, repeat zinc level  Repeat iron studies. Continue zinc 50 mg daily for now  Again discussed option for RACHEL therapy if Hgb falls below 10  Marito Arevalo participates with physical therapy approximately 3 times per day, otherwise she is essentially in her wheelchair most of the day. She denies overt shortness of breath though does have mild, chronic exertional dyspnea. No chest pain. No palpitations. He reports recent renal function decreased with GFR around 14. Dialysis was not recommended. Plan of care was discussed with Aimee Heck and her son, Bobbi Simmons. All questions answered. CBC will be monitored monthly, order provided for patient to have completed at [facility]. Orders Placed This Encounter   Procedures    CBC with Auto Differential    Zinc    Electrophoresis Protein, Serum    Iron and TIBC    Ferritin     Return in about 4 months (around 7/10/2022) for follow up with PREMA Friedman. I have seen, examined and reviewed this patient medication list, appropriate labs and imaging studies. I reviewed relevant medical records and others physicians notes. I discussed the plan of care with the patient. I answered all questions to the patients satisfaction. I have also reviewed the chief complaint (CC) and part of the history (History of Present Illness (HPI), Past Family Social History [facility]), or Review of Systems (ROS) and made changes when appropriated. Office note and labs from Dr. Bebeto Oconnell reviewed. Care plan reviewed with patient and her son, Bobbi Simmons. All questions answered. Dictated utilizing Dragon transcription software.         PREMA Aviles  10:06 PM  3/10/2022

## 2022-03-10 ENCOUNTER — OFFICE VISIT (OUTPATIENT)
Dept: HEMATOLOGY | Age: 87
End: 2022-03-10
Payer: MEDICARE

## 2022-03-10 ENCOUNTER — HOSPITAL ENCOUNTER (OUTPATIENT)
Dept: INFUSION THERAPY | Age: 87
Discharge: HOME | End: 2022-03-10
Payer: MEDICARE

## 2022-03-10 VITALS
WEIGHT: 141 LBS | HEIGHT: 67 IN | BODY MASS INDEX: 22.13 KG/M2 | OXYGEN SATURATION: 98 % | HEART RATE: 100 BPM | DIASTOLIC BLOOD PRESSURE: 62 MMHG | SYSTOLIC BLOOD PRESSURE: 100 MMHG

## 2022-03-10 DIAGNOSIS — E60 ZINC DEFICIENCY: ICD-10-CM

## 2022-03-10 DIAGNOSIS — N18.4 STAGE 4 CHRONIC KIDNEY DISEASE (HCC): ICD-10-CM

## 2022-03-10 DIAGNOSIS — N18.4 ANEMIA DUE TO STAGE 4 CHRONIC KIDNEY DISEASE (HCC): Primary | ICD-10-CM

## 2022-03-10 DIAGNOSIS — D50.8 OTHER IRON DEFICIENCY ANEMIA: ICD-10-CM

## 2022-03-10 DIAGNOSIS — D63.1 ANEMIA DUE TO STAGE 4 CHRONIC KIDNEY DISEASE (HCC): ICD-10-CM

## 2022-03-10 DIAGNOSIS — N18.4 ANEMIA DUE TO STAGE 4 CHRONIC KIDNEY DISEASE (HCC): ICD-10-CM

## 2022-03-10 DIAGNOSIS — D63.1 ANEMIA DUE TO STAGE 4 CHRONIC KIDNEY DISEASE (HCC): Primary | ICD-10-CM

## 2022-03-10 LAB
FERRITIN: 515.6 NG/ML (ref 13–150)
HCT VFR BLD CALC: 30.5 % (ref 34.1–44.9)
HEMOGLOBIN: 10.1 G/DL (ref 11.2–15.7)
IRON SATURATION: 76 % (ref 14–50)
IRON: 155 UG/DL (ref 37–145)
MCH RBC QN AUTO: 31 PG (ref 25.6–32.2)
MCHC RBC AUTO-ENTMCNC: 33.1 G/DL (ref 32.3–35.5)
MCV RBC AUTO: 93.6 FL (ref 79.4–94.8)
PDW BLD-RTO: 16.2 % (ref 11.7–14.4)
PLATELET # BLD: 204 K/UL (ref 182–369)
PMV BLD AUTO: 10.4 FL (ref 7.4–10.4)
RBC # BLD: 3.26 M/UL (ref 3.93–5.22)
TOTAL IRON BINDING CAPACITY: 203 UG/DL (ref 250–400)
WBC # BLD: 7 K/UL (ref 3.98–10.04)

## 2022-03-10 PROCEDURE — G8427 DOCREV CUR MEDS BY ELIG CLIN: HCPCS | Performed by: NURSE PRACTITIONER

## 2022-03-10 PROCEDURE — 1123F ACP DISCUSS/DSCN MKR DOCD: CPT | Performed by: NURSE PRACTITIONER

## 2022-03-10 PROCEDURE — 1036F TOBACCO NON-USER: CPT | Performed by: NURSE PRACTITIONER

## 2022-03-10 PROCEDURE — 85025 COMPLETE CBC W/AUTO DIFF WBC: CPT

## 2022-03-10 PROCEDURE — 4040F PNEUMOC VAC/ADMIN/RCVD: CPT | Performed by: NURSE PRACTITIONER

## 2022-03-10 PROCEDURE — 36415 COLL VENOUS BLD VENIPUNCTURE: CPT | Performed by: NURSE PRACTITIONER

## 2022-03-10 PROCEDURE — 99213 OFFICE O/P EST LOW 20 MIN: CPT | Performed by: NURSE PRACTITIONER

## 2022-03-10 PROCEDURE — G8484 FLU IMMUNIZE NO ADMIN: HCPCS | Performed by: NURSE PRACTITIONER

## 2022-03-10 PROCEDURE — G8420 CALC BMI NORM PARAMETERS: HCPCS | Performed by: NURSE PRACTITIONER

## 2022-03-10 PROCEDURE — 1090F PRES/ABSN URINE INCON ASSESS: CPT | Performed by: NURSE PRACTITIONER

## 2022-03-10 RX ORDER — ALLOPURINOL 100 MG/1
100 TABLET ORAL DAILY
COMMUNITY
Start: 2022-01-03

## 2022-03-10 RX ORDER — FERROUS SULFATE 325(65) MG
650 TABLET ORAL 2 TIMES DAILY
COMMUNITY
End: 2022-07-14 | Stop reason: SINTOL

## 2022-03-10 RX ORDER — BUMETANIDE 1 MG/1
0.5 TABLET ORAL DAILY
COMMUNITY
Start: 2022-01-11

## 2022-03-10 RX ORDER — ATORVASTATIN CALCIUM 40 MG/1
40 TABLET, FILM COATED ORAL NIGHTLY
COMMUNITY
Start: 2022-01-07

## 2022-03-10 RX ORDER — CARBOXYMETHYLCELLULOSE SODIUM 5 MG/ML
1 SOLUTION/ DROPS OPHTHALMIC NIGHTLY PRN
COMMUNITY

## 2022-03-10 RX ORDER — ACETAMINOPHEN 500 MG
500 TABLET ORAL EVERY 6 HOURS PRN
COMMUNITY

## 2022-03-10 RX ORDER — ONDANSETRON 4 MG/1
4 TABLET, FILM COATED ORAL EVERY 6 HOURS PRN
COMMUNITY

## 2022-03-10 RX ORDER — ERGOCALCIFEROL (VITAMIN D2) 1250 MCG
50000 CAPSULE ORAL WEEKLY
COMMUNITY

## 2022-03-10 RX ORDER — HYDRALAZINE HYDROCHLORIDE 25 MG/1
25 TABLET, FILM COATED ORAL 3 TIMES DAILY
COMMUNITY
Start: 2021-11-05

## 2022-03-10 RX ORDER — SACUBITRIL AND VALSARTAN 24; 26 MG/1; MG/1
1 TABLET, FILM COATED ORAL 2 TIMES DAILY
COMMUNITY
Start: 2022-02-24

## 2022-03-10 RX ORDER — POLYETHYLENE GLYCOL 3350 17 G/17G
17 POWDER, FOR SOLUTION ORAL DAILY
COMMUNITY
Start: 2021-03-31

## 2022-03-10 ASSESSMENT — ENCOUNTER SYMPTOMS
CONSTIPATION: 1
COUGH: 0
SHORTNESS OF BREATH: 1
VOMITING: 0
BACK PAIN: 1
EYE ITCHING: 0
SORE THROAT: 0
WHEEZING: 0
EYE DISCHARGE: 0
DIARRHEA: 0
NAUSEA: 1
ABDOMINAL PAIN: 0

## 2022-03-14 LAB
ALBUMIN SERPL-MCNC: 3.52 G/DL (ref 3.75–5.01)
ALPHA-1-GLOBULIN: 0.33 G/DL (ref 0.19–0.46)
ALPHA-2-GLOBULIN: 1.02 G/DL (ref 0.48–1.05)
BETA GLOBULIN: 0.64 G/DL (ref 0.48–1.1)
GAMMA GLOBULIN: 0.79 G/DL (ref 0.62–1.51)
PROTEIN ELECTROPHORESIS, SERUM: ABNORMAL
SPE/IFE INTERPRETATION: ABNORMAL
TOTAL PROTEIN: 6.3 G/DL (ref 6.3–8.2)

## 2022-03-15 ENCOUNTER — HOSPITAL ENCOUNTER (INPATIENT)
Age: 87
LOS: 3 days | Discharge: NURSING, REHAB OR LONG-TERM CARE | DRG: 641 | End: 2022-03-18
Attending: HOSPITALIST | Admitting: HOSPITALIST
Payer: MEDICARE

## 2022-03-15 ENCOUNTER — APPOINTMENT (OUTPATIENT)
Dept: GENERAL RADIOLOGY | Age: 87
DRG: 641 | End: 2022-03-15
Payer: MEDICARE

## 2022-03-15 ENCOUNTER — TELEPHONE (OUTPATIENT)
Dept: INFUSION THERAPY | Age: 87
End: 2022-03-15

## 2022-03-15 DIAGNOSIS — E87.5 HYPERKALEMIA: ICD-10-CM

## 2022-03-15 DIAGNOSIS — N18.5 STAGE 5 CHRONIC KIDNEY DISEASE NOT ON CHRONIC DIALYSIS (HCC): Primary | ICD-10-CM

## 2022-03-15 PROBLEM — I50.32 CHRONIC DIASTOLIC HEART FAILURE (HCC): Status: ACTIVE | Noted: 2022-03-15

## 2022-03-15 PROBLEM — J44.9 COPD (CHRONIC OBSTRUCTIVE PULMONARY DISEASE) (HCC): Status: ACTIVE | Noted: 2022-03-15

## 2022-03-15 PROBLEM — E03.9 HYPOTHYROIDISM: Status: ACTIVE | Noted: 2022-03-15

## 2022-03-15 PROBLEM — N18.9 ACUTE RENAL FAILURE SUPERIMPOSED ON CHRONIC KIDNEY DISEASE (HCC): Status: ACTIVE | Noted: 2022-03-15

## 2022-03-15 PROBLEM — I10 HTN (HYPERTENSION): Status: ACTIVE | Noted: 2022-03-15

## 2022-03-15 PROBLEM — N17.9 ACUTE RENAL FAILURE SUPERIMPOSED ON CHRONIC KIDNEY DISEASE (HCC): Status: ACTIVE | Noted: 2022-03-15

## 2022-03-15 LAB
ALBUMIN SERPL-MCNC: 3.9 G/DL (ref 3.5–5.2)
ALP BLD-CCNC: 66 U/L (ref 35–104)
ALT SERPL-CCNC: 71 U/L (ref 5–33)
ANION GAP SERPL CALCULATED.3IONS-SCNC: 15 MMOL/L (ref 7–19)
ANION GAP SERPL CALCULATED.3IONS-SCNC: 16 MMOL/L (ref 7–19)
AST SERPL-CCNC: 56 U/L (ref 5–32)
BACTERIA: NEGATIVE /HPF
BASOPHILS ABSOLUTE: 0 K/UL (ref 0–0.2)
BASOPHILS RELATIVE PERCENT: 0.2 % (ref 0–1)
BILIRUB SERPL-MCNC: <0.2 MG/DL (ref 0.2–1.2)
BILIRUBIN URINE: NEGATIVE
BLOOD, URINE: NEGATIVE
BUN BLDV-MCNC: 71 MG/DL (ref 8–23)
BUN BLDV-MCNC: 71 MG/DL (ref 8–23)
CALCIUM SERPL-MCNC: 9.6 MG/DL (ref 8.8–10.2)
CALCIUM SERPL-MCNC: 9.7 MG/DL (ref 8.8–10.2)
CHLORIDE BLD-SCNC: 105 MMOL/L (ref 98–111)
CHLORIDE BLD-SCNC: 107 MMOL/L (ref 98–111)
CLARITY: CLEAR
CO2: 11 MMOL/L (ref 22–29)
CO2: 14 MMOL/L (ref 22–29)
COLOR: YELLOW
CREAT SERPL-MCNC: 3 MG/DL (ref 0.5–0.9)
CREAT SERPL-MCNC: 3.1 MG/DL (ref 0.5–0.9)
CREATININE URINE: 33.1 MG/DL (ref 4.2–622)
CRYSTALS, UA: ABNORMAL /HPF
EOSINOPHILS ABSOLUTE: 0.1 K/UL (ref 0–0.6)
EOSINOPHILS RELATIVE PERCENT: 0.9 % (ref 0–5)
EPITHELIAL CELLS, UA: 2 /HPF (ref 0–5)
FOLATE: 15.9 NG/ML (ref 4.8–37.3)
GFR AFRICAN AMERICAN: 17
GFR AFRICAN AMERICAN: 18
GFR NON-AFRICAN AMERICAN: 14
GFR NON-AFRICAN AMERICAN: 15
GLUCOSE BLD-MCNC: 216 MG/DL (ref 74–109)
GLUCOSE BLD-MCNC: 87 MG/DL (ref 70–99)
GLUCOSE BLD-MCNC: 87 MG/DL (ref 74–109)
GLUCOSE BLD-MCNC: 90 MG/DL (ref 70–99)
GLUCOSE URINE: 100 MG/DL
HBA1C MFR BLD: 7.2 % (ref 4–6)
HCT VFR BLD CALC: 33.7 % (ref 37–47)
HEMOGLOBIN: 10.5 G/DL (ref 12–16)
HYALINE CASTS: 0 /HPF (ref 0–8)
IMMATURE GRANULOCYTES #: 0 K/UL
IRON SATURATION: 67 % (ref 14–50)
IRON: 154 UG/DL (ref 37–145)
KETONES, URINE: NEGATIVE MG/DL
LEUKOCYTE ESTERASE, URINE: ABNORMAL
LYMPHOCYTES ABSOLUTE: 1.6 K/UL (ref 1.1–4.5)
LYMPHOCYTES RELATIVE PERCENT: 24.8 % (ref 20–40)
MCH RBC QN AUTO: 30.6 PG (ref 27–31)
MCHC RBC AUTO-ENTMCNC: 31.2 G/DL (ref 33–37)
MCV RBC AUTO: 98.3 FL (ref 81–99)
MONOCYTES ABSOLUTE: 0.4 K/UL (ref 0–0.9)
MONOCYTES RELATIVE PERCENT: 6.3 % (ref 0–10)
NEUTROPHILS ABSOLUTE: 4.4 K/UL (ref 1.5–7.5)
NEUTROPHILS RELATIVE PERCENT: 67.5 % (ref 50–65)
NITRITE, URINE: NEGATIVE
PDW BLD-RTO: 16.9 % (ref 11.5–14.5)
PERFORMED ON: NORMAL
PERFORMED ON: NORMAL
PH UA: 5.5 (ref 5–8)
PLATELET # BLD: 203 K/UL (ref 130–400)
PMV BLD AUTO: 10.9 FL (ref 9.4–12.3)
POTASSIUM REFLEX MAGNESIUM: 6.2 MMOL/L (ref 3.5–5)
POTASSIUM REFLEX MAGNESIUM: 6.4 MMOL/L (ref 3.5–5)
PROTEIN UA: ABNORMAL MG/DL
RBC # BLD: 3.43 M/UL (ref 4.2–5.4)
RBC UA: 3 /HPF (ref 0–4)
SARS-COV-2, NAAT: NOT DETECTED
SODIUM BLD-SCNC: 134 MMOL/L (ref 136–145)
SODIUM BLD-SCNC: 134 MMOL/L (ref 136–145)
SODIUM URINE: 88 MMOL/L
SPECIFIC GRAVITY UA: 1.01 (ref 1–1.03)
TOTAL IRON BINDING CAPACITY: 230 UG/DL (ref 250–400)
TOTAL PROTEIN: 6.9 G/DL (ref 6.6–8.7)
TSH REFLEX FT4: 3.87 UIU/ML (ref 0.35–5.5)
UROBILINOGEN, URINE: 0.2 E.U./DL
VITAMIN B-12: 585 PG/ML (ref 211–946)
WBC # BLD: 6.5 K/UL (ref 4.8–10.8)
WBC UA: 1 /HPF (ref 0–5)

## 2022-03-15 PROCEDURE — 6360000002 HC RX W HCPCS: Performed by: NURSE PRACTITIONER

## 2022-03-15 PROCEDURE — 99285 EMERGENCY DEPT VISIT HI MDM: CPT

## 2022-03-15 PROCEDURE — 2580000003 HC RX 258: Performed by: NURSE PRACTITIONER

## 2022-03-15 PROCEDURE — 93005 ELECTROCARDIOGRAM TRACING: CPT | Performed by: NURSE PRACTITIONER

## 2022-03-15 PROCEDURE — 83550 IRON BINDING TEST: CPT

## 2022-03-15 PROCEDURE — 85025 COMPLETE CBC W/AUTO DIFF WBC: CPT

## 2022-03-15 PROCEDURE — 6370000000 HC RX 637 (ALT 250 FOR IP): Performed by: NURSE PRACTITIONER

## 2022-03-15 PROCEDURE — 71045 X-RAY EXAM CHEST 1 VIEW: CPT

## 2022-03-15 PROCEDURE — 2500000003 HC RX 250 WO HCPCS: Performed by: NURSE PRACTITIONER

## 2022-03-15 PROCEDURE — 1210000000 HC MED SURG R&B

## 2022-03-15 PROCEDURE — 81001 URINALYSIS AUTO W/SCOPE: CPT

## 2022-03-15 PROCEDURE — 82570 ASSAY OF URINE CREATININE: CPT

## 2022-03-15 PROCEDURE — 84300 ASSAY OF URINE SODIUM: CPT

## 2022-03-15 PROCEDURE — 82947 ASSAY GLUCOSE BLOOD QUANT: CPT

## 2022-03-15 PROCEDURE — 84443 ASSAY THYROID STIM HORMONE: CPT

## 2022-03-15 PROCEDURE — 80053 COMPREHEN METABOLIC PANEL: CPT

## 2022-03-15 PROCEDURE — 87635 SARS-COV-2 COVID-19 AMP PRB: CPT

## 2022-03-15 PROCEDURE — 96365 THER/PROPH/DIAG IV INF INIT: CPT

## 2022-03-15 PROCEDURE — 83036 HEMOGLOBIN GLYCOSYLATED A1C: CPT

## 2022-03-15 PROCEDURE — 82607 VITAMIN B-12: CPT

## 2022-03-15 PROCEDURE — 96375 TX/PRO/DX INJ NEW DRUG ADDON: CPT

## 2022-03-15 PROCEDURE — 36415 COLL VENOUS BLD VENIPUNCTURE: CPT

## 2022-03-15 PROCEDURE — 82746 ASSAY OF FOLIC ACID SERUM: CPT

## 2022-03-15 PROCEDURE — 83540 ASSAY OF IRON: CPT

## 2022-03-15 RX ORDER — POLYETHYLENE GLYCOL 3350 17 G/17G
17 POWDER, FOR SOLUTION ORAL DAILY PRN
Status: DISCONTINUED | OUTPATIENT
Start: 2022-03-15 | End: 2022-03-18 | Stop reason: HOSPADM

## 2022-03-15 RX ORDER — CALCIUM GLUCONATE 94 MG/ML
1000 INJECTION, SOLUTION INTRAVENOUS ONCE
Status: DISCONTINUED | OUTPATIENT
Start: 2022-03-15 | End: 2022-03-15 | Stop reason: SDUPTHER

## 2022-03-15 RX ORDER — ONDANSETRON 4 MG/1
4 TABLET, ORALLY DISINTEGRATING ORAL EVERY 8 HOURS PRN
Status: DISCONTINUED | OUTPATIENT
Start: 2022-03-15 | End: 2022-03-18 | Stop reason: HOSPADM

## 2022-03-15 RX ORDER — SODIUM CHLORIDE 0.9 % (FLUSH) 0.9 %
5-40 SYRINGE (ML) INJECTION PRN
Status: DISCONTINUED | OUTPATIENT
Start: 2022-03-15 | End: 2022-03-18 | Stop reason: HOSPADM

## 2022-03-15 RX ORDER — SODIUM CHLORIDE 9 MG/ML
25 INJECTION, SOLUTION INTRAVENOUS PRN
Status: DISCONTINUED | OUTPATIENT
Start: 2022-03-15 | End: 2022-03-18 | Stop reason: HOSPADM

## 2022-03-15 RX ORDER — ACETAMINOPHEN 650 MG/1
650 SUPPOSITORY RECTAL EVERY 6 HOURS PRN
Status: DISCONTINUED | OUTPATIENT
Start: 2022-03-15 | End: 2022-03-18 | Stop reason: HOSPADM

## 2022-03-15 RX ORDER — BUDESONIDE AND FORMOTEROL FUMARATE DIHYDRATE 80; 4.5 UG/1; UG/1
2 AEROSOL RESPIRATORY (INHALATION) 2 TIMES DAILY
Status: DISCONTINUED | OUTPATIENT
Start: 2022-03-15 | End: 2022-03-15 | Stop reason: CLARIF

## 2022-03-15 RX ORDER — ONDANSETRON 2 MG/ML
4 INJECTION INTRAMUSCULAR; INTRAVENOUS EVERY 6 HOURS PRN
Status: DISCONTINUED | OUTPATIENT
Start: 2022-03-15 | End: 2022-03-18 | Stop reason: HOSPADM

## 2022-03-15 RX ORDER — 0.9 % SODIUM CHLORIDE 0.9 %
250 INTRAVENOUS SOLUTION INTRAVENOUS ONCE
Status: DISCONTINUED | OUTPATIENT
Start: 2022-03-15 | End: 2022-03-15

## 2022-03-15 RX ORDER — SPIRONOLACTONE 25 MG/1
25 TABLET ORAL DAILY
COMMUNITY
End: 2022-09-22

## 2022-03-15 RX ORDER — BUDESONIDE 0.25 MG/2ML
0.25 INHALANT ORAL 2 TIMES DAILY
Status: DISCONTINUED | OUTPATIENT
Start: 2022-03-15 | End: 2022-03-18 | Stop reason: HOSPADM

## 2022-03-15 RX ORDER — HEPARIN SODIUM 5000 [USP'U]/ML
5000 INJECTION, SOLUTION INTRAVENOUS; SUBCUTANEOUS EVERY 8 HOURS SCHEDULED
Status: DISCONTINUED | OUTPATIENT
Start: 2022-03-15 | End: 2022-03-18 | Stop reason: HOSPADM

## 2022-03-15 RX ORDER — SODIUM CHLORIDE 0.9 % (FLUSH) 0.9 %
5-40 SYRINGE (ML) INJECTION EVERY 12 HOURS SCHEDULED
Status: DISCONTINUED | OUTPATIENT
Start: 2022-03-15 | End: 2022-03-18 | Stop reason: HOSPADM

## 2022-03-15 RX ORDER — DEXTROSE MONOHYDRATE 25 G/50ML
12.5 INJECTION, SOLUTION INTRAVENOUS PRN
Status: DISCONTINUED | OUTPATIENT
Start: 2022-03-15 | End: 2022-03-15 | Stop reason: CLARIF

## 2022-03-15 RX ORDER — ATORVASTATIN CALCIUM 40 MG/1
40 TABLET, FILM COATED ORAL NIGHTLY
Status: DISCONTINUED | OUTPATIENT
Start: 2022-03-15 | End: 2022-03-18 | Stop reason: HOSPADM

## 2022-03-15 RX ORDER — ACETAMINOPHEN 325 MG/1
650 TABLET ORAL EVERY 6 HOURS PRN
Status: DISCONTINUED | OUTPATIENT
Start: 2022-03-15 | End: 2022-03-18 | Stop reason: HOSPADM

## 2022-03-15 RX ORDER — INSULIN GLARGINE 100 [IU]/ML
10 INJECTION, SOLUTION SUBCUTANEOUS NIGHTLY
Status: DISCONTINUED | OUTPATIENT
Start: 2022-03-15 | End: 2022-03-18 | Stop reason: HOSPADM

## 2022-03-15 RX ORDER — HYDRALAZINE HYDROCHLORIDE 25 MG/1
25 TABLET, FILM COATED ORAL 3 TIMES DAILY
Status: DISCONTINUED | OUTPATIENT
Start: 2022-03-15 | End: 2022-03-18 | Stop reason: HOSPADM

## 2022-03-15 RX ORDER — AMOXICILLIN 250 MG
1 CAPSULE ORAL 2 TIMES DAILY
COMMUNITY

## 2022-03-15 RX ORDER — FERROUS SULFATE 325(65) MG
650 TABLET ORAL 2 TIMES DAILY
Status: DISCONTINUED | OUTPATIENT
Start: 2022-03-15 | End: 2022-03-18 | Stop reason: HOSPADM

## 2022-03-15 RX ORDER — SODIUM BICARBONATE 650 MG/1
325 TABLET ORAL 2 TIMES DAILY
Status: DISCONTINUED | OUTPATIENT
Start: 2022-03-15 | End: 2022-03-18

## 2022-03-15 RX ORDER — ZINC SULFATE 50(220)MG
50 CAPSULE ORAL DAILY
Status: DISCONTINUED | OUTPATIENT
Start: 2022-03-15 | End: 2022-03-18 | Stop reason: HOSPADM

## 2022-03-15 RX ORDER — BISACODYL 10 MG
10 SUPPOSITORY, RECTAL RECTAL DAILY PRN
COMMUNITY

## 2022-03-15 RX ORDER — M-VIT,TX,IRON,MINS/CALC/FOLIC 27MG-0.4MG
1 TABLET ORAL DAILY
COMMUNITY

## 2022-03-15 RX ORDER — LEVOTHYROXINE SODIUM 0.07 MG/1
75 TABLET ORAL DAILY
Status: DISCONTINUED | OUTPATIENT
Start: 2022-03-16 | End: 2022-03-18 | Stop reason: HOSPADM

## 2022-03-15 RX ORDER — FUROSEMIDE 10 MG/ML
20 INJECTION INTRAMUSCULAR; INTRAVENOUS ONCE
Status: COMPLETED | OUTPATIENT
Start: 2022-03-15 | End: 2022-03-15

## 2022-03-15 RX ORDER — ALLOPURINOL 100 MG/1
100 TABLET ORAL DAILY
Status: DISCONTINUED | OUTPATIENT
Start: 2022-03-15 | End: 2022-03-18 | Stop reason: HOSPADM

## 2022-03-15 RX ORDER — CALCIUM GLUCONATE 20 MG/ML
1000 INJECTION, SOLUTION INTRAVENOUS ONCE
Status: COMPLETED | OUTPATIENT
Start: 2022-03-15 | End: 2022-03-15

## 2022-03-15 RX ORDER — ARFORMOTEROL TARTRATE 15 UG/2ML
15 SOLUTION RESPIRATORY (INHALATION) 2 TIMES DAILY
Status: DISCONTINUED | OUTPATIENT
Start: 2022-03-15 | End: 2022-03-18 | Stop reason: HOSPADM

## 2022-03-15 RX ORDER — DEXTROSE MONOHYDRATE 50 MG/ML
100 INJECTION, SOLUTION INTRAVENOUS PRN
Status: DISCONTINUED | OUTPATIENT
Start: 2022-03-15 | End: 2022-03-18 | Stop reason: HOSPADM

## 2022-03-15 RX ORDER — NICOTINE POLACRILEX 4 MG
15 LOZENGE BUCCAL PRN
Status: DISCONTINUED | OUTPATIENT
Start: 2022-03-15 | End: 2022-03-15 | Stop reason: CLARIF

## 2022-03-15 RX ADMIN — FUROSEMIDE 20 MG: 10 INJECTION, SOLUTION INTRAMUSCULAR; INTRAVENOUS at 19:06

## 2022-03-15 RX ADMIN — FERROUS SULFATE TAB 325 MG (65 MG ELEMENTAL FE) 650 MG: 325 (65 FE) TAB at 22:27

## 2022-03-15 RX ADMIN — SODIUM ZIRCONIUM CYCLOSILICATE 10 G: 10 POWDER, FOR SUSPENSION ORAL at 22:28

## 2022-03-15 RX ADMIN — ATORVASTATIN CALCIUM 40 MG: 40 TABLET, FILM COATED ORAL at 22:30

## 2022-03-15 RX ADMIN — CALCIUM GLUCONATE 1000 MG: 20 INJECTION, SOLUTION INTRAVENOUS at 18:15

## 2022-03-15 RX ADMIN — SODIUM BICARBONATE: 84 INJECTION, SOLUTION INTRAVENOUS at 17:37

## 2022-03-15 RX ADMIN — SODIUM CHLORIDE, PRESERVATIVE FREE 10 ML: 5 INJECTION INTRAVENOUS at 22:31

## 2022-03-15 RX ADMIN — SODIUM ZIRCONIUM CYCLOSILICATE 10 G: 10 POWDER, FOR SUSPENSION ORAL at 17:33

## 2022-03-15 RX ADMIN — HEPARIN SODIUM 5000 UNITS: 5000 INJECTION INTRAVENOUS; SUBCUTANEOUS at 22:27

## 2022-03-15 RX ADMIN — SODIUM BICARBONATE 325 MG: 650 TABLET ORAL at 22:28

## 2022-03-15 RX ADMIN — HYDRALAZINE HYDROCHLORIDE 25 MG: 25 TABLET, FILM COATED ORAL at 22:28

## 2022-03-15 RX ADMIN — INSULIN HUMAN 10 UNITS: 100 INJECTION, SOLUTION PARENTERAL at 17:32

## 2022-03-15 ASSESSMENT — ENCOUNTER SYMPTOMS
SHORTNESS OF BREATH: 1
DIARRHEA: 0
VOMITING: 0
ABDOMINAL PAIN: 0

## 2022-03-15 ASSESSMENT — PAIN SCALES - GENERAL: PAINLEVEL_OUTOF10: 0

## 2022-03-15 NOTE — TELEPHONE ENCOUNTER
Received call from nurse Kayode at [facility] on 3/14/22 reporting critical lab results: BUN 70, Potassium 6.63. Timothy Cabezas stated Dr. Lewis Cortez at [facility] wants to d/c aldactone, but wants to run it by PREMA Orozco. Advised at that time that Elder Neumann was out of the office, but would be back today. Relayed this message to YASMINE Zarate for Elder Neumann. Haroon Tobar is seeing pt only for anemia/hematology. For CKD related issues Dr. Lewis Cortez may consult with Dr. Shmuel Rahman, pt's kidney specialist. Spoke with nurse Kayode at [facility], this morning & relayed the above information, she verbalizes understanding.

## 2022-03-15 NOTE — H&P
[facility] Hospital[facility]      Hospitalist - History & Physical      PCP: Moo Seth MD    Date of Admission: 3/15/2022    Date of Service: 3/15/2022    Chief Complaint:  Abnormal lab    History Of Present Illness: The patient is a 80 y.o. female who presented to [facility] ED complaining of abnormal lab. Pt has history of CKD, DM, COPD, pulmonary fibrosis, GERD and chronic diastolic HF. She presents from nursing home for further evaluation of hyperkalemia and worsening renal function followed by Dr. Carolina Urban. She has not previously required hemodialysis. She does report increased shortness of breath. She denies new or worsening lower extremity edema. She denies nausea, vomiting as well as diarrhea. She relates that the thickener for her fluids have been removed at nursing home this past week due to decreased po intake. She denies fevers, abdominal pain as well as dysuria. In ED, pt was given lokelma 10g po, calcium gluconate 1gm iv, 250cc bolus NS, lasix 20mg iv, 10units of regular insulin iv. Creatinine was 3.1/BUN 71w/gfr of 17-creatinine 11/27/2021 was 2.22/BUN 35-potassium today of 6.4, sodium 134, wbc 6.5, hgb 10.5, platelets 520, UA micro with negative bacteria, neg crystals, 1 wbc, 3 rbc, 2epi cells, specific gravity ua 1.010. CXR-pending. Pt is admitted to hospitalist service in consultation with nephrology for further evaluation and treatment.      Past Medical History:        Diagnosis Date    Aneurysm ([address] 1993    Aneurysm ([address]     Breast cyst     Chronic pulmonary disease     CKD (chronic kidney disease)     Colon polyps     COPD (chronic obstructive pulmonary disease) ([address] 3/15/2022    DM (diabetes mellitus) (HCC)     GERD (gastroesophageal reflux disease)     Hepatitis     High cholesterol     History of stomach ulcers     HTN (hypertension) 3/15/2022    Hypothyroidism 3/15/2022    Lung nodule     left/BG    Stomach ache reflux    Thyroid disorder     Thyroid nodule     Urinary incontinence        Past Surgical History:        Procedure Laterality Date    BLADDER SURGERY  ?  BRAIN ANEURYSM SURGERY      BREAST SURGERY      BIOPSY    CARPAL TUNNEL RELEASE      CATARACT REMOVAL       SECTION      TIMES 4    COLONOSCOPY  3-    DR Hunter Dakin    [facility]COPY  13    [facility]    CYST REMOVAL      FINGER    GALLBLADDER SURGERY      HYSTERECTOMY      KNEE ARTHROSCOPY      KNEE SURGERY      RTK    OTHER SURGICAL HISTORY      arteriorgram, surgeon said leave it alone no surgery.  TONSILLECTOMY AND ADENOIDECTOMY      UPPER GASTROINTESTINAL ENDOSCOPY  2010    DR Hunter Dakin    UPPER GASTROINTESTINAL ENDOSCOPY  10/25/2013    [facility]       Home Medications:  Prior to Admission medications    Medication Sig Start Date End Date Taking?  Authorizing Provider   spironolactone (ALDACTONE) 25 MG tablet Take 25 mg by mouth daily   Yes Historical Provider, MD   bisacodyl (DULCOLAX) 10 MG suppository Place 10 mg rectally daily as needed for Constipation   Yes Historical Provider, MD   insulin lispro (HUMALOG) 100 UNIT/ML injection vial Inject into the skin 3 times daily (before meals) Per sliding scale   Yes Historical Provider, MD   atorvastatin (LIPITOR) 40 MG tablet 40 mg nightly  22   Historical Provider, MD   allopurinol (ZYLOPRIM) 100 MG tablet 100 mg daily  1/3/22   Historical Provider, MD   ferrous sulfate (IRON 325) 325 (65 Fe) MG tablet Take 650 mg by mouth 2 times daily     Historical Provider, MD   fluticasone-salmeterol (ADVAIR) 100-50 MCG/DOSE diskus inhaler Inhale 1 puff into the lungs 2 times daily  22   Historical Provider, MD   hydrALAZINE (APRESOLINE) 25 MG tablet Take 25 mg by mouth 3 times daily Hold if BP is lower than 120/70 21   Historical Provider, MD   mirabegron (MYRBETRIQ) 50 MG TB24 Take 50 mg by mouth daily    Historical Provider, MD   polyethylene glycol (GLYCOLAX) 17 GM/SCOOP powder Take 17 g by mouth daily 3/31/21 Historical Provider, MD   ondansetron (ZOFRAN) 4 MG tablet Take 4 mg by mouth every 6 hours as needed     Historical Provider, MD   ergocalciferol (ERGOCALCIFEROL) 1.25 MG (60189 UT) capsule Take 50,000 Units by mouth once a week    Historical Provider, MD   carboxymethylcellulose (REFRESH PLUS) 0.5 % SOLN ophthalmic solution Apply 1 drop to eye nightly as needed    Historical Provider, MD   acetaminophen (TYLENOL) 500 MG tablet Take 500 mg by mouth every 6 hours as needed    Historical Provider, MD   bumetanide (BUMEX) 1 MG tablet 0.5 mg daily  1/11/22   Historical Provider, MD   ENTRESTO 24-26 MG per tablet 1 tablet 2 times daily  2/24/22   Historical Provider, MD   zinc 50 MG CAPS Take 50 mg by mouth daily 1/20/22   PREMA Hanna   insulin detemir (LEVEMIR) 100 UNIT/ML injection vial Inject 10 Units into the skin 2 times daily     Historical Provider, MD   levothyroxine (SYNTHROID) 75 MCG tablet Take 75 mcg by mouth Daily. Historical Provider, MD   GlipiZIDE (GLUCOTROL PO) Take 10 mg by mouth daily. Historical Provider, MD       Allergies:    Celebrex [celecoxib], Codeine, Pregabalin, Protonix [pantoprazole sodium], Quinapril hcl, Sulfa antibiotics, Tramadol, Trental [pentoxifylline er], and Vioxx [rofecoxib]    Social History:    The patient currently lives at [facility]  Tobacco:   reports that she quit smoking about 29 years ago. She started smoking about 72 years ago. She has never used smokeless tobacco.  Alcohol:   reports no history of alcohol use. Illicit Drugs: none    Family History:      Problem Relation Age of Onset    Stroke Mother     High Blood Pressure Mother     Lung Cancer Brother     Esophageal Cancer Brother     Other Brother         CKD    High Blood Pressure Father     High Blood Pressure Sister        Review of Systems:   Review of Systems   Constitutional: Negative for fever. Respiratory: Positive for shortness of breath.     Cardiovascular: Negative for chest pain. Gastrointestinal: Negative for abdominal pain, diarrhea and vomiting. Neurological: Positive for weakness (global). All other systems reviewed and are negative. 14 point review of systems is negative except as specifically addressed above. Physical Examination:  BP (!) 172/81   Pulse 86   Temp 97.9 °F (36.6 °C) (Oral)   Resp 16   Ht 5' 6\" (1.676 m)   Wt 143 lb (64.9 kg)   SpO2 97%   BMI 23.08 kg/m²   Physical Exam  Vitals reviewed. Constitutional:       Comments: 80 yr old female appears in no respiratory distress breathing room air oxygen. Son at bedside   HENT:      Head: Normocephalic. Right Ear: External ear normal.      Left Ear: External ear normal.   Eyes:      Conjunctiva/sclera: Conjunctivae normal.      Pupils: Pupils are equal, round, and reactive to light. Cardiovascular:      Rate and Rhythm: Normal rate and regular rhythm. Heart sounds: Normal heart sounds. Pulmonary:      Effort: Pulmonary effort is normal.      Comments: Decreased breath sounds throughout   Abdominal:      General: Bowel sounds are normal.      Palpations: Abdomen is soft. Musculoskeletal:         General: Normal range of motion. Cervical back: Normal range of motion. Comments: No obvious edema/ROYAL hose in place  Moves bilateral upper/lower extremities equally   Skin:     General: Skin is warm and dry. Neurological:      Mental Status: She is alert and oriented to person, place, and time.           Diagnostic Data:  CBC:  Recent Labs     03/15/22  1620   WBC 6.5   HGB 10.5*   HCT 33.7*        BMP:  Recent Labs     03/15/22  1620   *   K 6.4*      CO2 11*   BUN 71*   CREATININE 3.1*   CALCIUM 9.7     Recent Labs     03/15/22  1620   AST 56*   ALT 71*   BILITOT <0.2   ALKPHOS 66     Urinalysis:  Lab Results   Component Value Date    NITRU Negative 03/15/2022    WBCUA 1 03/15/2022    BACTERIA NEGATIVE 03/15/2022    RBCUA 3 03/15/2022    BLOODU Negative 03/15/2022    SPECGRAV 1.010 03/15/2022    GLUCOSEU 100 03/15/2022     No results found. Assessment/Plan:  Principal Problem:    Acute renal failure superimposed on chronic kidney disease (HCC)  Active Problems:    Diabetes mellitus, type 2 (HCC)    Iron deficiency anemia, unspecified     Hyperkalemia    COPD (chronic obstructive pulmonary disease) (HCC)    Chronic diastolic heart failure (HCC)    HTN (hypertension)    Hypothyroidism  Resolved Problems:    * No resolved hospital problems. *     Principal Problem:    Acute renal failure superimposed on chronic kidney disease ([facility] [address]   -consult nephrology   -avoid nephrotoxic agents   -monitor lytes/renal function   -D5NS w/HCO3 50meq at 50cc/hr   -4carb choice/low sodium/low PO4/low K diet   -urine sodium   -urine creatinine   -consider renal us   -I's and O's    -daily weight  Active Problems:    Diabetes mellitus, type 2 (HCC)   -check poc glucose/insulin iv dose given    -continue basal insulin   -HgA1c   -poc glucose qid   -low dose insulin correction   -hold glucotrol    Iron deficiency anemia, unspecified    -TIBC/iron   -b12/folate   -monitor hgb/hct   -continue po iron supplement    Hyperkalemia   -treated in ED with lokelma, insulin, calcium   -recheck bmp 4hrs   -monitor lytes closely   -telemetry    COPD (chronic obstructive pulmonary disease) (HCC)   -monitor for supplemental oxygen requirements   -CXR pending   -covid test pending   -continue fluticasone-salmeterol inhaler    Chronic diastolic HF   -given lasix in ED   -hold bumex   -hold aldactone   -continue entresto   -echocardiogram   -monitor daily weight   -monitor I's and O's    HTN (hypertension)   -monitor blood pressure   -avoid hypotension   -hold hydralazine    Hypothyroidism   -TSH w/reflex FT4   -continue synthroid     Hyperlipidemia   -continue atorvastatin  Resolved Problems:    * No resolved hospital problems. *      Resolved Problems:    * No resolved hospital problems. *  Signed:  PREMA Olmstead - CNP, 3/15/2022 7:23 PM

## 2022-03-15 NOTE — ED PROVIDER NOTES
[facility]  eMERGENCY dEPARTMENT eNCOUnter      Pt Name: Venita Tejeda  MRN: [id number]  Armsnoemigfaimee 3/27/1933  Date of evaluation: 3/15/2022  Provider: PREMA Newman    CHIEF COMPLAINT       Chief Complaint   Patient presents with    Other     pt arrives via EMS from [facility]. EMS states pt has had abnormal labs for the last two days (kidney function) and pcp wants to talk about dialysis. HISTORY OF PRESENT ILLNESS   (Location/Symptom, Timing/Onset,Context/Setting, Quality, Duration, Modifying Factors, Severity)  Note limiting factors. Venita Tejeda is a 80 y.o. female who presents to the emergency department by EMS from the nursing home for a high potassium and creatinine. Pt's family member states that she has been seeing nephrology and they have been debating adjusting meds versus beginning dialysis. Pt denies urinary symptoms. Denies N/V. Today creatinine was 3.03 and K was 6.21. The history is provided by the patient. Other  This is a new problem. The current episode started 1 to 2 hours ago. The problem occurs constantly. The problem has been gradually worsening. Associated symptoms include shortness of breath. Pertinent negatives include no chest pain and no abdominal pain. Associated symptoms comments: SOB w/ exertion. . Nothing aggravates the symptoms. Nothing relieves the symptoms. She has tried nothing for the symptoms. NursingNotes were reviewed. REVIEW OF SYSTEMS    (2-9 systems for level 4, 10 or more for level 5)     Review of Systems   Respiratory: Positive for shortness of breath. SOB w/ exertion   Cardiovascular: Negative for chest pain. Gastrointestinal: Negative for abdominal pain. Genitourinary: Negative for difficulty urinating, dysuria and flank pain. Except as noted above the remainder of the review of systems was reviewed and negative.        PAST MEDICAL HISTORY     Past Medical History:   Diagnosis Date    Aneurysm ([facility] [address] 1993    Aneurysm ([address]     Breast cyst     Chronic pulmonary disease     CKD (chronic kidney disease)     Colon polyps     DM (diabetes mellitus) (HCC)     GERD (gastroesophageal reflux disease)     Hepatitis     High cholesterol     History of stomach ulcers     Lung nodule     left/BG    Stomach ache reflux    Thyroid disorder     Thyroid nodule     Urinary incontinence          SURGICALHISTORY       Past Surgical History:   Procedure Laterality Date    BLADDER SURGERY  ?  BRAIN ANEURYSM SURGERY      BREAST SURGERY      BIOPSY    CARPAL TUNNEL RELEASE      CATARACT REMOVAL       SECTION      TIMES 4    COLONOSCOPY  3-    DR Brooklyn Shane    COLONOSCOPY  13    Louise    CYST REMOVAL      FINGER    GALLBLADDER SURGERY      HYSTERECTOMY      KNEE ARTHROSCOPY      KNEE SURGERY      RTK    OTHER SURGICAL HISTORY      arteriorgram, surgeon said leave it alone no surgery.     TONSILLECTOMY AND ADENOIDECTOMY      UPPER GASTROINTESTINAL ENDOSCOPY  2010    DR Brooklyn Shane    UPPER GASTROINTESTINAL ENDOSCOPY  10/25/2013    Louise         CURRENT MEDICATIONS       Previous Medications    ACETAMINOPHEN (TYLENOL) 500 MG TABLET    Take 500 mg by mouth every 6 hours as needed    ALLOPURINOL (ZYLOPRIM) 100 MG TABLET    100 mg daily     ATORVASTATIN (LIPITOR) 40 MG TABLET    40 mg nightly     BISACODYL (DULCOLAX) 10 MG SUPPOSITORY    Place 10 mg rectally daily as needed for Constipation    BUMETANIDE (BUMEX) 1 MG TABLET    0.5 mg daily     CARBOXYMETHYLCELLULOSE (REFRESH PLUS) 0.5 % SOLN OPHTHALMIC SOLUTION    Apply 1 drop to eye nightly as needed    ENTRESTO 24-26 MG PER TABLET    1 tablet 2 times daily     ERGOCALCIFEROL (ERGOCALCIFEROL) 1.25 MG (30781 UT) CAPSULE    Take 50,000 Units by mouth once a week    FERROUS SULFATE (IRON 325) 325 (65 FE) MG TABLET    Take 650 mg by mouth 2 times daily     FLUTICASONE-SALMETEROL (ADVAIR) 100-50 MCG/DOSE DISKUS INHALER    Inhale 1 puff into the lungs 2 times daily     GLIPIZIDE (GLUCOTROL PO)    Take 10 mg by mouth daily. HYDRALAZINE (APRESOLINE) 25 MG TABLET    Take 25 mg by mouth 3 times daily Hold if BP is lower than 120/70    INSULIN DETEMIR (LEVEMIR) 100 UNIT/ML INJECTION VIAL    Inject 10 Units into the skin 2 times daily     INSULIN LISPRO (HUMALOG) 100 UNIT/ML INJECTION VIAL    Inject into the skin 3 times daily (before meals) Per sliding scale    LEVOTHYROXINE (SYNTHROID) 75 MCG TABLET    Take 75 mcg by mouth Daily. MIRABEGRON (MYRBETRIQ) 50 MG TB24    Take 50 mg by mouth daily    ONDANSETRON (ZOFRAN) 4 MG TABLET    Take 4 mg by mouth every 6 hours as needed     POLYETHYLENE GLYCOL (GLYCOLAX) 17 GM/SCOOP POWDER    Take 17 g by mouth daily    SPIRONOLACTONE (ALDACTONE) 25 MG TABLET    Take 25 mg by mouth daily    ZINC 50 MG CAPS    Take 50 mg by mouth daily       ALLERGIES     Celebrex [celecoxib], Codeine, Pregabalin, Protonix [pantoprazole sodium], Quinapril hcl, Sulfa antibiotics, Tramadol, Trental [pentoxifylline er], and Vioxx [rofecoxib]    FAMILY HISTORY       Family History   Problem Relation Age of Onset    Stroke Mother     High Blood Pressure Mother     Lung Cancer Brother     Esophageal Cancer Brother     Other Brother         CKD    High Blood Pressure Father     High Blood Pressure Sister           SOCIAL HISTORY       Social History     Socioeconomic History    Marital status:       Spouse name: Not on file    Number of children: Not on file    Years of education: Not on file    Highest education level: Not on file   Occupational History    Not on file   Tobacco Use    Smoking status: Former Smoker     Start date: 46     Quit date: 1993     Years since quittin.1    Smokeless tobacco: Never Used    Tobacco comment: quit 1993   Vaping Use    Vaping Use: Never used   Substance and Sexual Activity    Alcohol use: No    Drug use: No    Sexual activity: Not on file   Other Topics Concern    Not on file   Social History Narrative    Not on file     Social Determinants of Health     Financial Resource Strain:     Difficulty of Paying Living Expenses: Not on file   Food Insecurity:     Worried About Running Out of Food in the Last Year: Not on file    Diana of Food in the Last Year: Not on file   Transportation Needs:     Lack of Transportation (Medical): Not on file    Lack of Transportation (Non-Medical): Not on file   Physical Activity:     Days of Exercise per Week: Not on file    Minutes of Exercise per Session: Not on file   Stress:     Feeling of Stress : Not on file   Social Connections:     Frequency of Communication with Friends and Family: Not on file    Frequency of Social Gatherings with Friends and Family: Not on file    Attends Anabaptist Services: Not on file    Active Member of [address] [place] [address] [employer] or Organizations: Not on file    Attends Club or Organization Meetings: Not on file    Marital Status: Not on file   Intimate Partner Violence:     Fear of Current or Ex-Partner: Not on file    Emotionally Abused: Not on file    Physically Abused: Not on file    Sexually Abused: Not on file   Housing Stability:     Unable to Pay for Housing in the Last Year: Not on file    Number of [place] in the Last Year: Not on file    Unstable Housing in the Last Year: Not on file       SCREENINGS    [place] Coma Scale  Eye Opening: Spontaneous  Best Verbal Response: Oriented  Best Motor Response: Obeys commands  Jeffrey Coma Scale Score: 15 @FLOW([id number])@      PHYSICAL EXAM    (up to 7 for level 4, 8 or more for level 5)     ED Triage Vitals [03/15/22 1610]   BP Temp Temp src Pulse Resp SpO2 Height Weight   139/66 -- -- 82 16 94 % 5' 6\" (1.676 m) 143 lb (64.9 kg)       Physical Exam  Vitals and nursing note reviewed. Constitutional:       Appearance: She is well-developed. HENT:      Head: Normocephalic and atraumatic. Eyes:      General: No scleral icterus.         Right eye: No discharge. Left eye: No discharge. Cardiovascular:      Rate and Rhythm: Normal rate. Heart sounds: Normal heart sounds, S1 normal and S2 normal.   Pulmonary:      Effort: No respiratory distress. Breath sounds: Normal breath sounds. Abdominal:      Tenderness: There is abdominal tenderness. There is guarding. There is no right CVA tenderness or left CVA tenderness. Musculoskeletal:      Cervical back: Normal range of motion and neck supple. Skin:         Neurological:      General: No focal deficit present. Mental Status: She is alert and oriented to person, place, and time.    Psychiatric:         Behavior: Behavior normal.         DIAGNOSTIC RESULTS     EKG: All EKG's are interpreted by the Emergency Department Physician who either signs or Co-signsthis chart in the absence of a cardiologist.  80 sinus rhythm no T wave abnormalities low voltage read at 1720 by Dr. Too Rehman:   Ila Gottron such as CT, Ultrasound and MRI are read by the radiologist. Plain radiographic images are visualized and preliminarily interpreted by the emergency physician with the below findings:      Interpretation per the Radiologist below, if available at the time of this note:    No orders to display         ED BEDSIDEULTRASOUND:   Performed by ED Physician -none    LABS:  Labs Reviewed   CBC WITH AUTO DIFFERENTIAL - Abnormal; Notable for the following components:       Result Value    RBC 3.43 (*)     Hemoglobin 10.5 (*)     Hematocrit 33.7 (*)     MCHC 31.2 (*)     RDW 16.9 (*)     Neutrophils % 67.5 (*)     All other components within normal limits   COMPREHENSIVE METABOLIC PANEL W/ REFLEX TO MG FOR LOW K - Abnormal; Notable for the following components:    Sodium 134 (*)     Potassium reflex Magnesium 6.4 (*)     CO2 11 (*)     Glucose 216 (*)     BUN 71 (*)     CREATININE 3.1 (*)     GFR Non- 14 (*)     GFR  17 (*)     ALT 71 (*)     AST 56 (*) All other components within normal limits    Narrative:     Amber Garciaholley tel. ,  Chemistry results called to and read back by selin bledsoe, 03/15/2022 17:03,  by [facility] PANEL W/ REFLEX TO MG FOR LOW K   CBC WITH AUTO DIFFERENTIAL   SODIUM, URINE, RANDOM   CREATININE, RANDOM URINE       All other labs were within normal range or not returned as of this dictation. EMERGENCY DEPARTMENT COURSE and DIFFERENTIALDIAGNOSIS/MDM:   Vitals:    Vitals:    03/15/22 1610 03/15/22 1802   BP: 139/66    Pulse: 82    Resp: 16    Temp:  97.9 °F (36.6 °C)   TempSrc:  Oral   SpO2: 94%    Weight: 143 lb (64.9 kg)    Height: 5' 6\" (1.676 m)            MDM      CONSULTS:  IP CONSULT TO NEPHROLOGY    PROCEDURES:  Unless otherwise noted below, none     Procedures    FINAL IMPRESSION      1. Stage 5 chronic kidney disease not on chronic dialysis ([facility] [address]    2. Hyperkalemia        DISPOSITION/PLAN   DISPOSITION        PATIENT REFERRED TO:  No follow-up provider specified.     DISCHARGE MEDICATIONS:  New Prescriptions    No medications on file          (Please note that portions of this note were completed with a voice recognitionprogram.  Efforts were made to edit the dictations but occasionally words are mis-transcribed.)    PREMA Gaviria (electronically signed)          PREMA Gaviria  03/15/22 3549

## 2022-03-16 PROBLEM — Z51.5 PALLIATIVE CARE PATIENT: Status: ACTIVE | Noted: 2022-03-16

## 2022-03-16 LAB
ANION GAP SERPL CALCULATED.3IONS-SCNC: 16 MMOL/L (ref 7–19)
BASOPHILS ABSOLUTE: 0 K/UL (ref 0–0.2)
BASOPHILS RELATIVE PERCENT: 0.2 % (ref 0–1)
BUN BLDV-MCNC: 63 MG/DL (ref 8–23)
CALCIUM SERPL-MCNC: 9.2 MG/DL (ref 8.8–10.2)
CHLORIDE BLD-SCNC: 104 MMOL/L (ref 98–111)
CO2: 15 MMOL/L (ref 22–29)
CREAT SERPL-MCNC: 2.9 MG/DL (ref 0.5–0.9)
CREATININE URINE: 48.7 MG/DL (ref 4.2–622)
EKG P AXIS: NORMAL DEGREES
EKG P-R INTERVAL: NORMAL MS
EKG Q-T INTERVAL: 348 MS
EKG QRS DURATION: 92 MS
EKG QTC CALCULATION (BAZETT): 379 MS
EKG T AXIS: 32 DEGREES
EOSINOPHILS ABSOLUTE: 0.1 K/UL (ref 0–0.6)
EOSINOPHILS RELATIVE PERCENT: 1.3 % (ref 0–5)
GFR AFRICAN AMERICAN: 18
GFR NON-AFRICAN AMERICAN: 15
GLUCOSE BLD-MCNC: 134 MG/DL (ref 70–99)
GLUCOSE BLD-MCNC: 142 MG/DL (ref 74–109)
GLUCOSE BLD-MCNC: 152 MG/DL (ref 70–99)
GLUCOSE BLD-MCNC: 177 MG/DL (ref 70–99)
GLUCOSE BLD-MCNC: 233 MG/DL (ref 70–99)
HCT VFR BLD CALC: 30.1 % (ref 37–47)
HEMOGLOBIN: 9.7 G/DL (ref 12–16)
IMMATURE GRANULOCYTES #: 0 K/UL
LV EF: 65 %
LVEF MODALITY: NORMAL
LYMPHOCYTES ABSOLUTE: 1.7 K/UL (ref 1.1–4.5)
LYMPHOCYTES RELATIVE PERCENT: 30.6 % (ref 20–40)
MAGNESIUM: 1.9 MG/DL (ref 1.6–2.4)
MCH RBC QN AUTO: 30.3 PG (ref 27–31)
MCHC RBC AUTO-ENTMCNC: 32.2 G/DL (ref 33–37)
MCV RBC AUTO: 94.1 FL (ref 81–99)
MICROALBUMIN UR-MCNC: 7 MG/DL (ref 0–19)
MICROALBUMIN/CREAT UR-RTO: 143.7 MG/G
MONOCYTES ABSOLUTE: 0.3 K/UL (ref 0–0.9)
MONOCYTES RELATIVE PERCENT: 5.5 % (ref 0–10)
NEUTROPHILS ABSOLUTE: 3.4 K/UL (ref 1.5–7.5)
NEUTROPHILS RELATIVE PERCENT: 62 % (ref 50–65)
PARATHYROID HORMONE INTACT: 163.9 PG/ML (ref 15–65)
PDW BLD-RTO: 16.3 % (ref 11.5–14.5)
PERFORMED ON: ABNORMAL
PHOSPHORUS: 3.5 MG/DL (ref 2.5–4.5)
PLATELET # BLD: 219 K/UL (ref 130–400)
PMV BLD AUTO: 11.5 FL (ref 9.4–12.3)
POTASSIUM REFLEX MAGNESIUM: 5.7 MMOL/L (ref 3.5–5)
RBC # BLD: 3.2 M/UL (ref 4.2–5.4)
SODIUM BLD-SCNC: 135 MMOL/L (ref 136–145)
VITAMIN D 25-HYDROXY: 46.4 NG/ML
WBC # BLD: 5.4 K/UL (ref 4.8–10.8)
ZINC: 64.2 UG/DL (ref 60–120)

## 2022-03-16 PROCEDURE — 83970 ASSAY OF PARATHORMONE: CPT

## 2022-03-16 PROCEDURE — 94640 AIRWAY INHALATION TREATMENT: CPT

## 2022-03-16 PROCEDURE — 80048 BASIC METABOLIC PNL TOTAL CA: CPT

## 2022-03-16 PROCEDURE — C8929 TTE W OR WO FOL WCON,DOPPLER: HCPCS

## 2022-03-16 PROCEDURE — 1210000000 HC MED SURG R&B

## 2022-03-16 PROCEDURE — 6360000002 HC RX W HCPCS: Performed by: NURSE PRACTITIONER

## 2022-03-16 PROCEDURE — 6370000000 HC RX 637 (ALT 250 FOR IP): Performed by: NURSE PRACTITIONER

## 2022-03-16 PROCEDURE — 2500000003 HC RX 250 WO HCPCS: Performed by: INTERNAL MEDICINE

## 2022-03-16 PROCEDURE — 6360000004 HC RX CONTRAST MEDICATION: Performed by: HOSPITALIST

## 2022-03-16 PROCEDURE — 82947 ASSAY GLUCOSE BLOOD QUANT: CPT

## 2022-03-16 PROCEDURE — 82306 VITAMIN D 25 HYDROXY: CPT

## 2022-03-16 PROCEDURE — 2580000003 HC RX 258: Performed by: INTERNAL MEDICINE

## 2022-03-16 PROCEDURE — 84100 ASSAY OF PHOSPHORUS: CPT

## 2022-03-16 PROCEDURE — 2580000003 HC RX 258: Performed by: NURSE PRACTITIONER

## 2022-03-16 PROCEDURE — 82570 ASSAY OF URINE CREATININE: CPT

## 2022-03-16 PROCEDURE — 36415 COLL VENOUS BLD VENIPUNCTURE: CPT

## 2022-03-16 PROCEDURE — 85025 COMPLETE CBC W/AUTO DIFF WBC: CPT

## 2022-03-16 PROCEDURE — 93010 ELECTROCARDIOGRAM REPORT: CPT | Performed by: INTERNAL MEDICINE

## 2022-03-16 PROCEDURE — 82043 UR ALBUMIN QUANTITATIVE: CPT

## 2022-03-16 PROCEDURE — 83735 ASSAY OF MAGNESIUM: CPT

## 2022-03-16 RX ADMIN — SODIUM CHLORIDE, PRESERVATIVE FREE 10 ML: 5 INJECTION INTRAVENOUS at 09:46

## 2022-03-16 RX ADMIN — ARFORMOTEROL TARTRATE 15 MCG: 15 SOLUTION RESPIRATORY (INHALATION) at 18:13

## 2022-03-16 RX ADMIN — ZINC SULFATE 220 MG (50 MG) CAPSULE 50 MG: CAPSULE at 09:41

## 2022-03-16 RX ADMIN — ALLOPURINOL 100 MG: 100 TABLET ORAL at 09:41

## 2022-03-16 RX ADMIN — FERROUS SULFATE TAB 325 MG (65 MG ELEMENTAL FE) 650 MG: 325 (65 FE) TAB at 17:41

## 2022-03-16 RX ADMIN — HEPARIN SODIUM 5000 UNITS: 5000 INJECTION INTRAVENOUS; SUBCUTANEOUS at 05:13

## 2022-03-16 RX ADMIN — HEPARIN SODIUM 5000 UNITS: 5000 INJECTION INTRAVENOUS; SUBCUTANEOUS at 15:04

## 2022-03-16 RX ADMIN — SODIUM BICARBONATE 325 MG: 650 TABLET ORAL at 09:41

## 2022-03-16 RX ADMIN — LEVOTHYROXINE SODIUM 75 MCG: 75 TABLET ORAL at 05:12

## 2022-03-16 RX ADMIN — SODIUM ZIRCONIUM CYCLOSILICATE 10 G: 10 POWDER, FOR SUSPENSION ORAL at 12:36

## 2022-03-16 RX ADMIN — FERROUS SULFATE TAB 325 MG (65 MG ELEMENTAL FE) 650 MG: 325 (65 FE) TAB at 09:41

## 2022-03-16 RX ADMIN — HYDRALAZINE HYDROCHLORIDE 25 MG: 25 TABLET, FILM COATED ORAL at 15:04

## 2022-03-16 RX ADMIN — HYDRALAZINE HYDROCHLORIDE 25 MG: 25 TABLET, FILM COATED ORAL at 09:41

## 2022-03-16 RX ADMIN — PERFLUTREN 1.65 MG: 6.52 INJECTION, SUSPENSION INTRAVENOUS at 08:12

## 2022-03-16 RX ADMIN — SODIUM BICARBONATE: 84 INJECTION, SOLUTION INTRAVENOUS at 12:37

## 2022-03-16 RX ADMIN — INSULIN LISPRO 1 UNITS: 100 INJECTION, SOLUTION INTRAVENOUS; SUBCUTANEOUS at 12:40

## 2022-03-16 ASSESSMENT — PAIN SCALES - GENERAL
PAINLEVEL_OUTOF10: 0
PAINLEVEL_OUTOF10: 0

## 2022-03-16 ASSESSMENT — ENCOUNTER SYMPTOMS
BACK PAIN: 0
COLOR CHANGE: 0
CHEST TIGHTNESS: 0
DIARRHEA: 0
SHORTNESS OF BREATH: 0
CONSTIPATION: 0
ABDOMINAL PAIN: 0
NAUSEA: 0
VOMITING: 0

## 2022-03-16 NOTE — PROGRESS NOTES
4 Eyes Skin Assessment    Randy Rodriguez is being assessed upon: Admission    I agree that I, Ashlee Mckeon RN, along with Mar Martinez RN (either 2 RN's or 1 LPN and 1 RN) have performed a thorough Head to Toe Skin Assessment on the patient. ALL assessment sites listed below have been assessed. Areas assessed by both nurses:     [x]   Head, Face, and Ears   [x]   Shoulders, Back, and Chest  [x]   Arms, Elbows, and Hands   [x]   Coccyx, Sacrum, and Ischium  [x]   Legs, Feet, and Heels    Does the Patient have Skin Breakdown? No    [place] Prevention initiated: Yes  Wound Care Orders initiated: No    WOC nurse consulted for Pressure Injury (Stage 3,4, Unstageable, DTI, NWPT, and Complex wounds) and New or Established Ostomies: No        Primary Nurse eSignature:  Ashlee Mckeon RN on 3/15/2022 at 11:19 PM      Co-Signer eSignature: Electronically signed by Mar Martinez RN on 3/16/22 at 3:27 AM CDT

## 2022-03-16 NOTE — ACP (ADVANCE CARE PLANNING)
Advance Care Planning     Advance Care Planning Activator (Inpatient)  Conversation Note      Date of ACP Conversation: 3/16/2022     Conversation Conducted with: Pt and NF records. ACP Activator: Jak Henry RN    . Health Care Decision Maker:     Current Designated Health Care Decision Maker:     Primary Decision Maker: Tasha Jauregui - Child - [phone]    Secondary Decision Maker: Baldo Boeck Child - [phone]  . Care Preferences    Ventilation: \"If you were in your present state of health and suddenly became very ill and were unable to breathe on your own, what would your preference be about the use of a ventilator (breathing machine) if it were available to you? \"      Would the patient desire the use of ventilator (breathing machine)?: No          Resuscitation  \"In the event your heart stopped as a result of an underlying serious health condition, would you want attempts to be made to restart your heart (answer \"yes\" for attempt to resuscitate) or would you prefer a natural death (answer \"no\" for do not attempt to resuscitate)? \" No          Conversation Outcomes:  [x] ACP discussion completed  [] Existing advance directive reviewed with patient; no changes to patient's previously recorded wishes  [] New Advance Directive completed  [] Portable Do Not Rescitate prepared for Provider review and signature  [] POLST/POST/MOLST/MOST prepared for Provider review and signature      Follow-up plan:    [] Schedule follow-up conversation to continue planning  [] Referred individual to Provider for additional questions/concerns   [] Advised patient/agent/surrogate to review completed ACP document and update if needed with changes in condition, patient preferences or care setting    [] This note routed to one or more involved healthcare providers         Electronically signed by Jak Henry RN on 3/16/2022 at 2:08 PM

## 2022-03-16 NOTE — PROGRESS NOTES
[facility]      Patient: Binh Mejia  YOB: 1933  Date of Service: 3/16/2022  MRN: [id number]   Acct: [de-identified]   Primary Care Physician: Ryan Dsouza MD  Advance Directive: DNR  Admit Date: 3/15/2022       Hospital Day: 1  Portions of this note have been copied forward, however, changed to reflect the most current clinical status of this patient. CHIEF COMPLAINT abnormal lab    SUBJECTIVE: Patient has no complaints today. She denies shortness of breath and chest pain. CUMULATIVE HOSPITAL COURSE:  The patient is an 79-year-old female with past medical history of CKD, diabetes, COPD, pulmonary fibrosis, GERD, and chronic diastolic heart failure who presented to [facility] ED from skilled nursing facility for abnormal labs. The patient was sent from facility has hyperkalemia and worsening renal functions were noted. The patient did not report increased shortness of breath, new or worsening extremity edema, nausea, vomiting, or diarrhea. She did endorse decreased p.o. intake as her fluids have been recently thickened to facility. Patient denied fever, chills, abdominal pain, and dysuria. ED work-up indicated creatinine 3.1, BUN 71, GRF 17, and potassium 6.4. Patient was given Lokelma 10 g p.o., calcium gluconate 1 g IV, 200 cc bolus normal saline, Lasix 20 mg IV, 10 units of regular insulin IV in the ED. Patient was admitted to the hospitalist service for CROW on CKD. Nephrology was consulted. Repeat BMP after intervention indicated potassium still 6.2 and repeat [facility] was given. Morning labs indicated potassium of 5.7, BUN 63, creatinine 2.9, GFR 15. Another dose of [facility] was ordered. Nephrology recommends continuing with sodium bicarbonate fluids, Lokelma, and urine electrolytes studies. Review of Systems:   Review of Systems   Constitutional: Positive for appetite change. Negative for chills, fatigue, fever and unexpected weight change.    Respiratory: Negative for chest tightness and shortness of breath. Cardiovascular: Negative for chest pain, palpitations and leg swelling. Gastrointestinal: Negative for abdominal pain, constipation, diarrhea, nausea and vomiting. Genitourinary: Negative for difficulty urinating, dysuria, frequency and urgency. Musculoskeletal: Negative for back pain, joint swelling and myalgias. Skin: Negative for color change and wound. Neurological: Negative for dizziness, light-headedness and headaches. Psychiatric/Behavioral: Negative for agitation and confusion. 14 point review of systems is negative except as specifically addressed above. Objective:   VITALS:  BP (!) 123/51   Pulse 80   Temp 98.1 °F (36.7 °C)   Resp 16   Ht 5' 6.5\" (1.689 m)   Wt 141 lb 1 oz (64 kg)   SpO2 97%   BMI 22.43 kg/m²   24HR INTAKE/OUTPUT:    Intake/Output Summary (Last 24 hours) at 3/16/2022 1203  Last data filed at 3/16/2022 0951  Gross per 24 hour   Intake 480 ml   Output 550 ml   Net -70 ml       Physical Exam  Vitals and nursing note reviewed. Constitutional:       Appearance: She is not ill-appearing. HENT:      Head: Normocephalic. Nose: Nose normal.      Mouth/Throat:      Mouth: Mucous membranes are moist.   Eyes:      Pupils: Pupils are equal, round, and reactive to light. Cardiovascular:      Rate and Rhythm: Normal rate and regular rhythm. Pulses: Normal pulses. Heart sounds: Murmur heard. Pulmonary:      Effort: Pulmonary effort is normal.      Breath sounds: Normal breath sounds. Abdominal:      General: Abdomen is flat. Bowel sounds are normal. There is no distension. Palpations: Abdomen is soft. Tenderness: There is no abdominal tenderness. There is no guarding. Musculoskeletal:         General: Normal range of motion. Cervical back: Normal range of motion and neck supple. Right lower leg: No edema. Left lower leg: No edema. Skin:     General: Skin is warm and dry. for input(s): INR in the last 72 hours. UA:  Recent Labs     03/15/22  1752   COLORU YELLOW   PHUR 5.5   WBCUA 1   RBCUA 3   BACTERIA NEGATIVE*   CLARITYU Clear   SPECGRAV 1.010   LEUKOCYTESUR TRACE*   UROBILINOGEN 0.2   BILIRUBINUR Negative   BLOODU Negative   GLUCOSEU 100*     A1C:   Recent Labs     03/15/22  1620   LABA1C 7.2*     ABG:No results for input(s): PHART, BOS3ZJG, PO2ART, BRY1PSZ, BEART, HGBAE, S1XTXUMZ, CARBOXHGBART in the last 72 hours. RAD:   XR CHEST PORTABLE    Result Date: 3/15/2022  . Bibasilar atelectasis. Lungs are otherwise clear. Signed by Dr Brandin Plasencia       Echo:   Summary   Normal left ventricular chamber size and systolic function. Moderate concentric left ventricular hypertrophy. Left ventricular ejection fraction is visually estimated at 65%. Aortic valve leaflets are moderately thickened. There is mildly decreased   excursion. Mild aortic stenosis is present. The peak gradient across the aortic valve is 33 mmHg. The mean gradient across the aortic valve is 16 mmHg. Trivial pericardial effusion.       Assessment/Plan   Principal Problem:    Acute renal failure superimposed on chronic kidney disease ([address] / Hyperkalemia   -nephrology on board   -Monitor BMP   -Telemetry   -Treat hyperkalemia as indicated   -Gentle IV hydration with sodium bicarb and fluid   -Urine electrolytes studies   -PTH   -Intake and output   -Encourage oral intake     Active Problems:    Diabetes mellitus, type 2 (HCC)   -Accu-Cheks before meals and at bedtime   -Low-dose sliding scale insulin   -Hypoglycemia protocol in place      Iron deficiency anemia, unspecified    -Noted      COPD (chronic obstructive pulmonary disease) ([facility])   -Monitor oxygen saturation   -Currently denies shortness of breath      Chronic diastolic heart failure ([address]   -Patient denies chest pain, weight gain, or shortness of   -Continue current medication   -Echo completed, EF estimated 65%   -Entresto held for hyperkalemia and CROW      HTN (hypertension)   -Avoid hypotension   -Monitor closely      Hypothyroidism   -Continue levothyroxine      DVT Prophylaxis: Heparin subcu      PREMA Fisher - CNP, 3/16/2022 12:03 PM

## 2022-03-16 NOTE — CARE COORDINATION
3/16/22: Pt  Is a bedhold at [facility] and [address] [place] [address] P  [phone] F  Electronically signed by ELIECER Abebe on 3/16/2022 at 11:58 AM

## 2022-03-16 NOTE — CONSULTS
Nephrology ([facility]) Consult Note      Patient: Eric Kulkarni  YOB: 1933  Date of Service: 3/16/2022  MRN: [id number]   Acct: [de-identified]   Primary Care Physician: Carroll Alfred MD  Advance Directive: DNR  Admit Date: 3/15/2022       Hospital Day: 1  Referring Provider: Flako Shay MD    Patient independently seen and examined, Chart, Consults, Notes, Operative notes, Labs, Cardiology, and Radiology studies reviewed as available. Chief complaint: Abnormal labs. Subjective: Eric Kulkarni is a 80 y.o. female for whom we were consulted for evaluation and treatment of acute kidney injury/hyperkalemia/stage IV chronic kidney disease. Patient has stage IV chronic kidney disease due to diabetic nephropathy and has slow progression of renal disease with estimated GFR slowly drifting down. Presented to the emergency room as she was sent from nursing home. She was found to have hyperkalemia and was directed to go to ER for correction of hyperkalemia. Patient denies any nausea vomiting or diarrhea. Her p.o. intake has been fairly good. Lately she was on diuretics for severe edema and has excellent diuresis, lost at least 35 pounds of fluid weight. Patient is hard of hearing and most of the information of her medical history was taken from the chart and her son who is sitting at the bedside.     Allergies:  Celebrex [celecoxib], Codeine, Pregabalin, Protonix [pantoprazole sodium], Quinapril hcl, Sulfa antibiotics, Tramadol, Trental [pentoxifylline er], and Vioxx [rofecoxib]    Medicines:  Current Facility-Administered Medications   Medication Dose Route Frequency Provider Last Rate Last Admin    perflutren lipid microspheres (DEFINITY) injection 1.65 mg  1.5 mL IntraVENous ONCE PRN Alex Marshall MD   1.65 mg at 03/16/22 0812    sodium bicarbonate 50 mEq in dextrose 5 % 1,000 mL infusion   IntraVENous Continuous PREMA Santiago 50 mL/hr at 03/15/22 1737 New Bag at 03/15/22 1737    sodium chloride flush 0.9 % injection 5-40 mL  5-40 mL IntraVENous 2 times per day [facility]   10 mL at 03/16/22 0946    sodium chloride flush 0.9 % injection 5-40 mL  5-40 mL IntraVENous PRN [facility]        0.9 % sodium chloride infusion  25 mL IntraVENous PRN [facility]        heparin (porcine) injection 5,000 Units  5,000 Units SubCUTAneous 3 times per day [facility], APRN - CNP   5,000 Units at 03/16/22 0513    ondansetron (ZOFRAN-ODT) disintegrating tablet 4 mg  4 mg Oral Q8H [facility]N [facility]        Or    ondansetron (ZOFRAN) injection 4 mg  4 mg IntraVENous Q6H PRN [facility]        polyethylene glycol (GLYCOLAX) packet 17 g  17 g Oral Daily PRN [facility]        acetaminophen (TYLENOL) tablet 650 mg  650 mg Oral Q6H PRN [facility]        Or    acetaminophen (TYLENOL) suppository 650 mg  650 mg Rectal Q6H PRN [facility]        insulin lispro (HUMALOG) injection vial 0-6 Units  0-6 Units SubCUTAneous TID WC [facility]        insulin lispro (HUMALOG) injection vial 0-3 Units  0-3 Units SubCUTAneous Nightly [facility]        allopurinol (ZYLOPRIM) tablet 100 mg  100 mg Oral Daily [facility]   100 mg at 03/16/22 0941    atorvastatin (LIPITOR) tablet 40 mg  40 mg Oral Nightly [facility]   40 mg at 03/15/22 2230    [Held by provider] sacubitril-valsartan (ENTRESTO) 24-26 MG per tablet 1 tablet  1 tablet Oral BID [facility]        ferrous sulfate (IRON 325) tablet 650 mg  650 mg Oral BID [facility]   650 mg at 03/16/22 0941    hydrALAZINE (APRESOLINE) tablet 25 mg  25 mg Oral TID [facility]   25 mg at 03/16/22 0941    levothyroxine (SYNTHROID) tablet 75 mcg  75 mcg Oral Daily PREMA Quevedo CNP   75 mcg at 03/16/22 0512    zinc sulfate (ZINCATE) capsule 50 mg  50 mg Oral Daily Pablo Gash, APRN - CNP   50 mg at 22 0941    insulin glargine (LANTUS) injection vial 10 Units  10 Units SubCUTAneous Nightly Pablo Gash, APRN - CNP        budesonide (PULMICORT) nebulizer suspension 250 mcg  0.25 mg Nebulization BID Pablo Gash, APRN - CNP        And    Arformoterol Tartrate (BROVANA) nebulizer solution 15 mcg  15 mcg Nebulization BID Pablo Gash, APRN - CNP        glucagon (rDNA) injection 1 mg  1 mg IntraMUSCular PRN Pablo Gash, APRN - CNP        dextrose 5 % solution  100 mL/hr IntraVENous PRN Pablo Gash, APRN - CNP        glucose chewable tablet 4 each  4 tablet Oral PRN Pablo Gash, APRN - CNP        dextrose bolus (hypoglycemia) 10% 125 mL  125 mL IntraVENous PRN Pablo Gash, APRN - CNP        Or    dextrose bolus (hypoglycemia) 10% 250 mL  250 mL IntraVENous PRN Pablo Gash, APRN - CNP        sodium bicarbonate tablet 325 mg  325 mg Oral BID Arabella Reichmann, APRN   325 mg at 22 0185       Past Medical History:  Past Medical History:   Diagnosis Date    [facility] [address]     [facility]neurysm ([facility] [address]     Breast cyst     Chronic pulmonary disease     CKD (chronic kidney disease)     Colon polyps     COPD (chronic obstructive pulmonary disease) ([facility] [address] 3/15/2022    DM (diabetes mellitus) ([facility] [address]     GERD (gastroesophageal reflux disease)     Hepatitis     High cholesterol     History of stomach ulcers     HTN (hypertension) 3/15/2022    Hypothyroidism 3/15/2022    Lung nodule     left/BG    Stomach ache reflux    Thyroid disorder     Thyroid nodule     Urinary incontinence        Past Surgical History:  Past Surgical History:   Procedure Laterality Date    BLADDER SURGERY  ?     BRAIN ANEURYSM SURGERY      BREAST SURGERY      BIOPSY    CARPAL TUNNEL RELEASE      CATARACT REMOVAL       SECTION      TIMES 4    COLONOSCOPY  3-    DR Thompson    COLONOSCOPY  13    [place]    CYST REMOVAL      FINGER    GALLBLADDER SURGERY      HYSTERECTOMY      KNEE ARTHROSCOPY      KNEE SURGERY      RTK    OTHER SURGICAL HISTORY      arteriorgram, surgeon said leave it alone no surgery.  TONSILLECTOMY AND ADENOIDECTOMY      UPPER GASTROINTESTINAL ENDOSCOPY  2010    DR Alejandra Benitez    UPPER GASTROINTESTINAL ENDOSCOPY  10/25/2013    Louise       Family History  Family History   Problem Relation Age of Onset    Stroke Mother     High Blood Pressure Mother     Lung Cancer Brother     Esophageal Cancer Brother     Other Brother         CKD    High Blood Pressure Father     High Blood Pressure Sister        Social History  Social History     Socioeconomic History    Marital status:      Spouse name: Not on file    Number of children: Not on file    Years of education: Not on file    Highest education level: Not on file   Occupational History    Not on file   Tobacco Use    Smoking status: Former Smoker     Start date: 46     Quit date: 1993     Years since quittin.1    Smokeless tobacco: Never Used    Tobacco comment: quit    Vaping Use    Vaping Use: Never used   Substance and Sexual Activity    Alcohol use: No    Drug use: No    Sexual activity: Not on file   Other Topics Concern    Not on file   Social History Narrative    Not on file     Social Determinants of Health     Financial Resource Strain:     Difficulty of Paying Living Expenses: Not on file   Food Insecurity:     Worried About [address] in the Last Year: Not on file    [address] in the Last Year: Not on file   Transportation Needs:     Lack of Transportation (Medical): Not on file    Lack of Transportation (Non-Medical):  Not on file   Physical Activity:     Days of Exercise per Week: Not on file    Minutes of Exercise per Session: Not on file   Stress:     Feeling of Stress : Not on file   Social Connections:     Frequency of Communication with Friends and Family: Not on file    Frequency of Social Gatherings with Friends and Family: Not on file    Attends Mandaen Services: Not on file    Active Member of Clubs or Organizations: Not on file    Attends Club or Organization Meetings: Not on file    Marital Status: Not on file   Intimate Partner Violence:     Fear of Current or Ex-Partner: Not on file    Emotionally Abused: Not on file    Physically Abused: Not on file    Sexually Abused: Not on file   Housing Stability:     Unable to Pay for Housing in the Last Year: Not on file    Number of [place] in the Last Year: Not on file    Unstable Housing in the Last Year: Not on file         Review of Systems:  History obtained from chart review and the patient  General ROS: No fever or chills  Respiratory ROS: positive for - shortness of breath  Cardiovascular ROS: positive for - dyspnea on exertion  Gastrointestinal ROS: No abdominal pain or melena  Genito-Urinary ROS: No dysuria or hematuria  Musculoskeletal ROS: No joint pain or swelling   14 point ROS reviewed with the patient and negative except as noted above and in the HPI unless unable to obtain.     Objective:  Patient Vitals for the past 24 hrs:   BP Temp Temp src Pulse Resp SpO2 Height Weight   03/16/22 0545 (!) 137/44 97.5 °F (36.4 °C) Temporal 77 16 97 % -- --   03/16/22 0000 (!) 118/57 97.8 °F (36.6 °C) Temporal 87 16 98 % -- --   03/15/22 2346 -- -- -- 78 -- -- -- --   03/15/22 2000 132/87 97.3 °F (36.3 °C) Temporal 80 16 99 % 5' 6.5\" (1.689 m) 141 lb 1 oz (64 kg)   03/15/22 1915 -- -- -- -- -- 96 % -- --   03/15/22 1845 (!) 172/81 -- -- 86 16 97 % -- --   03/15/22 1802 -- 97.9 °F (36.6 °C) Oral -- -- -- -- --   03/15/22 1610 139/66 -- -- 82 16 94 % 5' 6\" (1.676 m) 143 lb (64.9 kg)       Intake/Output Summary (Last 24 hours) at 3/16/2022 1045  Last data filed at 3/16/2022 0951  Gross per 24 hour   Intake 480 ml   Output 550 ml   Net -70 ml     General: awake/alert   HEENT: Normocephalic atraumatic head  Neck: Supple with no JVD or carotid bruits. Chest:  clear to auscultation bilaterally  CVS: regular rate and rhythm  Abdominal: soft, nontender, normal bowel sounds  Extremities: no cyanosis or edema  Skin: warm and dry without rash      Labs:  BMP:   Recent Labs     03/15/22  1620 03/15/22  2115 03/16/22  0911   * 134* 135*   K 6.4* 6.2* 5.7*    105 104   CO2 11* 14* 15*   PHOS  --   --  3.5   BUN 71* 71* 63*   CREATININE 3.1* 3.0* 2.9*   CALCIUM 9.7 9.6 9.2     CBC:   Recent Labs     03/15/22  1620 03/16/22  0911   WBC 6.5 5.4   HGB 10.5* 9.7*   HCT 33.7* 30.1*   MCV 98.3 94.1    219     LIVER PROFILE:   Recent Labs     03/15/22  1620   AST 56*   ALT 71*   BILITOT <0.2   ALKPHOS 66     PT/INR: No results for input(s): PROTIME, INR in the last 72 hours. APTT: No results for input(s): APTT in the last 72 hours. BNP:  No results for input(s): BNP in the last 72 hours. Ionized Calcium:No results for input(s): IONCA in the last 72 hours. Magnesium:  Recent Labs     03/16/22  0911   MG 1.9     Phosphorus:  Recent Labs     03/16/22  0911   PHOS 3.5     HgbA1C:   Recent Labs     03/15/22  1620   LABA1C 7.2*     Hepatic:   Recent Labs     03/15/22  1620   ALKPHOS 66   ALT 71*   AST 56*   PROT 6.9   BILITOT <0.2   LABALBU 3.9     Lactic Acid: No results for input(s): LACTA in the last 72 hours. Troponin: No results for input(s): CKTOTAL, CKMB, TROPONINT in the last 72 hours. ABGs: No results for input(s): PH, PCO2, PO2, HCO3, O2SAT in the last 72 hours. CRP:  No results for input(s): CRP in the last 72 hours. Sed Rate:  No results for input(s): SEDRATE in the last 72 hours. Cultures:   No results for input(s): CULTURE in the last 72 hours. No results for input(s): BC, Aries Espinoza in the last 72 hours. No results for input(s): CXSURG in the last 72 hours.     Radiology reports as per the Radiologist  Radiology: ECHO Complete 2D W Doppler W Color    Result Date: 3/16/2022  Transthoracic Echocardiography Report (TTE) Demographics   Patient Name   Bridgett Kenney  Date of Study           03/16/2022   MRN            [id number]        Gender                  Female   Date of Birth  03/27/1933    Room Number             [facility]   Age            80 year(s)   Height:        67 inches     Referring Physician     Maverick Murray   Weight:        141 pounds    Sonographer             Salmeron Kyree CS   BSA:           1.74 m^2      Interpreting Physician  Kermitt Lefort, DO   BMI:           22.08 kg/m^2  Procedure Type of Study   TTE procedure:ECHO 2D W/DOPPLER/COLOR/CONTRAST. Study Location: Echo Lab Technical Quality: Limited visualization due to poor acoustical window. Patient Status: Inpatient Contrast Medium: Definity. Amount - 2 ml HR: 74 bpm BP: 137/44 mmHg Indications:Congestive heart failure, diastolic dysfunction and Dyspnea/SOB. Conclusions   Summary  Normal left ventricular chamber size and systolic function. Moderate concentric left ventricular hypertrophy. Left ventricular ejection fraction is visually estimated at 65%. Aortic valve leaflets are moderately thickened. There is mildly decreased  excursion. Mild aortic stenosis is present. The peak gradient across the aortic valve is 33 mmHg. The mean gradient across the aortic valve is 16 mmHg. Trivial pericardial effusion. Signature   ----------------------------------------------------------------  Electronically signed by Kermitt Lefort, DO(Interpreting  physician) on 03/16/2022 09:29 AM  ----------------------------------------------------------------   Findings   Mitral Valve  Mitral valve leaflets are mildly thickened, unrestricted and motion. Mitral annular calcification is present. There is trace mitral regurgitation. Aortic Valve  Aortic valve leaflets are moderately thickened. There is mildly decreased  excursion. Mild aortic stenosis is present. The peak gradient across the aortic valve is 33 mmHg. The mean gradient across the aortic valve is 16 mmHg. No aortic regurgitation . Tricuspid Valve  Normal tricuspid valve leaflet thickness and excursion. There is trace tricuspid regurgitation. Estimated PA systolic pressure is 61VM mercury. Pulmonic Valve  No significant pulmonic regurgitation. Left Atrium  Left atrial size is normal.   Left Ventricle  Normal left ventricular chamber size and systolic function. Moderate concentric left ventricular hypertrophy. No regional wall motion abnormalities. Left ventricular ejection fraction is visually estimated at 65%. Grade 1 LV diastolic dysfunction   Right Atrium  Normal right atrial size. Right Ventricle  Normal right ventricular chamber size and function. Pericardial Effusion  Trivial pericardial effusion. Miscellaneous  IVC diameter is normal, suggesting normal right atrial pressure. M-Mode Measurements (cm)   LVIDd: 3.7 cm                        LVIDs: 2.35 cm  IVSd: 1.42 cm  LVPWd: 1.42 cm                       AO Root Dimension: 3.4 cm  % Ejection Fraction: 65 %            LA: 3.6 cm                                       LVOT: 1.9 cm  Doppler Measurements:   AV Peak Velocity:288 cm/s            MV Peak E-Wave: 72.8 cm/s  AV Peak Gradient: 33.18 mmHg         MV Peak A-Wave: 104 cm/s  AV Mean Gradient: 17 mmHg            MV E/A Ratio: 0.7 %  AV Area (Continuity):0.92 cm^2       MV Peak Gradient: 2.12 mmHg  TR Velocity:247 cm/s                 MV P1/2t: 105 msec  TR Gradient:24.4 mmHg                MVA by PHT2.1 cm^2  Estimated RAP:3 mmHg  RVSP:27 mmHg      XR CHEST PORTABLE    Result Date: 3/15/2022  EXAMINATION: Chest one view 3/15/2022 HISTORY: Shortness of air FINDINGS: Upright frontal projection of the chest demonstrates mild bibasilar atelectasis. Lungs are otherwise clear. There is no effusion or free air present. The thoracic aorta is ectatic. . Bibasilar atelectasis. Lungs are otherwise clear. Signed by Dr Jasmina Yusuf   1.   Acute kidney injury versus progression of CKD.  2.  Stage IV chronic kidney disease baseline. 3.  Moderate to severe hyperkalemia. 4.  Renal tubular acidosis. 5.  Type II diabetic nephropathy. 6.  Anemia of chronic kidney disease. Plan:  1. IV fluid with sodium bicarb. 2.  P.o. Vonzell La. 3.  Urinary electrolyte. 4.  Continue to monitor renal function. Thank you for the consult, we appreciate the opportunity to provide care to your patients. Feel free to contact me if I can be of any further assistance.       Ritesh Hope MD  03/16/22  10:45 AM

## 2022-03-16 NOTE — PROGRESS NOTES
Nii Newman arrived to room # 311. Presented with: CROW on CKD  Mental Status: Patient is oriented and alert. Vitals:    03/15/22 2000   BP: 132/87   Pulse: 80   Resp: 16   Temp: 97.3 °F (36.3 °C)   SpO2: 99%     Patient safety contract and falls prevention contract reviewed with patient Yes. Oriented Patient to room. Call light within reach. Yes.   Needs, issues or concerns expressed at this time: no.      Electronically signed by Patricia Ball RN on 3/15/2022 at 11:18 PM

## 2022-03-16 NOTE — CONSULTS
Palliative Care:     Pleasant 80 yr old sent to ED from SNF d/t abn labs(elevated potassium and creatinine). She is currently sitting up in bed preparing to eat her lunch. Alert and oriented x 3. Past Medical History:        Past Medical History:   Diagnosis Date    [facility]neur[facility] ([facility] [address] 1993    Aneurysm ([facility] [address]     Breast cyst     Chronic pulmonary disease     CKD (chronic kidney disease)     Colon polyps     COPD (chronic obstructive pulmonary disease) ([facility] [address] 3/15/2022    DM (diabetes mellitus) (HCC)     GERD (gastroesophageal reflux disease)     Hepatitis     High cholesterol     History of stomach ulcers     HTN (hypertension) 3/15/2022    Hypothyroidism 3/15/2022    Lung nodule     left/BG    Stomach ache reflux    Thyroid disorder     Thyroid nodule     Urinary incontinence        Advance Directives:    Not on file                                             DNR     Pain/Other Symptoms: Denies       Activity:  Up with assist          Psychological/Spiritual:  Reports good support. Plan:  Nephrology following for kidney management      Patient/family discussion r/t goals:  Plan is for pt to [facility] bed[facility]. Pt tells me she does get her hair done every week, however,  She tells me \"its been 3 weeks this time\". She reports she needs assist with transfers from bed to chair. Pt uses a walker/wc and a cane depending on how she is feeling. Pt tells me dhe is getting PT daily at the facilty. Reviewed HCS names with pt. Pallitaive following.     Electronically signed by Yoni Saleem RN on 3/16/2022 at 2:17 PM                  Electronically signed by Yoni Saleem RN on 3/16/2022 at 1:26 PM

## 2022-03-17 LAB
ALBUMIN SERPL-MCNC: 3.4 G/DL (ref 3.5–5.2)
ALP BLD-CCNC: 55 U/L (ref 35–104)
ALT SERPL-CCNC: 55 U/L (ref 5–33)
ANION GAP SERPL CALCULATED.3IONS-SCNC: 15 MMOL/L (ref 7–19)
AST SERPL-CCNC: 36 U/L (ref 5–32)
BASOPHILS ABSOLUTE: 0 K/UL (ref 0–0.2)
BASOPHILS RELATIVE PERCENT: 0.4 % (ref 0–1)
BILIRUB SERPL-MCNC: 0.3 MG/DL (ref 0.2–1.2)
BUN BLDV-MCNC: 58 MG/DL (ref 8–23)
CALCIUM SERPL-MCNC: 8.6 MG/DL (ref 8.8–10.2)
CHLORIDE BLD-SCNC: 108 MMOL/L (ref 98–111)
CO2: 15 MMOL/L (ref 22–29)
CREAT SERPL-MCNC: 3 MG/DL (ref 0.5–0.9)
EOSINOPHILS ABSOLUTE: 0.1 K/UL (ref 0–0.6)
EOSINOPHILS RELATIVE PERCENT: 1.4 % (ref 0–5)
GFR AFRICAN AMERICAN: 18
GFR NON-AFRICAN AMERICAN: 15
GLUCOSE BLD-MCNC: 132 MG/DL (ref 74–109)
GLUCOSE BLD-MCNC: 139 MG/DL (ref 70–99)
GLUCOSE BLD-MCNC: 142 MG/DL (ref 70–99)
GLUCOSE BLD-MCNC: 158 MG/DL (ref 70–99)
GLUCOSE BLD-MCNC: 173 MG/DL (ref 70–99)
GLUCOSE BLD-MCNC: 183 MG/DL (ref 70–99)
HCT VFR BLD CALC: 28.8 % (ref 37–47)
HEMOGLOBIN: 8.6 G/DL (ref 12–16)
IMMATURE GRANULOCYTES #: 0 K/UL
LYMPHOCYTES ABSOLUTE: 1.9 K/UL (ref 1.1–4.5)
LYMPHOCYTES RELATIVE PERCENT: 37.9 % (ref 20–40)
MCH RBC QN AUTO: 30.3 PG (ref 27–31)
MCHC RBC AUTO-ENTMCNC: 29.9 G/DL (ref 33–37)
MCV RBC AUTO: 101.4 FL (ref 81–99)
MONOCYTES ABSOLUTE: 0.4 K/UL (ref 0–0.9)
MONOCYTES RELATIVE PERCENT: 8.6 % (ref 0–10)
NEUTROPHILS ABSOLUTE: 2.5 K/UL (ref 1.5–7.5)
NEUTROPHILS RELATIVE PERCENT: 51.3 % (ref 50–65)
PDW BLD-RTO: 16.6 % (ref 11.5–14.5)
PERFORMED ON: ABNORMAL
PLATELET # BLD: 165 K/UL (ref 130–400)
PMV BLD AUTO: 10.9 FL (ref 9.4–12.3)
POTASSIUM REFLEX MAGNESIUM: 5 MMOL/L (ref 3.5–5)
POTASSIUM SERPL-SCNC: 5 MMOL/L (ref 3.5–5)
RBC # BLD: 2.84 M/UL (ref 4.2–5.4)
SODIUM BLD-SCNC: 138 MMOL/L (ref 136–145)
TOTAL PROTEIN: 5.1 G/DL (ref 6.6–8.7)
WBC # BLD: 4.9 K/UL (ref 4.8–10.8)

## 2022-03-17 PROCEDURE — 80053 COMPREHEN METABOLIC PANEL: CPT

## 2022-03-17 PROCEDURE — 6360000002 HC RX W HCPCS: Performed by: NURSE PRACTITIONER

## 2022-03-17 PROCEDURE — 1210000000 HC MED SURG R&B

## 2022-03-17 PROCEDURE — 94640 AIRWAY INHALATION TREATMENT: CPT

## 2022-03-17 PROCEDURE — 6360000002 HC RX W HCPCS: Performed by: INTERNAL MEDICINE

## 2022-03-17 PROCEDURE — 2500000003 HC RX 250 WO HCPCS: Performed by: INTERNAL MEDICINE

## 2022-03-17 PROCEDURE — 36415 COLL VENOUS BLD VENIPUNCTURE: CPT

## 2022-03-17 PROCEDURE — 82947 ASSAY GLUCOSE BLOOD QUANT: CPT

## 2022-03-17 PROCEDURE — 85025 COMPLETE CBC W/AUTO DIFF WBC: CPT

## 2022-03-17 PROCEDURE — 6370000000 HC RX 637 (ALT 250 FOR IP): Performed by: NURSE PRACTITIONER

## 2022-03-17 PROCEDURE — 2580000003 HC RX 258: Performed by: INTERNAL MEDICINE

## 2022-03-17 RX ADMIN — HEPARIN SODIUM 5000 UNITS: 5000 INJECTION INTRAVENOUS; SUBCUTANEOUS at 08:26

## 2022-03-17 RX ADMIN — HYDRALAZINE HYDROCHLORIDE 25 MG: 25 TABLET, FILM COATED ORAL at 12:49

## 2022-03-17 RX ADMIN — SODIUM BICARBONATE: 84 INJECTION, SOLUTION INTRAVENOUS at 00:17

## 2022-03-17 RX ADMIN — SODIUM BICARBONATE 325 MG: 650 TABLET ORAL at 22:49

## 2022-03-17 RX ADMIN — SODIUM BICARBONATE 325 MG: 650 TABLET ORAL at 00:17

## 2022-03-17 RX ADMIN — LEVOTHYROXINE SODIUM 75 MCG: 75 TABLET ORAL at 05:40

## 2022-03-17 RX ADMIN — ALLOPURINOL 100 MG: 100 TABLET ORAL at 08:20

## 2022-03-17 RX ADMIN — ATORVASTATIN CALCIUM 40 MG: 40 TABLET, FILM COATED ORAL at 00:17

## 2022-03-17 RX ADMIN — HEPARIN SODIUM 5000 UNITS: 5000 INJECTION INTRAVENOUS; SUBCUTANEOUS at 17:55

## 2022-03-17 RX ADMIN — BUDESONIDE 250 MCG: 0.25 SUSPENSION RESPIRATORY (INHALATION) at 19:42

## 2022-03-17 RX ADMIN — FERROUS SULFATE TAB 325 MG (65 MG ELEMENTAL FE) 650 MG: 325 (65 FE) TAB at 08:20

## 2022-03-17 RX ADMIN — HEPARIN SODIUM 5000 UNITS: 5000 INJECTION INTRAVENOUS; SUBCUTANEOUS at 00:16

## 2022-03-17 RX ADMIN — INSULIN LISPRO 1 UNITS: 100 INJECTION, SOLUTION INTRAVENOUS; SUBCUTANEOUS at 08:27

## 2022-03-17 RX ADMIN — ATORVASTATIN CALCIUM 40 MG: 40 TABLET, FILM COATED ORAL at 22:49

## 2022-03-17 RX ADMIN — ARFORMOTEROL TARTRATE 15 MCG: 15 SOLUTION RESPIRATORY (INHALATION) at 07:31

## 2022-03-17 RX ADMIN — INSULIN LISPRO 1 UNITS: 100 INJECTION, SOLUTION INTRAVENOUS; SUBCUTANEOUS at 12:50

## 2022-03-17 RX ADMIN — HYDRALAZINE HYDROCHLORIDE 25 MG: 25 TABLET, FILM COATED ORAL at 00:16

## 2022-03-17 RX ADMIN — INSULIN LISPRO 1 UNITS: 100 INJECTION, SOLUTION INTRAVENOUS; SUBCUTANEOUS at 17:55

## 2022-03-17 RX ADMIN — FERROUS SULFATE TAB 325 MG (65 MG ELEMENTAL FE) 650 MG: 325 (65 FE) TAB at 17:54

## 2022-03-17 RX ADMIN — ARFORMOTEROL TARTRATE 15 MCG: 15 SOLUTION RESPIRATORY (INHALATION) at 19:42

## 2022-03-17 RX ADMIN — SODIUM BICARBONATE 325 MG: 650 TABLET ORAL at 08:20

## 2022-03-17 RX ADMIN — INSULIN LISPRO 1 UNITS: 100 INJECTION, SOLUTION INTRAVENOUS; SUBCUTANEOUS at 22:49

## 2022-03-17 RX ADMIN — ZINC SULFATE 220 MG (50 MG) CAPSULE 50 MG: CAPSULE at 08:20

## 2022-03-17 RX ADMIN — SODIUM BICARBONATE: 84 INJECTION, SOLUTION INTRAVENOUS at 16:30

## 2022-03-17 RX ADMIN — BUDESONIDE 250 MCG: 0.25 SUSPENSION RESPIRATORY (INHALATION) at 07:31

## 2022-03-17 RX ADMIN — EPOETIN ALFA-EPBX 3000 UNITS: 3000 INJECTION, SOLUTION INTRAVENOUS; SUBCUTANEOUS at 12:49

## 2022-03-17 RX ADMIN — HEPARIN SODIUM 5000 UNITS: 5000 INJECTION INTRAVENOUS; SUBCUTANEOUS at 22:49

## 2022-03-17 RX ADMIN — HYDRALAZINE HYDROCHLORIDE 25 MG: 25 TABLET, FILM COATED ORAL at 22:49

## 2022-03-17 RX ADMIN — HYDRALAZINE HYDROCHLORIDE 25 MG: 25 TABLET, FILM COATED ORAL at 08:19

## 2022-03-17 ASSESSMENT — ENCOUNTER SYMPTOMS
NAUSEA: 0
CHEST TIGHTNESS: 0
VOMITING: 0
CONSTIPATION: 0
COLOR CHANGE: 0
SHORTNESS OF BREATH: 0
ABDOMINAL PAIN: 0
DIARRHEA: 0
BACK PAIN: 0

## 2022-03-17 ASSESSMENT — PAIN SCALES - GENERAL: PAINLEVEL_OUTOF10: 0

## 2022-03-17 NOTE — PROGRESS NOTES
[facility]      Patient: Debra Shah  YOB: 1933  Date of Service: 3/17/2022  MRN: [id number]   Acct: [de-identified]   Primary Care Physician: Felix Jimenez MD  Advance Directive: DNR  Admit Date: 3/15/2022       Hospital Day: 2  Portions of this note have been copied forward, however, changed to reflect the most current clinical status of this patient. CHIEF COMPLAINT abnormal lab    SUBJECTIVE: Reports that she was able to eat all of her breakfast this time. Denies shortness of breath and chest pain. CUMULATIVE HOSPITAL COURSE:  The patient is an 80-year-old female with past medical history of CKD, diabetes, COPD, pulmonary fibrosis, GERD, and chronic diastolic heart failure who presented to [facility] ED from [facility] nursing [facility] for abnormal labs. The patient was sent from facility has hyperkalemia and worsening renal functions were noted. The patient did not report increased shortness of breath, new or worsening extremity edema, nausea, vomiting, or diarrhea. She did endorse decreased p.o. intake as her fluids have been recently thickened to facility. Patient denied fever, chills, abdominal pain, and dysuria. ED work-up indicated creatinine 3.1, BUN 71, GRF 17, and potassium 6.4. Patient was given Lokelma 10 g p.o., calcium gluconate 1 g IV, 200 cc bolus normal saline, Lasix 20 mg IV, 10 units of regular insulin IV in the ED. Patient was admitted to the hospitalist service for CROW on CKD. Nephrology was consulted. Repeat BMP after intervention indicated potassium still 6.2 and repeat Orlie Spire was given. Repeat labs indicated potassium of 5.7, BUN 63, creatinine 2.9, GFR 15. Another dose of Orlie Spire was ordered. Potassium is now down to 5.0, Cr 3.0, BUN 58, and Gfr 15. Nephrology recommends beginning RACHEL today. We will continue on bicarb fluids overnight tonight and repeat BMP in a.m.     Review of Systems:   Review of Systems   Constitutional: Negative for appetite change, chills, fatigue, fever and unexpected weight change. Respiratory: Negative for chest tightness and shortness of breath. Cardiovascular: Negative for chest pain, palpitations and leg swelling. Gastrointestinal: Negative for abdominal pain, constipation, diarrhea, nausea and vomiting. Genitourinary: Negative for difficulty urinating, dysuria, frequency and urgency. Musculoskeletal: Negative for back pain, joint swelling and myalgias. Skin: Negative for color change and wound. Neurological: Negative for dizziness, light-headedness and headaches. Psychiatric/Behavioral: Negative for agitation and confusion. 14 point review of systems is negative except as specifically addressed above. Objective:   VITALS:  /64   Pulse 77   Temp 97.7 °F (36.5 °C)   Resp 18   Ht 5' 6.5\" (1.689 m)   Wt 141 lb 1 oz (64 kg)   SpO2 95%   BMI 22.43 kg/m²   24HR INTAKE/OUTPUT:      Intake/Output Summary (Last 24 hours) at 3/17/2022 1236  Last data filed at 3/17/2022 1011  Gross per 24 hour   Intake 1949.25 ml   Output 1600 ml   Net 349.25 ml       Physical Exam  Vitals and nursing note reviewed. Constitutional:       Appearance: She is not ill-appearing. HENT:      Head: Normocephalic. Nose: Nose normal.      Mouth/Throat:      Mouth: Mucous membranes are moist.   Eyes:      Pupils: Pupils are equal, round, and reactive to light. Cardiovascular:      Rate and Rhythm: Normal rate and regular rhythm. Pulses: Normal pulses. Heart sounds: Murmur heard. Pulmonary:      Effort: Pulmonary effort is normal.      Breath sounds: Normal breath sounds. Abdominal:      General: Abdomen is flat. Bowel sounds are normal. There is no distension. Palpations: Abdomen is soft. Tenderness: There is no abdominal tenderness. There is no guarding. Musculoskeletal:         General: Normal range of motion. Cervical back: Normal range of motion and neck supple.       Right lower leg: No edema. Left lower leg: No edema. Skin:     General: Skin is warm and dry. Capillary Refill: Capillary refill takes less than 2 seconds. Neurological:      General: No focal deficit present. Mental Status: She is alert and oriented to person, place, and time. Medications:      IV infusion builder 75 mL/hr at 03/17/22 0831    sodium chloride      dextrose        epoetin daniel-epbx  3,000 Units SubCUTAneous Once per day on Tue Thu Sat    sodium chloride flush  5-40 mL IntraVENous 2 times per day    heparin (porcine)  5,000 Units SubCUTAneous 3 times per day    insulin lispro  0-6 Units SubCUTAneous TID WC    insulin lispro  0-3 Units SubCUTAneous Nightly    allopurinol  100 mg Oral Daily    atorvastatin  40 mg Oral Nightly    [Held by provider] sacubitril-valsartan  1 tablet Oral BID    ferrous sulfate  650 mg Oral BID    hydrALAZINE  25 mg Oral TID    levothyroxine  75 mcg Oral Daily    zinc sulfate  50 mg Oral Daily    insulin glargine  10 Units SubCUTAneous Nightly    budesonide  0.25 mg Nebulization BID    And    Arformoterol Tartrate  15 mcg Nebulization BID    sodium bicarbonate  325 mg Oral BID     sodium chloride flush, sodium chloride, ondansetron **OR** ondansetron, polyethylene glycol, acetaminophen **OR** acetaminophen, glucagon (rDNA), dextrose, glucose, dextrose bolus (hypoglycemia) **OR** dextrose bolus (hypoglycemia)  ADULT DIET; Regular; 4 carb choices (60 gm/meal);  Low Potassium (Less than 3000 mg/day)     Lab and other Data:     Recent Labs     03/15/22  1620 03/16/22  0911 03/17/22  0218   WBC 6.5 5.4 4.9   HGB 10.5* 9.7* 8.6*    219 165     Recent Labs     03/15/22  2115 03/15/22  2115 03/16/22  0911 03/17/22  0218   *  --  135* 138   K 6.2*   < > 5.7* 5.0  5.0     --  104 108   CO2 14*  --  15* 15*   BUN 71*  --  63* 58*   CREATININE 3.0*  --  2.9* 3.0*   GLUCOSE 87  --  142* 132*    < > = values in this interval not displayed. Recent Labs     03/15/22  1620 03/17/22  0218   AST 56* 36*   ALT 71* 55*   BILITOT <0.2 0.3   ALKPHOS 66 55     Troponin T: No results for input(s): TROPONINI in the last 72 hours. Pro-BNP: No results for input(s): BNP in the last 72 hours. INR: No results for input(s): INR in the last 72 hours. UA:  Recent Labs     03/15/22  1752   COLORU YELLOW   PHUR 5.5   WBCUA 1   RBCUA 3   BACTERIA NEGATIVE*   CLARITYU Clear   SPECGRAV 1.010   LEUKOCYTESUR TRACE*   UROBILINOGEN 0.2   BILIRUBINUR Negative   BLOODU Negative   GLUCOSEU 100*     A1C:   Recent Labs     03/15/22  1620   LABA1C 7.2*     ABG:No results for input(s): PHART, DYM9QGB, PO2ART, KVG8INF, BEART, HGBAE, S2BXHDKT, CARBOXHGBART in the last 72 hours. RAD:   XR CHEST PORTABLE    Result Date: 3/15/2022  . Bibasilar atelectasis. Lungs are otherwise clear. Signed by Dr Gabriel Duong       Echo:   Summary   Normal left ventricular chamber size and systolic function. Moderate concentric left ventricular hypertrophy. Left ventricular ejection fraction is visually estimated at 65%. Aortic valve leaflets are moderately thickened. There is mildly decreased   excursion. Mild aortic stenosis is present. The peak gradient across the aortic valve is 33 mmHg. The mean gradient across the aortic valve is 16 mmHg. Trivial pericardial effusion.       Assessment/Plan   Principal Problem:    Acute renal failure superimposed on chronic kidney disease ([address] / Hyperkalemia   -nephrology on board   -Monitor BMP   -Telemetry   -Treat hyperkalemia as indicated, improved   -continue gentle IV hydration with sodium bicarb and fluid   -Urine electrolytes studies completed per nephrology   -.9   -monitor Intake and output   -Encourage oral intake   -begin RACHEL per nephrology recommendation     Active Problems:    Diabetes mellitus, type 2 ([address]   -Accu-Cheks before meals and at bedtime   -Low-dose sliding scale insulin   -Hypoglycemia protocol in place      Iron deficiency anemia, unspecified    -Noted      COPD (chronic obstructive pulmonary disease) ([facility])   -Monitor oxygen saturation   -Currently denies shortness of breath      Chronic diastolic heart failure ([facility]   -Patient denies chest pain, weight gain, or shortness of   -Continue current medication   -Echo completed, EF estimated 65%   -Entresto held for hyperkalemia and CROW      HTN (hypertension)   -Avoid hypotension   -Monitor closely      Hypothyroidism   -Continue levothyroxine      DVT Prophylaxis: Heparin subcu      PREMA Lynch - CNP, 3/17/2022 12:36 PM

## 2022-03-17 NOTE — PROGRESS NOTES
Nephrology ([facility]) Progress Note      Patient: Eric Kulkarni  YOB: 1933  Date of Service: 3/17/2022  MRN: [id number]   Acct: [de-identified]   Primary Care Physician: Carroll Alfred MD  Advance Directive: DNR  Admit Date: 3/15/2022       Hospital Day: 2  Referring Provider: Flako Shay MD    Patient independently seen and examined, Chart, Consults, Notes, Operative notes, Labs, Cardiology, and Radiology studies reviewed as available. Chief complaint: Abnormal labs. Subjective: Eric Kulkarni is a 80 y.o. female for whom we were consulted for evaluation and treatment of acute kidney injury/hyperkalemia/stage IV chronic kidney disease. Patient has stage IV chronic kidney disease due to diabetic nephropathy and has slow progression of renal disease with estimated GFR slowly drifting down. Presented to the emergency room as she was sent from nursing home. She was found to have hyperkalemia and was directed to go to ER for correction of hyperkalemia. Patient denies any nausea vomiting or diarrhea. Her p.o. intake has been fairly good. Lately she was on diuretics for severe edema and has excellent diuresis, lost at least 35 pounds of fluid weight. Patient is hard of hearing and most of the information of her medical history was taken from the chart and her son who is sitting at the bedside. Hospital course markable for IV fluid administration as well as Lokelma. This morning she feels better.   Her renal function has slightly improved    Allergies:  Celebrex [celecoxib], Codeine, Pregabalin, Protonix [pantoprazole sodium], Quinapril hcl, Sulfa antibiotics, Tramadol, Trental [pentoxifylline er], and Vioxx [rofecoxib]    Medicines:  Current Facility-Administered Medications   Medication Dose Route Frequency Provider Last Rate Last Admin    sodium bicarbonate 75 mEq in sodium chloride 0.45 % 1,000 mL infusion   IntraVENous Continuous Karen Vega MD 75 mL/hr at 03/17/22 0831 Rate Verify at 03/17/22 0831    sodium chloride flush 0.9 % injection 5-40 mL  5-40 mL IntraVENous 2 times per day KeonPREMA Matute - CNP   10 mL at 03/16/22 0946    sodium chloride flush 0.9 % injection 5-40 mL  5-40 mL IntraVENous PRN AngelcaPREMA Simmons - CNP        0.9 % sodium chloride infusion  25 mL IntraVENous PRN Angelcajaxon Doan APRN - CNP        heparin (porcine) injection 5,000 Units  5,000 Units SubCUTAneous 3 times per day Angelcajaxon Doan, APRN - CNP   5,000 Units at 03/17/22 0826    ondansetron (ZOFRAN-ODT) disintegrating tablet 4 mg  4 mg Oral Q8H PRN PREMA Paulson - CNP        Or    ondansetron (ZOFRAN) injection 4 mg  4 mg IntraVENous Q6H PRN PREMA Paulson - CNP        polyethylene glycol (GLYCOLAX) packet 17 g  17 g Oral Daily PRN Anupama Doan APRN - CNP        acetaminophen (TYLENOL) tablet 650 mg  650 mg Oral Q6H PRN Anupama Doan APRN - CNP        Or    acetaminophen (TYLENOL) suppository 650 mg  650 mg Rectal Q6H PRN Anupama Doan, APRN - CNP        insulin lispro (HUMALOG) injection vial 0-6 Units  0-6 Units SubCUTAneous TID WC PREMA Paulson - CNP   1 Units at 03/16/22 1240    insulin lispro (HUMALOG) injection vial 0-3 Units  0-3 Units SubCUTAneous Nightly Angelcai Franki, APRN - CNP        allopurinol (ZYLOPRIM) tablet 100 mg  100 mg Oral Daily Angelcai Franki, APRN - CNP   100 mg at 03/17/22 0820    atorvastatin (LIPITOR) tablet 40 mg  40 mg Oral Nightly Angelcai Franki, APRN - CNP   40 mg at 03/17/22 0017    [Held by provider] sacubitril-valsartan (ENTRESTO) 24-26 MG per tablet 1 tablet  1 tablet Oral BID Angelcajaxon Doan APRN - CNP        ferrous sulfate (IRON 325) tablet 650 mg  650 mg Oral BID Anupama Doan, APRN - CNP   650 mg at 03/17/22 0820    hydrALAZINE (APRESOLINE) tablet 25 mg  25 mg Oral TID Anupama Doan, APRN - CNP   25 mg at 03/17/22 0819    levothyroxine (SYNTHROID) tablet 75 mcg  75 mcg Oral Daily Anupama Doan, APRN - CNP   75 mcg at 03/17/22 0500    zinc sulfate (ZINCATE) capsule 50 mg  50 mg Oral Daily PREMA Mena CNP   50 mg at 22 0820    insulin glargine (LANTUS) injection vial 10 Units  10 Units SubCUTAneous Nightly PREMA Mena CNP        budesonide (PULMICORT) nebulizer suspension 250 mcg  0.25 mg Nebulization BID PREMA Mena CNP   250 mcg at 22 1753    And    Arformoterol Tartrate (BROVANA) nebulizer solution 15 mcg  15 mcg Nebulization BID PREMA Mena CNP   15 mcg at 22 0731    glucagon (rDNA) injection 1 mg  1 mg IntraMUSCular PRN PREMA Mena CNP        dextrose 5 % solution  100 mL/hr IntraVENous PRN PREMA Mena CNP        glucose chewable tablet 4 each  4 tablet Oral PRN PREMA Mena CNP        dextrose bolus (hypoglycemia) 10% 125 mL  125 mL IntraVENous PRN PREMA Mena CNP        Or    dextrose bolus (hypoglycemia) 10% 250 mL  250 mL IntraVENous PRN PREMA Mena CNP        sodium bicarbonate tablet 325 mg  325 mg Oral BID Montana Finley APRN   325 mg at 22 0820       Past Medical History:  Past Medical History:   Diagnosis Date    [facility]     [facility]m [facility]     Breast cyst     Chronic pulmonary disease     CKD (chronic kidney disease)     Colon polyps     COPD (chronic obstructive pulmonary disease) ([facility] 3/15/2022    DM (diabetes mellitus) ([facility]     GERD (gastroesophageal reflux disease)     Hepatitis     High cholesterol     History of stomach ulcers     HTN (hypertension) 3/15/2022    Hypothyroidism 3/15/2022    Lung nodule     left/BG    Stomach ache reflux    Thyroid disorder     Thyroid nodule     Urinary incontinence        Past Surgical History:  Past Surgical History:   Procedure Laterality Date    BLADDER SURGERY  ?     BRAIN ANEURYSM SURGERY      BREAST SURGERY      BIOPSY    CARPAL TUNNEL RELEASE      CATARACT REMOVAL       SECTION      TIMES 4    COLONOSCOPY  3-     LOUISE    COLONOSCOPY  13    Louise    CYST REMOVAL      FINGER    GALLBLADDER SURGERY      HYSTERECTOMY      KNEE ARTHROSCOPY      KNEE SURGERY      RTK    OTHER SURGICAL HISTORY      arteriorgram, surgeon said leave it alone no surgery.  TONSILLECTOMY AND ADENOIDECTOMY      UPPER GASTROINTESTINAL ENDOSCOPY  2010    DR Hunter Dakin    UPPER GASTROINTESTINAL ENDOSCOPY  10/25/2013    Louise       Family History  Family History   Problem Relation Age of Onset    Stroke Mother     High Blood Pressure Mother     Lung Cancer Brother     Esophageal Cancer Brother     Other Brother         CKD    High Blood Pressure Father     High Blood Pressure Sister        Social History  Social History     Socioeconomic History    Marital status:      Spouse name: Not on file    Number of children: Not on file    Years of education: Not on file    Highest education level: Not on file   Occupational History    Not on file   Tobacco Use    Smoking status: Former Smoker     Start date:      Quit date: 1993     Years since quittin.1    Smokeless tobacco: Never Used    Tobacco comment: quit    Vaping Use    Vaping Use: Never used   Substance and Sexual Activity    Alcohol use: No    Drug use: No    Sexual activity: Not on file   Other Topics Concern    Not on file   Social History Narrative    Not on file     Social Determinants of Health     Financial Resource Strain:     Difficulty of Paying Living Expenses: Not on file   Food Insecurity:     Worried About [address] [employer] in the Last Year: Not on file    [facility] [facility] [address] in the Last Year: Not on file   Transportation Needs:     Lack of Transportation (Medical): Not on file    Lack of Transportation (Non-Medical):  Not on file   Physical Activity:     Days of Exercise per Week: Not on file    Minutes of Exercise per Session: Not on file   Stress:     Feeling of Stress : Not on file   Social rhythm  Abdominal: soft, nontender, normal bowel sounds  Extremities: no cyanosis or edema  Skin: warm and dry without rash      Labs:  BMP:   Recent Labs     03/15/22  2115 03/15/22  2115 03/16/22  0911 03/17/22  0218   *  --  135* 138   K 6.2*   < > 5.7* 5.0  5.0     --  104 108   CO2 14*  --  15* 15*   PHOS  --   --  3.5  --    BUN 71*  --  63* 58*   CREATININE 3.0*  --  2.9* 3.0*   CALCIUM 9.6  --  9.2 8.6*    < > = values in this interval not displayed. CBC:   Recent Labs     03/15/22  1620 03/16/22  0911 03/17/22  0218   WBC 6.5 5.4 4.9   HGB 10.5* 9.7* 8.6*   HCT 33.7* 30.1* 28.8*   MCV 98.3 94.1 101.4*    219 165     LIVER PROFILE:   Recent Labs     03/15/22  1620 03/17/22  0218   AST 56* 36*   ALT 71* 55*   BILITOT <0.2 0.3   ALKPHOS 66 55     PT/INR: No results for input(s): PROTIME, INR in the last 72 hours. APTT: No results for input(s): APTT in the last 72 hours. BNP:  No results for input(s): BNP in the last 72 hours. Ionized Calcium:No results for input(s): IONCA in the last 72 hours. Magnesium:  Recent Labs     03/16/22  0911   MG 1.9     Phosphorus:  Recent Labs     03/16/22  0911   PHOS 3.5     HgbA1C:   Recent Labs     03/15/22  1620   LABA1C 7.2*     Hepatic:   Recent Labs     03/15/22  1620 03/17/22  0218   ALKPHOS 66 55   ALT 71* 55*   AST 56* 36*   PROT 6.9 5.1*   BILITOT <0.2 0.3   LABALBU 3.9 3.4*     Lactic Acid: No results for input(s): LACTA in the last 72 hours. Troponin: No results for input(s): CKTOTAL, CKMB, TROPONINT in the last 72 hours. ABGs: No results for input(s): PH, PCO2, PO2, HCO3, O2SAT in the last 72 hours. CRP:  No results for input(s): CRP in the last 72 hours. Sed Rate:  No results for input(s): SEDRATE in the last 72 hours. Cultures:   No results for input(s): CULTURE in the last 72 hours. No results for input(s): BC, Amy Creamer in the last 72 hours. No results for input(s): CXSURG in the last 72 hours.     Radiology reports as per aortic stenosis is present. The peak gradient across the aortic valve is 33 mmHg. The mean gradient across the aortic valve is 16 mmHg. No aortic regurgitation . Tricuspid Valve  Normal tricuspid valve leaflet thickness and excursion. There is trace tricuspid regurgitation. Estimated PA systolic pressure is 33WP mercury. Pulmonic Valve  No significant pulmonic regurgitation. Left Atrium  Left atrial size is normal.   Left Ventricle  Normal left ventricular chamber size and systolic function. Moderate concentric left ventricular hypertrophy. No regional wall motion abnormalities. Left ventricular ejection fraction is visually estimated at 65%. Grade 1 LV diastolic dysfunction   Right Atrium  Normal right atrial size. Right Ventricle  Normal right ventricular chamber size and function. Pericardial Effusion  Trivial pericardial effusion. Miscellaneous  IVC diameter is normal, suggesting normal right atrial pressure. M-Mode Measurements (cm)   LVIDd: 3.7 cm                        LVIDs: 2.35 cm  IVSd: 1.42 cm  LVPWd: 1.42 cm                       AO Root Dimension: 3.4 cm  % Ejection Fraction: 65 %            LA: 3.6 cm                                       LVOT: 1.9 cm  Doppler Measurements:   AV Peak Velocity:288 cm/s            MV Peak E-Wave: 72.8 cm/s  AV Peak Gradient: 33.18 mmHg         MV Peak A-Wave: 104 cm/s  AV Mean Gradient: 17 mmHg            MV E/A Ratio: 0.7 %  AV Area (Continuity):0.92 cm^2       MV Peak Gradient: 2.12 mmHg  TR Velocity:247 cm/s                 MV P1/2t: 105 msec  TR Gradient:24.4 mmHg                MVA by PHT2.1 cm^2  Estimated RAP:3 mmHg  RVSP:27 mmHg      XR CHEST PORTABLE    Result Date: 3/15/2022  EXAMINATION: Chest one view 3/15/2022 HISTORY: Shortness of air FINDINGS: Upright frontal projection of the chest demonstrates mild bibasilar atelectasis. Lungs are otherwise clear. There is no effusion or free air present. The thoracic aorta is ectatic. Waqas Awkward Bibasilar atelectasis. Lungs are otherwise clear. Signed by Dr Marvin Mcbride   1. Acute kidney injury versus progression of CKD. 2.  Stage IV chronic kidney disease baseline. 3.  Moderate to severe hyperkalemia. 4.  Renal tubular acidosis. 5.  Type II diabetic nephropathy. 6.  Anemia of chronic kidney disease. Plan:  1. IV fluid with sodium bicarb. 2.  P.o. Caryn Spotted. 3.  Start RACHEL. 4.  Continue to monitor renal function.         Ирина Suarez MD  03/17/22  9:46 AM

## 2022-03-17 NOTE — PROGRESS NOTES
Palliative care follow up visit. Pt is up in chair and asking to go back to bed. Notified [facility] to get pt assistance. Pt tells me she feels \"good\" today. States she  a good lunch. Denies pain at present time. No other needs voiced. Plan will be back to SNF on dc.   Electronically signed by Alfredo Wyatt RN on 3/17/2022 at 1:53 PM

## 2022-03-18 VITALS
WEIGHT: 145.31 LBS | TEMPERATURE: 96.4 F | RESPIRATION RATE: 18 BRPM | HEIGHT: 67 IN | HEART RATE: 69 BPM | SYSTOLIC BLOOD PRESSURE: 122 MMHG | DIASTOLIC BLOOD PRESSURE: 88 MMHG | OXYGEN SATURATION: 96 % | BODY MASS INDEX: 22.81 KG/M2

## 2022-03-18 LAB
ALBUMIN SERPL-MCNC: 3.3 G/DL (ref 3.5–5.2)
ALP BLD-CCNC: 55 U/L (ref 35–104)
ALT SERPL-CCNC: 52 U/L (ref 5–33)
ANION GAP SERPL CALCULATED.3IONS-SCNC: 13 MMOL/L (ref 7–19)
AST SERPL-CCNC: 32 U/L (ref 5–32)
BASOPHILS ABSOLUTE: 0 K/UL (ref 0–0.2)
BASOPHILS RELATIVE PERCENT: 0.2 % (ref 0–1)
BILIRUB SERPL-MCNC: <0.2 MG/DL (ref 0.2–1.2)
BUN BLDV-MCNC: 50 MG/DL (ref 8–23)
CALCIUM SERPL-MCNC: 8.5 MG/DL (ref 8.8–10.2)
CHLORIDE BLD-SCNC: 107 MMOL/L (ref 98–111)
CO2: 19 MMOL/L (ref 22–29)
CREAT SERPL-MCNC: 2.7 MG/DL (ref 0.5–0.9)
EOSINOPHILS ABSOLUTE: 0.1 K/UL (ref 0–0.6)
EOSINOPHILS RELATIVE PERCENT: 1 % (ref 0–5)
GFR AFRICAN AMERICAN: 20
GFR NON-AFRICAN AMERICAN: 17
GLUCOSE BLD-MCNC: 135 MG/DL (ref 74–109)
GLUCOSE BLD-MCNC: 155 MG/DL (ref 70–99)
GLUCOSE BLD-MCNC: 178 MG/DL (ref 70–99)
HCT VFR BLD CALC: 26 % (ref 37–47)
HEMOGLOBIN: 8.4 G/DL (ref 12–16)
IMMATURE GRANULOCYTES #: 0 K/UL
LYMPHOCYTES ABSOLUTE: 1.7 K/UL (ref 1.1–4.5)
LYMPHOCYTES RELATIVE PERCENT: 34.5 % (ref 20–40)
MCH RBC QN AUTO: 30.7 PG (ref 27–31)
MCHC RBC AUTO-ENTMCNC: 32.3 G/DL (ref 33–37)
MCV RBC AUTO: 94.9 FL (ref 81–99)
MONOCYTES ABSOLUTE: 0.5 K/UL (ref 0–0.9)
MONOCYTES RELATIVE PERCENT: 10.4 % (ref 0–10)
NEUTROPHILS ABSOLUTE: 2.6 K/UL (ref 1.5–7.5)
NEUTROPHILS RELATIVE PERCENT: 53.5 % (ref 50–65)
PDW BLD-RTO: 16.2 % (ref 11.5–14.5)
PERFORMED ON: ABNORMAL
PERFORMED ON: ABNORMAL
PLATELET # BLD: 167 K/UL (ref 130–400)
PMV BLD AUTO: 11.1 FL (ref 9.4–12.3)
POTASSIUM REFLEX MAGNESIUM: 4.3 MMOL/L (ref 3.5–5)
POTASSIUM SERPL-SCNC: 4.3 MMOL/L (ref 3.5–5)
RBC # BLD: 2.74 M/UL (ref 4.2–5.4)
SODIUM BLD-SCNC: 139 MMOL/L (ref 136–145)
TOTAL PROTEIN: 4.9 G/DL (ref 6.6–8.7)
WBC # BLD: 4.8 K/UL (ref 4.8–10.8)

## 2022-03-18 PROCEDURE — 2500000003 HC RX 250 WO HCPCS: Performed by: INTERNAL MEDICINE

## 2022-03-18 PROCEDURE — 6370000000 HC RX 637 (ALT 250 FOR IP): Performed by: NURSE PRACTITIONER

## 2022-03-18 PROCEDURE — 36415 COLL VENOUS BLD VENIPUNCTURE: CPT

## 2022-03-18 PROCEDURE — 80053 COMPREHEN METABOLIC PANEL: CPT

## 2022-03-18 PROCEDURE — 6360000002 HC RX W HCPCS: Performed by: NURSE PRACTITIONER

## 2022-03-18 PROCEDURE — 2580000003 HC RX 258: Performed by: INTERNAL MEDICINE

## 2022-03-18 PROCEDURE — 82947 ASSAY GLUCOSE BLOOD QUANT: CPT

## 2022-03-18 PROCEDURE — 85025 COMPLETE CBC W/AUTO DIFF WBC: CPT

## 2022-03-18 PROCEDURE — 94640 AIRWAY INHALATION TREATMENT: CPT

## 2022-03-18 PROCEDURE — 2580000003 HC RX 258: Performed by: NURSE PRACTITIONER

## 2022-03-18 RX ORDER — SODIUM BICARBONATE 650 MG/1
650 TABLET ORAL 3 TIMES DAILY
Status: DISCONTINUED | OUTPATIENT
Start: 2022-03-18 | End: 2022-03-18 | Stop reason: HOSPADM

## 2022-03-18 RX ORDER — SODIUM BICARBONATE 325 MG/1
325 TABLET ORAL 2 TIMES DAILY
Qty: 60 TABLET | Refills: 0 | Status: SHIPPED | OUTPATIENT
Start: 2022-03-18

## 2022-03-18 RX ADMIN — SODIUM BICARBONATE: 84 INJECTION, SOLUTION INTRAVENOUS at 04:38

## 2022-03-18 RX ADMIN — ARFORMOTEROL TARTRATE 15 MCG: 15 SOLUTION RESPIRATORY (INHALATION) at 07:36

## 2022-03-18 RX ADMIN — SODIUM BICARBONATE 325 MG: 650 TABLET ORAL at 09:03

## 2022-03-18 RX ADMIN — HEPARIN SODIUM 5000 UNITS: 5000 INJECTION INTRAVENOUS; SUBCUTANEOUS at 09:04

## 2022-03-18 RX ADMIN — FERROUS SULFATE TAB 325 MG (65 MG ELEMENTAL FE) 650 MG: 325 (65 FE) TAB at 09:03

## 2022-03-18 RX ADMIN — ZINC SULFATE 220 MG (50 MG) CAPSULE 50 MG: CAPSULE at 09:04

## 2022-03-18 RX ADMIN — LEVOTHYROXINE SODIUM 75 MCG: 75 TABLET ORAL at 04:38

## 2022-03-18 RX ADMIN — ALLOPURINOL 100 MG: 100 TABLET ORAL at 09:04

## 2022-03-18 RX ADMIN — BUDESONIDE 250 MCG: 0.25 SUSPENSION RESPIRATORY (INHALATION) at 07:36

## 2022-03-18 RX ADMIN — SODIUM CHLORIDE, PRESERVATIVE FREE 10 ML: 5 INJECTION INTRAVENOUS at 09:04

## 2022-03-18 RX ADMIN — HYDRALAZINE HYDROCHLORIDE 25 MG: 25 TABLET, FILM COATED ORAL at 09:03

## 2022-03-18 ASSESSMENT — PAIN SCALES - GENERAL
PAINLEVEL_OUTOF10: 0
PAINLEVEL_OUTOF10: 0

## 2022-03-18 NOTE — PROGRESS NOTES
Nephrology ([address]) Progress Note      Patient: Hu Salinas  YOB: 1933  Date of Service: 3/18/2022  MRN: [id number]   Acct: [de-identified]   Primary Care Physician: Sherry Licona MD  Advance Directive: DNR  Admit Date: 3/15/2022       Hospital Day: 3  Referring Provider: Brook Cortes MD    Patient independently seen and examined, Chart, Consults, Notes, Operative notes, Labs, Cardiology, and Radiology studies reviewed as available. Chief complaint: Abnormal labs. Subjective: Hu Salinas is a 80 y.o. female for whom we were consulted for evaluation and treatment of acute kidney injury/hyperkalemia/stage IV chronic kidney disease. Patient has stage IV chronic kidney disease due to diabetic nephropathy and has slow progression of renal disease with estimated GFR slowly drifting down. Presented to the emergency room as she was sent from nursing home. She was found to have hyperkalemia and was directed to go to ER for correction of hyperkalemia. Patient denies any nausea vomiting or diarrhea. Her p.o. intake has been fairly good. Lately she was on diuretics for severe edema and has excellent diuresis, lost at least 35 pounds of fluid weight. Patient is hard of hearing and most of the information of her medical history was taken from the chart and her son who is sitting at the bedside. Hospital course markable for IV fluid administration as well as Lokelma. This morning patient is feeling well. She is going to be discharged today to a nursing home. Her serum potassium as well as renal function has improved.     Allergies:  Celebrex [celecoxib], Codeine, Pregabalin, Protonix [pantoprazole sodium], Quinapril hcl, Sulfa antibiotics, Tramadol, Trental [pentoxifylline er], and Vioxx [rofecoxib]    Medicines:  Current Facility-Administered Medications   Medication Dose Route Frequency Provider Last Rate Last Admin    epoetin daniel-epbx (RETACRIT) injection 3,000 Units 3,000 Units SubCUTAneous Once per day on Tue Thu Sat Demarcus Ocasio MD   3,000 Units at 03/17/22 1249    sodium bicarbonate 75 mEq in sodium chloride 0.45 % 1,000 mL infusion   IntraVENous Continuous Demarcus Ocasio MD 75 mL/hr at 03/18/22 0438 New Bag at 03/18/22 0438    sodium chloride flush 0.9 % injection 5-40 mL  5-40 mL IntraVENous 2 times per day PREMA Lee CNP   10 mL at 03/18/22 0904    sodium chloride flush 0.9 % injection 5-40 mL  5-40 mL IntraVENous PRN PREMA Lee CNP        0.9 % sodium chloride infusion  25 mL IntraVENous PRN PREMA Lee CNP        heparin (porcine) injection 5,000 Units  5,000 Units SubCUTAneous 3 times per day PREMA Lee CNP   5,000 Units at 03/18/22 0904    ondansetron (ZOFRAN-ODT) disintegrating tablet 4 mg  4 mg Oral Q8H PRN PREMA Lee CNP        Or    ondansetron (ZOFRAN) injection 4 mg  4 mg IntraVENous Q6H PRN PREMA Lee CNP        polyethylene glycol (GLYCOLAX) packet 17 g  17 g Oral Daily PRN PREMA Lee CNP        acetaminophen (TYLENOL) tablet 650 mg  650 mg Oral Q6H PRN PREMA Lee CNP        Or    acetaminophen (TYLENOL) suppository 650 mg  650 mg Rectal Q6H PRN PREMA Lee CNP        insulin lispro (HUMALOG) injection vial 0-6 Units  0-6 Units SubCUTAneous TID WC PREMA Lee CNP   1 Units at 03/17/22 1755    insulin lispro (HUMALOG) injection vial 0-3 Units  0-3 Units SubCUTAneous Nightly PREMA Lee CNP   1 Units at 03/17/22 2249    allopurinol (ZYLOPRIM) tablet 100 mg  100 mg Oral Daily PREMA Lee CNP   100 mg at 03/18/22 1667    atorvastatin (LIPITOR) tablet 40 mg  40 mg Oral Nightly PREMA Lee CNP   40 mg at 03/17/22 2249    [Held by provider] sacubitril-valsartan (ENTRESTO) 24-26 MG per tablet 1 tablet  1 tablet Oral BID PREMA Lee - CNP        ferrous sulfate (IRON 325) tablet 650 mg  650 mg Oral BID PREMA Lee - CNP   650 mg at 03/18/22 6425    hydrALAZINE (APRESOLINE) tablet 25 mg  25 mg Oral TID [facility]   25 mg at 03/18/22 5878    levothyroxine (SYNTHROID) tablet 75 mcg  75 mcg Oral Daily [facility], APRN - CNP   75 mcg at 03/18/22 0438    zinc sulfate (ZINCATE) capsule 50 mg  50 mg Oral Daily [facility]   50 mg at 03/18/22 3594    insulin glargine (LANTUS) injection vial 10 Units  10 Units SubCUTAneous Nightly [facility] - GAIL        budesonide (PULMICORT) nebulizer suspension 250 mcg  0.25 mg Nebulization BID [facility] - CNP   250 mcg at 03/18/22 7708    And    Arformoterol Tartrate (BROVANA) nebulizer solution 15 mcg  15 mcg Nebulization BID [facility] CNP   15 mcg at 03/18/22 0736    glucagon (rDNA) injection 1 mg  1 mg IntraMUSCular PRN [place] PREMA Anderson - CNP        dextrose 5 % solution  100 mL/hr IntraVENous PRN [place] RPEMA Anderson - CNP        glucose chewable tablet 4 each  4 tablet Oral PRN [place] PREMA Anderson - CNP        dextrose bolus (hypoglycemia) 10% 125 mL  125 mL IntraVENous PRN GoodhuePREMA Conway CNP        Or    dextrose bolus (hypoglycemia) 10% 250 mL  250 mL IntraVENous PRN [place] PREMA Anderson - CNP        sodium bicarbonate tablet 325 mg  325 mg Oral BID PREMA Vivar   325 mg at 03/18/22 3961       Past Medical History:  Past Medical History:   Diagnosis Date    [facility]ysm [facility] [address] 1993    Aneurysm ([facility] [address]     Breast cyst     Chronic pulmonary disease     CKD (chronic kidney disease)     Colon polyps     COPD (chronic obstructive pulmonary disease) ([facility] [address] 3/15/2022    DM (diabetes mellitus) ([facility] [address]     GERD (gastroesophageal reflux disease)     Hepatitis     High cholesterol     History of stomach ulcers     HTN (hypertension) 3/15/2022    Hypothyroidism 3/15/2022    Lung nodule     left/BG    Stomach ache reflux    Thyroid disorder     Thyroid nodule     Urinary incontinence        Past Surgical History:  Past Surgical History:   Procedure Laterality Date    BLADDER SURGERY  ?  BRAIN ANEURYSM SURGERY      BREAST SURGERY      BIOPSY    CARPAL TUNNEL RELEASE      CATARACT REMOVAL       SECTION      TIMES 4    COLONOSCOPY  3-    DR Ricki Avila    COLONOSCOPY  13    Louise    CYST REMOVAL      FINGER    GALLBLADDER SURGERY      HYSTERECTOMY      KNEE ARTHROSCOPY      KNEE SURGERY      RTK    OTHER SURGICAL HISTORY      arteriorgram, surgeon said leave it alone no surgery.  TONSILLECTOMY AND ADENOIDECTOMY      UPPER GASTROINTESTINAL ENDOSCOPY  2010    DR Ricki Avila    UPPER GASTROINTESTINAL ENDOSCOPY  10/25/2013    Louise       Family History  Family History   Problem Relation Age of Onset    Stroke Mother     High Blood Pressure Mother     Lung Cancer Brother     Esophageal Cancer Brother     Other Brother         CKD    High Blood Pressure Father     High Blood Pressure Sister        Social History  Social History     Socioeconomic History    Marital status:       Spouse name: Not on file    Number of children: Not on file    Years of education: Not on file    Highest education level: Not on file   Occupational History    Not on file   Tobacco Use    Smoking status: Former Smoker     Start date: 46     Quit date: 1993     Years since quittin.1    Smokeless tobacco: Never Used    Tobacco comment: quit    Vaping Use    Vaping Use: Never used   Substance and Sexual Activity    Alcohol use: No    Drug use: No    Sexual activity: Not on file   Other Topics Concern    Not on file   Social History Narrative    Not on file     Social Determinants of Health     Financial Resource Strain:     Difficulty of Paying Living Expenses: Not on file   Food Insecurity:     Worried About [address] in the Last Year: Not on file    Diana of Food in the Last Year: Not on file   Transportation Needs:     Lack of Transportation (Medical): Not on file    Lack of Transportation (Non-Medical): Not on file   Physical Activity:     Days of Exercise per Week: Not on file    Minutes of Exercise per Session: Not on file   Stress:     Feeling of Stress : Not on file   Social Connections:     Frequency of Communication with Friends and Family: Not on file    Frequency of Social Gatherings with Friends and Family: Not on file    Attends Restoration Services: Not on file    Active Member of [address] [place] [address] [employer] or Organizations: Not on file    Attends Club or Organization Meetings: Not on file    Marital Status: Not on file   Intimate Partner Violence:     Fear of Current or Ex-Partner: Not on file    Emotionally Abused: Not on file    Physically Abused: Not on file    Sexually Abused: Not on file   Housing Stability:     Unable to Pay for Housing in the Last Year: Not on file    Number of [place] in the Last Year: Not on file    Unstable Housing in the Last Year: Not on file         Review of Systems:  History obtained from chart review and the patient  General ROS: No fever or chills  Respiratory ROS: positive for - shortness of breath  Cardiovascular ROS: positive for - dyspnea on exertion  Gastrointestinal ROS: No abdominal pain or melena  Genito-Urinary ROS: No dysuria or hematuria  Musculoskeletal ROS: No joint pain or swelling   14 point ROS reviewed with the patient and negative except as noted above and in the HPI unless unable to obtain.     Objective:  Patient Vitals for the past 24 hrs:   BP Temp Temp src Pulse Resp SpO2 Weight   03/18/22 0541 (!) 136/58 97.6 °F (36.4 °C) Temporal 73 16 96 % --   03/18/22 0010 138/65 98.5 °F (36.9 °C) Temporal 76 16 98 % 145 lb 5 oz (65.9 kg)   03/17/22 2151 137/63 -- -- 77 -- -- --   03/17/22 1747 (!) 163/66 97.8 °F (36.6 °C) Temporal 56 16 97 % --   03/17/22 1126 127/64 97.8 °F (36.6 °C) Temporal 73 18 100 % --       Intake/Output Summary (Last 24 hours) at 3/18/2022 8883  Last data filed at ----------------------------------------------------------------   Findings   Mitral Valve  Mitral valve leaflets are mildly thickened, unrestricted and motion. Mitral annular calcification is present. There is trace mitral regurgitation. Aortic Valve  Aortic valve leaflets are moderately thickened. There is mildly decreased  excursion. Mild aortic stenosis is present. The peak gradient across the aortic valve is 33 mmHg. The mean gradient across the aortic valve is 16 mmHg. No aortic regurgitation . Tricuspid Valve  Normal tricuspid valve leaflet thickness and excursion. There is trace tricuspid regurgitation. Estimated PA systolic pressure is 08FZ mercury. Pulmonic Valve  No significant pulmonic regurgitation. Left Atrium  Left atrial size is normal.   Left Ventricle  Normal left ventricular chamber size and systolic function. Moderate concentric left ventricular hypertrophy. No regional wall motion abnormalities. Left ventricular ejection fraction is visually estimated at 65%. Grade 1 LV diastolic dysfunction   Right Atrium  Normal right atrial size. Right Ventricle  Normal right ventricular chamber size and function. Pericardial Effusion  Trivial pericardial effusion. Miscellaneous  IVC diameter is normal, suggesting normal right atrial pressure. M-Mode Measurements (cm)   LVIDd: 3.7 cm                        LVIDs: 2.35 cm  IVSd: 1.42 cm  LVPWd: 1.42 cm                       AO Root Dimension: 3.4 cm  % Ejection Fraction: 65 %            LA:  3.6 cm                                       LVOT: 1.9 cm  Doppler Measurements:   AV Peak Velocity:288 cm/s            MV Peak E-Wave: 72.8 cm/s  AV Peak Gradient: 33.18 mmHg         MV Peak A-Wave: 104 cm/s  AV Mean Gradient: 17 mmHg            MV E/A Ratio: 0.7 %  AV Area (Continuity):0.92 cm^2       MV Peak Gradient: 2.12 mmHg  TR Velocity:247 cm/s                 MV P1/2t: 105 msec  TR Gradient:24.4 mmHg                MVA by PHT2.1 cm^2 Estimated RAP:3 mmHg  RVSP:27 mmHg      XR CHEST PORTABLE    Result Date: 3/15/2022  EXAMINATION: Chest one view 3/15/2022 HISTORY: Shortness of air FINDINGS: Upright frontal projection of the chest demonstrates mild bibasilar atelectasis. Lungs are otherwise clear. There is no effusion or free air present. The thoracic aorta is ectatic. . Bibasilar atelectasis. Lungs are otherwise clear. Signed by Dr Jaz Swift   1. Stage IV chronic kidney disease baseline. 2.  Type II diabetic nephropathy. 3.  Moderate to severe hyperkalemia/improving. .  4.  Renal tubular acidosis. 5.  Type II diabetic nephropathy. 6.  Anemia of chronic kidney disease. Plan:  1.  P.o. sodium bicarbonate. 2.  Kayexalate 15 g p.o. twice a week at nursing home. 3.  Continue RACHEL at nursing home. 4. Low potassium diet at nursing home as recommended by clinical dietitian at nursing home. 5.  Okay to discharge her today.         Debbie Guerrero MD  03/18/22  9:43 AM

## 2022-03-18 NOTE — DISCHARGE SUMMARY
Celia Miller  :  3/27/1933  MRN:  [id number]    Admit date:  3/15/2022  Discharge date:  3/18/22    Discharging Physician:  Dr. Jose Miguel Tucker Directive: DNR    Consults: Louis Starks     Primary Care Physician:  Mckayla Martínez MD    Discharge Diagnoses:  Principal Problem:    Acute renal failure superimposed on chronic kidney disease ([facility] [address]  Active Problems:    Diabetes mellitus, type 2 (HCC)    Iron deficiency anemia, unspecified     Hyperkalemia    COPD (chronic obstructive pulmonary disease) (HCC)    Chronic diastolic heart failure (HCC)    HTN (hypertension)    Hypothyroidism  Resolved Problems:    * No resolved hospital problems. *      Portions of this note have been copied forward, however, changed to reflect the most current clinical status of this patient. Hospital Course: The patient is an 51-year-old female with past medical history of CKD, diabetes, COPD, pulmonary fibrosis, GERD, and chronic diastolic heart failure who presented to [facility] ED from skilled nursing facility for abnormal labs. The patient was sent from facility has hyperkalemia and worsening renal functions were noted. The patient did not report increased shortness of breath, new or worsening extremity edema, nausea, vomiting, or diarrhea. She did endorse decreased p.o. intake as her fluids have been recently thickened to facility. Patient denied fever, chills, abdominal pain, and dysuria. ED work-up indicated creatinine 3.1, BUN 71, GRF 17, and potassium 6.4. Patient was given Lokelma 10 g p.o., calcium gluconate 1 g IV, 200 cc bolus normal saline, Lasix 20 mg IV, 10 units of regular insulin IV in the ED. Patient was admitted to the hospitalist service for CROW on CKD. Nephrology was consulted. Repeat BMP after intervention indicated potassium still 6.2 and repeat Dyan Janus was given. Repeat labs indicated potassium of 5.7, BUN 63, creatinine 2.9, GFR 15. Another dose of Dyan Janus was ordered. Potassium is now down to 4.3, Cr 2.7, BUN 50, and Gfr 17. Nephrology recommended beginning RACHEL. Patient started on oral bicarb. Patient is now medically stable to be discharged back to SNF. Significant Diagnostic Studies:   ECHO Complete 2D W Doppler W Color    Result Date: 3/16/2022  Transthoracic Echocardiography Report (TTE)  Demographics   Patient Name   Bridgett Anne Marie  Date of Study           03/16/2022   MRN            [id number]        Gender                  Female   Date of Birth  03/27/1933    Room Number             L-0311   Age            80 year(s)   Height:        67 inches     Referring Physician     Maverick Murray   Weight:        141 pounds    Sonographer             Faviola Adorno [facility]   BSA:           1.74 m^2      Interpreting Physician  Kermitt Lefort, DO   BMI:           22.08 kg/m^2  Procedure Type of Study   TTE procedure:ECHO 2D W/DOPPLER/COLOR/CONTRAST. Study Location: Echo Lab Technical Quality: Limited visualization due to poor acoustical window. Patient Status: Inpatient Contrast Medium: Definity. Amount - 2 ml HR: 74 bpm BP: 137/44 mmHg Indications:Congestive heart failure, diastolic dysfunction and Dyspnea/SOB. Conclusions   Summary  Normal left ventricular chamber size and systolic function. Moderate concentric left ventricular hypertrophy. Left ventricular ejection fraction is visually estimated at 65%. Aortic valve leaflets are moderately thickened. There is mildly decreased  excursion. Mild aortic stenosis is present. The peak gradient across the aortic valve is 33 mmHg. The mean gradient across the aortic valve is 16 mmHg. Trivial pericardial effusion.    Signature   ----------------------------------------------------------------  Electronically signed by Kermitt Lefort, DO(Interpreting  physician) on 03/16/2022 09:29 AM  ----------------------------------------------------------------   Findings   Mitral Valve  Mitral valve leaflets are mildly thickened, unrestricted and motion. Mitral annular calcification is present. There is trace mitral regurgitation. Aortic Valve  Aortic valve leaflets are moderately thickened. There is mildly decreased  excursion. Mild aortic stenosis is present. The peak gradient across the aortic valve is 33 mmHg. The mean gradient across the aortic valve is 16 mmHg. No aortic regurgitation . Tricuspid Valve  Normal tricuspid valve leaflet thickness and excursion. There is trace tricuspid regurgitation. Estimated PA systolic pressure is 21EE mercury. Pulmonic Valve  No significant pulmonic regurgitation. Left Atrium  Left atrial size is normal.   Left Ventricle  Normal left ventricular chamber size and systolic function. Moderate concentric left ventricular hypertrophy. No regional wall motion abnormalities. Left ventricular ejection fraction is visually estimated at 65%. Grade 1 LV diastolic dysfunction   Right Atrium  Normal right atrial size. Right Ventricle  Normal right ventricular chamber size and function. Pericardial Effusion  Trivial pericardial effusion. Miscellaneous  IVC diameter is normal, suggesting normal right atrial pressure. M-Mode Measurements (cm)   LVIDd: 3.7 cm                        LVIDs: 2.35 cm  IVSd: 1.42 cm  LVPWd: 1.42 cm                       AO Root Dimension: 3.4 cm  % Ejection Fraction: 65 %            LA:  3.6 cm                                       LVOT: 1.9 cm  Doppler Measurements:   AV Peak Velocity:288 cm/s            MV Peak E-Wave: 72.8 cm/s  AV Peak Gradient: 33.18 mmHg         MV Peak A-Wave: 104 cm/s  AV Mean Gradient: 17 mmHg            MV E/A Ratio: 0.7 %  AV Area (Continuity):0.92 cm^2       MV Peak Gradient: 2.12 mmHg  TR Velocity:247 cm/s                 MV P1/2t: 105 msec  TR Gradient:24.4 mmHg                MVA by PHT2.1 cm^2  Estimated RAP:3 mmHg  RVSP:27 mmHg      XR CHEST PORTABLE    Result Date: 3/15/2022  EXAMINATION: Chest one view 3/15/2022 HISTORY: Shortness of air dry.      Capillary Refill: Capillary refill takes less than 2 seconds. Neurological:      General: No focal deficit present. Mental Status: She is alert and oriented to person, place, and time.          Discharge Medications:         Medication List      START taking these medications    epoetin daniel-epbx 3000 UNIT/ML Soln injection  Commonly known as: RETACRIT  Inject 1 mL into the skin three times a week     sodium bicarbonate 325 MG tablet  Take 1 tablet by mouth 2 times daily        CONTINUE taking these medications    acetaminophen 500 MG tablet  Commonly known as: TYLENOL     allopurinol 100 MG tablet  Commonly known as: ZYLOPRIM     atorvastatin 40 MG tablet  Commonly known as: LIPITOR     bisacodyl 10 MG suppository  Commonly known as: DULCOLAX     bumetanide 1 MG tablet  Commonly known as: BUMEX     carboxymethylcellulose 0.5 % Soln ophthalmic solution  Commonly known as: REFRESH PLUS     Entresto 24-26 MG per tablet  Generic drug: sacubitril-valsartan     ergocalciferol 1.25 MG (32352 UT) capsule  Commonly known as: ERGOCALCIFEROL     ferrous sulfate 325 (65 Fe) MG tablet  Commonly known as: IRON 325     fluticasone-salmeterol 100-50 MCG/DOSE diskus inhaler  Commonly known as: ADVAIR     GLUCOTROL PO     hydrALAZINE 25 MG tablet  Commonly known as: APRESOLINE     insulin detemir 100 UNIT/ML injection vial  Commonly known as: LEVEMIR     insulin lispro 100 UNIT/ML injection vial  Commonly known as: HUMALOG     levothyroxine 75 MCG tablet  Commonly known as: SYNTHROID     mirabegron 50 MG Tb24  Commonly known as: MYRBETRIQ     ondansetron 4 MG tablet  Commonly known as: ZOFRAN     polyethylene glycol 17 GM/SCOOP powder  Commonly known as: GLYCOLAX     senna-docusate 8.6-50 MG per tablet  Commonly known as: PERICOLACE     spironolactone 25 MG tablet  Commonly known as: ALDACTONE     therapeutic multivitamin-minerals tablet     zinc 50 MG Caps  Take 50 mg by mouth daily           Where to Get Your Medications      [id number], [address] [facility] [address]  [address], [place] [address]    Phone: [phone]   · epoetin daniel-epbx 3000 UNIT/ML Soln injection  · sodium bicarbonate 325 MG tablet         Discharge Instructions: Follow up with Christina Landin MD in 3-5 days. Take medications as directed. Resume activity as tolerated. Diet: ADULT DIET; Regular; 4 carb choices (60 gm/meal); Low Potassium (Less than 3000 mg/day)     Disposition: Patient is Stableand will be discharged to [address] [place] [address]. Time spent on discharge 38 minutes spent in assessing patient, reviewing medications, discussion with nursing, confirming safe discharge plan and preparation of discharge summary.     Signed:  PREMA Ornelas CNP, 3/18/2022 9:11 AM

## 2022-03-21 ENCOUNTER — TELEPHONE (OUTPATIENT)
Dept: INFUSION THERAPY | Age: 87
End: 2022-03-21

## 2022-03-21 NOTE — TELEPHONE ENCOUNTER
Contacted patient and left voicemail informing her that her zinc level has improved and that per Dickson Darden, she should continue taking her zinc supplement.

## 2022-03-26 NOTE — PROGRESS NOTES
Physician Progress Note      PATIENTMermegan Leyva  [facility] #:                  [id number]  :                       3/27/1933  ADMIT DATE:       3/15/2022 4:08 PM  [address] [place] DATE:        3/18/2022 12:45 PM  RESPONDING  PROVIDER #:        Milana Sales          QUERY TEXT:    Patient admitted with Hyperkalemia. Noted documentation of Acute Kidney   Injury in your notes. .  In order to support the diagnosis of CROW, please   include additional clinical indicators in your documentation. Or please   document if the diagnosis of CROW has been ruled out after further study    The medical record reflects the following:  Risk Factors: Hyperkalemia,  Diabetic nephropathy,  CKD IV  Clinical Indicators:  creatinine 3.1 to 2.7  does not meet [facility] criteria 9   see belowe)  Treatment: IVF  100 ml/hr, Sodium Bicarbonate  5 0 meq 50 ml/hr, labs, At   discharge sodium bicarbonate po, Kayexalate 15 g po  bid. Thank you    Gin Rodriguez RN, BSN, [facility]  [phone]    Defined by Kidney Disease Improving Global Outcomes (KDIGO) clinical practice   guideline for acute kidney injury:  -Increase in SCr by greater than or equal to 0.3 mg/dl within 48 hours; or  -Increase or decrease in SCr to greater than or equal to 1.5 times baseline,   which is known or presumed to have occurred within the prior 7 days; or  -Urine volume < 0.5ml/kg/h for 6 hours  Options provided:  -- Acute kidney injury evidenced by, Please document evidence as well as   baseline creatinine, if known. -- Currently resolved acute kidney injury was evidenced by, Please document   evidence as well as baseline creatinine, if known.   -- Acute kidney injury ruled out after study  -- Other - I will add my own diagnosis  -- Disagree - Not applicable / Not valid  -- Disagree - Clinically unable to determine / Unknown  -- Refer to Clinical Documentation Reviewer    PROVIDER RESPONSE TEXT:    This patient has an acute kidney injury as evidenced by Patient had CROW on admission with underlying CK. She had increase of more than 0.3 mg/dl and   decrease urine output on admission.     Query created by: Keo Esteves on 3/24/2022 7:41 AM      Electronically signed by:  Claudell Ferdinand 3/26/2022 8:16 AM

## 2022-04-08 ENCOUNTER — HOSPITAL ENCOUNTER (EMERGENCY)
Age: 87
Discharge: HOME | End: 2022-04-09
Payer: MEDICARE

## 2022-04-08 VITALS
HEART RATE: 77 BPM | RESPIRATION RATE: 16 BRPM | SYSTOLIC BLOOD PRESSURE: 136 MMHG | WEIGHT: 143 LBS | DIASTOLIC BLOOD PRESSURE: 78 MMHG | BODY MASS INDEX: 22.74 KG/M2 | TEMPERATURE: 98.1 F | OXYGEN SATURATION: 97 %

## 2022-04-08 DIAGNOSIS — N18.9 CHRONIC KIDNEY DISEASE, UNSPECIFIED CKD STAGE: ICD-10-CM

## 2022-04-08 DIAGNOSIS — E87.5 HYPERKALEMIA: Primary | ICD-10-CM

## 2022-04-08 LAB
ALBUMIN SERPL-MCNC: 4.1 G/DL (ref 3.5–5.2)
ALP BLD-CCNC: 66 U/L (ref 35–104)
ALT SERPL-CCNC: 70 U/L (ref 5–33)
ANION GAP SERPL CALCULATED.3IONS-SCNC: 15 MMOL/L (ref 7–19)
AST SERPL-CCNC: 46 U/L (ref 5–32)
BASOPHILS ABSOLUTE: 0 K/UL (ref 0–0.2)
BASOPHILS RELATIVE PERCENT: 0.4 % (ref 0–1)
BILIRUB SERPL-MCNC: <0.2 MG/DL (ref 0.2–1.2)
BUN BLDV-MCNC: 67 MG/DL (ref 8–23)
CALCIUM SERPL-MCNC: 9.4 MG/DL (ref 8.8–10.2)
CHLORIDE BLD-SCNC: 106 MMOL/L (ref 98–111)
CO2: 15 MMOL/L (ref 22–29)
CREAT SERPL-MCNC: 2.7 MG/DL (ref 0.5–0.9)
EOSINOPHILS ABSOLUTE: 0.1 K/UL (ref 0–0.6)
EOSINOPHILS RELATIVE PERCENT: 1.7 % (ref 0–5)
GFR AFRICAN AMERICAN: 20
GFR NON-AFRICAN AMERICAN: 17
GLUCOSE BLD-MCNC: 172 MG/DL (ref 74–109)
HCT VFR BLD CALC: 35.4 % (ref 37–47)
HEMOGLOBIN: 10.7 G/DL (ref 12–16)
IMMATURE GRANULOCYTES #: 0 K/UL
INR BLD: 0.96 (ref 0.88–1.18)
LYMPHOCYTES ABSOLUTE: 2.2 K/UL (ref 1.1–4.5)
LYMPHOCYTES RELATIVE PERCENT: 31.7 % (ref 20–40)
MCH RBC QN AUTO: 31.7 PG (ref 27–31)
MCHC RBC AUTO-ENTMCNC: 30.2 G/DL (ref 33–37)
MCV RBC AUTO: 104.7 FL (ref 81–99)
MONOCYTES ABSOLUTE: 0.7 K/UL (ref 0–0.9)
MONOCYTES RELATIVE PERCENT: 9.5 % (ref 0–10)
NEUTROPHILS ABSOLUTE: 3.9 K/UL (ref 1.5–7.5)
NEUTROPHILS RELATIVE PERCENT: 56.1 % (ref 50–65)
PDW BLD-RTO: 17.6 % (ref 11.5–14.5)
PLATELET # BLD: 221 K/UL (ref 130–400)
PMV BLD AUTO: 10.8 FL (ref 9.4–12.3)
POTASSIUM REFLEX MAGNESIUM: 6.7 MMOL/L (ref 3.5–5)
POTASSIUM SERPL-SCNC: 5.3 MMOL/L (ref 3.5–5)
PROTHROMBIN TIME: 12.7 SEC (ref 12–14.6)
RBC # BLD: 3.38 M/UL (ref 4.2–5.4)
SODIUM BLD-SCNC: 136 MMOL/L (ref 136–145)
TOTAL PROTEIN: 6.4 G/DL (ref 6.6–8.7)
WBC # BLD: 7 K/UL (ref 4.8–10.8)

## 2022-04-08 PROCEDURE — 96375 TX/PRO/DX INJ NEW DRUG ADDON: CPT

## 2022-04-08 PROCEDURE — 2500000003 HC RX 250 WO HCPCS: Performed by: NURSE PRACTITIONER

## 2022-04-08 PROCEDURE — 85025 COMPLETE CBC W/AUTO DIFF WBC: CPT

## 2022-04-08 PROCEDURE — 96365 THER/PROPH/DIAG IV INF INIT: CPT

## 2022-04-08 PROCEDURE — 80053 COMPREHEN METABOLIC PANEL: CPT

## 2022-04-08 PROCEDURE — 84132 ASSAY OF SERUM POTASSIUM: CPT

## 2022-04-08 PROCEDURE — 85610 PROTHROMBIN TIME: CPT

## 2022-04-08 PROCEDURE — 2580000003 HC RX 258: Performed by: NURSE PRACTITIONER

## 2022-04-08 PROCEDURE — 36415 COLL VENOUS BLD VENIPUNCTURE: CPT

## 2022-04-08 PROCEDURE — 93005 ELECTROCARDIOGRAM TRACING: CPT | Performed by: NURSE PRACTITIONER

## 2022-04-08 PROCEDURE — 6360000002 HC RX W HCPCS: Performed by: NURSE PRACTITIONER

## 2022-04-08 PROCEDURE — 96366 THER/PROPH/DIAG IV INF ADDON: CPT

## 2022-04-08 PROCEDURE — 6370000000 HC RX 637 (ALT 250 FOR IP): Performed by: NURSE PRACTITIONER

## 2022-04-08 PROCEDURE — 99284 EMERGENCY DEPT VISIT MOD MDM: CPT

## 2022-04-08 RX ORDER — DEXTROSE MONOHYDRATE 25 G/50ML
25 INJECTION, SOLUTION INTRAVENOUS ONCE
Status: DISCONTINUED | OUTPATIENT
Start: 2022-04-08 | End: 2022-04-08 | Stop reason: CLARIF

## 2022-04-08 RX ORDER — CALCIUM GLUCONATE 94 MG/ML
1000 INJECTION, SOLUTION INTRAVENOUS ONCE
Status: COMPLETED | OUTPATIENT
Start: 2022-04-08 | End: 2022-04-08

## 2022-04-08 RX ADMIN — INSULIN HUMAN 10 UNITS: 100 INJECTION, SOLUTION PARENTERAL at 20:05

## 2022-04-08 RX ADMIN — SODIUM ZIRCONIUM CYCLOSILICATE 10 G: 10 POWDER, FOR SUSPENSION ORAL at 20:06

## 2022-04-08 RX ADMIN — SODIUM BICARBONATE: 84 INJECTION, SOLUTION INTRAVENOUS at 20:27

## 2022-04-08 RX ADMIN — DEXTROSE MONOHYDRATE 250 ML: 100 INJECTION, SOLUTION INTRAVENOUS at 20:04

## 2022-04-08 RX ADMIN — CALCIUM GLUCONATE 1000 MG: 98 INJECTION, SOLUTION INTRAVENOUS at 20:04

## 2022-04-08 ASSESSMENT — ENCOUNTER SYMPTOMS: SHORTNESS OF BREATH: 1

## 2022-04-09 LAB
EKG P AXIS: 33 DEGREES
EKG P-R INTERVAL: 178 MS
EKG Q-T INTERVAL: 358 MS
EKG QRS DURATION: 86 MS
EKG QTC CALCULATION (BAZETT): 384 MS
EKG T AXIS: 28 DEGREES

## 2022-04-09 PROCEDURE — 93010 ELECTROCARDIOGRAM REPORT: CPT | Performed by: INTERNAL MEDICINE

## 2022-04-09 NOTE — ED NOTES
Report give to [facility] at [facility].       Nahomy Kiran RN  04/08/22 7957

## 2022-04-09 NOTE — ED PROVIDER NOTES
[facility] EMERGENCY DEPT  eMERGENCY dEPARTMENT eNCOUnter      Pt Name: Geneva Cope  MRN: [id number]  [place] 3/27/1933  Date of evaluation: 4/8/2022  Provider: Jaycee Pro, [address]       Chief Complaint   Patient presents with    Abnormal Lab         HISTORY OF PRESENT ILLNESS   (Location/Symptom, Timing/Onset,Context/Setting, Quality, Duration, Modifying Factors, Severity)  Note limiting factors. Geneva Cope is a 80 y.o. female who presents to the emergency department from [address] [place] [address] with an elevated K. Unable to treat this at the [facility]. Pt has CKD and was admitted here with the same 3/15 -3/18/22. Pt denies any symptoms except for chronic shortness of breath      The history is provided by the patient. NursingNotes were reviewed. REVIEW OF SYSTEMS    (2-9 systems for level 4, 10 or more for level 5)     Review of Systems   Constitutional: Negative for fever. Respiratory: Positive for shortness of breath. Cardiovascular: Negative for chest pain. Psychiatric/Behavioral: Negative for confusion. Except as noted above the remainder of the review of systems was reviewed and negative. PAST MEDICAL HISTORY     Past Medical History:   Diagnosis Date    Aneurysm ([address] 1993    Aneurysm ([address]     Breast cyst     Chronic pulmonary disease     CKD (chronic kidney disease)     Colon polyps     COPD (chronic obstructive pulmonary disease) ([address] 3/15/2022    DM (diabetes mellitus) (HCC)     GERD (gastroesophageal reflux disease)     Hepatitis     High cholesterol     History of stomach ulcers     HTN (hypertension) 3/15/2022    Hypothyroidism 3/15/2022    Lung nodule     left/BG    Stomach ache reflux    Thyroid disorder     Thyroid nodule     Urinary incontinence          SURGICALHISTORY       Past Surgical History:   Procedure Laterality Date    BLADDER SURGERY  2016?     BRAIN ANEURYSM SURGERY      BREAST SURGERY      BIOPSY    CARPAL TUNNEL RELEASE  CATARACT REMOVAL       SECTION      TIMES 4    COLONOSCOPY  3-    DR Marcus Sanchez    COLONOSCOPY  13    Jacki    CYST REMOVAL      FINGER    GALLBLADDER SURGERY      HYSTERECTOMY      KNEE ARTHROSCOPY      KNEE SURGERY      RTK    OTHER SURGICAL HISTORY      arteriorgram, surgeon said leave it alone no surgery.  TONSILLECTOMY AND ADENOIDECTOMY      UPPER GASTROINTESTINAL ENDOSCOPY  2010    DR Marcus Sanchez    UPPER GASTROINTESTINAL ENDOSCOPY  10/25/2013    Louise         CURRENT MEDICATIONS       Previous Medications    ACETAMINOPHEN (TYLENOL) 500 MG TABLET    Take 500 mg by mouth every 6 hours as needed    ALLOPURINOL (ZYLOPRIM) 100 MG TABLET    100 mg daily     ATORVASTATIN (LIPITOR) 40 MG TABLET    40 mg nightly     BISACODYL (DULCOLAX) 10 MG SUPPOSITORY    Place 10 mg rectally daily as needed for Constipation    BUMETANIDE (BUMEX) 1 MG TABLET    0.5 mg daily     CARBOXYMETHYLCELLULOSE (REFRESH PLUS) 0.5 % SOLN OPHTHALMIC SOLUTION    Apply 1 drop to eye nightly as needed    ENTRESTO 24-26 MG PER TABLET    1 tablet 2 times daily     EPOETIN MARTY-EPBX (RETACRIT) 3000 UNIT/ML SOLN INJECTION    Inject 1 mL into the skin three times a week    ERGOCALCIFEROL (ERGOCALCIFEROL) 1.25 MG (69968 UT) CAPSULE    Take 50,000 Units by mouth once a week    FERROUS SULFATE (IRON 325) 325 (65 FE) MG TABLET    Take 650 mg by mouth 2 times daily     FLUTICASONE-SALMETEROL (ADVAIR) 100-50 MCG/DOSE DISKUS INHALER    Inhale 1 puff into the lungs 2 times daily     GLIPIZIDE (GLUCOTROL PO)    Take 10 mg by mouth daily.     HYDRALAZINE (APRESOLINE) 25 MG TABLET    Take 25 mg by mouth 3 times daily Hold if BP is lower than 120/70    INSULIN DETEMIR (LEVEMIR) 100 UNIT/ML INJECTION VIAL    Inject 10 Units into the skin 2 times daily     INSULIN LISPRO (HUMALOG) 100 UNIT/ML INJECTION VIAL    Inject into the skin 3 times daily (before meals) Per sliding scale    LEVOTHYROXINE (SYNTHROID) 75 MCG TABLET    Take 75 mcg by mouth Daily. MIRABEGRON (MYRBETRIQ) 50 MG TB24    Take 50 mg by mouth daily    MULTIPLE VITAMINS-MINERALS (THERAPEUTIC MULTIVITAMIN-MINERALS) TABLET    Take 1 tablet by mouth daily    ONDANSETRON (ZOFRAN) 4 MG TABLET    Take 4 mg by mouth every 6 hours as needed     POLYETHYLENE GLYCOL (GLYCOLAX) 17 GM/SCOOP POWDER    Take 17 g by mouth daily    SENNA-DOCUSATE (PERICOLACE) 8.6-50 MG PER TABLET    Take 1 tablet by mouth 2 times daily    SODIUM BICARBONATE 325 MG TABLET    Take 1 tablet by mouth 2 times daily    SPIRONOLACTONE (ALDACTONE) 25 MG TABLET    Take 25 mg by mouth daily    ZINC 50 MG CAPS    Take 50 mg by mouth daily       ALLERGIES     Celebrex [celecoxib], Codeine, Pregabalin, Protonix [pantoprazole sodium], Quinapril hcl, Sulfa antibiotics, Tramadol, Trental [pentoxifylline er], and Vioxx [rofecoxib]    FAMILY HISTORY       Family History   Problem Relation Age of Onset    Stroke Mother     High Blood Pressure Mother     Lung Cancer Brother     Esophageal Cancer Brother     Other Brother         CKD    High Blood Pressure Father     High Blood Pressure Sister           SOCIAL HISTORY       Social History     Socioeconomic History    Marital status:       Spouse name: None    Number of children: None    Years of education: None    Highest education level: None   Occupational History    None   Tobacco Use    Smoking status: Former Smoker     Start date:      Quit date: 1993     Years since quittin.1    Smokeless tobacco: Never Used    Tobacco comment: quit    Vaping Use    Vaping Use: Never used   Substance and Sexual Activity    Alcohol use: No    Drug use: No    Sexual activity: None   Other Topics Concern    None   Social History Narrative    None     Social Determinants of Health     Financial Resource Strain:     Difficulty of Paying Living Expenses: Not on file   Food Insecurity:     Worried About Running Out of Food in the Last Year: Not on file    Ran Out of Food in the Last Year: Not on file   Transportation Needs:     Lack of Transportation (Medical): Not on file    Lack of Transportation (Non-Medical): Not on file   Physical Activity:     Days of Exercise per Week: Not on file    Minutes of Exercise per Session: Not on file   Stress:     Feeling of Stress : Not on file   Social Connections:     Frequency of Communication with Friends and Family: Not on file    Frequency of Social Gatherings with Friends and Family: Not on file    Attends [employer]ism Services: Not on file    Active Member of [address] [place] [address] [employer] or Organizations: Not on file    Attends Club or Organization Meetings: Not on file    Marital Status: Not on file   Intimate Partner Violence:     Fear of Current or Ex-Partner: Not on file    Emotionally Abused: Not on file    Physically Abused: Not on file    Sexually Abused: Not on file   Housing Stability:     Unable to Pay for Housing in the Last Year: Not on file    Number of [place] in the Last Year: Not on file    Unstable Housing in the Last Year: Not on file       SCREENINGS    [place] Coma Scale  Eye Opening: Spontaneous  Best Verbal Response: Oriented  Best Motor Response: Obeys commands  Jeffrey Coma Scale Score: 15 @FLOW([id number])@      PHYSICAL EXAM    (up to 7 for level 4, 8 or more for level 5)     ED Triage Vitals [04/08/22 1745]   BP Temp Temp src Pulse Resp SpO2 Height Weight   (!) 153/82 98.1 °F (36.7 °C) -- 82 18 100 % -- 143 lb (64.9 kg)       Physical Exam  Vitals and nursing note reviewed. Constitutional:       Appearance: Normal appearance. She is well-developed. HENT:      Head: Normocephalic and atraumatic. Mouth/Throat:      Mouth: Mucous membranes are moist.   Eyes:      General: No scleral icterus. Right eye: No discharge. Left eye: No discharge. Cardiovascular:      Rate and Rhythm: Normal rate and regular rhythm. Heart sounds: Normal heart sounds. Pulmonary:      Effort: No respiratory distress. Breath sounds: Normal breath sounds. Musculoskeletal:      Cervical back: Normal range of motion and neck supple. Skin:     General: Skin is warm and dry. Neurological:      Mental Status: She is alert and oriented to person, place, and time.    Psychiatric:         Behavior: Behavior normal.         DIAGNOSTIC RESULTS     EKG: All EKG's are interpreted by the Emergency Department Physician who either signs or Co-signsthis chart in the absence of a cardiologist.  75 sinus rhythm CO interval is 56 no ST changes no peak T waves read at 1830 by Dr. Joseph Lynn:   Nina Tinoco such as CT, Ultrasound and MRI are read by the radiologist. Plain radiographic images are visualized and preliminarily interpreted by the emergency physician with the below findings:      Interpretation per the Radiologist below, if available at the time of this note:    No orders to display         ED BEDSIDEULTRASOUND:   Performed by ED Physician -none    LABS:  Labs Reviewed   CBC WITH AUTO DIFFERENTIAL - Abnormal; Notable for the following components:       Result Value    RBC 3.38 (*)     Hemoglobin 10.7 (*)     Hematocrit 35.4 (*)     .7 (*)     MCH 31.7 (*)     MCHC 30.2 (*)     RDW 17.6 (*)     All other components within normal limits   COMPREHENSIVE METABOLIC PANEL W/ REFLEX TO MG FOR LOW K - Abnormal; Notable for the following components:    Potassium reflex Magnesium 6.7 (*)     CO2 15 (*)     Glucose 172 (*)     BUN 67 (*)     CREATININE 2.7 (*)     GFR Non- 17 (*)     GFR  20 (*)     Total Protein 6.4 (*)     ALT 70 (*)     AST 46 (*)     All other components within normal limits    Narrative:     [facility]  [facility] ,  Chemistry results called to and read back by aidan vargas er, 04/08/2022 19:37,  by LOBO   POTASSIUM - Abnormal; Notable for the following components:    Potassium 5.3 (*)     All other components within normal limits   PROTIME-INR       All other labs were within normal range or not returned as of this dictation. EMERGENCY DEPARTMENT COURSE and DIFFERENTIALDIAGNOSIS/MDM:   Vitals:    Vitals:    04/08/22 1745 04/08/22 2014 04/08/22 2314 04/08/22 2352   BP: (!) 153/82 (!) 150/67 (!) 135/58 136/78   Pulse: 82 71 77 77   Resp: 18 18 16 16   Temp: 98.1 °F (36.7 °C)      SpO2: 100% 99% 96% 97%   Weight: 143 lb (64.9 kg)              MDM patient's hyperkalemia was treated with insulin, D50, bicarb and calcium gluconate as well as lokelma po. Spoke to Dr. Josue Simmons hospitalist for admission for the patient. She declined and recommended repeat labs after treatment. Repeat K is 5.3  Will d/c back to the nursing home. Will need repeat labs on Monday. Dr Josue Simmons also felt like the patient might benefit from stopping her spironolactone and having entresto every other day    CONSULTS:  None    PROCEDURES:  Unless otherwise noted below, none     Procedures    FINAL IMPRESSION      1. Hyperkalemia    2.  Chronic kidney disease, unspecified CKD stage        DISPOSITION/PLAN   DISPOSITION  04/08/2022 11:54:09 PM      PATIENT REFERRED TO:  Nas Schaffer MD  [address] [place] [address] [phone]            repeat labs on  monday      stop spironolactone          DISCHARGE MEDICATIONS:  New Prescriptions    No medications on file          (Please note that portions of this note were completed with a voice recognitionprogram.  Efforts were made to edit the dictations but occasionally words are mis-transcribed.)    PREMA Johnson (electronically signed)          PREMA Johnson  04/08/22 9060

## 2022-04-27 PROBLEM — I50.32 CHRONIC DIASTOLIC HEART FAILURE (HCC): Status: ACTIVE | Noted: 2021-11-03

## 2022-04-27 NOTE — PROGRESS NOTES
Subjective:     Encounter Date:04/27/2022      Patient ID: Honey Canales is a 89 y.o. female with moderate aortic valve stenosis, diastolic dysfunction, advanced chronic kidney disease, hypertension, insulin requiring diabetes mellitus, here today for follow-up.    Chief Complaint: Here today for follow-up of aortic valve disease, diastolic dysfunction    This patient presents today for routine follow-up.  She says that from a cardiac standpoint she seems to be doing well.  She is known to have moderate aortic valve stenosis and also diastolic dysfunction.  She reports that her weight is stable.  No orthopnea, PND or significant edema.  Breathing is also stable.  Unfortunately, she also has advanced kidney disease and has had hyperkalemia recently.  She has been to the hospital on 2 occasions for treatment of elevated potassium levels.  She had labs checked yesterday.  These were reportedly related to her nephrologist.  She continues to follow closely with nephrology and has follow-up next week.  As stated, breathing is stable with no significant peripheral edema.  She denies having palpitations, lightheadedness, dizziness or syncope.  No chest pain at this time.  Currently, blood pressure is well controlled.  She is tolerating all of her medications well.      Current Outpatient Medications:   •  acetaminophen (TYLENOL) 500 MG tablet, Take 500 mg by mouth Every 6 (Six) Hours As Needed for Mild Pain  or Fever., Disp: , Rfl:   •  allopurinol (ZYLOPRIM) 100 MG tablet, Take 100 mg by mouth Daily., Disp: , Rfl:   •  atorvastatin (LIPITOR) 40 MG tablet, Take 40 mg by mouth Every Night., Disp: , Rfl:   •  bumetanide (BUMEX) 1 MG tablet, Take 1 tablet by mouth Daily. (Patient taking differently: Take 0.5 mg by mouth 2 (Two) Times a Day.), Disp: , Rfl:   •  carboxymethylcellulose (REFRESH PLUS) 0.5 % solution, Administer 1 drop to both eyes At Night As Needed for Dry Eyes., Disp: , Rfl:   •  ferrous sulfate 325 (65 FE)  MG tablet, Take 650 mg by mouth Daily With Breakfast., Disp: , Rfl:   •  fluticasone-salmeterol (ADVAIR) 100-50 MCG/DOSE DISKUS, Inhale 1 puff 2 (Two) Times a Day., Disp: , Rfl:   •  glipizide (GLUCOTROL) 10 MG tablet, Take 10 mg by mouth Daily., Disp: , Rfl:   •  hydrALAZINE (APRESOLINE) 25 MG tablet, Take 1 tablet by mouth Every 8 (Eight) Hours., Disp: , Rfl:   •  insulin detemir (LEVEMIR) 100 UNIT/ML injection, Inject 10 Units under the skin into the appropriate area as directed 2 (Two) Times a Day., Disp: , Rfl: 12  •  insulin lispro (humaLOG) 100 UNIT/ML injection, Inject 2-7 Units under the skin into the appropriate area as directed 3 (Three) Times a Day With Meals. Inject under the skin TID WM as per sliding scale: 150-199= 2 units 200-249= 3 units 250-299= 4 units 300-349= 5 units 350-399= 6 units >400= 7 units and call MD, Disp: , Rfl:   •  ipratropium-albuterol (DUO-NEB) 0.5-2.5 mg/3 ml nebulizer, Take 3 mL by nebulization 4 (Four) Times a Day As Needed for Wheezing., Disp: 360 mL, Rfl:   •  levothyroxine (SYNTHROID, LEVOTHROID) 75 MCG tablet, Take 75 mcg by mouth Daily., Disp: , Rfl:   •  Mirabegron ER (MYRBETRIQ) 50 MG tablet sustained-release 24 hour 24 hr tablet, Take 50 mg by mouth Daily., Disp: , Rfl:   •  multivitamin with minerals tablet tablet, Take 1 tablet by mouth Daily., Disp: , Rfl:   •  polyethylene glycol (polyethylene glycol) 17 g packet, Take 17 g by mouth Daily., Disp: , Rfl:   •  sacubitril-valsartan (ENTRESTO) 24-26 MG tablet, Take 1 tablet by mouth 2 (Two) Times a Day., Disp: , Rfl:   •  sennosides-docusate (senna-docusate sodium) 8.6-50 MG per tablet, Take 1 tablet by mouth Daily., Disp: , Rfl:   •  sodium bicarbonate 325 MG tablet, Take 325 mg by mouth 2 (Two) Times a Day., Disp: , Rfl:   •  spironolactone (ALDACTONE) 25 MG tablet, Take 25 mg by mouth Daily., Disp: , Rfl:   •  vitamin D (ERGOCALCIFEROL) 1.25 MG (62471 UT) capsule capsule, Take 50,000 Units by mouth 1 (One) Time Per  Week., Disp: , Rfl:   •  Zinc 50 MG tablet, Take 1 tablet by mouth Daily., Disp: , Rfl:     Allergies   Allergen Reactions   • Codeine Phosphate [Codeine] Unknown (See Comments)     CHEST PAIN   • Collagen Other (See Comments)     CAT GUT SUTURES \ WOUND WOULD NOT HEAL AND GOT INFECTION   • Accupril [Quinapril Hcl] Other (See Comments)     COUGH   • Aspirin Other (See Comments)     HISTORY OF STOMACH ULCERS   • Adhesive Tape Rash     SKIN BLISTERS   • Celebrex [Celecoxib] Nausea Only   • Lyrica [Pregabalin] Other (See Comments)     GAINED 50 POUNDS   • Pantoprazole Sodium Diarrhea   • Pentoxifylline Diarrhea   • Sulfa Antibiotics Nausea And Vomiting   • Tramadol Nausea And Vomiting   • Vioxx [Rofecoxib] Nausea And Vomiting     Social History     Tobacco Use   • Smoking status: Former Smoker     Types: Cigarettes     Quit date:      Years since quittin.3   • Smokeless tobacco: Never Used   • Tobacco comment: SMOKED OVER 40 YEARS   Substance Use Topics   • Alcohol use: No     Review of Systems   Constitutional: Positive for malaise/fatigue. Negative for fever and weight loss.   Cardiovascular: Negative for chest pain, dyspnea on exertion, leg swelling, orthopnea, palpitations, paroxysmal nocturnal dyspnea and syncope.   Respiratory: Negative for cough, shortness of breath and wheezing.    Hematologic/Lymphatic: Negative for adenopathy and bleeding problem.   Gastrointestinal: Negative for abdominal pain, nausea and vomiting.   Neurological: Negative for dizziness, headaches, loss of balance and weakness.       ECG 12 Lead    Date/Time: 2022 3:36 PM  Performed by: Seth Rueda MD  Authorized by: Seth Rueda MD   Comparison: compared with previous ECG from 2021  Similar to previous ECG  Rhythm: sinus rhythm  Rate: normal  BPM: 76  Conduction: conduction normal  QRS axis: normal  Other findings: non-specific ST-T wave changes    Clinical impression: non-specific ECG            "  Objective:     Vitals reviewed.   Constitutional:       General: Not in acute distress.     Appearance: Well-developed and not in distress. Chronically ill-appearing.   Eyes:      Extraocular Movements: Extraocular movements intact.      Pupils: Pupils are equal, round, and reactive to light.   HENT:      Head: Normocephalic and atraumatic.   Neck:      Thyroid: No thyroid mass.      Vascular: No JVD.   Pulmonary:      Effort: Pulmonary effort is normal.      Breath sounds: Normal breath sounds. No wheezing. No rhonchi. No rales.   Cardiovascular:      Normal rate. Regular rhythm.      Murmurs: There is a grade 2/6 harsh midsystolic murmur at the URSB, radiating to the neck.      No gallop.   Pulses:     Intact distal pulses.   Edema:     Peripheral edema absent.   Abdominal:      General: Bowel sounds are normal. There is no distension.      Palpations: Abdomen is soft.      Tenderness: There is no abdominal tenderness.   Musculoskeletal: Normal range of motion.      Extremities: No clubbing present.Skin:     General: Skin is warm and dry. There is no cyanosis.      Findings: No erythema or rash.   Neurological:      Mental Status: Alert and oriented to person, place, and time.      Cranial Nerves: No cranial nerve deficit.       /60   Pulse 76   Ht 167.6 cm (66\")   Wt 63.5 kg (140 lb)   SpO2 98%   BMI 22.60 kg/m²     Data/Lab Review:     Labs from 4/26/2022: White blood cell count 6.9, hemoglobin 9.4, hematocrit 27.6, platelets 196, sodium 138, potassium 5.5, chloride 113, CO2 14.8, BUN 74, creatinine 2.76    Results for orders placed during the hospital encounter of 10/31/21    Adult Transthoracic Echo Complete W/ Cont if Necessary Per Protocol    Interpretation Summary  · Left ventricular ejection fraction appears to be 66 - 70%. Left ventricular systolic function is normal.  · Moderate aortic valve stenosis is present.  · Left ventricular diastolic function is consistent with (grade II w/high LAP) " pseudonormalization.  · Estimated right ventricular systolic pressure from tricuspid regurgitation is mildly elevated (35-45 mmHg).  · Normal size and function of the right ventricle.        Assessment:          Diagnosis Plan   1. Chronic diastolic heart failure (HCC)  ECG 12 Lead   2. Nonrheumatic aortic valve stenosis            Plan:       1.  Acute on chronic diastolic heart failure: Overall, the patient is stable.  Unfortunately, with her advanced kidney disease, managing volume status in light of her renal dysfunction will likely be very difficult.  Unfortunately, she has had worsening creatinine levels and hyperkalemia.  She is following closely with nephrology.  Both her and her son have indicated that should the need arise for dialysis, the patient would not want to be placed on dialysis.     2.  Nonrheumatic aortic valve stenosis: Most recent echocardiogram is referenced above.  No indication for any type of intervention at this time.  Likely, the patient's advanced age and comorbid medical conditions would lead her to not be a candidate for any type of correction of her aortic valve stenosis, even if severe and symptomatic, however.     We will plan to see the patient back in 6 months unless otherwise needed sooner.

## 2022-07-13 NOTE — PROGRESS NOTES
OP HEMATOLOGY/ONCOLOGY PROGRESS NOTE      Pt Name: Walter Campbell  YOB: 1933  MRN: [id number]  Referring provider: PREMA Bojorquez  Nephrology: Dr. She Bhat  Date of evaluation: 7/14/2022    History Obtained From:  patient, EMR, family member - son, Samira High:    Chief Complaint   Patient presents with    Follow-up     Anemia due to stage 4 chronic kidney disease ([address]     HISTORY OF PRESENT ILLNESS:    Walter Campbell is a 80 y.o.  female returning to the clinic for follow-up and management of multifactorial anemia including iron deficiency, stage IV CKD anemia, anemia of chronic disease, mild zinc deficiency. She continues oral zinc supplement daily. Rosalva received Venofer 500 mg IV on 2/11/2022 and 2/18/2022. She tolerated infusion without adverse event. She has since continued ferrous sulfate 325 mg po BID with exacerbation of constipation. CBC 3/10/2022 revealed an Hgb of 10.1. [facility] did not meet criteria to begin Retacrit at that time. In the interim, [facility] was admitted to [address] [place] [address] 3/15/2022-3/18/2022 for hyperkalemia and worsening renal function. She was evaluated at [address] [place] [address] ER 4/8/2022, again for hyperkalemia. Hgb was 10.7 at that time. [facility] returns to the clinic, accompanied by her son-Efren, for hematology follow-up and consideration of Retacrit as warranted. Benita Alonso tells me Rosalva's renal function was improved above 15 at her last nephrology appointment approximately 2 months ago. Hgb is 9.4/.9 today. Associated symptoms include fatigue, weakness and exertional dyspnea. She continues to reside at [address] [place] [address] and presents in a wheelchair today. HEMATOLOGY HISTORY: Multifactorial Anemia  Evangelist Medina was seen in hematology consultation 1/14/2022, referred by Dr. She Bhat regarding CKD associated anemia. [facility]   She has multiple comorbidities to include stage IV CKD, COPD, type 2 diabetes mellitus, CHF, chronic respiratory failure, GERD, viral hepatitis (A&B), HTN, OAB with bladder neuro device, Oxygen therapy PRN, Vitamin D deficiency, renal cysts  She has had multiple hospitalizations over the past 6 months. She has had worsening renal function. She is referred to consider RACHEL for CKD associated anemia. Symptoms include fatigue, exertional shortness of breath. Labs 2021:  · CBC: WBC 6.49, Hgb 9.6/MCV 91.0, platelets [phone]  · CMP: Creatinine 2.23, GFR 21, calcium 8.52, total protein 5.85  · Iron: 44.7, TIBC 257, iron saturation 17%  · Vitamin D 25: 17.2  · Urinalysis: Trace intact blood  · PTH: 25.8    Oral iron was initiated by nephrology. At hematology consultation, Chloe Martinez states Chantale Patton has been taking oral iron for the past 2 weeks. Chantale Patton states her stools have since been dark. Prior to the iron, she denied melena or hematochezia. Labs 2022:  · LDH: 366  · Haptoglobin: 215  · SPEP: Decreased albumin region.  Restriction of protein migration in the gamma region.  HAIDER gel shows a slight restriction at the application point in IgG kappa which could be artifact, a specific immune response, or early monoclonal protein. Suggest close clinical follow-up and repeat HAIDER in 1-2 months. · Kappa light chain: 6.60, Lambda light chain: 7.47, L/K ratio 1.13  · Ig, IgA: 186, IgM: 31  · Zinc: 51.2 ()  · Copper: 116.2  · EPO: 9  · B12: 375  · Folate: 10.9  · Ferritin: 83.1    TREATMENT SUMMARY:  1. Venofer 500 mg IV delivered 2022 and 2022  2. Zinc 50 mg daily initiated 2021  3.  Anticipate Retacrit 10,000 units weekly if Hgb falls below 10    Past Medical History:   Diagnosis Date    [facility]     [facility]neurysm [facility]     Breast cyst     Chronic pulmonary disease     CKD (chronic kidney disease)     Colon polyps     COPD (chronic obstructive pulmonary disease) ([facility] 3/15/2022    DM (diabetes mellitus) (HCC)     GERD (gastroesophageal reflux disease)     Hepatitis     High cholesterol     History of stomach ulcers     HTN (hypertension) 3/15/2022    Hypothyroidism 3/15/2022    Lung nodule     left/BG    Stomach ache reflux    Thyroid disorder     Thyroid nodule     Urinary incontinence      Past Surgical History:   Procedure Laterality Date    BLADDER SURGERY  ?  BRAIN ANEURYSM SURGERY      BREAST SURGERY      BIOPSY    CARPAL TUNNEL RELEASE      CATARACT REMOVAL       SECTION      TIMES 4    COLONOSCOPY  3-    DR Elenita Kim    [facility]OSCOPY  13    [facility]    CYST REMOVAL      FINGER    GALLBLADDER SURGERY      HYSTERECTOMY (CERVIX STATUS UNKNOWN)      KNEE ARTHROSCOPY      KNEE SURGERY      RTK    OTHER SURGICAL HISTORY      arteriorgram, surgeon said leave it alone no surgery.     TONSILLECTOMY AND ADENOIDECTOMY      UPPER GASTROINTESTINAL ENDOSCOPY  2010    DR Saray Fabian UPPER GASTROINTESTINAL ENDOSCOPY  10/25/2013    [facility]       Current Outpatient Medications:     sodium bicarbonate 325 MG tablet, Take 1 tablet by mouth 2 times daily, Disp: 60 tablet, Rfl: 0    epoetin daniel-epbx (RETACRIT) 3000 UNIT/ML SOLN injection, Inject 1 mL into the skin three times a week, Disp: 21.9 mL, Rfl: 0    spironolactone (ALDACTONE) 25 MG tablet, Take 25 mg by mouth daily, Disp: , Rfl:     bisacodyl (DULCOLAX) 10 MG suppository, Place 10 mg rectally daily as needed for Constipation, Disp: , Rfl:     insulin lispro (HUMALOG) 100 UNIT/ML injection vial, Inject into the skin 3 times daily (before meals) Per sliding scale, Disp: , Rfl:     senna-docusate (PERICOLACE) 8.6-50 MG per tablet, Take 1 tablet by mouth 2 times daily, Disp: , Rfl:     Multiple Vitamins-Minerals (THERAPEUTIC MULTIVITAMIN-MINERALS) tablet, Take 1 tablet by mouth daily, Disp: , Rfl:     atorvastatin (LIPITOR) 40 MG tablet, 40 mg nightly , Disp: , Rfl:     allopurinol (ZYLOPRIM) 100 MG tablet, 100 mg daily , Disp: , Rfl:     fluticasone-salmeterol (ADVAIR) 100-50 MCG/DOSE diskus inhaler, Inhale 1 puff into the lungs 2 times daily , Disp: , Rfl:     hydrALAZINE (APRESOLINE) 25 MG tablet, Take 25 mg by mouth 3 times daily Hold if BP is lower than 120/70, Disp: , Rfl:     mirabegron (MYRBETRIQ) 50 MG TB24, Take 50 mg by mouth daily, Disp: , Rfl:     polyethylene glycol (GLYCOLAX) 17 GM/SCOOP powder, Take 17 g by mouth daily, Disp: , Rfl:     ondansetron (ZOFRAN) 4 MG tablet, Take 4 mg by mouth every 6 hours as needed , Disp: , Rfl:     ergocalciferol (ERGOCALCIFEROL) 1.25 MG (41581 UT) capsule, Take 50,000 Units by mouth once a week, Disp: , Rfl:     carboxymethylcellulose (REFRESH PLUS) 0.5 % SOLN ophthalmic solution, Apply 1 drop to eye nightly as needed, Disp: , Rfl:     acetaminophen (TYLENOL) 500 MG tablet, Take 500 mg by mouth every 6 hours as needed, Disp: , Rfl:     bumetanide (BUMEX) 1 MG tablet, 0.5 mg daily , Disp: , Rfl:     ENTRESTO 24-26 MG per tablet, 1 tablet 2 times daily , Disp: , Rfl:     zinc 50 MG CAPS, Take 50 mg by mouth daily, Disp: 30 capsule, Rfl: 2    insulin detemir (LEVEMIR) 100 UNIT/ML injection vial, Inject 10 Units into the skin 2 times daily , Disp: , Rfl:     levothyroxine (SYNTHROID) 75 MCG tablet, Take 75 mcg by mouth Daily. , Disp: , Rfl:     GlipiZIDE (GLUCOTROL PO), Take 10 mg by mouth daily. , Disp: , Rfl:    Allergies:    Allergies   Allergen Reactions    Celebrex [Celecoxib]     Codeine     Pregabalin     Protonix [Pantoprazole Sodium] Diarrhea    Quinapril Hcl     Sulfa Antibiotics     Tramadol     Trental [Pentoxifylline Er] Diarrhea    Vioxx [Rofecoxib]      Social History     Tobacco Use    Smoking status: Former Smoker     Start date:      Quit date: 1993     Years since quittin.4    Smokeless tobacco: Never Used    Tobacco comment: quit    Vaping Use    Vaping Use: Never used   Substance Use Topics    Alcohol use: No    Drug use: No     Family History   Problem Relation Age of Onset    Stroke Mother     High Blood Pressure Mother     Lung Cancer Brother     Esophageal Cancer Brother     Other Brother         CKD    High Blood Pressure Father     High Blood Pressure Sister      Health Maintenance   Topic Date Due    Annual Wellness Visit (AWV)  Never done    Depression Screen  Never done    DTaP/Tdap/Td vaccine (1 - Tdap) Never done    Shingles vaccine (1 of 2) Never done    COVID-19 Vaccine (3 - Booster for Pfizer series) 08/02/2021    Lipids  10/15/2021    Flu vaccine (1) 09/01/2022    Pneumococcal 65+ years Vaccine  Completed    Hepatitis A vaccine  Aged Out    Hib vaccine  Aged Out    Meningococcal (ACWY) vaccine  Aged Out     Subjective   Review of Systems   Constitutional: Positive for fatigue. Negative for fever and unexpected weight change. HENT: Positive for hearing loss. Negative for dental problem, mouth sores, nosebleeds and sore throat. Eyes: Negative for discharge and itching. Respiratory: Positive for shortness of breath (exertional). Negative for cough and wheezing. Cardiovascular: Negative for chest pain and palpitations. Gastrointestinal: Positive for constipation and nausea. Negative for abdominal pain, diarrhea and vomiting. GERD   Endocrine: Negative for cold intolerance and heat intolerance. Genitourinary: Negative for dysuria, frequency, hematuria and urgency. History of UTIs   Musculoskeletal: Positive for arthralgias and back pain. Negative for joint swelling and myalgias. Skin: Negative for pallor and rash. Allergic/Immunologic: Positive for environmental allergies. Negative for immunocompromised state. Neurological: Positive for weakness. Negative for seizures, syncope and numbness. Balance issues   Hematological: Negative for adenopathy. Bruises/bleeds easily. Psychiatric/Behavioral: Negative for agitation, behavioral problems and confusion. The patient is not nervous/anxious.       Objective Physical Exam  Vitals reviewed. Constitutional:       General: She is not in acute distress. Appearance: She is well-developed. She is not toxic-appearing or diaphoretic. Comments: Wearing a facial mask. Appears chronically ill    HENT:      Head: Normocephalic and atraumatic. Right Ear: External ear normal.      Left Ear: External ear normal.      Ears:      Comments: Very hard of hearing     Nose: Nose normal.      Mouth/Throat:      Mouth: Mucous membranes are moist.   Eyes:      General: No scleral icterus. Right eye: No discharge. Left eye: No discharge. Conjunctiva/sclera: Conjunctivae normal.   Neck:      Trachea: No tracheal deviation. Cardiovascular:      Rate and Rhythm: Normal rate and regular rhythm. Heart sounds: Murmur heard. Pulmonary:      Effort: Pulmonary effort is normal. No respiratory distress. Breath sounds: Normal breath sounds. No wheezing or rales. Chest:   Breasts:      Right: No supraclavicular adenopathy. Left: No supraclavicular adenopathy. Abdominal:      General: Bowel sounds are normal. There is no distension. Palpations: Abdomen is soft. Tenderness: There is no abdominal tenderness. There is no guarding. Genitourinary:     Comments: Exam deferred  Musculoskeletal:         General: No tenderness or deformity. Cervical back: Neck supple. No muscular tenderness. Right lower leg: Edema (trace ) present. Left lower leg: Edema (trace ) present. Comments: Normal ROM all four extremities. Changes of arthritis. Presents in a wheelchair   Lymphadenopathy:      Cervical:      Right cervical: No superficial or deep cervical adenopathy. Left cervical: No superficial or deep cervical adenopathy. Upper Body:      Right upper body: No supraclavicular adenopathy. Left upper body: No supraclavicular adenopathy. Comments:      Skin:     General: Skin is warm and dry.       Coloration: Skin is pale. Findings: No rash. Neurological:      Mental Status: She is alert and oriented to person, place, and time. Comments: follows commands, non-focal   Psychiatric:         Behavior: Behavior normal. Behavior is cooperative. Thought Content: Thought content normal.         Judgment: Judgment normal.      Comments: Alert and oriented to person, place and time. /64   Pulse 74   Wt 146 lb (66.2 kg)   SpO2 99%   BMI 23.21 kg/m²   Wt Readings from Last 3 Encounters:   07/14/22 146 lb (66.2 kg)   04/08/22 143 lb (64.9 kg)   03/18/22 145 lb 5 oz (65.9 kg)     Labs 3/10/2022:  · SPEP: Restriction of protein migration in the gamma region. · Zinc: 64.2  · Iron: 155, TIBC: 203, Iron Saturation: 76  · Ferritin: 515.6    CBC:  Lab Results   Component Value Date    WBC 8.54 07/14/2022    HGB 9.4 (L) 07/14/2022    HCT 30.2 (L) 07/14/2022    .9 (H) 07/14/2022     (L) 07/14/2022    LABLYMP 1.90 03/08/2012    LYMPHOPCT 25.8 07/14/2022    RBC 2.88 (L) 07/14/2022    MCH 32.6 (H) 07/14/2022    MCHC 31.1 (L) 07/14/2022    RDW 14.0 07/14/2022     ASSESSMENT/PLAN:    1. Anemia due to stage 4 chronic kidney disease (HCC)    2. Other iron deficiency anemia    3. Zinc deficiency    4. Drug-induced constipation    5. Stage 4 chronic kidney disease ([facility] [address] [address]    6. Care plan discussed with patient        #Multifactorial anemia (iron deficiency, stage IV CKD associated, chronic disease, mild zinc deficiency)  s/p Venofer 500 mg IV on 2/11/2022 and 2/18/2022. Hgb 9.4/.9 today. Repeat iron studies today, if normal, will begin Retacrit 10,000 units weekly for CKD associated anemia. Hgb goal 11.0  R/B/E discussed with patient and son. Handouts provided. All questions answered.     #Zinc deficiency, mild  Zinc improved at 64.2 on 3/10/2022  Continue zinc 50 mg daily for now    #Stage IV CKD  Managed by Dr. Christiano Simmons  No dialysis at this time, per patient/son  Keep scheduled follow-up with nephrology (apt 7/25/2022 per son)    #Drug-induced constipation  Chronic constipation exacerbated by oral iron  Discontinue oral iron  Continue stool softeners/MiraLAX as needed    #Care plan discussed with patient and son  All questions answered    Orders Placed This Encounter   Procedures    Iron and TIBC    Ferritin    Erythropoietin    Vitamin B12     RTC RN in 2 weeks and weekly thereafter for Retacrit if Hgb < 11.0  Return in about 6 weeks (around 8/25/2022) for follow up with PREMA Bone for Retacrit. I have seen, examined and reviewed this patient medication list, appropriate labs and imaging studies. I reviewed relevant medical records and others physicians notes. I discussed the plan of care with the patient. I answered all questions to the patients satisfaction. I have also reviewed the chief complaint (CC) and part of the history (History of Present Illness (HPI), Past Family Social History [facility]), or Review of Systems (ROS) and made changes when appropriated. Labs from recent hospital visits reviewed. Care plan reviewed with patient and her son, Tyesha Jonas. All questions answered. Dictated utilizing Dragon transcription software.         PREMA Vaughan  3:29 PM  7/14/2022

## 2022-07-14 ENCOUNTER — HOSPITAL ENCOUNTER (OUTPATIENT)
Dept: INFUSION THERAPY | Age: 87
Discharge: HOME | End: 2022-07-14
Payer: MEDICARE

## 2022-07-14 ENCOUNTER — OFFICE VISIT (OUTPATIENT)
Dept: HEMATOLOGY | Age: 87
End: 2022-07-14
Payer: MEDICARE

## 2022-07-14 VITALS
SYSTOLIC BLOOD PRESSURE: 120 MMHG | BODY MASS INDEX: 23.21 KG/M2 | WEIGHT: 146 LBS | DIASTOLIC BLOOD PRESSURE: 64 MMHG | HEART RATE: 74 BPM | OXYGEN SATURATION: 99 %

## 2022-07-14 DIAGNOSIS — N18.4 STAGE 4 CHRONIC KIDNEY DISEASE (HCC): ICD-10-CM

## 2022-07-14 DIAGNOSIS — N18.4 ANEMIA DUE TO STAGE 4 CHRONIC KIDNEY DISEASE (HCC): ICD-10-CM

## 2022-07-14 DIAGNOSIS — N18.4 ANEMIA DUE TO STAGE 4 CHRONIC KIDNEY DISEASE (HCC): Primary | ICD-10-CM

## 2022-07-14 DIAGNOSIS — E60 ZINC DEFICIENCY: ICD-10-CM

## 2022-07-14 DIAGNOSIS — D50.8 OTHER IRON DEFICIENCY ANEMIA: ICD-10-CM

## 2022-07-14 DIAGNOSIS — Z71.89 CARE PLAN DISCUSSED WITH PATIENT: ICD-10-CM

## 2022-07-14 DIAGNOSIS — N18.9 CHRONIC KIDNEY DISEASE, UNSPECIFIED CKD STAGE: ICD-10-CM

## 2022-07-14 DIAGNOSIS — K59.03 DRUG-INDUCED CONSTIPATION: ICD-10-CM

## 2022-07-14 DIAGNOSIS — D63.1 ANEMIA DUE TO STAGE 4 CHRONIC KIDNEY DISEASE (HCC): ICD-10-CM

## 2022-07-14 DIAGNOSIS — D63.1 ANEMIA DUE TO STAGE 4 CHRONIC KIDNEY DISEASE (HCC): Primary | ICD-10-CM

## 2022-07-14 LAB
BASOPHILS ABSOLUTE: 0.01 K/UL (ref 0.01–0.08)
BASOPHILS RELATIVE PERCENT: 0.1 % (ref 0.1–1.2)
EOSINOPHILS ABSOLUTE: 0.09 K/UL (ref 0.04–0.54)
EOSINOPHILS RELATIVE PERCENT: 1.1 % (ref 0.7–7)
FERRITIN: 374.6 NG/ML (ref 13–150)
HCT VFR BLD CALC: 30.2 % (ref 34.1–44.9)
HEMOGLOBIN: 9.4 G/DL (ref 11.2–15.7)
IRON SATURATION: 69 % (ref 14–50)
IRON: 118 UG/DL (ref 37–145)
LYMPHOCYTES ABSOLUTE: 2.2 K/UL (ref 1.18–3.74)
LYMPHOCYTES RELATIVE PERCENT: 25.8 % (ref 19.3–53.1)
MCH RBC QN AUTO: 32.6 PG (ref 25.6–32.2)
MCHC RBC AUTO-ENTMCNC: 31.1 G/DL (ref 32.3–35.5)
MCV RBC AUTO: 104.9 FL (ref 79.4–94.8)
MONOCYTES ABSOLUTE: 0.77 K/UL (ref 0.24–0.82)
MONOCYTES RELATIVE PERCENT: 9 % (ref 4.7–12.5)
NEUTROPHILS ABSOLUTE: 5.42 K/UL (ref 1.56–6.13)
NEUTROPHILS RELATIVE PERCENT: 63.4 % (ref 34–71.1)
PDW BLD-RTO: 14 % (ref 11.7–14.4)
PLATELET # BLD: 166 K/UL (ref 182–369)
PMV BLD AUTO: 11.6 FL (ref 7.4–10.4)
RBC # BLD: 2.88 M/UL (ref 3.93–5.22)
TOTAL IRON BINDING CAPACITY: 170 UG/DL (ref 250–400)
VITAMIN B-12: 606 PG/ML (ref 211–946)
WBC # BLD: 8.54 K/UL (ref 3.98–10.04)

## 2022-07-14 PROCEDURE — 1036F TOBACCO NON-USER: CPT | Performed by: NURSE PRACTITIONER

## 2022-07-14 PROCEDURE — 1090F PRES/ABSN URINE INCON ASSESS: CPT | Performed by: NURSE PRACTITIONER

## 2022-07-14 PROCEDURE — G8420 CALC BMI NORM PARAMETERS: HCPCS | Performed by: NURSE PRACTITIONER

## 2022-07-14 PROCEDURE — 99212 OFFICE O/P EST SF 10 MIN: CPT

## 2022-07-14 PROCEDURE — 99214 OFFICE O/P EST MOD 30 MIN: CPT | Performed by: NURSE PRACTITIONER

## 2022-07-14 PROCEDURE — 1123F ACP DISCUSS/DSCN MKR DOCD: CPT | Performed by: NURSE PRACTITIONER

## 2022-07-14 PROCEDURE — 85025 COMPLETE CBC W/AUTO DIFF WBC: CPT

## 2022-07-14 PROCEDURE — G8427 DOCREV CUR MEDS BY ELIG CLIN: HCPCS | Performed by: NURSE PRACTITIONER

## 2022-07-14 ASSESSMENT — ENCOUNTER SYMPTOMS
SHORTNESS OF BREATH: 1
ABDOMINAL PAIN: 0
NAUSEA: 1
SORE THROAT: 0
COUGH: 0
EYE DISCHARGE: 0
VOMITING: 0
BACK PAIN: 1
WHEEZING: 0
EYE ITCHING: 0
DIARRHEA: 0
CONSTIPATION: 1

## 2022-07-14 NOTE — PATIENT INSTRUCTIONS
Patient Education        epoetin daniel  Pronunciation: e CLARA e tin AL fa  Brand: Epogen, Procrit, Retacrit  What is the most important information I should know about epoetin daniel? This medicine can cause serious side effects, including heart attack or stroke. Epoetin daniel may also speed up tumor growth, or shorten remission or survival time in some people. Talk with your doctor about the risks and benefits of using epoetin daniel. You should not use this medicine if you have uncontrolled high blood pressure, or if you have ever had pure red cell aplasia (PRCA, a type of anemia) causedby using epoetin daniel or darbepoetin daniel. Call your doctor at once if you have signs of a blood clot: sudden numbness or weakness, problems with vision or speech, chest pain,trouble breathing, pain or cold feeling in an arm or leg. What is epoetin daniel? Epoetin daniel is a man-made form of a protein that helps your body produce red blood cells. This protein may be reduced when you have kidney failure or use certain medications. When fewer red blood cells are produced, you can develop acondition called anemia. Epoetin daniel is used to treat anemia caused by chemotherapy in adults andchildren at least 11years old. Epoetin daniel is also used to treat anemia caused by chronic kidney disease inadults and children at least 2 month old. Epoetin daniel is also used to treat anemia in adults taking zidovudine to treatHIV (human immunodeficiency virus). Epoetin daniel is also used to reduce the need for red blood cell transfusions inadults having certain types of surgery. Epoetin daniel may also be used for purposes not listed in this medication guide. What should I discuss with my healthcare provider before using epoetin daniel?   You should not use this medication if you are allergic to epoetin daniel ordarbepoetin daniel, or if:   you have untreated or uncontrolled high blood pressure;   you have had pure red cell aplasia (PRCA, a type of anemia) your injection only when you are ready to give it. Do not use if the medicine has changed colors or has particles in it. Call your pharmacist for new medicine. Do not shake this medicine or you may ruin it. Call your doctor if you feel weak, tired, or light-headed. These may be signsthat your body has stopped responding to epoetin daniel. You may need frequent medical tests to be sure this medicine is not causingharmful effects. Your injections may be delayed based on the results. You may be given other medications to help prevent serious side effects. Guille Flakes these medicines for as long as your doctor has prescribed. If you need surgery, tell the surgeon ahead of time that you are using epoetinalfa. You may need to use a medicine to prevent blood clots. Epoetin daniel is only part of a complete treatment program that may also includea special diet. Follow your doctor's instructions very closely. Store in the refrigerator and protect from light. Do not freeze epoetin daniel, and throw away the medication if it has become frozen. Each single-use vial (bottle) of this medicine is for one use only. Throw it away after one use, even if there is still medicine left inside. Throw away any leftover medicine in a multi-dose vial 21 days after the first use. Use a needle and syringe only once and then place them in a puncture-proof \"sharps\" container. Follow state or local laws about how to dispose of thiscontainer. Keep it out of the reach of children and pets. What happens if I miss a dose? Call your doctor for instructions if you miss a dose of epoetin daniel. What happens if I overdose? Seek emergency medical attention or call the Poison Help line at 1-210.603.3259. What should I avoid while using epoetin daniel? Avoid driving or hazardous activity until you know how this medicine willaffect you. Your reactions could be impaired. What are the possible side effects of epoetin daniel?   Get emergency medical help if epoetin daniel, including prescription and over-the-counter medicines, vitamins, and herbal products. Tell your doctorabout all your current medicines and any medicine you start or stop using. Where can I get more information? Your pharmacist can provide more information about epoetin daniel. Remember, keep this and all other medicines out of the reach of children, never share your medicines with others, and use this medication only for the indication prescribed. Every effort has been made to ensure that the information provided by [address]ila [address] is accurate, up-to-date, and complete, but no guarantee is made to that effect. Drug information contained herein may be time sensitive. [facility] information has been compiled for use by healthcare practitioners and consumers in the [place] and therefore [facility] does not warrant that uses outside of the [place] are appropriate, unless specifically indicated otherwise. [facility]'s drug information does not endorse drugs, diagnose patients or recommend therapy. [facility]previously [employer])s drug information is an informational resource designed to assist licensed healthcare practitioners in caring for their patients and/or to serve consumers viewing this service as a supplement to, and not a substitute for, the expertise, skill, knowledge and judgment of healthcare practitioners. The absence of a warning for a given drug or drug combination in no way should be construed to indicate that the drug or drug combination is safe, effective or appropriate for any given patient. [facility] does not assume any responsibility for any aspect of healthcare administered with the aid of information [facility] provides. The information contained herein is not intended to cover all possible uses, directions, precautions, warnings, drug interactions, allergic reactions, or adverse effects.  If you have questions about the drugs you are taking, check with yourdoctor, nurse or pharmacist.  Copyright 9541-1578 [employer] [facility]. Version: 12.01. Revision date:11/23/2020. Care instructions adapted under license by [facility] If you have questions about a medical condition or this instruction, always ask your healthcare professional. [address] any warranty or liability for your use of this information.

## 2022-07-15 DIAGNOSIS — N18.4 ANEMIA DUE TO STAGE 4 CHRONIC KIDNEY DISEASE (HCC): Primary | ICD-10-CM

## 2022-07-15 DIAGNOSIS — D63.1 ANEMIA DUE TO STAGE 4 CHRONIC KIDNEY DISEASE (HCC): Primary | ICD-10-CM

## 2022-07-15 RX ORDER — ONDANSETRON 2 MG/ML
8 INJECTION INTRAMUSCULAR; INTRAVENOUS
OUTPATIENT
Start: 2022-07-28

## 2022-07-15 RX ORDER — EPINEPHRINE 1 MG/ML
0.3 INJECTION, SOLUTION, CONCENTRATE INTRAVENOUS PRN
OUTPATIENT
Start: 2022-07-28

## 2022-07-15 RX ORDER — SODIUM CHLORIDE 9 MG/ML
INJECTION, SOLUTION INTRAVENOUS CONTINUOUS
OUTPATIENT
Start: 2022-07-28

## 2022-07-15 RX ORDER — FAMOTIDINE 10 MG/ML
20 INJECTION, SOLUTION INTRAVENOUS
OUTPATIENT
Start: 2022-07-28

## 2022-07-15 RX ORDER — ACETAMINOPHEN 325 MG/1
650 TABLET ORAL
OUTPATIENT
Start: 2022-07-28

## 2022-07-15 RX ORDER — DIPHENHYDRAMINE HYDROCHLORIDE 50 MG/ML
50 INJECTION INTRAMUSCULAR; INTRAVENOUS
OUTPATIENT
Start: 2022-07-28

## 2022-07-16 LAB — ERYTHROPOIETIN: 7 MU/ML (ref 4–27)

## 2022-07-27 ENCOUNTER — TELEPHONE (OUTPATIENT)
Dept: HEMATOLOGY | Age: 87
End: 2022-07-27

## 2022-07-27 NOTE — TELEPHONE ENCOUNTER
No answer left message to call asap to let pt know we had authorization for retacrit injection. Would she like to come in sooner.

## 2022-07-28 ENCOUNTER — APPOINTMENT (OUTPATIENT)
Dept: INFUSION THERAPY | Age: 87
End: 2022-07-28
Payer: MEDICARE

## 2022-08-10 DIAGNOSIS — N18.4 ANEMIA DUE TO STAGE 4 CHRONIC KIDNEY DISEASE (HCC): Primary | ICD-10-CM

## 2022-08-10 DIAGNOSIS — D63.1 ANEMIA DUE TO STAGE 4 CHRONIC KIDNEY DISEASE (HCC): Primary | ICD-10-CM

## 2022-08-11 ENCOUNTER — HOSPITAL ENCOUNTER (OUTPATIENT)
Dept: INFUSION THERAPY | Age: 87
Discharge: HOME | End: 2022-08-11
Payer: MEDICARE

## 2022-08-11 VITALS
HEART RATE: 73 BPM | HEIGHT: 65 IN | OXYGEN SATURATION: 100 % | BODY MASS INDEX: 24.32 KG/M2 | WEIGHT: 146 LBS | DIASTOLIC BLOOD PRESSURE: 80 MMHG | SYSTOLIC BLOOD PRESSURE: 140 MMHG

## 2022-08-11 DIAGNOSIS — D63.1 ANEMIA DUE TO STAGE 4 CHRONIC KIDNEY DISEASE (HCC): ICD-10-CM

## 2022-08-11 DIAGNOSIS — N18.4 ANEMIA DUE TO STAGE 4 CHRONIC KIDNEY DISEASE (HCC): ICD-10-CM

## 2022-08-11 LAB
BASOPHILS ABSOLUTE: 0.01 K/UL (ref 0.01–0.08)
BASOPHILS RELATIVE PERCENT: 0.1 % (ref 0.1–1.2)
EOSINOPHILS ABSOLUTE: 0.14 K/UL (ref 0.04–0.54)
EOSINOPHILS RELATIVE PERCENT: 1.7 % (ref 0.7–7)
HCT VFR BLD CALC: 33.9 % (ref 34.1–44.9)
HEMOGLOBIN: 10.3 G/DL (ref 11.2–15.7)
LYMPHOCYTES ABSOLUTE: 2.23 K/UL (ref 1.18–3.74)
LYMPHOCYTES RELATIVE PERCENT: 27.8 % (ref 19.3–53.1)
MCH RBC QN AUTO: 32.5 PG (ref 25.6–32.2)
MCHC RBC AUTO-ENTMCNC: 30.4 G/DL (ref 32.3–35.5)
MCV RBC AUTO: 106.9 FL (ref 79.4–94.8)
MONOCYTES ABSOLUTE: 0.75 K/UL (ref 0.24–0.82)
MONOCYTES RELATIVE PERCENT: 9.3 % (ref 4.7–12.5)
NEUTROPHILS ABSOLUTE: 4.86 K/UL (ref 1.56–6.13)
NEUTROPHILS RELATIVE PERCENT: 60.6 % (ref 34–71.1)
PDW BLD-RTO: 13.9 % (ref 11.7–14.4)
PLATELET # BLD: 173 K/UL (ref 182–369)
PMV BLD AUTO: 11.1 FL (ref 7.4–10.4)
RBC # BLD: 3.17 M/UL (ref 3.93–5.22)
WBC # BLD: 8.03 K/UL (ref 3.98–10.04)

## 2022-08-11 PROCEDURE — 85025 COMPLETE CBC W/AUTO DIFF WBC: CPT

## 2022-08-18 ENCOUNTER — HOSPITAL ENCOUNTER (OUTPATIENT)
Dept: INFUSION THERAPY | Age: 87
Discharge: HOME | End: 2022-08-18
Payer: MEDICARE

## 2022-08-18 VITALS
HEART RATE: 69 BPM | OXYGEN SATURATION: 100 % | DIASTOLIC BLOOD PRESSURE: 80 MMHG | HEIGHT: 65 IN | BODY MASS INDEX: 24.3 KG/M2 | SYSTOLIC BLOOD PRESSURE: 142 MMHG

## 2022-08-18 DIAGNOSIS — N18.4 ANEMIA DUE TO STAGE 4 CHRONIC KIDNEY DISEASE (HCC): ICD-10-CM

## 2022-08-18 DIAGNOSIS — D63.1 ANEMIA DUE TO STAGE 4 CHRONIC KIDNEY DISEASE (HCC): ICD-10-CM

## 2022-08-18 LAB
BASOPHILS ABSOLUTE: 0.02 K/UL (ref 0.01–0.08)
BASOPHILS RELATIVE PERCENT: 0.3 % (ref 0.1–1.2)
EOSINOPHILS ABSOLUTE: 0.15 K/UL (ref 0.04–0.54)
EOSINOPHILS RELATIVE PERCENT: 2 % (ref 0.7–7)
HCT VFR BLD CALC: 33.7 % (ref 34.1–44.9)
HEMOGLOBIN: 10.9 G/DL (ref 11.2–15.7)
LYMPHOCYTES ABSOLUTE: 2.4 K/UL (ref 1.18–3.74)
LYMPHOCYTES RELATIVE PERCENT: 32 % (ref 19.3–53.1)
MCH RBC QN AUTO: 33.2 PG (ref 25.6–32.2)
MCHC RBC AUTO-ENTMCNC: 32.3 G/DL (ref 32.3–35.5)
MCV RBC AUTO: 102.7 FL (ref 79.4–94.8)
MONOCYTES ABSOLUTE: 0.77 K/UL (ref 0.24–0.82)
MONOCYTES RELATIVE PERCENT: 10.3 % (ref 4.7–12.5)
NEUTROPHILS ABSOLUTE: 4.12 K/UL (ref 1.56–6.13)
NEUTROPHILS RELATIVE PERCENT: 54.9 % (ref 34–71.1)
PDW BLD-RTO: 14 % (ref 11.7–14.4)
PLATELET # BLD: 163 K/UL (ref 182–369)
PMV BLD AUTO: 11.4 FL (ref 7.4–10.4)
RBC # BLD: 3.28 M/UL (ref 3.93–5.22)
WBC # BLD: 7.5 K/UL (ref 3.98–10.04)

## 2022-08-18 PROCEDURE — 85025 COMPLETE CBC W/AUTO DIFF WBC: CPT

## 2022-09-06 ENCOUNTER — TELEPHONE (OUTPATIENT)
Dept: HEMATOLOGY | Age: 87
End: 2022-09-06

## 2022-09-06 NOTE — TELEPHONE ENCOUNTER
Pt of Eleanor: Pt son Eino Dawson called to cx lab/inj appt for 9/8/22. He states that for several weeks pts HGB has too high to need Retacrit inj and he asks if labs can be ordered at her nursing home instead of pt having to come into office. I transferred him to the nurse line to leave a message about this.

## 2022-09-07 ENCOUNTER — TELEPHONE (OUTPATIENT)
Dept: HEMATOLOGY | Age: 87
End: 2022-09-07

## 2022-09-07 NOTE — TELEPHONE ENCOUNTER
Discussed with Shavonne Arredondo about changing CBC to every other week for procrit injection and have [place] draw the labs instead of him having to bring his mother to the office weekly or bi weekly.

## 2022-09-09 ENCOUNTER — TELEPHONE (OUTPATIENT)
Dept: INFUSION THERAPY | Age: 87
End: 2022-09-09

## 2022-09-09 NOTE — TELEPHONE ENCOUNTER
Called to ask if the CBC could be drawn at their facility and sent to us regarding procrit injection. They draw hers on Thursday weekly already.

## 2022-09-22 ENCOUNTER — HOSPITAL ENCOUNTER (INPATIENT)
Age: 87
LOS: 1 days | Discharge: HOME | DRG: 641 | End: 2022-09-23
Attending: EMERGENCY MEDICINE | Admitting: STUDENT IN AN ORGANIZED HEALTH CARE EDUCATION/TRAINING PROGRAM
Payer: MEDICARE

## 2022-09-22 DIAGNOSIS — E87.5 HYPERKALEMIA: Primary | ICD-10-CM

## 2022-09-22 DIAGNOSIS — N18.9 CHRONIC KIDNEY DISEASE, UNSPECIFIED CKD STAGE: ICD-10-CM

## 2022-09-22 PROBLEM — N18.4 CHRONIC KIDNEY DISEASE, STAGE 4 (SEVERE) (HCC): Status: ACTIVE | Noted: 2022-09-22

## 2022-09-22 LAB
ALBUMIN SERPL-MCNC: 4.1 G/DL (ref 3.5–5.2)
ALP BLD-CCNC: 76 U/L (ref 35–104)
ALT SERPL-CCNC: 60 U/L (ref 5–33)
ANION GAP SERPL CALCULATED.3IONS-SCNC: 12 MMOL/L (ref 7–19)
ANION GAP SERPL CALCULATED.3IONS-SCNC: 13 MMOL/L (ref 7–19)
AST SERPL-CCNC: 29 U/L (ref 5–32)
BASOPHILS ABSOLUTE: 0 K/UL (ref 0–0.2)
BASOPHILS RELATIVE PERCENT: 0.2 % (ref 0–1)
BILIRUB SERPL-MCNC: <0.2 MG/DL (ref 0.2–1.2)
BUN BLDV-MCNC: 75 MG/DL (ref 8–23)
BUN BLDV-MCNC: 79 MG/DL (ref 8–23)
CALCIUM SERPL-MCNC: 10.8 MG/DL (ref 8.8–10.2)
CALCIUM SERPL-MCNC: 9.7 MG/DL (ref 8.8–10.2)
CHLORIDE BLD-SCNC: 104 MMOL/L (ref 98–111)
CHLORIDE BLD-SCNC: 107 MMOL/L (ref 98–111)
CO2: 19 MMOL/L (ref 22–29)
CO2: 19 MMOL/L (ref 22–29)
CREAT SERPL-MCNC: 2.9 MG/DL (ref 0.5–0.9)
CREAT SERPL-MCNC: 2.9 MG/DL (ref 0.5–0.9)
EOSINOPHILS ABSOLUTE: 0.1 K/UL (ref 0–0.6)
EOSINOPHILS RELATIVE PERCENT: 1.1 % (ref 0–5)
GFR AFRICAN AMERICAN: 18
GFR AFRICAN AMERICAN: 18
GFR NON-AFRICAN AMERICAN: 15
GFR NON-AFRICAN AMERICAN: 15
GLUCOSE BLD-MCNC: 215 MG/DL (ref 74–109)
GLUCOSE BLD-MCNC: 218 MG/DL (ref 70–99)
GLUCOSE BLD-MCNC: 224 MG/DL (ref 70–99)
GLUCOSE BLD-MCNC: 281 MG/DL (ref 70–99)
GLUCOSE BLD-MCNC: 309 MG/DL (ref 74–109)
HBA1C MFR BLD: 8.2 % (ref 4–6)
HCT VFR BLD CALC: 35.2 % (ref 37–47)
HEMOGLOBIN: 11.6 G/DL (ref 12–16)
IMMATURE GRANULOCYTES #: 0 K/UL
LYMPHOCYTES ABSOLUTE: 2.1 K/UL (ref 1.1–4.5)
LYMPHOCYTES RELATIVE PERCENT: 32.1 % (ref 20–40)
MCH RBC QN AUTO: 33.2 PG (ref 27–31)
MCHC RBC AUTO-ENTMCNC: 33 G/DL (ref 33–37)
MCV RBC AUTO: 100.9 FL (ref 81–99)
MONOCYTES ABSOLUTE: 0.4 K/UL (ref 0–0.9)
MONOCYTES RELATIVE PERCENT: 6.1 % (ref 0–10)
NEUTROPHILS ABSOLUTE: 3.9 K/UL (ref 1.5–7.5)
NEUTROPHILS RELATIVE PERCENT: 60 % (ref 50–65)
PDW BLD-RTO: 13.5 % (ref 11.5–14.5)
PERFORMED ON: ABNORMAL
PLATELET # BLD: 192 K/UL (ref 130–400)
PMV BLD AUTO: 11.5 FL (ref 9.4–12.3)
POTASSIUM REFLEX MAGNESIUM: 5.3 MMOL/L (ref 3.5–5)
POTASSIUM REFLEX MAGNESIUM: 6 MMOL/L (ref 3.5–5)
RBC # BLD: 3.49 M/UL (ref 4.2–5.4)
SARS-COV-2, NAAT: NOT DETECTED
SODIUM BLD-SCNC: 135 MMOL/L (ref 136–145)
SODIUM BLD-SCNC: 139 MMOL/L (ref 136–145)
TOTAL PROTEIN: 6.6 G/DL (ref 6.6–8.7)
WBC # BLD: 6.5 K/UL (ref 4.8–10.8)

## 2022-09-22 PROCEDURE — 83036 HEMOGLOBIN GLYCOSYLATED A1C: CPT

## 2022-09-22 PROCEDURE — 99285 EMERGENCY DEPT VISIT HI MDM: CPT

## 2022-09-22 PROCEDURE — 96372 THER/PROPH/DIAG INJ SC/IM: CPT

## 2022-09-22 PROCEDURE — 2580000003 HC RX 258: Performed by: NURSE PRACTITIONER

## 2022-09-22 PROCEDURE — 36415 COLL VENOUS BLD VENIPUNCTURE: CPT

## 2022-09-22 PROCEDURE — 2500000003 HC RX 250 WO HCPCS: Performed by: EMERGENCY MEDICINE

## 2022-09-22 PROCEDURE — 6370000000 HC RX 637 (ALT 250 FOR IP): Performed by: NURSE PRACTITIONER

## 2022-09-22 PROCEDURE — 87635 SARS-COV-2 COVID-19 AMP PRB: CPT

## 2022-09-22 PROCEDURE — 2580000003 HC RX 258: Performed by: EMERGENCY MEDICINE

## 2022-09-22 PROCEDURE — 96366 THER/PROPH/DIAG IV INF ADDON: CPT

## 2022-09-22 PROCEDURE — 93005 ELECTROCARDIOGRAM TRACING: CPT

## 2022-09-22 PROCEDURE — 85025 COMPLETE CBC W/AUTO DIFF WBC: CPT

## 2022-09-22 PROCEDURE — 6360000002 HC RX W HCPCS: Performed by: NURSE PRACTITIONER

## 2022-09-22 PROCEDURE — 96365 THER/PROPH/DIAG IV INF INIT: CPT

## 2022-09-22 PROCEDURE — 6370000000 HC RX 637 (ALT 250 FOR IP): Performed by: EMERGENCY MEDICINE

## 2022-09-22 PROCEDURE — 82947 ASSAY GLUCOSE BLOOD QUANT: CPT

## 2022-09-22 PROCEDURE — 1210000000 HC MED SURG R&B

## 2022-09-22 PROCEDURE — 80053 COMPREHEN METABOLIC PANEL: CPT

## 2022-09-22 PROCEDURE — 96375 TX/PRO/DX INJ NEW DRUG ADDON: CPT

## 2022-09-22 RX ORDER — ONDANSETRON 2 MG/ML
4 INJECTION INTRAMUSCULAR; INTRAVENOUS EVERY 6 HOURS PRN
Status: DISCONTINUED | OUTPATIENT
Start: 2022-09-22 | End: 2022-09-23 | Stop reason: HOSPADM

## 2022-09-22 RX ORDER — HEPARIN SODIUM 5000 [USP'U]/ML
5000 INJECTION, SOLUTION INTRAVENOUS; SUBCUTANEOUS EVERY 8 HOURS SCHEDULED
Status: DISCONTINUED | OUTPATIENT
Start: 2022-09-22 | End: 2022-09-23 | Stop reason: HOSPADM

## 2022-09-22 RX ORDER — INSULIN GLARGINE 100 [IU]/ML
10 INJECTION, SOLUTION SUBCUTANEOUS 2 TIMES DAILY
Status: DISCONTINUED | OUTPATIENT
Start: 2022-09-22 | End: 2022-09-23 | Stop reason: HOSPADM

## 2022-09-22 RX ORDER — SODIUM BICARBONATE 325 MG/1
325 TABLET ORAL 2 TIMES DAILY
Status: DISCONTINUED | OUTPATIENT
Start: 2022-09-22 | End: 2022-09-23

## 2022-09-22 RX ORDER — SODIUM CHLORIDE 9 MG/ML
INJECTION, SOLUTION INTRAVENOUS CONTINUOUS
Status: DISCONTINUED | OUTPATIENT
Start: 2022-09-22 | End: 2022-09-23 | Stop reason: HOSPADM

## 2022-09-22 RX ORDER — SODIUM CHLORIDE 0.9 % (FLUSH) 0.9 %
5-40 SYRINGE (ML) INJECTION EVERY 12 HOURS SCHEDULED
Status: DISCONTINUED | OUTPATIENT
Start: 2022-09-22 | End: 2022-09-23 | Stop reason: HOSPADM

## 2022-09-22 RX ORDER — CALCIUM CHLORIDE 100 MG/ML
1000 INJECTION INTRAVENOUS; INTRAVENTRICULAR ONCE
Status: COMPLETED | OUTPATIENT
Start: 2022-09-22 | End: 2022-09-22

## 2022-09-22 RX ORDER — ACETAMINOPHEN 650 MG/1
650 SUPPOSITORY RECTAL EVERY 6 HOURS PRN
Status: DISCONTINUED | OUTPATIENT
Start: 2022-09-22 | End: 2022-09-23 | Stop reason: HOSPADM

## 2022-09-22 RX ORDER — TROSPIUM CHLORIDE 20 MG/1
20 TABLET, FILM COATED ORAL NIGHTLY
Status: DISCONTINUED | OUTPATIENT
Start: 2022-09-22 | End: 2022-09-23 | Stop reason: HOSPADM

## 2022-09-22 RX ORDER — DEXTROSE MONOHYDRATE 100 MG/ML
INJECTION, SOLUTION INTRAVENOUS CONTINUOUS PRN
Status: DISCONTINUED | OUTPATIENT
Start: 2022-09-22 | End: 2022-09-23 | Stop reason: HOSPADM

## 2022-09-22 RX ORDER — POLYETHYLENE GLYCOL 3350 17 G/17G
17 POWDER, FOR SOLUTION ORAL DAILY PRN
Status: DISCONTINUED | OUTPATIENT
Start: 2022-09-22 | End: 2022-09-23 | Stop reason: HOSPADM

## 2022-09-22 RX ORDER — SODIUM CHLORIDE 0.9 % (FLUSH) 0.9 %
5-40 SYRINGE (ML) INJECTION PRN
Status: DISCONTINUED | OUTPATIENT
Start: 2022-09-22 | End: 2022-09-23 | Stop reason: HOSPADM

## 2022-09-22 RX ORDER — ACETAMINOPHEN 325 MG/1
650 TABLET ORAL EVERY 6 HOURS PRN
Status: DISCONTINUED | OUTPATIENT
Start: 2022-09-22 | End: 2022-09-23 | Stop reason: HOSPADM

## 2022-09-22 RX ORDER — ATORVASTATIN CALCIUM 40 MG/1
40 TABLET, FILM COATED ORAL NIGHTLY
Status: DISCONTINUED | OUTPATIENT
Start: 2022-09-22 | End: 2022-09-23 | Stop reason: HOSPADM

## 2022-09-22 RX ORDER — DEXTROSE MONOHYDRATE 25 G/50ML
25 INJECTION, SOLUTION INTRAVENOUS ONCE
Status: DISCONTINUED | OUTPATIENT
Start: 2022-09-22 | End: 2022-09-22 | Stop reason: SDUPTHER

## 2022-09-22 RX ORDER — ONDANSETRON 4 MG/1
4 TABLET, ORALLY DISINTEGRATING ORAL EVERY 8 HOURS PRN
Status: DISCONTINUED | OUTPATIENT
Start: 2022-09-22 | End: 2022-09-23 | Stop reason: HOSPADM

## 2022-09-22 RX ORDER — ALLOPURINOL 100 MG/1
100 TABLET ORAL DAILY
Status: DISCONTINUED | OUTPATIENT
Start: 2022-09-22 | End: 2022-09-23 | Stop reason: HOSPADM

## 2022-09-22 RX ORDER — LEVOTHYROXINE SODIUM 0.03 MG/1
75 TABLET ORAL DAILY
Status: DISCONTINUED | OUTPATIENT
Start: 2022-09-22 | End: 2022-09-23 | Stop reason: HOSPADM

## 2022-09-22 RX ORDER — BUMETANIDE 0.5 MG/1
0.5 TABLET ORAL DAILY
Status: DISCONTINUED | OUTPATIENT
Start: 2022-09-22 | End: 2022-09-23 | Stop reason: HOSPADM

## 2022-09-22 RX ORDER — HYDRALAZINE HYDROCHLORIDE 25 MG/1
25 TABLET, FILM COATED ORAL 3 TIMES DAILY
Status: DISCONTINUED | OUTPATIENT
Start: 2022-09-22 | End: 2022-09-23 | Stop reason: HOSPADM

## 2022-09-22 RX ORDER — INSULIN LISPRO 100 [IU]/ML
0-4 INJECTION, SOLUTION INTRAVENOUS; SUBCUTANEOUS NIGHTLY
Status: DISCONTINUED | OUTPATIENT
Start: 2022-09-22 | End: 2022-09-23 | Stop reason: HOSPADM

## 2022-09-22 RX ORDER — INSULIN LISPRO 100 [IU]/ML
0-8 INJECTION, SOLUTION INTRAVENOUS; SUBCUTANEOUS
Status: DISCONTINUED | OUTPATIENT
Start: 2022-09-22 | End: 2022-09-23 | Stop reason: HOSPADM

## 2022-09-22 RX ORDER — SODIUM CHLORIDE 9 MG/ML
INJECTION, SOLUTION INTRAVENOUS PRN
Status: DISCONTINUED | OUTPATIENT
Start: 2022-09-22 | End: 2022-09-23 | Stop reason: HOSPADM

## 2022-09-22 RX ADMIN — INSULIN LISPRO 2 UNITS: 100 INJECTION, SOLUTION INTRAVENOUS; SUBCUTANEOUS at 18:56

## 2022-09-22 RX ADMIN — INSULIN HUMAN 10 UNITS: 100 INJECTION, SOLUTION PARENTERAL at 15:52

## 2022-09-22 RX ADMIN — SACUBITRIL AND VALSARTAN 1 TABLET: 24; 26 TABLET, FILM COATED ORAL at 21:59

## 2022-09-22 RX ADMIN — HYDRALAZINE HYDROCHLORIDE 25 MG: 25 TABLET, FILM COATED ORAL at 21:59

## 2022-09-22 RX ADMIN — SODIUM BICARBONATE 325 MG: 325 TABLET ORAL at 21:59

## 2022-09-22 RX ADMIN — SODIUM ZIRCONIUM CYCLOSILICATE 10 G: 10 POWDER, FOR SUSPENSION ORAL at 15:47

## 2022-09-22 RX ADMIN — DEXTROSE MONOHYDRATE 125 ML: 10 INJECTION, SOLUTION INTRAVENOUS at 15:52

## 2022-09-22 RX ADMIN — TROSPIUM CHLORIDE 20 MG: 20 TABLET, FILM COATED ORAL at 21:58

## 2022-09-22 RX ADMIN — HYDRALAZINE HYDROCHLORIDE 25 MG: 25 TABLET, FILM COATED ORAL at 18:27

## 2022-09-22 RX ADMIN — CALCIUM CHLORIDE 1000 MG: 100 INJECTION INTRAVENOUS; INTRAVENTRICULAR at 15:48

## 2022-09-22 RX ADMIN — SODIUM BICARBONATE 50 MEQ: 84 INJECTION INTRAVENOUS at 15:49

## 2022-09-22 RX ADMIN — ALLOPURINOL 100 MG: 100 TABLET ORAL at 19:12

## 2022-09-22 RX ADMIN — HEPARIN SODIUM 5000 UNITS: 5000 INJECTION INTRAVENOUS; SUBCUTANEOUS at 18:55

## 2022-09-22 RX ADMIN — ATORVASTATIN CALCIUM 40 MG: 40 TABLET, FILM COATED ORAL at 21:59

## 2022-09-22 RX ADMIN — INSULIN GLARGINE 10 UNITS: 100 INJECTION, SOLUTION SUBCUTANEOUS at 22:00

## 2022-09-22 RX ADMIN — SODIUM CHLORIDE: 9 INJECTION, SOLUTION INTRAVENOUS at 18:27

## 2022-09-22 ASSESSMENT — ENCOUNTER SYMPTOMS
COUGH: 0
NAUSEA: 0
SINUS PAIN: 0
WHEEZING: 0
BLOOD IN STOOL: 0
DIARRHEA: 1
TROUBLE SWALLOWING: 0
ABDOMINAL PAIN: 0
DIARRHEA: 0
BACK PAIN: 0
SORE THROAT: 0
SHORTNESS OF BREATH: 0
VOMITING: 0
CONSTIPATION: 0
RHINORRHEA: 0
ABDOMINAL DISTENTION: 0

## 2022-09-22 ASSESSMENT — PAIN - FUNCTIONAL ASSESSMENT: PAIN_FUNCTIONAL_ASSESSMENT: NONE - DENIES PAIN

## 2022-09-22 NOTE — ED PROVIDER NOTES
([facility] 3/15/2022    DM (diabetes mellitus) ([facility]     GERD (gastroesophageal reflux disease)     Hepatitis     High cholesterol     History of stomach ulcers     HTN (hypertension) 3/15/2022    Hypothyroidism 3/15/2022    Lung nodule     left/BG    Stomach ache reflux    Thyroid disorder     Thyroid nodule     Urinary incontinence          SURGICAL HISTORY       Past Surgical History:   Procedure Laterality Date    BLADDER SURGERY  ? BRAIN ANEURYSM SURGERY      BREAST SURGERY      BIOPSY    CARPAL TUNNEL RELEASE      CATARACT REMOVAL       SECTION      TIMES 4    COLONOSCOPY  3-    DR Michelle Guevara    COLONOSCOPY  13    Jacki    CYST REMOVAL      FINGER    GALLBLADDER SURGERY      HYSTERECTOMY (CERVIX STATUS UNKNOWN)      KNEE ARTHROSCOPY      KNEE SURGERY      RTK    OTHER SURGICAL HISTORY      arteriorgram, surgeon said leave it alone no surgery.     TONSILLECTOMY AND ADENOIDECTOMY      UPPER GASTROINTESTINAL ENDOSCOPY  2010     [id number]    UPPER GASTROINTESTINAL ENDOSCOPY  10/25/2013    Louise         CURRENT MEDICATIONS       Previous Medications    ACETAMINOPHEN (TYLENOL) 500 MG TABLET    Take 500 mg by mouth every 6 hours as needed    ALLOPURINOL (ZYLOPRIM) 100 MG TABLET    100 mg daily     ATORVASTATIN (LIPITOR) 40 MG TABLET    40 mg nightly     BISACODYL (DULCOLAX) 10 MG SUPPOSITORY    Place 10 mg rectally daily as needed for Constipation    BUMETANIDE (BUMEX) 1 MG TABLET    0.5 mg daily     CARBOXYMETHYLCELLULOSE (REFRESH PLUS) 0.5 % SOLN OPHTHALMIC SOLUTION    Apply 1 drop to eye nightly as needed    ENTRESTO 24-26 MG PER TABLET    1 tablet 2 times daily     EPOETIN MARTY-EPBX (RETACRIT) 3000 UNIT/ML SOLN INJECTION    Inject 1 mL into the skin three times a week    ERGOCALCIFEROL (ERGOCALCIFEROL) 1.25 MG (11379 UT) CAPSULE    Take 50,000 Units by mouth once a week    FLUTICASONE-SALMETEROL (ADVAIR) 100-50 MCG/DOSE DISKUS INHALER    Inhale 1 puff into the lungs 2 times daily     GLIPIZIDE (GLUCOTROL PO)    Take 10 mg by mouth daily. HYDRALAZINE (APRESOLINE) 25 MG TABLET    Take 25 mg by mouth 3 times daily Hold if BP is lower than 120/70    INSULIN DETEMIR (LEVEMIR) 100 UNIT/ML INJECTION VIAL    Inject 10 Units into the skin 2 times daily     INSULIN LISPRO (HUMALOG) 100 UNIT/ML INJECTION VIAL    Inject into the skin 3 times daily (before meals) Per sliding scale    LEVOTHYROXINE (SYNTHROID) 75 MCG TABLET    Take 75 mcg by mouth Daily. MIRABEGRON (MYRBETRIQ) 50 MG TB24    Take 50 mg by mouth daily    MULTIPLE VITAMINS-MINERALS (THERAPEUTIC MULTIVITAMIN-MINERALS) TABLET    Take 1 tablet by mouth daily    ONDANSETRON (ZOFRAN) 4 MG TABLET    Take 4 mg by mouth every 6 hours as needed     POLYETHYLENE GLYCOL (GLYCOLAX) 17 GM/SCOOP POWDER    Take 17 g by mouth daily    SENNA-DOCUSATE (PERICOLACE) 8.6-50 MG PER TABLET    Take 1 tablet by mouth 2 times daily    SODIUM BICARBONATE 325 MG TABLET    Take 1 tablet by mouth 2 times daily    ZINC 50 MG CAPS    Take 50 mg by mouth daily       ALLERGIES     Celebrex [celecoxib], Codeine, Pregabalin, Protonix [pantoprazole sodium], Quinapril hcl, Sulfa antibiotics, Tramadol, Trental [pentoxifylline er], and Vioxx [rofecoxib]    FAMILY HISTORY       Family History   Problem Relation Age of Onset    Stroke Mother     High Blood Pressure Mother     Lung Cancer Brother     Esophageal Cancer Brother     Other Brother         CKD    High Blood Pressure Father     High Blood Pressure Sister           SOCIAL HISTORY       Social History     Socioeconomic History    Marital status:       Spouse name: None    Number of children: None    Years of education: None    Highest education level: None   Tobacco Use    Smoking status: Former     Types: Cigarettes     Start date:      Quit date: 1993     Years since quittin.6    Smokeless tobacco: Never    Tobacco comments:     quit    Vaping Use    Vaping Use: Never used   Substance and Sexual Activity    Alcohol use: No    Drug use: No       SCREENINGS    [place] Coma Scale  Eye Opening: Spontaneous  Best Verbal Response: Oriented  Best Motor Response: Obeys commands  [place] Coma Scale Score: 15        PHYSICAL EXAM    (up to 7 for level 4, 8 or more for level 5)     ED Triage Vitals   BP Temp Temp src Heart Rate Resp SpO2 Height Weight   09/22/22 1320 09/22/22 1324 -- 09/22/22 1320 09/22/22 1320 09/22/22 1320 09/22/22 1320 09/22/22 1320   (!) 123/57 98.1 °F (36.7 °C)  73 18 99 % 5' 6.5\" (1.689 m) 150 lb (68 kg)       Physical Exam  Constitutional:       General: She is not in acute distress. Appearance: She is well-developed. She is not diaphoretic. HENT:      Head: Normocephalic and atraumatic. Eyes:      Pupils: Pupils are equal, round, and reactive to light. Cardiovascular:      Rate and Rhythm: Normal rate and regular rhythm. Heart sounds: Normal heart sounds. Pulmonary:      Effort: Pulmonary effort is normal. No respiratory distress. Breath sounds: Normal breath sounds. Abdominal:      General: Bowel sounds are normal. There is no distension. Palpations: Abdomen is soft. Tenderness: There is no abdominal tenderness. Musculoskeletal:         General: Normal range of motion. Cervical back: Normal range of motion and neck supple. Skin:     General: Skin is warm and dry. Findings: No rash. Neurological:      Mental Status: She is alert and oriented to person, place, and time. Cranial Nerves: No cranial nerve deficit. Motor: No abnormal muscle tone.       Coordination: Coordination normal.   Psychiatric:         Behavior: Behavior normal.       DIAGNOSTIC RESULTS     EKG: All EKG's are interpreted by the Emergency Department Physician who either signs or Co-signs this chart in the absence of a cardiologist.    Looks sinus, has some artifact, no acute st abnormality, unable to compare to prior    RADIOLOGY:   Non-plain film images such as CT, Ultrasound and MRI are read by the radiologist. Amandeep Miracle images are visualized and preliminarily interpreted by the emergency physician with the below findings:      Interpretation per the Radiologist below, if available at the time of this note:    No orders to display         ED BEDSIDE ULTRASOUND:   Performed by ED Physician - none    LABS:  Labs Reviewed   CBC WITH AUTO DIFFERENTIAL - Abnormal; Notable for the following components:       Result Value    RBC 3.49 (*)     Hemoglobin 11.6 (*)     Hematocrit 35.2 (*)     .9 (*)     MCH 33.2 (*)     All other components within normal limits   COMPREHENSIVE METABOLIC PANEL W/ REFLEX TO MG FOR LOW K - Abnormal; Notable for the following components:    Sodium 135 (*)     Potassium reflex Magnesium 6.0 (*)     CO2 19 (*)     Glucose 309 (*)     BUN 79 (*)     Creatinine 2.9 (*)     GFR Non- 15 (*)     GFR  18 (*)     ALT 60 (*)     All other components within normal limits    Narrative:     CALL  [place] tel. ,  Chemistry results called to and read back by [facility] RN/ER, 09/22/2022 15:09,  by Solo Mcconnell   POCT GLUCOSE - Abnormal; Notable for the following components:    POC Glucose 281 (*)     All other components within normal limits   COVID-19, RAPID   BASIC METABOLIC PANEL W/ REFLEX TO MG FOR LOW K       All other labs were within normal range or not returned as of this dictation. EMERGENCY DEPARTMENT COURSE and DIFFERENTIALDIAGNOSIS/MDM:   Vitals:    Vitals:    09/22/22 1320 09/22/22 1324 09/22/22 1345   BP: (!) 123/57  (!) 118/56   Pulse: 73  69   Resp: 18  17   Temp:  98.1 °F (36.7 °C)    SpO2: 99%  98%   Weight: 150 lb (68 kg)     Height: 5' 6.5\" (1.689 m)         MDM        CONSULTS:  None    PROCEDURES:  Unless otherwise notedbelow, none     Procedures    FINAL IMPRESSION     1. Hyperkalemia    2.  Chronic kidney disease, unspecified CKD stage          DISPOSITION/PLAN   DISPOSITION Admitted 09/22/2022 03:24:07 PM      PATIENT REFERRED TO:  @FUP@    DISCHARGE MEDICATIONS:  New Prescriptions    No medications on file          (Please note that portions of this note were completed with a voice recognition program.  Efforts were made to edit the dictations butoccasionally words are mis-transcribed.)    Mer Young MD (electronically signed)  AttendingEmergency Physician         Mer Young MD  09/22/22 0024

## 2022-09-22 NOTE — H&P
Steve Garduno - History & Physical      PCP: No primary care provider on file. Date of Admission: 9/22/2022    Date of Service: 9/22/2022    Chief Complaint:  abnormal labs     History Of Present Illness: The patient is a 80 y.o. female with past medical history of CKD 4, COPD, diabetes, hypertension, hypothyroidism, and GERD who presented to [facility] ED from SNF for evaluation of hyperkalemia. Ms. Chanel Ho is a poor historian and unable to provide HPI. HPI obtained from chart review and son at bedside. Per son patient has routine lab work at [facility] on a weekly basis due to her CKD and was found to have hyperkalemia. Son reported patient having issues with hyperkalemia since April 2022 and [facility] and had been stable until about 3 weeks ago started having hypokalemia therefore lokelma was discontinued. Patient and family unsure about how long patient had been on spironolactone. Patient does not offer any complaints at this time. Stated she has SOB with exertion at times. Workup in ED revealed K+ 6.0, CO2 19, BUN 79, Cr 2.9, GFR 15 (Previous K+ 6.7, BUN 67, Cr 2.7, GFR 17 on 4/8/2022), glucose 309, Hgb 11.6. Patient received Lokelma, sodium bicarbonate, regular insulin, D10 bolus, and calcium chloride. Patient was admitted to hospital medicine for hyperkalemia.          Past Medical History:        Diagnosis Date    Aneurysm ([address] 1993    Aneurysm ([address]     Breast cyst     Chronic pulmonary disease     CKD (chronic kidney disease)     Colon polyps     COPD (chronic obstructive pulmonary disease) ([address] 3/15/2022    DM (diabetes mellitus) (HCC)     GERD (gastroesophageal reflux disease)     Hepatitis     High cholesterol     History of stomach ulcers     HTN (hypertension) 3/15/2022    Hypothyroidism 3/15/2022    Lung nodule     left/BG    Stomach ache reflux    Thyroid disorder     Thyroid nodule     Urinary incontinence        Past Surgical History:        Procedure Laterality Date BLADDER SURGERY  2016? BRAIN ANEURYSM SURGERY      BREAST SURGERY      BIOPSY    CARPAL TUNNEL RELEASE      CATARACT REMOVAL       SECTION      TIMES 4    COLONOSCOPY  3-    DR Tristan Loya    COLONOSCOPY  13    [facility]    CYST REMOVAL      FINGER    GALLBLADDER SURGERY      HYSTERECTOMY (CERVIX STATUS UNKNOWN)      KNEE ARTHROSCOPY      KNEE SURGERY      RTK    OTHER SURGICAL HISTORY      arteriorgram, surgeon said leave it alone no surgery. TONSILLECTOMY AND ADENOIDECTOMY      UPPER GASTROINTESTINAL ENDOSCOPY  2010     [id number]    UPPER GASTROINTESTINAL ENDOSCOPY  10/25/2013    [facility] Medications:  Prior to Admission medications    Medication Sig Start Date End Date Taking?  Authorizing Provider   sodium bicarbonate 325 MG tablet Take 1 tablet by mouth 2 times daily 3/18/22   PREMA Drew - CNP   epoetin daniel-epbx (RETACRIT) 3000 UNIT/ML SOLN injection Inject 1 mL into the skin three times a week 3/18/22   PREMA Drew CNP   bisacodyl (DULCOLAX) 10 MG suppository Place 10 mg rectally daily as needed for Constipation    Historical Provider, MD   insulin lispro (HUMALOG) 100 UNIT/ML injection vial Inject into the skin 3 times daily (before meals) Per sliding scale    Historical Provider, MD   senna-docusate (Gabriel Yobani) 8.6-50 MG per tablet Take 1 tablet by mouth 2 times daily    Historical Provider, MD   Multiple Vitamins-Minerals (THERAPEUTIC MULTIVITAMIN-MINERALS) tablet Take 1 tablet by mouth daily    Historical Provider, MD   spironolactone (ALDACTONE) 25 MG tablet Take 25 mg by mouth daily  22  Historical Provider, MD   atorvastatin (LIPITOR) 40 MG tablet 40 mg nightly  22   Historical Provider, MD   allopurinol (ZYLOPRIM) 100 MG tablet 100 mg daily  1/3/22   Historical Provider, MD   fluticasone-salmeterol (ADVAIR) 100-50 MCG/DOSE diskus inhaler Inhale 1 puff into the lungs 2 times daily  22   Historical Provider, MD hydrALAZINE (APRESOLINE) 25 MG tablet Take 25 mg by mouth 3 times daily Hold if BP is lower than 120/70 11/5/21   Historical Provider, MD   mirabegron (MYRBETRIQ) 50 MG TB24 Take 50 mg by mouth daily    Historical Provider, MD   polyethylene glycol (GLYCOLAX) 17 GM/SCOOP powder Take 17 g by mouth daily 3/31/21   Historical Provider, MD   ondansetron (ZOFRAN) 4 MG tablet Take 4 mg by mouth every 6 hours as needed     Historical Provider, MD   ergocalciferol (ERGOCALCIFEROL) 1.25 MG (50592 UT) capsule Take 50,000 Units by mouth once a week    Historical Provider, MD   carboxymethylcellulose (REFRESH PLUS) 0.5 % SOLN ophthalmic solution Apply 1 drop to eye nightly as needed    Historical Provider, MD   acetaminophen (TYLENOL) 500 MG tablet Take 500 mg by mouth every 6 hours as needed    Historical Provider, MD   bumetanide (BUMEX) 1 MG tablet 0.5 mg daily  1/11/22   Historical Provider, MD   ENTRESTO 24-26 MG per tablet 1 tablet 2 times daily  2/24/22   Historical Provider, MD   zinc 50 MG CAPS Take 50 mg by mouth daily 1/20/22   PREMA Quintero   insulin detemir (LEVEMIR) 100 UNIT/ML injection vial Inject 10 Units into the skin 2 times daily     Historical Provider, MD   levothyroxine (SYNTHROID) 75 MCG tablet Take 75 mcg by mouth Daily. Historical Provider, MD   GlipiZIDE (GLUCOTROL PO) Take 10 mg by mouth daily. Historical Provider, MD       Allergies:    Celebrex [celecoxib], Codeine, Pregabalin, Protonix [pantoprazole sodium], Quinapril hcl, Sulfa antibiotics, Tramadol, Trental [pentoxifylline er], and Vioxx [rofecoxib]    Social History:    The patient currently lives at [address] [place] [address] and rehab   Tobacco:   reports that she quit smoking about 29 years ago. She started smoking about 72 years ago. She has never used smokeless tobacco.  Alcohol:   reports no history of alcohol use.   Illicit Drugs: denies    Family History:      Problem Relation Age of Onset    Stroke Mother     High Blood Pressure Mother     Lung Cancer Brother     Esophageal Cancer Brother     Other Brother         CKD    High Blood Pressure Father     High Blood Pressure Sister            Review of Systems   Constitutional:  Negative for chills, diaphoresis, fatigue and fever. HENT:  Negative for congestion, ear pain, sinus pain, sore throat and trouble swallowing. Eyes:  Negative for visual disturbance. Respiratory:  Negative for cough, shortness of breath and wheezing. Reported SOB with exertion at times    Cardiovascular:  Negative for chest pain, palpitations and leg swelling. Gastrointestinal:  Positive for diarrhea. Negative for abdominal distention, abdominal pain, blood in stool, constipation, nausea and vomiting. Endocrine: Negative for cold intolerance and heat intolerance. Genitourinary:  Negative for difficulty urinating, flank pain, frequency and urgency. Musculoskeletal:  Negative for arthralgias and myalgias. Neurological:  Negative for dizziness, syncope, weakness, light-headedness, numbness and headaches. Hematological:  Does not bruise/bleed easily. Psychiatric/Behavioral:  Negative for agitation, confusion and dysphoric mood. Physical Examination:  BP (!) 118/56   Pulse 69   Temp 98.1 °F (36.7 °C)   Resp 17   Ht 5' 6.5\" (1.689 m)   Wt 150 lb (68 kg)   SpO2 98%   BMI 23.85 kg/m²     Physical Exam  Constitutional:       General: She is not in acute distress. Appearance: Normal appearance. She is not toxic-appearing or diaphoretic. HENT:      Head: Normocephalic and atraumatic. Right Ear: External ear normal.      Left Ear: External ear normal.      Nose: Nose normal. No congestion or rhinorrhea. Mouth/Throat:      Mouth: Mucous membranes are moist.      Pharynx: Oropharynx is clear. Eyes:      General: No scleral icterus. Extraocular Movements: Extraocular movements intact.       Conjunctiva/sclera: Conjunctivae normal.   Cardiovascular:      Rate Problems:    Chronic kidney disease, stage 4 (severe) ([facility])    Diabetes mellitus, type 2 ([facility])    GERD (gastroesophageal reflux disease)    COPD (chronic obstructive pulmonary disease) ([facility])    HTN (hypertension)  Resolved Problems:    * No resolved hospital problems. *       Principal Problem:    Hyperkalemia-    - Received Lokelma, sodium bicarbonate, regular insulin, D10 bolus, and calcium chloride    - Monitor labs closely       Active Problems:    Chronic kidney disease, stage 4 (severe) ([facility]-    - Nephrology consultation and recommendations appreciated               - IVFs               - Monitor I's and O's closely              - Monitor labs closely              - Avoid hypotension              - Avoid nephrotoxic agents      Diabetes mellitus, type 2 ([facility]-    - A1c   - Lantus    - SSI   - Accu-Checks    - Hypoglycemia treatment protocol in place       COPD (chronic obstructive pulmonary disease) ([facility])-    - Supplemental oxygen as needed       HTN (hypertension)-    - Resume home medications    - Monitor BP closely            DVT Prophylaxis:  Heparin     Further Orders per Clinical course/attending. Signed:  Electronically signed by PREMA Palm CNP on 9/22/22 at 4:10 PM CDT       EMR Dragon/Transcription disclaimer:   Much of this encounter note is an electronic transcription/translation of spoken language to printed text.  The electronic translation of spoken language may permit erroneous, or at times, nonsensical words or phrases to be inadvertently transcribed; although attempts have made to review the note for such errors, some may still exist.

## 2022-09-23 VITALS
WEIGHT: 150 LBS | RESPIRATION RATE: 18 BRPM | TEMPERATURE: 97.2 F | DIASTOLIC BLOOD PRESSURE: 68 MMHG | HEIGHT: 67 IN | OXYGEN SATURATION: 98 % | HEART RATE: 83 BPM | SYSTOLIC BLOOD PRESSURE: 125 MMHG | BODY MASS INDEX: 23.54 KG/M2

## 2022-09-23 LAB
ANION GAP SERPL CALCULATED.3IONS-SCNC: 12 MMOL/L (ref 7–19)
ANION GAP SERPL CALCULATED.3IONS-SCNC: 14 MMOL/L (ref 7–19)
BASOPHILS ABSOLUTE: 0 K/UL (ref 0–0.2)
BASOPHILS RELATIVE PERCENT: 0.1 % (ref 0–1)
BUN BLDV-MCNC: 70 MG/DL (ref 8–23)
BUN BLDV-MCNC: 73 MG/DL (ref 8–23)
CALCIUM SERPL-MCNC: 9.8 MG/DL (ref 8.8–10.2)
CALCIUM SERPL-MCNC: 9.9 MG/DL (ref 8.8–10.2)
CHLORIDE BLD-SCNC: 110 MMOL/L (ref 98–111)
CHLORIDE BLD-SCNC: 111 MMOL/L (ref 98–111)
CO2: 13 MMOL/L (ref 22–29)
CO2: 17 MMOL/L (ref 22–29)
CREAT SERPL-MCNC: 2.7 MG/DL (ref 0.5–0.9)
CREAT SERPL-MCNC: 2.8 MG/DL (ref 0.5–0.9)
EOSINOPHILS ABSOLUTE: 0.1 K/UL (ref 0–0.6)
EOSINOPHILS RELATIVE PERCENT: 1.2 % (ref 0–5)
GFR AFRICAN AMERICAN: 19
GFR AFRICAN AMERICAN: 20
GFR NON-AFRICAN AMERICAN: 16
GFR NON-AFRICAN AMERICAN: 17
GLUCOSE BLD-MCNC: 168 MG/DL (ref 70–99)
GLUCOSE BLD-MCNC: 191 MG/DL (ref 74–109)
GLUCOSE BLD-MCNC: 239 MG/DL (ref 70–99)
GLUCOSE BLD-MCNC: 256 MG/DL (ref 74–109)
HCT VFR BLD CALC: 36.8 % (ref 37–47)
HEMOGLOBIN: 11.6 G/DL (ref 12–16)
IMMATURE GRANULOCYTES #: 0 K/UL
LYMPHOCYTES ABSOLUTE: 2.3 K/UL (ref 1.1–4.5)
LYMPHOCYTES RELATIVE PERCENT: 31.3 % (ref 20–40)
MCH RBC QN AUTO: 32.6 PG (ref 27–31)
MCHC RBC AUTO-ENTMCNC: 31.5 G/DL (ref 33–37)
MCV RBC AUTO: 103.4 FL (ref 81–99)
MONOCYTES ABSOLUTE: 0.6 K/UL (ref 0–0.9)
MONOCYTES RELATIVE PERCENT: 8.4 % (ref 0–10)
NEUTROPHILS ABSOLUTE: 4.2 K/UL (ref 1.5–7.5)
NEUTROPHILS RELATIVE PERCENT: 58.7 % (ref 50–65)
PARATHYROID HORMONE INTACT: 66.5 PG/ML (ref 15–65)
PDW BLD-RTO: 13.1 % (ref 11.5–14.5)
PERFORMED ON: ABNORMAL
PERFORMED ON: ABNORMAL
PLATELET # BLD: 185 K/UL (ref 130–400)
PMV BLD AUTO: 11.2 FL (ref 9.4–12.3)
POTASSIUM REFLEX MAGNESIUM: 5 MMOL/L (ref 3.5–5)
POTASSIUM REFLEX MAGNESIUM: 5.9 MMOL/L (ref 3.5–5)
RBC # BLD: 3.56 M/UL (ref 4.2–5.4)
SODIUM BLD-SCNC: 137 MMOL/L (ref 136–145)
SODIUM BLD-SCNC: 140 MMOL/L (ref 136–145)
VITAMIN D 25-HYDROXY: 53.1 NG/ML
WBC # BLD: 7.2 K/UL (ref 4.8–10.8)

## 2022-09-23 PROCEDURE — 6370000000 HC RX 637 (ALT 250 FOR IP): Performed by: NURSE PRACTITIONER

## 2022-09-23 PROCEDURE — 6360000002 HC RX W HCPCS: Performed by: NURSE PRACTITIONER

## 2022-09-23 PROCEDURE — 2580000003 HC RX 258: Performed by: NURSE PRACTITIONER

## 2022-09-23 PROCEDURE — 83970 ASSAY OF PARATHORMONE: CPT

## 2022-09-23 PROCEDURE — 80048 BASIC METABOLIC PNL TOTAL CA: CPT

## 2022-09-23 PROCEDURE — 82306 VITAMIN D 25 HYDROXY: CPT

## 2022-09-23 PROCEDURE — 96372 THER/PROPH/DIAG INJ SC/IM: CPT

## 2022-09-23 PROCEDURE — 82947 ASSAY GLUCOSE BLOOD QUANT: CPT

## 2022-09-23 PROCEDURE — 36415 COLL VENOUS BLD VENIPUNCTURE: CPT

## 2022-09-23 PROCEDURE — 85025 COMPLETE CBC W/AUTO DIFF WBC: CPT

## 2022-09-23 RX ORDER — SODIUM BICARBONATE 650 MG/1
650 TABLET ORAL 3 TIMES DAILY
Status: DISCONTINUED | OUTPATIENT
Start: 2022-09-23 | End: 2022-09-23 | Stop reason: HOSPADM

## 2022-09-23 RX ORDER — GUAIFENESIN/DEXTROMETHORPHAN 100-10MG/5
5 SYRUP ORAL EVERY 4 HOURS PRN
Status: DISCONTINUED | OUTPATIENT
Start: 2022-09-23 | End: 2022-09-23 | Stop reason: HOSPADM

## 2022-09-23 RX ADMIN — SODIUM ZIRCONIUM CYCLOSILICATE 10 G: 10 POWDER, FOR SUSPENSION ORAL at 12:44

## 2022-09-23 RX ADMIN — ACETAMINOPHEN 650 MG: 325 TABLET, FILM COATED ORAL at 10:26

## 2022-09-23 RX ADMIN — SODIUM ZIRCONIUM CYCLOSILICATE 5 G: 5 POWDER, FOR SUSPENSION ORAL at 12:32

## 2022-09-23 RX ADMIN — HEPARIN SODIUM 5000 UNITS: 5000 INJECTION INTRAVENOUS; SUBCUTANEOUS at 06:13

## 2022-09-23 RX ADMIN — SODIUM BICARBONATE 325 MG: 325 TABLET ORAL at 10:26

## 2022-09-23 RX ADMIN — ACETAMINOPHEN 650 MG: 325 TABLET, FILM COATED ORAL at 01:52

## 2022-09-23 RX ADMIN — LEVOTHYROXINE SODIUM 75 MCG: 25 TABLET ORAL at 06:10

## 2022-09-23 RX ADMIN — ALLOPURINOL 100 MG: 100 TABLET ORAL at 10:26

## 2022-09-23 RX ADMIN — Medication 10 ML: at 10:28

## 2022-09-23 RX ADMIN — INSULIN GLARGINE 10 UNITS: 100 INJECTION, SOLUTION SUBCUTANEOUS at 09:30

## 2022-09-23 RX ADMIN — HYDRALAZINE HYDROCHLORIDE 25 MG: 25 TABLET, FILM COATED ORAL at 10:26

## 2022-09-23 RX ADMIN — SACUBITRIL AND VALSARTAN 1 TABLET: 24; 26 TABLET, FILM COATED ORAL at 10:26

## 2022-09-23 ASSESSMENT — PAIN DESCRIPTION - DESCRIPTORS
DESCRIPTORS: ACHING;DULL
DESCRIPTORS: ACHING

## 2022-09-23 ASSESSMENT — PAIN DESCRIPTION - ORIENTATION
ORIENTATION: RIGHT;LEFT;UPPER
ORIENTATION: RIGHT;LEFT

## 2022-09-23 ASSESSMENT — PAIN DESCRIPTION - LOCATION
LOCATION: KNEE;HIP
LOCATION: LEG

## 2022-09-23 ASSESSMENT — PAIN SCALES - GENERAL
PAINLEVEL_OUTOF10: 5
PAINLEVEL_OUTOF10: 5

## 2022-09-23 NOTE — CARE COORDINATION
Patient is from [facility]. Has bed hold and can return when medically stable. Covid test is not needed.   Marisol Santana [phone]  F [phone]  Electronically signed by King Lamb RN on 9/23/2022 at 7:58 AM

## 2022-09-23 NOTE — CONSULTS
Palliative Care:        Pt known to Palliative Care. Initiated for support, goals of care and ACP. On arrival to visit pt reports she is going back to [facility]. Reviewed her family contacts and her healthcare wishes. She is asking to get dressed in her own clothes, and is anticipating the facility to come pick her up. Pleasant lady with no immediate needs and denies any pain or soa. Encouragement and support was provided to her. Past Medical History:        Past Medical History:   Diagnosis Date    Aneurysm ([facility] 01/01/1993    [facility]     Breast cyst     Chronic pulmonary disease     CKD (chronic kidney disease)     Colon polyps     COPD (chronic obstructive pulmonary disease) ([facility] 03/15/2022    DM (diabetes mellitus) (HCC)     GERD (gastroesophageal reflux disease)     Hepatitis     High cholesterol     History of stomach ulcers     HTN (hypertension) 03/15/2022    Hypothyroidism 03/15/2022    Lung nodule     left/BG    Palliative care patient 03/16/2022    Stomach ache reflux    Thyroid disorder     Thyroid nodule     Urinary incontinence        Advance Directives:    DNR  Pt has documents on file, POLST, POA and living will.                Electronically signed by Matilda Block RN on 9/23/2022 at 11:23 AM

## 2022-09-23 NOTE — DISCHARGE SUMMARY
[facility]    Discharge Summary      Jen Davis  :  3/27/1933  MRN:  [id number]    Admit date:  2022  Discharge date:   2022    Discharging Physician:  Dr. Festus Bahena     Advance Directive: Full Code    Consults: nephrology    Primary Care Physician:  No primary care provider on file. Discharge Diagnoses:  Principal Problem:    Hyperkalemia  Active Problems:    Chronic kidney disease, stage 4 (severe) (HCC)    Diabetes mellitus, type 2 (HCC)    GERD (gastroesophageal reflux disease)    COPD (chronic obstructive pulmonary disease) (HCC)    HTN (hypertension)  Resolved Problems:    * No resolved hospital problems. *      Portions of this note have been copied forward, however, changed to reflect the most current clinical status of this patient. Hospital Course: The patient is a 80 y.o. female with past medical history of CKD 4, COPD, diabetes, hypertension, hypothyroidism, and GERD who presented to [facility] ED from [facility] for evaluation of hyperkalemia. Ms. Gaviota Young is a poor historian and unable to provide HPI. HPI obtained from chart review and son at bedside. Per son patient has routine lab work at [facility] on a weekly basis due to her CKD and was found to have hyperkalemia. Son reported patient having issues with hyperkalemia since 2022 and was started on [facility] and had been stable until about 3 weeks ago started having hypokalemia therefore lokelma was discontinued. Patient and family unsure about how long patient had been on spironolactone. Patient does not offer any complaints at this time. Stated she has SOB with exertion at times. Workup in ED revealed K+ 6.0, CO2 19, BUN 79, Cr 2.9, GFR 15 (Previous K+ 6.7, BUN 67, Cr 2.7, GFR 17 on 2022), glucose 309, Hgb 11.6. Patient received Lokelma, sodium bicarbonate, regular insulin, D10 bolus, and calcium chloride in ED. Patient was admitted to hospital medicine for hyperkalemia with nephrology consultation.  Hyperkalemia improved with treatment. Spironolactone discontinued. Discussed with nephrology, agrees with Lokelma 5 mg once daily. K+ was noted to be 5.9 this afternoon. One time dose of 10 mg Lokelma given. Repeat K+ check in am at [facility]. She has been advised to follow-up with PCP and nephrology as recommended. Patient is currently in stable condition to be discharged back to [facility]. Significant Diagnostic Studies:   No results found. Pertinent Labs:   CBC:   Recent Labs     09/22/22  1422 09/23/22  0316   WBC 6.5 7.2   HGB 11.6* 11.6*    185     BMP:    Recent Labs     09/22/22  1422 09/22/22  1833 09/23/22  0316   * 139 140   K 6.0* 5.3* 5.0    107 111   CO2 19* 19* 17*   BUN 79* 75* 73*   CREATININE 2.9* 2.9* 2.7*   GLUCOSE 309* 215* 191*         Physical Exam:   Vital Signs: /68   Pulse 83   Temp 97.2 °F (36.2 °C)   Resp 18   Ht 5' 6.5\" (1.689 m)   Wt 150 lb (68 kg)   SpO2 98%   BMI 23.85 kg/m²   General appearance:. Alert and Cooperative   HEENT: Normocephalic. Chest: Lung sounds clear bilaterally without wheezes or rhonchi. Cardiac: RRR, S1, S2 normal.  Murmur appreciated. No gallops, or rubs auscultated. Abdomen: soft, non-tender; non-distended normal bowel sounds no masses, no organomegaly. Extremities: No clubbing or cyanosis. No peripheral edema. Peripheral pulses palpable. Neurologic: Grossly intact.         Discharge Medications:          Medication List        START taking these medications      sodium zirconium cyclosilicate 5 g Pack oral suspension  Commonly known as: LOKELMA  Take 5 g by mouth daily            CONTINUE taking these medications      acetaminophen 500 MG tablet  Commonly known as: TYLENOL     allopurinol 100 MG tablet  Commonly known as: ZYLOPRIM     atorvastatin 40 MG tablet  Commonly known as: LIPITOR     bisacodyl 10 MG suppository  Commonly known as: DULCOLAX     bumetanide 1 MG tablet  Commonly known as: BUMEX     carboxymethylcellulose 0.5 % Soln ophthalmic solution  Commonly known as: REFRESH PLUS     Entresto 24-26 MG per tablet  Generic drug: sacubitril-valsartan     epoetin daniel-epbx 3000 UNIT/ML Soln injection  Commonly known as: RETACRIT  Inject 1 mL into the skin three times a week     ergocalciferol 1.25 MG (12064 UT) capsule  Commonly known as: ERGOCALCIFEROL     fluticasone-salmeterol 100-50 MCG/DOSE diskus inhaler  Commonly known as: ADVAIR     GLUCOTROL PO     hydrALAZINE 25 MG tablet  Commonly known as: APRESOLINE     insulin detemir 100 UNIT/ML injection vial  Commonly known as: LEVEMIR     insulin lispro 100 UNIT/ML injection vial  Commonly known as: HUMALOG     levothyroxine 75 MCG tablet  Commonly known as: SYNTHROID     mirabegron 50 MG Tb24  Commonly known as: MYRBETRIQ     ondansetron 4 MG tablet  Commonly known as: ZOFRAN     polyethylene glycol 17 GM/SCOOP powder  Commonly known as: GLYCOLAX     senna-docusate 8.6-50 MG per tablet  Commonly known as: PERICOLACE     sodium bicarbonate 325 MG tablet  Take 1 tablet by mouth 2 times daily     therapeutic multivitamin-minerals tablet     zinc 50 MG Caps  Take 50 mg by mouth daily            STOP taking these medications      spironolactone 25 MG tablet  Commonly known as: ALDACTONE               Where to Get Your Medications        Information about where to get these medications is not yet available    Ask your nurse or doctor about these medications  sodium zirconium cyclosilicate 5 g Pack oral suspension            Discharge Instructions: Follow up with PCP in 7 days. Follow-up with nephrology as recommended. Take medications as directed. Resume activity as tolerated. Diet: ADULT DIET; Regular     Disposition: Patient is medically stable and will be discharged back to SNF. Time spent on discharge 36 minutes spent in assessing patient, reviewing medications, discussion with nursing, confirming safe discharge plan and preparation of discharge summary.     Signed:  Electronically signed by Doris Alarcon, APRN - CNP on 9/23/22 at 11:12 AM CDT         EMR Dragon/Transcription disclaimer:   Much of this encounter note is an electronic transcription/translation of spoken language to printed text.  The electronic translation of spoken language may permit erroneous, or at times, nonsensical words or phrases to be inadvertently transcribed; although attempts have made to review the note for such errors, some may still exist.

## 2022-09-23 NOTE — ACP (ADVANCE CARE PLANNING)
Advance Care Planning     Advance Care Planning Activator (Inpatient)  Conversation Note      Date of ACP Conversation: 9/23/2022     Conversation Conducted with:   Pt: Abbie Mandel    ACP Activator: Judah Daniels RN        Health Care Decision Maker:     Current Designated Health Care Decision Maker:     Primary Decision Maker: Shawn Martin Child - [phone]    Secondary Decision Maker: Andrew Gonzalez Child - [phone]      Care Preferences    Ventilation: \"If you were in your present state of health and suddenly became very ill and were unable to breathe on your own, what would your preference be about the use of a ventilator (breathing machine) if it were available to you? \"      Would the patient desire the use of ventilator (breathing machine)?:   NO      Resuscitation  \"CPR works best to restart the heart when there is a sudden event, like a heart attack, in someone who is otherwise healthy. Unfortunately, CPR does not typically restart the heart for people who have serious health conditions or who are very sick. \"    \"In the event your heart stopped as a result of an underlying serious health condition, would you want attempts to be made to restart your heart (answer \"yes\" for attempt to resuscitate) or would you prefer a natural death (answer \"no\" for do not attempt to resuscitate)? \"   NO         Conversation Outcomes:  [x] ACP discussion completed  [x] Existing advance directive reviewed with patient; no changes to patient's previously recorded wishes.

## 2022-09-23 NOTE — CONSULTS
Nephrology ([facility]) Consult Note      Patient: Radha Holland  YOB: 1933  Date of Service: 9/23/2022  MRN: [id number]   Acct: [de-identified]   Primary Care Physician: No primary care provider on file. Advance Directive: Full Code  Admit Date: 9/22/2022       Hospital Day: 1  Referring Provider: Ene Blas MD    Patient independently seen and examined, Chart, Consults, Notes, Operative notes, Labs, Cardiology, and Radiology studies reviewed as available. Chief complaint: Abnormal labs. Subjective: Radha Holland is a 80 y.o. female for whom we were consulted for evaluation and treatment of acute kidney injury/chronic kidney disease. She follows Dr. Alpesh Fisher in the office. Her last serum creatinine was 2.5 mg/estimated GFR 20 mL. She is currently a resident of [address] [place] [address] and was directed to come to the hospital as she has recurrent hyperkalemia. She has type II diabetic nephropathy, hypertension, anemia of chronic kidney disease, metabolic acidemia and poorly controlled hypertension. Despite restricting her high potassium diet her potassium remains high. Hospital course markable for treatment with IV fluid and Lokelma and serum potassium is back to 5.0 mmol today. She denies any nausea vomiting or diarrhea.   Her intake of fluid has been good she is working on to get better control of type 2 diabetes    Allergies:  Celebrex [celecoxib], Codeine, Pregabalin, Protonix [pantoprazole sodium], Quinapril hcl, Sulfa antibiotics, Tramadol, Trental [pentoxifylline er], and Vioxx [rofecoxib]    Medicines:  Current Facility-Administered Medications   Medication Dose Route Frequency Provider Last Rate Last Admin    sodium chloride flush 0.9 % injection 5-40 mL  5-40 mL IntraVENous 2 times per day Bisi Nelson, APRN - CNP   10 mL at 09/23/22 1028    sodium chloride flush 0.9 % injection 5-40 mL  5-40 mL IntraVENous PRN Bisi Nelson, APRN - CNP        0.9 % sodium chloride infusion   IntraVENous PRN Watt , APRN - CNP        heparin (porcine) injection 5,000 Units  5,000 Units SubCUTAneous 3 times per day Watt , APRN - CNP   5,000 Units at 09/23/22 5956    ondansetron (ZOFRAN-ODT) disintegrating tablet 4 mg  4 mg Oral Q8H PRN Watt , APRN - CNP        Or    ondansetron (ZOFRAN) injection 4 mg  4 mg IntraVENous Q6H PRN Watt , APRN - CNP        polyethylene glycol (GLYCOLAX) packet 17 g  17 g Oral Daily PRN Watt , APRN - CNP        acetaminophen (TYLENOL) tablet 650 mg  650 mg Oral Q6H PRN Watt , APRN - CNP   650 mg at 09/23/22 1026    Or    acetaminophen (TYLENOL) suppository 650 mg  650 mg Rectal Q6H PRN Watt , APRN - CNP        0.9 % sodium chloride infusion   IntraVENous Continuous Watt , APRN - CNP 75 mL/hr at 09/22/22 1827 New Bag at 09/22/22 1827    allopurinol (ZYLOPRIM) tablet 100 mg  100 mg Oral Daily Watt , APRN - CNP   100 mg at 09/23/22 1026    atorvastatin (LIPITOR) tablet 40 mg  40 mg Oral Nightly Watt , APRN - CNP   40 mg at 09/22/22 2159    [Held by provider] bumetanide (BUMEX) tablet 0.5 mg  0.5 mg Oral Daily Watt , APRN - CNP        sacubitril-valsartan (ENTRESTO) 24-26 MG per tablet 1 tablet  1 tablet Oral BID Watt , APRN - CNP   1 tablet at 09/23/22 1026    hydrALAZINE (APRESOLINE) tablet 25 mg  25 mg Oral TID Watt , APRN - CNP   25 mg at 09/23/22 1026    levothyroxine (SYNTHROID) tablet 75 mcg  75 mcg Oral Daily Watt , APRN - CNP   75 mcg at 09/23/22 0610    trospium (SANCTURA) tablet 20 mg  20 mg Oral Nightly Watt , APRN - CNP   20 mg at 09/22/22 2158    sodium bicarbonate tablet 325 mg  325 mg Oral BID Watt , APRN - CNP   325 mg at 09/23/22 1026    insulin lispro (HUMALOG) injection vial 0-8 Units  0-8 Units SubCUTAneous TID  PREMA Garcia CNP   2 Units at 22 1856    insulin lispro (HUMALOG) injection vial 0-4 Units  0-4 Units SubCUTAneous Nightly Marletta Elks, APRN - CNP        glucose chewable tablet 16 g  4 tablet Oral PRN Marletta Elks, APRN - CNP        dextrose bolus 10% 125 mL  125 mL IntraVENous PRN Marletta Elks, APRN - CNP        Or    dextrose bolus 10% 250 mL  250 mL IntraVENous PRN Marletta Elks, APRN - CNP        glucagon (rDNA) injection 1 mg  1 mg IntraMUSCular PRN Marletta Elks, APRN - CNP        dextrose 10 % infusion   IntraVENous Continuous PRN Marletta Elks, APRN - CNP        insulin glargine (LANTUS) injection vial 10 Units  10 Units SubCUTAneous BID Marletta Elks, APRN - CNP   10 Units at 22 2200       Past Medical History:  Past Medical History:   Diagnosis Date    Aneurysm ([facility] 1993    [facility]     Breast cyst     Chronic pulmonary disease     CKD (chronic kidney disease)     Colon polyps     COPD (chronic obstructive pulmonary disease) ([facility] 03/15/2022    DM (diabetes mellitus) ([facility]     GERD (gastroesophageal reflux disease)     Hepatitis     High cholesterol     History of stomach ulcers     HTN (hypertension) 03/15/2022    Hypothyroidism 03/15/2022    Lung nodule     left/BG    Palliative care patient 2022    Stomach ache reflux    Thyroid disorder     Thyroid nodule     Urinary incontinence        Past Surgical History:  Past Surgical History:   Procedure Laterality Date    BLADDER SURGERY  ? BRAIN ANEURYSM SURGERY      BREAST SURGERY      BIOPSY    CARPAL TUNNEL RELEASE      CATARACT REMOVAL       SECTION      TIMES 4    COLONOSCOPY  3-    DR Ann Reynoso    COLONOSCOPY  13    [place]    CYST REMOVAL      FINGER    GALLBLADDER SURGERY      HYSTERECTOMY (CERVIX STATUS UNKNOWN)      KNEE ARTHROSCOPY      KNEE SURGERY      RTK    OTHER SURGICAL HISTORY      arteriorgram, surgeon said leave it alone no surgery.     TONSILLECTOMY AND ADENOIDECTOMY      UPPER GASTROINTESTINAL ENDOSCOPY  2010    DR Louisa Garcia    UPPER GASTROINTESTINAL ENDOSCOPY  10/25/2013    Louise       Family History  Family History   Problem Relation Age of Onset    Stroke Mother     High Blood Pressure Mother     Lung Cancer Brother     Esophageal Cancer Brother     Other Brother         CKD    High Blood Pressure Father     High Blood Pressure Sister        Social History  Social History     Socioeconomic History    Marital status:      Spouse name: Not on file    Number of children: Not on file    Years of education: Not on file    Highest education level: Not on file   Occupational History    Not on file   Tobacco Use    Smoking status: Former     Types: Cigarettes     Start date: 46     Quit date: 1993     Years since quittin.6    Smokeless tobacco: Never    Tobacco comments:     quit    Vaping Use    Vaping Use: Never used   Substance and Sexual Activity    Alcohol use: No    Drug use: No    Sexual activity: Not on file   Other Topics Concern    Not on file   Social History Narrative    Not on file     Social Determinants of Health     Financial Resource Strain: Not on file   Food Insecurity: Not on file   Transportation Needs: Not on file   Physical Activity: Not on file   Stress: Not on file   Social Connections: Not on file   Intimate Partner Violence: Not on file   Housing Stability: Not on file         Review of Systems:  History obtained from chart review and the patient  General ROS: No fever or chills  Respiratory ROS: No cough, shortness of breath, wheezing  Cardiovascular ROS: No chest pain or palpitations  Gastrointestinal ROS: No abdominal pain or melena  Genito-Urinary ROS: No dysuria or hematuria  Musculoskeletal ROS: No joint pain or swelling   14 point ROS reviewed with the patient and negative except as noted above and in the HPI unless unable to obtain.     Objective:  Patient Vitals for the past 24 hrs:   BP Temp Pulse Resp SpO2 Height Weight 09/23/22 0354 125/68 97.2 °F (36.2 °C) 83 18 98 % -- --   09/22/22 1936 (!) 177/76 96.8 °F (36 °C) 78 -- 100 % -- --   09/22/22 1345 (!) 118/56 -- 69 17 98 % -- --   09/22/22 1324 -- 98.1 °F (36.7 °C) -- -- -- -- --   09/22/22 1320 (!) 123/57 -- 73 18 99 % 5' 6.5\" (1.689 m) 150 lb (68 kg)     No intake or output data in the 24 hours ending 09/23/22 1034  General: awake/alert   HEENT: Normocephalic atraumatic head  Neck: Supple with no JVD or carotid bruits  Chest:  clear to auscultation bilaterally  CVS: regular rate and rhythm  Abdominal: soft, nontender, normal bowel sounds  Extremities: no cyanosis or edema  Skin: warm and dry without rash      Labs:  BMP:   Recent Labs     09/22/22  1422 09/22/22  1833 09/23/22  0316   * 139 140   K 6.0* 5.3* 5.0    107 111   CO2 19* 19* 17*   BUN 79* 75* 73*   CREATININE 2.9* 2.9* 2.7*   CALCIUM 9.7 10.8* 9.9     CBC:   Recent Labs     09/22/22  1422 09/23/22  0316   WBC 6.5 7.2   HGB 11.6* 11.6*   HCT 35.2* 36.8*   .9* 103.4*    185     LIVER PROFILE:   Recent Labs     09/22/22  1422   AST 29   ALT 60*   BILITOT <0.2   ALKPHOS 76     PT/INR: No results for input(s): PROTIME, INR in the last 72 hours. APTT: No results for input(s): APTT in the last 72 hours. BNP:  No results for input(s): BNP in the last 72 hours. Ionized Calcium:No results for input(s): IONCA in the last 72 hours. Magnesium:No results for input(s): MG in the last 72 hours. Phosphorus:No results for input(s): PHOS in the last 72 hours. HgbA1C:   Recent Labs     09/22/22  1422   LABA1C 8.2*     Hepatic:   Recent Labs     09/22/22  1422   ALKPHOS 76   ALT 60*   AST 29   PROT 6.6   BILITOT <0.2   LABALBU 4.1     Lactic Acid: No results for input(s): LACTA in the last 72 hours. Troponin: No results for input(s): CKTOTAL, CKMB, TROPONINT in the last 72 hours. ABGs: No results for input(s): PH, PCO2, PO2, HCO3, O2SAT in the last 72 hours.   CRP:  No results for input(s): CRP in the

## 2022-09-26 LAB
EKG P AXIS: 64 DEGREES
EKG P-R INTERVAL: 190 MS
EKG Q-T INTERVAL: 397 MS
EKG QRS DURATION: 113 MS
EKG QTC CALCULATION (BAZETT): 429 MS
EKG T AXIS: 48 DEGREES

## 2022-10-26 ENCOUNTER — TELEPHONE (OUTPATIENT)
Dept: HEMATOLOGY | Age: 87
End: 2022-10-26

## 2022-10-26 NOTE — TELEPHONE ENCOUNTER
Pt of Eleanor: Pt son Wing Trevizo called to cx pt appt for 11/3/22 w/Eleanor. He states pt is resident at [facility] and they have been checking her lab for low H&H. Last lab is in pt chart 10/20/22. Pt will call for appt if pt H&H drops since it is difficult to get pt to appts.

## 2023-01-01 ENCOUNTER — OFFICE VISIT (OUTPATIENT)
Dept: CARDIOLOGY | Facility: CLINIC | Age: 88
End: 2023-01-01
Payer: MEDICARE

## 2023-01-01 VITALS
BODY MASS INDEX: 26.36 KG/M2 | OXYGEN SATURATION: 99 % | HEART RATE: 73 BPM | HEIGHT: 66 IN | SYSTOLIC BLOOD PRESSURE: 124 MMHG | WEIGHT: 164 LBS | DIASTOLIC BLOOD PRESSURE: 70 MMHG

## 2023-01-01 DIAGNOSIS — I50.32 CHRONIC DIASTOLIC HEART FAILURE: ICD-10-CM

## 2023-01-01 DIAGNOSIS — I35.0 NONRHEUMATIC AORTIC VALVE STENOSIS: Primary | ICD-10-CM

## 2023-01-01 PROCEDURE — 99213 OFFICE O/P EST LOW 20 MIN: CPT | Performed by: INTERNAL MEDICINE

## 2023-01-01 PROCEDURE — 93000 ELECTROCARDIOGRAM COMPLETE: CPT | Performed by: INTERNAL MEDICINE

## 2023-01-01 RX ORDER — ONDANSETRON 4 MG/1
4 TABLET, FILM COATED ORAL EVERY 8 HOURS PRN
COMMUNITY

## 2023-01-01 RX ORDER — HYDROCODONE BITARTRATE AND ACETAMINOPHEN 7.5; 325 MG/1; MG/1
1 TABLET ORAL EVERY 6 HOURS PRN
COMMUNITY

## 2023-01-01 RX ORDER — POTASSIUM CHLORIDE 750 MG/1
10 TABLET, EXTENDED RELEASE ORAL DAILY
COMMUNITY

## 2023-01-01 RX ORDER — CYANOCOBALAMIN/FOLIC AC/VIT B6 1-2.5-25MG
TABLET ORAL
COMMUNITY
Start: 2022-01-01

## 2023-01-11 NOTE — PROGRESS NOTES
Subjective:     Encounter Date:01/11/2023      Patient ID: Honey Canales is a 89 y.o. female with moderate aortic valve stenosis, diastolic dysfunction, advanced chronic kidney disease, hypertension, insulin requiring diabetes mellitus who presents today for routine follow-up.    Chief Complaint: Here for follow-up of valvular heart disease    History of Present Illness    This patient presents today for routine follow-up.  She is known to have moderate aortic valve stenosis as well as diastolic dysfunction and advanced chronic kidney disease.  She says that from a cardiac standpoint she has been doing reasonably well.  She has noticed in recent months an increase in lower extremity edema.  She has been using diuretic therapy and wrapping her legs and trying to elevate her legs as much as possible which seems to keep the edema relatively stable.  Breathing has been stable.  She denies having any chest discomfort or palpitations.  No lightheadedness, dizziness or syncope.  Blood pressure is generally well controlled.  Family notes that she had labs checked recently that were reviewed by nephrology.  Apparently, renal function has been relatively stable and potassium levels have been stable.      Current Outpatient Medications:   •  acetaminophen (TYLENOL) 500 MG tablet, Take 500 mg by mouth Every 6 (Six) Hours As Needed for Mild Pain  or Fever., Disp: , Rfl:   •  atorvastatin (LIPITOR) 40 MG tablet, Take 40 mg by mouth Every Night., Disp: , Rfl:   •  bumetanide (BUMEX) 1 MG tablet, Take 1 tablet by mouth Daily. (Patient taking differently: Take 0.5 mg by mouth 2 (Two) Times a Day.), Disp: , Rfl:   •  carboxymethylcellulose (REFRESH PLUS) 0.5 % solution, Administer 1 drop to both eyes At Night As Needed for Dry Eyes., Disp: , Rfl:   •  Folbee 2.5-25-1 MG tablet tablet, , Disp: , Rfl:   •  glipizide (GLUCOTROL) 10 MG tablet, Take 10 mg by mouth Daily., Disp: , Rfl:   •  hydrALAZINE (APRESOLINE) 25 MG tablet, Take 1  tablet by mouth Every 8 (Eight) Hours. (Patient taking differently: Take 25 mg by mouth 2 (Two) Times a Day.), Disp: , Rfl:   •  HYDROcodone-acetaminophen (NORCO) 7.5-325 MG per tablet, Take 1 tablet by mouth Every 6 (Six) Hours As Needed for Moderate Pain., Disp: , Rfl:   •  insulin detemir (LEVEMIR) 100 UNIT/ML injection, Inject 10 Units under the skin into the appropriate area as directed 2 (Two) Times a Day., Disp: , Rfl: 12  •  insulin lispro (humaLOG) 100 UNIT/ML injection, Inject 2-7 Units under the skin into the appropriate area as directed 3 (Three) Times a Day With Meals. Inject under the skin TID WM as per sliding scale: 150-199= 2 units 200-249= 3 units 250-299= 4 units 300-349= 5 units 350-399= 6 units >400= 7 units and call MD, Disp: , Rfl:   •  levothyroxine (SYNTHROID, LEVOTHROID) 75 MCG tablet, Take 75 mcg by mouth Daily., Disp: , Rfl:   •  Mirabegron ER (MYRBETRIQ) 50 MG tablet sustained-release 24 hour 24 hr tablet, Take 50 mg by mouth Daily., Disp: , Rfl:   •  ondansetron (ZOFRAN) 4 MG tablet, Take 4 mg by mouth Every 8 (Eight) Hours As Needed for Nausea or Vomiting., Disp: , Rfl:   •  polyethylene glycol (polyethylene glycol) 17 g packet, Take 17 g by mouth Daily., Disp: , Rfl:   •  potassium chloride (K-DUR,KLOR-CON) 10 MEQ CR tablet, Take 10 mEq by mouth Daily., Disp: , Rfl:   •  sacubitril-valsartan (ENTRESTO) 24-26 MG tablet, Take 1 tablet by mouth 2 (Two) Times a Day., Disp: , Rfl:   •  sennosides-docusate (PERICOLACE) 8.6-50 MG per tablet, Take 1 tablet by mouth Daily., Disp: , Rfl:   •  sodium bicarbonate 325 MG tablet, Take 325 mg by mouth 2 (Two) Times a Day., Disp: , Rfl:     Allergies   Allergen Reactions   • Codeine Phosphate [Codeine] Unknown (See Comments)     CHEST PAIN   • Collagen Other (See Comments)     CAT GUT SUTURES \ WOUND WOULD NOT HEAL AND GOT INFECTION   • Accupril [Quinapril Hcl] Other (See Comments)     COUGH   • Aspirin Other (See Comments)     HISTORY OF STOMACH  ULCERS   • Adhesive Tape Rash     SKIN BLISTERS   • Celebrex [Celecoxib] Nausea Only   • Lyrica [Pregabalin] Other (See Comments)     GAINED 50 POUNDS   • Pantoprazole Sodium Diarrhea   • Pentoxifylline Diarrhea   • Sulfa Antibiotics Nausea And Vomiting   • Tramadol Nausea And Vomiting   • Vioxx [Rofecoxib] Nausea And Vomiting     Social History     Tobacco Use   • Smoking status: Former     Types: Cigarettes     Quit date:      Years since quittin.0   • Smokeless tobacco: Never   • Tobacco comments:     SMOKED OVER 40 YEARS   Substance Use Topics   • Alcohol use: No     Review of Systems   Constitutional: Positive for malaise/fatigue. Negative for fever and weight loss.   Cardiovascular: Positive for leg swelling. Negative for chest pain, dyspnea on exertion, orthopnea, palpitations, paroxysmal nocturnal dyspnea and syncope.   Respiratory: Negative for cough, shortness of breath and wheezing.    Hematologic/Lymphatic: Negative for bleeding problem.   Gastrointestinal: Negative for abdominal pain, nausea and vomiting.   Neurological: Positive for weakness. Negative for dizziness and headaches.       ECG 12 Lead    Date/Time: 2023 3:38 PM  Performed by: Seth Rueda MD  Authorized by: Seth Rueda MD   Comparison: compared with previous ECG from 2022  Similar to previous ECG  Rhythm: sinus rhythm  Rate: normal  BPM: 75  Conduction: conduction normal  QRS axis: normal  Other findings: poor R wave progression    Clinical impression: non-specific ECG             Objective:     Vitals reviewed.   Constitutional:       General: Not in acute distress.     Appearance: Well-developed and not in distress. Chronically ill-appearing.      Comments: Seated in a wheelchair   HENT:      Head: Normocephalic and atraumatic.   Pulmonary:      Effort: Pulmonary effort is normal.      Breath sounds: Normal breath sounds. No wheezing. No rhonchi. No rales.   Cardiovascular:      Normal rate.  "Regular rhythm.      Murmurs: There is a grade 2/6 harsh midsystolic murmur at the URSB.      No gallop.   Pulses:     Intact distal pulses.   Edema:     Peripheral edema present.     Comments: Legs are wrapped with Ace bandages bilaterally  Abdominal:      General: Bowel sounds are normal. There is no distension.      Palpations: Abdomen is soft.      Tenderness: There is no abdominal tenderness.   Skin:     General: Skin is warm and dry. There is no cyanosis.      Findings: No erythema or rash.   Neurological:      Mental Status: Alert and oriented to person, place, and time.      Cranial Nerves: No cranial nerve deficit.       /70   Pulse 73   Ht 167.6 cm (66\")   Wt 74.4 kg (164 lb)   SpO2 99%   BMI 26.47 kg/m²     Data/Lab Review:     Results for orders placed during the hospital encounter of 10/31/21    Adult Transthoracic Echo Complete W/ Cont if Necessary Per Protocol    Interpretation Summary  · Left ventricular ejection fraction appears to be 66 - 70%. Left ventricular systolic function is normal.  · Moderate aortic valve stenosis is present.  · Left ventricular diastolic function is consistent with (grade II w/high LAP) pseudonormalization.  · Estimated right ventricular systolic pressure from tricuspid regurgitation is mildly elevated (35-45 mmHg).  · Normal size and function of the right ventricle.        Assessment:          Diagnosis Plan   1. Nonrheumatic aortic valve stenosis  ECG 12 Lead      2. Chronic diastolic heart failure (HCC)             Plan:       1.  Chronic diastolic heart failure: Reasonably stable.  The patient has developed some lower extremity edema which is likely multifactorial.  She is known to have diastolic dysfunction as well as valvular dysfunction.  Previously, normal systolic function was noted.  Her pulmonary exam today is benign.  I did not appreciate any elevation of JVP, no abdominal distention but she does have some lower extremity edema that seems to be " reasonably well managed with elevation and compression.     2.  Nonrheumatic aortic valve stenosis:  The patient's most recent echocardiogram as noted above.  Moderate aortic valve stenosis was noted on that study.  Clinical exam today suggests no worse than moderate aortic valve stenosis that she does have a preserved S2 sound.  No indication to repeat echocardiogram at this time.     We will see the patient again in 6 months unless otherwise needed sooner.

## 2023-04-24 ENCOUNTER — TELEPHONE (OUTPATIENT)
Dept: UROLOGY | Facility: CLINIC | Age: 88
End: 2023-04-24
Payer: MEDICARE

## 2023-04-24 NOTE — TELEPHONE ENCOUNTER
“Please be informed that patient has passed. Patient has been marked  in the system. The date of death is: 2023    Caller: Chaka Canales    Relationship: Emergency Contact    Best call back number: [phone]      DOES HE NEED TO CONTACT [employer]?  PLEASE CALL SON,

## 2023-04-24 NOTE — TELEPHONE ENCOUNTER
I called patient's son and he was just calling our office to let us know that his mother had passed away.

## 2023-08-03 NOTE — OUTREACH NOTE
23  Nereida Mendez is a 53 year old female here for annual well woman exam.   [facility]   LMP-2022  NO POST COITAL BLEEDING OR DYSPAREUNIA  HAVING INTERMITTENT RIGHT ADNEXAL PAIN   HX OF OVARIAN CYSTS      GYNE History  LMP-2022  Contraception: post menopausal  Hx STD: No  Last Mammogram:   Recent Results (from the past 365 days)    MAMMO SCREENING BILATERAL W YARED    Impression  MAMMOGRAPHY  BENIGN    There is no mammographic evidence of malignancy. A 1 year screening mammogram is recommended.    Patient has heterogeneously/extremely dense breast tissue and elevated lifetime risk of breast cancer according to the Tyrer-Cuzick risk assessment model. Consider genetic screening if applicable and/or adjunct screening MRI, or Molecular Breast Imaging  as clinically indicated.      MAMMOGRAPHY BI-RADS: 2 BENIGN    Electronically Signed by: Juan torres/power:2023 14:34:10      letter sent: Normal Single Exam     History of abnormal mammogram:  Health Maintenance   Topic Date Due   • Mammogram- Breast Cancer Screening  2024      Family history of breast cancer: No known family history of breast cancer.   Family history of uterine or ovarian cancer: No    OB History    Para Term  AB Living   3 3 0 0 0 3   SAB IAB Ectopic Molar Multiple Live Births   0 0 0 0 0 0     Current Outpatient Medications   Medication Sig Dispense Refill   • meloxicam (MOBIC) 15 MG tablet Take 1 tablet by mouth daily. Take with food. 30 tablet 0     No current facility-administered medications for this visit.     ALLERGIES:  Amoxicillin   Past Medical History:   Diagnosis Date   • Greater trochanteric bursitis of left hip    • Palpitations 2022    Stress test negative, Holter monitor with PVCs short runs SVT      Past Surgical History:   Procedure Laterality Date   • APPENDECTOMY     • BREAST LUMPECTOMY Left    • TONSILLECTOMY        Family History   Problem Relation Age  Prep Survey      Responses   [facility] facility patient discharged from? [place]   Is LACE score < 7 ? No   Emergency Room discharge w/ pulse ox? No   Eligibility Not Eligible   What are the reasons patient is not eligible? [facility]   Does the patient have one of the following disease processes/diagnoses(primary or secondary)? COPD/Pneumonia   Prep survey completed? Yes          Marilia Tejeda RN         of Onset   • COPD Mother    • Diabetes Mother    • Kidney disease Mother    • Heart Father    • Hypertension Brother    • Cancer, Esophageal Brother 56      Social History     Tobacco Use   • Smoking status: Never   • Smokeless tobacco: Never   Vaping Use   • Vaping Use: never used   Substance Use Topics   • Alcohol use: Yes     Alcohol/week: 1.0 standard drink of alcohol     Types: 1 Standard drinks or equivalent per week     Comment: social   • Drug use: Never             Review of Systems   Constitutional: Negative for activity change, appetite change, chills, diaphoresis, fatigue, fever and unexpected weight change.   Eyes: Negative.         No discharge or reddness   Respiratory: Negative for cough and shortness of breath.    Cardiovascular: Negative for chest pain and palpitations.   Gastrointestinal: Negative for abdominal distention, abdominal pain, blood in stool, constipation, diarrhea, nausea and vomiting.   Endocrine: Negative for cold intolerance and heat intolerance.   Genitourinary: Negative.  Negative for decreased urine volume, dyspareunia, dysuria, flank pain, frequency, genital sores, hematuria, menstrual problem, pelvic pain, urgency, vaginal bleeding, vaginal discharge and vaginal pain.   Skin: Negative for color change and rash.   Neurological: Negative for dizziness, light-headedness and headaches.   Hematological: Negative for adenopathy.   Psychiatric/Behavioral: Negative.         Appropriate behavioral interaction        Physical Exam  Vitals reviewed.   Constitutional:       Appearance: Normal appearance.   Neck:      Thyroid: No thyroid mass or thyroid tenderness.   Cardiovascular:      Rate and Rhythm: Normal rate and regular rhythm.      Heart sounds: No murmur heard.  Pulmonary:      Effort: Pulmonary effort is normal.      Breath sounds: Normal breath sounds.   Chest:   Breasts:     Right: Anne Reed N.D.

## 2023-08-15 NOTE — ED TRIAGE NOTES
PT PRESENTS FROM SNF DUE TO SOA. SNF STATES IT STARTED THIS AM AND INCREASINGLY GOT WORSE TO AN O2 SAT OF 91. SNF STATES PT'S O2 TYPICALLY RUNS AROUND 96%. DOES NOT WEAR O2 REGULARLY. EMS STATES THAT PT WAS IN DISTRESS UPON ARRIVAL WITH RESPIRATIONS AROUND 50. NEB GIVEN EN ROUTE WITH RELIEF. PT CURRENTLY ON 3L. PT STATES THIS HAS BEEN GOING ON FOR A FEW WEEKS.    Minocycline Pregnancy And Lactation Text: This medication is Pregnancy Category D and not consider safe during pregnancy. It is also excreted in breast milk.

## (undated) DEVICE — SPNG GZ 2S 2X2 8PLY STRL PK/2

## (undated) DEVICE — DRP C/ARMOR

## (undated) DEVICE — ANTENNA PROGRAMMER NEUROSTIM ACTIVA

## (undated) DEVICE — INTENDED FOR TISSUE SEPARATION, AND OTHER PROCEDURES THAT REQUIRE A SHARP SURGICAL BLADE TO PUNCTURE OR CUT.: Brand: BARD-PARKER ® STAINLESS STEEL BLADES

## (undated) DEVICE — PK TURNOVER RM ADV

## (undated) DEVICE — CVR UNIV C/ARM

## (undated) DEVICE — HDRST INTUB GENTLETOUCH SLOT 7IN RT

## (undated) DEVICE — ANTIBACTERIAL UNDYED BRAIDED (POLYGLACTIN 910), SYNTHETIC ABSORBABLE SUTURE: Brand: COATED VICRYL

## (undated) DEVICE — PAD MINOR UNIVERSAL: Brand: MEDLINE INDUSTRIES, INC.

## (undated) DEVICE — ADHS LIQ MASTISOL 2/3ML

## (undated) DEVICE — KT INTRO LD INTERSTIM 2 355018

## (undated) DEVICE — GLV SURG TRIUMPH MICRO PF LTX 7.5 STRL

## (undated) DEVICE — CABL SACRAL NEUROSTM INTERSTIM 218CM

## (undated) DEVICE — 3M™ STERI-STRIP™ REINFORCED ADHESIVE SKIN CLOSURES, R1546, 1/4 IN X 4 IN (6 MM X 100 MM), 10 STRIPS/ENVELOPE: Brand: 3M™ STERI-STRIP™

## (undated) DEVICE — PROGRAMMER NEUROSTIM PT ICON 3037

## (undated) DEVICE — DRSNG SURESITE WNDW 4X4.5

## (undated) DEVICE — SUT SILK 0 SUTUPAK TIES 24IN SA76G

## (undated) DEVICE — TRY PREP SCRB VAG PVP

## (undated) DEVICE — NDL HYPO PRECISIONGLIDE REG 25G 1 1/2